# Patient Record
Sex: FEMALE | Race: BLACK OR AFRICAN AMERICAN | NOT HISPANIC OR LATINO | Employment: FULL TIME | URBAN - METROPOLITAN AREA
[De-identification: names, ages, dates, MRNs, and addresses within clinical notes are randomized per-mention and may not be internally consistent; named-entity substitution may affect disease eponyms.]

---

## 2020-01-01 ENCOUNTER — TELEPHONE (OUTPATIENT)
Dept: HEMATOLOGY/ONCOLOGY | Facility: CLINIC | Age: 30
End: 2020-01-01

## 2020-01-01 ENCOUNTER — ANESTHESIA EVENT (OUTPATIENT)
Dept: SURGERY | Facility: HOSPITAL | Age: 30
DRG: 314 | End: 2020-01-01
Payer: MEDICAID

## 2020-01-01 ENCOUNTER — HOSPITAL ENCOUNTER (EMERGENCY)
Facility: HOSPITAL | Age: 30
Discharge: HOME | End: 2020-08-09
Attending: FAMILY MEDICINE
Payer: MEDICAID

## 2020-01-01 ENCOUNTER — DOCUMENTATION ONLY (OUTPATIENT)
Dept: HEMATOLOGY/ONCOLOGY | Facility: CLINIC | Age: 30
End: 2020-01-01

## 2020-01-01 ENCOUNTER — OFFICE VISIT (OUTPATIENT)
Dept: HEMATOLOGY/ONCOLOGY | Facility: CLINIC | Age: 30
End: 2020-01-01
Payer: MEDICAID

## 2020-01-01 ENCOUNTER — PATIENT OUTREACH (OUTPATIENT)
Dept: ADMINISTRATIVE | Facility: CLINIC | Age: 30
End: 2020-01-01

## 2020-01-01 ENCOUNTER — LAB VISIT (OUTPATIENT)
Dept: LAB | Facility: HOSPITAL | Age: 30
End: 2020-01-01
Attending: INTERNAL MEDICINE
Payer: MEDICAID

## 2020-01-01 ENCOUNTER — HOSPITAL ENCOUNTER (EMERGENCY)
Facility: HOSPITAL | Age: 30
Discharge: HOME | End: 2020-06-06
Attending: FAMILY MEDICINE
Payer: MEDICAID

## 2020-01-01 ENCOUNTER — ANESTHESIA (OUTPATIENT)
Dept: SURGERY | Facility: HOSPITAL | Age: 30
DRG: 314 | End: 2020-01-01
Payer: MEDICAID

## 2020-01-01 ENCOUNTER — OFFICE VISIT (OUTPATIENT)
Dept: SURGERY | Facility: CLINIC | Age: 30
End: 2020-01-01
Payer: MEDICAID

## 2020-01-01 ENCOUNTER — TELEPHONE (OUTPATIENT)
Dept: SURGERY | Facility: CLINIC | Age: 30
End: 2020-01-01

## 2020-01-01 ENCOUNTER — ANESTHESIA (OUTPATIENT)
Dept: INTENSIVE CARE | Facility: HOSPITAL | Age: 30
DRG: 871 | End: 2020-01-01
Payer: MEDICAID

## 2020-01-01 ENCOUNTER — TELEPHONE (OUTPATIENT)
Dept: GASTROENTEROLOGY | Facility: CLINIC | Age: 30
End: 2020-01-01

## 2020-01-01 ENCOUNTER — OFFICE VISIT (OUTPATIENT)
Dept: FAMILY MEDICINE | Facility: HOSPITAL | Age: 30
End: 2020-01-01
Attending: INTERNAL MEDICINE
Payer: MEDICAID

## 2020-01-01 ENCOUNTER — HOSPITAL ENCOUNTER (INPATIENT)
Facility: HOSPITAL | Age: 30
LOS: 7 days | Discharge: HOME | DRG: 330 | End: 2020-07-20
Attending: EMERGENCY MEDICINE | Admitting: INTERNAL MEDICINE
Payer: MEDICAID

## 2020-01-01 ENCOUNTER — ANESTHESIA EVENT (OUTPATIENT)
Dept: SURGERY | Facility: HOSPITAL | Age: 30
DRG: 330 | End: 2020-01-01
Payer: MEDICAID

## 2020-01-01 ENCOUNTER — TELEPHONE (OUTPATIENT)
Dept: HOME HEALTH SERVICES | Facility: CLINIC | Age: 30
End: 2020-01-01

## 2020-01-01 ENCOUNTER — HOSPITAL ENCOUNTER (INPATIENT)
Facility: HOSPITAL | Age: 30
LOS: 4 days | Discharge: HOME HEALTH | DRG: 871 | End: 2020-09-24
Attending: EMERGENCY MEDICINE | Admitting: HOSPITALIST
Payer: MEDICAID

## 2020-01-01 ENCOUNTER — CLINICAL SUPPORT (OUTPATIENT)
Dept: HEMATOLOGY/ONCOLOGY | Facility: CLINIC | Age: 30
End: 2020-01-01
Payer: MEDICAID

## 2020-01-01 ENCOUNTER — RESEARCH ENCOUNTER (OUTPATIENT)
Dept: RESEARCH | Facility: HOSPITAL | Age: 30
End: 2020-01-01

## 2020-01-01 ENCOUNTER — HOSPITAL ENCOUNTER (INPATIENT)
Facility: HOSPITAL | Age: 30
LOS: 12 days | DRG: 871 | End: 2020-10-06
Attending: EMERGENCY MEDICINE | Admitting: EMERGENCY MEDICINE
Payer: MEDICAID

## 2020-01-01 ENCOUNTER — ANESTHESIA (OUTPATIENT)
Dept: ENDOSCOPY | Facility: HOSPITAL | Age: 30
DRG: 330 | End: 2020-01-01
Payer: MEDICAID

## 2020-01-01 ENCOUNTER — TELEPHONE (OUTPATIENT)
Dept: INFUSION THERAPY | Facility: HOSPITAL | Age: 30
End: 2020-01-01

## 2020-01-01 ENCOUNTER — INFUSION (OUTPATIENT)
Dept: INFUSION THERAPY | Facility: HOSPITAL | Age: 30
End: 2020-01-01
Attending: INTERNAL MEDICINE
Payer: MEDICAID

## 2020-01-01 ENCOUNTER — ANESTHESIA (OUTPATIENT)
Dept: ENDOSCOPY | Facility: HOSPITAL | Age: 30
DRG: 871 | End: 2020-01-01
Payer: MEDICAID

## 2020-01-01 ENCOUNTER — INFUSION (OUTPATIENT)
Dept: INFUSION THERAPY | Facility: HOSPITAL | Age: 30
End: 2020-01-01
Payer: MEDICAID

## 2020-01-01 ENCOUNTER — ANESTHESIA EVENT (OUTPATIENT)
Dept: ENDOSCOPY | Facility: HOSPITAL | Age: 30
DRG: 871 | End: 2020-01-01
Payer: MEDICAID

## 2020-01-01 ENCOUNTER — ANESTHESIA (OUTPATIENT)
Dept: SURGERY | Facility: HOSPITAL | Age: 30
DRG: 330 | End: 2020-01-01
Payer: MEDICAID

## 2020-01-01 ENCOUNTER — ANESTHESIA EVENT (OUTPATIENT)
Dept: INTENSIVE CARE | Facility: HOSPITAL | Age: 30
DRG: 871 | End: 2020-01-01
Payer: MEDICAID

## 2020-01-01 ENCOUNTER — HOSPITAL ENCOUNTER (EMERGENCY)
Facility: HOSPITAL | Age: 30
Discharge: HOME | End: 2020-08-01
Attending: EMERGENCY MEDICINE
Payer: MEDICAID

## 2020-01-01 ENCOUNTER — ANESTHESIA EVENT (OUTPATIENT)
Dept: ENDOSCOPY | Facility: HOSPITAL | Age: 30
DRG: 330 | End: 2020-01-01
Payer: MEDICAID

## 2020-01-01 ENCOUNTER — HOSPITAL ENCOUNTER (INPATIENT)
Facility: HOSPITAL | Age: 30
LOS: 14 days | Discharge: HOME | DRG: 314 | End: 2020-09-09
Attending: EMERGENCY MEDICINE | Admitting: INTERNAL MEDICINE
Payer: MEDICAID

## 2020-01-01 ENCOUNTER — NURSE TRIAGE (OUTPATIENT)
Dept: ADMINISTRATIVE | Facility: CLINIC | Age: 30
End: 2020-01-01

## 2020-01-01 VITALS
HEART RATE: 121 BPM | TEMPERATURE: 98 F | DIASTOLIC BLOOD PRESSURE: 70 MMHG | SYSTOLIC BLOOD PRESSURE: 108 MMHG | OXYGEN SATURATION: 99 % | WEIGHT: 186.75 LBS | BODY MASS INDEX: 31.11 KG/M2 | HEIGHT: 65 IN | RESPIRATION RATE: 17 BRPM

## 2020-01-01 VITALS
OXYGEN SATURATION: 98 % | DIASTOLIC BLOOD PRESSURE: 101 MMHG | SYSTOLIC BLOOD PRESSURE: 136 MMHG | HEART RATE: 121 BPM | BODY MASS INDEX: 30.71 KG/M2 | TEMPERATURE: 98 F | WEIGHT: 184.5 LBS | RESPIRATION RATE: 18 BRPM

## 2020-01-01 VITALS
RESPIRATION RATE: 20 BRPM | WEIGHT: 194 LBS | SYSTOLIC BLOOD PRESSURE: 143 MMHG | DIASTOLIC BLOOD PRESSURE: 89 MMHG | OXYGEN SATURATION: 96 % | BODY MASS INDEX: 32.32 KG/M2 | HEART RATE: 118 BPM | HEIGHT: 65 IN | TEMPERATURE: 100 F

## 2020-01-01 VITALS
HEIGHT: 65 IN | OXYGEN SATURATION: 100 % | WEIGHT: 186.31 LBS | SYSTOLIC BLOOD PRESSURE: 115 MMHG | DIASTOLIC BLOOD PRESSURE: 74 MMHG | HEART RATE: 103 BPM | RESPIRATION RATE: 22 BRPM | BODY MASS INDEX: 31.04 KG/M2 | TEMPERATURE: 98 F

## 2020-01-01 VITALS
SYSTOLIC BLOOD PRESSURE: 135 MMHG | RESPIRATION RATE: 20 BRPM | DIASTOLIC BLOOD PRESSURE: 90 MMHG | HEART RATE: 90 BPM | BODY MASS INDEX: 33.99 KG/M2 | WEIGHT: 204 LBS | OXYGEN SATURATION: 100 % | HEIGHT: 65 IN | TEMPERATURE: 98 F

## 2020-01-01 VITALS
TEMPERATURE: 99 F | SYSTOLIC BLOOD PRESSURE: 92 MMHG | RESPIRATION RATE: 18 BRPM | BODY MASS INDEX: 30.64 KG/M2 | DIASTOLIC BLOOD PRESSURE: 92 MMHG | DIASTOLIC BLOOD PRESSURE: 64 MMHG | SYSTOLIC BLOOD PRESSURE: 130 MMHG | DIASTOLIC BLOOD PRESSURE: 89 MMHG | WEIGHT: 185.94 LBS | OXYGEN SATURATION: 97 % | HEART RATE: 102 BPM | HEIGHT: 65 IN | SYSTOLIC BLOOD PRESSURE: 126 MMHG | WEIGHT: 183.88 LBS | BODY MASS INDEX: 29.88 KG/M2 | HEART RATE: 112 BPM | HEIGHT: 66 IN

## 2020-01-01 VITALS
SYSTOLIC BLOOD PRESSURE: 107 MMHG | RESPIRATION RATE: 20 BRPM | DIASTOLIC BLOOD PRESSURE: 65 MMHG | OXYGEN SATURATION: 99 % | TEMPERATURE: 99 F | HEART RATE: 114 BPM | BODY MASS INDEX: 31.07 KG/M2 | HEIGHT: 65 IN

## 2020-01-01 VITALS
RESPIRATION RATE: 20 BRPM | DIASTOLIC BLOOD PRESSURE: 79 MMHG | TEMPERATURE: 100 F | HEART RATE: 111 BPM | WEIGHT: 293 LBS | HEIGHT: 65 IN | BODY MASS INDEX: 48.82 KG/M2 | OXYGEN SATURATION: 100 % | SYSTOLIC BLOOD PRESSURE: 125 MMHG

## 2020-01-01 VITALS
HEART RATE: 119 BPM | WEIGHT: 191.13 LBS | DIASTOLIC BLOOD PRESSURE: 84 MMHG | OXYGEN SATURATION: 100 % | HEIGHT: 66 IN | BODY MASS INDEX: 30.72 KG/M2 | RESPIRATION RATE: 21 BRPM | TEMPERATURE: 99 F | SYSTOLIC BLOOD PRESSURE: 144 MMHG

## 2020-01-01 VITALS
SYSTOLIC BLOOD PRESSURE: 80 MMHG | BODY MASS INDEX: 32.36 KG/M2 | HEART RATE: 125 BPM | TEMPERATURE: 102 F | RESPIRATION RATE: 9 BRPM | WEIGHT: 194.25 LBS | DIASTOLIC BLOOD PRESSURE: 45 MMHG | OXYGEN SATURATION: 83 % | HEIGHT: 65 IN

## 2020-01-01 VITALS
SYSTOLIC BLOOD PRESSURE: 138 MMHG | WEIGHT: 195.13 LBS | OXYGEN SATURATION: 99 % | HEART RATE: 103 BPM | HEIGHT: 65 IN | BODY MASS INDEX: 32.51 KG/M2 | DIASTOLIC BLOOD PRESSURE: 94 MMHG | TEMPERATURE: 99 F | RESPIRATION RATE: 18 BRPM

## 2020-01-01 VITALS
TEMPERATURE: 98 F | SYSTOLIC BLOOD PRESSURE: 137 MMHG | DIASTOLIC BLOOD PRESSURE: 91 MMHG | RESPIRATION RATE: 18 BRPM | HEART RATE: 94 BPM | OXYGEN SATURATION: 100 %

## 2020-01-01 DIAGNOSIS — C18.9 ADENOCARCINOMA OF COLON: Primary | ICD-10-CM

## 2020-01-01 DIAGNOSIS — R79.89 ELEVATED LFTS: ICD-10-CM

## 2020-01-01 DIAGNOSIS — T45.1X5A CHEMOTHERAPY-INDUCED NAUSEA AND VOMITING: ICD-10-CM

## 2020-01-01 DIAGNOSIS — R78.81 MRSA BACTEREMIA: ICD-10-CM

## 2020-01-01 DIAGNOSIS — C18.9 ADENOCARCINOMA OF COLON: ICD-10-CM

## 2020-01-01 DIAGNOSIS — Z71.89 COUNSELING REGARDING ADVANCE CARE PLANNING AND GOALS OF CARE: ICD-10-CM

## 2020-01-01 DIAGNOSIS — E87.20 LACTIC ACID ACIDOSIS: ICD-10-CM

## 2020-01-01 DIAGNOSIS — R11.2 INTRACTABLE NAUSEA AND VOMITING: Primary | ICD-10-CM

## 2020-01-01 DIAGNOSIS — G93.40 ACUTE ENCEPHALOPATHY: ICD-10-CM

## 2020-01-01 DIAGNOSIS — E87.20 LACTIC ACIDOSIS: ICD-10-CM

## 2020-01-01 DIAGNOSIS — B95.62 MRSA BACTEREMIA: ICD-10-CM

## 2020-01-01 DIAGNOSIS — R11.2 CHEMOTHERAPY-INDUCED NAUSEA AND VOMITING: ICD-10-CM

## 2020-01-01 DIAGNOSIS — T80.219D INFECTION OF VENOUS ACCESS PORT, SUBSEQUENT ENCOUNTER: ICD-10-CM

## 2020-01-01 DIAGNOSIS — C79.9 METASTATIC DISEASE: ICD-10-CM

## 2020-01-01 DIAGNOSIS — G89.3 CHRONIC NEOPLASM-RELATED PAIN: ICD-10-CM

## 2020-01-01 DIAGNOSIS — R78.81 BACTEREMIA DUE TO STAPHYLOCOCCUS: ICD-10-CM

## 2020-01-01 DIAGNOSIS — D50.0 IRON DEFICIENCY ANEMIA DUE TO CHRONIC BLOOD LOSS: ICD-10-CM

## 2020-01-01 DIAGNOSIS — Z71.89 ADVANCE CARE PLANNING: ICD-10-CM

## 2020-01-01 DIAGNOSIS — R00.0 TACHYARRHYTHMIA: ICD-10-CM

## 2020-01-01 DIAGNOSIS — R06.02 SHORTNESS OF BREATH: ICD-10-CM

## 2020-01-01 DIAGNOSIS — D63.0 ANEMIA IN NEOPLASTIC DISEASE: ICD-10-CM

## 2020-01-01 DIAGNOSIS — T45.1X5A CHEMOTHERAPY-INDUCED NEUTROPENIA: ICD-10-CM

## 2020-01-01 DIAGNOSIS — I38 ENDOCARDITIS: ICD-10-CM

## 2020-01-01 DIAGNOSIS — R52 PAIN: ICD-10-CM

## 2020-01-01 DIAGNOSIS — R94.31 QT PROLONGATION: ICD-10-CM

## 2020-01-01 DIAGNOSIS — K59.00 CONSTIPATION, UNSPECIFIED CONSTIPATION TYPE: ICD-10-CM

## 2020-01-01 DIAGNOSIS — K63.89 COLONIC MASS: ICD-10-CM

## 2020-01-01 DIAGNOSIS — R10.9 ABDOMINAL PAIN: ICD-10-CM

## 2020-01-01 DIAGNOSIS — R41.82 ALTERED MENTAL STATUS, UNSPECIFIED ALTERED MENTAL STATUS TYPE: Primary | ICD-10-CM

## 2020-01-01 DIAGNOSIS — C78.7 SECONDARY MALIGNANCY OF LIVER: ICD-10-CM

## 2020-01-01 DIAGNOSIS — A41.9 SEPSIS: ICD-10-CM

## 2020-01-01 DIAGNOSIS — T80.219A INFECTION OF VENOUS ACCESS PORT, INITIAL ENCOUNTER: ICD-10-CM

## 2020-01-01 DIAGNOSIS — Z51.5 PALLIATIVE CARE ENCOUNTER: ICD-10-CM

## 2020-01-01 DIAGNOSIS — E87.20 METABOLIC ACIDOSIS: ICD-10-CM

## 2020-01-01 DIAGNOSIS — B95.8 BACTEREMIA DUE TO STAPHYLOCOCCUS: ICD-10-CM

## 2020-01-01 DIAGNOSIS — T81.30XA WOUND DEHISCENCE: Primary | ICD-10-CM

## 2020-01-01 DIAGNOSIS — R00.0 TACHYCARDIA: ICD-10-CM

## 2020-01-01 DIAGNOSIS — C18.9 METASTATIC COLON CANCER TO LIVER: ICD-10-CM

## 2020-01-01 DIAGNOSIS — R34 OLIGURIA: ICD-10-CM

## 2020-01-01 DIAGNOSIS — R04.0 EPISTAXIS: ICD-10-CM

## 2020-01-01 DIAGNOSIS — D63.0 ANEMIA IN NEOPLASTIC DISEASE: Primary | ICD-10-CM

## 2020-01-01 DIAGNOSIS — R07.9 CHEST PAIN: ICD-10-CM

## 2020-01-01 DIAGNOSIS — R65.21 SEPTIC SHOCK: ICD-10-CM

## 2020-01-01 DIAGNOSIS — Z71.89 GOALS OF CARE, COUNSELING/DISCUSSION: ICD-10-CM

## 2020-01-01 DIAGNOSIS — C78.7 METASTATIC COLON CANCER TO LIVER: ICD-10-CM

## 2020-01-01 DIAGNOSIS — R11.2 NON-INTRACTABLE VOMITING WITH NAUSEA, UNSPECIFIED VOMITING TYPE: ICD-10-CM

## 2020-01-01 DIAGNOSIS — R31.9 URINARY TRACT INFECTION WITH HEMATURIA, SITE UNSPECIFIED: Primary | ICD-10-CM

## 2020-01-01 DIAGNOSIS — E80.6 HYPERBILIRUBINEMIA: ICD-10-CM

## 2020-01-01 DIAGNOSIS — R11.2 NAUSEA AND VOMITING, INTRACTABILITY OF VOMITING NOT SPECIFIED, UNSPECIFIED VOMITING TYPE: Primary | ICD-10-CM

## 2020-01-01 DIAGNOSIS — R79.89 ELEVATED TROPONIN: ICD-10-CM

## 2020-01-01 DIAGNOSIS — D70.1 CHEMOTHERAPY-INDUCED NEUTROPENIA: ICD-10-CM

## 2020-01-01 DIAGNOSIS — Z79.899 DVT PROPHYLAXIS: ICD-10-CM

## 2020-01-01 DIAGNOSIS — Z01.818 PREOP TESTING: Primary | ICD-10-CM

## 2020-01-01 DIAGNOSIS — R16.0 LIVER MASS: ICD-10-CM

## 2020-01-01 DIAGNOSIS — D70.9 NEUTROPENIA, UNSPECIFIED TYPE: ICD-10-CM

## 2020-01-01 DIAGNOSIS — N17.9 AKI (ACUTE KIDNEY INJURY): ICD-10-CM

## 2020-01-01 DIAGNOSIS — A41.9 SEPTIC SHOCK: ICD-10-CM

## 2020-01-01 DIAGNOSIS — N39.0 URINARY TRACT INFECTION WITH HEMATURIA, SITE UNSPECIFIED: Primary | ICD-10-CM

## 2020-01-01 DIAGNOSIS — A41.9 SEPSIS, DUE TO UNSPECIFIED ORGANISM, UNSPECIFIED WHETHER ACUTE ORGAN DYSFUNCTION PRESENT: Primary | ICD-10-CM

## 2020-01-01 LAB
ABO + RH BLD: NORMAL
AFP SERPL-MCNC: 2.2 NG/ML (ref 0–8.4)
ALBUMIN SERPL BCP-MCNC: 1.5 G/DL (ref 3.5–5.2)
ALBUMIN SERPL BCP-MCNC: 1.5 G/DL (ref 3.5–5.2)
ALBUMIN SERPL BCP-MCNC: 1.6 G/DL (ref 3.5–5.2)
ALBUMIN SERPL BCP-MCNC: 1.6 G/DL (ref 3.5–5.2)
ALBUMIN SERPL BCP-MCNC: 1.7 G/DL (ref 3.5–5.2)
ALBUMIN SERPL BCP-MCNC: 1.8 G/DL (ref 3.5–5.2)
ALBUMIN SERPL BCP-MCNC: 1.9 G/DL (ref 3.5–5.2)
ALBUMIN SERPL BCP-MCNC: 1.9 G/DL (ref 3.5–5.2)
ALBUMIN SERPL BCP-MCNC: 2 G/DL (ref 3.5–5.2)
ALBUMIN SERPL BCP-MCNC: 2.1 G/DL (ref 3.5–5.2)
ALBUMIN SERPL BCP-MCNC: 2.2 G/DL (ref 3.5–5.2)
ALBUMIN SERPL BCP-MCNC: 2.3 G/DL (ref 3.5–5.2)
ALBUMIN SERPL BCP-MCNC: 2.3 G/DL (ref 3.5–5.2)
ALBUMIN SERPL BCP-MCNC: 2.4 G/DL (ref 3.5–5.2)
ALBUMIN SERPL BCP-MCNC: 2.9 G/DL (ref 3.5–5.2)
ALBUMIN SERPL BCP-MCNC: 3 G/DL (ref 3.5–5.2)
ALBUMIN SERPL BCP-MCNC: 3 G/DL (ref 3.5–5.2)
ALBUMIN SERPL BCP-MCNC: 3.5 G/DL (ref 3.5–5.2)
ALBUMIN SERPL BCP-MCNC: 3.5 G/DL (ref 3.5–5.2)
ALBUMIN SERPL BCP-MCNC: 3.7 G/DL (ref 3.5–5.2)
ALBUMIN SERPL BCP-MCNC: 3.9 G/DL (ref 3.5–5.2)
ALBUMIN SERPL BCP-MCNC: 4.1 G/DL (ref 3.5–5.2)
ALBUMIN SERPL BCP-MCNC: 4.2 G/DL (ref 3.5–5.2)
ALBUMIN SERPL BCP-MCNC: 4.2 G/DL (ref 3.5–5.2)
ALBUMIN SERPL BCP-MCNC: 4.5 G/DL (ref 3.5–5.2)
ALBUMIN SERPL BCP-MCNC: 4.5 G/DL (ref 3.5–5.2)
ALBUMIN/CREAT UR: 66.7 UG/MG (ref 0–30)
ALLENS TEST: ABNORMAL
ALP SERPL-CCNC: 126 U/L (ref 38–126)
ALP SERPL-CCNC: 156 U/L (ref 55–135)
ALP SERPL-CCNC: 159 U/L (ref 55–135)
ALP SERPL-CCNC: 160 U/L (ref 55–135)
ALP SERPL-CCNC: 167 U/L (ref 55–135)
ALP SERPL-CCNC: 167 U/L (ref 55–135)
ALP SERPL-CCNC: 190 U/L (ref 38–126)
ALP SERPL-CCNC: 202 U/L (ref 38–126)
ALP SERPL-CCNC: 205 U/L (ref 38–126)
ALP SERPL-CCNC: 235 U/L (ref 38–126)
ALP SERPL-CCNC: 240 U/L (ref 55–135)
ALP SERPL-CCNC: 247 U/L (ref 38–126)
ALP SERPL-CCNC: 280 U/L (ref 55–135)
ALP SERPL-CCNC: 281 U/L (ref 38–126)
ALP SERPL-CCNC: 291 U/L (ref 55–135)
ALP SERPL-CCNC: 294 U/L (ref 55–135)
ALP SERPL-CCNC: 301 U/L (ref 55–135)
ALP SERPL-CCNC: 308 U/L (ref 55–135)
ALP SERPL-CCNC: 313 U/L (ref 55–135)
ALP SERPL-CCNC: 325 U/L (ref 55–135)
ALP SERPL-CCNC: 330 U/L (ref 55–135)
ALP SERPL-CCNC: 336 U/L (ref 55–135)
ALP SERPL-CCNC: 337 U/L (ref 55–135)
ALP SERPL-CCNC: 372 U/L (ref 55–135)
ALP SERPL-CCNC: 383 U/L (ref 55–135)
ALP SERPL-CCNC: 397 U/L (ref 55–135)
ALP SERPL-CCNC: 417 U/L (ref 55–135)
ALP SERPL-CCNC: 93 U/L (ref 55–135)
ALP SERPL-CCNC: 93 U/L (ref 55–135)
ALT SERPL W/O P-5'-P-CCNC: 111 U/L (ref 10–44)
ALT SERPL W/O P-5'-P-CCNC: 128 U/L (ref 10–44)
ALT SERPL W/O P-5'-P-CCNC: 129 U/L (ref 10–44)
ALT SERPL W/O P-5'-P-CCNC: 20 U/L (ref 10–44)
ALT SERPL W/O P-5'-P-CCNC: 24 U/L (ref 10–44)
ALT SERPL W/O P-5'-P-CCNC: 37 U/L (ref 10–44)
ALT SERPL W/O P-5'-P-CCNC: 37 U/L (ref 10–44)
ALT SERPL W/O P-5'-P-CCNC: 38 U/L (ref 10–44)
ALT SERPL W/O P-5'-P-CCNC: 38 U/L (ref 10–44)
ALT SERPL W/O P-5'-P-CCNC: 46 U/L (ref 10–44)
ALT SERPL W/O P-5'-P-CCNC: 46 U/L (ref 10–44)
ALT SERPL W/O P-5'-P-CCNC: 47 U/L (ref 10–44)
ALT SERPL W/O P-5'-P-CCNC: 51 U/L (ref 10–44)
ALT SERPL W/O P-5'-P-CCNC: 52 U/L (ref 10–44)
ALT SERPL W/O P-5'-P-CCNC: 54 U/L (ref 10–44)
ALT SERPL W/O P-5'-P-CCNC: 55 U/L (ref 10–44)
ALT SERPL W/O P-5'-P-CCNC: 56 U/L (ref 10–44)
ALT SERPL W/O P-5'-P-CCNC: 58 U/L (ref 10–44)
ALT SERPL W/O P-5'-P-CCNC: 58 U/L (ref 10–44)
ALT SERPL W/O P-5'-P-CCNC: 61 U/L (ref 10–44)
ALT SERPL W/O P-5'-P-CCNC: 64 U/L (ref 10–44)
ALT SERPL W/O P-5'-P-CCNC: 70 U/L (ref 10–44)
ALT SERPL W/O P-5'-P-CCNC: 71 U/L (ref 10–44)
ALT SERPL W/O P-5'-P-CCNC: 73 U/L (ref 10–44)
ALT SERPL W/O P-5'-P-CCNC: 81 U/L (ref 10–44)
ALT SERPL W/O P-5'-P-CCNC: 83 U/L (ref 10–44)
ALT SERPL W/O P-5'-P-CCNC: 84 U/L (ref 10–44)
ALT SERPL W/O P-5'-P-CCNC: 93 U/L (ref 10–44)
ALT SERPL W/O P-5'-P-CCNC: 98 U/L (ref 10–44)
AMMONIA PLAS-SCNC: 18 UMOL/L (ref 10–50)
AMMONIA PLAS-SCNC: 30 UMOL/L (ref 10–50)
AMORPH CRY UR QL COMP ASSIST: ABNORMAL
AMPHET+METHAMPHET UR QL: NEGATIVE
AMPHIPHYSIN AB TITR CSF: NEGATIVE TITER
AMPHIPHYSIN AB TITR SER: NORMAL {TITER}
ANION GAP SERPL CALC-SCNC: 10 MMOL/L (ref 8–16)
ANION GAP SERPL CALC-SCNC: 11 MMOL/L (ref 8–16)
ANION GAP SERPL CALC-SCNC: 12 MMOL/L (ref 8–16)
ANION GAP SERPL CALC-SCNC: 13 MMOL/L (ref 8–16)
ANION GAP SERPL CALC-SCNC: 14 MMOL/L (ref 8–16)
ANION GAP SERPL CALC-SCNC: 14 MMOL/L (ref 8–16)
ANION GAP SERPL CALC-SCNC: 15 MMOL/L (ref 8–16)
ANION GAP SERPL CALC-SCNC: 16 MMOL/L (ref 8–16)
ANION GAP SERPL CALC-SCNC: 16 MMOL/L (ref 8–16)
ANION GAP SERPL CALC-SCNC: 17 MMOL/L (ref 8–16)
ANION GAP SERPL CALC-SCNC: 18 MMOL/L (ref 8–16)
ANION GAP SERPL CALC-SCNC: 20 MMOL/L (ref 8–16)
ANION GAP SERPL CALC-SCNC: 22 MMOL/L (ref 8–16)
ANION GAP SERPL CALC-SCNC: 24 MMOL/L (ref 8–16)
ANION GAP SERPL CALC-SCNC: 26 MMOL/L (ref 8–16)
ANION GAP SERPL CALC-SCNC: 7 MMOL/L (ref 8–16)
ANION GAP SERPL CALC-SCNC: 7 MMOL/L (ref 8–16)
ANION GAP SERPL CALC-SCNC: 8 MMOL/L (ref 8–16)
ANION GAP SERPL CALC-SCNC: 8 MMOL/L (ref 8–16)
ANION GAP SERPL CALC-SCNC: 9 MMOL/L (ref 8–16)
ANION GAP SERPL CALC-SCNC: 9 MMOL/L (ref 8–16)
ANISOCYTOSIS BLD QL SMEAR: ABNORMAL
ANISOCYTOSIS BLD QL SMEAR: SLIGHT
AORTIC ROOT ANNULUS: 2.61 CM
ASCENDING AORTA: 2.31 CM
ASCENDING AORTA: 2.76 CM
AST SERPL-CCNC: 100 U/L (ref 10–40)
AST SERPL-CCNC: 104 U/L (ref 10–40)
AST SERPL-CCNC: 112 U/L (ref 10–40)
AST SERPL-CCNC: 117 U/L (ref 15–46)
AST SERPL-CCNC: 118 U/L (ref 10–40)
AST SERPL-CCNC: 121 U/L (ref 10–40)
AST SERPL-CCNC: 138 U/L (ref 10–40)
AST SERPL-CCNC: 150 U/L (ref 10–40)
AST SERPL-CCNC: 153 U/L (ref 10–40)
AST SERPL-CCNC: 167 U/L (ref 10–40)
AST SERPL-CCNC: 173 U/L (ref 10–40)
AST SERPL-CCNC: 200 U/L (ref 10–40)
AST SERPL-CCNC: 201 U/L (ref 10–40)
AST SERPL-CCNC: 210 U/L (ref 15–46)
AST SERPL-CCNC: 219 U/L (ref 10–40)
AST SERPL-CCNC: 222 U/L (ref 10–40)
AST SERPL-CCNC: 237 U/L (ref 10–40)
AST SERPL-CCNC: 241 U/L (ref 15–46)
AST SERPL-CCNC: 261 U/L (ref 10–40)
AST SERPL-CCNC: 265 U/L (ref 15–46)
AST SERPL-CCNC: 274 U/L (ref 15–46)
AST SERPL-CCNC: 285 U/L (ref 10–40)
AST SERPL-CCNC: 346 U/L (ref 15–46)
AST SERPL-CCNC: 379 U/L (ref 10–40)
AST SERPL-CCNC: 383 U/L (ref 10–40)
AST SERPL-CCNC: 80 U/L (ref 10–40)
AST SERPL-CCNC: 86 U/L (ref 10–40)
AST SERPL-CCNC: 89 U/L (ref 10–40)
AST SERPL-CCNC: 90 U/L (ref 15–46)
AV INDEX (PROSTH): 0.79
AV INDEX (PROSTH): 0.82
AV MEAN GRADIENT: 3 MMHG
AV MEAN GRADIENT: 6 MMHG
AV PEAK GRADIENT: 13 MMHG
AV PEAK GRADIENT: 4 MMHG
AV VALVE AREA: 2.47 CM2
AV VALVE AREA: 2.54 CM2
AV VELOCITY RATIO: 0.71
AV VELOCITY RATIO: 0.73
B-HCG UR QL: NEGATIVE
BACTERIA #/AREA URNS AUTO: ABNORMAL /HPF
BACTERIA #/AREA URNS AUTO: NORMAL /HPF
BACTERIA #/AREA URNS HPF: ABNORMAL /HPF
BACTERIA #/AREA URNS HPF: ABNORMAL /HPF
BACTERIA #/AREA URNS HPF: NORMAL /HPF
BACTERIA BLD CULT: ABNORMAL
BACTERIA BLD CULT: NORMAL
BACTERIA CATH TIP CULT: ABNORMAL
BACTERIA CSF CULT: NO GROWTH
BACTERIA SPEC AEROBE CULT: ABNORMAL
BACTERIA SPEC ANAEROBE CULT: NORMAL
BACTERIA SPEC ANAEROBE CULT: NORMAL
BACTERIA UR CULT: ABNORMAL
BARBITURATES UR QL SCN>200 NG/ML: NEGATIVE
BASO STIPL BLD QL SMEAR: ABNORMAL
BASOPHILS # BLD AUTO: 0.01 K/UL (ref 0–0.2)
BASOPHILS # BLD AUTO: 0.02 K/UL (ref 0–0.2)
BASOPHILS # BLD AUTO: 0.03 K/UL (ref 0–0.2)
BASOPHILS # BLD AUTO: 0.04 K/UL (ref 0–0.2)
BASOPHILS # BLD AUTO: 0.04 K/UL (ref 0–0.2)
BASOPHILS # BLD AUTO: 0.05 K/UL (ref 0–0.2)
BASOPHILS # BLD AUTO: 0.06 K/UL (ref 0–0.2)
BASOPHILS # BLD AUTO: ABNORMAL K/UL (ref 0–0.2)
BASOPHILS NFR BLD: 0 % (ref 0–1.9)
BASOPHILS NFR BLD: 0.1 % (ref 0–1.9)
BASOPHILS NFR BLD: 0.3 % (ref 0–1.9)
BASOPHILS NFR BLD: 0.4 % (ref 0–1.9)
BASOPHILS NFR BLD: 0.4 % (ref 0–1.9)
BASOPHILS NFR BLD: 0.5 % (ref 0–1.9)
BASOPHILS NFR BLD: 0.5 % (ref 0–1.9)
BASOPHILS NFR BLD: 0.7 % (ref 0–1.9)
BASOPHILS NFR BLD: 0.8 % (ref 0–1.9)
BASOPHILS NFR BLD: 1 % (ref 0–1.9)
BASOPHILS NFR BLD: 2 % (ref 0–1.9)
BENZODIAZ UR QL SCN>200 NG/ML: NEGATIVE
BILIRUB SERPL-MCNC: 0.6 MG/DL (ref 0.1–1)
BILIRUB SERPL-MCNC: 0.6 MG/DL (ref 0.1–1)
BILIRUB SERPL-MCNC: 0.7 MG/DL (ref 0.1–1)
BILIRUB SERPL-MCNC: 0.7 MG/DL (ref 0.1–1)
BILIRUB SERPL-MCNC: 0.8 MG/DL (ref 0.1–1)
BILIRUB SERPL-MCNC: 0.9 MG/DL (ref 0.1–1)
BILIRUB SERPL-MCNC: 1 MG/DL (ref 0.1–1)
BILIRUB SERPL-MCNC: 1.1 MG/DL (ref 0.1–1)
BILIRUB SERPL-MCNC: 1.2 MG/DL (ref 0.1–1)
BILIRUB SERPL-MCNC: 1.3 MG/DL (ref 0.1–1)
BILIRUB SERPL-MCNC: 1.3 MG/DL (ref 0.1–1)
BILIRUB SERPL-MCNC: 1.6 MG/DL (ref 0.1–1)
BILIRUB SERPL-MCNC: 1.6 MG/DL (ref 0.1–1)
BILIRUB SERPL-MCNC: 1.8 MG/DL (ref 0.1–1)
BILIRUB SERPL-MCNC: 1.9 MG/DL (ref 0.1–1)
BILIRUB SERPL-MCNC: 2.2 MG/DL (ref 0.1–1)
BILIRUB SERPL-MCNC: 2.3 MG/DL (ref 0.1–1)
BILIRUB SERPL-MCNC: 2.5 MG/DL (ref 0.1–1)
BILIRUB SERPL-MCNC: 2.6 MG/DL (ref 0.1–1)
BILIRUB SERPL-MCNC: 2.6 MG/DL (ref 0.1–1)
BILIRUB SERPL-MCNC: 2.8 MG/DL (ref 0.1–1)
BILIRUB SERPL-MCNC: 2.8 MG/DL (ref 0.1–1)
BILIRUB SERPL-MCNC: 2.9 MG/DL (ref 0.1–1)
BILIRUB SERPL-MCNC: 2.9 MG/DL (ref 0.1–1)
BILIRUB SERPL-MCNC: 3 MG/DL (ref 0.1–1)
BILIRUB SERPL-MCNC: 3.1 MG/DL (ref 0.1–1)
BILIRUB SERPL-MCNC: 3.5 MG/DL (ref 0.1–1)
BILIRUB SERPL-MCNC: 3.7 MG/DL (ref 0.1–1)
BILIRUB SERPL-MCNC: 3.8 MG/DL (ref 0.1–1)
BILIRUB UR QL STRIP: ABNORMAL
BILIRUB UR QL STRIP: NEGATIVE
BLD GP AB SCN CELLS X3 SERPL QL: NORMAL
BLD PROD TYP BPU: NORMAL
BLOOD UNIT EXPIRATION DATE: NORMAL
BLOOD UNIT TYPE CODE: 5100
BLOOD UNIT TYPE: NORMAL
BNP SERPL-MCNC: 53 PG/ML (ref 0–99)
BSA FOR ECHO PROCEDURE: 1.93 M2
BSA FOR ECHO PROCEDURE: 1.93 M2
BSA FOR ECHO PROCEDURE: 1.97 M2
BUN SERPL-MCNC: 10 MG/DL (ref 6–20)
BUN SERPL-MCNC: 10 MG/DL (ref 6–20)
BUN SERPL-MCNC: 11 MG/DL (ref 6–20)
BUN SERPL-MCNC: 11 MG/DL (ref 6–20)
BUN SERPL-MCNC: 12 MG/DL (ref 6–20)
BUN SERPL-MCNC: 13 MG/DL (ref 6–20)
BUN SERPL-MCNC: 13 MG/DL (ref 6–20)
BUN SERPL-MCNC: 14 MG/DL (ref 6–20)
BUN SERPL-MCNC: 14 MG/DL (ref 7–17)
BUN SERPL-MCNC: 15 MG/DL (ref 6–20)
BUN SERPL-MCNC: 15 MG/DL (ref 6–20)
BUN SERPL-MCNC: 16 MG/DL (ref 6–20)
BUN SERPL-MCNC: 17 MG/DL (ref 6–20)
BUN SERPL-MCNC: 17 MG/DL (ref 6–20)
BUN SERPL-MCNC: 18 MG/DL (ref 6–20)
BUN SERPL-MCNC: 18 MG/DL (ref 6–20)
BUN SERPL-MCNC: 19 MG/DL (ref 6–20)
BUN SERPL-MCNC: 20 MG/DL (ref 6–20)
BUN SERPL-MCNC: 21 MG/DL (ref 6–20)
BUN SERPL-MCNC: 22 MG/DL (ref 6–20)
BUN SERPL-MCNC: 27 MG/DL (ref 6–20)
BUN SERPL-MCNC: 28 MG/DL (ref 6–20)
BUN SERPL-MCNC: 3 MG/DL (ref 7–17)
BUN SERPL-MCNC: 4 MG/DL (ref 6–20)
BUN SERPL-MCNC: 5 MG/DL (ref 7–17)
BUN SERPL-MCNC: 6 MG/DL (ref 6–20)
BUN SERPL-MCNC: 6 MG/DL (ref 6–20)
BUN SERPL-MCNC: 7 MG/DL (ref 6–20)
BUN SERPL-MCNC: 8 MG/DL (ref 6–20)
BUN SERPL-MCNC: 8 MG/DL (ref 7–17)
BUN SERPL-MCNC: 9 MG/DL (ref 7–17)
BURR CELLS BLD QL SMEAR: ABNORMAL
BZE UR QL SCN: NEGATIVE
C DIFF GDH STL QL: NEGATIVE
C DIFF GDH STL QL: NEGATIVE
C DIFF TOX A+B STL QL IA: NEGATIVE
C DIFF TOX A+B STL QL IA: NEGATIVE
CALCIUM SERPL-MCNC: 10 MG/DL (ref 8.7–10.5)
CALCIUM SERPL-MCNC: 10 MG/DL (ref 8.7–10.5)
CALCIUM SERPL-MCNC: 10.2 MG/DL (ref 8.7–10.5)
CALCIUM SERPL-MCNC: 10.3 MG/DL (ref 8.7–10.5)
CALCIUM SERPL-MCNC: 6.3 MG/DL (ref 8.7–10.5)
CALCIUM SERPL-MCNC: 7.1 MG/DL (ref 8.7–10.5)
CALCIUM SERPL-MCNC: 7.1 MG/DL (ref 8.7–10.5)
CALCIUM SERPL-MCNC: 7.2 MG/DL (ref 8.7–10.5)
CALCIUM SERPL-MCNC: 7.3 MG/DL (ref 8.7–10.5)
CALCIUM SERPL-MCNC: 7.4 MG/DL (ref 8.7–10.5)
CALCIUM SERPL-MCNC: 7.5 MG/DL (ref 8.7–10.5)
CALCIUM SERPL-MCNC: 7.6 MG/DL (ref 8.7–10.5)
CALCIUM SERPL-MCNC: 7.8 MG/DL (ref 8.7–10.5)
CALCIUM SERPL-MCNC: 7.9 MG/DL (ref 8.7–10.5)
CALCIUM SERPL-MCNC: 7.9 MG/DL (ref 8.7–10.5)
CALCIUM SERPL-MCNC: 8 MG/DL (ref 8.7–10.5)
CALCIUM SERPL-MCNC: 8 MG/DL (ref 8.7–10.5)
CALCIUM SERPL-MCNC: 8.3 MG/DL (ref 8.7–10.5)
CALCIUM SERPL-MCNC: 8.4 MG/DL (ref 8.7–10.5)
CALCIUM SERPL-MCNC: 8.4 MG/DL (ref 8.7–10.5)
CALCIUM SERPL-MCNC: 8.6 MG/DL (ref 8.7–10.5)
CALCIUM SERPL-MCNC: 8.6 MG/DL (ref 8.7–10.5)
CALCIUM SERPL-MCNC: 8.9 MG/DL (ref 8.7–10.5)
CALCIUM SERPL-MCNC: 9.1 MG/DL (ref 8.7–10.5)
CALCIUM SERPL-MCNC: 9.1 MG/DL (ref 8.7–10.5)
CALCIUM SERPL-MCNC: 9.2 MG/DL (ref 8.7–10.5)
CALCIUM SERPL-MCNC: 9.2 MG/DL (ref 8.7–10.5)
CALCIUM SERPL-MCNC: 9.6 MG/DL (ref 8.7–10.5)
CALCIUM SERPL-MCNC: 9.8 MG/DL (ref 8.7–10.5)
CANNABINOIDS UR QL SCN: NEGATIVE
CEA SERPL-MCNC: 133.5 NG/ML (ref 0–5)
CHLORIDE SERPL-SCNC: 100 MMOL/L (ref 95–110)
CHLORIDE SERPL-SCNC: 101 MMOL/L (ref 95–110)
CHLORIDE SERPL-SCNC: 101 MMOL/L (ref 95–110)
CHLORIDE SERPL-SCNC: 102 MMOL/L (ref 95–110)
CHLORIDE SERPL-SCNC: 103 MMOL/L (ref 95–110)
CHLORIDE SERPL-SCNC: 104 MMOL/L (ref 95–110)
CHLORIDE SERPL-SCNC: 105 MMOL/L (ref 95–110)
CHLORIDE SERPL-SCNC: 106 MMOL/L (ref 95–110)
CHLORIDE SERPL-SCNC: 106 MMOL/L (ref 95–110)
CHLORIDE SERPL-SCNC: 108 MMOL/L (ref 95–110)
CHLORIDE SERPL-SCNC: 108 MMOL/L (ref 95–110)
CHLORIDE SERPL-SCNC: 109 MMOL/L (ref 95–110)
CHLORIDE SERPL-SCNC: 109 MMOL/L (ref 95–110)
CHLORIDE SERPL-SCNC: 111 MMOL/L (ref 95–110)
CHLORIDE SERPL-SCNC: 112 MMOL/L (ref 95–110)
CHLORIDE SERPL-SCNC: 87 MMOL/L (ref 95–110)
CHLORIDE SERPL-SCNC: 93 MMOL/L (ref 95–110)
CHLORIDE SERPL-SCNC: 94 MMOL/L (ref 95–110)
CHLORIDE SERPL-SCNC: 96 MMOL/L (ref 95–110)
CHLORIDE SERPL-SCNC: 97 MMOL/L (ref 95–110)
CHLORIDE SERPL-SCNC: 97 MMOL/L (ref 95–110)
CHLORIDE SERPL-SCNC: 98 MMOL/L (ref 95–110)
CHLORIDE SERPL-SCNC: 99 MMOL/L (ref 95–110)
CHLORIDE UR-SCNC: 45 MMOL/L (ref 25–200)
CK SERPL-CCNC: 180 U/L (ref 55–170)
CK SERPL-CCNC: 299 U/L (ref 20–180)
CLARITY CSF: CLEAR
CLARITY CSF: CLEAR
CLARITY UR REFRACT.AUTO: ABNORMAL
CLARITY UR REFRACT.AUTO: CLEAR
CLARITY UR: ABNORMAL
CLARITY UR: ABNORMAL
CLARITY UR: CLEAR
CLARITY UR: CLEAR
CMV DNA SPEC QL NAA+PROBE: NOT DETECTED
CO2 SERPL-SCNC: 10 MMOL/L (ref 23–29)
CO2 SERPL-SCNC: 12 MMOL/L (ref 23–29)
CO2 SERPL-SCNC: 14 MMOL/L (ref 23–29)
CO2 SERPL-SCNC: 15 MMOL/L (ref 23–29)
CO2 SERPL-SCNC: 16 MMOL/L (ref 23–29)
CO2 SERPL-SCNC: 18 MMOL/L (ref 23–29)
CO2 SERPL-SCNC: 19 MMOL/L (ref 23–29)
CO2 SERPL-SCNC: 20 MMOL/L (ref 23–29)
CO2 SERPL-SCNC: 21 MMOL/L (ref 23–29)
CO2 SERPL-SCNC: 22 MMOL/L (ref 23–29)
CO2 SERPL-SCNC: 23 MMOL/L (ref 23–29)
CO2 SERPL-SCNC: 24 MMOL/L (ref 23–29)
CO2 SERPL-SCNC: 25 MMOL/L (ref 23–29)
CO2 SERPL-SCNC: 27 MMOL/L (ref 23–29)
CO2 SERPL-SCNC: 28 MMOL/L (ref 23–29)
CO2 SERPL-SCNC: 29 MMOL/L (ref 23–29)
CO2 SERPL-SCNC: 30 MMOL/L (ref 23–29)
CO2 SERPL-SCNC: 31 MMOL/L (ref 23–29)
CO2 SERPL-SCNC: 8 MMOL/L (ref 23–29)
CODING SYSTEM: NORMAL
COLOR CSF: COLORLESS
COLOR CSF: COLORLESS
COLOR UR AUTO: ABNORMAL
COLOR UR AUTO: ABNORMAL
COLOR UR AUTO: YELLOW
COLOR UR: ABNORMAL
COLOR UR: YELLOW
COMMENT: NORMAL
CORTIS SERPL-MCNC: 11.8 UG/DL
CREAT SERPL-MCNC: 0.6 MG/DL (ref 0.5–1.4)
CREAT SERPL-MCNC: 0.63 MG/DL (ref 0.5–1.4)
CREAT SERPL-MCNC: 0.64 MG/DL (ref 0.5–1.4)
CREAT SERPL-MCNC: 0.64 MG/DL (ref 0.5–1.4)
CREAT SERPL-MCNC: 0.65 MG/DL (ref 0.5–1.4)
CREAT SERPL-MCNC: 0.7 MG/DL (ref 0.5–1.4)
CREAT SERPL-MCNC: 0.72 MG/DL (ref 0.5–1.4)
CREAT SERPL-MCNC: 0.73 MG/DL (ref 0.5–1.4)
CREAT SERPL-MCNC: 0.8 MG/DL (ref 0.5–1.4)
CREAT SERPL-MCNC: 0.9 MG/DL (ref 0.5–1.4)
CREAT SERPL-MCNC: 1 MG/DL (ref 0.5–1.4)
CREAT SERPL-MCNC: 1.2 MG/DL (ref 0.5–1.4)
CREAT SERPL-MCNC: 1.4 MG/DL (ref 0.5–1.4)
CREAT SERPL-MCNC: 1.5 MG/DL (ref 0.5–1.4)
CREAT SERPL-MCNC: 1.7 MG/DL (ref 0.5–1.4)
CREAT SERPL-MCNC: 2 MG/DL (ref 0.5–1.4)
CREAT UR-MCNC: 49 MG/DL (ref 15–325)
CREAT UR-MCNC: 9 MG/DL (ref 15–325)
CREAT UR-MCNC: 9 MG/DL (ref 15–325)
CTP QC/QA: YES
CV ECHO LV RWT: 0.33 CM
CV ECHO LV RWT: 0.37 CM
CV2 IGG TITR CSF: NEGATIVE TITER
CV2 IGG TITR SER: NORMAL {TITER}
DACRYOCYTES BLD QL SMEAR: ABNORMAL
DELSYS: ABNORMAL
DIFFERENTIAL METHOD: ABNORMAL
DISPENSE STATUS: NORMAL
DOHLE BOD BLD QL SMEAR: PRESENT
DOP CALC AO PEAK VEL: 1.06 M/S
DOP CALC AO PEAK VEL: 1.77 M/S
DOP CALC AO VTI: 13.18 CM
DOP CALC AO VTI: 19.9 CM
DOP CALC LVOT AREA: 3.1 CM2
DOP CALC LVOT AREA: 3.1 CM2
DOP CALC LVOT DIAMETER: 1.98 CM
DOP CALC LVOT DIAMETER: 2 CM
DOP CALC LVOT PEAK VEL: 0.77 M/S
DOP CALC LVOT PEAK VEL: 1.25 M/S
DOP CALC LVOT STROKE VOLUME: 33.45 CM3
DOP CALC LVOT STROKE VOLUME: 49.2 CM3
DOP CALCLVOT PEAK VEL VTI: 10.87 CM
DOP CALCLVOT PEAK VEL VTI: 15.67 CM
E WAVE DECELERATION TIME: 60.12 MSEC
E WAVE DECELERATION TIME: 68.59 MSEC
E/A RATIO: 0.84
E/A RATIO: 0.87
E/E' RATIO: 5.57 M/S
E/E' RATIO: 8.94 M/S
EBV DNA SPEC QL NAA+PROBE: NOT DETECTED
ECHO LV POSTERIOR WALL: 0.63 CM (ref 0.6–1.1)
ECHO LV POSTERIOR WALL: 0.8 CM (ref 0.6–1.1)
EOSINOPHIL # BLD AUTO: 0 K/UL (ref 0–0.5)
EOSINOPHIL # BLD AUTO: 0.1 K/UL (ref 0–0.5)
EOSINOPHIL # BLD AUTO: 0.2 K/UL (ref 0–0.5)
EOSINOPHIL # BLD AUTO: 0.3 K/UL (ref 0–0.5)
EOSINOPHIL # BLD AUTO: ABNORMAL K/UL (ref 0–0.5)
EOSINOPHIL NFR BLD: 0 % (ref 0–8)
EOSINOPHIL NFR BLD: 0.1 % (ref 0–8)
EOSINOPHIL NFR BLD: 0.2 % (ref 0–8)
EOSINOPHIL NFR BLD: 0.3 % (ref 0–8)
EOSINOPHIL NFR BLD: 0.4 % (ref 0–8)
EOSINOPHIL NFR BLD: 0.4 % (ref 0–8)
EOSINOPHIL NFR BLD: 0.6 % (ref 0–8)
EOSINOPHIL NFR BLD: 0.7 % (ref 0–8)
EOSINOPHIL NFR BLD: 0.7 % (ref 0–8)
EOSINOPHIL NFR BLD: 0.8 % (ref 0–8)
EOSINOPHIL NFR BLD: 0.8 % (ref 0–8)
EOSINOPHIL NFR BLD: 1 % (ref 0–8)
EOSINOPHIL NFR BLD: 2 % (ref 0–8)
EOSINOPHIL NFR BLD: 2.1 % (ref 0–8)
EOSINOPHIL NFR BLD: 2.3 % (ref 0–8)
EOSINOPHIL NFR BLD: 2.4 % (ref 0–8)
EOSINOPHIL NFR BLD: 3.5 % (ref 0–8)
EOSINOPHIL NFR BLD: 4.8 % (ref 0–8)
EOSINOPHIL NFR BLD: 4.9 % (ref 0–8)
ERYTHROCYTE [DISTWIDTH] IN BLOOD BY AUTOMATED COUNT: 14.1 % (ref 11.5–14.5)
ERYTHROCYTE [DISTWIDTH] IN BLOOD BY AUTOMATED COUNT: 14.1 % (ref 11.5–14.5)
ERYTHROCYTE [DISTWIDTH] IN BLOOD BY AUTOMATED COUNT: 14.4 % (ref 11.5–14.5)
ERYTHROCYTE [DISTWIDTH] IN BLOOD BY AUTOMATED COUNT: 14.5 % (ref 11.5–14.5)
ERYTHROCYTE [DISTWIDTH] IN BLOOD BY AUTOMATED COUNT: 14.7 % (ref 11.5–14.5)
ERYTHROCYTE [DISTWIDTH] IN BLOOD BY AUTOMATED COUNT: 14.8 % (ref 11.5–14.5)
ERYTHROCYTE [DISTWIDTH] IN BLOOD BY AUTOMATED COUNT: 17.7 % (ref 11.5–14.5)
ERYTHROCYTE [DISTWIDTH] IN BLOOD BY AUTOMATED COUNT: 17.7 % (ref 11.5–14.5)
ERYTHROCYTE [DISTWIDTH] IN BLOOD BY AUTOMATED COUNT: 17.9 % (ref 11.5–14.5)
ERYTHROCYTE [DISTWIDTH] IN BLOOD BY AUTOMATED COUNT: 18.6 % (ref 11.5–14.5)
ERYTHROCYTE [DISTWIDTH] IN BLOOD BY AUTOMATED COUNT: 18.7 % (ref 11.5–14.5)
ERYTHROCYTE [DISTWIDTH] IN BLOOD BY AUTOMATED COUNT: 18.8 % (ref 11.5–14.5)
ERYTHROCYTE [DISTWIDTH] IN BLOOD BY AUTOMATED COUNT: 18.9 % (ref 11.5–14.5)
ERYTHROCYTE [DISTWIDTH] IN BLOOD BY AUTOMATED COUNT: 19.3 % (ref 11.5–14.5)
ERYTHROCYTE [DISTWIDTH] IN BLOOD BY AUTOMATED COUNT: 19.4 % (ref 11.5–14.5)
ERYTHROCYTE [DISTWIDTH] IN BLOOD BY AUTOMATED COUNT: 19.4 % (ref 11.5–14.5)
ERYTHROCYTE [DISTWIDTH] IN BLOOD BY AUTOMATED COUNT: 19.7 % (ref 11.5–14.5)
ERYTHROCYTE [DISTWIDTH] IN BLOOD BY AUTOMATED COUNT: 19.9 % (ref 11.5–14.5)
ERYTHROCYTE [DISTWIDTH] IN BLOOD BY AUTOMATED COUNT: 20.2 % (ref 11.5–14.5)
ERYTHROCYTE [DISTWIDTH] IN BLOOD BY AUTOMATED COUNT: 20.4 % (ref 11.5–14.5)
ERYTHROCYTE [DISTWIDTH] IN BLOOD BY AUTOMATED COUNT: 20.4 % (ref 11.5–14.5)
ERYTHROCYTE [DISTWIDTH] IN BLOOD BY AUTOMATED COUNT: 20.5 % (ref 11.5–14.5)
ERYTHROCYTE [DISTWIDTH] IN BLOOD BY AUTOMATED COUNT: 20.6 % (ref 11.5–14.5)
ERYTHROCYTE [DISTWIDTH] IN BLOOD BY AUTOMATED COUNT: 21.4 % (ref 11.5–14.5)
ERYTHROCYTE [DISTWIDTH] IN BLOOD BY AUTOMATED COUNT: 23.1 % (ref 11.5–14.5)
ERYTHROCYTE [DISTWIDTH] IN BLOOD BY AUTOMATED COUNT: 27.5 % (ref 11.5–14.5)
ERYTHROCYTE [DISTWIDTH] IN BLOOD BY AUTOMATED COUNT: 27.7 % (ref 11.5–14.5)
ERYTHROCYTE [DISTWIDTH] IN BLOOD BY AUTOMATED COUNT: 27.7 % (ref 11.5–14.5)
ERYTHROCYTE [DISTWIDTH] IN BLOOD BY AUTOMATED COUNT: 27.9 % (ref 11.5–14.5)
ERYTHROCYTE [DISTWIDTH] IN BLOOD BY AUTOMATED COUNT: 27.9 % (ref 11.5–14.5)
ERYTHROCYTE [DISTWIDTH] IN BLOOD BY AUTOMATED COUNT: 28 % (ref 11.5–14.5)
ERYTHROCYTE [DISTWIDTH] IN BLOOD BY AUTOMATED COUNT: 28.5 % (ref 11.5–14.5)
ERYTHROCYTE [DISTWIDTH] IN BLOOD BY AUTOMATED COUNT: 28.6 % (ref 11.5–14.5)
ERYTHROCYTE [DISTWIDTH] IN BLOOD BY AUTOMATED COUNT: 29.2 % (ref 11.5–14.5)
ERYTHROCYTE [DISTWIDTH] IN BLOOD BY AUTOMATED COUNT: 29.6 % (ref 11.5–14.5)
ERYTHROCYTE [DISTWIDTH] IN BLOOD BY AUTOMATED COUNT: 29.7 % (ref 11.5–14.5)
ERYTHROCYTE [DISTWIDTH] IN BLOOD BY AUTOMATED COUNT: 30.2 % (ref 11.5–14.5)
ERYTHROCYTE [DISTWIDTH] IN BLOOD BY AUTOMATED COUNT: 32.2 % (ref 11.5–14.5)
EST. GFR  (AFRICAN AMERICAN): 37.8 ML/MIN/1.73 M^2
EST. GFR  (AFRICAN AMERICAN): 46 ML/MIN/1.73 M^2
EST. GFR  (AFRICAN AMERICAN): 54 ML/MIN/1.73 M^2
EST. GFR  (AFRICAN AMERICAN): 58 ML/MIN/1.73 M^2
EST. GFR  (AFRICAN AMERICAN): >60 ML/MIN/1.73 M^2
EST. GFR  (NON AFRICAN AMERICAN): 32.8 ML/MIN/1.73 M^2
EST. GFR  (NON AFRICAN AMERICAN): 40 ML/MIN/1.73 M^2
EST. GFR  (NON AFRICAN AMERICAN): 46 ML/MIN/1.73 M^2
EST. GFR  (NON AFRICAN AMERICAN): 50 ML/MIN/1.73 M^2
EST. GFR  (NON AFRICAN AMERICAN): >60 ML/MIN/1.73 M^2
ESTIMATED AVG GLUCOSE: 88 MG/DL (ref 68–131)
ETHANOL UR-MCNC: <10 MG/DL
FERRITIN SERPL-MCNC: 147 NG/ML (ref 20–300)
FERRITIN SERPL-MCNC: 406 NG/ML (ref 20–300)
FINAL PATHOLOGIC DIAGNOSIS: ABNORMAL
FINAL PATHOLOGIC DIAGNOSIS: NORMAL
FLOW: 2
FLOW: 3
FOLATE SERPL-MCNC: 4.7 NG/ML (ref 4–24)
FRACTIONAL SHORTENING: 28 % (ref 28–44)
FRACTIONAL SHORTENING: 58 % (ref 28–44)
GLIAL NUC TYPE 1 AB TITR CSF: NEGATIVE TITER
GLIAL NUC TYPE 1 AB TITR SER: NORMAL {TITER}
GLUCOSE CSF-MCNC: 57 MG/DL (ref 40–70)
GLUCOSE SERPL-MCNC: 100 MG/DL (ref 70–110)
GLUCOSE SERPL-MCNC: 100 MG/DL (ref 70–110)
GLUCOSE SERPL-MCNC: 101 MG/DL (ref 70–110)
GLUCOSE SERPL-MCNC: 101 MG/DL (ref 70–110)
GLUCOSE SERPL-MCNC: 102 MG/DL (ref 70–110)
GLUCOSE SERPL-MCNC: 103 MG/DL (ref 70–110)
GLUCOSE SERPL-MCNC: 104 MG/DL (ref 70–110)
GLUCOSE SERPL-MCNC: 105 MG/DL (ref 70–110)
GLUCOSE SERPL-MCNC: 106 MG/DL (ref 70–110)
GLUCOSE SERPL-MCNC: 110 MG/DL (ref 70–110)
GLUCOSE SERPL-MCNC: 117 MG/DL (ref 70–110)
GLUCOSE SERPL-MCNC: 65 MG/DL (ref 70–110)
GLUCOSE SERPL-MCNC: 660 MG/DL (ref 70–110)
GLUCOSE SERPL-MCNC: 68 MG/DL (ref 70–110)
GLUCOSE SERPL-MCNC: 72 MG/DL (ref 70–110)
GLUCOSE SERPL-MCNC: 73 MG/DL (ref 70–110)
GLUCOSE SERPL-MCNC: 75 MG/DL (ref 70–110)
GLUCOSE SERPL-MCNC: 76 MG/DL (ref 70–110)
GLUCOSE SERPL-MCNC: 76 MG/DL (ref 70–110)
GLUCOSE SERPL-MCNC: 78 MG/DL (ref 70–110)
GLUCOSE SERPL-MCNC: 82 MG/DL (ref 70–110)
GLUCOSE SERPL-MCNC: 83 MG/DL (ref 70–110)
GLUCOSE SERPL-MCNC: 87 MG/DL (ref 70–110)
GLUCOSE SERPL-MCNC: 88 MG/DL (ref 70–110)
GLUCOSE SERPL-MCNC: 89 MG/DL (ref 70–110)
GLUCOSE SERPL-MCNC: 92 MG/DL (ref 70–110)
GLUCOSE SERPL-MCNC: 92 MG/DL (ref 70–110)
GLUCOSE SERPL-MCNC: 93 MG/DL (ref 70–110)
GLUCOSE SERPL-MCNC: 93 MG/DL (ref 70–110)
GLUCOSE SERPL-MCNC: 94 MG/DL (ref 70–110)
GLUCOSE SERPL-MCNC: 94 MG/DL (ref 70–110)
GLUCOSE SERPL-MCNC: 95 MG/DL (ref 70–110)
GLUCOSE SERPL-MCNC: 96 MG/DL (ref 70–110)
GLUCOSE SERPL-MCNC: 97 MG/DL (ref 70–110)
GLUCOSE SERPL-MCNC: 98 MG/DL (ref 70–110)
GLUCOSE SERPL-MCNC: 98 MG/DL (ref 70–110)
GLUCOSE SERPL-MCNC: 99 MG/DL (ref 70–110)
GLUCOSE UR QL STRIP: NEGATIVE
GRAM STN SPEC: NORMAL
GRAM STN SPEC: NORMAL
GRAN CASTS #/AREA URNS LPF: 2 /LPF
GROSS: ABNORMAL
GROSS: NORMAL
GROSS: NORMAL
HAV IGM SERPL QL IA: NEGATIVE
HBA1C MFR BLD HPLC: 4.7 % (ref 4–5.6)
HBV CORE IGM SERPL QL IA: NEGATIVE
HBV SURFACE AG SERPL QL IA: NEGATIVE
HBV SURFACE AG SERPL QL IA: NEGATIVE
HCG INTACT+B SERPL-ACNC: <1.2 MIU/ML
HCG INTACT+B SERPL-ACNC: <2.39 MIU/ML
HCO3 UR-SCNC: 12.1 MMOL/L (ref 24–28)
HCO3 UR-SCNC: 12.5 MMOL/L (ref 24–28)
HCO3 UR-SCNC: 14.4 MMOL/L (ref 24–28)
HCO3 UR-SCNC: 17.7 MMOL/L (ref 24–28)
HCO3 UR-SCNC: 18.6 MMOL/L (ref 24–28)
HCO3 UR-SCNC: 6.7 MMOL/L (ref 24–28)
HCT VFR BLD AUTO: 20.4 % (ref 37–48.5)
HCT VFR BLD AUTO: 22.2 % (ref 37–48.5)
HCT VFR BLD AUTO: 22.3 % (ref 37–48.5)
HCT VFR BLD AUTO: 22.4 % (ref 37–48.5)
HCT VFR BLD AUTO: 22.7 % (ref 37–48.5)
HCT VFR BLD AUTO: 23.1 % (ref 37–48.5)
HCT VFR BLD AUTO: 23.3 % (ref 37–48.5)
HCT VFR BLD AUTO: 23.4 % (ref 37–48.5)
HCT VFR BLD AUTO: 23.4 % (ref 37–48.5)
HCT VFR BLD AUTO: 23.5 % (ref 37–48.5)
HCT VFR BLD AUTO: 23.8 % (ref 37–48.5)
HCT VFR BLD AUTO: 24.3 % (ref 37–48.5)
HCT VFR BLD AUTO: 24.3 % (ref 37–48.5)
HCT VFR BLD AUTO: 24.5 % (ref 37–48.5)
HCT VFR BLD AUTO: 24.6 % (ref 37–48.5)
HCT VFR BLD AUTO: 24.8 % (ref 37–48.5)
HCT VFR BLD AUTO: 25.2 % (ref 37–48.5)
HCT VFR BLD AUTO: 25.3 % (ref 37–48.5)
HCT VFR BLD AUTO: 25.3 % (ref 37–48.5)
HCT VFR BLD AUTO: 25.4 % (ref 37–48.5)
HCT VFR BLD AUTO: 25.9 % (ref 37–48.5)
HCT VFR BLD AUTO: 26.3 % (ref 37–48.5)
HCT VFR BLD AUTO: 26.5 % (ref 37–48.5)
HCT VFR BLD AUTO: 27 % (ref 37–48.5)
HCT VFR BLD AUTO: 27.1 % (ref 37–48.5)
HCT VFR BLD AUTO: 27.1 % (ref 37–48.5)
HCT VFR BLD AUTO: 27.7 % (ref 37–48.5)
HCT VFR BLD AUTO: 29.5 % (ref 37–48.5)
HCT VFR BLD AUTO: 29.5 % (ref 37–48.5)
HCT VFR BLD AUTO: 30.1 % (ref 37–48.5)
HCT VFR BLD AUTO: 32.9 % (ref 37–48.5)
HCT VFR BLD AUTO: 34.2 % (ref 37–48.5)
HCT VFR BLD AUTO: 36.1 % (ref 37–48.5)
HCT VFR BLD AUTO: 36.9 % (ref 37–48.5)
HCT VFR BLD AUTO: 37.2 % (ref 37–48.5)
HCV AB SERPL QL IA: NEGATIVE
HGB BLD-MCNC: 10 G/DL (ref 12–16)
HGB BLD-MCNC: 10.6 G/DL (ref 12–16)
HGB BLD-MCNC: 11.1 G/DL (ref 12–16)
HGB BLD-MCNC: 11.1 G/DL (ref 12–16)
HGB BLD-MCNC: 6.4 G/DL (ref 12–16)
HGB BLD-MCNC: 6.4 G/DL (ref 12–16)
HGB BLD-MCNC: 6.6 G/DL (ref 12–16)
HGB BLD-MCNC: 7 G/DL (ref 12–16)
HGB BLD-MCNC: 7 G/DL (ref 12–16)
HGB BLD-MCNC: 7.2 G/DL (ref 12–16)
HGB BLD-MCNC: 7.3 G/DL (ref 12–16)
HGB BLD-MCNC: 7.4 G/DL (ref 12–16)
HGB BLD-MCNC: 7.5 G/DL (ref 12–16)
HGB BLD-MCNC: 7.6 G/DL (ref 12–16)
HGB BLD-MCNC: 7.6 G/DL (ref 12–16)
HGB BLD-MCNC: 7.8 G/DL (ref 12–16)
HGB BLD-MCNC: 7.9 G/DL (ref 12–16)
HGB BLD-MCNC: 7.9 G/DL (ref 12–16)
HGB BLD-MCNC: 8.1 G/DL (ref 12–16)
HGB BLD-MCNC: 8.1 G/DL (ref 12–16)
HGB BLD-MCNC: 8.4 G/DL (ref 12–16)
HGB BLD-MCNC: 8.6 G/DL (ref 12–16)
HGB BLD-MCNC: 9 G/DL (ref 12–16)
HGB BLD-MCNC: 9.1 G/DL (ref 12–16)
HGB BLD-MCNC: 9.1 G/DL (ref 12–16)
HGB BLD-MCNC: 9.9 G/DL (ref 12–16)
HGB UR QL STRIP: ABNORMAL
HGB UR QL STRIP: NEGATIVE
HIV 1+2 AB+HIV1 P24 AG SERPL QL IA: NEGATIVE
HSV1 DNA SPEC QL NAA+PROBE: NORMAL
HSV1, PCR, CSF: NEGATIVE
HSV2 DNA SPEC QL NAA+PROBE: NORMAL
HSV2, PCR, CSF: NEGATIVE
HU1 AB TITR CSF IF: NEGATIVE TITER
HU1 AB TITR SER: NORMAL {TITER}
HU2 AB TITR CSF IF: NEGATIVE TITER
HU2 AB TITR SER IF: NORMAL {TITER}
HU3 AB TITR CSF: NEGATIVE TITER
HU3 AB TITR SER: NORMAL {TITER}
HYALINE CASTS #/AREA URNS LPF: 0 /LPF
HYALINE CASTS #/AREA URNS LPF: 1 /LPF
HYALINE CASTS #/AREA URNS LPF: 3 /LPF
HYALINE CASTS UR QL AUTO: 0 /LPF
HYPOCHROMIA BLD QL SMEAR: ABNORMAL
IMM GRANULOCYTES # BLD AUTO: 0 K/UL (ref 0–0.04)
IMM GRANULOCYTES # BLD AUTO: 0.01 K/UL (ref 0–0.04)
IMM GRANULOCYTES # BLD AUTO: 0.02 K/UL (ref 0–0.04)
IMM GRANULOCYTES # BLD AUTO: 0.04 K/UL (ref 0–0.04)
IMM GRANULOCYTES # BLD AUTO: 0.05 K/UL (ref 0–0.04)
IMM GRANULOCYTES # BLD AUTO: 0.05 K/UL (ref 0–0.04)
IMM GRANULOCYTES # BLD AUTO: 0.06 K/UL (ref 0–0.04)
IMM GRANULOCYTES # BLD AUTO: 0.07 K/UL (ref 0–0.04)
IMM GRANULOCYTES # BLD AUTO: 0.2 K/UL (ref 0–0.04)
IMM GRANULOCYTES # BLD AUTO: 0.22 K/UL (ref 0–0.04)
IMM GRANULOCYTES # BLD AUTO: 0.25 K/UL (ref 0–0.04)
IMM GRANULOCYTES # BLD AUTO: 0.26 K/UL (ref 0–0.04)
IMM GRANULOCYTES # BLD AUTO: 0.29 K/UL (ref 0–0.04)
IMM GRANULOCYTES # BLD AUTO: 0.32 K/UL (ref 0–0.04)
IMM GRANULOCYTES # BLD AUTO: 0.35 K/UL (ref 0–0.04)
IMM GRANULOCYTES # BLD AUTO: 0.38 K/UL (ref 0–0.04)
IMM GRANULOCYTES # BLD AUTO: 0.39 K/UL (ref 0–0.04)
IMM GRANULOCYTES # BLD AUTO: ABNORMAL K/UL (ref 0–0.04)
IMM GRANULOCYTES NFR BLD AUTO: 0 % (ref 0–0.5)
IMM GRANULOCYTES NFR BLD AUTO: 0.2 % (ref 0–0.5)
IMM GRANULOCYTES NFR BLD AUTO: 0.3 % (ref 0–0.5)
IMM GRANULOCYTES NFR BLD AUTO: 0.3 % (ref 0–0.5)
IMM GRANULOCYTES NFR BLD AUTO: 0.4 % (ref 0–0.5)
IMM GRANULOCYTES NFR BLD AUTO: 0.5 % (ref 0–0.5)
IMM GRANULOCYTES NFR BLD AUTO: 0.6 % (ref 0–0.5)
IMM GRANULOCYTES NFR BLD AUTO: 0.7 % (ref 0–0.5)
IMM GRANULOCYTES NFR BLD AUTO: 0.8 % (ref 0–0.5)
IMM GRANULOCYTES NFR BLD AUTO: 2.1 % (ref 0–0.5)
IMM GRANULOCYTES NFR BLD AUTO: 2.2 % (ref 0–0.5)
IMM GRANULOCYTES NFR BLD AUTO: 2.5 % (ref 0–0.5)
IMM GRANULOCYTES NFR BLD AUTO: 2.6 % (ref 0–0.5)
IMM GRANULOCYTES NFR BLD AUTO: 2.8 % (ref 0–0.5)
IMM GRANULOCYTES NFR BLD AUTO: 3.1 % (ref 0–0.5)
IMM GRANULOCYTES NFR BLD AUTO: 4 % (ref 0–0.5)
IMM GRANULOCYTES NFR BLD AUTO: 4.4 % (ref 0–0.5)
IMM GRANULOCYTES NFR BLD AUTO: 4.9 % (ref 0–0.5)
IMM GRANULOCYTES NFR BLD AUTO: ABNORMAL % (ref 0–0.5)
IMMUNOLOGIST REVIEW: NORMAL
INR PPP: 1 (ref 0.8–1.2)
INR PPP: 1.2 (ref 0.8–1.2)
INR PPP: 1.3 (ref 0.8–1.2)
INR PPP: 1.5 (ref 0.8–1.2)
INTERVENTRICULAR SEPTUM: 0.8 CM (ref 0.6–1.1)
INTERVENTRICULAR SEPTUM: 0.8 CM (ref 0.6–1.1)
IRON SERPL-MCNC: 18 UG/DL (ref 30–160)
IRON SERPL-MCNC: 20 UG/DL (ref 30–160)
IVRT: 93.24 MSEC
KETONES UR QL STRIP: ABNORMAL
KETONES UR QL STRIP: NEGATIVE
LA MAJOR: 3.88 CM
LA MAJOR: 4.43 CM
LA MINOR: 3.2 CM
LA MINOR: 4.36 CM
LA WIDTH: 2 CM
LA WIDTH: 3.05 CM
LACTATE SERPL-SCNC: 1.2 MMOL/L (ref 0.5–2.2)
LACTATE SERPL-SCNC: 1.7 MMOL/L (ref 0.5–2.2)
LACTATE SERPL-SCNC: 1.8 MMOL/L (ref 0.5–2.2)
LACTATE SERPL-SCNC: 10.3 MMOL/L (ref 0.5–2.2)
LACTATE SERPL-SCNC: 10.7 MMOL/L (ref 0.5–2.2)
LACTATE SERPL-SCNC: 2.1 MMOL/L (ref 0.5–2.2)
LACTATE SERPL-SCNC: 3.1 MMOL/L (ref 0.5–2.2)
LACTATE SERPL-SCNC: 3.1 MMOL/L (ref 0.5–2.2)
LACTATE SERPL-SCNC: 4.3 MMOL/L (ref 0.5–2.2)
LACTATE SERPL-SCNC: 4.4 MMOL/L (ref 0.5–2.2)
LACTATE SERPL-SCNC: 4.4 MMOL/L (ref 0.5–2.2)
LACTATE SERPL-SCNC: 4.6 MMOL/L (ref 0.5–2.2)
LACTATE SERPL-SCNC: 4.7 MMOL/L (ref 0.5–2.2)
LACTATE SERPL-SCNC: 4.8 MMOL/L (ref 0.5–2.2)
LACTATE SERPL-SCNC: 4.9 MMOL/L (ref 0.5–2.2)
LACTATE SERPL-SCNC: 5 MMOL/L (ref 0.5–2.2)
LACTATE SERPL-SCNC: 5.2 MMOL/L (ref 0.5–2.2)
LACTATE SERPL-SCNC: 5.4 MMOL/L (ref 0.5–2.2)
LACTATE SERPL-SCNC: 5.4 MMOL/L (ref 0.5–2.2)
LACTATE SERPL-SCNC: 5.5 MMOL/L (ref 0.5–2.2)
LACTATE SERPL-SCNC: 5.8 MMOL/L (ref 0.5–2.2)
LACTATE SERPL-SCNC: 6.3 MMOL/L (ref 0.5–2.2)
LACTATE SERPL-SCNC: 6.3 MMOL/L (ref 0.5–2.2)
LACTATE SERPL-SCNC: 6.9 MMOL/L (ref 0.5–2.2)
LACTATE SERPL-SCNC: 7 MMOL/L (ref 0.5–2.2)
LACTATE SERPL-SCNC: 9.8 MMOL/L (ref 0.5–2.2)
LACTATE SERPL-SCNC: >12 MMOL/L (ref 0.5–2.2)
LDH SERPL L TO P-CCNC: 4.84 MMOL/L (ref 0.5–2.2)
LEFT ATRIUM SIZE: 2.73 CM
LEFT ATRIUM SIZE: 2.85 CM
LEFT ATRIUM VOLUME INDEX: 16.2 ML/M2
LEFT ATRIUM VOLUME INDEX: 9 ML/M2
LEFT ATRIUM VOLUME: 16.99 CM3
LEFT ATRIUM VOLUME: 31.1 CM3
LEFT INTERNAL DIMENSION IN SYSTOLE: 1.8 CM (ref 2.1–4)
LEFT INTERNAL DIMENSION IN SYSTOLE: 2.73 CM (ref 2.1–4)
LEFT VENTRICLE DIASTOLIC VOLUME INDEX: 32.39 ML/M2
LEFT VENTRICLE DIASTOLIC VOLUME INDEX: 41.42 ML/M2
LEFT VENTRICLE DIASTOLIC VOLUME: 62.12 ML
LEFT VENTRICLE DIASTOLIC VOLUME: 78.17 ML
LEFT VENTRICLE MASS INDEX: 39 G/M2
LEFT VENTRICLE MASS INDEX: 56 G/M2
LEFT VENTRICLE SYSTOLIC VOLUME INDEX: 12.2 ML/M2
LEFT VENTRICLE SYSTOLIC VOLUME INDEX: 14.5 ML/M2
LEFT VENTRICLE SYSTOLIC VOLUME: 23.09 ML
LEFT VENTRICLE SYSTOLIC VOLUME: 27.77 ML
LEFT VENTRICULAR INTERNAL DIMENSION IN DIASTOLE: 3.8 CM (ref 3.5–6)
LEFT VENTRICULAR INTERNAL DIMENSION IN DIASTOLE: 4.3 CM (ref 3.5–6)
LEFT VENTRICULAR MASS: 105.33 G
LEFT VENTRICULAR MASS: 73.97 G
LEUKOCYTE ESTERASE UR QL STRIP: ABNORMAL
LEUKOCYTE ESTERASE UR QL STRIP: NEGATIVE
LIPASE SERPL-CCNC: 17 U/L (ref 23–300)
LIPASE SERPL-CCNC: 26 U/L (ref 23–300)
LV LATERAL E/E' RATIO: 5.2 M/S
LV LATERAL E/E' RATIO: 7.6 M/S
LV SEPTAL E/E' RATIO: 10.86 M/S
LV SEPTAL E/E' RATIO: 6 M/S
LYMPHOCYTES # BLD AUTO: 0.6 K/UL (ref 1–4.8)
LYMPHOCYTES # BLD AUTO: 0.6 K/UL (ref 1–4.8)
LYMPHOCYTES # BLD AUTO: 0.7 K/UL (ref 1–4.8)
LYMPHOCYTES # BLD AUTO: 1.1 K/UL (ref 1–4.8)
LYMPHOCYTES # BLD AUTO: 1.2 K/UL (ref 1–4.8)
LYMPHOCYTES # BLD AUTO: 1.3 K/UL (ref 1–4.8)
LYMPHOCYTES # BLD AUTO: 1.3 K/UL (ref 1–4.8)
LYMPHOCYTES # BLD AUTO: 1.4 K/UL (ref 1–4.8)
LYMPHOCYTES # BLD AUTO: 1.5 K/UL (ref 1–4.8)
LYMPHOCYTES # BLD AUTO: ABNORMAL K/UL (ref 1–4.8)
LYMPHOCYTES NFR BLD: 10.7 % (ref 18–48)
LYMPHOCYTES NFR BLD: 10.8 % (ref 18–48)
LYMPHOCYTES NFR BLD: 11.2 % (ref 18–48)
LYMPHOCYTES NFR BLD: 12 % (ref 18–48)
LYMPHOCYTES NFR BLD: 12 % (ref 18–48)
LYMPHOCYTES NFR BLD: 12.6 % (ref 18–48)
LYMPHOCYTES NFR BLD: 13.1 % (ref 18–48)
LYMPHOCYTES NFR BLD: 13.2 % (ref 18–48)
LYMPHOCYTES NFR BLD: 14 % (ref 18–48)
LYMPHOCYTES NFR BLD: 14 % (ref 18–48)
LYMPHOCYTES NFR BLD: 14.1 % (ref 18–48)
LYMPHOCYTES NFR BLD: 14.8 % (ref 18–48)
LYMPHOCYTES NFR BLD: 15.2 % (ref 18–48)
LYMPHOCYTES NFR BLD: 15.5 % (ref 18–48)
LYMPHOCYTES NFR BLD: 18 % (ref 18–48)
LYMPHOCYTES NFR BLD: 19 % (ref 18–48)
LYMPHOCYTES NFR BLD: 19.8 % (ref 18–48)
LYMPHOCYTES NFR BLD: 2 % (ref 18–48)
LYMPHOCYTES NFR BLD: 22 % (ref 18–48)
LYMPHOCYTES NFR BLD: 22.1 % (ref 18–48)
LYMPHOCYTES NFR BLD: 28.4 % (ref 18–48)
LYMPHOCYTES NFR BLD: 29 % (ref 18–48)
LYMPHOCYTES NFR BLD: 4 % (ref 18–48)
LYMPHOCYTES NFR BLD: 40 % (ref 18–48)
LYMPHOCYTES NFR BLD: 5 % (ref 18–48)
LYMPHOCYTES NFR BLD: 50 % (ref 18–48)
LYMPHOCYTES NFR BLD: 57 % (ref 18–48)
LYMPHOCYTES NFR BLD: 6 % (ref 18–48)
LYMPHOCYTES NFR BLD: 6 % (ref 18–48)
LYMPHOCYTES NFR BLD: 7 % (ref 18–48)
LYMPHOCYTES NFR BLD: 70 % (ref 18–48)
LYMPHOCYTES NFR BLD: 8.7 % (ref 18–48)
LYMPHOCYTES NFR BLD: 8.8 % (ref 18–48)
LYMPHOCYTES NFR BLD: 8.8 % (ref 18–48)
LYMPHOCYTES NFR BLD: 9 % (ref 18–48)
LYMPHOCYTES NFR CSF MANUAL: 100 % (ref 40–80)
LYMPHOCYTES NFR CSF MANUAL: 89 % (ref 40–80)
MAGNESIUM SERPL-MCNC: 1.4 MG/DL (ref 1.6–2.6)
MAGNESIUM SERPL-MCNC: 1.7 MG/DL (ref 1.6–2.6)
MAGNESIUM SERPL-MCNC: 1.7 MG/DL (ref 1.6–2.6)
MAGNESIUM SERPL-MCNC: 1.8 MG/DL (ref 1.6–2.6)
MAGNESIUM SERPL-MCNC: 1.9 MG/DL (ref 1.6–2.6)
MAGNESIUM SERPL-MCNC: 2 MG/DL (ref 1.6–2.6)
MAGNESIUM SERPL-MCNC: 2.1 MG/DL (ref 1.6–2.6)
MAGNESIUM SERPL-MCNC: 2.2 MG/DL (ref 1.6–2.6)
MAGNESIUM SERPL-MCNC: 2.3 MG/DL (ref 1.6–2.6)
MAGNESIUM SERPL-MCNC: 2.4 MG/DL (ref 1.6–2.6)
MAGNESIUM SERPL-MCNC: 2.5 MG/DL (ref 1.6–2.6)
MAGNESIUM SERPL-MCNC: 2.6 MG/DL (ref 1.6–2.6)
MAGNESIUM SERPL-MCNC: 2.8 MG/DL (ref 1.6–2.6)
MCH RBC QN AUTO: 23 PG (ref 27–31)
MCH RBC QN AUTO: 23.2 PG (ref 27–31)
MCH RBC QN AUTO: 23.3 PG (ref 27–31)
MCH RBC QN AUTO: 23.4 PG (ref 27–31)
MCH RBC QN AUTO: 23.5 PG (ref 27–31)
MCH RBC QN AUTO: 23.5 PG (ref 27–31)
MCH RBC QN AUTO: 23.6 PG (ref 27–31)
MCH RBC QN AUTO: 23.7 PG (ref 27–31)
MCH RBC QN AUTO: 23.8 PG (ref 27–31)
MCH RBC QN AUTO: 23.9 PG (ref 27–31)
MCH RBC QN AUTO: 24.1 PG (ref 27–31)
MCH RBC QN AUTO: 24.2 PG (ref 27–31)
MCH RBC QN AUTO: 24.4 PG (ref 27–31)
MCH RBC QN AUTO: 24.5 PG (ref 27–31)
MCH RBC QN AUTO: 24.5 PG (ref 27–31)
MCH RBC QN AUTO: 25 PG (ref 27–31)
MCH RBC QN AUTO: 25.3 PG (ref 27–31)
MCH RBC QN AUTO: 26.4 PG (ref 27–31)
MCH RBC QN AUTO: 26.5 PG (ref 27–31)
MCH RBC QN AUTO: 26.6 PG (ref 27–31)
MCH RBC QN AUTO: 26.8 PG (ref 27–31)
MCH RBC QN AUTO: 27 PG (ref 27–31)
MCH RBC QN AUTO: 27 PG (ref 27–31)
MCH RBC QN AUTO: 27.5 PG (ref 27–31)
MCH RBC QN AUTO: 28 PG (ref 27–31)
MCHC RBC AUTO-ENTMCNC: 28.2 G/DL (ref 32–36)
MCHC RBC AUTO-ENTMCNC: 28.8 G/DL (ref 32–36)
MCHC RBC AUTO-ENTMCNC: 29 G/DL (ref 32–36)
MCHC RBC AUTO-ENTMCNC: 29.2 G/DL (ref 32–36)
MCHC RBC AUTO-ENTMCNC: 29.3 G/DL (ref 32–36)
MCHC RBC AUTO-ENTMCNC: 29.4 G/DL (ref 32–36)
MCHC RBC AUTO-ENTMCNC: 29.4 G/DL (ref 32–36)
MCHC RBC AUTO-ENTMCNC: 29.8 G/DL (ref 32–36)
MCHC RBC AUTO-ENTMCNC: 30 G/DL (ref 32–36)
MCHC RBC AUTO-ENTMCNC: 30.1 G/DL (ref 32–36)
MCHC RBC AUTO-ENTMCNC: 30.1 G/DL (ref 32–36)
MCHC RBC AUTO-ENTMCNC: 30.2 G/DL (ref 32–36)
MCHC RBC AUTO-ENTMCNC: 30.2 G/DL (ref 32–36)
MCHC RBC AUTO-ENTMCNC: 30.3 G/DL (ref 32–36)
MCHC RBC AUTO-ENTMCNC: 30.5 G/DL (ref 32–36)
MCHC RBC AUTO-ENTMCNC: 30.7 G/DL (ref 32–36)
MCHC RBC AUTO-ENTMCNC: 30.8 G/DL (ref 32–36)
MCHC RBC AUTO-ENTMCNC: 30.9 G/DL (ref 32–36)
MCHC RBC AUTO-ENTMCNC: 31 G/DL (ref 32–36)
MCHC RBC AUTO-ENTMCNC: 31.2 G/DL (ref 32–36)
MCHC RBC AUTO-ENTMCNC: 31.3 G/DL (ref 32–36)
MCHC RBC AUTO-ENTMCNC: 31.4 G/DL (ref 32–36)
MCHC RBC AUTO-ENTMCNC: 31.5 G/DL (ref 32–36)
MCHC RBC AUTO-ENTMCNC: 31.5 G/DL (ref 32–36)
MCHC RBC AUTO-ENTMCNC: 31.7 G/DL (ref 32–36)
MCHC RBC AUTO-ENTMCNC: 32.1 G/DL (ref 32–36)
MCHC RBC AUTO-ENTMCNC: 32.2 G/DL (ref 32–36)
MCHC RBC AUTO-ENTMCNC: 32.3 G/DL (ref 32–36)
MCHC RBC AUTO-ENTMCNC: 32.5 G/DL (ref 32–36)
MCV RBC AUTO: 74 FL (ref 82–98)
MCV RBC AUTO: 75 FL (ref 82–98)
MCV RBC AUTO: 76 FL (ref 82–98)
MCV RBC AUTO: 77 FL (ref 82–98)
MCV RBC AUTO: 78 FL (ref 82–98)
MCV RBC AUTO: 79 FL (ref 82–98)
MCV RBC AUTO: 80 FL (ref 82–98)
MCV RBC AUTO: 81 FL (ref 82–98)
MCV RBC AUTO: 81 FL (ref 82–98)
MCV RBC AUTO: 82 FL (ref 82–98)
MCV RBC AUTO: 84 FL (ref 82–98)
MCV RBC AUTO: 85 FL (ref 82–98)
MCV RBC AUTO: 86 FL (ref 82–98)
MCV RBC AUTO: 88 FL (ref 82–98)
MCV RBC AUTO: 89 FL (ref 82–98)
MCV RBC AUTO: 90 FL (ref 82–98)
MCV RBC AUTO: 91 FL (ref 82–98)
MCV RBC AUTO: 93 FL (ref 82–98)
MCV RBC AUTO: 93 FL (ref 82–98)
MCV RBC AUTO: 94 FL (ref 82–98)
MCV RBC AUTO: 95 FL (ref 82–98)
METAMYELOCYTES NFR BLD MANUAL: 1 %
METAMYELOCYTES NFR BLD MANUAL: 1 %
METAMYELOCYTES NFR BLD MANUAL: 2 %
METAMYELOCYTES NFR BLD MANUAL: 4 %
METAMYELOCYTES NFR BLD MANUAL: 5 %
METAMYELOCYTES NFR BLD MANUAL: 6 %
METHADONE UR QL SCN>300 NG/ML: NEGATIVE
MICROALBUMIN UR DL<=1MG/L-MCNC: 6 UG/ML
MICROSCOPIC COMMENT: ABNORMAL
MICROSCOPIC COMMENT: NORMAL
MICROSCOPIC COMMENT: NORMAL
MICROSCOPIC EXAM: ABNORMAL
MICROSCOPIC EXAM: NORMAL
MICROSCOPIC EXAM: NORMAL
MODE: ABNORMAL
MONOCYTES # BLD AUTO: 0.3 K/UL (ref 0.3–1)
MONOCYTES # BLD AUTO: 0.5 K/UL (ref 0.3–1)
MONOCYTES # BLD AUTO: 0.5 K/UL (ref 0.3–1)
MONOCYTES # BLD AUTO: 0.6 K/UL (ref 0.3–1)
MONOCYTES # BLD AUTO: 0.7 K/UL (ref 0.3–1)
MONOCYTES # BLD AUTO: 0.8 K/UL (ref 0.3–1)
MONOCYTES # BLD AUTO: 0.9 K/UL (ref 0.3–1)
MONOCYTES # BLD AUTO: 0.9 K/UL (ref 0.3–1)
MONOCYTES # BLD AUTO: 1 K/UL (ref 0.3–1)
MONOCYTES # BLD AUTO: 1.2 K/UL (ref 0.3–1)
MONOCYTES # BLD AUTO: ABNORMAL K/UL (ref 0.3–1)
MONOCYTES NFR BLD: 1 % (ref 4–15)
MONOCYTES NFR BLD: 10 % (ref 4–15)
MONOCYTES NFR BLD: 10.1 % (ref 4–15)
MONOCYTES NFR BLD: 10.1 % (ref 4–15)
MONOCYTES NFR BLD: 10.3 % (ref 4–15)
MONOCYTES NFR BLD: 11.5 % (ref 4–15)
MONOCYTES NFR BLD: 12 % (ref 4–15)
MONOCYTES NFR BLD: 2 % (ref 4–15)
MONOCYTES NFR BLD: 3 % (ref 4–15)
MONOCYTES NFR BLD: 30 % (ref 4–15)
MONOCYTES NFR BLD: 4 % (ref 4–15)
MONOCYTES NFR BLD: 5 % (ref 4–15)
MONOCYTES NFR BLD: 50 % (ref 4–15)
MONOCYTES NFR BLD: 6 % (ref 4–15)
MONOCYTES NFR BLD: 6.9 % (ref 4–15)
MONOCYTES NFR BLD: 7 % (ref 4–15)
MONOCYTES NFR BLD: 7.1 % (ref 4–15)
MONOCYTES NFR BLD: 7.4 % (ref 4–15)
MONOCYTES NFR BLD: 7.5 % (ref 4–15)
MONOCYTES NFR BLD: 7.6 % (ref 4–15)
MONOCYTES NFR BLD: 8 % (ref 4–15)
MONOCYTES NFR BLD: 8 % (ref 4–15)
MONOCYTES NFR BLD: 8.7 % (ref 4–15)
MONOCYTES NFR BLD: 8.8 % (ref 4–15)
MONOCYTES NFR BLD: 9 % (ref 4–15)
MONOCYTES NFR BLD: 9.2 % (ref 4–15)
MONOCYTES NFR BLD: 9.2 % (ref 4–15)
MONOCYTES NFR BLD: 9.3 % (ref 4–15)
MONOCYTES NFR BLD: 9.3 % (ref 4–15)
MV PEAK A VEL: 0.87 M/S
MV PEAK A VEL: 0.93 M/S
MV PEAK E VEL: 0.76 M/S
MV PEAK E VEL: 0.78 M/S
MV STENOSIS PRESSURE HALF TIME: 17.43 MS
MV STENOSIS PRESSURE HALF TIME: 19.89 MS
MV VALVE AREA P 1/2 METHOD: 11.06 CM2
MV VALVE AREA P 1/2 METHOD: 12.62 CM2
MYELOCYTES NFR BLD MANUAL: 1 %
MYELOCYTES NFR BLD MANUAL: 2 %
MYELOCYTES NFR BLD MANUAL: 3 %
MYELOCYTES NFR BLD MANUAL: 5 %
NACHR AB SER-SCNC: NORMAL NMOL/ML
NEUTROPHILS # BLD AUTO: 1.1 K/UL (ref 1.8–7.7)
NEUTROPHILS # BLD AUTO: 2.8 K/UL (ref 1.8–7.7)
NEUTROPHILS # BLD AUTO: 3 K/UL (ref 1.8–7.7)
NEUTROPHILS # BLD AUTO: 4.5 K/UL (ref 1.8–7.7)
NEUTROPHILS # BLD AUTO: 4.9 K/UL (ref 1.8–7.7)
NEUTROPHILS # BLD AUTO: 4.9 K/UL (ref 1.8–7.7)
NEUTROPHILS # BLD AUTO: 5.2 K/UL (ref 1.8–7.7)
NEUTROPHILS # BLD AUTO: 5.7 K/UL (ref 1.8–7.7)
NEUTROPHILS # BLD AUTO: 6 K/UL (ref 1.8–7.7)
NEUTROPHILS # BLD AUTO: 6 K/UL (ref 1.8–7.7)
NEUTROPHILS # BLD AUTO: 6.4 K/UL (ref 1.8–7.7)
NEUTROPHILS # BLD AUTO: 6.8 K/UL (ref 1.8–7.7)
NEUTROPHILS # BLD AUTO: 6.9 K/UL (ref 1.8–7.7)
NEUTROPHILS # BLD AUTO: 7.1 K/UL (ref 1.8–7.7)
NEUTROPHILS # BLD AUTO: 7.2 K/UL (ref 1.8–7.7)
NEUTROPHILS # BLD AUTO: 7.4 K/UL (ref 1.8–7.7)
NEUTROPHILS # BLD AUTO: 8 K/UL (ref 1.8–7.7)
NEUTROPHILS # BLD AUTO: 8.1 K/UL (ref 1.8–7.7)
NEUTROPHILS # BLD AUTO: 8.9 K/UL (ref 1.8–7.7)
NEUTROPHILS # BLD AUTO: ABNORMAL K/UL (ref 1.8–7.7)
NEUTROPHILS NFR BLD: 0 % (ref 38–73)
NEUTROPHILS NFR BLD: 10 % (ref 38–73)
NEUTROPHILS NFR BLD: 37 % (ref 38–73)
NEUTROPHILS NFR BLD: 37.9 % (ref 38–73)
NEUTROPHILS NFR BLD: 52 % (ref 38–73)
NEUTROPHILS NFR BLD: 54.2 % (ref 38–73)
NEUTROPHILS NFR BLD: 61.6 % (ref 38–73)
NEUTROPHILS NFR BLD: 63 % (ref 38–73)
NEUTROPHILS NFR BLD: 64 % (ref 38–73)
NEUTROPHILS NFR BLD: 65.1 % (ref 38–73)
NEUTROPHILS NFR BLD: 67 % (ref 38–73)
NEUTROPHILS NFR BLD: 68.4 % (ref 38–73)
NEUTROPHILS NFR BLD: 69 % (ref 38–73)
NEUTROPHILS NFR BLD: 70.8 % (ref 38–73)
NEUTROPHILS NFR BLD: 71.1 % (ref 38–73)
NEUTROPHILS NFR BLD: 72 % (ref 38–73)
NEUTROPHILS NFR BLD: 72 % (ref 38–73)
NEUTROPHILS NFR BLD: 72.1 % (ref 38–73)
NEUTROPHILS NFR BLD: 73 % (ref 38–73)
NEUTROPHILS NFR BLD: 74.1 % (ref 38–73)
NEUTROPHILS NFR BLD: 75 % (ref 38–73)
NEUTROPHILS NFR BLD: 75.4 % (ref 38–73)
NEUTROPHILS NFR BLD: 76.6 % (ref 38–73)
NEUTROPHILS NFR BLD: 77 % (ref 38–73)
NEUTROPHILS NFR BLD: 79.4 % (ref 38–73)
NEUTROPHILS NFR BLD: 79.5 % (ref 38–73)
NEUTROPHILS NFR BLD: 80 % (ref 38–73)
NEUTROPHILS NFR BLD: 80.1 % (ref 38–73)
NEUTROPHILS NFR BLD: 80.3 % (ref 38–73)
NEUTROPHILS NFR BLD: 83 % (ref 38–73)
NEUTROPHILS NFR BLD: 85 % (ref 38–73)
NEUTROPHILS NFR BLD: 86 % (ref 38–73)
NEUTROPHILS NFR BLD: 87 % (ref 38–73)
NEUTROPHILS NFR BLD: 9 % (ref 38–73)
NEUTROPHILS NFR BLD: 91 % (ref 38–73)
NEUTROPHILS NFR CSF MANUAL: 11 % (ref 0–6)
NEUTS BAND NFR BLD MANUAL: 1 %
NEUTS BAND NFR BLD MANUAL: 1 %
NEUTS BAND NFR BLD MANUAL: 12 %
NEUTS BAND NFR BLD MANUAL: 12 %
NEUTS BAND NFR BLD MANUAL: 13 %
NEUTS BAND NFR BLD MANUAL: 15 %
NEUTS BAND NFR BLD MANUAL: 18 %
NEUTS BAND NFR BLD MANUAL: 18 %
NEUTS BAND NFR BLD MANUAL: 19 %
NEUTS BAND NFR BLD MANUAL: 2 %
NEUTS BAND NFR BLD MANUAL: 22 %
NEUTS BAND NFR BLD MANUAL: 31 %
NEUTS BAND NFR BLD MANUAL: 4 %
NEUTS BAND NFR BLD MANUAL: 5 %
NEUTS BAND NFR BLD MANUAL: 7 %
NITRITE UR QL STRIP: NEGATIVE
NITRITE UR QL STRIP: POSITIVE
NON-SQ EPI CELLS #/AREA URNS AUTO: <1 /HPF
NRBC BLD-RTO: 0 /100 WBC
NRBC BLD-RTO: 1 /100 WBC
NUM UNITS TRANS PACKED RBC: NORMAL
OPIATES UR QL SCN: NORMAL
OSMOLALITY SERPL: 280 MOSM/KG (ref 275–295)
OSMOLALITY UR: 579 MOSM/KG (ref 50–1200)
OVALOCYTES BLD QL SMEAR: ABNORMAL
PARANEOPLASTIC INTERPRETATION,CSF: NORMAL
PAVAL REFLEX TEST ADDED: NORMAL
PCA-1 AB TITR SER: NORMAL {TITER}
PCA-2 AB TITR CSF: NEGATIVE TITER
PCA-2 AB TITR SER: NORMAL {TITER}
PCA-TR AB TITR CSF: NEGATIVE TITER
PCA-TR AB TITR SER: NORMAL {TITER}
PCO2 BLDA: 13.2 MMHG (ref 35–45)
PCO2 BLDA: 18.3 MMHG (ref 35–45)
PCO2 BLDA: 23.6 MMHG (ref 35–45)
PCO2 BLDA: 24.9 MMHG (ref 35–45)
PCO2 BLDA: 27.7 MMHG (ref 35–45)
PCO2 BLDA: 29.3 MMHG (ref 35–45)
PCP UR QL SCN>25 NG/ML: NEGATIVE
PH SMN: 7.3 [PH] (ref 7.35–7.45)
PH SMN: 7.32 [PH] (ref 7.35–7.45)
PH SMN: 7.39 [PH] (ref 7.35–7.45)
PH SMN: 7.41 [PH] (ref 7.35–7.45)
PH SMN: 7.41 [PH] (ref 7.35–7.45)
PH SMN: 7.44 [PH] (ref 7.35–7.45)
PH UR STRIP: 5 [PH] (ref 5–8)
PH UR STRIP: 6 [PH] (ref 5–8)
PH UR STRIP: 7 [PH] (ref 5–8)
PH UR STRIP: 7 [PH] (ref 5–8)
PHOSPHATE SERPL-MCNC: 1.2 MG/DL (ref 2.7–4.5)
PHOSPHATE SERPL-MCNC: 1.3 MG/DL (ref 2.7–4.5)
PHOSPHATE SERPL-MCNC: 1.4 MG/DL (ref 2.7–4.5)
PHOSPHATE SERPL-MCNC: 1.6 MG/DL (ref 2.7–4.5)
PHOSPHATE SERPL-MCNC: 1.6 MG/DL (ref 2.7–4.5)
PHOSPHATE SERPL-MCNC: 1.7 MG/DL (ref 2.7–4.5)
PHOSPHATE SERPL-MCNC: 1.8 MG/DL (ref 2.7–4.5)
PHOSPHATE SERPL-MCNC: 1.8 MG/DL (ref 2.7–4.5)
PHOSPHATE SERPL-MCNC: 1.9 MG/DL (ref 2.7–4.5)
PHOSPHATE SERPL-MCNC: 1.9 MG/DL (ref 2.7–4.5)
PHOSPHATE SERPL-MCNC: 2 MG/DL (ref 2.7–4.5)
PHOSPHATE SERPL-MCNC: 2 MG/DL (ref 2.7–4.5)
PHOSPHATE SERPL-MCNC: 2.1 MG/DL (ref 2.7–4.5)
PHOSPHATE SERPL-MCNC: 2.3 MG/DL (ref 2.7–4.5)
PHOSPHATE SERPL-MCNC: 2.4 MG/DL (ref 2.7–4.5)
PHOSPHATE SERPL-MCNC: 2.4 MG/DL (ref 2.7–4.5)
PHOSPHATE SERPL-MCNC: 2.5 MG/DL (ref 2.7–4.5)
PHOSPHATE SERPL-MCNC: 2.6 MG/DL (ref 2.7–4.5)
PHOSPHATE SERPL-MCNC: 2.7 MG/DL (ref 2.7–4.5)
PHOSPHATE SERPL-MCNC: 2.8 MG/DL (ref 2.7–4.5)
PHOSPHATE SERPL-MCNC: 2.9 MG/DL (ref 2.7–4.5)
PHOSPHATE SERPL-MCNC: 3 MG/DL (ref 2.7–4.5)
PHOSPHATE SERPL-MCNC: 3.1 MG/DL (ref 2.7–4.5)
PHOSPHATE SERPL-MCNC: 3.1 MG/DL (ref 2.7–4.5)
PHOSPHATE SERPL-MCNC: 3.2 MG/DL (ref 2.7–4.5)
PHOSPHATE SERPL-MCNC: 3.2 MG/DL (ref 2.7–4.5)
PHOSPHATE SERPL-MCNC: 3.4 MG/DL (ref 2.7–4.5)
PHOSPHATE SERPL-MCNC: 3.4 MG/DL (ref 2.7–4.5)
PHOSPHATE SERPL-MCNC: 3.5 MG/DL (ref 2.7–4.5)
PHOSPHATE SERPL-MCNC: 4 MG/DL (ref 2.7–4.5)
PISA TR MAX VEL: 3.16 M/S
PLATELET # BLD AUTO: 112 K/UL (ref 150–350)
PLATELET # BLD AUTO: 114 K/UL (ref 150–350)
PLATELET # BLD AUTO: 116 K/UL (ref 150–350)
PLATELET # BLD AUTO: 122 K/UL (ref 150–350)
PLATELET # BLD AUTO: 138 K/UL (ref 150–350)
PLATELET # BLD AUTO: 139 K/UL (ref 150–350)
PLATELET # BLD AUTO: 144 K/UL (ref 150–350)
PLATELET # BLD AUTO: 144 K/UL (ref 150–350)
PLATELET # BLD AUTO: 145 K/UL (ref 150–350)
PLATELET # BLD AUTO: 145 K/UL (ref 150–350)
PLATELET # BLD AUTO: 148 K/UL (ref 150–350)
PLATELET # BLD AUTO: 149 K/UL (ref 150–350)
PLATELET # BLD AUTO: 151 K/UL (ref 150–350)
PLATELET # BLD AUTO: 152 K/UL (ref 150–350)
PLATELET # BLD AUTO: 154 K/UL (ref 150–350)
PLATELET # BLD AUTO: 157 K/UL (ref 150–350)
PLATELET # BLD AUTO: 170 K/UL (ref 150–350)
PLATELET # BLD AUTO: 177 K/UL (ref 150–350)
PLATELET # BLD AUTO: 178 K/UL (ref 150–350)
PLATELET # BLD AUTO: 182 K/UL (ref 150–350)
PLATELET # BLD AUTO: 184 K/UL (ref 150–350)
PLATELET # BLD AUTO: 191 K/UL (ref 150–350)
PLATELET # BLD AUTO: 212 K/UL (ref 150–350)
PLATELET # BLD AUTO: 239 K/UL (ref 150–350)
PLATELET # BLD AUTO: 294 K/UL (ref 150–350)
PLATELET # BLD AUTO: 302 K/UL (ref 150–350)
PLATELET # BLD AUTO: 336 K/UL (ref 150–350)
PLATELET # BLD AUTO: 372 K/UL (ref 150–350)
PLATELET # BLD AUTO: 378 K/UL (ref 150–350)
PLATELET # BLD AUTO: 388 K/UL (ref 150–350)
PLATELET # BLD AUTO: 400 K/UL (ref 150–350)
PLATELET # BLD AUTO: 408 K/UL (ref 150–350)
PLATELET # BLD AUTO: 414 K/UL (ref 150–350)
PLATELET # BLD AUTO: 418 K/UL (ref 150–350)
PLATELET # BLD AUTO: 479 K/UL (ref 150–350)
PLATELET # BLD AUTO: 489 K/UL (ref 150–350)
PLATELET # BLD AUTO: 636 K/UL (ref 150–350)
PLATELET # BLD AUTO: 64 K/UL (ref 150–350)
PLATELET # BLD AUTO: 65 K/UL (ref 150–350)
PLATELET # BLD AUTO: 77 K/UL (ref 150–350)
PLATELET BLD QL SMEAR: ABNORMAL
PMV BLD AUTO: 10 FL (ref 9.2–12.9)
PMV BLD AUTO: 10.1 FL (ref 9.2–12.9)
PMV BLD AUTO: 10.2 FL (ref 9.2–12.9)
PMV BLD AUTO: 10.5 FL (ref 9.2–12.9)
PMV BLD AUTO: 10.6 FL (ref 9.2–12.9)
PMV BLD AUTO: 10.7 FL (ref 9.2–12.9)
PMV BLD AUTO: 10.7 FL (ref 9.2–12.9)
PMV BLD AUTO: 10.9 FL (ref 9.2–12.9)
PMV BLD AUTO: 11 FL (ref 9.2–12.9)
PMV BLD AUTO: 11 FL (ref 9.2–12.9)
PMV BLD AUTO: 11.4 FL (ref 9.2–12.9)
PMV BLD AUTO: 11.8 FL (ref 9.2–12.9)
PMV BLD AUTO: 9.4 FL (ref 9.2–12.9)
PMV BLD AUTO: 9.4 FL (ref 9.2–12.9)
PMV BLD AUTO: 9.5 FL (ref 9.2–12.9)
PMV BLD AUTO: 9.5 FL (ref 9.2–12.9)
PMV BLD AUTO: 9.6 FL (ref 9.2–12.9)
PMV BLD AUTO: 9.7 FL (ref 9.2–12.9)
PMV BLD AUTO: 9.8 FL (ref 9.2–12.9)
PMV BLD AUTO: ABNORMAL FL (ref 9.2–12.9)
PNEOE REFLEX TEST ADDED: NORMAL
PO2 BLDA: 108 MMHG (ref 80–100)
PO2 BLDA: 129 MMHG (ref 80–100)
PO2 BLDA: 24 MMHG (ref 40–60)
PO2 BLDA: 33 MMHG (ref 40–60)
PO2 BLDA: 39 MMHG (ref 40–60)
PO2 BLDA: 53 MMHG (ref 40–60)
POC BE: -11 MMOL/L
POC BE: -12 MMOL/L
POC BE: -14 MMOL/L
POC BE: -19 MMOL/L
POC BE: -6 MMOL/L
POC BE: -7 MMOL/L
POC SATURATED O2: 37 % (ref 95–100)
POC SATURATED O2: 66 % (ref 95–100)
POC SATURATED O2: 75 % (ref 95–100)
POC SATURATED O2: 88 % (ref 95–100)
POC SATURATED O2: 98 % (ref 95–100)
POC SATURATED O2: 99 % (ref 95–100)
POC TCO2: 13 MMOL/L (ref 23–27)
POC TCO2: 13 MMOL/L (ref 24–29)
POC TCO2: 15 MMOL/L (ref 24–29)
POC TCO2: 19 MMOL/L (ref 24–29)
POC TCO2: 19 MMOL/L (ref 24–29)
POC TCO2: 7 MMOL/L (ref 23–27)
POCT GLUCOSE: 101 MG/DL (ref 70–110)
POCT GLUCOSE: 106 MG/DL (ref 70–110)
POCT GLUCOSE: 108 MG/DL (ref 70–110)
POCT GLUCOSE: 109 MG/DL (ref 70–110)
POCT GLUCOSE: 120 MG/DL (ref 70–110)
POCT GLUCOSE: 138 MG/DL (ref 70–110)
POCT GLUCOSE: 79 MG/DL (ref 70–110)
POCT GLUCOSE: 80 MG/DL (ref 70–110)
POCT GLUCOSE: 83 MG/DL (ref 70–110)
POCT GLUCOSE: 84 MG/DL (ref 70–110)
POCT GLUCOSE: 85 MG/DL (ref 70–110)
POCT GLUCOSE: 86 MG/DL (ref 70–110)
POCT GLUCOSE: 86 MG/DL (ref 70–110)
POIKILOCYTOSIS BLD QL SMEAR: ABNORMAL
POIKILOCYTOSIS BLD QL SMEAR: SLIGHT
POLYCHROMASIA BLD QL SMEAR: ABNORMAL
POTASSIUM SERPL-SCNC: 3.3 MMOL/L (ref 3.5–5.1)
POTASSIUM SERPL-SCNC: 3.4 MMOL/L (ref 3.5–5.1)
POTASSIUM SERPL-SCNC: 3.5 MMOL/L (ref 3.5–5.1)
POTASSIUM SERPL-SCNC: 3.6 MMOL/L (ref 3.5–5.1)
POTASSIUM SERPL-SCNC: 3.7 MMOL/L (ref 3.5–5.1)
POTASSIUM SERPL-SCNC: 3.8 MMOL/L (ref 3.5–5.1)
POTASSIUM SERPL-SCNC: 3.9 MMOL/L (ref 3.5–5.1)
POTASSIUM SERPL-SCNC: 4 MMOL/L (ref 3.5–5.1)
POTASSIUM SERPL-SCNC: 4.1 MMOL/L (ref 3.5–5.1)
POTASSIUM SERPL-SCNC: 4.2 MMOL/L (ref 3.5–5.1)
POTASSIUM SERPL-SCNC: 4.3 MMOL/L (ref 3.5–5.1)
POTASSIUM SERPL-SCNC: 4.3 MMOL/L (ref 3.5–5.1)
POTASSIUM SERPL-SCNC: 4.4 MMOL/L (ref 3.5–5.1)
POTASSIUM SERPL-SCNC: 4.5 MMOL/L (ref 3.5–5.1)
POTASSIUM SERPL-SCNC: 4.5 MMOL/L (ref 3.5–5.1)
POTASSIUM SERPL-SCNC: 4.7 MMOL/L (ref 3.5–5.1)
POTASSIUM SERPL-SCNC: 4.8 MMOL/L (ref 3.5–5.1)
POTASSIUM SERPL-SCNC: 4.9 MMOL/L (ref 3.5–5.1)
POTASSIUM SERPL-SCNC: 5 MMOL/L (ref 3.5–5.1)
POTASSIUM SERPL-SCNC: 5.8 MMOL/L (ref 3.5–5.1)
POTASSIUM UR-SCNC: 28 MMOL/L (ref 15–95)
PROCALCITONIN SERPL IA-MCNC: 291.29 NG/ML
PROT CSF-MCNC: 20 MG/DL (ref 15–40)
PROT SERPL-MCNC: 10.4 G/DL (ref 6–8.4)
PROT SERPL-MCNC: 6 G/DL (ref 6–8.4)
PROT SERPL-MCNC: 6.1 G/DL (ref 6–8.4)
PROT SERPL-MCNC: 6.2 G/DL (ref 6–8.4)
PROT SERPL-MCNC: 6.3 G/DL (ref 6–8.4)
PROT SERPL-MCNC: 6.3 G/DL (ref 6–8.4)
PROT SERPL-MCNC: 6.4 G/DL (ref 6–8.4)
PROT SERPL-MCNC: 6.5 G/DL (ref 6–8.4)
PROT SERPL-MCNC: 6.6 G/DL (ref 6–8.4)
PROT SERPL-MCNC: 6.7 G/DL (ref 6–8.4)
PROT SERPL-MCNC: 6.8 G/DL (ref 6–8.4)
PROT SERPL-MCNC: 6.9 G/DL (ref 6–8.4)
PROT SERPL-MCNC: 7.1 G/DL (ref 6–8.4)
PROT SERPL-MCNC: 7.2 G/DL (ref 6–8.4)
PROT SERPL-MCNC: 7.2 G/DL (ref 6–8.4)
PROT SERPL-MCNC: 7.3 G/DL (ref 6–8.4)
PROT SERPL-MCNC: 7.4 G/DL (ref 6–8.4)
PROT SERPL-MCNC: 7.6 G/DL (ref 6–8.4)
PROT SERPL-MCNC: 7.7 G/DL (ref 6–8.4)
PROT SERPL-MCNC: 7.9 G/DL (ref 6–8.4)
PROT SERPL-MCNC: 8.1 G/DL (ref 6–8.4)
PROT SERPL-MCNC: 8.2 G/DL (ref 6–8.4)
PROT SERPL-MCNC: 8.2 G/DL (ref 6–8.4)
PROT SERPL-MCNC: 8.5 G/DL (ref 6–8.4)
PROT SERPL-MCNC: 8.6 G/DL (ref 6–8.4)
PROT SERPL-MCNC: 8.9 G/DL (ref 6–8.4)
PROT SERPL-MCNC: 9 G/DL (ref 6–8.4)
PROT SERPL-MCNC: 9.2 G/DL (ref 6–8.4)
PROT SERPL-MCNC: 9.4 G/DL (ref 6–8.4)
PROT UR QL STRIP: ABNORMAL
PROT UR QL STRIP: NEGATIVE
PROT UR QL STRIP: NEGATIVE
PROT UR-MCNC: 8 MG/DL (ref 0–15)
PROT/CREAT UR: 0.89 MG/G{CREAT} (ref 0–0.2)
PROTHROMBIN TIME: 10.9 SEC (ref 9–12.5)
PROTHROMBIN TIME: 13.1 SEC (ref 9–12.5)
PROTHROMBIN TIME: 13.9 SEC (ref 9–12.5)
PROTHROMBIN TIME: 14 SEC (ref 9–12.5)
PROTHROMBIN TIME: 14.6 SEC (ref 9–12.5)
PROTHROMBIN TIME: 16.4 SEC (ref 9–12.5)
PURKINJE CELLS AB TITR CSF IF: NEGATIVE TITER
PV PEAK VELOCITY: 1.35 CM/S
RA MAJOR: 3.2 CM
RA MAJOR: 3.34 CM
RA PRESSURE: 3 MMHG
RA PRESSURE: 3 MMHG
RA WIDTH: 2.14 CM
RA WIDTH: 2.33 CM
RBC # BLD AUTO: 2.33 M/UL (ref 4–5.4)
RBC # BLD AUTO: 2.49 M/UL (ref 4–5.4)
RBC # BLD AUTO: 2.63 M/UL (ref 4–5.4)
RBC # BLD AUTO: 2.71 M/UL (ref 4–5.4)
RBC # BLD AUTO: 2.72 M/UL (ref 4–5.4)
RBC # BLD AUTO: 2.72 M/UL (ref 4–5.4)
RBC # BLD AUTO: 2.74 M/UL (ref 4–5.4)
RBC # BLD AUTO: 2.74 M/UL (ref 4–5.4)
RBC # BLD AUTO: 2.76 M/UL (ref 4–5.4)
RBC # BLD AUTO: 2.79 M/UL (ref 4–5.4)
RBC # BLD AUTO: 2.81 M/UL (ref 4–5.4)
RBC # BLD AUTO: 2.86 M/UL (ref 4–5.4)
RBC # BLD AUTO: 2.94 M/UL (ref 4–5.4)
RBC # BLD AUTO: 2.94 M/UL (ref 4–5.4)
RBC # BLD AUTO: 2.95 M/UL (ref 4–5.4)
RBC # BLD AUTO: 3.07 M/UL (ref 4–5.4)
RBC # BLD AUTO: 3.08 M/UL (ref 4–5.4)
RBC # BLD AUTO: 3.08 M/UL (ref 4–5.4)
RBC # BLD AUTO: 3.1 M/UL (ref 4–5.4)
RBC # BLD AUTO: 3.15 M/UL (ref 4–5.4)
RBC # BLD AUTO: 3.16 M/UL (ref 4–5.4)
RBC # BLD AUTO: 3.24 M/UL (ref 4–5.4)
RBC # BLD AUTO: 3.26 M/UL (ref 4–5.4)
RBC # BLD AUTO: 3.27 M/UL (ref 4–5.4)
RBC # BLD AUTO: 3.3 M/UL (ref 4–5.4)
RBC # BLD AUTO: 3.37 M/UL (ref 4–5.4)
RBC # BLD AUTO: 3.4 M/UL (ref 4–5.4)
RBC # BLD AUTO: 3.4 M/UL (ref 4–5.4)
RBC # BLD AUTO: 3.54 M/UL (ref 4–5.4)
RBC # BLD AUTO: 3.65 M/UL (ref 4–5.4)
RBC # BLD AUTO: 3.65 M/UL (ref 4–5.4)
RBC # BLD AUTO: 3.74 M/UL (ref 4–5.4)
RBC # BLD AUTO: 3.9 M/UL (ref 4–5.4)
RBC # BLD AUTO: 4.18 M/UL (ref 4–5.4)
RBC # BLD AUTO: 4.25 M/UL (ref 4–5.4)
RBC # BLD AUTO: 4.61 M/UL (ref 4–5.4)
RBC # BLD AUTO: 4.67 M/UL (ref 4–5.4)
RBC # BLD AUTO: 4.67 M/UL (ref 4–5.4)
RBC # CSF: 1 /CU MM
RBC # CSF: 175 /CU MM
RBC #/AREA URNS AUTO: 0 /HPF (ref 0–4)
RBC #/AREA URNS AUTO: 1 /HPF (ref 0–4)
RBC #/AREA URNS AUTO: 2 /HPF (ref 0–4)
RBC #/AREA URNS AUTO: 3 /HPF (ref 0–4)
RBC #/AREA URNS AUTO: >100 /HPF (ref 0–4)
RBC #/AREA URNS HPF: 0 /HPF (ref 0–4)
RBC #/AREA URNS HPF: 3 /HPF (ref 0–4)
RBC #/AREA URNS HPF: >100 /HPF (ref 0–4)
RIGHT VENTRICULAR END-DIASTOLIC DIMENSION: 1.91 CM
RIGHT VENTRICULAR END-DIASTOLIC DIMENSION: 2 CM
RIGHT VENTRICULAR LENGTH IN DIASTOLE (APICAL 4-CHAMBER VIEW): 4.1 CM
RV TISSUE DOPPLER FREE WALL SYSTOLIC VELOCITY 1 (APICAL 4 CHAMBER VIEW): 16.94 CM/S
SAMPLE: ABNORMAL
SARS-COV-2 RDRP RESP QL NAA+PROBE: NEGATIVE
SATURATED IRON: 4 % (ref 20–50)
SATURATED IRON: 6 % (ref 20–50)
SCHISTOCYTES BLD QL SMEAR: ABNORMAL
SCHISTOCYTES BLD QL SMEAR: PRESENT
SCHISTOCYTES BLD QL SMEAR: PRESENT
SINUS: 2.43 CM
SITE: ABNORMAL
SODIUM SERPL-SCNC: 121 MMOL/L (ref 136–145)
SODIUM SERPL-SCNC: 130 MMOL/L (ref 136–145)
SODIUM SERPL-SCNC: 131 MMOL/L (ref 136–145)
SODIUM SERPL-SCNC: 132 MMOL/L (ref 136–145)
SODIUM SERPL-SCNC: 133 MMOL/L (ref 136–145)
SODIUM SERPL-SCNC: 134 MMOL/L (ref 136–145)
SODIUM SERPL-SCNC: 135 MMOL/L (ref 136–145)
SODIUM SERPL-SCNC: 135 MMOL/L (ref 136–145)
SODIUM SERPL-SCNC: 136 MMOL/L (ref 136–145)
SODIUM SERPL-SCNC: 137 MMOL/L (ref 136–145)
SODIUM SERPL-SCNC: 138 MMOL/L (ref 136–145)
SODIUM SERPL-SCNC: 138 MMOL/L (ref 136–145)
SODIUM SERPL-SCNC: 139 MMOL/L (ref 136–145)
SODIUM SERPL-SCNC: 139 MMOL/L (ref 136–145)
SODIUM SERPL-SCNC: 140 MMOL/L (ref 136–145)
SODIUM UR-SCNC: 53 MMOL/L (ref 20–250)
SODIUM UR-SCNC: <20 MMOL/L (ref 20–250)
SP GR UR STRIP: 1.01 (ref 1–1.03)
SP GR UR STRIP: 1.01 (ref 1–1.03)
SP GR UR STRIP: 1.02 (ref 1–1.03)
SP GR UR STRIP: <=1.005 (ref 1–1.03)
SP GR UR STRIP: >=1.03 (ref 1–1.03)
SP02: 100
SP02: 96
SPECIMEN SOURCE: NORMAL
SPECIMEN VOL CSF: 3 ML
SPECIMEN VOL CSF: 3 ML
SQUAMOUS #/AREA URNS AUTO: 2 /HPF
SQUAMOUS #/AREA URNS AUTO: 3 /HPF
SQUAMOUS #/AREA URNS AUTO: ABNORMAL /HPF
SQUAMOUS #/AREA URNS HPF: 2 /HPF
STJ: 2.18 CM
STJ: 2.28 CM
STOMATOCYTES BLD QL SMEAR: PRESENT
STOMATOCYTES BLD QL SMEAR: PRESENT
STRIA MUS AB TITR SER: NORMAL {TITER}
SUPPLEMENTAL DIAGNOSIS: ABNORMAL
SUPPLEMENTAL DIAGNOSIS: NORMAL
TARGETS BLD QL SMEAR: ABNORMAL
TDI LATERAL: 0.1 M/S
TDI LATERAL: 0.15 M/S
TDI SEPTAL: 0.07 M/S
TDI SEPTAL: 0.13 M/S
TDI: 0.09 M/S
TDI: 0.14 M/S
TOTAL IRON BINDING CAPACITY: 342 UG/DL (ref 250–450)
TOTAL IRON BINDING CAPACITY: 438 UG/DL (ref 250–450)
TOXIC GRANULES BLD QL SMEAR: PRESENT
TOXICOLOGY INFORMATION: NORMAL
TR MAX PG: 40 MMHG
TRANS ERYTHROCYTES VOL PATIENT: NORMAL ML
TRANS ERYTHROCYTES VOL PATIENT: NORMAL ML
TRANSFERRIN SERPL-MCNC: 231 MG/DL (ref 200–375)
TRANSFERRIN SERPL-MCNC: 296 MG/DL (ref 200–375)
TRICUSPID ANNULAR PLANE SYSTOLIC EXCURSION: 1.79 CM
TRICUSPID ANNULAR PLANE SYSTOLIC EXCURSION: 1.93 CM
TROPONIN I SERPL DL<=0.01 NG/ML-MCNC: 0.01 NG/ML (ref 0–0.03)
TROPONIN I SERPL DL<=0.01 NG/ML-MCNC: 0.04 NG/ML (ref 0–0.03)
TROPONIN I SERPL DL<=0.01 NG/ML-MCNC: 0.07 NG/ML (ref 0–0.03)
TROPONIN I SERPL DL<=0.01 NG/ML-MCNC: <0.006 NG/ML (ref 0–0.03)
TSH SERPL DL<=0.005 MIU/L-ACNC: 1.88 UIU/ML (ref 0.4–4)
TV REST PULMONARY ARTERY PRESSURE: 43 MMHG
URN SPEC COLLECT METH UR: ABNORMAL
UROBILINOGEN UR STRIP-ACNC: >=8 EU/DL
UROBILINOGEN UR STRIP-ACNC: >=8 EU/DL
UROBILINOGEN UR STRIP-ACNC: ABNORMAL EU/DL
UROBILINOGEN UR STRIP-ACNC: NEGATIVE EU/DL
VANCOMYCIN SERPL-MCNC: 19.4 UG/ML
VANCOMYCIN SERPL-MCNC: 19.6 UG/ML
VANCOMYCIN SERPL-MCNC: 22.3 UG/ML
VANCOMYCIN SERPL-MCNC: 27.3 UG/ML
VANCOMYCIN SERPL-MCNC: 6.9 UG/ML
VANCOMYCIN TROUGH SERPL-MCNC: 10.2 UG/ML (ref 10–22)
VANCOMYCIN TROUGH SERPL-MCNC: 17.3 UG/ML (ref 10–22)
VANCOMYCIN TROUGH SERPL-MCNC: 19.5 UG/ML (ref 10–22)
VANCOMYCIN TROUGH SERPL-MCNC: 19.9 UG/ML (ref 10–22)
VANCOMYCIN TROUGH SERPL-MCNC: 22.1 UG/ML (ref 10–22)
VANCOMYCIN TROUGH SERPL-MCNC: 22.7 UG/ML (ref 10–22)
VANCOMYCIN TROUGH SERPL-MCNC: 23 UG/ML (ref 10–22)
VANCOMYCIN TROUGH SERPL-MCNC: 25.5 UG/ML (ref 10–22)
VANCOMYCIN TROUGH SERPL-MCNC: 39.1 UG/ML (ref 10–22)
VANCOMYCIN TROUGH SERPL-MCNC: 6 UG/ML (ref 10–22)
VGCC-N BIND AB SER-SCNC: NORMAL PMOL/L
VGCC-P/Q BIND AB SER-SCNC: NORMAL PMOL/L
VGKC AB SER-SCNC: NORMAL PMOL/L
VIT B1 BLD-MCNC: 37 UG/L (ref 38–122)
VIT B12 SERPL-MCNC: 663 PG/ML (ref 210–950)
WBC # BLD AUTO: 0.95 K/UL (ref 3.9–12.7)
WBC # BLD AUTO: 0.99 K/UL (ref 3.9–12.7)
WBC # BLD AUTO: 10.06 K/UL (ref 3.9–12.7)
WBC # BLD AUTO: 10.2 K/UL (ref 3.9–12.7)
WBC # BLD AUTO: 10.25 K/UL (ref 3.9–12.7)
WBC # BLD AUTO: 11.29 K/UL (ref 3.9–12.7)
WBC # BLD AUTO: 11.63 K/UL (ref 3.9–12.7)
WBC # BLD AUTO: 12.42 K/UL (ref 3.9–12.7)
WBC # BLD AUTO: 14.04 K/UL (ref 3.9–12.7)
WBC # BLD AUTO: 2.5 K/UL (ref 3.9–12.7)
WBC # BLD AUTO: 2.88 K/UL (ref 3.9–12.7)
WBC # BLD AUTO: 20.1 K/UL (ref 3.9–12.7)
WBC # BLD AUTO: 21.43 K/UL (ref 3.9–12.7)
WBC # BLD AUTO: 21.81 K/UL (ref 3.9–12.7)
WBC # BLD AUTO: 23.64 K/UL (ref 3.9–12.7)
WBC # BLD AUTO: 25.77 K/UL (ref 3.9–12.7)
WBC # BLD AUTO: 25.94 K/UL (ref 3.9–12.7)
WBC # BLD AUTO: 25.94 K/UL (ref 3.9–12.7)
WBC # BLD AUTO: 26.54 K/UL (ref 3.9–12.7)
WBC # BLD AUTO: 27.95 K/UL (ref 3.9–12.7)
WBC # BLD AUTO: 29.81 K/UL (ref 3.9–12.7)
WBC # BLD AUTO: 32.88 K/UL (ref 3.9–12.7)
WBC # BLD AUTO: 4.84 K/UL (ref 3.9–12.7)
WBC # BLD AUTO: 5.11 K/UL (ref 3.9–12.7)
WBC # BLD AUTO: 6.88 K/UL (ref 3.9–12.7)
WBC # BLD AUTO: 6.93 K/UL (ref 3.9–12.7)
WBC # BLD AUTO: 7 K/UL (ref 3.9–12.7)
WBC # BLD AUTO: 7.12 K/UL (ref 3.9–12.7)
WBC # BLD AUTO: 7.24 K/UL (ref 3.9–12.7)
WBC # BLD AUTO: 7.25 K/UL (ref 3.9–12.7)
WBC # BLD AUTO: 7.29 K/UL (ref 3.9–12.7)
WBC # BLD AUTO: 7.36 K/UL (ref 3.9–12.7)
WBC # BLD AUTO: 7.51 K/UL (ref 3.9–12.7)
WBC # BLD AUTO: 8.6 K/UL (ref 3.9–12.7)
WBC # BLD AUTO: 8.9 K/UL (ref 3.9–12.7)
WBC # BLD AUTO: 9.36 K/UL (ref 3.9–12.7)
WBC # BLD AUTO: 9.42 K/UL (ref 3.9–12.7)
WBC # BLD AUTO: 9.56 K/UL (ref 3.9–12.7)
WBC # BLD AUTO: 9.62 K/UL (ref 3.9–12.7)
WBC # BLD AUTO: 9.83 K/UL (ref 3.9–12.7)
WBC # CSF: 2 /CU MM (ref 0–5)
WBC # CSF: 2 /CU MM (ref 0–5)
WBC #/AREA URNS AUTO: 1 /HPF (ref 0–5)
WBC #/AREA URNS AUTO: 10 /HPF (ref 0–5)
WBC #/AREA URNS AUTO: 3 /HPF (ref 0–5)
WBC #/AREA URNS AUTO: 3 /HPF (ref 0–5)
WBC #/AREA URNS AUTO: 5 /HPF (ref 0–5)
WBC #/AREA URNS HPF: 1 /HPF (ref 0–5)
WBC #/AREA URNS HPF: 1 /HPF (ref 0–5)
WBC #/AREA URNS HPF: 2 /HPF (ref 0–5)
WBC CLUMPS UR QL AUTO: ABNORMAL

## 2020-01-01 PROCEDURE — 86850 RBC ANTIBODY SCREEN: CPT

## 2020-01-01 PROCEDURE — 25000003 PHARM REV CODE 250: Performed by: STUDENT IN AN ORGANIZED HEALTH CARE EDUCATION/TRAINING PROGRAM

## 2020-01-01 PROCEDURE — 25000003 PHARM REV CODE 250: Performed by: NURSE PRACTITIONER

## 2020-01-01 PROCEDURE — 83690 ASSAY OF LIPASE: CPT | Mod: ER

## 2020-01-01 PROCEDURE — 83735 ASSAY OF MAGNESIUM: CPT

## 2020-01-01 PROCEDURE — 71000039 HC RECOVERY, EACH ADD'L HOUR: Performed by: STUDENT IN AN ORGANIZED HEALTH CARE EDUCATION/TRAINING PROGRAM

## 2020-01-01 PROCEDURE — 87186 SC STD MICRODIL/AGAR DIL: CPT

## 2020-01-01 PROCEDURE — 63600175 PHARM REV CODE 636 W HCPCS: Performed by: HOSPITALIST

## 2020-01-01 PROCEDURE — 80069 RENAL FUNCTION PANEL: CPT | Mod: 91

## 2020-01-01 PROCEDURE — 85025 COMPLETE CBC W/AUTO DIFF WBC: CPT

## 2020-01-01 PROCEDURE — 63600175 PHARM REV CODE 636 W HCPCS: Performed by: FAMILY MEDICINE

## 2020-01-01 PROCEDURE — 80053 COMPREHEN METABOLIC PANEL: CPT

## 2020-01-01 PROCEDURE — 99900035 HC TECH TIME PER 15 MIN (STAT)

## 2020-01-01 PROCEDURE — 81025 URINE PREGNANCY TEST: CPT | Mod: ER

## 2020-01-01 PROCEDURE — 94761 N-INVAS EAR/PLS OXIMETRY MLT: CPT

## 2020-01-01 PROCEDURE — 36415 COLL VENOUS BLD VENIPUNCTURE: CPT | Mod: PO

## 2020-01-01 PROCEDURE — 11000001 HC ACUTE MED/SURG PRIVATE ROOM

## 2020-01-01 PROCEDURE — 63600175 PHARM REV CODE 636 W HCPCS: Performed by: INTERNAL MEDICINE

## 2020-01-01 PROCEDURE — 25000003 PHARM REV CODE 250: Performed by: ANESTHESIOLOGY

## 2020-01-01 PROCEDURE — 25000003 PHARM REV CODE 250: Performed by: INTERNAL MEDICINE

## 2020-01-01 PROCEDURE — 36556 INSERT NON-TUNNEL CV CATH: CPT | Mod: 79,RT,, | Performed by: STUDENT IN AN ORGANIZED HEALTH CARE EDUCATION/TRAINING PROGRAM

## 2020-01-01 PROCEDURE — 93010 ELECTROCARDIOGRAM REPORT: CPT | Mod: ,,, | Performed by: INTERNAL MEDICINE

## 2020-01-01 PROCEDURE — 20000000 HC ICU ROOM

## 2020-01-01 PROCEDURE — 85007 BL SMEAR W/DIFF WBC COUNT: CPT

## 2020-01-01 PROCEDURE — 63600175 PHARM REV CODE 636 W HCPCS: Performed by: NURSE PRACTITIONER

## 2020-01-01 PROCEDURE — 84100 ASSAY OF PHOSPHORUS: CPT

## 2020-01-01 PROCEDURE — 63600175 PHARM REV CODE 636 W HCPCS

## 2020-01-01 PROCEDURE — 27000221 HC OXYGEN, UP TO 24 HOURS

## 2020-01-01 PROCEDURE — 93010 EKG 12-LEAD: ICD-10-PCS | Mod: ,,, | Performed by: INTERNAL MEDICINE

## 2020-01-01 PROCEDURE — 85027 COMPLETE CBC AUTOMATED: CPT

## 2020-01-01 PROCEDURE — 99213 OFFICE O/P EST LOW 20 MIN: CPT | Mod: PBBFAC,PO | Performed by: STUDENT IN AN ORGANIZED HEALTH CARE EDUCATION/TRAINING PROGRAM

## 2020-01-01 PROCEDURE — 25000003 PHARM REV CODE 250: Performed by: FAMILY MEDICINE

## 2020-01-01 PROCEDURE — 88381 MICRODISSECTION MANUAL: CPT | Performed by: PATHOLOGY

## 2020-01-01 PROCEDURE — 25000003 PHARM REV CODE 250: Performed by: NURSE ANESTHETIST, CERTIFIED REGISTERED

## 2020-01-01 PROCEDURE — 97110 THERAPEUTIC EXERCISES: CPT | Performed by: PHYSICAL THERAPIST

## 2020-01-01 PROCEDURE — P9021 RED BLOOD CELLS UNIT: HCPCS

## 2020-01-01 PROCEDURE — A9698 NON-RAD CONTRAST MATERIALNOC: HCPCS | Performed by: FAMILY MEDICINE

## 2020-01-01 PROCEDURE — 81003 URINALYSIS AUTO W/O SCOPE: CPT

## 2020-01-01 PROCEDURE — 83605 ASSAY OF LACTIC ACID: CPT

## 2020-01-01 PROCEDURE — 87040 BLOOD CULTURE FOR BACTERIA: CPT | Mod: 59

## 2020-01-01 PROCEDURE — 36000711: Performed by: STUDENT IN AN ORGANIZED HEALTH CARE EDUCATION/TRAINING PROGRAM

## 2020-01-01 PROCEDURE — 85610 PROTHROMBIN TIME: CPT

## 2020-01-01 PROCEDURE — 27201012 HC FORCEPS, HOT/COLD, DISP: Performed by: INTERNAL MEDICINE

## 2020-01-01 PROCEDURE — 99233 SBSQ HOSP IP/OBS HIGH 50: CPT | Mod: ,,, | Performed by: INTERNAL MEDICINE

## 2020-01-01 PROCEDURE — 86255 FLUORESCENT ANTIBODY SCREEN: CPT | Mod: 91

## 2020-01-01 PROCEDURE — 87086 URINE CULTURE/COLONY COUNT: CPT | Mod: ER

## 2020-01-01 PROCEDURE — 25000003 PHARM REV CODE 250: Performed by: GENERAL ACUTE CARE HOSPITAL

## 2020-01-01 PROCEDURE — 25000003 PHARM REV CODE 250: Performed by: CLINICAL NURSE SPECIALIST

## 2020-01-01 PROCEDURE — 83735 ASSAY OF MAGNESIUM: CPT | Mod: 91

## 2020-01-01 PROCEDURE — 99233 PR SUBSEQUENT HOSPITAL CARE,LEVL III: ICD-10-PCS | Mod: ,,, | Performed by: INTERNAL MEDICINE

## 2020-01-01 PROCEDURE — 88108 CYTOPATH CONCENTRATE TECH: CPT | Performed by: STUDENT IN AN ORGANIZED HEALTH CARE EDUCATION/TRAINING PROGRAM

## 2020-01-01 PROCEDURE — 37000009 HC ANESTHESIA EA ADD 15 MINS: Performed by: INTERNAL MEDICINE

## 2020-01-01 PROCEDURE — 87075 CULTR BACTERIA EXCEPT BLOOD: CPT | Mod: 59

## 2020-01-01 PROCEDURE — 97535 SELF CARE MNGMENT TRAINING: CPT | Mod: CO

## 2020-01-01 PROCEDURE — 80069 RENAL FUNCTION PANEL: CPT

## 2020-01-01 PROCEDURE — 37000008 HC ANESTHESIA 1ST 15 MINUTES: Performed by: STUDENT IN AN ORGANIZED HEALTH CARE EDUCATION/TRAINING PROGRAM

## 2020-01-01 PROCEDURE — 96415 CHEMO IV INFUSION ADDL HR: CPT

## 2020-01-01 PROCEDURE — A4216 STERILE WATER/SALINE, 10 ML: HCPCS | Performed by: NURSE PRACTITIONER

## 2020-01-01 PROCEDURE — 84484 ASSAY OF TROPONIN QUANT: CPT | Mod: 91

## 2020-01-01 PROCEDURE — 83880 ASSAY OF NATRIURETIC PEPTIDE: CPT

## 2020-01-01 PROCEDURE — 97530 THERAPEUTIC ACTIVITIES: CPT | Mod: CO

## 2020-01-01 PROCEDURE — 63600175 PHARM REV CODE 636 W HCPCS: Performed by: GENERAL ACUTE CARE HOSPITAL

## 2020-01-01 PROCEDURE — 93010 EKG 12-LEAD: ICD-10-PCS | Mod: 59,,, | Performed by: INTERNAL MEDICINE

## 2020-01-01 PROCEDURE — 99024 PR POST-OP FOLLOW-UP VISIT: ICD-10-PCS | Mod: ,,, | Performed by: STUDENT IN AN ORGANIZED HEALTH CARE EDUCATION/TRAINING PROGRAM

## 2020-01-01 PROCEDURE — 36415 COLL VENOUS BLD VENIPUNCTURE: CPT

## 2020-01-01 PROCEDURE — 25000003 PHARM REV CODE 250: Mod: ER | Performed by: EMERGENCY MEDICINE

## 2020-01-01 PROCEDURE — 63600175 PHARM REV CODE 636 W HCPCS: Performed by: STUDENT IN AN ORGANIZED HEALTH CARE EDUCATION/TRAINING PROGRAM

## 2020-01-01 PROCEDURE — 25500020 PHARM REV CODE 255: Performed by: INTERNAL MEDICINE

## 2020-01-01 PROCEDURE — 87040 BLOOD CULTURE FOR BACTERIA: CPT

## 2020-01-01 PROCEDURE — 99291 PR CRITICAL CARE, E/M 30-74 MINUTES: ICD-10-PCS | Mod: ,,, | Performed by: EMERGENCY MEDICINE

## 2020-01-01 PROCEDURE — 87088 URINE BACTERIA CULTURE: CPT | Mod: ER

## 2020-01-01 PROCEDURE — 25000003 PHARM REV CODE 250: Performed by: PHYSICIAN ASSISTANT

## 2020-01-01 PROCEDURE — 97110 THERAPEUTIC EXERCISES: CPT

## 2020-01-01 PROCEDURE — S0028 INJECTION, FAMOTIDINE, 20 MG: HCPCS | Performed by: NURSE PRACTITIONER

## 2020-01-01 PROCEDURE — 99232 SBSQ HOSP IP/OBS MODERATE 35: CPT | Mod: 24,,, | Performed by: STUDENT IN AN ORGANIZED HEALTH CARE EDUCATION/TRAINING PROGRAM

## 2020-01-01 PROCEDURE — 99233 SBSQ HOSP IP/OBS HIGH 50: CPT | Mod: ,,, | Performed by: NURSE PRACTITIONER

## 2020-01-01 PROCEDURE — 63600175 PHARM REV CODE 636 W HCPCS: Mod: JG | Performed by: INTERNAL MEDICINE

## 2020-01-01 PROCEDURE — 83935 ASSAY OF URINE OSMOLALITY: CPT

## 2020-01-01 PROCEDURE — 85025 COMPLETE CBC W/AUTO DIFF WBC: CPT | Mod: ER

## 2020-01-01 PROCEDURE — 63600175 PHARM REV CODE 636 W HCPCS: Performed by: NURSE ANESTHETIST, CERTIFIED REGISTERED

## 2020-01-01 PROCEDURE — 83540 ASSAY OF IRON: CPT | Mod: PO

## 2020-01-01 PROCEDURE — 77001 CHG FLUOROGUIDE CNTRL VEN ACCESS,PLACE,REPLACE,REMOVE: ICD-10-PCS | Mod: 26,59,, | Performed by: STUDENT IN AN ORGANIZED HEALTH CARE EDUCATION/TRAINING PROGRAM

## 2020-01-01 PROCEDURE — 96375 TX/PRO/DX INJ NEW DRUG ADDON: CPT

## 2020-01-01 PROCEDURE — 12000002 HC ACUTE/MED SURGE SEMI-PRIVATE ROOM

## 2020-01-01 PROCEDURE — 97110 THERAPEUTIC EXERCISES: CPT | Mod: CO

## 2020-01-01 PROCEDURE — 82043 UR ALBUMIN QUANTITATIVE: CPT

## 2020-01-01 PROCEDURE — 85025 COMPLETE CBC W/AUTO DIFF WBC: CPT | Mod: PO

## 2020-01-01 PROCEDURE — 87040 BLOOD CULTURE FOR BACTERIA: CPT | Mod: ER

## 2020-01-01 PROCEDURE — 87529 HSV DNA AMP PROBE: CPT

## 2020-01-01 PROCEDURE — 80074 ACUTE HEPATITIS PANEL: CPT

## 2020-01-01 PROCEDURE — 88307 TISSUE EXAM BY PATHOLOGIST: CPT | Mod: 26,,, | Performed by: PATHOLOGY

## 2020-01-01 PROCEDURE — 99233 SBSQ HOSP IP/OBS HIGH 50: CPT | Mod: ,,, | Performed by: PSYCHIATRY & NEUROLOGY

## 2020-01-01 PROCEDURE — 20600001 HC STEP DOWN PRIVATE ROOM

## 2020-01-01 PROCEDURE — 80307 DRUG TEST PRSMV CHEM ANLYZR: CPT

## 2020-01-01 PROCEDURE — 84300 ASSAY OF URINE SODIUM: CPT

## 2020-01-01 PROCEDURE — U0002 COVID-19 LAB TEST NON-CDC: HCPCS | Mod: ER

## 2020-01-01 PROCEDURE — A9585 GADOBUTROL INJECTION: HCPCS | Performed by: INTERNAL MEDICINE

## 2020-01-01 PROCEDURE — 63600175 PHARM REV CODE 636 W HCPCS: Mod: ER | Performed by: FAMILY MEDICINE

## 2020-01-01 PROCEDURE — 80202 ASSAY OF VANCOMYCIN: CPT

## 2020-01-01 PROCEDURE — 80053 COMPREHEN METABOLIC PANEL: CPT | Mod: PO

## 2020-01-01 PROCEDURE — 97530 THERAPEUTIC ACTIVITIES: CPT

## 2020-01-01 PROCEDURE — 25000003 PHARM REV CODE 250: Performed by: EMERGENCY MEDICINE

## 2020-01-01 PROCEDURE — 87077 CULTURE AEROBIC IDENTIFY: CPT | Mod: ER

## 2020-01-01 PROCEDURE — 96523 IRRIG DRUG DELIVERY DEVICE: CPT

## 2020-01-01 PROCEDURE — 97166 OT EVAL MOD COMPLEX 45 MIN: CPT

## 2020-01-01 PROCEDURE — 81275 KRAS GENE VARIANTS EXON 2: CPT | Performed by: PATHOLOGY

## 2020-01-01 PROCEDURE — 85007 BL SMEAR W/DIFF WBC COUNT: CPT | Mod: ER

## 2020-01-01 PROCEDURE — 99213 OFFICE O/P EST LOW 20 MIN: CPT | Mod: PBBFAC,PO | Performed by: INTERNAL MEDICINE

## 2020-01-01 PROCEDURE — 83605 ASSAY OF LACTIC ACID: CPT | Mod: ER

## 2020-01-01 PROCEDURE — 27201040 HC RC 50 FILTER

## 2020-01-01 PROCEDURE — 93010 ELECTROCARDIOGRAM REPORT: CPT | Mod: 59,,, | Performed by: INTERNAL MEDICINE

## 2020-01-01 PROCEDURE — 25500020 PHARM REV CODE 255: Performed by: FAMILY MEDICINE

## 2020-01-01 PROCEDURE — 96361 HYDRATE IV INFUSION ADD-ON: CPT | Mod: ER

## 2020-01-01 PROCEDURE — 80053 COMPREHEN METABOLIC PANEL: CPT | Mod: ER

## 2020-01-01 PROCEDURE — 86703 HIV-1/HIV-2 1 RESULT ANTBDY: CPT

## 2020-01-01 PROCEDURE — 82436 ASSAY OF URINE CHLORIDE: CPT

## 2020-01-01 PROCEDURE — 36600 WITHDRAWAL OF ARTERIAL BLOOD: CPT

## 2020-01-01 PROCEDURE — 99213 OFFICE O/P EST LOW 20 MIN: CPT | Mod: PBBFAC

## 2020-01-01 PROCEDURE — 96365 THER/PROPH/DIAG IV INF INIT: CPT | Mod: ER

## 2020-01-01 PROCEDURE — 87186 SC STD MICRODIL/AGAR DIL: CPT | Mod: 59,ER

## 2020-01-01 PROCEDURE — 99233 SBSQ HOSP IP/OBS HIGH 50: CPT | Mod: ,,, | Performed by: GENERAL ACUTE CARE HOSPITAL

## 2020-01-01 PROCEDURE — 99233 PR SUBSEQUENT HOSPITAL CARE,LEVL III: ICD-10-PCS | Mod: ,,, | Performed by: STUDENT IN AN ORGANIZED HEALTH CARE EDUCATION/TRAINING PROGRAM

## 2020-01-01 PROCEDURE — 99000 SPECIMEN HANDLING OFFICE-LAB: CPT

## 2020-01-01 PROCEDURE — 93005 ELECTROCARDIOGRAM TRACING: CPT

## 2020-01-01 PROCEDURE — 25000003 PHARM REV CODE 250: Mod: ER | Performed by: FAMILY MEDICINE

## 2020-01-01 PROCEDURE — 99232 PR SUBSEQUENT HOSPITAL CARE,LEVL II: ICD-10-PCS | Mod: ,,, | Performed by: INTERNAL MEDICINE

## 2020-01-01 PROCEDURE — 63600175 PHARM REV CODE 636 W HCPCS: Performed by: PHYSICIAN ASSISTANT

## 2020-01-01 PROCEDURE — 82803 BLOOD GASES ANY COMBINATION: CPT | Mod: ER

## 2020-01-01 PROCEDURE — 88342 IMHCHEM/IMCYTCHM 1ST ANTB: CPT | Performed by: PATHOLOGY

## 2020-01-01 PROCEDURE — S0030 INJECTION, METRONIDAZOLE: HCPCS | Performed by: INTERNAL MEDICINE

## 2020-01-01 PROCEDURE — 99291 PR CRITICAL CARE, E/M 30-74 MINUTES: ICD-10-PCS | Mod: ,,, | Performed by: INTERNAL MEDICINE

## 2020-01-01 PROCEDURE — 99232 SBSQ HOSP IP/OBS MODERATE 35: CPT | Mod: ,,, | Performed by: INTERNAL MEDICINE

## 2020-01-01 PROCEDURE — P9016 RBC LEUKOCYTES REDUCED: HCPCS

## 2020-01-01 PROCEDURE — 99215 PR OFFICE/OUTPT VISIT, EST, LEVL V, 40-54 MIN: ICD-10-PCS | Mod: S$PBB,,, | Performed by: INTERNAL MEDICINE

## 2020-01-01 PROCEDURE — 99291 CRITICAL CARE FIRST HOUR: CPT | Mod: ,,, | Performed by: CLINICAL NURSE SPECIALIST

## 2020-01-01 PROCEDURE — 96361 HYDRATE IV INFUSION ADD-ON: CPT

## 2020-01-01 PROCEDURE — 83036 HEMOGLOBIN GLYCOSYLATED A1C: CPT

## 2020-01-01 PROCEDURE — 99356 PR PROLONGED SERV,INPATIENT,1ST HR: ICD-10-PCS | Mod: ,,, | Performed by: INTERNAL MEDICINE

## 2020-01-01 PROCEDURE — 81001 URINALYSIS AUTO W/SCOPE: CPT

## 2020-01-01 PROCEDURE — 82803 BLOOD GASES ANY COMBINATION: CPT

## 2020-01-01 PROCEDURE — 84133 ASSAY OF URINE POTASSIUM: CPT

## 2020-01-01 PROCEDURE — 99356 PR PROLONGED SERV,INPATIENT,1ST HR: CPT | Mod: ,,, | Performed by: INTERNAL MEDICINE

## 2020-01-01 PROCEDURE — U0002 COVID-19 LAB TEST NON-CDC: HCPCS | Performed by: EMERGENCY MEDICINE

## 2020-01-01 PROCEDURE — 99999 PR PBB SHADOW E&M-EST. PATIENT-LVL III: CPT | Mod: PBBFAC,,, | Performed by: INTERNAL MEDICINE

## 2020-01-01 PROCEDURE — 88305 TISSUE EXAM BY PATHOLOGIST: ICD-10-PCS | Mod: 26,,, | Performed by: PATHOLOGY

## 2020-01-01 PROCEDURE — 97110 THERAPEUTIC EXERCISES: CPT | Mod: CQ

## 2020-01-01 PROCEDURE — 96375 TX/PRO/DX INJ NEW DRUG ADDON: CPT | Mod: ER

## 2020-01-01 PROCEDURE — 83605 ASSAY OF LACTIC ACID: CPT | Mod: 91

## 2020-01-01 PROCEDURE — 87077 CULTURE AEROBIC IDENTIFY: CPT | Mod: 59,ER

## 2020-01-01 PROCEDURE — C9290 INJ, BUPIVACAINE LIPOSOME: HCPCS | Performed by: STUDENT IN AN ORGANIZED HEALTH CARE EDUCATION/TRAINING PROGRAM

## 2020-01-01 PROCEDURE — 80053 COMPREHEN METABOLIC PANEL: CPT | Mod: 91

## 2020-01-01 PROCEDURE — 80202 ASSAY OF VANCOMYCIN: CPT | Mod: 91

## 2020-01-01 PROCEDURE — 99233 PR SUBSEQUENT HOSPITAL CARE,LEVL III: ICD-10-PCS | Mod: ,,, | Performed by: NURSE PRACTITIONER

## 2020-01-01 PROCEDURE — 99233 PR SUBSEQUENT HOSPITAL CARE,LEVL III: ICD-10-PCS | Mod: ,,, | Performed by: PSYCHIATRY & NEUROLOGY

## 2020-01-01 PROCEDURE — 81000 URINALYSIS NONAUTO W/SCOPE: CPT | Mod: ER

## 2020-01-01 PROCEDURE — 99232 PR SUBSEQUENT HOSPITAL CARE,LEVL II: ICD-10-PCS | Mod: 24,,, | Performed by: STUDENT IN AN ORGANIZED HEALTH CARE EDUCATION/TRAINING PROGRAM

## 2020-01-01 PROCEDURE — 36430 TRANSFUSION BLD/BLD COMPNT: CPT

## 2020-01-01 PROCEDURE — 96374 THER/PROPH/DIAG INJ IV PUSH: CPT

## 2020-01-01 PROCEDURE — 81210 BRAF GENE: CPT | Performed by: PATHOLOGY

## 2020-01-01 PROCEDURE — 25000003 PHARM REV CODE 250: Performed by: HOSPITALIST

## 2020-01-01 PROCEDURE — 95714 VEEG EA 12-26 HR UNMNTR: CPT

## 2020-01-01 PROCEDURE — 99215 OFFICE O/P EST HI 40 MIN: CPT | Mod: S$PBB,,, | Performed by: INTERNAL MEDICINE

## 2020-01-01 PROCEDURE — 82550 ASSAY OF CK (CPK): CPT | Mod: ER

## 2020-01-01 PROCEDURE — 95700 EEG CONT REC W/VID EEG TECH: CPT

## 2020-01-01 PROCEDURE — 83930 ASSAY OF BLOOD OSMOLALITY: CPT

## 2020-01-01 PROCEDURE — 99291 CRITICAL CARE FIRST HOUR: CPT | Mod: 25,ER

## 2020-01-01 PROCEDURE — 82533 TOTAL CORTISOL: CPT

## 2020-01-01 PROCEDURE — 25000003 PHARM REV CODE 250

## 2020-01-01 PROCEDURE — 82550 ASSAY OF CK (CPK): CPT

## 2020-01-01 PROCEDURE — 88342 IMHCHEM/IMCYTCHM 1ST ANTB: CPT | Mod: 26,,, | Performed by: PATHOLOGY

## 2020-01-01 PROCEDURE — 99900035 HC TECH TIME PER 15 MIN (STAT): Mod: ER

## 2020-01-01 PROCEDURE — 76937 US GUIDE VASCULAR ACCESS: CPT

## 2020-01-01 PROCEDURE — 88341 PR IHC OR ICC EACH ADD'L SINGLE ANTIBODY  STAINPR: ICD-10-PCS | Mod: 26,,, | Performed by: PATHOLOGY

## 2020-01-01 PROCEDURE — 37000008 HC ANESTHESIA 1ST 15 MINUTES: Performed by: INTERNAL MEDICINE

## 2020-01-01 PROCEDURE — 84702 CHORIONIC GONADOTROPIN TEST: CPT

## 2020-01-01 PROCEDURE — 86920 COMPATIBILITY TEST SPIN: CPT

## 2020-01-01 PROCEDURE — 87075 CULTR BACTERIA EXCEPT BLOOD: CPT

## 2020-01-01 PROCEDURE — 87496 CYTOMEG DNA AMP PROBE: CPT

## 2020-01-01 PROCEDURE — C9113 INJ PANTOPRAZOLE SODIUM, VIA: HCPCS | Mod: ER | Performed by: FAMILY MEDICINE

## 2020-01-01 PROCEDURE — 63600175 PHARM REV CODE 636 W HCPCS: Performed by: CLINICAL NURSE SPECIALIST

## 2020-01-01 PROCEDURE — 82105 ALPHA-FETOPROTEIN SERUM: CPT

## 2020-01-01 PROCEDURE — 63600175 PHARM REV CODE 636 W HCPCS: Performed by: EMERGENCY MEDICINE

## 2020-01-01 PROCEDURE — 95720 EEG PHY/QHP EA INCR W/VEEG: CPT | Mod: ,,, | Performed by: PSYCHIATRY & NEUROLOGY

## 2020-01-01 PROCEDURE — 82607 VITAMIN B-12: CPT

## 2020-01-01 PROCEDURE — 99999 PR PBB SHADOW E&M-EST. PATIENT-LVL III: ICD-10-PCS | Mod: PBBFAC,,, | Performed by: STUDENT IN AN ORGANIZED HEALTH CARE EDUCATION/TRAINING PROGRAM

## 2020-01-01 PROCEDURE — 88305 TISSUE EXAM BY PATHOLOGIST: CPT | Performed by: PATHOLOGY

## 2020-01-01 PROCEDURE — 88342 CHG IMMUNOCYTOCHEMISTRY: ICD-10-PCS | Mod: 26,,, | Performed by: PATHOLOGY

## 2020-01-01 PROCEDURE — 88341 IMHCHEM/IMCYTCHM EA ADD ANTB: CPT | Mod: 26,,, | Performed by: PATHOLOGY

## 2020-01-01 PROCEDURE — 36410 VNPNXR 3YR/> PHY/QHP DX/THER: CPT

## 2020-01-01 PROCEDURE — 63600175 PHARM REV CODE 636 W HCPCS: Mod: JG | Performed by: NURSE PRACTITIONER

## 2020-01-01 PROCEDURE — 63600175 PHARM REV CODE 636 W HCPCS: Mod: ER | Performed by: EMERGENCY MEDICINE

## 2020-01-01 PROCEDURE — 85007 BL SMEAR W/DIFF WBC COUNT: CPT | Mod: 91

## 2020-01-01 PROCEDURE — 25000242 PHARM REV CODE 250 ALT 637 W/ HCPCS: Performed by: NURSE PRACTITIONER

## 2020-01-01 PROCEDURE — 87798 DETECT AGENT NOS DNA AMP: CPT

## 2020-01-01 PROCEDURE — 85027 COMPLETE CBC AUTOMATED: CPT | Mod: PO

## 2020-01-01 PROCEDURE — 88307 PR  SURG PATH,LEVEL V: ICD-10-PCS | Mod: 26,,, | Performed by: PATHOLOGY

## 2020-01-01 PROCEDURE — 84157 ASSAY OF PROTEIN OTHER: CPT

## 2020-01-01 PROCEDURE — 82140 ASSAY OF AMMONIA: CPT

## 2020-01-01 PROCEDURE — 99497 PR ADVNCD CARE PLAN 30 MIN: ICD-10-PCS | Mod: ,,, | Performed by: NURSE PRACTITIONER

## 2020-01-01 PROCEDURE — 63600175 PHARM REV CODE 636 W HCPCS: Performed by: ANESTHESIOLOGY

## 2020-01-01 PROCEDURE — 81275 KRAS GENE VARIANTS EXON 2: CPT | Mod: 91 | Performed by: PATHOLOGY

## 2020-01-01 PROCEDURE — 87077 CULTURE AEROBIC IDENTIFY: CPT

## 2020-01-01 PROCEDURE — U0002 COVID-19 LAB TEST NON-CDC: HCPCS

## 2020-01-01 PROCEDURE — 97116 GAIT TRAINING THERAPY: CPT | Mod: CQ

## 2020-01-01 PROCEDURE — 83735 ASSAY OF MAGNESIUM: CPT | Mod: ER

## 2020-01-01 PROCEDURE — 45380 PR COLONOSCOPY,BIOPSY: ICD-10-PCS | Mod: 52,,, | Performed by: INTERNAL MEDICINE

## 2020-01-01 PROCEDURE — 96411 CHEMO IV PUSH ADDL DRUG: CPT

## 2020-01-01 PROCEDURE — 99215 PR OFFICE/OUTPT VISIT, EST, LEVL V, 40-54 MIN: ICD-10-PCS | Mod: 95,,, | Performed by: INTERNAL MEDICINE

## 2020-01-01 PROCEDURE — 99233 SBSQ HOSP IP/OBS HIGH 50: CPT | Mod: ,,, | Performed by: STUDENT IN AN ORGANIZED HEALTH CARE EDUCATION/TRAINING PROGRAM

## 2020-01-01 PROCEDURE — 84156 ASSAY OF PROTEIN URINE: CPT

## 2020-01-01 PROCEDURE — 82945 GLUCOSE OTHER FLUID: CPT

## 2020-01-01 PROCEDURE — 27202415 HC CARTRIDGE, CRRT

## 2020-01-01 PROCEDURE — 96413 CHEMO IV INFUSION 1 HR: CPT

## 2020-01-01 PROCEDURE — 82746 ASSAY OF FOLIC ACID SERUM: CPT

## 2020-01-01 PROCEDURE — 99213 OFFICE O/P EST LOW 20 MIN: CPT | Performed by: PHYSICIAN ASSISTANT

## 2020-01-01 PROCEDURE — 88309 TISSUE EXAM BY PATHOLOGIST: CPT | Performed by: PATHOLOGY

## 2020-01-01 PROCEDURE — 99284 EMERGENCY DEPT VISIT MOD MDM: CPT | Mod: 25,ER

## 2020-01-01 PROCEDURE — 71000033 HC RECOVERY, INTIAL HOUR: Performed by: STUDENT IN AN ORGANIZED HEALTH CARE EDUCATION/TRAINING PROGRAM

## 2020-01-01 PROCEDURE — 85027 COMPLETE CBC AUTOMATED: CPT | Mod: ER

## 2020-01-01 PROCEDURE — 86255 FLUORESCENT ANTIBODY SCREEN: CPT | Mod: 59

## 2020-01-01 PROCEDURE — 25500020 PHARM REV CODE 255: Mod: ER | Performed by: EMERGENCY MEDICINE

## 2020-01-01 PROCEDURE — 99291 CRITICAL CARE FIRST HOUR: CPT | Mod: ,,, | Performed by: INTERNAL MEDICINE

## 2020-01-01 PROCEDURE — 81311 NRAS GENE VARIANTS EXON 2&3: CPT | Performed by: PATHOLOGY

## 2020-01-01 PROCEDURE — 99213 OFFICE O/P EST LOW 20 MIN: CPT | Mod: PBBFAC,PO,25 | Performed by: INTERNAL MEDICINE

## 2020-01-01 PROCEDURE — 25500020 PHARM REV CODE 255: Performed by: HOSPITALIST

## 2020-01-01 PROCEDURE — 99291 PR CRITICAL CARE, E/M 30-74 MINUTES: ICD-10-PCS | Mod: ,,, | Performed by: CLINICAL NURSE SPECIALIST

## 2020-01-01 PROCEDURE — 88305 TISSUE EXAM BY PATHOLOGIST: CPT | Mod: 26,,, | Performed by: PATHOLOGY

## 2020-01-01 PROCEDURE — 81210 BRAF GENE: CPT | Mod: 91 | Performed by: PATHOLOGY

## 2020-01-01 PROCEDURE — 80100008 HC CRRT DAILY MAINTENANCE

## 2020-01-01 PROCEDURE — 97162 PT EVAL MOD COMPLEX 30 MIN: CPT | Performed by: PHYSICAL THERAPIST

## 2020-01-01 PROCEDURE — 97165 OT EVAL LOW COMPLEX 30 MIN: CPT

## 2020-01-01 PROCEDURE — 86901 BLOOD TYPING SEROLOGIC RH(D): CPT

## 2020-01-01 PROCEDURE — 36561 INSERT TUNNELED CV CATH: CPT | Mod: 51,RT,, | Performed by: STUDENT IN AN ORGANIZED HEALTH CARE EDUCATION/TRAINING PROGRAM

## 2020-01-01 PROCEDURE — 99233 PR SUBSEQUENT HOSPITAL CARE,LEVL III: ICD-10-PCS | Mod: ,,, | Performed by: GENERAL ACUTE CARE HOSPITAL

## 2020-01-01 PROCEDURE — 83605 ASSAY OF LACTIC ACID: CPT | Mod: 91,ER

## 2020-01-01 PROCEDURE — 99285 EMERGENCY DEPT VISIT HI MDM: CPT | Mod: 25

## 2020-01-01 PROCEDURE — 82728 ASSAY OF FERRITIN: CPT

## 2020-01-01 PROCEDURE — 99999 PR PBB SHADOW E&M-EST. PATIENT-LVL III: CPT | Mod: PBBFAC,,,

## 2020-01-01 PROCEDURE — A4216 STERILE WATER/SALINE, 10 ML: HCPCS | Performed by: INTERNAL MEDICINE

## 2020-01-01 PROCEDURE — 88341 IMHCHEM/IMCYTCHM EA ADD ANTB: CPT | Mod: 59 | Performed by: PATHOLOGY

## 2020-01-01 PROCEDURE — 81403 MOPATH PROCEDURE LEVEL 4: CPT | Performed by: PATHOLOGY

## 2020-01-01 PROCEDURE — 99215 OFFICE O/P EST HI 40 MIN: CPT | Mod: 95,,, | Performed by: INTERNAL MEDICINE

## 2020-01-01 PROCEDURE — 95720 PR EEG, W/VIDEO, CONT RECORD, I&R, >12<26 HRS: ICD-10-PCS | Mod: ,,, | Performed by: PSYCHIATRY & NEUROLOGY

## 2020-01-01 PROCEDURE — 87070 CULTURE OTHR SPECIMN AEROBIC: CPT

## 2020-01-01 PROCEDURE — 36561 PR INSERT TUNNELED CV CATH WITH PORT: ICD-10-PCS | Mod: 51,RT,, | Performed by: STUDENT IN AN ORGANIZED HEALTH CARE EDUCATION/TRAINING PROGRAM

## 2020-01-01 PROCEDURE — C1788 PORT, INDWELLING, IMP: HCPCS | Performed by: STUDENT IN AN ORGANIZED HEALTH CARE EDUCATION/TRAINING PROGRAM

## 2020-01-01 PROCEDURE — 99223 PR INITIAL HOSPITAL CARE,LEVL III: ICD-10-PCS | Mod: ,,, | Performed by: INTERNAL MEDICINE

## 2020-01-01 PROCEDURE — 88108 PR  CYTOPATH FLUIDS,CONCENTRATN,INTERP: ICD-10-PCS | Mod: 26,,, | Performed by: STUDENT IN AN ORGANIZED HEALTH CARE EDUCATION/TRAINING PROGRAM

## 2020-01-01 PROCEDURE — 84443 ASSAY THYROID STIM HORMONE: CPT

## 2020-01-01 PROCEDURE — 81000 URINALYSIS NONAUTO W/SCOPE: CPT

## 2020-01-01 PROCEDURE — 87340 HEPATITIS B SURFACE AG IA: CPT

## 2020-01-01 PROCEDURE — 97161 PT EVAL LOW COMPLEX 20 MIN: CPT

## 2020-01-01 PROCEDURE — 94760 N-INVAS EAR/PLS OXIMETRY 1: CPT

## 2020-01-01 PROCEDURE — G0378 HOSPITAL OBSERVATION PER HR: HCPCS

## 2020-01-01 PROCEDURE — 36590 REMOVAL TUNNELED CV CATH: CPT | Mod: 79,51,, | Performed by: STUDENT IN AN ORGANIZED HEALTH CARE EDUCATION/TRAINING PROGRAM

## 2020-01-01 PROCEDURE — 87205 SMEAR GRAM STAIN: CPT

## 2020-01-01 PROCEDURE — 80048 BASIC METABOLIC PNL TOTAL CA: CPT

## 2020-01-01 PROCEDURE — 87449 NOS EACH ORGANISM AG IA: CPT

## 2020-01-01 PROCEDURE — 36556 PR INSERT NON-TUNNEL CV CATH 5+ YRS OLD: ICD-10-PCS | Mod: 79,RT,, | Performed by: STUDENT IN AN ORGANIZED HEALTH CARE EDUCATION/TRAINING PROGRAM

## 2020-01-01 PROCEDURE — 44205 LAP COLECTOMY PART W/ILEUM: CPT | Mod: ,,, | Performed by: STUDENT IN AN ORGANIZED HEALTH CARE EDUCATION/TRAINING PROGRAM

## 2020-01-01 PROCEDURE — 99291 CRITICAL CARE FIRST HOUR: CPT | Mod: ,,, | Performed by: EMERGENCY MEDICINE

## 2020-01-01 PROCEDURE — 83540 ASSAY OF IRON: CPT

## 2020-01-01 PROCEDURE — 84484 ASSAY OF TROPONIN QUANT: CPT

## 2020-01-01 PROCEDURE — 96365 THER/PROPH/DIAG IV INF INIT: CPT | Mod: 59

## 2020-01-01 PROCEDURE — 88108 CYTOPATH CONCENTRATE TECH: CPT | Mod: 26,,, | Performed by: STUDENT IN AN ORGANIZED HEALTH CARE EDUCATION/TRAINING PROGRAM

## 2020-01-01 PROCEDURE — 83735 ASSAY OF MAGNESIUM: CPT | Mod: PO

## 2020-01-01 PROCEDURE — C1751 CATH, INF, PER/CENT/MIDLINE: HCPCS

## 2020-01-01 PROCEDURE — 97530 THERAPEUTIC ACTIVITIES: CPT | Performed by: PHYSICAL THERAPIST

## 2020-01-01 PROCEDURE — 84100 ASSAY OF PHOSPHORUS: CPT | Mod: PO

## 2020-01-01 PROCEDURE — 89051 BODY FLUID CELL COUNT: CPT

## 2020-01-01 PROCEDURE — 99232 PR SUBSEQUENT HOSPITAL CARE,LEVL II: ICD-10-PCS | Mod: 25,24,, | Performed by: STUDENT IN AN ORGANIZED HEALTH CARE EDUCATION/TRAINING PROGRAM

## 2020-01-01 PROCEDURE — 36590 PR REMOVAL TUNNELED CV CATH W SUBQ PORT OR PUMP: ICD-10-PCS | Mod: 79,51,, | Performed by: STUDENT IN AN ORGANIZED HEALTH CARE EDUCATION/TRAINING PROGRAM

## 2020-01-01 PROCEDURE — 27201423 OPTIME MED/SURG SUP & DEVICES STERILE SUPPLY: Performed by: STUDENT IN AN ORGANIZED HEALTH CARE EDUCATION/TRAINING PROGRAM

## 2020-01-01 PROCEDURE — 87324 CLOSTRIDIUM AG IA: CPT

## 2020-01-01 PROCEDURE — 45380 COLONOSCOPY AND BIOPSY: CPT | Mod: 52,,, | Performed by: INTERNAL MEDICINE

## 2020-01-01 PROCEDURE — 99999 PR PBB SHADOW E&M-EST. PATIENT-LVL III: ICD-10-PCS | Mod: PBBFAC,,, | Performed by: INTERNAL MEDICINE

## 2020-01-01 PROCEDURE — 99292 CRITICAL CARE ADDL 30 MIN: CPT | Mod: 25,ER

## 2020-01-01 PROCEDURE — 44205 PR LAP,SURG,COLECTOMY,W/REMVL TERM ILEUM: ICD-10-PCS | Mod: ,,, | Performed by: STUDENT IN AN ORGANIZED HEALTH CARE EDUCATION/TRAINING PROGRAM

## 2020-01-01 PROCEDURE — 99999 PR PBB SHADOW E&M-EST. PATIENT-LVL III: ICD-10-PCS | Mod: PBBFAC,,,

## 2020-01-01 PROCEDURE — 25500020 PHARM REV CODE 255: Performed by: EMERGENCY MEDICINE

## 2020-01-01 PROCEDURE — 94799 UNLISTED PULMONARY SVC/PX: CPT

## 2020-01-01 PROCEDURE — 90945 DIALYSIS ONE EVALUATION: CPT

## 2020-01-01 PROCEDURE — 82378 CARCINOEMBRYONIC ANTIGEN: CPT

## 2020-01-01 PROCEDURE — 99232 SBSQ HOSP IP/OBS MODERATE 35: CPT | Mod: 25,24,, | Performed by: STUDENT IN AN ORGANIZED HEALTH CARE EDUCATION/TRAINING PROGRAM

## 2020-01-01 PROCEDURE — 99285 EMERGENCY DEPT VISIT HI MDM: CPT | Mod: 25,ER

## 2020-01-01 PROCEDURE — 99497 ADVNCD CARE PLAN 30 MIN: CPT | Mod: ,,, | Performed by: NURSE PRACTITIONER

## 2020-01-01 PROCEDURE — 84425 ASSAY OF VITAMIN B-1: CPT

## 2020-01-01 PROCEDURE — 87186 SC STD MICRODIL/AGAR DIL: CPT | Mod: ER

## 2020-01-01 PROCEDURE — 85027 COMPLETE CBC AUTOMATED: CPT | Mod: 91

## 2020-01-01 PROCEDURE — 77001 FLUOROGUIDE FOR VEIN DEVICE: CPT | Mod: 26,59,, | Performed by: STUDENT IN AN ORGANIZED HEALTH CARE EDUCATION/TRAINING PROGRAM

## 2020-01-01 PROCEDURE — 87070 CULTURE OTHR SPECIMN AEROBIC: CPT | Mod: 59

## 2020-01-01 PROCEDURE — 81025 URINE PREGNANCY TEST: CPT

## 2020-01-01 PROCEDURE — 36000710: Performed by: STUDENT IN AN ORGANIZED HEALTH CARE EDUCATION/TRAINING PROGRAM

## 2020-01-01 PROCEDURE — 87529 HSV DNA AMP PROBE: CPT | Mod: 59

## 2020-01-01 PROCEDURE — 88307 TISSUE EXAM BY PATHOLOGIST: CPT | Performed by: PATHOLOGY

## 2020-01-01 PROCEDURE — 97530 THERAPEUTIC ACTIVITIES: CPT | Mod: CQ

## 2020-01-01 PROCEDURE — 99999 PR PBB SHADOW E&M-EST. PATIENT-LVL III: CPT | Mod: PBBFAC,,, | Performed by: STUDENT IN AN ORGANIZED HEALTH CARE EDUCATION/TRAINING PROGRAM

## 2020-01-01 PROCEDURE — 37000009 HC ANESTHESIA EA ADD 15 MINS: Performed by: STUDENT IN AN ORGANIZED HEALTH CARE EDUCATION/TRAINING PROGRAM

## 2020-01-01 PROCEDURE — 25000003 PHARM REV CODE 250: Performed by: PATHOLOGY

## 2020-01-01 PROCEDURE — 96367 TX/PROPH/DG ADDL SEQ IV INF: CPT

## 2020-01-01 PROCEDURE — 45380 COLONOSCOPY AND BIOPSY: CPT | Performed by: INTERNAL MEDICINE

## 2020-01-01 PROCEDURE — 96368 THER/DIAG CONCURRENT INF: CPT

## 2020-01-01 PROCEDURE — 81025 URINE PREGNANCY TEST: CPT | Performed by: EMERGENCY MEDICINE

## 2020-01-01 PROCEDURE — 96372 THER/PROPH/DIAG INJ SC/IM: CPT | Mod: 59,ER

## 2020-01-01 PROCEDURE — 96416 CHEMO PROLONG INFUSE W/PUMP: CPT

## 2020-01-01 PROCEDURE — 99024 POSTOP FOLLOW-UP VISIT: CPT | Mod: ,,, | Performed by: STUDENT IN AN ORGANIZED HEALTH CARE EDUCATION/TRAINING PROGRAM

## 2020-01-01 PROCEDURE — 99223 1ST HOSP IP/OBS HIGH 75: CPT | Mod: ,,, | Performed by: INTERNAL MEDICINE

## 2020-01-01 PROCEDURE — 63600175 PHARM REV CODE 636 W HCPCS: Performed by: PATHOLOGY

## 2020-01-01 PROCEDURE — 84702 CHORIONIC GONADOTROPIN TEST: CPT | Mod: ER

## 2020-01-01 PROCEDURE — 84145 PROCALCITONIN (PCT): CPT

## 2020-01-01 PROCEDURE — 93005 ELECTROCARDIOGRAM TRACING: CPT | Mod: ER

## 2020-01-01 DEVICE — PORT POWER CLEAR VIEW: Type: IMPLANTABLE DEVICE | Site: CHEST | Status: FUNCTIONAL

## 2020-01-01 RX ORDER — OXYCODONE AND ACETAMINOPHEN 10; 325 MG/1; MG/1
1 TABLET ORAL EVERY 4 HOURS PRN
Qty: 30 TABLET | Refills: 0 | Status: SHIPPED | OUTPATIENT
Start: 2020-01-01 | End: 2020-10-09

## 2020-01-01 RX ORDER — DEXMEDETOMIDINE HYDROCHLORIDE 4 UG/ML
0.2 INJECTION, SOLUTION INTRAVENOUS CONTINUOUS
Status: DISCONTINUED | OUTPATIENT
Start: 2020-01-01 | End: 2020-01-01

## 2020-01-01 RX ORDER — ENOXAPARIN SODIUM 100 MG/ML
40 INJECTION SUBCUTANEOUS EVERY 24 HOURS
Status: DISCONTINUED | OUTPATIENT
Start: 2020-01-01 | End: 2020-01-01

## 2020-01-01 RX ORDER — ONDANSETRON 4 MG/1
4 TABLET, ORALLY DISINTEGRATING ORAL EVERY 6 HOURS PRN
Status: DISCONTINUED | OUTPATIENT
Start: 2020-01-01 | End: 2020-01-01 | Stop reason: HOSPADM

## 2020-01-01 RX ORDER — ENOXAPARIN SODIUM 100 MG/ML
40 INJECTION SUBCUTANEOUS EVERY 24 HOURS
Status: DISCONTINUED | OUTPATIENT
Start: 2020-01-01 | End: 2020-01-01 | Stop reason: HOSPADM

## 2020-01-01 RX ORDER — ONDANSETRON 4 MG/1
4 TABLET, ORALLY DISINTEGRATING ORAL EVERY 6 HOURS
Status: DISCONTINUED | OUTPATIENT
Start: 2020-01-01 | End: 2020-01-01 | Stop reason: HOSPADM

## 2020-01-01 RX ORDER — SIMETHICONE 125 MG
125 TABLET,CHEWABLE ORAL EVERY 6 HOURS PRN
Status: DISCONTINUED | OUTPATIENT
Start: 2020-01-01 | End: 2020-01-01 | Stop reason: HOSPADM

## 2020-01-01 RX ORDER — MIDAZOLAM HYDROCHLORIDE 1 MG/ML
4 INJECTION INTRAMUSCULAR; INTRAVENOUS ONCE AS NEEDED
Status: DISCONTINUED | OUTPATIENT
Start: 2020-01-01 | End: 2020-01-01

## 2020-01-01 RX ORDER — PROPOFOL 10 MG/ML
VIAL (ML) INTRAVENOUS
Status: DISCONTINUED | OUTPATIENT
Start: 2020-01-01 | End: 2020-01-01

## 2020-01-01 RX ORDER — SODIUM CHLORIDE 0.9 % (FLUSH) 0.9 %
10 SYRINGE (ML) INJECTION
Status: CANCELLED | OUTPATIENT
Start: 2020-01-01

## 2020-01-01 RX ORDER — SODIUM CHLORIDE 0.9 % (FLUSH) 0.9 %
10 SYRINGE (ML) INJECTION
Status: DISCONTINUED | OUTPATIENT
Start: 2020-01-01 | End: 2020-01-01 | Stop reason: HOSPADM

## 2020-01-01 RX ORDER — POTASSIUM CHLORIDE 29.8 MG/ML
80 INJECTION INTRAVENOUS
Status: DISCONTINUED | OUTPATIENT
Start: 2020-01-01 | End: 2020-01-01

## 2020-01-01 RX ORDER — METOPROLOL TARTRATE 50 MG/1
100 TABLET ORAL 2 TIMES DAILY
Status: DISCONTINUED | OUTPATIENT
Start: 2020-01-01 | End: 2020-01-01 | Stop reason: HOSPADM

## 2020-01-01 RX ORDER — FENTANYL CITRATE 50 UG/ML
INJECTION, SOLUTION INTRAMUSCULAR; INTRAVENOUS
Status: DISCONTINUED | OUTPATIENT
Start: 2020-01-01 | End: 2020-01-01

## 2020-01-01 RX ORDER — SODIUM CHLORIDE 0.9 % (FLUSH) 0.9 %
10 SYRINGE (ML) INJECTION
Status: DISCONTINUED | OUTPATIENT
Start: 2020-01-01 | End: 2020-01-01

## 2020-01-01 RX ORDER — CIPROFLOXACIN 500 MG/1
500 TABLET ORAL
Status: COMPLETED | OUTPATIENT
Start: 2020-01-01 | End: 2020-01-01

## 2020-01-01 RX ORDER — HYDROCODONE BITARTRATE AND ACETAMINOPHEN 500; 5 MG/1; MG/1
TABLET ORAL
Status: DISCONTINUED | OUTPATIENT
Start: 2020-01-01 | End: 2020-01-01 | Stop reason: HOSPADM

## 2020-01-01 RX ORDER — DOCUSATE SODIUM 100 MG/1
100 CAPSULE, LIQUID FILLED ORAL DAILY
Status: DISCONTINUED | OUTPATIENT
Start: 2020-01-01 | End: 2020-01-01 | Stop reason: HOSPADM

## 2020-01-01 RX ORDER — ONDANSETRON 4 MG/1
4 TABLET, ORALLY DISINTEGRATING ORAL
Status: COMPLETED | OUTPATIENT
Start: 2020-01-01 | End: 2020-01-01

## 2020-01-01 RX ORDER — HYDROMORPHONE HYDROCHLORIDE 2 MG/ML
0.2 INJECTION, SOLUTION INTRAMUSCULAR; INTRAVENOUS; SUBCUTANEOUS EVERY 5 MIN PRN
Status: DISCONTINUED | OUTPATIENT
Start: 2020-01-01 | End: 2020-01-01 | Stop reason: HOSPADM

## 2020-01-01 RX ORDER — HEPARIN 100 UNIT/ML
500 SYRINGE INTRAVENOUS
Status: DISCONTINUED | OUTPATIENT
Start: 2020-01-01 | End: 2020-01-01 | Stop reason: HOSPADM

## 2020-01-01 RX ORDER — FOLIC ACID 1 MG/1
1 TABLET ORAL DAILY
Qty: 100 TABLET | Refills: 3 | Status: SHIPPED | OUTPATIENT
Start: 2020-01-01 | End: 2021-07-30

## 2020-01-01 RX ORDER — HYDROCODONE BITARTRATE AND ACETAMINOPHEN 500; 5 MG/1; MG/1
TABLET ORAL CONTINUOUS
Status: ACTIVE | OUTPATIENT
Start: 2020-01-01 | End: 2020-01-01

## 2020-01-01 RX ORDER — INSULIN ASPART 100 [IU]/ML
0-5 INJECTION, SOLUTION INTRAVENOUS; SUBCUTANEOUS EVERY 4 HOURS PRN
Status: DISCONTINUED | OUTPATIENT
Start: 2020-01-01 | End: 2020-01-01

## 2020-01-01 RX ORDER — MORPHINE SULFATE 2 MG/ML
2 INJECTION, SOLUTION INTRAMUSCULAR; INTRAVENOUS ONCE
Status: COMPLETED | OUTPATIENT
Start: 2020-01-01 | End: 2020-01-01

## 2020-01-01 RX ORDER — FUROSEMIDE 10 MG/ML
20 INJECTION INTRAMUSCULAR; INTRAVENOUS ONCE
Status: COMPLETED | OUTPATIENT
Start: 2020-01-01 | End: 2020-01-01

## 2020-01-01 RX ORDER — LIDOCAINE HYDROCHLORIDE 10 MG/ML
INJECTION INFILTRATION; PERINEURAL CODE/TRAUMA/SEDATION MEDICATION
Status: COMPLETED | OUTPATIENT
Start: 2020-01-01 | End: 2020-01-01

## 2020-01-01 RX ORDER — METOPROLOL TARTRATE 25 MG/1
25 TABLET, FILM COATED ORAL 3 TIMES DAILY
Status: DISCONTINUED | OUTPATIENT
Start: 2020-01-01 | End: 2020-01-01

## 2020-01-01 RX ORDER — DICYCLOMINE HYDROCHLORIDE 20 MG/1
20 TABLET ORAL 3 TIMES DAILY PRN
Status: DISCONTINUED | OUTPATIENT
Start: 2020-01-01 | End: 2020-01-01

## 2020-01-01 RX ORDER — MAGNESIUM SULFATE HEPTAHYDRATE 40 MG/ML
2 INJECTION, SOLUTION INTRAVENOUS
Status: DISPENSED | OUTPATIENT
Start: 2020-01-01 | End: 2020-01-01

## 2020-01-01 RX ORDER — SODIUM CHLORIDE 9 MG/ML
1000 INJECTION, SOLUTION INTRAVENOUS
Status: COMPLETED | OUTPATIENT
Start: 2020-01-01 | End: 2020-01-01

## 2020-01-01 RX ORDER — SODIUM CHLORIDE 9 MG/ML
INJECTION, SOLUTION INTRAVENOUS CONTINUOUS PRN
Status: DISCONTINUED | OUTPATIENT
Start: 2020-01-01 | End: 2020-01-01

## 2020-01-01 RX ORDER — DOCUSATE SODIUM 100 MG/1
100 CAPSULE, LIQUID FILLED ORAL DAILY
Qty: 30 CAPSULE | Refills: 0 | Status: SHIPPED | OUTPATIENT
Start: 2020-01-01

## 2020-01-01 RX ORDER — ONDANSETRON 2 MG/ML
4 INJECTION INTRAMUSCULAR; INTRAVENOUS EVERY 6 HOURS PRN
Status: DISCONTINUED | OUTPATIENT
Start: 2020-01-01 | End: 2020-01-01 | Stop reason: HOSPADM

## 2020-01-01 RX ORDER — MAGNESIUM SULFATE HEPTAHYDRATE 40 MG/ML
2 INJECTION, SOLUTION INTRAVENOUS
Status: DISCONTINUED | OUTPATIENT
Start: 2020-01-01 | End: 2020-01-01

## 2020-01-01 RX ORDER — AMLODIPINE BESYLATE 5 MG/1
5 TABLET ORAL DAILY
Qty: 30 TABLET | Refills: 11 | Status: SHIPPED | OUTPATIENT
Start: 2020-01-01 | End: 2021-09-10

## 2020-01-01 RX ORDER — METOCLOPRAMIDE HYDROCHLORIDE 5 MG/ML
10 INJECTION INTRAMUSCULAR; INTRAVENOUS
Status: COMPLETED | OUTPATIENT
Start: 2020-01-01 | End: 2020-01-01

## 2020-01-01 RX ORDER — IBUPROFEN 200 MG
24 TABLET ORAL
Status: DISCONTINUED | OUTPATIENT
Start: 2020-01-01 | End: 2020-01-01

## 2020-01-01 RX ORDER — LORAZEPAM 2 MG/ML
0.5 INJECTION INTRAMUSCULAR ONCE
Status: COMPLETED | OUTPATIENT
Start: 2020-01-01 | End: 2020-01-01

## 2020-01-01 RX ORDER — MIDAZOLAM HYDROCHLORIDE 1 MG/ML
INJECTION INTRAMUSCULAR; INTRAVENOUS
Status: COMPLETED
Start: 2020-01-01 | End: 2020-01-01

## 2020-01-01 RX ORDER — ALPRAZOLAM 0.25 MG/1
0.25 TABLET ORAL ONCE AS NEEDED
Status: DISCONTINUED | OUTPATIENT
Start: 2020-01-01 | End: 2020-01-01

## 2020-01-01 RX ORDER — ONDANSETRON 2 MG/ML
8 INJECTION INTRAMUSCULAR; INTRAVENOUS EVERY 6 HOURS PRN
Status: DISCONTINUED | OUTPATIENT
Start: 2020-01-01 | End: 2020-01-01

## 2020-01-01 RX ORDER — VECURONIUM BROMIDE FOR INJECTION 1 MG/ML
INJECTION, POWDER, LYOPHILIZED, FOR SOLUTION INTRAVENOUS
Status: DISCONTINUED | OUTPATIENT
Start: 2020-01-01 | End: 2020-01-01

## 2020-01-01 RX ORDER — ONDANSETRON 2 MG/ML
8 INJECTION INTRAMUSCULAR; INTRAVENOUS
Status: DISCONTINUED | OUTPATIENT
Start: 2020-01-01 | End: 2020-01-01 | Stop reason: HOSPADM

## 2020-01-01 RX ORDER — POTASSIUM CHLORIDE 20 MEQ/1
20 TABLET, EXTENDED RELEASE ORAL ONCE
Status: COMPLETED | OUTPATIENT
Start: 2020-01-01 | End: 2020-01-01

## 2020-01-01 RX ORDER — MAGNESIUM SULFATE HEPTAHYDRATE 40 MG/ML
2 INJECTION, SOLUTION INTRAVENOUS ONCE
Status: COMPLETED | OUTPATIENT
Start: 2020-01-01 | End: 2020-01-01

## 2020-01-01 RX ORDER — CEFEPIME HYDROCHLORIDE 2 G/1
2 INJECTION, POWDER, FOR SOLUTION INTRAVENOUS
Status: DISCONTINUED | OUTPATIENT
Start: 2020-01-01 | End: 2020-01-01

## 2020-01-01 RX ORDER — EPINEPHRINE 0.3 MG/.3ML
0.3 INJECTION SUBCUTANEOUS ONCE AS NEEDED
Status: CANCELLED | OUTPATIENT
Start: 2020-01-01

## 2020-01-01 RX ORDER — OXYCODONE AND ACETAMINOPHEN 5; 325 MG/1; MG/1
1 TABLET ORAL ONCE
Status: COMPLETED | OUTPATIENT
Start: 2020-01-01 | End: 2020-01-01

## 2020-01-01 RX ORDER — SODIUM CHLORIDE 9 MG/ML
INJECTION, SOLUTION INTRAVENOUS CONTINUOUS
Status: DISCONTINUED | OUTPATIENT
Start: 2020-01-01 | End: 2020-01-01

## 2020-01-01 RX ORDER — ONDANSETRON 4 MG/1
4 TABLET, ORALLY DISINTEGRATING ORAL EVERY 8 HOURS PRN
Qty: 30 TABLET | Refills: 0 | Status: SHIPPED | OUTPATIENT
Start: 2020-01-01 | End: 2020-01-01

## 2020-01-01 RX ORDER — POTASSIUM CHLORIDE 7.45 MG/ML
40 INJECTION INTRAVENOUS
Status: DISCONTINUED | OUTPATIENT
Start: 2020-01-01 | End: 2020-01-01

## 2020-01-01 RX ORDER — KETOROLAC TROMETHAMINE 30 MG/ML
15 INJECTION, SOLUTION INTRAMUSCULAR; INTRAVENOUS
Status: COMPLETED | OUTPATIENT
Start: 2020-01-01 | End: 2020-01-01

## 2020-01-01 RX ORDER — MORPHINE SULFATE 10 MG/ML
4 INJECTION, SOLUTION INTRAMUSCULAR; INTRAVENOUS EVERY 4 HOURS PRN
Status: DISCONTINUED | OUTPATIENT
Start: 2020-01-01 | End: 2020-01-01

## 2020-01-01 RX ORDER — FENTANYL CITRATE 50 UG/ML
INJECTION, SOLUTION INTRAMUSCULAR; INTRAVENOUS CODE/TRAUMA/SEDATION MEDICATION
Status: COMPLETED | OUTPATIENT
Start: 2020-01-01 | End: 2020-01-01

## 2020-01-01 RX ORDER — MORPHINE SULFATE/0.9% NACL/PF 1 MG/ML
1 PLASTIC BAG, INJECTION (ML) INTRAVENOUS CONTINUOUS
Status: DISCONTINUED | OUTPATIENT
Start: 2020-01-01 | End: 2020-10-07 | Stop reason: HOSPADM

## 2020-01-01 RX ORDER — MORPHINE SULFATE 4 MG/ML
4 INJECTION, SOLUTION INTRAMUSCULAR; INTRAVENOUS EVERY 4 HOURS PRN
Status: DISCONTINUED | OUTPATIENT
Start: 2020-01-01 | End: 2020-01-01 | Stop reason: HOSPADM

## 2020-01-01 RX ORDER — CEFEPIME HYDROCHLORIDE 1 G/50ML
2 INJECTION, SOLUTION INTRAVENOUS
Status: DISCONTINUED | OUTPATIENT
Start: 2020-01-01 | End: 2020-01-01

## 2020-01-01 RX ORDER — ACETAMINOPHEN 325 MG/1
650 TABLET ORAL EVERY 4 HOURS PRN
Status: DISCONTINUED | OUTPATIENT
Start: 2020-01-01 | End: 2020-01-01

## 2020-01-01 RX ORDER — EPINEPHRINE 0.3 MG/.3ML
0.3 INJECTION SUBCUTANEOUS ONCE AS NEEDED
Status: DISCONTINUED | OUTPATIENT
Start: 2020-01-01 | End: 2020-01-01 | Stop reason: HOSPADM

## 2020-01-01 RX ORDER — HEPARIN SODIUM 5000 [USP'U]/ML
5000 INJECTION, SOLUTION INTRAVENOUS; SUBCUTANEOUS EVERY 8 HOURS
Status: DISCONTINUED | OUTPATIENT
Start: 2020-01-01 | End: 2020-01-01

## 2020-01-01 RX ORDER — FOLIC ACID 1 MG/1
1 TABLET ORAL DAILY
Status: DISCONTINUED | OUTPATIENT
Start: 2020-01-01 | End: 2020-01-01

## 2020-01-01 RX ORDER — DIPHENHYDRAMINE HYDROCHLORIDE 50 MG/ML
25 INJECTION INTRAMUSCULAR; INTRAVENOUS EVERY 6 HOURS PRN
Status: DISCONTINUED | OUTPATIENT
Start: 2020-01-01 | End: 2020-01-01

## 2020-01-01 RX ORDER — HYDROCODONE BITARTRATE AND ACETAMINOPHEN 5; 325 MG/1; MG/1
1 TABLET ORAL EVERY 6 HOURS PRN
Qty: 30 TABLET | Refills: 0 | Status: SHIPPED | OUTPATIENT
Start: 2020-01-01 | End: 2020-01-01 | Stop reason: SDUPTHER

## 2020-01-01 RX ORDER — HYDROCODONE BITARTRATE AND ACETAMINOPHEN 5; 325 MG/1; MG/1
1 TABLET ORAL EVERY 4 HOURS PRN
Qty: 15 TABLET | Refills: 0 | Status: SHIPPED | OUTPATIENT
Start: 2020-01-01 | End: 2020-01-01

## 2020-01-01 RX ORDER — NOREPINEPHRINE BITARTRATE/D5W 8 MG/250ML
0.05 PLASTIC BAG, INJECTION (ML) INTRAVENOUS CONTINUOUS
Status: DISCONTINUED | OUTPATIENT
Start: 2020-01-01 | End: 2020-01-01

## 2020-01-01 RX ORDER — MIDAZOLAM HYDROCHLORIDE 1 MG/ML
INJECTION INTRAMUSCULAR; INTRAVENOUS CODE/TRAUMA/SEDATION MEDICATION
Status: COMPLETED | OUTPATIENT
Start: 2020-01-01 | End: 2020-01-01

## 2020-01-01 RX ORDER — SCOLOPAMINE TRANSDERMAL SYSTEM 1 MG/1
1 PATCH, EXTENDED RELEASE TRANSDERMAL
Status: DISCONTINUED | OUTPATIENT
Start: 2020-01-01 | End: 2020-10-07 | Stop reason: HOSPADM

## 2020-01-01 RX ORDER — GLUCAGON 1 MG
1 KIT INJECTION
Status: DISCONTINUED | OUTPATIENT
Start: 2020-01-01 | End: 2020-01-01

## 2020-01-01 RX ORDER — IBUPROFEN 200 MG
16 TABLET ORAL
Status: DISCONTINUED | OUTPATIENT
Start: 2020-01-01 | End: 2020-01-01 | Stop reason: HOSPADM

## 2020-01-01 RX ORDER — ACETAMINOPHEN 325 MG/1
650 TABLET ORAL
Status: CANCELLED | OUTPATIENT
Start: 2020-01-01

## 2020-01-01 RX ORDER — LIDOCAINE HCL/PF 100 MG/5ML
SYRINGE (ML) INTRAVENOUS
Status: DISCONTINUED | OUTPATIENT
Start: 2020-01-01 | End: 2020-01-01

## 2020-01-01 RX ORDER — ACETAMINOPHEN 325 MG/1
650 TABLET ORAL EVERY 8 HOURS PRN
Status: DISCONTINUED | OUTPATIENT
Start: 2020-01-01 | End: 2020-01-01 | Stop reason: HOSPADM

## 2020-01-01 RX ORDER — TALC
6 POWDER (GRAM) TOPICAL NIGHTLY PRN
Status: DISCONTINUED | OUTPATIENT
Start: 2020-01-01 | End: 2020-01-01 | Stop reason: HOSPADM

## 2020-01-01 RX ORDER — HYDROCODONE BITARTRATE AND ACETAMINOPHEN 500; 5 MG/1; MG/1
TABLET ORAL ONCE
Status: CANCELLED | OUTPATIENT
Start: 2020-01-01 | End: 2020-01-01

## 2020-01-01 RX ORDER — SODIUM CHLORIDE, SODIUM LACTATE, POTASSIUM CHLORIDE, CALCIUM CHLORIDE 600; 310; 30; 20 MG/100ML; MG/100ML; MG/100ML; MG/100ML
INJECTION, SOLUTION INTRAVENOUS CONTINUOUS
Status: DISCONTINUED | OUTPATIENT
Start: 2020-01-01 | End: 2020-01-01

## 2020-01-01 RX ORDER — INDOCYANINE GREEN AND WATER 25 MG
KIT INJECTION
Status: DISCONTINUED | OUTPATIENT
Start: 2020-01-01 | End: 2020-01-01

## 2020-01-01 RX ORDER — ONDANSETRON 8 MG/1
8 TABLET, ORALLY DISINTEGRATING ORAL EVERY 8 HOURS PRN
Qty: 40 TABLET | Refills: 5 | Status: SHIPPED | OUTPATIENT
Start: 2020-01-01 | End: 2020-01-01

## 2020-01-01 RX ORDER — FENTANYL CITRATE 50 UG/ML
25 INJECTION, SOLUTION INTRAMUSCULAR; INTRAVENOUS EVERY 5 MIN PRN
Status: DISCONTINUED | OUTPATIENT
Start: 2020-01-01 | End: 2020-01-01

## 2020-01-01 RX ORDER — HEPARIN SODIUM 5000 [USP'U]/ML
INJECTION, SOLUTION INTRAVENOUS; SUBCUTANEOUS
Status: DISCONTINUED | OUTPATIENT
Start: 2020-01-01 | End: 2020-01-01 | Stop reason: HOSPADM

## 2020-01-01 RX ORDER — KETOROLAC TROMETHAMINE 10 MG/1
10 TABLET, FILM COATED ORAL
Status: COMPLETED | OUTPATIENT
Start: 2020-01-01 | End: 2020-01-01

## 2020-01-01 RX ORDER — MORPHINE SULFATE 2 MG/ML
2 INJECTION, SOLUTION INTRAMUSCULAR; INTRAVENOUS EVERY 6 HOURS PRN
Status: DISCONTINUED | OUTPATIENT
Start: 2020-01-01 | End: 2020-01-01

## 2020-01-01 RX ORDER — MORPHINE SULFATE 2 MG/ML
2 INJECTION, SOLUTION INTRAMUSCULAR; INTRAVENOUS
Status: DISCONTINUED | OUTPATIENT
Start: 2020-01-01 | End: 2020-10-07 | Stop reason: HOSPADM

## 2020-01-01 RX ORDER — ENOXAPARIN SODIUM 100 MG/ML
90 INJECTION SUBCUTANEOUS EVERY 12 HOURS
Status: DISCONTINUED | OUTPATIENT
Start: 2020-01-01 | End: 2020-01-01

## 2020-01-01 RX ORDER — ENOXAPARIN SODIUM 100 MG/ML
40 INJECTION SUBCUTANEOUS EVERY 12 HOURS
Status: DISCONTINUED | OUTPATIENT
Start: 2020-01-01 | End: 2020-01-01

## 2020-01-01 RX ORDER — LORAZEPAM 2 MG/ML
4 INJECTION INTRAMUSCULAR EVERY 5 MIN PRN
Status: DISCONTINUED | OUTPATIENT
Start: 2020-01-01 | End: 2020-01-01

## 2020-01-01 RX ORDER — SIMETHICONE 125 MG
125 TABLET,CHEWABLE ORAL EVERY 6 HOURS PRN
Refills: 0 | COMMUNITY
Start: 2020-01-01

## 2020-01-01 RX ORDER — HEPARIN 100 UNIT/ML
500 SYRINGE INTRAVENOUS
Status: CANCELLED | OUTPATIENT
Start: 2020-01-01

## 2020-01-01 RX ORDER — ONDANSETRON 4 MG/1
4 TABLET, FILM COATED ORAL EVERY 8 HOURS PRN
Qty: 20 TABLET | Refills: 0 | Status: ON HOLD | OUTPATIENT
Start: 2020-01-01 | End: 2020-01-01 | Stop reason: HOSPADM

## 2020-01-01 RX ORDER — FUROSEMIDE 10 MG/ML
40 INJECTION INTRAMUSCULAR; INTRAVENOUS ONCE
Status: COMPLETED | OUTPATIENT
Start: 2020-01-01 | End: 2020-01-01

## 2020-01-01 RX ORDER — HYDROMORPHONE HYDROCHLORIDE 2 MG/ML
INJECTION, SOLUTION INTRAMUSCULAR; INTRAVENOUS; SUBCUTANEOUS
Status: DISCONTINUED | OUTPATIENT
Start: 2020-01-01 | End: 2020-01-01

## 2020-01-01 RX ORDER — DILTIAZEM HYDROCHLORIDE 5 MG/ML
10 INJECTION INTRAVENOUS ONCE
Status: DISCONTINUED | OUTPATIENT
Start: 2020-01-01 | End: 2020-01-01

## 2020-01-01 RX ORDER — ONDANSETRON 2 MG/ML
4 INJECTION INTRAMUSCULAR; INTRAVENOUS EVERY 8 HOURS PRN
Status: DISCONTINUED | OUTPATIENT
Start: 2020-01-01 | End: 2020-01-01 | Stop reason: HOSPADM

## 2020-01-01 RX ORDER — ROCURONIUM BROMIDE 10 MG/ML
INJECTION, SOLUTION INTRAVENOUS
Status: DISCONTINUED | OUTPATIENT
Start: 2020-01-01 | End: 2020-01-01

## 2020-01-01 RX ORDER — ACETAMINOPHEN 325 MG/1
650 TABLET ORAL EVERY 4 HOURS PRN
Status: DISCONTINUED | OUTPATIENT
Start: 2020-01-01 | End: 2020-01-01 | Stop reason: HOSPADM

## 2020-01-01 RX ORDER — IBUPROFEN 800 MG/1
800 TABLET ORAL 3 TIMES DAILY PRN
Qty: 30 TABLET | Refills: 0 | Status: ON HOLD | OUTPATIENT
Start: 2020-01-01 | End: 2020-01-01 | Stop reason: HOSPADM

## 2020-01-01 RX ORDER — DICYCLOMINE HYDROCHLORIDE 10 MG/ML
20 INJECTION INTRAMUSCULAR ONCE
Status: COMPLETED | OUTPATIENT
Start: 2020-01-01 | End: 2020-01-01

## 2020-01-01 RX ORDER — MORPHINE SULFATE 2 MG/ML
2 INJECTION, SOLUTION INTRAMUSCULAR; INTRAVENOUS EVERY 4 HOURS PRN
Status: DISCONTINUED | OUTPATIENT
Start: 2020-01-01 | End: 2020-01-01

## 2020-01-01 RX ORDER — OXYCODONE AND ACETAMINOPHEN 5; 325 MG/1; MG/1
1 TABLET ORAL
Status: DISCONTINUED | OUTPATIENT
Start: 2020-01-01 | End: 2020-01-01 | Stop reason: HOSPADM

## 2020-01-01 RX ORDER — LIDOCAINE HYDROCHLORIDE 10 MG/ML
INJECTION INFILTRATION; PERINEURAL
Status: COMPLETED
Start: 2020-01-01 | End: 2020-01-01

## 2020-01-01 RX ORDER — OXYMETAZOLINE HCL 0.05 %
2 SPRAY, NON-AEROSOL (ML) NASAL 2 TIMES DAILY PRN
Status: DISPENSED | OUTPATIENT
Start: 2020-01-01 | End: 2020-01-01

## 2020-01-01 RX ORDER — ACETAMINOPHEN 325 MG/1
650 TABLET ORAL
Status: COMPLETED | OUTPATIENT
Start: 2020-01-01 | End: 2020-01-01

## 2020-01-01 RX ORDER — ONDANSETRON HYDROCHLORIDE 2 MG/ML
INJECTION, SOLUTION INTRAMUSCULAR; INTRAVENOUS
Status: DISCONTINUED | OUTPATIENT
Start: 2020-01-01 | End: 2020-01-01

## 2020-01-01 RX ORDER — LIDOCAINE HYDROCHLORIDE 10 MG/ML
10 INJECTION, SOLUTION EPIDURAL; INFILTRATION; INTRACAUDAL; PERINEURAL
Status: COMPLETED | OUTPATIENT
Start: 2020-01-01 | End: 2020-01-01

## 2020-01-01 RX ORDER — MAGNESIUM SULFATE HEPTAHYDRATE 40 MG/ML
4 INJECTION, SOLUTION INTRAVENOUS
Status: DISCONTINUED | OUTPATIENT
Start: 2020-01-01 | End: 2020-01-01

## 2020-01-01 RX ORDER — VANCOMYCIN HCL IN 5 % DEXTROSE 1G/250ML
1500 PLASTIC BAG, INJECTION (ML) INTRAVENOUS EVERY 12 HOURS
Start: 2020-01-01 | End: 2020-01-01

## 2020-01-01 RX ORDER — MORPHINE SULFATE 2 MG/ML
1 INJECTION, SOLUTION INTRAMUSCULAR; INTRAVENOUS
Status: COMPLETED | OUTPATIENT
Start: 2020-01-01 | End: 2020-01-01

## 2020-01-01 RX ORDER — DIPHENHYDRAMINE HYDROCHLORIDE 50 MG/ML
50 INJECTION INTRAMUSCULAR; INTRAVENOUS ONCE AS NEEDED
Status: DISCONTINUED | OUTPATIENT
Start: 2020-01-01 | End: 2020-01-01 | Stop reason: HOSPADM

## 2020-01-01 RX ORDER — FENTANYL CITRATE 50 UG/ML
100 INJECTION, SOLUTION INTRAMUSCULAR; INTRAVENOUS ONCE
Status: COMPLETED | OUTPATIENT
Start: 2020-01-01 | End: 2020-01-01

## 2020-01-01 RX ORDER — LIDOCAINE AND PRILOCAINE 25; 25 MG/G; MG/G
CREAM TOPICAL SEE ADMIN INSTRUCTIONS
Qty: 30 G | Refills: 2 | Status: SHIPPED | OUTPATIENT
Start: 2020-01-01

## 2020-01-01 RX ORDER — HYOSCYAMINE SULFATE 0.5 MG/ML
0.5 INJECTION, SOLUTION SUBCUTANEOUS ONCE
Status: DISCONTINUED | OUTPATIENT
Start: 2020-01-01 | End: 2020-01-01

## 2020-01-01 RX ORDER — ONDANSETRON 2 MG/ML
4 INJECTION INTRAMUSCULAR; INTRAVENOUS
Status: COMPLETED | OUTPATIENT
Start: 2020-01-01 | End: 2020-01-01

## 2020-01-01 RX ORDER — MUPIROCIN 20 MG/G
OINTMENT TOPICAL 2 TIMES DAILY
Status: DISCONTINUED | OUTPATIENT
Start: 2020-10-07 | End: 2020-10-07 | Stop reason: HOSPADM

## 2020-01-01 RX ORDER — INSULIN ASPART 100 [IU]/ML
1-10 INJECTION, SOLUTION INTRAVENOUS; SUBCUTANEOUS EVERY 6 HOURS PRN
Status: DISCONTINUED | OUTPATIENT
Start: 2020-01-01 | End: 2020-01-01

## 2020-01-01 RX ORDER — PROMETHAZINE HYDROCHLORIDE 12.5 MG/1
12.5 TABLET ORAL EVERY 6 HOURS PRN
Status: DISCONTINUED | OUTPATIENT
Start: 2020-01-01 | End: 2020-01-01 | Stop reason: HOSPADM

## 2020-01-01 RX ORDER — HEPARIN 100 UNIT/ML
500 SYRINGE INTRAVENOUS
Status: COMPLETED | OUTPATIENT
Start: 2020-01-01 | End: 2020-01-01

## 2020-01-01 RX ORDER — HYDROCODONE BITARTRATE AND ACETAMINOPHEN 5; 325 MG/1; MG/1
1 TABLET ORAL EVERY 4 HOURS PRN
Status: DISCONTINUED | OUTPATIENT
Start: 2020-01-01 | End: 2020-01-01 | Stop reason: HOSPADM

## 2020-01-01 RX ORDER — SODIUM CHLORIDE, SODIUM LACTATE, POTASSIUM CHLORIDE, CALCIUM CHLORIDE 600; 310; 30; 20 MG/100ML; MG/100ML; MG/100ML; MG/100ML
1000 INJECTION, SOLUTION INTRAVENOUS
Status: COMPLETED | OUTPATIENT
Start: 2020-01-01 | End: 2020-01-01

## 2020-01-01 RX ORDER — LIDOCAINE HYDROCHLORIDE 10 MG/ML
INJECTION, SOLUTION EPIDURAL; INFILTRATION; INTRACAUDAL; PERINEURAL
Status: COMPLETED | OUTPATIENT
Start: 2020-01-01 | End: 2020-01-01

## 2020-01-01 RX ORDER — SUCCINYLCHOLINE CHLORIDE 20 MG/ML
INJECTION INTRAMUSCULAR; INTRAVENOUS
Status: DISCONTINUED | OUTPATIENT
Start: 2020-01-01 | End: 2020-01-01

## 2020-01-01 RX ORDER — METRONIDAZOLE 500 MG/100ML
500 INJECTION, SOLUTION INTRAVENOUS
Status: DISCONTINUED | OUTPATIENT
Start: 2020-01-01 | End: 2020-01-01

## 2020-01-01 RX ORDER — LIDOCAINE HYDROCHLORIDE 10 MG/ML
1 INJECTION, SOLUTION EPIDURAL; INFILTRATION; INTRACAUDAL; PERINEURAL ONCE
Status: DISCONTINUED | OUTPATIENT
Start: 2020-01-01 | End: 2020-01-01

## 2020-01-01 RX ORDER — ACETAMINOPHEN 325 MG/1
650 TABLET ORAL EVERY 6 HOURS PRN
Status: DISCONTINUED | OUTPATIENT
Start: 2020-01-01 | End: 2020-01-01 | Stop reason: HOSPADM

## 2020-01-01 RX ORDER — DIPHENHYDRAMINE HYDROCHLORIDE 50 MG/ML
50 INJECTION INTRAMUSCULAR; INTRAVENOUS ONCE AS NEEDED
Status: CANCELLED | OUTPATIENT
Start: 2020-01-01

## 2020-01-01 RX ORDER — PROPOFOL 10 MG/ML
INJECTION, EMULSION INTRAVENOUS
Status: DISCONTINUED | OUTPATIENT
Start: 2020-01-01 | End: 2020-01-01

## 2020-01-01 RX ORDER — CLINDAMYCIN HYDROCHLORIDE 150 MG/1
300 CAPSULE ORAL 4 TIMES DAILY
Qty: 40 CAPSULE | Refills: 0 | Status: SHIPPED | OUTPATIENT
Start: 2020-01-01 | End: 2020-01-01

## 2020-01-01 RX ORDER — BUSPIRONE HYDROCHLORIDE 5 MG/1
10 TABLET ORAL ONCE
Status: COMPLETED | OUTPATIENT
Start: 2020-01-01 | End: 2020-01-01

## 2020-01-01 RX ORDER — ONDANSETRON 2 MG/ML
INJECTION INTRAMUSCULAR; INTRAVENOUS
Status: COMPLETED
Start: 2020-01-01 | End: 2020-01-01

## 2020-01-01 RX ORDER — METOPROLOL TARTRATE 50 MG/1
50 TABLET ORAL 3 TIMES DAILY
Status: DISCONTINUED | OUTPATIENT
Start: 2020-01-01 | End: 2020-01-01 | Stop reason: HOSPADM

## 2020-01-01 RX ORDER — KETOROLAC TROMETHAMINE 30 MG/ML
INJECTION, SOLUTION INTRAMUSCULAR; INTRAVENOUS
Status: DISCONTINUED | OUTPATIENT
Start: 2020-01-01 | End: 2020-01-01

## 2020-01-01 RX ORDER — METHOCARBAMOL 500 MG/1
500 TABLET, FILM COATED ORAL ONCE
Status: COMPLETED | OUTPATIENT
Start: 2020-01-01 | End: 2020-01-01

## 2020-01-01 RX ORDER — GLUCAGON 1 MG
1 KIT INJECTION
Status: DISCONTINUED | OUTPATIENT
Start: 2020-01-01 | End: 2020-01-01 | Stop reason: HOSPADM

## 2020-01-01 RX ORDER — MIDAZOLAM HYDROCHLORIDE 1 MG/ML
INJECTION INTRAMUSCULAR; INTRAVENOUS
Status: DISCONTINUED | OUTPATIENT
Start: 2020-01-01 | End: 2020-01-01

## 2020-01-01 RX ORDER — GLYCOPYRROLATE 0.2 MG/ML
INJECTION INTRAMUSCULAR; INTRAVENOUS
Status: DISCONTINUED | OUTPATIENT
Start: 2020-01-01 | End: 2020-01-01

## 2020-01-01 RX ORDER — LORAZEPAM 2 MG/ML
1 INJECTION INTRAMUSCULAR
Status: DISCONTINUED | OUTPATIENT
Start: 2020-01-01 | End: 2020-10-07 | Stop reason: HOSPADM

## 2020-01-01 RX ORDER — MORPHINE SULFATE 2 MG/ML
2 INJECTION, SOLUTION INTRAMUSCULAR; INTRAVENOUS EVERY 4 HOURS PRN
Status: DISCONTINUED | OUTPATIENT
Start: 2020-01-01 | End: 2020-01-01 | Stop reason: HOSPADM

## 2020-01-01 RX ORDER — DIPHENHYDRAMINE HYDROCHLORIDE 50 MG/ML
25 INJECTION INTRAMUSCULAR; INTRAVENOUS EVERY 6 HOURS PRN
Status: DISCONTINUED | OUTPATIENT
Start: 2020-01-01 | End: 2020-01-01 | Stop reason: HOSPADM

## 2020-01-01 RX ORDER — LORAZEPAM 2 MG/ML
2 INJECTION INTRAMUSCULAR ONCE
Status: DISCONTINUED | OUTPATIENT
Start: 2020-01-01 | End: 2020-01-01

## 2020-01-01 RX ORDER — METOPROLOL TARTRATE 100 MG/1
100 TABLET ORAL 2 TIMES DAILY
Qty: 60 TABLET | Refills: 11 | Status: SHIPPED | OUTPATIENT
Start: 2020-01-01 | End: 2021-09-09

## 2020-01-01 RX ORDER — LACTULOSE 10 G/15ML
20 SOLUTION ORAL
Status: COMPLETED | OUTPATIENT
Start: 2020-01-01 | End: 2020-01-01

## 2020-01-01 RX ORDER — POTASSIUM CHLORIDE 14.9 MG/ML
60 INJECTION INTRAVENOUS
Status: DISCONTINUED | OUTPATIENT
Start: 2020-01-01 | End: 2020-01-01

## 2020-01-01 RX ORDER — SODIUM CHLORIDE 0.9 % (FLUSH) 0.9 %
10 SYRINGE (ML) INJECTION EVERY 6 HOURS
Status: DISCONTINUED | OUTPATIENT
Start: 2020-01-01 | End: 2020-01-01 | Stop reason: HOSPADM

## 2020-01-01 RX ORDER — ONDANSETRON HCL IN 0.9 % NACL 8 MG/50 ML
8 INTRAVENOUS SOLUTION, PIGGYBACK (ML) INTRAVENOUS 2 TIMES DAILY
Status: DISCONTINUED | OUTPATIENT
Start: 2020-01-01 | End: 2020-01-01

## 2020-01-01 RX ORDER — SPIRONOLACTONE 25 MG/1
25 TABLET ORAL DAILY
Status: DISCONTINUED | OUTPATIENT
Start: 2020-01-01 | End: 2020-01-01

## 2020-01-01 RX ORDER — VANCOMYCIN HCL IN 5 % DEXTROSE 1G/250ML
1000 PLASTIC BAG, INJECTION (ML) INTRAVENOUS
Status: DISCONTINUED | OUTPATIENT
Start: 2020-01-01 | End: 2020-01-01

## 2020-01-01 RX ORDER — PANTOPRAZOLE SODIUM 40 MG/10ML
40 INJECTION, POWDER, LYOPHILIZED, FOR SOLUTION INTRAVENOUS
Status: COMPLETED | OUTPATIENT
Start: 2020-01-01 | End: 2020-01-01

## 2020-01-01 RX ORDER — GADOBUTROL 604.72 MG/ML
9 INJECTION INTRAVENOUS
Status: COMPLETED | OUTPATIENT
Start: 2020-01-01 | End: 2020-01-01

## 2020-01-01 RX ORDER — BENZONATATE 100 MG/1
100 CAPSULE ORAL 3 TIMES DAILY PRN
Status: DISCONTINUED | OUTPATIENT
Start: 2020-01-01 | End: 2020-01-01 | Stop reason: HOSPADM

## 2020-01-01 RX ORDER — POTASSIUM CHLORIDE 20 MEQ/1
60 TABLET, EXTENDED RELEASE ORAL ONCE
Status: COMPLETED | OUTPATIENT
Start: 2020-01-01 | End: 2020-01-01

## 2020-01-01 RX ORDER — ONDANSETRON 4 MG/1
4 TABLET, ORALLY DISINTEGRATING ORAL EVERY 8 HOURS PRN
Status: DISCONTINUED | OUTPATIENT
Start: 2020-01-01 | End: 2020-01-01 | Stop reason: HOSPADM

## 2020-01-01 RX ORDER — VANCOMYCIN HCL IN 5 % DEXTROSE 1G/250ML
1000 PLASTIC BAG, INJECTION (ML) INTRAVENOUS
Status: DISCONTINUED | OUTPATIENT
Start: 2020-01-01 | End: 2020-01-01 | Stop reason: HOSPADM

## 2020-01-01 RX ORDER — POLYETHYLENE GLYCOL 3350, SODIUM SULFATE ANHYDROUS, SODIUM BICARBONATE, SODIUM CHLORIDE, POTASSIUM CHLORIDE 236; 22.74; 6.74; 5.86; 2.97 G/4L; G/4L; G/4L; G/4L; G/4L
4000 POWDER, FOR SOLUTION ORAL ONCE
Status: COMPLETED | OUTPATIENT
Start: 2020-01-01 | End: 2020-01-01

## 2020-01-01 RX ORDER — PROPOFOL 10 MG/ML
INJECTION, EMULSION INTRAVENOUS CONTINUOUS PRN
Status: DISCONTINUED | OUTPATIENT
Start: 2020-01-01 | End: 2020-01-01

## 2020-01-01 RX ORDER — OXYCODONE AND ACETAMINOPHEN 10; 325 MG/1; MG/1
1 TABLET ORAL EVERY 4 HOURS PRN
Status: DISCONTINUED | OUTPATIENT
Start: 2020-01-01 | End: 2020-01-01 | Stop reason: HOSPADM

## 2020-01-01 RX ORDER — IBUPROFEN 200 MG
24 TABLET ORAL
Status: DISCONTINUED | OUTPATIENT
Start: 2020-01-01 | End: 2020-01-01 | Stop reason: HOSPADM

## 2020-01-01 RX ORDER — POTASSIUM CHLORIDE 20 MEQ/1
40 TABLET, EXTENDED RELEASE ORAL ONCE
Status: COMPLETED | OUTPATIENT
Start: 2020-01-01 | End: 2020-01-01

## 2020-01-01 RX ORDER — ONDANSETRON 4 MG/1
4 TABLET, ORALLY DISINTEGRATING ORAL EVERY 8 HOURS PRN
Qty: 15 TABLET | Refills: 1 | Status: SHIPPED | OUTPATIENT
Start: 2020-01-01 | End: 2020-01-01

## 2020-01-01 RX ORDER — SPIRONOLACTONE 25 MG/1
25 TABLET ORAL ONCE
Status: COMPLETED | OUTPATIENT
Start: 2020-01-01 | End: 2020-01-01

## 2020-01-01 RX ORDER — NEOSTIGMINE METHYLSULFATE 1 MG/ML
INJECTION, SOLUTION INTRAVENOUS
Status: DISCONTINUED | OUTPATIENT
Start: 2020-01-01 | End: 2020-01-01

## 2020-01-01 RX ORDER — AMLODIPINE BESYLATE 5 MG/1
5 TABLET ORAL DAILY
Status: DISCONTINUED | OUTPATIENT
Start: 2020-01-01 | End: 2020-01-01 | Stop reason: HOSPADM

## 2020-01-01 RX ORDER — SODIUM CHLORIDE 9 MG/ML
INJECTION, SOLUTION INTRAVENOUS CONTINUOUS
Status: CANCELLED | OUTPATIENT
Start: 2020-01-01

## 2020-01-01 RX ORDER — VANCOMYCIN HCL IN 5 % DEXTROSE 1G/250ML
1000 PLASTIC BAG, INJECTION (ML) INTRAVENOUS ONCE
Status: COMPLETED | OUTPATIENT
Start: 2020-01-01 | End: 2020-01-01

## 2020-01-01 RX ORDER — ONDANSETRON 2 MG/ML
4 INJECTION INTRAMUSCULAR; INTRAVENOUS EVERY 4 HOURS PRN
Status: DISCONTINUED | OUTPATIENT
Start: 2020-01-01 | End: 2020-01-01 | Stop reason: HOSPADM

## 2020-01-01 RX ORDER — PROMETHAZINE HYDROCHLORIDE 12.5 MG/1
12.5 TABLET ORAL EVERY 6 HOURS PRN
Qty: 30 TABLET | Refills: 2 | Status: SHIPPED | OUTPATIENT
Start: 2020-01-01

## 2020-01-01 RX ORDER — POTASSIUM CHLORIDE 7.45 MG/ML
80 INJECTION INTRAVENOUS
Status: DISCONTINUED | OUTPATIENT
Start: 2020-01-01 | End: 2020-01-01

## 2020-01-01 RX ORDER — METOPROLOL TARTRATE 50 MG/1
50 TABLET ORAL 2 TIMES DAILY
Status: DISCONTINUED | OUTPATIENT
Start: 2020-01-01 | End: 2020-01-01

## 2020-01-01 RX ORDER — DEXTROSE MONOHYDRATE 100 MG/ML
INJECTION, SOLUTION INTRAVENOUS CONTINUOUS PRN
Status: DISCONTINUED | OUTPATIENT
Start: 2020-01-01 | End: 2020-01-01

## 2020-01-01 RX ORDER — VANCOMYCIN HCL IN 5 % DEXTROSE 1G/250ML
1000 PLASTIC BAG, INJECTION (ML) INTRAVENOUS EVERY 12 HOURS
Status: ON HOLD
Start: 2020-01-01 | End: 2020-01-01 | Stop reason: SDUPTHER

## 2020-01-01 RX ORDER — FENTANYL CITRATE 50 UG/ML
12.5 INJECTION, SOLUTION INTRAMUSCULAR; INTRAVENOUS EVERY 4 HOURS PRN
Status: DISCONTINUED | OUTPATIENT
Start: 2020-01-01 | End: 2020-01-01

## 2020-01-01 RX ORDER — BUPIVACAINE HYDROCHLORIDE 5 MG/ML
INJECTION, SOLUTION PERINEURAL
Status: DISCONTINUED | OUTPATIENT
Start: 2020-01-01 | End: 2020-01-01 | Stop reason: HOSPADM

## 2020-01-01 RX ORDER — POTASSIUM CHLORIDE 7.45 MG/ML
60 INJECTION INTRAVENOUS
Status: DISCONTINUED | OUTPATIENT
Start: 2020-01-01 | End: 2020-01-01

## 2020-01-01 RX ORDER — KETOROLAC TROMETHAMINE 30 MG/ML
15 INJECTION, SOLUTION INTRAMUSCULAR; INTRAVENOUS EVERY 6 HOURS
Status: DISCONTINUED | OUTPATIENT
Start: 2020-01-01 | End: 2020-01-01 | Stop reason: HOSPADM

## 2020-01-01 RX ORDER — ONDANSETRON 8 MG/1
8 TABLET, ORALLY DISINTEGRATING ORAL EVERY 8 HOURS PRN
Status: DISCONTINUED | OUTPATIENT
Start: 2020-01-01 | End: 2020-01-01 | Stop reason: HOSPADM

## 2020-01-01 RX ORDER — HYDROCODONE BITARTRATE AND ACETAMINOPHEN 5; 325 MG/1; MG/1
1 TABLET ORAL EVERY 8 HOURS PRN
Qty: 60 TABLET | Refills: 0 | Status: ON HOLD | OUTPATIENT
Start: 2020-01-01 | End: 2020-01-01 | Stop reason: HOSPADM

## 2020-01-01 RX ORDER — FOLIC ACID 1 MG/1
1 TABLET ORAL DAILY
Status: DISCONTINUED | OUTPATIENT
Start: 2020-01-01 | End: 2020-01-01 | Stop reason: HOSPADM

## 2020-01-01 RX ORDER — CLINDAMYCIN PHOSPHATE 150 MG/ML
600 INJECTION, SOLUTION INTRAVENOUS
Status: COMPLETED | OUTPATIENT
Start: 2020-01-01 | End: 2020-01-01

## 2020-01-01 RX ORDER — ENOXAPARIN SODIUM 100 MG/ML
30 INJECTION SUBCUTANEOUS EVERY 24 HOURS
Status: DISCONTINUED | OUTPATIENT
Start: 2020-01-01 | End: 2020-01-01

## 2020-01-01 RX ORDER — FAMOTIDINE 10 MG/ML
20 INJECTION INTRAVENOUS ONCE
Status: COMPLETED | OUTPATIENT
Start: 2020-01-01 | End: 2020-01-01

## 2020-01-01 RX ORDER — CEFEPIME HYDROCHLORIDE 1 G/50ML
2 INJECTION, SOLUTION INTRAVENOUS
Status: COMPLETED | OUTPATIENT
Start: 2020-01-01 | End: 2020-01-01

## 2020-01-01 RX ORDER — VANCOMYCIN HCL IN 5 % DEXTROSE 1G/250ML
1000 PLASTIC BAG, INJECTION (ML) INTRAVENOUS
Status: DISCONTINUED | OUTPATIENT
Start: 2020-01-01 | End: 2020-01-01 | Stop reason: DRUGHIGH

## 2020-01-01 RX ORDER — PROMETHAZINE HYDROCHLORIDE 25 MG/1
25 TABLET ORAL EVERY 6 HOURS PRN
Qty: 60 TABLET | Refills: 2 | Status: ON HOLD | OUTPATIENT
Start: 2020-01-01 | End: 2020-01-01 | Stop reason: HOSPADM

## 2020-01-01 RX ORDER — MORPHINE SULFATE 2 MG/ML
2 INJECTION, SOLUTION INTRAMUSCULAR; INTRAVENOUS EVERY 6 HOURS PRN
Status: DISCONTINUED | OUTPATIENT
Start: 2020-01-01 | End: 2020-01-01 | Stop reason: HOSPADM

## 2020-01-01 RX ORDER — HYDROCODONE BITARTRATE AND ACETAMINOPHEN 500; 5 MG/1; MG/1
TABLET ORAL ONCE
Status: COMPLETED | OUTPATIENT
Start: 2020-01-01 | End: 2020-01-01

## 2020-01-01 RX ORDER — POTASSIUM CHLORIDE 29.8 MG/ML
40 INJECTION INTRAVENOUS
Status: DISCONTINUED | OUTPATIENT
Start: 2020-01-01 | End: 2020-01-01

## 2020-01-01 RX ORDER — NOREPINEPHRINE BITARTRATE/D5W 4MG/250ML
0.02 PLASTIC BAG, INJECTION (ML) INTRAVENOUS CONTINUOUS
Status: DISCONTINUED | OUTPATIENT
Start: 2020-01-01 | End: 2020-01-01

## 2020-01-01 RX ORDER — DEXTROSE MONOHYDRATE AND SODIUM CHLORIDE 5; .9 G/100ML; G/100ML
1000 INJECTION, SOLUTION INTRAVENOUS
Status: COMPLETED | OUTPATIENT
Start: 2020-01-01 | End: 2020-01-01

## 2020-01-01 RX ORDER — SODIUM CHLORIDE, SODIUM LACTATE, POTASSIUM CHLORIDE, CALCIUM CHLORIDE 600; 310; 30; 20 MG/100ML; MG/100ML; MG/100ML; MG/100ML
INJECTION, SOLUTION INTRAVENOUS CONTINUOUS PRN
Status: DISCONTINUED | OUTPATIENT
Start: 2020-01-01 | End: 2020-01-01

## 2020-01-01 RX ORDER — TALC
6 POWDER (GRAM) TOPICAL ONCE
Status: COMPLETED | OUTPATIENT
Start: 2020-01-01 | End: 2020-01-01

## 2020-01-01 RX ORDER — IBUPROFEN 400 MG/1
400 TABLET ORAL EVERY 4 HOURS PRN
Status: DISCONTINUED | OUTPATIENT
Start: 2020-01-01 | End: 2020-01-01 | Stop reason: HOSPADM

## 2020-01-01 RX ORDER — FLUOROURACIL 50 MG/ML
400 INJECTION, SOLUTION INTRAVENOUS
Status: COMPLETED | OUTPATIENT
Start: 2020-01-01 | End: 2020-01-01

## 2020-01-01 RX ORDER — SPIRONOLACTONE 25 MG/1
25 TABLET ORAL DAILY
Qty: 30 TABLET | Refills: 11 | Status: SHIPPED | OUTPATIENT
Start: 2020-01-01 | End: 2021-09-09

## 2020-01-01 RX ORDER — PHENYLEPHRINE HYDROCHLORIDE 10 MG/ML
INJECTION INTRAVENOUS
Status: DISCONTINUED | OUTPATIENT
Start: 2020-01-01 | End: 2020-01-01

## 2020-01-01 RX ORDER — ONDANSETRON 2 MG/ML
4 INJECTION INTRAMUSCULAR; INTRAVENOUS EVERY 6 HOURS PRN
Status: DISCONTINUED | OUTPATIENT
Start: 2020-01-01 | End: 2020-01-01

## 2020-01-01 RX ORDER — FLUOROURACIL 50 MG/ML
400 INJECTION, SOLUTION INTRAVENOUS
Status: CANCELLED | OUTPATIENT
Start: 2020-01-01

## 2020-01-01 RX ORDER — DOCUSATE SODIUM 100 MG/1
100 CAPSULE, LIQUID FILLED ORAL DAILY
Status: DISCONTINUED | OUTPATIENT
Start: 2020-01-01 | End: 2020-01-01

## 2020-01-01 RX ORDER — CIPROFLOXACIN 500 MG/1
500 TABLET ORAL 2 TIMES DAILY
Qty: 20 TABLET | Refills: 0 | Status: SHIPPED | OUTPATIENT
Start: 2020-01-01 | End: 2020-01-01

## 2020-01-01 RX ORDER — HYDROMORPHONE HYDROCHLORIDE 2 MG/ML
0.5 INJECTION, SOLUTION INTRAMUSCULAR; INTRAVENOUS; SUBCUTANEOUS EVERY 5 MIN PRN
Status: DISCONTINUED | OUTPATIENT
Start: 2020-01-01 | End: 2020-01-01 | Stop reason: HOSPADM

## 2020-01-01 RX ORDER — HYDROCODONE BITARTRATE AND ACETAMINOPHEN 500; 5 MG/1; MG/1
TABLET ORAL
Status: DISCONTINUED | OUTPATIENT
Start: 2020-01-01 | End: 2020-01-01

## 2020-01-01 RX ORDER — IBUPROFEN 200 MG
16 TABLET ORAL
Status: DISCONTINUED | OUTPATIENT
Start: 2020-01-01 | End: 2020-01-01

## 2020-01-01 RX ORDER — HEPARIN SODIUM 1000 [USP'U]/ML
2300 INJECTION, SOLUTION INTRAVENOUS; SUBCUTANEOUS
Status: DISCONTINUED | OUTPATIENT
Start: 2020-01-01 | End: 2020-01-01 | Stop reason: HOSPADM

## 2020-01-01 RX ADMIN — MAGNESIUM SULFATE HEPTAHYDRATE 2 G: 40 INJECTION, SOLUTION INTRAVENOUS at 04:08

## 2020-01-01 RX ADMIN — DICYCLOMINE HYDROCHLORIDE 20 MG: 20 INJECTION, SOLUTION INTRAMUSCULAR at 01:09

## 2020-01-01 RX ADMIN — SODIUM CHLORIDE, SODIUM LACTATE, POTASSIUM CHLORIDE, AND CALCIUM CHLORIDE 1000 ML: .6; .31; .03; .02 INJECTION, SOLUTION INTRAVENOUS at 12:09

## 2020-01-01 RX ADMIN — MORPHINE SULFATE 2 MG: 2 INJECTION, SOLUTION INTRAMUSCULAR; INTRAVENOUS at 08:09

## 2020-01-01 RX ADMIN — CEFEPIME 2 G: 2 INJECTION, POWDER, FOR SOLUTION INTRAVENOUS at 03:09

## 2020-01-01 RX ADMIN — FAMOTIDINE 20 MG: 10 INJECTION, SOLUTION INTRAVENOUS at 05:08

## 2020-01-01 RX ADMIN — ESMOLOL HYDROCHLORIDE 10 MG: 10 INJECTION INTRAVENOUS at 07:07

## 2020-01-01 RX ADMIN — PROMETHAZINE HYDROCHLORIDE 12.5 MG: 25 INJECTION INTRAMUSCULAR; INTRAVENOUS at 03:09

## 2020-01-01 RX ADMIN — Medication 10 ML: at 12:09

## 2020-01-01 RX ADMIN — PSYLLIUM HUSK 1 PACKET: 3.4 POWDER ORAL at 09:09

## 2020-01-01 RX ADMIN — ONDANSETRON 4 MG: 2 INJECTION INTRAMUSCULAR; INTRAVENOUS at 09:09

## 2020-01-01 RX ADMIN — SODIUM PHOSPHATE, MONOBASIC, MONOHYDRATE 30 MMOL: 276; 142 INJECTION, SOLUTION INTRAVENOUS at 02:09

## 2020-01-01 RX ADMIN — ONDANSETRON 4 MG: 2 INJECTION INTRAMUSCULAR; INTRAVENOUS at 08:09

## 2020-01-01 RX ADMIN — HEPARIN SODIUM 2300 UNITS: 1000 INJECTION, SOLUTION INTRAVENOUS; SUBCUTANEOUS at 05:08

## 2020-01-01 RX ADMIN — METOPROLOL TARTRATE 50 MG: 50 TABLET, FILM COATED ORAL at 09:09

## 2020-01-01 RX ADMIN — LIDOCAINE HYDROCHLORIDE 100 MG: 10 INJECTION, SOLUTION EPIDURAL; INFILTRATION; INTRACAUDAL; PERINEURAL at 03:08

## 2020-01-01 RX ADMIN — PROPOFOL 50 MG: 10 INJECTION, EMULSION INTRAVENOUS at 08:07

## 2020-01-01 RX ADMIN — Medication 6 MG: at 02:09

## 2020-01-01 RX ADMIN — BENZONATATE 100 MG: 100 CAPSULE ORAL at 11:09

## 2020-01-01 RX ADMIN — IOHEXOL 100 ML: 350 INJECTION, SOLUTION INTRAVENOUS at 11:07

## 2020-01-01 RX ADMIN — THIAMINE HYDROCHLORIDE 500 MG: 100 INJECTION, SOLUTION INTRAMUSCULAR; INTRAVENOUS at 02:10

## 2020-01-01 RX ADMIN — ALTEPLASE 2 MG: 2.2 INJECTION, POWDER, LYOPHILIZED, FOR SOLUTION INTRAVENOUS at 04:09

## 2020-01-01 RX ADMIN — MAGNESIUM SULFATE HEPTAHYDRATE 2 G: 40 INJECTION, SOLUTION INTRAVENOUS at 11:09

## 2020-01-01 RX ADMIN — HYDROMORPHONE HYDROCHLORIDE 0.4 MG: 2 INJECTION INTRAMUSCULAR; INTRAVENOUS; SUBCUTANEOUS at 12:07

## 2020-01-01 RX ADMIN — AMLODIPINE BESYLATE 5 MG: 5 TABLET ORAL at 09:09

## 2020-01-01 RX ADMIN — AMPICILLIN SODIUM 2 G: 2 INJECTION, POWDER, FOR SOLUTION INTRAMUSCULAR; INTRAVENOUS at 10:09

## 2020-01-01 RX ADMIN — HEPARIN 500 UNITS: 100 SYRINGE at 04:08

## 2020-01-01 RX ADMIN — ENOXAPARIN SODIUM 90 MG: 100 INJECTION SUBCUTANEOUS at 08:09

## 2020-01-01 RX ADMIN — METOPROLOL TARTRATE 25 MG: 25 TABLET, FILM COATED ORAL at 09:09

## 2020-01-01 RX ADMIN — KETOROLAC TROMETHAMINE 15 MG: 30 INJECTION, SOLUTION INTRAMUSCULAR at 05:07

## 2020-01-01 RX ADMIN — POTASSIUM CHLORIDE 20 MEQ: 1500 TABLET, EXTENDED RELEASE ORAL at 06:09

## 2020-01-01 RX ADMIN — ONDANSETRON 4 MG: 2 INJECTION INTRAMUSCULAR; INTRAVENOUS at 09:07

## 2020-01-01 RX ADMIN — SODIUM PHOSPHATE, MONOBASIC, MONOHYDRATE 15 MMOL: 276; 142 INJECTION, SOLUTION INTRAVENOUS at 04:09

## 2020-01-01 RX ADMIN — BENZONATATE 100 MG: 100 CAPSULE ORAL at 08:09

## 2020-01-01 RX ADMIN — FLUOROURACIL 790 MG: 50 INJECTION, SOLUTION INTRAVENOUS at 01:08

## 2020-01-01 RX ADMIN — PHYTONADIONE 5 MG: 10 INJECTION, EMULSION INTRAMUSCULAR; INTRAVENOUS; SUBCUTANEOUS at 04:09

## 2020-01-01 RX ADMIN — FENTANYL CITRATE 50 MCG: 50 INJECTION, SOLUTION INTRAMUSCULAR; INTRAVENOUS at 10:07

## 2020-01-01 RX ADMIN — FOLIC ACID 1 MG: 1 TABLET ORAL at 10:09

## 2020-01-01 RX ADMIN — KETOROLAC TROMETHAMINE 15 MG: 30 INJECTION, SOLUTION INTRAMUSCULAR at 06:07

## 2020-01-01 RX ADMIN — LORAZEPAM 0.5 MG: 2 INJECTION INTRAMUSCULAR; INTRAVENOUS at 06:09

## 2020-01-01 RX ADMIN — SCOPALAMINE 1 PATCH: 1 PATCH, EXTENDED RELEASE TRANSDERMAL at 06:10

## 2020-01-01 RX ADMIN — VANCOMYCIN HYDROCHLORIDE 1250 MG: 1.25 INJECTION, POWDER, LYOPHILIZED, FOR SOLUTION INTRAVENOUS at 03:09

## 2020-01-01 RX ADMIN — MORPHINE SULFATE 4 MG: 4 INJECTION INTRAVENOUS at 09:07

## 2020-01-01 RX ADMIN — ENOXAPARIN SODIUM 40 MG: 40 INJECTION SUBCUTANEOUS at 05:09

## 2020-01-01 RX ADMIN — MORPHINE SULFATE 1 MG: 2 INJECTION, SOLUTION INTRAMUSCULAR; INTRAVENOUS at 01:09

## 2020-01-01 RX ADMIN — Medication 10 ML: at 05:09

## 2020-01-01 RX ADMIN — THIAMINE HYDROCHLORIDE 100 MG: 100 INJECTION, SOLUTION INTRAMUSCULAR; INTRAVENOUS at 08:09

## 2020-01-01 RX ADMIN — SODIUM CHLORIDE 1000 ML: 0.9 INJECTION, SOLUTION INTRAVENOUS at 02:09

## 2020-01-01 RX ADMIN — ESMOLOL HYDROCHLORIDE 10 MG: 10 INJECTION INTRAVENOUS at 09:07

## 2020-01-01 RX ADMIN — OXYCODONE HYDROCHLORIDE AND ACETAMINOPHEN 1 TABLET: 5; 325 TABLET ORAL at 07:07

## 2020-01-01 RX ADMIN — KETOROLAC TROMETHAMINE 15 MG: 30 INJECTION, SOLUTION INTRAMUSCULAR at 11:07

## 2020-01-01 RX ADMIN — MAGNESIUM SULFATE IN WATER 2 G: 40 INJECTION, SOLUTION INTRAVENOUS at 02:09

## 2020-01-01 RX ADMIN — PROPOFOL 50 MG: 10 INJECTION, EMULSION INTRAVENOUS at 04:08

## 2020-01-01 RX ADMIN — MORPHINE SULFATE 2 MG: 2 INJECTION, SOLUTION INTRAMUSCULAR; INTRAVENOUS at 01:09

## 2020-01-01 RX ADMIN — VANCOMYCIN HYDROCHLORIDE 1500 MG: 1.5 INJECTION, POWDER, LYOPHILIZED, FOR SOLUTION INTRAVENOUS at 10:09

## 2020-01-01 RX ADMIN — ACYCLOVIR SODIUM 570 MG: 50 INJECTION, SOLUTION INTRAVENOUS at 12:09

## 2020-01-01 RX ADMIN — MORPHINE SULFATE 4 MG: 4 INJECTION INTRAVENOUS at 04:07

## 2020-01-01 RX ADMIN — SUCCINYLCHOLINE CHLORIDE 100 MG: 20 INJECTION, SOLUTION INTRAMUSCULAR; INTRAVENOUS at 07:07

## 2020-01-01 RX ADMIN — VECURONIUM BROMIDE 1 MG: 10 INJECTION, POWDER, LYOPHILIZED, FOR SOLUTION INTRAVENOUS at 09:07

## 2020-01-01 RX ADMIN — METOPROLOL TARTRATE 25 MG: 25 TABLET, FILM COATED ORAL at 08:08

## 2020-01-01 RX ADMIN — MORPHINE SULFATE 2 MG: 2 INJECTION, SOLUTION INTRAMUSCULAR; INTRAVENOUS at 06:09

## 2020-01-01 RX ADMIN — ENOXAPARIN SODIUM 40 MG: 40 INJECTION SUBCUTANEOUS at 06:09

## 2020-01-01 RX ADMIN — FUROSEMIDE 40 MG: 10 INJECTION, SOLUTION INTRAVENOUS at 04:09

## 2020-01-01 RX ADMIN — METRONIDAZOLE 500 MG: 500 INJECTION, SOLUTION INTRAVENOUS at 01:08

## 2020-01-01 RX ADMIN — METOPROLOL TARTRATE 50 MG: 50 TABLET, FILM COATED ORAL at 03:09

## 2020-01-01 RX ADMIN — VANCOMYCIN HYDROCHLORIDE 1500 MG: 1.5 INJECTION, POWDER, LYOPHILIZED, FOR SOLUTION INTRAVENOUS at 03:09

## 2020-01-01 RX ADMIN — MORPHINE SULFATE 2 MG: 2 INJECTION, SOLUTION INTRAMUSCULAR; INTRAVENOUS at 09:09

## 2020-01-01 RX ADMIN — TBO-FILGRASTIM 480 MCG: 480 INJECTION, SOLUTION SUBCUTANEOUS at 11:08

## 2020-01-01 RX ADMIN — MORPHINE SULFATE 2 MG: 2 INJECTION, SOLUTION INTRAMUSCULAR; INTRAVENOUS at 06:10

## 2020-01-01 RX ADMIN — SODIUM CHLORIDE 250 ML: 0.9 INJECTION, SOLUTION INTRAVENOUS at 05:09

## 2020-01-01 RX ADMIN — BENZONATATE 100 MG: 100 CAPSULE ORAL at 10:09

## 2020-01-01 RX ADMIN — METOPROLOL TARTRATE 25 MG: 25 TABLET, FILM COATED ORAL at 08:09

## 2020-01-01 RX ADMIN — FENTANYL CITRATE 100 MCG: 50 INJECTION, SOLUTION INTRAMUSCULAR; INTRAVENOUS at 07:07

## 2020-01-01 RX ADMIN — DOCUSATE SODIUM 100 MG: 100 CAPSULE, LIQUID FILLED ORAL at 08:07

## 2020-01-01 RX ADMIN — ONDANSETRON 4 MG: 2 INJECTION INTRAMUSCULAR; INTRAVENOUS at 07:09

## 2020-01-01 RX ADMIN — Medication 0.04 MCG/KG/MIN: at 04:08

## 2020-01-01 RX ADMIN — METOPROLOL TARTRATE 50 MG: 50 TABLET, FILM COATED ORAL at 08:09

## 2020-01-01 RX ADMIN — ESMOLOL HYDROCHLORIDE 10 MG: 10 INJECTION INTRAVENOUS at 08:07

## 2020-01-01 RX ADMIN — HEPARIN SODIUM 5000 UNITS: 5000 INJECTION INTRAVENOUS; SUBCUTANEOUS at 06:10

## 2020-01-01 RX ADMIN — SODIUM BICARBONATE: 84 INJECTION, SOLUTION INTRAVENOUS at 05:08

## 2020-01-01 RX ADMIN — MIDAZOLAM HYDROCHLORIDE 1 MG: 1 INJECTION, SOLUTION INTRAMUSCULAR; INTRAVENOUS at 03:07

## 2020-01-01 RX ADMIN — POTASSIUM PHOSPHATE, MONOBASIC 500 MG: 500 TABLET, SOLUBLE ORAL at 09:09

## 2020-01-01 RX ADMIN — CEFEPIME HYDROCHLORIDE 2 G: 2 INJECTION, SOLUTION INTRAVENOUS at 12:08

## 2020-01-01 RX ADMIN — Medication 6 MG: at 09:08

## 2020-01-01 RX ADMIN — AMPICILLIN SODIUM 2 G: 2 INJECTION, POWDER, FOR SOLUTION INTRAMUSCULAR; INTRAVENOUS at 07:09

## 2020-01-01 RX ADMIN — ACETAMINOPHEN 650 MG: 325 TABLET ORAL at 07:08

## 2020-01-01 RX ADMIN — MORPHINE SULFATE 2 MG: 2 INJECTION, SOLUTION INTRAMUSCULAR; INTRAVENOUS at 03:10

## 2020-01-01 RX ADMIN — AMPICILLIN SODIUM 2 G: 2 INJECTION, POWDER, FOR SOLUTION INTRAMUSCULAR; INTRAVENOUS at 12:09

## 2020-01-01 RX ADMIN — PHENYLEPHRINE HYDROCHLORIDE 100 MCG: 10 INJECTION INTRAVENOUS at 07:07

## 2020-01-01 RX ADMIN — FOLIC ACID 1 MG: 1 TABLET ORAL at 09:07

## 2020-01-01 RX ADMIN — IBUPROFEN 400 MG: 400 TABLET, FILM COATED ORAL at 09:07

## 2020-01-01 RX ADMIN — CEFEPIME HYDROCHLORIDE 2 G: 2 INJECTION, SOLUTION INTRAVENOUS at 07:08

## 2020-01-01 RX ADMIN — SPIRONOLACTONE 25 MG: 25 TABLET ORAL at 09:09

## 2020-01-01 RX ADMIN — AMPICILLIN SODIUM 2 G: 2 INJECTION, POWDER, FOR SOLUTION INTRAMUSCULAR; INTRAVENOUS at 03:09

## 2020-01-01 RX ADMIN — GELATIN ABSORBABLE SPONGE SIZE 100 1 APPLICATOR: MISC at 04:07

## 2020-01-01 RX ADMIN — ACYCLOVIR SODIUM 570 MG: 50 INJECTION, SOLUTION INTRAVENOUS at 03:10

## 2020-01-01 RX ADMIN — MORPHINE SULFATE 2 MG: 2 INJECTION, SOLUTION INTRAMUSCULAR; INTRAVENOUS at 10:09

## 2020-01-01 RX ADMIN — FENTANYL CITRATE 12.5 MCG: 50 INJECTION INTRAMUSCULAR; INTRAVENOUS at 09:09

## 2020-01-01 RX ADMIN — MEROPENEM 1 G: 1 INJECTION, POWDER, FOR SOLUTION INTRAVENOUS at 12:09

## 2020-01-01 RX ADMIN — MORPHINE SULFATE 2 MG: 2 INJECTION, SOLUTION INTRAMUSCULAR; INTRAVENOUS at 03:09

## 2020-01-01 RX ADMIN — PHENYLEPHRINE HYDROCHLORIDE 100 MCG: 10 INJECTION INTRAVENOUS at 12:07

## 2020-01-01 RX ADMIN — PROMETHAZINE HYDROCHLORIDE 25 MG: 25 INJECTION INTRAMUSCULAR; INTRAVENOUS at 04:09

## 2020-01-01 RX ADMIN — PROPOFOL 80 MG: 10 INJECTION, EMULSION INTRAVENOUS at 02:07

## 2020-01-01 RX ADMIN — VECURONIUM BROMIDE 2 MG: 10 INJECTION, POWDER, LYOPHILIZED, FOR SOLUTION INTRAVENOUS at 08:07

## 2020-01-01 RX ADMIN — AMPICILLIN SODIUM 2 G: 2 INJECTION, POWDER, FOR SOLUTION INTRAMUSCULAR; INTRAVENOUS at 06:09

## 2020-01-01 RX ADMIN — CEFEPIME 2 G: 2 INJECTION, POWDER, FOR SOLUTION INTRAVENOUS at 12:09

## 2020-01-01 RX ADMIN — ENOXAPARIN SODIUM 90 MG: 100 INJECTION SUBCUTANEOUS at 09:09

## 2020-01-01 RX ADMIN — ENOXAPARIN SODIUM 40 MG: 40 INJECTION SUBCUTANEOUS at 04:09

## 2020-01-01 RX ADMIN — THIAMINE HYDROCHLORIDE 500 MG: 100 INJECTION, SOLUTION INTRAMUSCULAR; INTRAVENOUS at 09:09

## 2020-01-01 RX ADMIN — MORPHINE SULFATE 2 MG: 2 INJECTION, SOLUTION INTRAMUSCULAR; INTRAVENOUS at 11:09

## 2020-01-01 RX ADMIN — CIPROFLOXACIN 500 MG: 500 TABLET, FILM COATED ORAL at 09:06

## 2020-01-01 RX ADMIN — METOPROLOL TARTRATE 25 MG: 25 TABLET, FILM COATED ORAL at 04:09

## 2020-01-01 RX ADMIN — SODIUM PHOSPHATE, MONOBASIC, MONOHYDRATE 20.01 MMOL: 276; 142 INJECTION, SOLUTION INTRAVENOUS at 12:09

## 2020-01-01 RX ADMIN — IOHEXOL 1000 ML: 9 SOLUTION ORAL at 10:08

## 2020-01-01 RX ADMIN — SPIRONOLACTONE 25 MG: 25 TABLET ORAL at 04:09

## 2020-01-01 RX ADMIN — METOCLOPRAMIDE 10 MG: 5 INJECTION, SOLUTION INTRAMUSCULAR; INTRAVENOUS at 12:08

## 2020-01-01 RX ADMIN — SODIUM CHLORIDE 1000 ML: 0.9 INJECTION, SOLUTION INTRAVENOUS at 12:09

## 2020-01-01 RX ADMIN — FENTANYL CITRATE 12.5 MCG: 50 INJECTION INTRAMUSCULAR; INTRAVENOUS at 01:09

## 2020-01-01 RX ADMIN — VANCOMYCIN HYDROCHLORIDE 500 MG: KIT at 11:08

## 2020-01-01 RX ADMIN — SODIUM CHLORIDE 1000 ML: 0.9 INJECTION, SOLUTION INTRAVENOUS at 01:08

## 2020-01-01 RX ADMIN — DEXTROSE MONOHYDRATE 1750 MG: 50 INJECTION, SOLUTION INTRAVENOUS at 10:08

## 2020-01-01 RX ADMIN — ONDANSETRON 4 MG: 2 INJECTION INTRAMUSCULAR; INTRAVENOUS at 06:09

## 2020-01-01 RX ADMIN — KETOROLAC TROMETHAMINE 15 MG: 30 INJECTION, SOLUTION INTRAMUSCULAR at 07:07

## 2020-01-01 RX ADMIN — FENTANYL CITRATE 12.5 MCG: 50 INJECTION INTRAMUSCULAR; INTRAVENOUS at 05:09

## 2020-01-01 RX ADMIN — Medication 10 ML: at 06:09

## 2020-01-01 RX ADMIN — SODIUM CHLORIDE, SODIUM LACTATE, POTASSIUM CHLORIDE, AND CALCIUM CHLORIDE: .6; .31; .03; .02 INJECTION, SOLUTION INTRAVENOUS at 11:08

## 2020-01-01 RX ADMIN — PROPOFOL 50 MG: 10 INJECTION, EMULSION INTRAVENOUS at 03:08

## 2020-01-01 RX ADMIN — SODIUM CHLORIDE, SODIUM LACTATE, POTASSIUM CHLORIDE, AND CALCIUM CHLORIDE 1000 ML: .6; .31; .03; .02 INJECTION, SOLUTION INTRAVENOUS at 01:08

## 2020-01-01 RX ADMIN — SODIUM CHLORIDE, SODIUM LACTATE, POTASSIUM CHLORIDE, AND CALCIUM CHLORIDE: .6; .31; .03; .02 INJECTION, SOLUTION INTRAVENOUS at 02:08

## 2020-01-01 RX ADMIN — ROCURONIUM BROMIDE 10 MG: 10 INJECTION, SOLUTION INTRAVENOUS at 08:07

## 2020-01-01 RX ADMIN — ENOXAPARIN SODIUM 40 MG: 40 INJECTION SUBCUTANEOUS at 05:07

## 2020-01-01 RX ADMIN — ONDANSETRON 4 MG: 2 INJECTION INTRAMUSCULAR; INTRAVENOUS at 04:07

## 2020-01-01 RX ADMIN — VANCOMYCIN HYDROCHLORIDE 1250 MG: 1.25 INJECTION, POWDER, LYOPHILIZED, FOR SOLUTION INTRAVENOUS at 02:09

## 2020-01-01 RX ADMIN — HYDROMORPHONE HYDROCHLORIDE 0.5 MG: 2 INJECTION, SOLUTION INTRAMUSCULAR; INTRAVENOUS; SUBCUTANEOUS at 02:07

## 2020-01-01 RX ADMIN — HYDROCODONE BITARTRATE AND ACETAMINOPHEN 1 TABLET: 5; 325 TABLET ORAL at 10:07

## 2020-01-01 RX ADMIN — DEXAMETHASONE SODIUM PHOSPHATE: 4 INJECTION, SOLUTION INTRAMUSCULAR; INTRAVENOUS at 10:08

## 2020-01-01 RX ADMIN — THIAMINE HYDROCHLORIDE 500 MG: 100 INJECTION, SOLUTION INTRAMUSCULAR; INTRAVENOUS at 04:09

## 2020-01-01 RX ADMIN — CEFEPIME 2 G: 2 INJECTION, POWDER, FOR SOLUTION INTRAVENOUS at 06:09

## 2020-01-01 RX ADMIN — PROPOFOL 20 MG: 10 INJECTION, EMULSION INTRAVENOUS at 04:08

## 2020-01-01 RX ADMIN — ONDANSETRON 8 MG: 2 INJECTION INTRAMUSCULAR; INTRAVENOUS at 02:09

## 2020-01-01 RX ADMIN — ACYCLOVIR SODIUM 570 MG: 50 INJECTION, SOLUTION INTRAVENOUS at 06:09

## 2020-01-01 RX ADMIN — INDOCYANINE GREEN 7.5 MG: KIT INTRAVENOUS at 10:07

## 2020-01-01 RX ADMIN — Medication 6 MG: at 09:09

## 2020-01-01 RX ADMIN — ONDANSETRON 4 MG: 2 INJECTION, SOLUTION INTRAMUSCULAR; INTRAVENOUS at 12:07

## 2020-01-01 RX ADMIN — HYDROMORPHONE HYDROCHLORIDE 0.4 MG: 2 INJECTION INTRAMUSCULAR; INTRAVENOUS; SUBCUTANEOUS at 11:07

## 2020-01-01 RX ADMIN — ACYCLOVIR SODIUM 570 MG: 50 INJECTION, SOLUTION INTRAVENOUS at 07:09

## 2020-01-01 RX ADMIN — Medication 10 ML: at 04:09

## 2020-01-01 RX ADMIN — BENZONATATE 100 MG: 100 CAPSULE ORAL at 04:09

## 2020-01-01 RX ADMIN — MORPHINE SULFATE 2 MG: 2 INJECTION, SOLUTION INTRAMUSCULAR; INTRAVENOUS at 05:09

## 2020-01-01 RX ADMIN — PROPOFOL 150 MG: 10 INJECTION, EMULSION INTRAVENOUS at 07:07

## 2020-01-01 RX ADMIN — VANCOMYCIN HYDROCHLORIDE 1000 MG: 1 INJECTION, POWDER, LYOPHILIZED, FOR SOLUTION INTRAVENOUS at 03:09

## 2020-01-01 RX ADMIN — POTASSIUM PHOSPHATE, MONOBASIC 500 MG: 500 TABLET, SOLUBLE ORAL at 08:09

## 2020-01-01 RX ADMIN — VANCOMYCIN HYDROCHLORIDE 1250 MG: 1.25 INJECTION, POWDER, LYOPHILIZED, FOR SOLUTION INTRAVENOUS at 02:08

## 2020-01-01 RX ADMIN — BENZONATATE 100 MG: 100 CAPSULE ORAL at 01:09

## 2020-01-01 RX ADMIN — VANCOMYCIN HYDROCHLORIDE 1250 MG: 1.25 INJECTION, POWDER, LYOPHILIZED, FOR SOLUTION INTRAVENOUS at 01:09

## 2020-01-01 RX ADMIN — ENOXAPARIN SODIUM 90 MG: 100 INJECTION SUBCUTANEOUS at 10:09

## 2020-01-01 RX ADMIN — HEPARIN SODIUM 5000 UNITS: 5000 INJECTION INTRAVENOUS; SUBCUTANEOUS at 01:10

## 2020-01-01 RX ADMIN — PIPERACILLIN AND TAZOBACTAM 4.5 G: 4; .5 INJECTION, POWDER, LYOPHILIZED, FOR SOLUTION INTRAVENOUS; PARENTERAL at 05:09

## 2020-01-01 RX ADMIN — DEXMEDETOMIDINE HYDROCHLORIDE 0.2 MCG/KG/HR: 4 INJECTION, SOLUTION INTRAVENOUS at 06:09

## 2020-01-01 RX ADMIN — KETOROLAC TROMETHAMINE 15 MG: 30 INJECTION, SOLUTION INTRAMUSCULAR at 12:07

## 2020-01-01 RX ADMIN — SIMETHICONE 125 MG: 125 TABLET, CHEWABLE ORAL at 08:09

## 2020-01-01 RX ADMIN — NEOSTIGMINE METHYLSULFATE 5 MG: 1 INJECTION INTRAVENOUS at 12:07

## 2020-01-01 RX ADMIN — DEXTROSE MONOHYDRATE 2000 MG: 50 INJECTION, SOLUTION INTRAVENOUS at 03:08

## 2020-01-01 RX ADMIN — PROPOFOL 150 MCG/KG/MIN: 10 INJECTION, EMULSION INTRAVENOUS at 02:07

## 2020-01-01 RX ADMIN — ACETAMINOPHEN 650 MG: 325 TABLET ORAL at 12:07

## 2020-01-01 RX ADMIN — METRONIDAZOLE 500 MG: 500 INJECTION, SOLUTION INTRAVENOUS at 07:08

## 2020-01-01 RX ADMIN — AMPICILLIN SODIUM 2 G: 2 INJECTION, POWDER, FOR SOLUTION INTRAMUSCULAR; INTRAVENOUS at 09:09

## 2020-01-01 RX ADMIN — PROMETHAZINE HYDROCHLORIDE 12.5 MG: 25 INJECTION INTRAMUSCULAR; INTRAVENOUS at 12:08

## 2020-01-01 RX ADMIN — LORAZEPAM 0.5 MG: 2 INJECTION INTRAMUSCULAR; INTRAVENOUS at 04:08

## 2020-01-01 RX ADMIN — LIDOCAINE HYDROCHLORIDE: 10 INJECTION, SOLUTION INFILTRATION; PERINEURAL at 04:08

## 2020-01-01 RX ADMIN — SPIRONOLACTONE 25 MG: 25 TABLET ORAL at 01:09

## 2020-01-01 RX ADMIN — VANCOMYCIN HYDROCHLORIDE 500 MG: KIT at 09:08

## 2020-01-01 RX ADMIN — SODIUM BICARBONATE: 84 INJECTION, SOLUTION INTRAVENOUS at 11:08

## 2020-01-01 RX ADMIN — MEROPENEM 1 G: 1 INJECTION, POWDER, FOR SOLUTION INTRAVENOUS at 06:09

## 2020-01-01 RX ADMIN — ONDANSETRON 4 MG: 2 INJECTION INTRAMUSCULAR; INTRAVENOUS at 05:07

## 2020-01-01 RX ADMIN — MORPHINE SULFATE 2 MG: 2 INJECTION, SOLUTION INTRAMUSCULAR; INTRAVENOUS at 12:09

## 2020-01-01 RX ADMIN — POTASSIUM CHLORIDE 60 MEQ: 1500 TABLET, EXTENDED RELEASE ORAL at 02:09

## 2020-01-01 RX ADMIN — MORPHINE SULFATE 4 MG: 4 INJECTION INTRAVENOUS at 07:07

## 2020-01-01 RX ADMIN — MEROPENEM 1 G: 1 INJECTION, POWDER, FOR SOLUTION INTRAVENOUS at 08:09

## 2020-01-01 RX ADMIN — GADOBUTROL 9 ML: 604.72 INJECTION INTRAVENOUS at 04:09

## 2020-01-01 RX ADMIN — MEROPENEM 1 G: 1 INJECTION, POWDER, FOR SOLUTION INTRAVENOUS at 02:09

## 2020-01-01 RX ADMIN — CEFTRIAXONE 1 G: 1 INJECTION, SOLUTION INTRAVENOUS at 12:07

## 2020-01-01 RX ADMIN — POTASSIUM PHOSPHATE, MONOBASIC 500 MG: 500 TABLET, SOLUBLE ORAL at 03:09

## 2020-01-01 RX ADMIN — ACETAMINOPHEN 650 MG: 325 TABLET ORAL at 08:09

## 2020-01-01 RX ADMIN — DEXMEDETOMIDINE HYDROCHLORIDE 0.6 MCG/KG/HR: 4 INJECTION, SOLUTION INTRAVENOUS at 11:09

## 2020-01-01 RX ADMIN — FOLIC ACID 1 MG: 1 TABLET ORAL at 08:09

## 2020-01-01 RX ADMIN — SODIUM CHLORIDE 1000 ML: 0.9 INJECTION, SOLUTION INTRAVENOUS at 03:09

## 2020-01-01 RX ADMIN — THIAMINE HYDROCHLORIDE 500 MG: 100 INJECTION, SOLUTION INTRAMUSCULAR; INTRAVENOUS at 09:10

## 2020-01-01 RX ADMIN — DOCUSATE SODIUM 100 MG: 100 CAPSULE, LIQUID FILLED ORAL at 09:07

## 2020-01-01 RX ADMIN — SODIUM PHOSPHATE, MONOBASIC, MONOHYDRATE 39.99 MMOL: 276; 142 INJECTION, SOLUTION INTRAVENOUS at 07:08

## 2020-01-01 RX ADMIN — SODIUM BICARBONATE: 84 INJECTION, SOLUTION INTRAVENOUS at 01:08

## 2020-01-01 RX ADMIN — ACYCLOVIR SODIUM 570 MG: 50 INJECTION, SOLUTION INTRAVENOUS at 11:09

## 2020-01-01 RX ADMIN — MEROPENEM 1 G: 1 INJECTION, POWDER, FOR SOLUTION INTRAVENOUS at 01:09

## 2020-01-01 RX ADMIN — SODIUM CHLORIDE, SODIUM LACTATE, POTASSIUM CHLORIDE, AND CALCIUM CHLORIDE 500 ML: .6; .31; .03; .02 INJECTION, SOLUTION INTRAVENOUS at 09:08

## 2020-01-01 RX ADMIN — ONDANSETRON 8 MG: 2 INJECTION INTRAMUSCULAR; INTRAVENOUS at 06:09

## 2020-01-01 RX ADMIN — HEPARIN SODIUM 5000 UNITS: 5000 INJECTION INTRAVENOUS; SUBCUTANEOUS at 09:09

## 2020-01-01 RX ADMIN — ONDANSETRON 4 MG: 4 TABLET, ORALLY DISINTEGRATING ORAL at 04:07

## 2020-01-01 RX ADMIN — METOPROLOL TARTRATE 25 MG: 25 TABLET, FILM COATED ORAL at 02:09

## 2020-01-01 RX ADMIN — METOPROLOL TARTRATE 25 MG: 25 TABLET, FILM COATED ORAL at 03:09

## 2020-01-01 RX ADMIN — TBO-FILGRASTIM 480 MCG: 480 INJECTION, SOLUTION SUBCUTANEOUS at 05:08

## 2020-01-01 RX ADMIN — ACETAMINOPHEN 650 MG: 325 TABLET ORAL at 04:08

## 2020-01-01 RX ADMIN — ENOXAPARIN SODIUM 30 MG: 40 INJECTION SUBCUTANEOUS at 04:08

## 2020-01-01 RX ADMIN — Medication 10 ML: at 03:09

## 2020-01-01 RX ADMIN — FOLIC ACID 1 MG: 1 TABLET ORAL at 08:07

## 2020-01-01 RX ADMIN — FUROSEMIDE 20 MG: 10 INJECTION, SOLUTION INTRAVENOUS at 06:09

## 2020-01-01 RX ADMIN — FENTANYL CITRATE 50 MCG: 50 INJECTION, SOLUTION INTRAMUSCULAR; INTRAVENOUS at 07:07

## 2020-01-01 RX ADMIN — Medication 6 MG: at 11:09

## 2020-01-01 RX ADMIN — METOPROLOL TARTRATE 25 MG: 25 TABLET, FILM COATED ORAL at 02:08

## 2020-01-01 RX ADMIN — ALTEPLASE 2 MG: 2.2 INJECTION, POWDER, LYOPHILIZED, FOR SOLUTION INTRAVENOUS at 12:09

## 2020-01-01 RX ADMIN — CEFEPIME 2 G: 2 INJECTION, POWDER, FOR SOLUTION INTRAVENOUS at 10:09

## 2020-01-01 RX ADMIN — THIAMINE HYDROCHLORIDE 100 MG: 100 INJECTION, SOLUTION INTRAMUSCULAR; INTRAVENOUS at 02:09

## 2020-01-01 RX ADMIN — POTASSIUM CHLORIDE 40 MEQ: 7.46 INJECTION, SOLUTION INTRAVENOUS at 05:09

## 2020-01-01 RX ADMIN — PANTOPRAZOLE SODIUM 40 MG: 40 INJECTION, POWDER, FOR SOLUTION INTRAVENOUS at 12:08

## 2020-01-01 RX ADMIN — OXYCODONE HYDROCHLORIDE AND ACETAMINOPHEN 1 TABLET: 5; 325 TABLET ORAL at 03:09

## 2020-01-01 RX ADMIN — LIDOCAINE HYDROCHLORIDE 10 ML: 10 INJECTION, SOLUTION INFILTRATION; PERINEURAL at 03:07

## 2020-01-01 RX ADMIN — PSYLLIUM HUSK 1 PACKET: 3.4 POWDER ORAL at 08:09

## 2020-01-01 RX ADMIN — PROMETHAZINE HYDROCHLORIDE 12.5 MG: 25 INJECTION INTRAMUSCULAR; INTRAVENOUS at 11:08

## 2020-01-01 RX ADMIN — CEFEPIME 2 G: 2 INJECTION, POWDER, FOR SOLUTION INTRAVENOUS at 03:10

## 2020-01-01 RX ADMIN — POTASSIUM CHLORIDE 40 MEQ: 1500 TABLET, EXTENDED RELEASE ORAL at 02:08

## 2020-01-01 RX ADMIN — METOPROLOL TARTRATE 25 MG: 25 TABLET, FILM COATED ORAL at 10:08

## 2020-01-01 RX ADMIN — ONDANSETRON 4 MG: 2 INJECTION INTRAMUSCULAR; INTRAVENOUS at 03:07

## 2020-01-01 RX ADMIN — Medication 1 MG/HR: at 09:10

## 2020-01-01 RX ADMIN — FENTANYL CITRATE 50 MCG: 50 INJECTION INTRAMUSCULAR; INTRAVENOUS at 03:07

## 2020-01-01 RX ADMIN — POLYETHYLENE GLYCOL 3350, SODIUM SULFATE ANHYDROUS, SODIUM BICARBONATE, SODIUM CHLORIDE, POTASSIUM CHLORIDE 4000 ML: 236; 22.74; 6.74; 5.86; 2.97 POWDER, FOR SOLUTION ORAL at 10:07

## 2020-01-01 RX ADMIN — VANCOMYCIN HYDROCHLORIDE 1250 MG: 1.25 INJECTION, POWDER, LYOPHILIZED, FOR SOLUTION INTRAVENOUS at 06:09

## 2020-01-01 RX ADMIN — SODIUM CHLORIDE 1000 ML: 0.9 INJECTION, SOLUTION INTRAVENOUS at 11:08

## 2020-01-01 RX ADMIN — ROCURONIUM BROMIDE 5 MG: 10 INJECTION, SOLUTION INTRAVENOUS at 07:07

## 2020-01-01 RX ADMIN — MAGNESIUM SULFATE HEPTAHYDRATE 2 G: 40 INJECTION, SOLUTION INTRAVENOUS at 12:08

## 2020-01-01 RX ADMIN — Medication 10 ML: at 04:08

## 2020-01-01 RX ADMIN — VANCOMYCIN HYDROCHLORIDE 1000 MG: 1 INJECTION, POWDER, LYOPHILIZED, FOR SOLUTION INTRAVENOUS at 09:09

## 2020-01-01 RX ADMIN — POTASSIUM PHOSPHATE, MONOBASIC 1000 MG: 500 TABLET, SOLUBLE ORAL at 11:09

## 2020-01-01 RX ADMIN — ONDANSETRON 4 MG: 4 TABLET, ORALLY DISINTEGRATING ORAL at 09:06

## 2020-01-01 RX ADMIN — IOHEXOL 100 ML: 350 INJECTION, SOLUTION INTRAVENOUS at 10:09

## 2020-01-01 RX ADMIN — BUSPIRONE HYDROCHLORIDE 10 MG: 5 TABLET ORAL at 05:08

## 2020-01-01 RX ADMIN — Medication 6 MG: at 08:08

## 2020-01-01 RX ADMIN — ONDANSETRON 4 MG: 2 INJECTION INTRAMUSCULAR; INTRAVENOUS at 02:09

## 2020-01-01 RX ADMIN — GLYCOPYRROLATE 0.6 MG: 0.2 INJECTION, SOLUTION INTRAMUSCULAR; INTRAVENOUS at 12:07

## 2020-01-01 RX ADMIN — Medication 10 ML: at 11:09

## 2020-01-01 RX ADMIN — ENOXAPARIN SODIUM 40 MG: 40 INJECTION SUBCUTANEOUS at 06:08

## 2020-01-01 RX ADMIN — VANCOMYCIN HYDROCHLORIDE 1500 MG: 1.5 INJECTION, POWDER, LYOPHILIZED, FOR SOLUTION INTRAVENOUS at 11:09

## 2020-01-01 RX ADMIN — ENOXAPARIN SODIUM 40 MG: 40 INJECTION SUBCUTANEOUS at 04:08

## 2020-01-01 RX ADMIN — AMPICILLIN SODIUM 2 G: 2 INJECTION, POWDER, FOR SOLUTION INTRAMUSCULAR; INTRAVENOUS at 11:10

## 2020-01-01 RX ADMIN — SODIUM CHLORIDE, SODIUM LACTATE, POTASSIUM CHLORIDE, AND CALCIUM CHLORIDE: .6; .31; .03; .02 INJECTION, SOLUTION INTRAVENOUS at 06:07

## 2020-01-01 RX ADMIN — ACETAMINOPHEN 650 MG: 325 TABLET ORAL at 11:08

## 2020-01-01 RX ADMIN — IOHEXOL 100 ML: 350 INJECTION, SOLUTION INTRAVENOUS at 03:07

## 2020-01-01 RX ADMIN — CEFEPIME HYDROCHLORIDE 2 G: 2 INJECTION, SOLUTION INTRAVENOUS at 01:08

## 2020-01-01 RX ADMIN — VANCOMYCIN HYDROCHLORIDE 1000 MG: 1 INJECTION, POWDER, LYOPHILIZED, FOR SOLUTION INTRAVENOUS at 04:09

## 2020-01-01 RX ADMIN — FOLIC ACID 1 MG: 5 INJECTION, SOLUTION INTRAMUSCULAR; INTRAVENOUS; SUBCUTANEOUS at 02:10

## 2020-01-01 RX ADMIN — MIDAZOLAM 2 MG: 1 INJECTION INTRAMUSCULAR; INTRAVENOUS at 03:09

## 2020-01-01 RX ADMIN — SODIUM PHOSPHATE, MONOBASIC, MONOHYDRATE 30 MMOL: 276; 142 INJECTION, SOLUTION INTRAVENOUS at 12:09

## 2020-01-01 RX ADMIN — NOREPINEPHRINE BITARTRATE 0.05 MCG/KG/MIN: 1 INJECTION, SOLUTION, CONCENTRATE INTRAVENOUS at 01:08

## 2020-01-01 RX ADMIN — LACTULOSE 20 G: 20 SOLUTION ORAL at 09:06

## 2020-01-01 RX ADMIN — BACITRACIN ZINC NEOMYCIN SULFATE POLYMYXIN B SULFATE: 400; 3.5; 5 OINTMENT TOPICAL at 03:08

## 2020-01-01 RX ADMIN — METOPROLOL TARTRATE 100 MG: 50 TABLET, FILM COATED ORAL at 09:09

## 2020-01-01 RX ADMIN — SODIUM PHOSPHATE, DIBASIC AND SODIUM PHOSPHATE, MONOBASIC 1 ENEMA: 7; 19 ENEMA RECTAL at 12:07

## 2020-01-01 RX ADMIN — SODIUM PHOSPHATE, MONOBASIC, MONOHYDRATE 30 MMOL: 276; 142 INJECTION, SOLUTION INTRAVENOUS at 09:09

## 2020-01-01 RX ADMIN — LORAZEPAM 1 MG: 2 INJECTION INTRAMUSCULAR; INTRAVENOUS at 12:09

## 2020-01-01 RX ADMIN — Medication 10 ML: at 05:07

## 2020-01-01 RX ADMIN — CLINDAMYCIN PHOSPHATE 600 MG: 150 INJECTION, SOLUTION INTRAMUSCULAR; INTRAVENOUS at 03:08

## 2020-01-01 RX ADMIN — ONDANSETRON 4 MG: 4 TABLET, ORALLY DISINTEGRATING ORAL at 10:07

## 2020-01-01 RX ADMIN — VANCOMYCIN HYDROCHLORIDE 1000 MG: 1 INJECTION, POWDER, LYOPHILIZED, FOR SOLUTION INTRAVENOUS at 05:09

## 2020-01-01 RX ADMIN — METOPROLOL TARTRATE 25 MG: 25 TABLET, FILM COATED ORAL at 06:08

## 2020-01-01 RX ADMIN — SODIUM CHLORIDE, SODIUM LACTATE, POTASSIUM CHLORIDE, AND CALCIUM CHLORIDE 1000 ML: .6; .31; .03; .02 INJECTION, SOLUTION INTRAVENOUS at 06:09

## 2020-01-01 RX ADMIN — POTASSIUM PHOSPHATE, MONOBASIC 500 MG: 500 TABLET, SOLUBLE ORAL at 04:09

## 2020-01-01 RX ADMIN — Medication 6 MG: at 11:08

## 2020-01-01 RX ADMIN — VANCOMYCIN HYDROCHLORIDE 1750 MG: 1 INJECTION, POWDER, LYOPHILIZED, FOR SOLUTION INTRAVENOUS at 12:08

## 2020-01-01 RX ADMIN — MEROPENEM 1 G: 1 INJECTION, POWDER, FOR SOLUTION INTRAVENOUS at 05:09

## 2020-01-01 RX ADMIN — LIDOCAINE HYDROCHLORIDE 60 MG: 20 INJECTION, SOLUTION INTRAVENOUS at 07:07

## 2020-01-01 RX ADMIN — VANCOMYCIN HYDROCHLORIDE 500 MG: KIT at 06:08

## 2020-01-01 RX ADMIN — HEPARIN 500 UNITS: 100 SYRINGE at 05:07

## 2020-01-01 RX ADMIN — AMPICILLIN SODIUM 2 G: 2 INJECTION, POWDER, FOR SOLUTION INTRAMUSCULAR; INTRAVENOUS at 11:09

## 2020-01-01 RX ADMIN — SODIUM CHLORIDE: 0.9 INJECTION, SOLUTION INTRAVENOUS at 03:08

## 2020-01-01 RX ADMIN — ESMOLOL HYDROCHLORIDE 10 MG: 10 INJECTION INTRAVENOUS at 10:07

## 2020-01-01 RX ADMIN — SODIUM CHLORIDE, SODIUM LACTATE, POTASSIUM CHLORIDE, AND CALCIUM CHLORIDE: .6; .31; .03; .02 INJECTION, SOLUTION INTRAVENOUS at 04:08

## 2020-01-01 RX ADMIN — SODIUM BICARBONATE: 84 INJECTION, SOLUTION INTRAVENOUS at 06:08

## 2020-01-01 RX ADMIN — HYDROCODONE BITARTRATE AND ACETAMINOPHEN 1 TABLET: 5; 325 TABLET ORAL at 09:07

## 2020-01-01 RX ADMIN — ONDANSETRON 4 MG: 4 TABLET, ORALLY DISINTEGRATING ORAL at 10:08

## 2020-01-01 RX ADMIN — LIDOCAINE HYDROCHLORIDE 20 MG: 10 INJECTION, SOLUTION EPIDURAL; INFILTRATION; INTRACAUDAL; PERINEURAL at 01:09

## 2020-01-01 RX ADMIN — THIAMINE HYDROCHLORIDE 500 MG: 100 INJECTION, SOLUTION INTRAMUSCULAR; INTRAVENOUS at 03:09

## 2020-01-01 RX ADMIN — AMPICILLIN SODIUM 2 G: 2 INJECTION, POWDER, FOR SOLUTION INTRAMUSCULAR; INTRAVENOUS at 02:09

## 2020-01-01 RX ADMIN — ACETAMINOPHEN 650 MG: 325 TABLET ORAL at 05:08

## 2020-01-01 RX ADMIN — METRONIDAZOLE 500 MG: 500 INJECTION, SOLUTION INTRAVENOUS at 12:08

## 2020-01-01 RX ADMIN — IOHEXOL 100 ML: 350 INJECTION, SOLUTION INTRAVENOUS at 02:09

## 2020-01-01 RX ADMIN — METOPROLOL TARTRATE 25 MG: 25 TABLET, FILM COATED ORAL at 09:08

## 2020-01-01 RX ADMIN — PHENYLEPHRINE HYDROCHLORIDE 100 MCG: 10 INJECTION INTRAVENOUS at 11:07

## 2020-01-01 RX ADMIN — POTASSIUM CHLORIDE 60 MEQ: 1500 TABLET, EXTENDED RELEASE ORAL at 08:09

## 2020-01-01 RX ADMIN — ACYCLOVIR SODIUM 570 MG: 50 INJECTION, SOLUTION INTRAVENOUS at 03:09

## 2020-01-01 RX ADMIN — SODIUM PHOSPHATE, MONOBASIC, MONOHYDRATE AND SODIUM PHOSPHATE, DIBASIC, ANHYDROUS 15 MMOL: 276; 142 INJECTION, SOLUTION INTRAVENOUS at 05:09

## 2020-01-01 RX ADMIN — SODIUM CHLORIDE, SODIUM LACTATE, POTASSIUM CHLORIDE, AND CALCIUM CHLORIDE: .6; .31; .03; .02 INJECTION, SOLUTION INTRAVENOUS at 08:07

## 2020-01-01 RX ADMIN — DOCUSATE SODIUM 100 MG: 100 CAPSULE, LIQUID FILLED ORAL at 08:09

## 2020-01-01 RX ADMIN — ACYCLOVIR SODIUM 570 MG: 50 INJECTION, SOLUTION INTRAVENOUS at 02:09

## 2020-01-01 RX ADMIN — VANCOMYCIN HYDROCHLORIDE 1000 MG: 1 INJECTION, POWDER, LYOPHILIZED, FOR SOLUTION INTRAVENOUS at 12:09

## 2020-01-01 RX ADMIN — METRONIDAZOLE 500 MG: 500 INJECTION, SOLUTION INTRAVENOUS at 06:08

## 2020-01-01 RX ADMIN — ENOXAPARIN SODIUM 40 MG: 40 INJECTION SUBCUTANEOUS at 05:08

## 2020-01-01 RX ADMIN — Medication 10 ML: at 09:09

## 2020-01-01 RX ADMIN — SODIUM CHLORIDE 1000 ML: 0.9 INJECTION, SOLUTION INTRAVENOUS at 11:09

## 2020-01-01 RX ADMIN — ONDANSETRON 8 MG: 2 INJECTION INTRAMUSCULAR; INTRAVENOUS at 11:09

## 2020-01-01 RX ADMIN — AMIODARONE HYDROCHLORIDE 150 MG: 1.5 INJECTION, SOLUTION INTRAVENOUS at 03:08

## 2020-01-01 RX ADMIN — VANCOMYCIN HYDROCHLORIDE 1250 MG: 1.25 INJECTION, POWDER, LYOPHILIZED, FOR SOLUTION INTRAVENOUS at 01:08

## 2020-01-01 RX ADMIN — LORAZEPAM 0.5 MG: 2 INJECTION INTRAMUSCULAR; INTRAVENOUS at 07:08

## 2020-01-01 RX ADMIN — AMPICILLIN SODIUM 2 G: 2 INJECTION, POWDER, FOR SOLUTION INTRAMUSCULAR; INTRAVENOUS at 03:10

## 2020-01-01 RX ADMIN — Medication 1 MG/HR: at 05:10

## 2020-01-01 RX ADMIN — SODIUM CHLORIDE, SODIUM LACTATE, POTASSIUM CHLORIDE, AND CALCIUM CHLORIDE 500 ML: .6; .31; .03; .02 INJECTION, SOLUTION INTRAVENOUS at 11:10

## 2020-01-01 RX ADMIN — VANCOMYCIN HYDROCHLORIDE 1000 MG: 1 INJECTION, POWDER, LYOPHILIZED, FOR SOLUTION INTRAVENOUS at 08:09

## 2020-01-01 RX ADMIN — CEFEPIME 2 G: 2 INJECTION, POWDER, FOR SOLUTION INTRAVENOUS at 11:10

## 2020-01-01 RX ADMIN — VANCOMYCIN HYDROCHLORIDE 1500 MG: 1.5 INJECTION, POWDER, LYOPHILIZED, FOR SOLUTION INTRAVENOUS at 05:09

## 2020-01-01 RX ADMIN — ONDANSETRON 8 MG: 2 INJECTION INTRAMUSCULAR; INTRAVENOUS at 04:09

## 2020-01-01 RX ADMIN — IBUPROFEN 400 MG: 400 TABLET, FILM COATED ORAL at 12:07

## 2020-01-01 RX ADMIN — ENOXAPARIN SODIUM 30 MG: 40 INJECTION SUBCUTANEOUS at 05:08

## 2020-01-01 RX ADMIN — DOCUSATE SODIUM 100 MG: 100 CAPSULE, LIQUID FILLED ORAL at 11:07

## 2020-01-01 RX ADMIN — AMPICILLIN SODIUM 2 G: 2 INJECTION, POWDER, FOR SOLUTION INTRAMUSCULAR; INTRAVENOUS at 04:09

## 2020-01-01 RX ADMIN — IBUPROFEN 400 MG: 400 TABLET, FILM COATED ORAL at 10:07

## 2020-01-01 RX ADMIN — CEFEPIME HYDROCHLORIDE 2 G: 2 INJECTION, SOLUTION INTRAVENOUS at 11:08

## 2020-01-01 RX ADMIN — SODIUM PHOSPHATE, MONOBASIC, MONOHYDRATE 20.01 MMOL: 276; 142 INJECTION, SOLUTION INTRAVENOUS at 10:09

## 2020-01-01 RX ADMIN — ONDANSETRON 8 MG: 2 INJECTION INTRAMUSCULAR; INTRAVENOUS at 09:09

## 2020-01-01 RX ADMIN — VANCOMYCIN HYDROCHLORIDE 2000 MG: 100 INJECTION, POWDER, LYOPHILIZED, FOR SOLUTION INTRAVENOUS at 04:09

## 2020-01-01 RX ADMIN — SODIUM PHOSPHATE, MONOBASIC, MONOHYDRATE 30 MMOL: 276; 142 INJECTION, SOLUTION INTRAVENOUS at 12:08

## 2020-01-01 RX ADMIN — ONDANSETRON 8 MG: 8 TABLET, ORALLY DISINTEGRATING ORAL at 05:07

## 2020-01-01 RX ADMIN — MORPHINE SULFATE 2 MG: 2 INJECTION, SOLUTION INTRAMUSCULAR; INTRAVENOUS at 04:09

## 2020-01-01 RX ADMIN — SODIUM CHLORIDE, SODIUM LACTATE, POTASSIUM CHLORIDE, AND CALCIUM CHLORIDE 1000 ML: .6; .31; .03; .02 INJECTION, SOLUTION INTRAVENOUS at 12:07

## 2020-01-01 RX ADMIN — ONDANSETRON 4 MG: 2 INJECTION INTRAMUSCULAR; INTRAVENOUS at 06:08

## 2020-01-01 RX ADMIN — LIDOCAINE HYDROCHLORIDE 50 MG: 20 INJECTION, SOLUTION INTRAVENOUS at 02:07

## 2020-01-01 RX ADMIN — VANCOMYCIN HYDROCHLORIDE 1250 MG: 1.25 INJECTION, POWDER, LYOPHILIZED, FOR SOLUTION INTRAVENOUS at 08:09

## 2020-01-01 RX ADMIN — ONDANSETRON 4 MG: 2 INJECTION INTRAMUSCULAR; INTRAVENOUS at 12:07

## 2020-01-01 RX ADMIN — KETOROLAC TROMETHAMINE 10 MG: 10 TABLET, FILM COATED ORAL at 09:06

## 2020-01-01 RX ADMIN — POTASSIUM CHLORIDE 40 MEQ: 1500 TABLET, EXTENDED RELEASE ORAL at 10:08

## 2020-01-01 RX ADMIN — VANCOMYCIN HYDROCHLORIDE 1500 MG: 1.5 INJECTION, POWDER, LYOPHILIZED, FOR SOLUTION INTRAVENOUS at 06:09

## 2020-01-01 RX ADMIN — Medication 6 MG: at 02:08

## 2020-01-01 RX ADMIN — BENZONATATE 100 MG: 100 CAPSULE ORAL at 06:08

## 2020-01-01 RX ADMIN — METOPROLOL TARTRATE 25 MG: 25 TABLET, FILM COATED ORAL at 04:08

## 2020-01-01 RX ADMIN — AMLODIPINE BESYLATE 5 MG: 5 TABLET ORAL at 08:09

## 2020-01-01 RX ADMIN — IOHEXOL 75 ML: 350 INJECTION, SOLUTION INTRAVENOUS at 12:08

## 2020-01-01 RX ADMIN — AMIODARONE HYDROCHLORIDE 1 MG/MIN: 1.8 INJECTION, SOLUTION INTRAVENOUS at 03:08

## 2020-01-01 RX ADMIN — CEFEPIME 2 G: 2 INJECTION, POWDER, FOR SOLUTION INTRAVENOUS at 07:09

## 2020-01-01 RX ADMIN — POTASSIUM CHLORIDE 20 MEQ: 1500 TABLET, EXTENDED RELEASE ORAL at 04:09

## 2020-01-01 RX ADMIN — FENTANYL CITRATE 50 MCG: 50 INJECTION, SOLUTION INTRAMUSCULAR; INTRAVENOUS at 08:07

## 2020-01-01 RX ADMIN — METHOCARBAMOL TABLETS 500 MG: 500 TABLET, COATED ORAL at 02:09

## 2020-01-01 RX ADMIN — SODIUM PHOSPHATE, MONOBASIC, MONOHYDRATE 30 MMOL: 276; 142 INJECTION, SOLUTION INTRAVENOUS at 08:09

## 2020-01-01 RX ADMIN — FENTANYL CITRATE 100 MCG: 50 INJECTION, SOLUTION INTRAMUSCULAR; INTRAVENOUS at 12:09

## 2020-01-01 RX ADMIN — CEFEPIME 2 G: 2 INJECTION, POWDER, FOR SOLUTION INTRAVENOUS at 02:09

## 2020-01-01 RX ADMIN — MEROPENEM 1 G: 1 INJECTION, POWDER, FOR SOLUTION INTRAVENOUS at 04:09

## 2020-01-01 RX ADMIN — SODIUM CHLORIDE: 0.9 INJECTION, SOLUTION INTRAVENOUS at 12:07

## 2020-01-01 RX ADMIN — PROMETHAZINE HYDROCHLORIDE 25 MG: 25 INJECTION INTRAMUSCULAR; INTRAVENOUS at 12:09

## 2020-01-01 RX ADMIN — ERTAPENEM SODIUM 1 G: 1 INJECTION, POWDER, LYOPHILIZED, FOR SOLUTION INTRAMUSCULAR; INTRAVENOUS at 06:07

## 2020-01-01 RX ADMIN — DEXTROSE MONOHYDRATE 790 MG: 50 INJECTION, SOLUTION INTRAVENOUS at 11:08

## 2020-01-01 RX ADMIN — BENZONATATE 100 MG: 100 CAPSULE ORAL at 06:09

## 2020-01-01 RX ADMIN — ACETAMINOPHEN 650 MG: 325 TABLET ORAL at 04:09

## 2020-01-01 RX ADMIN — SODIUM CHLORIDE, SODIUM LACTATE, POTASSIUM CHLORIDE, AND CALCIUM CHLORIDE 500 ML: .6; .31; .03; .02 INJECTION, SOLUTION INTRAVENOUS at 02:08

## 2020-01-01 RX ADMIN — AMPICILLIN SODIUM 2 G: 2 INJECTION, POWDER, FOR SOLUTION INTRAMUSCULAR; INTRAVENOUS at 06:10

## 2020-01-01 RX ADMIN — MORPHINE SULFATE 2 MG: 2 INJECTION, SOLUTION INTRAMUSCULAR; INTRAVENOUS at 02:09

## 2020-01-01 RX ADMIN — ONDANSETRON 4 MG: 2 INJECTION INTRAMUSCULAR; INTRAVENOUS at 03:08

## 2020-01-01 RX ADMIN — ACETAMINOPHEN 650 MG: 325 TABLET ORAL at 02:08

## 2020-01-01 RX ADMIN — ONDANSETRON 8 MG: 2 INJECTION INTRAMUSCULAR; INTRAVENOUS at 12:09

## 2020-01-01 RX ADMIN — DEXTROSE AND SODIUM CHLORIDE 1000 ML: 5; .9 INJECTION, SOLUTION INTRAVENOUS at 12:08

## 2020-01-01 RX ADMIN — FENTANYL CITRATE 12.5 MCG: 50 INJECTION INTRAMUSCULAR; INTRAVENOUS at 04:09

## 2020-01-01 RX ADMIN — SPIRONOLACTONE 25 MG: 25 TABLET ORAL at 08:09

## 2020-01-01 RX ADMIN — SODIUM CHLORIDE, SODIUM LACTATE, POTASSIUM CHLORIDE, AND CALCIUM CHLORIDE 1000 ML: .6; .31; .03; .02 INJECTION, SOLUTION INTRAVENOUS at 08:09

## 2020-01-01 RX ADMIN — SODIUM CHLORIDE, SODIUM LACTATE, POTASSIUM CHLORIDE, AND CALCIUM CHLORIDE 1000 ML: .6; .31; .03; .02 INJECTION, SOLUTION INTRAVENOUS at 11:08

## 2020-01-01 RX ADMIN — SODIUM CHLORIDE: 0.9 INJECTION, SOLUTION INTRAVENOUS at 09:07

## 2020-01-01 RX ADMIN — ACETAMINOPHEN 650 MG: 325 TABLET ORAL at 03:09

## 2020-01-01 RX ADMIN — ACYCLOVIR SODIUM 570 MG: 50 INJECTION, SOLUTION INTRAVENOUS at 11:10

## 2020-01-01 RX ADMIN — BENZONATATE 100 MG: 100 CAPSULE ORAL at 07:08

## 2020-01-01 RX ADMIN — METOPROLOL TARTRATE 25 MG: 25 TABLET, FILM COATED ORAL at 11:08

## 2020-01-01 RX ADMIN — BENZONATATE 100 MG: 100 CAPSULE ORAL at 09:09

## 2020-01-01 RX ADMIN — METOPROLOL TARTRATE 50 MG: 50 TABLET, FILM COATED ORAL at 04:09

## 2020-01-01 RX ADMIN — MEROPENEM 1 G: 1 INJECTION, POWDER, FOR SOLUTION INTRAVENOUS at 09:09

## 2020-01-01 RX ADMIN — METOPROLOL TARTRATE 50 MG: 50 TABLET, FILM COATED ORAL at 02:09

## 2020-01-01 RX ADMIN — ONDANSETRON 4 MG: 2 INJECTION INTRAMUSCULAR; INTRAVENOUS at 04:09

## 2020-01-01 RX ADMIN — FLUOROURACIL 4730 MG: 50 INJECTION, SOLUTION INTRAVENOUS at 01:08

## 2020-01-01 RX ADMIN — HEPARIN SODIUM 5000 UNITS: 5000 INJECTION INTRAVENOUS; SUBCUTANEOUS at 12:09

## 2020-01-01 RX ADMIN — KETOROLAC TROMETHAMINE 30 MG: 30 INJECTION, SOLUTION INTRAMUSCULAR; INTRAVENOUS at 11:07

## 2020-01-01 RX ADMIN — DEXMEDETOMIDINE HYDROCHLORIDE 0.9 MCG/KG/HR: 4 INJECTION, SOLUTION INTRAVENOUS at 04:10

## 2020-01-01 RX ADMIN — CEFEPIME HYDROCHLORIDE 2 G: 2 INJECTION, SOLUTION INTRAVENOUS at 04:08

## 2020-01-01 RX ADMIN — MORPHINE SULFATE 2 MG: 2 INJECTION, SOLUTION INTRAMUSCULAR; INTRAVENOUS at 07:09

## 2020-01-01 RX ADMIN — ACYCLOVIR SODIUM 570 MG: 50 INJECTION, SOLUTION INTRAVENOUS at 10:09

## 2020-01-01 RX ADMIN — POTASSIUM CHLORIDE 40 MEQ: 7.46 INJECTION, SOLUTION INTRAVENOUS at 12:09

## 2020-01-01 RX ADMIN — SODIUM CHLORIDE: 0.9 INJECTION, SOLUTION INTRAVENOUS at 01:07

## 2020-01-01 RX ADMIN — ROCURONIUM BROMIDE 35 MG: 10 INJECTION, SOLUTION INTRAVENOUS at 07:07

## 2020-01-01 RX ADMIN — MAGNESIUM SULFATE IN WATER 2 G: 40 INJECTION, SOLUTION INTRAVENOUS at 06:09

## 2020-01-01 RX ADMIN — MIDAZOLAM HYDROCHLORIDE 3 MG: 1 INJECTION, SOLUTION INTRAMUSCULAR; INTRAVENOUS at 06:07

## 2020-01-01 RX ADMIN — IOHEXOL 100 ML: 350 INJECTION, SOLUTION INTRAVENOUS at 01:09

## 2020-01-01 RX ADMIN — FUROSEMIDE 40 MG: 10 INJECTION, SOLUTION INTRAVENOUS at 02:09

## 2020-01-01 RX ADMIN — LIDOCAINE HYDROCHLORIDE: 20 SOLUTION ORAL; TOPICAL at 02:08

## 2020-01-01 RX ADMIN — OXALIPLATIN 167 MG: 5 INJECTION, SOLUTION INTRAVENOUS at 11:08

## 2020-01-01 RX ADMIN — FUROSEMIDE 20 MG: 10 INJECTION, SOLUTION INTRAVENOUS at 04:09

## 2020-01-01 RX ADMIN — MIDAZOLAM HYDROCHLORIDE 2 MG: 1 INJECTION, SOLUTION INTRAMUSCULAR; INTRAVENOUS at 07:07

## 2020-01-01 RX ADMIN — SODIUM CHLORIDE, SODIUM LACTATE, POTASSIUM CHLORIDE, AND CALCIUM CHLORIDE 1000 ML: .6; .31; .03; .02 INJECTION, SOLUTION INTRAVENOUS at 05:09

## 2020-01-01 RX ADMIN — DEXTROSE MONOHYDRATE: 50 INJECTION, SOLUTION INTRAVENOUS at 10:08

## 2020-01-01 RX ADMIN — THIAMINE HYDROCHLORIDE 500 MG: 100 INJECTION, SOLUTION INTRAMUSCULAR; INTRAVENOUS at 10:09

## 2020-01-01 RX ADMIN — ACYCLOVIR SODIUM 570 MG: 50 INJECTION, SOLUTION INTRAVENOUS at 08:09

## 2020-06-06 NOTE — ED PROVIDER NOTES
Encounter Date: 6/6/2020       History     Chief Complaint   Patient presents with    Abdominal Pain     Pt with c/o constant lower abd pain x1 week with associated nausea and vomiting     29-year-old female complains of lower abdominal pain for last 1 week.  Mild nausea and vomiting.  No diarrhea.  Constipation.  Patient had cycle on May 9th and also had some bleeding on May 20th.  Increased frequency of urination and dysuria.  No acute vaginal bleeding or discharge.  No flank pain.  No back pain.    The history is provided by the patient.     Review of patient's allergies indicates:   Allergen Reactions    Sulfa (sulfonamide antibiotics)      Past Medical History:   Diagnosis Date    Uterine cyst      History reviewed. No pertinent surgical history.  Family History   Problem Relation Age of Onset    Diabetes Mother     Hypertension Mother     Hypertension Maternal Grandmother     Hypertension Maternal Grandfather     Hypertension Paternal Grandmother     Hypertension Paternal Grandfather      Social History     Tobacco Use    Smoking status: Never Smoker    Smokeless tobacco: Never Used   Substance Use Topics    Alcohol use: No    Drug use: No     Review of Systems   Constitutional: Negative for activity change, appetite change, chills and fever.   HENT: Negative for congestion, ear discharge, rhinorrhea, sinus pressure, sinus pain, sore throat and trouble swallowing.    Eyes: Negative for photophobia, pain, discharge, redness, itching and visual disturbance.   Respiratory: Negative for cough, chest tightness, shortness of breath and wheezing.    Cardiovascular: Negative for chest pain, palpitations and leg swelling.   Gastrointestinal: Positive for abdominal pain and nausea. Negative for abdominal distention, constipation, diarrhea and vomiting.   Genitourinary: Negative for dysuria, flank pain, frequency and hematuria.   Musculoskeletal: Negative for back pain, gait problem, neck pain and neck  stiffness.   Skin: Negative for rash and wound.   Neurological: Negative for dizziness, tremors, seizures, syncope, speech difficulty, weakness, light-headedness, numbness and headaches.   Psychiatric/Behavioral: Negative for behavioral problems, confusion, hallucinations and sleep disturbance. The patient is not nervous/anxious.    All other systems reviewed and are negative.      Physical Exam     Initial Vitals [06/06/20 0758]   BP Pulse Resp Temp SpO2   (!) 140/94 95 18 98.4 °F (36.9 °C) 100 %      MAP       --         Physical Exam    Nursing note and vitals reviewed.  Constitutional: Vital signs are normal. She appears well-developed and well-nourished. She is active.   HENT:   Head: Normocephalic and atraumatic.   Right Ear: Tympanic membrane normal.   Left Ear: Tympanic membrane normal.   Nose: Nose normal.   Mouth/Throat: Oropharynx is clear and moist.   Eyes: Conjunctivae and lids are normal.   Neck: Trachea normal, normal range of motion and full passive range of motion without pain. Neck supple. Normal range of motion present. No neck rigidity.   Cardiovascular: Normal rate, regular rhythm, S1 normal, S2 normal, normal heart sounds, intact distal pulses and normal pulses.   Pulmonary/Chest: Breath sounds normal. No respiratory distress. She has no wheezes. She has no rhonchi. She has no rales.   Abdominal: Soft. Normal appearance and bowel sounds are normal. She exhibits no distension. There is tenderness in the suprapubic area. There is guarding. There is no rigidity, no CVA tenderness, no tenderness at McBurney's point and negative Chavira's sign.       Musculoskeletal: Normal range of motion.   Lymphadenopathy:     She has no cervical adenopathy.   Neurological: She is alert and oriented to person, place, and time. She has normal strength and normal reflexes. No cranial nerve deficit or sensory deficit. GCS score is 15. GCS eye subscore is 4. GCS verbal subscore is 5. GCS motor subscore is 6.   Skin:  Skin is warm and intact. Capillary refill takes less than 2 seconds. No abrasion, no bruising and no rash noted.   Psychiatric: She has a normal mood and affect. Her speech is normal and behavior is normal. Judgment and thought content normal. She is not actively hallucinating. Cognition and memory are normal. She is attentive.         ED Course   Procedures  Labs Reviewed   URINALYSIS, REFLEX TO URINE CULTURE   PREGNANCY TEST, URINE RAPID          Imaging Results    None          Medical Decision Making:   Initial Assessment:   Lower abdominal pain and dysuria.  Mild nausea and vomiting.  No fever.  Differential Diagnosis:   Constipation, UTI, dysmenorrhea, fibroids, ovarian cyst.  Clinical Tests:   Lab Tests: Ordered and Reviewed  ED Management:  Urinalysis is consistent with UTI.  Treated with Cipro.  Zofran for nausea Ibuprofen for pain.  X-ray reveals constipation treated with lactulose.  Pregnancy test is negative.  Patient's symptoms have improved and prescribed Cipro, Zofran, ibuprofen and senna as needed for constipation.  Patient is advised to follow-up ED immediately with any worsening vomiting or pain.                                 Clinical Impression:       ICD-10-CM ICD-9-CM   1. Urinary tract infection with hematuria, site unspecified N39.0 599.0    R31.9 599.70   2. Abdominal pain R10.9 789.00   3. Constipation, unspecified constipation type K59.00 564.00         Disposition:   Disposition: Discharged  Condition: Stable                        Thomas Galaviz MD  06/06/20 5759

## 2020-07-14 PROBLEM — K63.89 COLONIC MASS: Status: ACTIVE | Noted: 2020-01-01

## 2020-07-14 PROBLEM — R16.0 LIVER MASS: Status: ACTIVE | Noted: 2020-01-01

## 2020-07-14 PROBLEM — C79.9 METASTATIC DISEASE: Status: ACTIVE | Noted: 2020-01-01

## 2020-07-14 PROBLEM — R11.2 INTRACTABLE NAUSEA AND VOMITING: Status: ACTIVE | Noted: 2020-01-01

## 2020-07-14 NOTE — PLAN OF CARE
Plan of care reviewed with patient. Patient verbalized understanding of care. Patient remained NPO for liver biopsy today.  Patient is currently on room air. No signs/symptoms of distress noted. Bed locked and low, call light within reach, non skid socks applied, allergy band applied. Instructed to call If needing assistance. Will continue to monitor.

## 2020-07-14 NOTE — PLAN OF CARE
Pt lives in [place] with her 3 sons ages 3, 8, 10 yo.  She says her mom, Char [phone], lives near by and helps her as needed.  White board updated with CM name and contact information.  Discharge brochure provided.  Pt encouraged to call with any questions or concerns.  Cm will continue to follow pt through transitions of care and assist with any discharge needs.       07/14/20 1050   Discharge Assessment   Assessment Type Discharge Planning Assessment   Confirmed/corrected address and phone number on facesheet? Yes   Assessment information obtained from? Patient   Communicated expected length of stay with patient/caregiver yes   Prior to hospitilization cognitive status: Alert/Oriented   Prior to hospitalization functional status: Independent   Current cognitive status: Alert/Oriented   Current Functional Status: Independent   Facility Arrived From: home   Lives With child(larisa), dependent   Able to Return to Prior Arrangements yes   Is patient able to care for self after discharge? Yes   Who are your caregiver(s) and their phone number(s)? Char (pt's mom) [phone]   Patient's perception of discharge disposition home or selfcare   Readmission Within the Last 30 Days no previous admission in last 30 days   Patient currently being followed by outpatient case management? No   Patient currently receives any other outside agency services? No   Equipment Currently Used at Home none   Do you have any problems affording any of your prescribed medications? No   Is the patient taking medications as prescribed? yes   Does the patient have transportation home? Yes   Transportation Anticipated family or friend will provide   Discharge Plan A Home   Discharge Plan B Home with family   DME Needed Upon Discharge  none   Patient/Family in Agreement with Plan yes     Michael Nickerson RN,   [phone]

## 2020-07-14 NOTE — PLAN OF CARE
Clarified bowel prep with Dr. Cordova. Night nurse is to start bowel prep @ 2100 tonight not 1800

## 2020-07-14 NOTE — PLAN OF CARE
VN cued into pt's room for introduction. VN informed pt that VN would be working along side bedside nurse and PCT throughout shift. Level of present pain assessed. At present no distress noted. Thoroughly discussed with patient the plan of care. With assistance from patient, admission assessment data collection completed. Discussed with patient High fall risk protocol and interventions that have been initiated and cont be in place for safety. Patient verbalized clear understanding and cooperation using teach back method. Bed alarm presently activated and in use. Will cont to be available to patient and intervene prn.

## 2020-07-14 NOTE — HPI
29 year-old female was admitted on 7/13/20 for nausea/abdominal pain. CT imaging revealed a colon mass with liver lesions concerning for metastatic cancer. Consult is for cancer.

## 2020-07-14 NOTE — CONSULTS
"[facility]    CC: "Evaluation for metastatic colon cancer", left lower quadrant abdominal pain, nausea, vomiting    HPI 29 y.o. female with PMHx of left ovarian cyst who presents for progressive, intermittent left lower quadrant abdominal pain with associated nausea and vomiting. The patient states that she developed progressively worsening, intermittent left lower quadrant abdominal pain over the past 2-3 weeks and nausea and vomiting for the last month of non-bloody, bilious emesis. She was transferred from [facility] after coming to the ED at [facility] for these complaints with workup including CT A/P revealing 4.5 cm length annular lesion in the midline transverse colon suspicious for colonic neoplasm along with innumerable large hepatic lesions, suspicious for metastases, with largest measuring 7.6 by 6.4 cm in size. She was treated for a UTI and was transferred to [facility] and GI was consulted for further evaluation of suspected metastatic colon cancer.    Upon interview, the patient reports weight loss of 10 lbs over the last month, nausea, vomiting, LLQ abdominal pain, constipation x2 days, and hematochezia with bright red blood upon wiping after bowel movements. She denies FH of colon or gastric malignancy. No smoking, alcohol use, or illicit drug use. Denies night sweats, dysphagia, odynophagia, melena, or changes in stool appearance or caliber.    Chart reviewed and summarized here.    Past Medical History  Past Medical History:   Diagnosis Date    Uterine cyst        Past Surgical History  History reviewed. No pertinent surgical history.    Social History  Social History     Tobacco Use    Smoking status: Never Smoker    Smokeless tobacco: Never Used   Substance Use Topics    Alcohol use: No    Drug use: No       Family History  Family History   Problem Relation Age of Onset    Diabetes Mother     Hypertension Mother     Hypertension Maternal Grandmother     Hypertension Maternal " "Grandfather     Hypertension Paternal Grandmother     Hypertension Paternal Grandfather        Review of Systems  General ROS: negative for chills, fever. +Weight loss  Psychological ROS: negative for hallucination, depression or suicidal ideation  Ophthalmic ROS: negative for blurry vision, photophobia or eye pain  ENT ROS: negative for epistaxis, sore throat or rhinorrhea  Respiratory ROS: no cough, shortness of breath, or wheezing  Cardiovascular ROS: no chest pain or dyspnea on exertion  Gastrointestinal ROS: +Abdominal pain. No change in bowel habits, or black/ bloody stools  Genito-Urinary ROS: no dysuria, trouble voiding, or hematuria  Musculoskeletal ROS: negative for gait disturbance or muscular weakness  Neurological ROS: no syncope or seizures; no ataxia  Dermatological ROS: negative for pruritis, rash and jaundice    Physical Examination  /84 (BP Location: Left arm, Patient Position: Lying)   Pulse 101   Temp 96.9 °F (36.1 °C) (Oral)   Resp 20   Ht 5' 5" (1.651 m)   Wt 88.5 kg (195 lb 1.7 oz)   LMP 06/22/2020 (Approximate)   SpO2 99%   Breastfeeding No   BMI 32.47 kg/m²   General appearance: alert, cooperative, no distress, sitting upright in bed  HENT: Normocephalic, atraumatic, neck symmetrical, no nasal discharge   Eyes: conjunctivae/corneas clear, PERRL, EOM's intact  Lungs: clear to auscultation bilaterally, no dullness to percussion bilaterally  Heart: regular rate and rhythm without rub; no displacement of the PMI   Abdomen: tenderness to light and deep palpation at LLQ and epigastrium, no guarding or rebound tenderness present  Extremities: extremities symmetric; no clubbing, cyanosis, or edema  Integument: Skin color, texture, turgor normal; no rashes; hair distrubution normal  Neurologic: Alert and oriented X 3, normal strength, normal coordination and gait  Psychiatric: no pressured speech; normal affect; no evidence of impaired cognition     Labs:  WBC 7.36, Hgb 9.0, MCV 79, " Plts 479  Ferritin 147, alkaline phosphatase 202, T bili 0.9, , ALT 70, lipase 26  UA 3+ occult blood, positive nitrites, 2+ bilirubin    Imaging:  I have personally reviewed and interpreted these images.    Assessment:   Ms. Cisneros is a 29 year old -American female with PMHx of left ovarian cyst who presents for progressive, intermittent left lower quadrant abdominal pain x2-3 weeks with associated nausea and vomiting x1 month. She was transferred from [facility] after coming to the ED at [facility] for LLQ abdominal pain, nausea, and vomiting with workup including CT A/P revealing 4.5 cm length annular lesion in the midline transverse colon suspicious for colonic neoplasm along with innumerable large hepatic lesions, suspicious for metastases, with largest measuring 7.6 by 6.4 cm in size. She was treated for a UTI and was transferred to [facility] and GI was consulted for further evaluation of suspected metastatic colon cancer. Pt reports 10 lb weight loss over last month and hematochezia with bright red blood upon wiping. Labs notable for Hgb 9.0, MCV 79, alk phos 202, t bili 0.9, , and ALT 70, lipase 26. Imaging as summarized above. Suspect colonic adenocarcinoma with metastases to liver with microcytic anemia.    // Left lower quadrant abdominal pain  // Nausea, vomiting  // Suspected metastatic cancer with hepatic metastases  // CT A/P with 4.5 cm transverse colon mass, suspect colonic primary  // Microcytic anemia, possibly 2/2 GI blood loss    Plan:   - Will plan for colonoscopy tomorrow 7/15 for biopsy of transverse colon mass for definitive diagnosis. Split prep ordered.  - IR consult for image guided biopsy of liver lesions.  - CEA and AFP tumor markers ordered.  - Oncology consulted, follow up recommendations.  - [facility] will continue to follow.      Nasra Mcclain MD PGY4  [facility]

## 2020-07-14 NOTE — ED PROVIDER NOTES
"Encounter Date: 7/13/2020       History     Chief Complaint   Patient presents with    Vomiting     Pt c/o vomiting after each time she eats X 1 month. No vomiting noted since arrival to ED.     Diarrhea     Pt c/o Diarrhea X 10 episodes today.  Pt reports seeing "a little blood" on toilet tissue with last episode of diarrhea.      Huy Cisneros is a 29 y.o. female who  has a past medical history of Uterine cyst she presents the ER today with vomiting for the past month.  She reports she is able to keep food suggest tubes or broths or bland food down however when she eats more solid foods she vomits shortly thereafter.  She denies any interval change in this today.  She reports diarrhea for the past 1 day.  She reports 10 episodes.  She did see blood in her diarrhea earlier.  She was seen last month and diagnosed with urinary tract infection.  She did take antibiotics after that.  No recent travel no sick contacts no fever.        Review of patient's allergies indicates:   Allergen Reactions    Sulfa (sulfonamide antibiotics)      Past Medical History:   Diagnosis Date    Uterine cyst      History reviewed. No pertinent surgical history.  Family History   Problem Relation Age of Onset    Diabetes Mother     Hypertension Mother     Hypertension Maternal Grandmother     Hypertension Maternal Grandfather     Hypertension Paternal Grandmother     Hypertension Paternal Grandfather      Social History     Tobacco Use    Smoking status: Never Smoker    Smokeless tobacco: Never Used   Substance Use Topics    Alcohol use: No    Drug use: No     Review of Systems   Constitutional: Negative for chills and fever.   HENT: Negative for sore throat.    Respiratory: Negative for cough and shortness of breath.    Cardiovascular: Negative for chest pain.   Gastrointestinal: Positive for abdominal pain, diarrhea and vomiting. Negative for nausea.   Genitourinary: Negative for dysuria, frequency and urgency. "   Musculoskeletal: Negative for back pain, neck pain and neck stiffness.   Skin: Negative for rash and wound.   Neurological: Negative for syncope and weakness.   Hematological: Does not bruise/bleed easily.   Psychiatric/Behavioral: Negative for agitation, behavioral problems and confusion.       Physical Exam     Initial Vitals [07/13/20 2234]   BP Pulse Resp Temp SpO2   (!) 148/89 102 19 98.9 °F (37.2 °C) 98 %      MAP       --         Physical Exam    Constitutional: No distress.   HENT:   Head: Normocephalic and atraumatic.   Eyes: Conjunctivae are normal.   Cardiovascular: Intact distal pulses.   Pulmonary/Chest: No respiratory distress.   Abdominal:   Diffuse generalized tenderness no rebound or guarding.   Neurological: She is alert.   Skin: Skin is warm and dry.   Psychiatric: She has a normal mood and affect.         ED Course   Procedures  Labs Reviewed   URINALYSIS, REFLEX TO URINE CULTURE - Abnormal; Notable for the following components:       Result Value    Appearance, UA Hazy (*)     Protein, UA 1+ (*)     Ketones, UA 3+ (*)     Bilirubin (UA) 2+ (*)     Occult Blood UA 3+ (*)     Nitrite, UA Positive (*)     Urobilinogen, UA >=8.0 (*)     Leukocytes, UA 1+ (*)     All other components within normal limits    Narrative:     Specimen Source->Urine   URINALYSIS MICROSCOPIC - Abnormal; Notable for the following components:    RBC, UA >100 (*)     All other components within normal limits    Narrative:     Specimen Source->Urine   PREGNANCY TEST, URINE RAPID    Narrative:     Specimen Source->Urine   CBC W/ AUTO DIFFERENTIAL   COMPREHENSIVE METABOLIC PANEL   LIPASE   OCCULT BLOOD X 1, STOOL   LACTIC ACID, PLASMA   POCT URINE PREGNANCY          Imaging Results          X-Ray Chest 1 View (Final result)  Result time 07/14/20 07:27:46    Final result by Juanpablo Shine MD (07/14/20 07:27:46)                 Impression:      No acute abnormality.      Electronically signed by: Juanpablo  Rl  Date:    07/14/2020  Time:    07:27             Narrative:    EXAMINATION:  XR CHEST 1 VIEW    CLINICAL HISTORY:  . Secondary malignant neoplasm of unspecified site    TECHNIQUE:  Single frontal portable view of the chest was performed.    COMPARISON:  04/29/2013    FINDINGS:  Support devices: None    The lungs are clear, with normal appearance of pulmonary vasculature and no pleural effusion or pneumothorax.    The cardiac silhouette is normal in size. The hilar and mediastinal contours are unremarkable.    Bones are intact.                               CT Abdomen Pelvis With Contrast (Final result)  Result time 07/14/20 07:42:01    Final result by Juanpablo Shine MD (07/14/20 07:42:01)                 Impression:      4.5 cm length annular lesion in the midline transverse colon suspicious for colonic neoplasm, with associated innumerable large hepatic metastatic lesions.  Fluid noted in the right colon suggesting impending diarrheal illness.  No evidence of bowel obstruction.      Electronically signed by: Juanpablo Shine  Date:    07/14/2020  Time:    07:42             Narrative:    EXAMINATION:  CT ABDOMEN PELVIS WITH CONTRAST    CLINICAL HISTORY:  Metastatic disease evaluation;abnormal CT scan with concern for liver metastasis;    TECHNIQUE:  Low dose axial images, sagittal and coronal reformations were obtained from the lung bases to the pubic symphysis after  mL Omnipaque 350.    All CT scans at this location are performed using dose modulation techniques as appropriate to a performed exam including the following: Automated exposure control; adjustment of the mA and/or kV according to patient size.    COMPARISON:  CT abdomen pelvis without IV contrast 07/14/2020    FINDINGS:  Heart: Normal in size. No pericardial effusion.    Lung Bases: Well aerated, without consolidation or pleural fluid.    Liver: Innumerable large hypoattenuating hepatic lesions compatible with metastatic neoplastic process.   Largest lesion for example at the posterior right liver measuring 7.6 x 6.4 cm series 3, image 36.  And left hepatic lesion measuring 5.1 x 4.5 cm image 45.    Gallbladder: Innumerable partially calcified and low-density gallstones are present.  No CT evidence of cholecystitis.    Bile Ducts: No evidence of dilated ducts.    Pancreas: No mass or peripancreatic fat stranding.    Spleen: Unremarkable.    Adrenals: Unremarkable.    Kidneys/ Ureters: Unremarkable.    Bladder: Bladder partially distended limiting evaluation.  Suggestion of mild bladder wall thickening.  Please correlate for cystitis.    Reproductive organs: Left ovarian follicles noted.  Trace free fluid the pelvis.    GI Tract/Mesentery: Distal esophagus, stomach, duodenum, small bowel unremarkable.  Fluid noted in the right colon suggesting impending diarrheal illness.  4.5 cm length annular lesion in the midline transverse colon suspicious for colonic neoplasm.  9 x 9 mm mesenteric lymph node series 3, image 102.    Appendix: No evidence of appendictis.    Peritoneal Space: No free air.    Retroperitoneum: No significant adenopathy.    Abdominal wall: 5.2 x 2.1 cm ovoid lesion in the right flank superficial subcutaneous fat, likely representing an old posttraumatic resolved hematoma.    Vasculature: No significant atherosclerosis or aneurysm.    Bones: No acute fracture.                               CT Renal Stone Study ABD Pelvis WO (Final result)  Result time 07/14/20 08:08:32    Final result by ADRIANA Perdue Sr., MD (07/14/20 08:08:32)                 Impression:      1. There are multiple hypodense masses scattered throughout the liver.  This is consistent with the patient's history and characteristic of metastatic disease.  2. There is possible thickening of the wall of the transverse portion of the colon.  If additional imaging evaluation is clinically indicated, I recommend consideration of a CT examination of the abdomen and pelvis with  oral, rectal, and IV contrast.  3. There is a heterogeneous mass in the subcutaneous fat posterolateral to the right gluteus chris.  This mass measures 5.4 cm in craniocaudal dimension by 2.5 cm in AP dimension by 4.6 cm in medial-lateral dimension.  If additional imaging evaluation of this is clinically indicated, I recommend consideration of a PET-CT examination.  This may help in the planning of a percutaneous biopsy.  4. The kidneys, ureters, and urinary bladder are normal in appearance.  5. There are multiple poorly visualized stones within the gallbladder.  If additional imaging evaluation is clinically indicated, I recommend consideration of an ultrasound examination of the gallbladder.  6. The liver is enlarged.  It measures 20.4 cm in craniocaudal dimension.  7. The emergency room department is already aware of the above findings and impressions at the time of this dictation.  There were notified by Dr. Jimenez at 01:26 on 07/14/2020.  All CT scans at this facility use dose modulation, iterative reconstruction, and/or weight base dosing when appropriate to reduce radiation dose when appropriate to reduce radiation dose to as low as reasonably achievable.      Electronically signed by: Carlo Perdue MD  Date:    07/14/2020  Time:    08:08             Narrative:    EXAMINATION:  CT RENAL STONE STUDY ABD PELVIS WO    CLINICAL HISTORY:  Hematuria, unknown cause; metastatic disease    TECHNIQUE:  Standard abdomen and pelvis CT protocol without oral or IV contrast was performed.    COMPARISON:  A KUB performed on 06/06/2020.    FINDINGS:  Finding: The size of the heart is within normal limits. The lungs are clear. There is no pneumothorax or pleural effusion.    There are multiple hypodense masses scattered throughout the liver.  The liver is enlarged.  It measures 20.4 cm in craniocaudal dimension.  There are multiple poorly visualized stones within the gallbladder.  The pancreas, spleen, adrenals, and  kidneys are normal in appearance. The ureters and the urinary bladder are normal in appearance.  The uterus and ovaries were not optimally visualized.  The appendix is normal in appearance.  There is possible thickening of the wall of the transverse portion of the colon.  There is no free fluid within the abdomen or pelvis. There is no pneumoperitoneum.  There is a heterogeneous mass in the subcutaneous fat posterolateral to the right gluteus chris.  This mass measures 5.4 cm in craniocaudal dimension by 2.5 cm in AP dimension by 4.6 cm in medial-lateral dimension.                                 Medical Decision Making:   Differential Diagnosis:   Differential Diagnosis includes, but is not limited to:  AAA, aortic dissection, mesenteric ischemia, perforated viscous, MI/ACS, SBO/volvulus, incarcerated/strangulated hernia, intussusception, ileus, appendicitis, cholecystitis, cholangitis, diverticulitis, esophagitis, hepatitis, nephrolithiasis, pancreatitis, gastroenteritis, colitis, IBD/IBS, biliary colic, GERD, PUD, constipation, UTI/pyelonephritis,  disorder.    ED Management:  Patient to be admitted to [facility] for further evaluation of findings concerning for metastatic cancer                   ED Course as of Jul 14 2057   Tue Jul 14, 2020   0155 Patient sleeping on reassessment.     [RN]   0248 Patient with concern for metastatic liver ca. Will repeat imaging to assess for malignancy with IV contrast.     [RN]   0502 Chest x-ray without acute findings by my interpretation    [RN]      ED Course User Index  [RN] Bola Villafuerte Jr., MD                Clinical Impression:       ICD-10-CM ICD-9-CM   1. Intractable nausea and vomiting  R11.2 536.2   2. Metastatic disease  C79.9 199.1     Portions of this note were dictated using voice recognition software and may contain dictation related errors in spelling/grammar/syntax not found on text review                             Bola Villafuerte Jr.,  MD  07/14/20 1842

## 2020-07-14 NOTE — CONSULTS
[facility]  Hematology/Oncology  Consult Note    Patient Name: Huy Cisneros  MRN: [id number]  Admission Date: 7/13/2020  Hospital Length of Stay: 0 days  Code Status: Full Code   Attending Provider: Melquiades Leon MD  Consulting Provider: Jewel Arzola MD  Primary Care Physician: Primary Doctor No  Principal Problem:Intractable nausea and vomiting    Inpatient consult to Hematology/Oncology  Consult performed by: Jewel Arzola MD  Consult ordered by: Drake Littlejohn MD  Reason for consult: concern for metastatic cancer.        Subjective:     HPI:  29 year-old female was admitted on 7/13/20 for nausea/abdominal pain. CT imaging revealed a colon mass with liver lesions concerning for metastatic cancer. Consult is for cancer.    - she endorses recent onset of blood in her stool. She endorses mild weight loss, abdominal pain, fatigue.    Oncology Treatment Plan:   [No treatment plan]    Medications:  Continuous Infusions:  Scheduled Meds:   polyethylene glycol  4,000 mL Oral Once     PRN Meds:acetaminophen, dextrose 50%, dextrose 50%, glucagon (human recombinant), glucose, glucose, ibuprofen, ondansetron, ondansetron, ondansetron, sodium chloride 0.9%, sodium chloride 0.9%     Review of patient's allergies indicates:   Allergen Reactions    Sulfa (sulfonamide antibiotics)         Past Medical History:   Diagnosis Date    Uterine cyst      History reviewed. No pertinent surgical history.  Family History     Problem Relation (Age of Onset)    Diabetes Mother    Hypertension Mother, Maternal Grandmother, Maternal Grandfather, Paternal Grandmother, Paternal Grandfather        Tobacco Use    Smoking status: Never Smoker    Smokeless tobacco: Never Used   Substance and Sexual Activity    Alcohol use: No    Drug use: No    Sexual activity: Yes     Partners: Male     Birth control/protection: None       Review of Systems   Constitutional: Positive for fatigue and unexpected weight change.    HENT: Negative for sore throat.    Eyes: Negative for visual disturbance.   Respiratory: Negative for shortness of breath.    Cardiovascular: Negative for chest pain.   Gastrointestinal: Positive for abdominal pain.   Genitourinary: Negative for dysuria.   Musculoskeletal: Negative for back pain.   Skin: Negative for rash.   Neurological: Negative for headaches.   Hematological: Negative for adenopathy.   Psychiatric/Behavioral: The patient is nervous/anxious.      Objective:     Vital Signs (Most Recent):  Temp: 96.9 °F (36.1 °C) (07/14/20 1131)  Pulse: 82 (07/14/20 1625)  Resp: 16 (07/14/20 1625)  BP: 122/74 (07/14/20 1625)  SpO2: 100 % (07/14/20 1625) Vital Signs (24h Range):  Temp:  [96.9 °F (36.1 °C)-98.9 °F (37.2 °C)] 96.9 °F (36.1 °C)  Pulse:  [] 82  Resp:  [15-20] 16  SpO2:  [98 %-100 %] 100 %  BP: (114-148)/(64-89) 122/74     Weight: 88.5 kg (195 lb 1.7 oz)  Body mass index is 32.47 kg/m².  Body surface area is 2.01 meters squared.      Intake/Output Summary (Last 24 hours) at 7/14/2020 1707  Last data filed at 7/14/2020 0205  Gross per 24 hour   Intake 1130 ml   Output --   Net 1130 ml       Physical Exam  Vitals signs and nursing note reviewed.   Constitutional:       Appearance: She is well-developed.   HENT:      Head: Normocephalic and atraumatic.   Eyes:      Pupils: Pupils are equal, round, and reactive to light.   Neck:      Musculoskeletal: Normal range of motion and neck supple.   Cardiovascular:      Rate and Rhythm: Normal rate and regular rhythm.   Pulmonary:      Effort: Pulmonary effort is normal.      Breath sounds: Normal breath sounds.   Abdominal:      General: Bowel sounds are normal.      Palpations: Abdomen is soft.   Musculoskeletal: Normal range of motion.   Skin:     General: Skin is warm and dry.   Neurological:      Mental Status: She is alert and oriented to person, place, and time.   Psychiatric:         Behavior: Behavior normal.         Thought Content: Thought content  normal.         Judgment: Judgment normal.         Significant Labs:   Labs have been reviewed.    Lab Results   Component Value Date    WBC 7.36 07/14/2020    HGB 9.0 (L) 07/14/2020    HCT 29.5 (L) 07/14/2020    MCV 79 (L) 07/14/2020     (H) 07/14/2020         Diagnostic Results:  CT abdomen/pelvis (7/14/20): I have personally reviewed the images  4.5 cm length annular lesion in the midline transverse colon suspicious for colonic neoplasm, with associated innumerable large hepatic metastatic lesions.  Fluid noted in the right colon suggesting impending diarrheal illness.  No evidence of bowel obstruction.      Assessment/Plan:     Liver mass  - CT imaging is very concerning for metastatic colon cancer.  - follow up pathology from liver biopsy.  - I will schedule a follow-up appointment in clinic in one week to discuss results of biopsy and determine next steps (systemic chemotherapy).    Colonic mass  - see above.        Thank you for your consult.     Jewel Arzola MD  Hematology/Oncology  [facility]
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
## 2020-07-14 NOTE — TELEPHONE ENCOUNTER
----- Message from Chao Bradshaw sent at 7/14/2020  8:11 AM CDT -----  Contact: Herlinda romero ATG [employer] / [phone]  CONSULTS:       Room number:  484     Referring MD: Dr. Villafuerte    Diagnosis:  evaluation for colon cancer

## 2020-07-14 NOTE — ED NOTES
Attempted to call report to receiving nurse at [facility]. Was told the nurse is passing medications and will be given a message to call back for report.

## 2020-07-14 NOTE — ASSESSMENT & PLAN NOTE
- CT imaging is very concerning for metastatic colon cancer.  - follow up pathology from liver biopsy.  - I will schedule a follow-up appointment in clinic in one week to discuss results of biopsy and determine next steps (systemic chemotherapy).

## 2020-07-14 NOTE — SUBJECTIVE & OBJECTIVE
- she endorses recent onset of blood in her stool. She endorses mild weight loss, abdominal pain, fatigue.    Oncology Treatment Plan:   [No treatment plan]    Medications:  Continuous Infusions:  Scheduled Meds:   polyethylene glycol  4,000 mL Oral Once     PRN Meds:acetaminophen, dextrose 50%, dextrose 50%, glucagon (human recombinant), glucose, glucose, ibuprofen, ondansetron, ondansetron, ondansetron, sodium chloride 0.9%, sodium chloride 0.9%     Review of patient's allergies indicates:   Allergen Reactions    Sulfa (sulfonamide antibiotics)         Past Medical History:   Diagnosis Date    Uterine cyst      History reviewed. No pertinent surgical history.  Family History     Problem Relation (Age of Onset)    Diabetes Mother    Hypertension Mother, Maternal Grandmother, Maternal Grandfather, Paternal Grandmother, Paternal Grandfather        Tobacco Use    Smoking status: Never Smoker    Smokeless tobacco: Never Used   Substance and Sexual Activity    Alcohol use: No    Drug use: No    Sexual activity: Yes     Partners: Male     Birth control/protection: None       Review of Systems   Constitutional: Positive for fatigue and unexpected weight change.   HENT: Negative for sore throat.    Eyes: Negative for visual disturbance.   Respiratory: Negative for shortness of breath.    Cardiovascular: Negative for chest pain.   Gastrointestinal: Positive for abdominal pain.   Genitourinary: Negative for dysuria.   Musculoskeletal: Negative for back pain.   Skin: Negative for rash.   Neurological: Negative for headaches.   Hematological: Negative for adenopathy.   Psychiatric/Behavioral: The patient is nervous/anxious.      Objective:     Vital Signs (Most Recent):  Temp: 96.9 °F (36.1 °C) (07/14/20 1131)  Pulse: 82 (07/14/20 1625)  Resp: 16 (07/14/20 1625)  BP: 122/74 (07/14/20 1625)  SpO2: 100 % (07/14/20 1625) Vital Signs (24h Range):  Temp:  [96.9 °F (36.1 °C)-98.9 °F (37.2 °C)] 96.9 °F (36.1 °C)  Pulse:   [] 82  Resp:  [15-20] 16  SpO2:  [98 %-100 %] 100 %  BP: (114-148)/(64-89) 122/74     Weight: 88.5 kg (195 lb 1.7 oz)  Body mass index is 32.47 kg/m².  Body surface area is 2.01 meters squared.      Intake/Output Summary (Last 24 hours) at 7/14/2020 1707  Last data filed at 7/14/2020 0205  Gross per 24 hour   Intake 1130 ml   Output --   Net 1130 ml       Physical Exam  Vitals signs and nursing note reviewed.   Constitutional:       Appearance: She is well-developed.   HENT:      Head: Normocephalic and atraumatic.   Eyes:      Pupils: Pupils are equal, round, and reactive to light.   Neck:      Musculoskeletal: Normal range of motion and neck supple.   Cardiovascular:      Rate and Rhythm: Normal rate and regular rhythm.   Pulmonary:      Effort: Pulmonary effort is normal.      Breath sounds: Normal breath sounds.   Abdominal:      General: Bowel sounds are normal.      Palpations: Abdomen is soft.   Musculoskeletal: Normal range of motion.   Skin:     General: Skin is warm and dry.   Neurological:      Mental Status: She is alert and oriented to person, place, and time.   Psychiatric:         Behavior: Behavior normal.         Thought Content: Thought content normal.         Judgment: Judgment normal.         Significant Labs:   Labs have been reviewed.    Lab Results   Component Value Date    WBC 7.36 07/14/2020    HGB 9.0 (L) 07/14/2020    HCT 29.5 (L) 07/14/2020    MCV 79 (L) 07/14/2020     (H) 07/14/2020         Diagnostic Results:  CT abdomen/pelvis (7/14/20): I have personally reviewed the images  4.5 cm length annular lesion in the midline transverse colon suspicious for colonic neoplasm, with associated innumerable large hepatic metastatic lesions.  Fluid noted in the right colon suggesting impending diarrheal illness.  No evidence of bowel obstruction.

## 2020-07-14 NOTE — CONSULTS
Interventional Radiology Consult/Pre-Procedure Note      Chief Complaint/Reason for Consult: Liver masses    History of Present Illness:  Huy Cisneros is a 29 y.o. female with the PMH listed below who presents with imaging findings of colon ca met to liver. IR consulted for liver mass bx.    Admission H&P reviewed.    Past Medical History:   Diagnosis Date    Uterine cyst      History reviewed. No pertinent surgical history.    Allergies:   Review of patient's allergies indicates:   Allergen Reactions    Sulfa (sulfonamide antibiotics)        Scheduled Meds:   Continuous Infusions:   PRN Meds:dextrose 50%, dextrose 50%, glucagon (human recombinant), glucose, glucose, ibuprofen, ondansetron, sodium chloride 0.9%, sodium chloride 0.9%    Anticoagulation/Antiplatelet Meds: aspirin    Review of Systems:   As documented in admission H&P.    Physical Exam:  Temp: 96.9 °F (36.1 °C) (07/14/20 1131)  Pulse: 101 (07/14/20 1131)  Resp: 20 (07/14/20 1131)  BP: 120/84 (07/14/20 1131)  SpO2: 99 % (07/14/20 1032)     General: NAD  HEENT: Normocephalic, sclera anicteric, oropharynx clear  Heart: RRR  Lungs: Symmetric excursions, breathing unlabored  Abd: NTND, soft  Extremities: CARRILLO  Neuro: Gross nonfocal    Labs:  No results for input(s): INR in the last 168 hours.    Invalid input(s):  PT,  PTT    Recent Labs   Lab 07/14/20  0032   WBC 7.36   HGB 9.0*   HCT 29.5*   MCV 79*   *      Recent Labs   Lab 07/14/20  0032         K 4.2      CO2 23   BUN 9   CREATININE 0.72   CALCIUM 10.0   ALT 70*   *   ALBUMIN 4.5   BILITOT 0.9     Imaging:  CT AP 7/14/20 reviewed.    Assessment/Plan:   Liver mets presumed 2/2 transverse colon primary. Will undergo liver mass bx today.    Sedation:  Sedation history: have not been any systemic reactions  ASA: 2 / Mallampati: 2  Sedation plan: Moderate (Versed, fentanyl)     Risks (including, but not limited to, pain, bleeding, infection, damage to nearby  structures, failure to obtain sufficient material for diagnosis, and the need for additional procedures), benefits, and alternatives were discussed with the patient. All questions were answered to the best of my abilities. The patient wishes to proceed. Written informed consent was obtained.      Skyler Fleming MD  [facility]  Pager [phone]

## 2020-07-14 NOTE — TELEPHONE ENCOUNTER
Consult given to dr dacosta----- Message from Neisha Cid sent at 7/14/2020 12:24 PM CDT -----  CONSULT    Room /bed number: 484  Referring MD:   Diagnosis: found to have colon mask with likely METASTASES to the liver   Who Called: HARSH Amos Telemetry 4b, [phone]

## 2020-07-15 NOTE — TRANSFER OF CARE
"Anesthesia Transfer of Care Note    Patient: Huy Cisneros    Procedure(s) Performed: Procedure(s) (LRB):  COLONOSCOPY (N/A)    Patient location: GI    Anesthesia Type: MAC    Transport from OR: Transported from OR on room air with adequate spontaneous ventilation    Post pain: adequate analgesia    Post assessment: no apparent anesthetic complications and tolerated procedure well    Post vital signs: stable    Level of consciousness: awake, alert and oriented    Nausea/Vomiting: no nausea/vomiting    Complications: none    Transfer of care protocol was followed      Last vitals:   Visit Vitals  BP (!) 131/58 (BP Location: Left arm, Patient Position: Lying)   Pulse 101   Temp 36.7 °C (98.1 °F) (Temporal)   Resp 18   Ht 5' 5" (1.651 m)   Wt 88.5 kg (195 lb 1.7 oz)   LMP 06/22/2020 (Approximate)   SpO2 100%   Breastfeeding No   BMI 32.47 kg/m²     "

## 2020-07-15 NOTE — ANESTHESIA PREPROCEDURE EVALUATION
07/15/2020  Huy Cisneros is a 29 y.o., female with metastatic colon CA admitted with intractable n/v for colonoscopy under MAC. States no vomiting today, but nausea getting zofran. Still working on prep at 0720.    Past Medical History:   Diagnosis Date    Uterine cyst      History reviewed. No pertinent surgical history.      Anesthesia Evaluation    I have reviewed the Patient Summary Reports.   I have reviewed the NPO Status.   I have reviewed the Medications.     Review of Systems  Anesthesia Hx:  Denies Family Hx of Anesthesia complications.    Social:  Non-Smoker    Hematology/Oncology:         -- Anemia: Current/Recent Cancer.   Cardiovascular:  Cardiovascular Normal     Pulmonary:  Pulmonary Normal    Renal/:  Renal/ Normal     Hepatic/GI:   Colon CA       Physical Exam  General:  Obesity    Airway/Jaw/Neck:  Airway Findings: Tongue: Normal Mallampati: III  TM Distance: Normal, at least 6 cm       Chest/Lungs:  Chest/Lungs Clear    Heart/Vascular:  Heart Findings: Normal       Mental Status:  Mental Status Findings:  Alert and Oriented       Lab Results   Component Value Date    WBC 7.36 07/14/2020    HGB 9.0 (L) 07/14/2020    HCT 29.5 (L) 07/14/2020     (H) 07/14/2020    ALT 70 (H) 07/14/2020     (H) 07/14/2020     07/14/2020    K 4.2 07/14/2020     07/14/2020    CREATININE 0.72 07/14/2020    BUN 9 07/14/2020    CO2 23 07/14/2020    TSH 1.876 07/14/2020    INR 1.0 07/14/2020    HGBA1C 4.7 07/14/2020         Anesthesia Plan  Type of Anesthesia, risks & benefits discussed:  Anesthesia Type:  MAC, general  Patient's Preference:   Intra-op Monitoring Plan: standard ASA monitors  Intra-op Monitoring Plan Comments:   Post Op Pain Control Plan:   Post Op Pain Control Plan Comments:   Induction:    Beta Blocker:  Patient is not currently on a Beta-Blocker (No further  documentation required).       Informed Consent: Patient understands risks and agrees with Anesthesia plan.  Questions answered. Anesthesia consent signed with patient.  ASA Score: 3     Day of Surgery Review of History & Physical:            Ready For Surgery From Anesthesia Perspective.

## 2020-07-15 NOTE — [employer] PATIENT INSTRUCTIONS
Discharge Summary/Instructions after an Endoscopic Procedure  Patient Name: Huy Cisneros  Patient MRN: [id number]  Patient   YOB: 1990  Wednesday, July 15, 2020  Hi Drake MD  Your health is very important to us during the Covid Crisis. Following your   procedure today, you will receive a daily text for 2 weeks asking about   signs or symptoms of Covid 19.  Please respond to this text when you   receive it so we can follow up and keep you as safe as possible.   RESTRICTIONS:  During your procedure today, you received medications for sedation.  These   medications may affect your judgment, balance and coordination.  Therefore,   for 24 hours, you have the following restrictions:   - DO NOT drive a car, operate machinery, make legal/financial decisions,   sign important papers or drink alcohol.    ACTIVITY:  Today: no heavy lifting, straining or running due to procedural   sedation/anesthesia.  The following day: return to full activity including work.  DIET:  Eat and drink normally unless instructed otherwise.     TREATMENT FOR COMMON SIDE EFFECTS:  - Mild abdominal pain, nausea, belching, bloating or excessive gas:  rest,   eat lightly and use a heating pad.  - Sore Throat: treat with throat lozenges and/or gargle with warm salt   water.  - Because air was used during the procedure, expelling large amounts of air   from your rectum or belching is normal.  - If a bowel prep was taken, you may not have a bowel movement for 1-3 days.    This is normal.  SYMPTOMS TO WATCH FOR AND REPORT TO YOUR PHYSICIAN:  1. Abdominal pain or bloating, other than gas cramps.  2. Chest pain.  3. Back pain.  4. Signs of infection such as: chills or fever occurring within 24 hours   after the procedure.  5. Rectal bleeding, which would show as bright red, maroon, or black stools.   (A tablespoon of blood from the rectum is not serious, especially if   hemorrhoids are present.)  6. Vomiting.  7. Weakness or  dizziness.  GO DIRECTLY TO THE NEAREST EMERGENCY ROOM IF YOU HAVE ANY OF THE FOLLOWING:      Difficulty breathing              Chills and/or fever over 101 F   Persistent vomiting and/or vomiting blood   Severe abdominal pain   Severe chest pain   Black, tarry stools   Bleeding- more than one tablespoon   Any other symptom or condition that you feel may need urgent attention  Your doctor recommends these additional instructions:  If any biopsies were taken, your doctors clinic will contact you in 1 to 2   weeks with any results.  - Return patient to hospital [facility] for ongoing care.   - Advance diet as tolerated.   - Continue present medications.   - Await pathology results.   - Refer to a surgeon today.   - Repeat colonoscopy after surgery is complete because the examination was   incomplete.  For questions, problems or results please call your physician - Hi Drake MD.  EMERGENCY PHONE NUMBER: [phone],  LAB RESULTS: [phone]  IF A COMPLICATION OR EMERGENCY SITUATION ARISES AND YOU ARE UNABLE TO REACH   YOUR PHYSICIAN - GO DIRECTLY TO THE EMERGENCY ROOM.  Hi Drake MD  7/15/2020 2:22:03 PM  This report has been verified and signed electronically.  [employer]

## 2020-07-15 NOTE — PLAN OF CARE
Plan of care reviewed with patient.  Kept clean and dry per staff assist. Bowel prep administered  for colonoscopy. Patient remained NPO passed midnight. Safety maintained at this time. Bed in lowest position, side rails up x 2. Call light in reach.  Encouraged patient to use call light for assistance .Verbalized understanding. Will continue to monitor patient.

## 2020-07-15 NOTE — NURSING
Patient educated on importance of completing Golytely. Patient stated she gets nauseated when she takes sips. Offered to chill Golytely. Stated it was cool already. PRN Zofran administered for nausea.

## 2020-07-15 NOTE — NURSING
Patient did not complete bowel prep for colonoscopy. Patient   Encouraged and educated  on importance of finishing bowel prep. Patient stated it made her nauseated. PRN Zofran administered. MD Beauchamp notified..

## 2020-07-15 NOTE — PROGRESS NOTES
[facility] Res[facility] Progress Note    Subjective:      Plan for colonoscopy today but unable to finish prep last night 2/2 nausea. Discussed importance of finishing the prep with her in regards to GI's ability to perform the procedure. Otherwise she is still having some crampy left abdominal pain. UCx w/ E. Coli >100k- denies any frequency/dysuria.    Objective:   Last 24 Hour Vital Signs:  BP  Min: 106/72  Max: 139/89  Temp  Av.1 °F (36.7 °C)  Min: 96.9 °F (36.1 °C)  Max: 98.7 °F (37.1 °C)  Pulse  Av.4  Min: 82  Max: 102  Resp  Av.7  Min: 15  Max: 20  SpO2  Av.8 %  Min: 99 %  Max: 100 %  Weight  Av.5 kg (195 lb 1.7 oz)  Min: 88.5 kg (195 lb 1.7 oz)  Max: 88.5 kg (195 lb 1.7 oz)  I/O last 3 completed shifts:  In: 1255 [P.O.:125; IV Piggyback:1130]  Out: 0     Physical Examination:  Physical Exam   Constitutional: She is oriented to person, place, and time. No distress.   HENT:   Head: Normocephalic and atraumatic.   Nose: Nose normal. No rhinorrhea.   Mouth/Throat: Moist mucous membranes, no swelling   Eyes: Pupils are equal, round, and reactive to light. Conjunctivae are normal.   Neck: Normal range of motion. Neck supple.   Cardiovascular: Normal rate, regular rhythm and normal heart sounds.   Pulmonary/Chest: Effort normal and breath sounds normal.   Abdominal: Soft. Normal appearance and bowel sounds are normal.   Slight tenderness to left side upper and lower quadrants   Musculoskeletal:         General: No swelling or deformity.   Neurological: She is oriented to person, place, and time.   Skin: Skin is warm and dry.   Psychiatric: Her behavior is normal. Mood normal.        Laboratory:  Laboratory Data Reviewed:  Trended Lab Data:  Recent Labs   Lab 20  0032   WBC 7.36   HGB 9.0*   HCT 29.5*   *   MCV 79*   RDW 14.4      K 4.2      CO2 23   BUN 9   CREATININE 0.72      PROT 9.0*   ALBUMIN 4.5   BILITOT 0.9   *   ALKPHOS 202*   ALT  70*       Trended Cardiac Data:  No results for input(s): TROPONINI, CKTOTAL, CKMB, BNP in the last 168 hours.    Microbiology Data   Microbiology Results (last 7 days)     ** No results found for the last 168 hours. **          Radiology:  Imaging Results          X-Ray Chest 1 View (Final result)  Result time 07/14/20 07:27:46    Final result by Juanpablo Shine MD (07/14/20 07:27:46)                 Impression:      No acute abnormality.      Electronically signed by: Juanpablo Shine  Date:    07/14/2020  Time:    07:27             Narrative:    EXAMINATION:  XR CHEST 1 VIEW    CLINICAL HISTORY:  . Secondary malignant neoplasm of unspecified site    TECHNIQUE:  Single frontal portable view of the chest was performed.    COMPARISON:  04/29/2013    FINDINGS:  Support devices: None    The lungs are clear, with normal appearance of pulmonary vasculature and no pleural effusion or pneumothorax.    The cardiac silhouette is normal in size. The hilar and mediastinal contours are unremarkable.    Bones are intact.                               CT Abdomen Pelvis With Contrast (Final result)  Result time 07/14/20 07:42:01    Final result by Juanpablo Shine MD (07/14/20 07:42:01)                 Impression:      4.5 cm length annular lesion in the midline transverse colon suspicious for colonic neoplasm, with associated innumerable large hepatic metastatic lesions.  Fluid noted in the right colon suggesting impending diarrheal illness.  No evidence of bowel obstruction.      Electronically signed by: Juanpablo Shine  Date:    07/14/2020  Time:    07:42             Narrative:    EXAMINATION:  CT ABDOMEN PELVIS WITH CONTRAST    CLINICAL HISTORY:  Metastatic disease evaluation;abnormal CT scan with concern for liver metastasis;    TECHNIQUE:  Low dose axial images, sagittal and coronal reformations were obtained from the lung bases to the pubic symphysis after  mL Omnipaque 350.    All CT scans at this location are performed  using dose modulation techniques as appropriate to a performed exam including the following: Automated exposure control; adjustment of the mA and/or kV according to patient size.    COMPARISON:  CT abdomen pelvis without IV contrast 07/14/2020    FINDINGS:  Heart: Normal in size. No pericardial effusion.    Lung Bases: Well aerated, without consolidation or pleural fluid.    Liver: Innumerable large hypoattenuating hepatic lesions compatible with metastatic neoplastic process.  Largest lesion for example at the posterior right liver measuring 7.6 x 6.4 cm series 3, image 36.  And left hepatic lesion measuring 5.1 x 4.5 cm image 45.    Gallbladder: Innumerable partially calcified and low-density gallstones are present.  No CT evidence of cholecystitis.    Bile Ducts: No evidence of dilated ducts.    Pancreas: No mass or peripancreatic fat stranding.    Spleen: Unremarkable.    Adrenals: Unremarkable.    Kidneys/ Ureters: Unremarkable.    Bladder: Bladder partially distended limiting evaluation.  Suggestion of mild bladder wall thickening.  Please correlate for cystitis.    Reproductive organs: Left ovarian follicles noted.  Trace free fluid the pelvis.    GI Tract/Mesentery: Distal esophagus, stomach, duodenum, small bowel unremarkable.  Fluid noted in the right colon suggesting impending diarrheal illness.  4.5 cm length annular lesion in the midline transverse colon suspicious for colonic neoplasm.  9 x 9 mm mesenteric lymph node series 3, image 102.    Appendix: No evidence of appendictis.    Peritoneal Space: No free air.    Retroperitoneum: No significant adenopathy.    Abdominal wall: 5.2 x 2.1 cm ovoid lesion in the right flank superficial subcutaneous fat, likely representing an old posttraumatic resolved hematoma.    Vasculature: No significant atherosclerosis or aneurysm.    Bones: No acute fracture.                               CT Renal Stone Study ABD Pelvis WO (Final result)  Result time 07/14/20  08:08:32    Final result by ADRIANA Perdue Sr., MD (07/14/20 08:08:32)                 Impression:      1. There are multiple hypodense masses scattered throughout the liver.  This is consistent with the patient's history and characteristic of metastatic disease.  2. There is possible thickening of the wall of the transverse portion of the colon.  If additional imaging evaluation is clinically indicated, I recommend consideration of a CT examination of the abdomen and pelvis with oral, rectal, and IV contrast.  3. There is a heterogeneous mass in the subcutaneous fat posterolateral to the right gluteus chris.  This mass measures 5.4 cm in craniocaudal dimension by 2.5 cm in AP dimension by 4.6 cm in medial-lateral dimension.  If additional imaging evaluation of this is clinically indicated, I recommend consideration of a PET-CT examination.  This may help in the planning of a percutaneous biopsy.  4. The kidneys, ureters, and urinary bladder are normal in appearance.  5. There are multiple poorly visualized stones within the gallbladder.  If additional imaging evaluation is clinically indicated, I recommend consideration of an ultrasound examination of the gallbladder.  6. The liver is enlarged.  It measures 20.4 cm in craniocaudal dimension.  7. The emergency room department is already aware of the above findings and impressions at the time of this dictation.  There were notified by Dr. Jimenez at 01:26 on 07/14/2020.  All CT scans at this facility use dose modulation, iterative reconstruction, and/or weight base dosing when appropriate to reduce radiation dose when appropriate to reduce radiation dose to as low as reasonably achievable.      Electronically signed by: aCrlo Perdue MD  Date:    07/14/2020  Time:    08:08             Narrative:    EXAMINATION:  CT RENAL STONE STUDY ABD PELVIS WO    CLINICAL HISTORY:  Hematuria, unknown cause; metastatic disease    TECHNIQUE:  Standard abdomen and pelvis CT  protocol without oral or IV contrast was performed.    COMPARISON:  A KUB performed on 06/06/2020.    FINDINGS:  Finding: The size of the heart is within normal limits. The lungs are clear. There is no pneumothorax or pleural effusion.    There are multiple hypodense masses scattered throughout the liver.  The liver is enlarged.  It measures 20.4 cm in craniocaudal dimension.  There are multiple poorly visualized stones within the gallbladder.  The pancreas, spleen, adrenals, and kidneys are normal in appearance. The ureters and the urinary bladder are normal in appearance.  The uterus and ovaries were not optimally visualized.  The appendix is normal in appearance.  There is possible thickening of the wall of the transverse portion of the colon.  There is no free fluid within the abdomen or pelvis. There is no pneumoperitoneum.  There is a heterogeneous mass in the subcutaneous fat posterolateral to the right gluteus chris.  This mass measures 5.4 cm in craniocaudal dimension by 2.5 cm in AP dimension by 4.6 cm in medial-lateral dimension.                                  Current Medications:     Infusions:       Scheduled:   folic acid  1 mg Oral Daily        PRN:  acetaminophen, dextrose 50%, dextrose 50%, glucagon (human recombinant), glucose, glucose, ibuprofen, ondansetron, ondansetron, ondansetron, sodium chloride 0.9%, sodium chloride 0.9%    Antibiotics and Day Number of Therapy:  None    Lines and Day Number of Therapy:  PIV R    Assessment and Plan     Huy Cisneros is a 29 y.o.female being evaluated for new diagnosis of colon cancer + numerous metastases to the liver. Scheduled for colonoscopy today.    Persistent vomiting  -patient has intractable vomiting likely 2/2 obstruction of the transverse colon from her cancer  -unable to finish bowel prep overnight 2/2 nausea/vomiting  -PRN zofran    Metastatic colon cancer:  - Pt presents with 1 month history of post-prandial emesis and abdominal  pain.   - New onset diarrhea x 1 day with 10 episodes of non-bloody diarrhea in the past day.   - CT abdomen demonstrates stricture at transverse colon with numerous metastatic lesions in the liver.   - AST and ALT elevated on admission at 117 and 70 respectively. Alk-phos elevated at 202.   - Plan for colonoscopy today although patient as yet hasn't been able to finish bowel prep  -Will f/u GI recs     Microcytic anemia:  - Likely iron deficiency anemia 2/2 GI blood loss  - Hgb: 9.0, Hct: 29.5, MCV: 79.   - F/U iron studies  - Consider transfusion if Hgb < 7  - asymptomatic on admission     UTI:  - UCx positive for E. Coli >100,000 7/15  - No dysuria or frequency, pt reports symptoms do not feel like her previous UTI  - Urinalysis on admission nitrite (+), demonstrated +1 leukocytes, and > 100 RBCs with occasional bacteria.   - Pt given Rocephin 1 g IV at [facility] ED      Ovarian Cyst  -sees outpatient OBGYN        Health Maintenance:  Up to date on Flu, Tetanus, Pap smear  PCP [facility]     Fluids: GoLytely prep today, PO after  Electrolytes: No abnormalities  Prophylaxis: Lovenox 40mg  Code Status: Full     Dispo: Pending GI recommendations, outpatient workup of new cancer diagnosis  Angel Del Rosario  [facility] HO-I  [facility] Hospitalist Service Team B    [facility] Hospitalist Pager numbers:   [facility] Hospital[facility] Medicine Team A (Beatris/Selvin): [phone]  [facility]ist Medicine Team B (Carolyn/Nato):  [phone]

## 2020-07-15 NOTE — PLAN OF CARE
Recovery complete. Patient recovered to baseline. Report phoned to TERRY Ennis. Pt transferred back to unit in [facility].

## 2020-07-15 NOTE — PLAN OF CARE
Patient A&Ox4.  Room air.  No telemtery orders.  Regular diet.  Up independently to the bathroom.  Skin intact.  No complaints of pain.  Colonoscopy done with this shift with biopsy taken.  All questions answered.  Will continue to monitor.    Marycarmen Cohn RN

## 2020-07-15 NOTE — PLAN OF CARE
Dr Drake attempted to talk to pt post procedure, but pt is sleeping and whimpering in her sleep and did not respond to him. NAGEL BUSTOS to revisit pt another time.

## 2020-07-15 NOTE — TELEPHONE ENCOUNTER
9:12am:  Attempted to notify/lm for pt. To confirm appt. With Dr. Arzola at 8:30am on 7/17/20 but phone out of service.    9:13am:  Pt.'s mother requests call in 10-15 minutes with appt. Info.    9:25am:  Pt. And mother confirmed appt. With Dr. Arzola at 8:30am on 7/17/20.

## 2020-07-16 NOTE — PROGRESS NOTES
"[facility]    CC: "Evaluation for metastatic colon cancer", left lower quadrant abdominal pain, nausea, vomiting    HPI 29 y.o. female with PMHx of left ovarian cyst who presents for progressive, intermittent left lower quadrant abdominal pain with associated nausea and vomiting. The patient states that she developed progressively worsening, intermittent left lower quadrant abdominal pain over the past 2-3 weeks and nausea and vomiting for the last month of non-bloody, bilious emesis. She was transferred from [facility] after coming to the ED at [facility] for these complaints with workup including CT A/P revealing 4.5 cm length annular lesion in the midline transverse colon suspicious for colonic neoplasm along with innumerable large hepatic lesions, suspicious for metastases, with largest measuring 7.6 by 6.4 cm in size. She was treated for a UTI and was transferred to [facility] and GI was consulted for further evaluation of suspected metastatic colon cancer.    Interval History:   Patient seen and examined this morning, no acute events overnight. She reports nausea and has had little PO intake. Minimal abdominal pain this morning.     Review of Systems  General ROS: negative for chills, fever. +Weight loss  Respiratory ROS: no cough, shortness of breath, or wheezing  Cardiovascular ROS: no chest pain or dyspnea on exertion  Gastrointestinal ROS: +Abdominal pain. No change in bowel habits, or black/ bloody stools      Physical Examination  /87 (BP Location: Right arm, Patient Position: Lying)   Pulse 89   Temp 97.7 °F (36.5 °C) (Oral)   Resp 20   Ht 5' 5" (1.651 m)   Wt 88.5 kg (195 lb 1.7 oz)   LMP 06/22/2020 (Approximate)   SpO2 97%   Breastfeeding No   BMI 32.47 kg/m²   General appearance: alert, cooperative, no distress, sitting upright in bed  HENT: Normocephalic, atraumatic, neck symmetrical, no nasal discharge   Eyes: conjunctivae/corneas clear, PERRL, EOM's intact  Lungs: clear to " auscultation bilaterally, no chest wall tenderness   Heart: regular rate and rhythm without rub, no murmurs, gallops or rubs   Abdomen: tenderness to  deep palpation at LLQ and epigastrium, no guarding or rebound tenderness present  Extremities: extremities symmetric; no clubbing, cyanosis, or edema    Labs:  WBC 7.36, Hgb 9.0, MCV 79, Plts 479  Ferritin 147, alkaline phosphatase 202, T bili 0.9, , ALT 70, lipase 26  UA 3+ occult blood, positive nitrites, 2+ bilirubin    Imaging:  I have personally reviewed and interpreted these images.    Assessment:   Ms. Cisneros is a 29 year old AAF with PMHx who presents for progressive, intermittent left lower quadrant abdominal pain x2-3 weeks with associated nausea and vomiting x1 month. Workup including CT A/P revealing 4.5 cm length annular lesion in the midline transverse colon suspicious for colonic neoplasm along with innumerable large hepatic lesions, suspicious for metastases, with largest measuring 7.6 by 6.4 cm in size. GI was consulted for further evaluation of suspected metastatic colon cancer. Pt reports 10 lb weight loss over last month and hematochezia with bright red blood upon wiping. Suspect colonic adenocarcinoma with metastases to liver with microcytic anemia. She has undergone IR biopsy of liver lesion and colonoscpy with biospy of transverse colon mass     Plan:   - CEA elevated, AFP wnl   - colonoscopy 7/15 with biopsy of partially obstructing mass in transverse colon  - follow up path and oncology planning as next step as chemotherapy will likely be pursued and will shrink the colonic tumor with stricture but will consider for future stent or surgery for palliation

## 2020-07-16 NOTE — CONSULTS
"Patient ID: Huy Cisneros is a 29 y.o. female.    Chief Complaint: Vomiting (Pt c/o vomiting after each time she eats X 1 month. No vomiting noted since arrival to ED. ) and Diarrhea (Pt c/o Diarrhea X 10 episodes today.  Pt reports seeing "a little blood" on toilet tissue with last episode of diarrhea. )      HPI:  29F presents to ED in [place] with nausea, vomiting, stomach pains across upper abdomen. OVerall this pain is worse shortly after eating. She thinks that has been getting worse for the past 3-4 weeks. She previously went to the ED with similar symptoms. She was transferred to [place] when CT showed mass in transverse colon. She underwent a colon prep which she seems to have tolerated well and had cscope but they were unable to pass the area of the mass even with pediatric scope. Biopsies were taken. She does have bowel movements. Denies fam hx of colon cancer. Has not been able to keep much food down, even soups for a few weeks. Has lost enough weight in the past month that her clothes no longer fit.     Review of Systems   Constitutional: Negative for fever.   HENT: Negative for trouble swallowing.    Respiratory: Negative for shortness of breath.    Cardiovascular: Negative for chest pain.   Gastrointestinal: Positive for abdominal pain, nausea and vomiting. Negative for blood in stool.   Genitourinary: Negative for dysuria.   Musculoskeletal: Negative for gait problem.   Skin: Negative for rash and wound.   Allergic/Immunologic: Negative for immunocompromised state.   Neurological: Negative for weakness.   Hematological: Does not bruise/bleed easily.   Psychiatric/Behavioral: Negative for agitation.       Current Facility-Administered Medications   Medication Dose Route Frequency Provider Last Rate Last Dose    acetaminophen tablet 650 mg  650 mg Oral Q6H PRN Drake Littlejohn MD        dextrose 50% injection 12.5 g  12.5 g Intravenous PRN Leonila Rogers MD        dextrose 50% injection 25 g  " 25 g Intravenous PRN Leonila Rogers MD        folic acid tablet 1 mg  1 mg Oral Daily Drake Littlejohn MD   1 mg at 07/16/20 0809    glucagon (human recombinant) injection 1 mg  1 mg Intramuscular PRN Leonila Rogers MD        glucose chewable tablet 16 g  16 g Oral PRN Leonila Rogers MD        glucose chewable tablet 24 g  24 g Oral PRN Leonila Rogers MD        ibuprofen tablet 400 mg  400 mg Oral Q4H PRN Leonila Rogers MD   400 mg at 07/15/20 2158    ondansetron disintegrating tablet 4 mg  4 mg Oral Q8H PRN Leonila Rogers MD   4 mg at 07/15/20 0407    ondansetron injection 4 mg  4 mg Intravenous Q4H PRN Melquiades Leon MD        ondansetron injection 8 mg  8 mg Intravenous PRN Melquiades Leon MD        sodium chloride 0.9% flush 10 mL  10 mL Intravenous PRN Bola Villafuerte Jr., MD        sodium chloride 0.9% flush 10 mL  10 mL Intravenous PRN Leonila Rogers MD           Review of patient's allergies indicates:   Allergen Reactions    Sulfa (sulfonamide antibiotics)        Past Medical History:   Diagnosis Date    Uterine cyst        Past Surgical History:   Procedure Laterality Date    COLONOSCOPY N/A 7/15/2020    Procedure: COLONOSCOPY;  Surgeon: Hi Drake MD;  Location: [facility];  Service: Endoscopy;  Laterality: N/A;    COLONOSCOPY W/ BIOPSIES         Family History   Problem Relation Age of Onset    Diabetes Mother     Hypertension Mother     Hypertension Maternal Grandmother     Hypertension Maternal Grandfather     Hypertension Paternal Grandmother     Hypertension Paternal Grandfather        Social History     Socioeconomic History    Marital status: Single     Spouse name: Not on file    Number of children: Not on file    Years of education: Not on file    Highest education level: Not on file   Occupational History    Not on file   Social Needs    Financial resource strain: Not on file    Food insecurity     Worry: Not on file     Inability: Not on file     Transportation needs     Medical: Not on file     Non-medical: Not on file   Tobacco Use    Smoking status: Never Smoker    Smokeless tobacco: Never Used   Substance and Sexual Activity    Alcohol use: No    Drug use: No    Sexual activity: Yes     Partners: Male     Birth control/protection: None   Lifestyle    Physical activity     Days per week: Not on file     Minutes per session: Not on file    Stress: Not on file   Relationships    Social connections     Talks on phone: Not on file     Gets together: Not on file     Attends Nondenominational service: Not on file     Active member of club or organization: Not on file     Attends meetings of clubs or organizations: Not on file     Relationship status: Not on file   Other Topics Concern    Not on file   Social History Narrative    Not on file       Vitals:    07/16/20 1210   BP: 124/87   Pulse: 89   Resp: 20   Temp: 97.7 °F (36.5 °C)       Physical Exam  Constitutional:       General: She is not in acute distress.     Appearance: She is well-developed.   HENT:      Head: Normocephalic and atraumatic.   Eyes:      General: No scleral icterus.  Cardiovascular:      Rate and Rhythm: Normal rate.   Pulmonary:      Effort: Pulmonary effort is normal.      Breath sounds: No stridor.   Abdominal:      General: There is no distension.      Palpations: Abdomen is soft.      Tenderness: There is no abdominal tenderness.   Lymphadenopathy:      Cervical: No cervical adenopathy.   Skin:     General: Skin is warm.      Findings: No erythema.   Neurological:      Mental Status: She is alert and oriented to person, place, and time.   Psychiatric:         Behavior: Behavior normal.     CT shows mass in mid transverse colon along with what appears to be widespread metastatic disease in the liver.  CEA 130s  WBC 4  Hg 8  Liver biopsy done  Albumin 4.5 on admit    Assessment & Plan:   29F with obstructing mass in transverse colon likely metastatic colon cancer  Discussed options  with patient, hemo/onc, GI. Given that she appears obstructed would not wait for neoadj chemo. Plan for resection in am. Discussed possible ostomy, possible leak. Will place port while in OR.

## 2020-07-16 NOTE — SUBJECTIVE & OBJECTIVE
Interval History:   - she underwent colonoscopy/biopsy yesterday.  - she feels somewhat better today. She is tolerating PO diet.    Oncology Treatment Plan:   [No treatment plan]    Medications:  Continuous Infusions:  Scheduled Meds:   folic acid  1 mg Oral Daily     PRN Meds:acetaminophen, dextrose 50%, dextrose 50%, glucagon (human recombinant), glucose, glucose, ibuprofen, ondansetron, ondansetron, ondansetron, sodium chloride 0.9%, sodium chloride 0.9%     Review of Systems   Constitutional: Positive for fatigue and unexpected weight change.   HENT: Negative for sore throat.    Eyes: Negative for visual disturbance.   Respiratory: Negative for shortness of breath.    Cardiovascular: Negative for chest pain.   Gastrointestinal: Positive for abdominal pain.   Genitourinary: Negative for dysuria.   Musculoskeletal: Negative for back pain.   Skin: Negative for rash.   Neurological: Negative for headaches.   Hematological: Negative for adenopathy.   Psychiatric/Behavioral: The patient is not nervous/anxious.      Objective:     Vital Signs (Most Recent):  Temp: 97.5 °F (36.4 °C) (07/16/20 0737)  Pulse: 100 (07/16/20 0737)  Resp: 18 (07/16/20 0737)  BP: 117/64 (07/16/20 0737)  SpO2: 98 % (07/16/20 0800) Vital Signs (24h Range):  Temp:  [97.5 °F (36.4 °C)-98.4 °F (36.9 °C)] 97.5 °F (36.4 °C)  Pulse:  [] 100  Resp:  [16-24] 18  SpO2:  [98 %-100 %] 98 %  BP: (117-135)/(58-90) 117/64     Weight: 88.5 kg (195 lb 1.7 oz)  Body mass index is 32.47 kg/m².  Body surface area is 2.01 meters squared.      Intake/Output Summary (Last 24 hours) at 7/16/2020 1013  Last data filed at 7/16/2020 0818  Gross per 24 hour   Intake 380 ml   Output 700 ml   Net -320 ml       Physical Exam  Vitals signs and nursing note reviewed.   Constitutional:       Appearance: She is well-developed.   HENT:      Head: Normocephalic and atraumatic.   Eyes:      Pupils: Pupils are equal, round, and reactive to light.   Neck:      Musculoskeletal:  Normal range of motion and neck supple.   Cardiovascular:      Rate and Rhythm: Normal rate and regular rhythm.   Pulmonary:      Effort: Pulmonary effort is normal.      Breath sounds: Normal breath sounds.   Abdominal:      General: Bowel sounds are normal.      Palpations: Abdomen is soft.   Musculoskeletal: Normal range of motion.   Skin:     General: Skin is warm and dry.   Neurological:      Mental Status: She is alert and oriented to person, place, and time.   Psychiatric:         Behavior: Behavior normal.         Thought Content: Thought content normal.         Judgment: Judgment normal.         Significant Labs:   Labs have been reviewed.    Lab Results   Component Value Date    WBC 4.84 07/16/2020    HGB 7.9 (L) 07/16/2020    HCT 26.3 (L) 07/16/2020    MCV 78 (L) 07/16/2020     (H) 07/16/2020     Colonoscopy (7/15/20):  Findings:        The perianal and digital rectal examinations were normal.        A fungating partially obstructing mass was found in the transverse        colon. The mass was circumferential. It could not be traversed by        the pediatric colonoscope. No bleeding was present on finding but        did bleed on contact. This was biopsied with a cold forceps for        histology. Verification of patient identification for the specimen        was done. Estimated blood loss was minimal.   Impression:             - Likely malignant partially obstructing tumor in the transverse colon. Biopsied.

## 2020-07-16 NOTE — PLAN OF CARE
Plan of care reviewed with patient.  Kept clean and dry per staff assist.Regular diet. Patient stated she did not have an appetite. Safety maintained at this time. Bed in lowest position, side rails up x 2. Call light in reach.  Encouraged patient to use call light for assistance .Verbalized understanding. Will continue to monitor patient.

## 2020-07-16 NOTE — ASSESSMENT & PLAN NOTE
- CT imaging is very concerning for metastatic colon cancer.  - follow up pathology from colonoscopy.  - defer to surgery regarding if she will need upfront surgery for partially obstructing colonic mass.  - I will schedule a follow-up appointment in clinic in one week to discuss results of biopsy and determine next steps (systemic chemotherapy).

## 2020-07-16 NOTE — PROGRESS NOTES
[facility] Resident [facility] Progress Note    Subjective:      Underwent colonoscopy yesterday, to be evaluated by surgery today. Poor PO intake 2/2 reduced appetite, otherwise no complaints today.    Objective:   Last 24 Hour Vital Signs:  BP  Min: 117/64  Max: 135/79  Temp  Av.9 °F (36.6 °C)  Min: 97.5 °F (36.4 °C)  Max: 98.4 °F (36.9 °C)  Pulse  Av.5  Min: 92  Max: 112  Resp  Av.2  Min: 16  Max: 24  SpO2  Av.3 %  Min: 98 %  Max: 100 %  I/O last 3 completed shifts:  In: 505 [P.O.:305; I.V.:200]  Out: 200 [Urine:200]    Physical Examination:  Physical Exam   Constitutional: She is oriented to person, place, and time. No distress.   HENT:   Head: Normocephalic and atraumatic.   Nose: Nose normal. No rhinorrhea.   Mouth/Throat: Moist mucous membranes, no swelling   Eyes: Pupils are equal, round, and reactive to light. Conjunctivae are normal.   Neck: Normal range of motion. Neck supple.   Cardiovascular: Normal rate, regular rhythm and normal heart sounds.   Pulmonary/Chest: Effort normal and breath sounds normal.   Abdominal: Soft. Normal appearance and bowel sounds are normal.   Slight tenderness to left side upper and lower quadrants   Musculoskeletal:         General: No swelling or deformity.   Neurological: She is oriented to person, place, and time.   Skin: Skin is warm and dry.   Psychiatric: Her behavior is normal. Mood normal.        Laboratory:  Laboratory Data Reviewed:  Trended Lab Data:  Recent Labs   Lab 20  0032 07/15/20  0825   WBC 7.36  --    HGB 9.0*  --    HCT 29.5*  --    *  --    MCV 79*  --    RDW 14.4  --     136   K 4.2 4.4    102   CO2 23 23   BUN 9 8   CREATININE 0.72 0.8    72   PROT 9.0* 7.9   ALBUMIN 4.5 3.5   BILITOT 0.9 0.7   * 86*   ALKPHOS 202* 160*   ALT 70* 47*       Trended Cardiac Data:  No results for input(s): TROPONINI, CKTOTAL, CKMB, BNP in the last 168 hours.    Microbiology Data   Microbiology Results (last  7 days)     ** No results found for the last 168 hours. **          Radiology:  Imaging Results          X-Ray Chest 1 View (Final result)  Result time 07/14/20 07:27:46    Final result by Juanpablo Shine MD (07/14/20 07:27:46)                 Impression:      No acute abnormality.      Electronically signed by: Juanpablo Shine  Date:    07/14/2020  Time:    07:27             Narrative:    EXAMINATION:  XR CHEST 1 VIEW    CLINICAL HISTORY:  . Secondary malignant neoplasm of unspecified site    TECHNIQUE:  Single frontal portable view of the chest was performed.    COMPARISON:  04/29/2013    FINDINGS:  Support devices: None    The lungs are clear, with normal appearance of pulmonary vasculature and no pleural effusion or pneumothorax.    The cardiac silhouette is normal in size. The hilar and mediastinal contours are unremarkable.    Bones are intact.                               CT Abdomen Pelvis With Contrast (Final result)  Result time 07/14/20 07:42:01    Final result by Juanpablo Shine MD (07/14/20 07:42:01)                 Impression:      4.5 cm length annular lesion in the midline transverse colon suspicious for colonic neoplasm, with associated innumerable large hepatic metastatic lesions.  Fluid noted in the right colon suggesting impending diarrheal illness.  No evidence of bowel obstruction.      Electronically signed by: Juanpablo Shine  Date:    07/14/2020  Time:    07:42             Narrative:    EXAMINATION:  CT ABDOMEN PELVIS WITH CONTRAST    CLINICAL HISTORY:  Metastatic disease evaluation;abnormal CT scan with concern for liver metastasis;    TECHNIQUE:  Low dose axial images, sagittal and coronal reformations were obtained from the lung bases to the pubic symphysis after  mL Omnipaque 350.    All CT scans at this location are performed using dose modulation techniques as appropriate to a performed exam including the following: Automated exposure control; adjustment of the mA and/or kV according to  patient size.    COMPARISON:  CT abdomen pelvis without IV contrast 07/14/2020    FINDINGS:  Heart: Normal in size. No pericardial effusion.    Lung Bases: Well aerated, without consolidation or pleural fluid.    Liver: Innumerable large hypoattenuating hepatic lesions compatible with metastatic neoplastic process.  Largest lesion for example at the posterior right liver measuring 7.6 x 6.4 cm series 3, image 36.  And left hepatic lesion measuring 5.1 x 4.5 cm image 45.    Gallbladder: Innumerable partially calcified and low-density gallstones are present.  No CT evidence of cholecystitis.    Bile Ducts: No evidence of dilated ducts.    Pancreas: No mass or peripancreatic fat stranding.    Spleen: Unremarkable.    Adrenals: Unremarkable.    Kidneys/ Ureters: Unremarkable.    Bladder: Bladder partially distended limiting evaluation.  Suggestion of mild bladder wall thickening.  Please correlate for cystitis.    Reproductive organs: Left ovarian follicles noted.  Trace free fluid the pelvis.    GI Tract/Mesentery: Distal esophagus, stomach, duodenum, small bowel unremarkable.  Fluid noted in the right colon suggesting impending diarrheal illness.  4.5 cm length annular lesion in the midline transverse colon suspicious for colonic neoplasm.  9 x 9 mm mesenteric lymph node series 3, image 102.    Appendix: No evidence of appendictis.    Peritoneal Space: No free air.    Retroperitoneum: No significant adenopathy.    Abdominal wall: 5.2 x 2.1 cm ovoid lesion in the right flank superficial subcutaneous fat, likely representing an old posttraumatic resolved hematoma.    Vasculature: No significant atherosclerosis or aneurysm.    Bones: No acute fracture.                               CT Renal Stone Study ABD Pelvis WO (Final result)  Result time 07/14/20 08:08:32    Final result by ADRIANA Perdue Sr., MD (07/14/20 08:08:32)                 Impression:      1. There are multiple hypodense masses scattered throughout the  liver.  This is consistent with the patient's history and characteristic of metastatic disease.  2. There is possible thickening of the wall of the transverse portion of the colon.  If additional imaging evaluation is clinically indicated, I recommend consideration of a CT examination of the abdomen and pelvis with oral, rectal, and IV contrast.  3. There is a heterogeneous mass in the subcutaneous fat posterolateral to the right gluteus chris.  This mass measures 5.4 cm in craniocaudal dimension by 2.5 cm in AP dimension by 4.6 cm in medial-lateral dimension.  If additional imaging evaluation of this is clinically indicated, I recommend consideration of a PET-CT examination.  This may help in the planning of a percutaneous biopsy.  4. The kidneys, ureters, and urinary bladder are normal in appearance.  5. There are multiple poorly visualized stones within the gallbladder.  If additional imaging evaluation is clinically indicated, I recommend consideration of an ultrasound examination of the gallbladder.  6. The liver is enlarged.  It measures 20.4 cm in craniocaudal dimension.  7. The emergency room department is already aware of the above findings and impressions at the time of this dictation.  There were notified by Dr. Jimenez at 01:26 on 07/14/2020.  All CT scans at this facility use dose modulation, iterative reconstruction, and/or weight base dosing when appropriate to reduce radiation dose when appropriate to reduce radiation dose to as low as reasonably achievable.      Electronically signed by: Carlo Perdue MD  Date:    07/14/2020  Time:    08:08             Narrative:    EXAMINATION:  CT RENAL STONE STUDY ABD PELVIS WO    CLINICAL HISTORY:  Hematuria, unknown cause; metastatic disease    TECHNIQUE:  Standard abdomen and pelvis CT protocol without oral or IV contrast was performed.    COMPARISON:  A KUB performed on 06/06/2020.    FINDINGS:  Finding: The size of the heart is within normal limits. The  lungs are clear. There is no pneumothorax or pleural effusion.    There are multiple hypodense masses scattered throughout the liver.  The liver is enlarged.  It measures 20.4 cm in craniocaudal dimension.  There are multiple poorly visualized stones within the gallbladder.  The pancreas, spleen, adrenals, and kidneys are normal in appearance. The ureters and the urinary bladder are normal in appearance.  The uterus and ovaries were not optimally visualized.  The appendix is normal in appearance.  There is possible thickening of the wall of the transverse portion of the colon.  There is no free fluid within the abdomen or pelvis. There is no pneumoperitoneum.  There is a heterogeneous mass in the subcutaneous fat posterolateral to the right gluteus chris.  This mass measures 5.4 cm in craniocaudal dimension by 2.5 cm in AP dimension by 4.6 cm in medial-lateral dimension.                                  Current Medications:     Infusions:       Scheduled:   folic acid  1 mg Oral Daily        PRN:  acetaminophen, dextrose 50%, dextrose 50%, glucagon (human recombinant), glucose, glucose, ibuprofen, ondansetron, ondansetron, ondansetron, sodium chloride 0.9%, sodium chloride 0.9%    Antibiotics and Day Number of Therapy:  None    Lines and Day Number of Therapy:  PIV R    Assessment and Plan     Huy Cisneros is a 29 y.o.female being evaluated for new diagnosis of colon cancer + numerous metastases to the liver. S/p colonoscopy 7/15.    Persistent vomiting  -patient has intractable vomiting likely 2/2 obstruction of the transverse colon from her cancer  -surgery evaluating today for possible resection of transverse colonic stricture  -better PO intake with PRN zofran    Metastatic colon cancer:  - Pt presents with 1 month history of post-prandial emesis and abdominal pain.   - New onset diarrhea x 1 day with 10 episodes of non-bloody diarrhea in the past day.   - CT abdomen demonstrates stricture at  transverse colon with numerous metastatic lesions in the liver.   - AST and ALT elevated on admission at 117 and 70 respectively. Alk-phos elevated at 202.   - likely malignant partially obstructing tumor in transverse colon; biopsy pending  - will F/U GI/surgery recs     Microcytic anemia:  - Likely iron deficiency anemia 2/2 GI blood loss  - Hgb: 9.0, Hct: 29.5, MCV: 79.   - F/U iron studies  - Consider transfusion if Hgb < 7  - asymptomatic on admission     UTI:  - UCx positive for E. Coli >100,000 7/15  - No dysuria or frequency, pt reports symptoms do not feel like her previous UTI  - Urinalysis on admission nitrite (+), demonstrated +1 leukocytes, and > 100 RBCs with occasional bacteria.   - Pt given Rocephin 1 g IV at [facility] ED  - Refraining from additional abx given lack of symptoms      Ovarian Cyst  -sees outpatient OBGYN        Health Maintenance:  Up to date on Flu, Tetanus, Pap smear  PCP [facility]     Fluids: PO  Electrolytes: No abnormalities  Prophylaxis: Lovenox 40mg  Code Status: Full     Dispo: Pending surgery eval, workup of new cancer diagnosis  Angel Del Rosario  [facility] HO-I  [facility] Hospitalist Service Team B    [facility] Hospitalist Pager numbers:   [facility] Hospitalist Medicine Team A (Beatris/Selvin): [phone]  [facility] Hospitalist Medicine Team B (Carolyn/Nato):  [phone]

## 2020-07-16 NOTE — PLAN OF CARE
Patient A&Ox4.  Room air.  No telemetry orders.  Clear liquid diet.  Up independently to the bathroom.  NPO at midnight for colectomy and port placement.  All questions answered.  Will continue to monitor.    Marycarmen Cohn RN

## 2020-07-16 NOTE — H&P (VIEW-ONLY)
"Patient ID: Huy Cisneros is a 29 y.o. female.    Chief Complaint: Vomiting (Pt c/o vomiting after each time she eats X 1 month. No vomiting noted since arrival to ED. ) and Diarrhea (Pt c/o Diarrhea X 10 episodes today.  Pt reports seeing "a little blood" on toilet tissue with last episode of diarrhea. )      HPI:  29F presents to ED in [place] with nausea, vomiting, stomach pains across upper abdomen. OVerall this pain is worse shortly after eating. She thinks that has been getting worse for the past 3-4 weeks. She previously went to the ED with similar symptoms. She was transferred to [place] when CT showed mass in transverse colon. She underwent a colon prep which she seems to have tolerated well and had cscope but they were unable to pass the area of the mass even with pediatric scope. Biopsies were taken. She does have bowel movements. Denies fam hx of colon cancer. Has not been able to keep much food down, even soups for a few weeks. Has lost enough weight in the past month that her clothes no longer fit.     Review of Systems   Constitutional: Negative for fever.   HENT: Negative for trouble swallowing.    Respiratory: Negative for shortness of breath.    Cardiovascular: Negative for chest pain.   Gastrointestinal: Positive for abdominal pain, nausea and vomiting. Negative for blood in stool.   Genitourinary: Negative for dysuria.   Musculoskeletal: Negative for gait problem.   Skin: Negative for rash and wound.   Allergic/Immunologic: Negative for immunocompromised state.   Neurological: Negative for weakness.   Hematological: Does not bruise/bleed easily.   Psychiatric/Behavioral: Negative for agitation.       Current Facility-Administered Medications   Medication Dose Route Frequency Provider Last Rate Last Dose    acetaminophen tablet 650 mg  650 mg Oral Q6H PRN Drake Littlejohn MD        dextrose 50% injection 12.5 g  12.5 g Intravenous PRN Leonila Rogers MD        dextrose 50% injection 25 g  " 25 g Intravenous PRN Leonila Rogers MD        folic acid tablet 1 mg  1 mg Oral Daily Drake Littlejohn MD   1 mg at 07/16/20 0809    glucagon (human recombinant) injection 1 mg  1 mg Intramuscular PRN Leonila Rogers MD        glucose chewable tablet 16 g  16 g Oral PRN Leonila Rogers MD        glucose chewable tablet 24 g  24 g Oral PRN Leonila Rogers MD        ibuprofen tablet 400 mg  400 mg Oral Q4H PRN Leonila Rogers MD   400 mg at 07/15/20 2158    ondansetron disintegrating tablet 4 mg  4 mg Oral Q8H PRN Leonila Rogers MD   4 mg at 07/15/20 0407    ondansetron injection 4 mg  4 mg Intravenous Q4H PRN Melquiades Leon MD        ondansetron injection 8 mg  8 mg Intravenous PRN Melquiades Leon MD        sodium chloride 0.9% flush 10 mL  10 mL Intravenous PRN Bola Villafuerte Jr., MD        sodium chloride 0.9% flush 10 mL  10 mL Intravenous PRN Leonila Rogers MD           Review of patient's allergies indicates:   Allergen Reactions    Sulfa (sulfonamide antibiotics)        Past Medical History:   Diagnosis Date    Uterine cyst        Past Surgical History:   Procedure Laterality Date    COLONOSCOPY N/A 7/15/2020    Procedure: COLONOSCOPY;  Surgeon: Hi Drake MD;  Location: [facility];  Service: Endoscopy;  Laterality: N/A;    COLONOSCOPY W/ BIOPSIES         Family History   Problem Relation Age of Onset    Diabetes Mother     Hypertension Mother     Hypertension Maternal Grandmother     Hypertension Maternal Grandfather     Hypertension Paternal Grandmother     Hypertension Paternal Grandfather        Social History     Socioeconomic History    Marital status: Single     Spouse name: Not on file    Number of children: Not on file    Years of education: Not on file    Highest education level: Not on file   Occupational History    Not on file   Social Needs    Financial resource strain: Not on file    Food insecurity     Worry: Not on file     Inability: Not on file     Transportation needs     Medical: Not on file     Non-medical: Not on file   Tobacco Use    Smoking status: Never Smoker    Smokeless tobacco: Never Used   Substance and Sexual Activity    Alcohol use: No    Drug use: No    Sexual activity: Yes     Partners: Male     Birth control/protection: None   Lifestyle    Physical activity     Days per week: Not on file     Minutes per session: Not on file    Stress: Not on file   Relationships    Social connections     Talks on phone: Not on file     Gets together: Not on file     Attends Voodoo service: Not on file     Active member of club or organization: Not on file     Attends meetings of clubs or organizations: Not on file     Relationship status: Not on file   Other Topics Concern    Not on file   Social History Narrative    Not on file       Vitals:    07/16/20 1210   BP: 124/87   Pulse: 89   Resp: 20   Temp: 97.7 °F (36.5 °C)       Physical Exam  Constitutional:       General: She is not in acute distress.     Appearance: She is well-developed.   HENT:      Head: Normocephalic and atraumatic.   Eyes:      General: No scleral icterus.  Cardiovascular:      Rate and Rhythm: Normal rate.   Pulmonary:      Effort: Pulmonary effort is normal.      Breath sounds: No stridor.   Abdominal:      General: There is no distension.      Palpations: Abdomen is soft.      Tenderness: There is no abdominal tenderness.   Lymphadenopathy:      Cervical: No cervical adenopathy.   Skin:     General: Skin is warm.      Findings: No erythema.   Neurological:      Mental Status: She is alert and oriented to person, place, and time.   Psychiatric:         Behavior: Behavior normal.     CT shows mass in mid transverse colon along with what appears to be widespread metastatic disease in the liver.  CEA 130s  WBC 4  Hg 8  Liver biopsy done  Albumin 4.5 on admit    Assessment & Plan:   29F with obstructing mass in transverse colon likely metastatic colon cancer  Discussed options  with patient, hemo/onc, GI. Given that she appears obstructed would not wait for neoadj chemo. Plan for resection in am. Discussed possible ostomy, possible leak. Will place port while in OR.

## 2020-07-16 NOTE — TELEPHONE ENCOUNTER
Pt.'s mother and pt. Confirmed rescheduled appt. With Dr. Arzola at 2:30pm on 7/23/20 due to hospital admittance.

## 2020-07-16 NOTE — PROGRESS NOTES
[facility]  Hematology/Oncology  Progress Note    Patient Name: Huy Cisneros  Admission Date: 7/13/2020  Hospital Length of Stay: 2 days  Code Status: Full Code     Subjective:     HPI:  29 year-old female was admitted on 7/13/20 for nausea/abdominal pain. CT imaging revealed a colon mass with liver lesions concerning for metastatic cancer. Consult is for cancer.    Interval History:   - she underwent colonoscopy/biopsy yesterday.  - she feels somewhat better today. She is tolerating PO diet.    Oncology Treatment Plan:   [No treatment plan]    Medications:  Continuous Infusions:  Scheduled Meds:   folic acid  1 mg Oral Daily     PRN Meds:acetaminophen, dextrose 50%, dextrose 50%, glucagon (human recombinant), glucose, glucose, ibuprofen, ondansetron, ondansetron, ondansetron, sodium chloride 0.9%, sodium chloride 0.9%     Review of Systems   Constitutional: Positive for fatigue and unexpected weight change.   HENT: Negative for sore throat.    Eyes: Negative for visual disturbance.   Respiratory: Negative for shortness of breath.    Cardiovascular: Negative for chest pain.   Gastrointestinal: Positive for abdominal pain.   Genitourinary: Negative for dysuria.   Musculoskeletal: Negative for back pain.   Skin: Negative for rash.   Neurological: Negative for headaches.   Hematological: Negative for adenopathy.   Psychiatric/Behavioral: The patient is not nervous/anxious.      Objective:     Vital Signs (Most Recent):  Temp: 97.5 °F (36.4 °C) (07/16/20 0737)  Pulse: 100 (07/16/20 0737)  Resp: 18 (07/16/20 0737)  BP: 117/64 (07/16/20 0737)  SpO2: 98 % (07/16/20 0800) Vital Signs (24h Range):  Temp:  [97.5 °F (36.4 °C)-98.4 °F (36.9 °C)] 97.5 °F (36.4 °C)  Pulse:  [] 100  Resp:  [16-24] 18  SpO2:  [98 %-100 %] 98 %  BP: (117-135)/(58-90) 117/64     Weight: 88.5 kg (195 lb 1.7 oz)  Body mass index is 32.47 kg/m².  Body surface area is 2.01 meters squared.      Intake/Output Summary  (Last 24 hours) at 7/16/2020 1013  Last data filed at 7/16/2020 0818  Gross per 24 hour   Intake 380 ml   Output 700 ml   Net -320 ml       Physical Exam  Vitals signs and nursing note reviewed.   Constitutional:       Appearance: She is well-developed.   HENT:      Head: Normocephalic and atraumatic.   Eyes:      Pupils: Pupils are equal, round, and reactive to light.   Neck:      Musculoskeletal: Normal range of motion and neck supple.   Cardiovascular:      Rate and Rhythm: Normal rate and regular rhythm.   Pulmonary:      Effort: Pulmonary effort is normal.      Breath sounds: Normal breath sounds.   Abdominal:      General: Bowel sounds are normal.      Palpations: Abdomen is soft.   Musculoskeletal: Normal range of motion.   Skin:     General: Skin is warm and dry.   Neurological:      Mental Status: She is alert and oriented to person, place, and time.   Psychiatric:         Behavior: Behavior normal.         Thought Content: Thought content normal.         Judgment: Judgment normal.         Significant Labs:   Labs have been reviewed.    Lab Results   Component Value Date    WBC 4.84 07/16/2020    HGB 7.9 (L) 07/16/2020    HCT 26.3 (L) 07/16/2020    MCV 78 (L) 07/16/2020     (H) 07/16/2020     Colonoscopy (7/15/20):  Findings:        The perianal and digital rectal examinations were normal.        A fungating partially obstructing mass was found in the transverse        colon. The mass was circumferential. It could not be traversed by        the pediatric colonoscope. No bleeding was present on finding but        did bleed on contact. This was biopsied with a cold forceps for        histology. Verification of patient identification for the specimen        was done. Estimated blood loss was minimal.   Impression:             - Likely malignant partially obstructing tumor in the transverse colon. Biopsied.     Assessment/Plan:     Liver mass  - CT imaging is very concerning for metastatic colon  cancer.  - follow up pathology from colonoscopy.  - defer to surgery regarding if she will need upfront surgery for partially obstructing colonic mass.  - I will schedule a follow-up appointment in clinic in one week to discuss results of biopsy and determine next steps (systemic chemotherapy).    Colonic mass  - see above.          Jewel Arzola MD  Hematology/Oncology  [facility]

## 2020-07-16 NOTE — PROCEDURES
Interventional Radiology Post-Procedure Note    Pre Op Diagnosis: Liver masses  Post Op Diagnosis: Same    Procedure: US-guide coax core needle bx    Procedure performed by: Bernadette    Written Informed Consent Obtained: Yes  Specimen Sent: Yes  Estimated Blood Loss: Minimal    Findings:    Hypoechoic masses in both lobes of the liver. 18-ga cores x5 taken from a representative mass in the left lobe. Adequacy of specimen confirmed. Tract embolized with Gel-Foam sponge slurry.    No immediate complications. Patient tolerated procedure well. Please see full dictated procedure report for additional details and recommendations.      Skyler Fleming MD  [facility]  Pager [phone]

## 2020-07-17 NOTE — NURSING
Pt arrived to unit via stretcher. Drowsy but easily arouse. Mother at bedside. No distress noted. Offered fluids. Drank apple juice and took sips of broth, c/o nausea. Administered zofran prn as ordered. No further nausea episode noted. Pt fell back asleep. Tran remains in place, intact and patent. Vs obtained and documented. Remains on O2 at 2L/min via NC. sats @ 100%. Call light within reach. Mother remains at bedside.

## 2020-07-17 NOTE — TRANSFER OF CARE
"Anesthesia Transfer of Care Note    Patient: Huy Cisneros    Procedure(s) Performed: Procedure(s) (LRB):  COLECTOMY, LAPAROSCOPIC (N/A)  INSERTION, PORT-A-CATH (N/A)    Patient location: PACU    Anesthesia Type: general    Transport from OR: Transported from OR on 100% O2 by closed face mask with adequate spontaneous ventilation    Post pain: adequate analgesia    Post assessment: no apparent anesthetic complications    Post vital signs: stable    Level of consciousness: sedated    Nausea/Vomiting: no nausea/vomiting    Complications: none    Transfer of care protocol was followedComments: Report given to IVY, PACU RN      Last vitals:   Visit Vitals  BP (!) 136/93 (BP Location: Right arm, Patient Position: Lying)   Pulse 98   Temp 36.8 °C (98.3 °F) (Oral)   Resp 16   Ht 5' 5" (1.651 m)   Wt 88.5 kg (195 lb 1.7 oz)   LMP 06/22/2020 (Approximate)   SpO2 99%   Breastfeeding No   BMI 32.47 kg/m²     "

## 2020-07-17 NOTE — PLAN OF CARE
[facility] Plan of Care     29 year old AAF with partially obstructing mass in transverse colon. CT abd/pelvis with innumerable liver lesions, concerning for metastasis. Suspect colonic adenocarcinoma with metastases to liver with microcytic anemia. She has undergone IR biopsy of liver lesion and colonoscpy with biospy of transverse colon mass.    - follow up path and oncology planning  - to OR today for resection of obstructing transverse colon mass and likely port placement   - GI will sign off, please contact us for if additional questions or concerns arise    Gatito Alejandro MD  [facility] PGY-II  Case discussed with [facility] fellow

## 2020-07-17 NOTE — ANESTHESIA PREPROCEDURE EVALUATION
07/16/2020  Huy Cisneros is a 29 y.o., female with metastatic colon CA admitted with intractable n/v for colonoscopy under MAC. States no vomiting today, but nausea getting zofran. Still working on prep at 0720.    Past Medical History:   Diagnosis Date    Uterine cyst      Past Surgical History:   Procedure Laterality Date    COLONOSCOPY N/A 7/15/2020    Procedure: COLONOSCOPY;  Surgeon: Hi Drake MD;  Location: [facility];  Service: Endoscopy;  Laterality: N/A;    COLONOSCOPY W/ BIOPSIES           Pre-op Assessment    I have reviewed the Patient Summary Reports.    I have reviewed the NPO Status.   I have reviewed the Medications.     Review of Systems  Anesthesia Hx:  Denies Family Hx of Anesthesia complications.    Social:  Non-Smoker    Hematology/Oncology:         -- Anemia: Current/Recent Cancer.   Cardiovascular:  Cardiovascular Normal     Pulmonary:  Pulmonary Normal    Renal/:  Renal/ Normal     Hepatic/GI:   Colon CA       Physical Exam  General:  Obesity    Airway/Jaw/Neck:  Airway Findings: Tongue: Normal Mallampati: III  TM Distance: Normal, at least 6 cm       Chest/Lungs:  Chest/Lungs Clear    Heart/Vascular:  Heart Findings: Normal       Mental Status:  Mental Status Findings:  Alert and Oriented       Lab Results   Component Value Date    WBC 4.84 07/16/2020    HGB 7.9 (L) 07/16/2020    HCT 26.3 (L) 07/16/2020     (H) 07/16/2020    ALT 46 (H) 07/16/2020    AST 89 (H) 07/16/2020     07/16/2020    K 3.6 07/16/2020     07/16/2020    CREATININE 0.8 07/16/2020    BUN 6 07/16/2020    CO2 25 07/16/2020    TSH 1.876 07/14/2020    INR 1.0 07/14/2020    HGBA1C 4.7 07/14/2020         Anesthesia Plan  Type of Anesthesia, risks & benefits discussed:  Anesthesia Type:  MAC, general  Patient's Preference:   Intra-op Monitoring Plan: standard ASA  monitors  Intra-op Monitoring Plan Comments:   Post Op Pain Control Plan:   Post Op Pain Control Plan Comments:   Induction:    Beta Blocker:  Patient is not currently on a Beta-Blocker (No further documentation required).       Informed Consent: Patient understands risks and agrees with Anesthesia plan.  Questions answered. Anesthesia consent signed with patient.  ASA Score: 3     Day of Surgery Review of History & Physical:            Ready For Surgery From Anesthesia Perspective.

## 2020-07-17 NOTE — PLAN OF CARE
VN cued into patients room.  Patient sleeping. Patient is in no acute distress at this time.  Call light within reach. Will continue to monitor closely.

## 2020-07-17 NOTE — PROGRESS NOTES
[facility] Resident Progress Note    Subjective:      Patient not in room.  Getting procedure today to remove partially obstructing mass in the transverse colon.  Per chart review, no events overnight.      Objective:   Last 24 Hour Vital Signs:  BP  Min: 117/64  Max: 138/91  Temp  Av.7 °F (36.5 °C)  Min: 97.5 °F (36.4 °C)  Max: 98.3 °F (36.8 °C)  Pulse  Av.2  Min: 74  Max: 115  Resp  Av  Min: 16  Max: 20  SpO2  Av.6 %  Min: 97 %  Max: 100 %  I/O last 3 completed shifts:  In: 420 [P.O.:420]  Out: 500 [Urine:500]    Physical Examination:  UNABLE TO BE PERFORMED - NOT IN ROOM       Laboratory:  Laboratory Data Reviewed:  Trended Lab Data:  Recent Labs   Lab 20  0032 07/15/20  0825 20  0927 20  0616   WBC 7.36  --  4.84 5.11   HGB 9.0*  --  7.9* 7.8*   HCT 29.5*  --  26.3* 25.4*   *  --  418* 400*   MCV 79*  --  78* 78*   RDW 14.4  --  14.1 14.1    136 136 134*   K 4.2 4.4 3.6 3.9    102 103 102   CO2 23 23 25 21*   BUN 9 8 6 4*   CREATININE 0.72 0.8 0.8 0.7    72 97 83   PROT 9.0* 7.9 8.1 7.6   ALBUMIN 4.5 3.5 3.5 3.0*   BILITOT 0.9 0.7 0.6 0.6   * 86* 89* 118*   ALKPHOS 202* 160* 167* 167*   ALT 70* 47* 46* 54*       Trended Cardiac Data:  No results for input(s): TROPONINI, CKTOTAL, CKMB, BNP in the last 168 hours.    Microbiology Data   Microbiology Results (last 7 days)     ** No results found for the last 168 hours. **          Radiology:  Imaging Results          X-Ray Chest 1 View (Final result)  Result time 20 07:27:46    Final result by Juanpablo Shine MD (20 07:27:46)                 Impression:      No acute abnormality.      Electronically signed by: Juanpablo Shine  Date:    2020  Time:    07:27             Narrative:    EXAMINATION:  XR CHEST 1 VIEW    CLINICAL HISTORY:  . Secondary malignant neoplasm of unspecified site    TECHNIQUE:  Single frontal portable view of the chest was  performed.    COMPARISON:  04/29/2013    FINDINGS:  Support devices: None    The lungs are clear, with normal appearance of pulmonary vasculature and no pleural effusion or pneumothorax.    The cardiac silhouette is normal in size. The hilar and mediastinal contours are unremarkable.    Bones are intact.                               CT Abdomen Pelvis With Contrast (Final result)  Result time 07/14/20 07:42:01    Final result by Juanpablo Shine MD (07/14/20 07:42:01)                 Impression:      4.5 cm length annular lesion in the midline transverse colon suspicious for colonic neoplasm, with associated innumerable large hepatic metastatic lesions.  Fluid noted in the right colon suggesting impending diarrheal illness.  No evidence of bowel obstruction.      Electronically signed by: Juanpablo Shine  Date:    07/14/2020  Time:    07:42             Narrative:    EXAMINATION:  CT ABDOMEN PELVIS WITH CONTRAST    CLINICAL HISTORY:  Metastatic disease evaluation;abnormal CT scan with concern for liver metastasis;    TECHNIQUE:  Low dose axial images, sagittal and coronal reformations were obtained from the lung bases to the pubic symphysis after  mL Omnipaque 350.    All CT scans at this location are performed using dose modulation techniques as appropriate to a performed exam including the following: Automated exposure control; adjustment of the mA and/or kV according to patient size.    COMPARISON:  CT abdomen pelvis without IV contrast 07/14/2020    FINDINGS:  Heart: Normal in size. No pericardial effusion.    Lung Bases: Well aerated, without consolidation or pleural fluid.    Liver: Innumerable large hypoattenuating hepatic lesions compatible with metastatic neoplastic process.  Largest lesion for example at the posterior right liver measuring 7.6 x 6.4 cm series 3, image 36.  And left hepatic lesion measuring 5.1 x 4.5 cm image 45.    Gallbladder: Innumerable partially calcified and low-density gallstones are  present.  No CT evidence of cholecystitis.    Bile Ducts: No evidence of dilated ducts.    Pancreas: No mass or peripancreatic fat stranding.    Spleen: Unremarkable.    Adrenals: Unremarkable.    Kidneys/ Ureters: Unremarkable.    Bladder: Bladder partially distended limiting evaluation.  Suggestion of mild bladder wall thickening.  Please correlate for cystitis.    Reproductive organs: Left ovarian follicles noted.  Trace free fluid the pelvis.    GI Tract/Mesentery: Distal esophagus, stomach, duodenum, small bowel unremarkable.  Fluid noted in the right colon suggesting impending diarrheal illness.  4.5 cm length annular lesion in the midline transverse colon suspicious for colonic neoplasm.  9 x 9 mm mesenteric lymph node series 3, image 102.    Appendix: No evidence of appendictis.    Peritoneal Space: No free air.    Retroperitoneum: No significant adenopathy.    Abdominal wall: 5.2 x 2.1 cm ovoid lesion in the right flank superficial subcutaneous fat, likely representing an old posttraumatic resolved hematoma.    Vasculature: No significant atherosclerosis or aneurysm.    Bones: No acute fracture.                               CT Renal Stone Study ABD Pelvis WO (Final result)  Result time 07/14/20 08:08:32    Final result by ADRIANA Perdue Sr., MD (07/14/20 08:08:32)                 Impression:      1. There are multiple hypodense masses scattered throughout the liver.  This is consistent with the patient's history and characteristic of metastatic disease.  2. There is possible thickening of the wall of the transverse portion of the colon.  If additional imaging evaluation is clinically indicated, I recommend consideration of a CT examination of the abdomen and pelvis with oral, rectal, and IV contrast.  3. There is a heterogeneous mass in the subcutaneous fat posterolateral to the right gluteus chris.  This mass measures 5.4 cm in craniocaudal dimension by 2.5 cm in AP dimension by 4.6 cm in  medial-lateral dimension.  If additional imaging evaluation of this is clinically indicated, I recommend consideration of a PET-CT examination.  This may help in the planning of a percutaneous biopsy.  4. The kidneys, ureters, and urinary bladder are normal in appearance.  5. There are multiple poorly visualized stones within the gallbladder.  If additional imaging evaluation is clinically indicated, I recommend consideration of an ultrasound examination of the gallbladder.  6. The liver is enlarged.  It measures 20.4 cm in craniocaudal dimension.  7. The emergency room department is already aware of the above findings and impressions at the time of this dictation.  There were notified by Dr. Jimenez at 01:26 on 07/14/2020.  All CT scans at this facility use dose modulation, iterative reconstruction, and/or weight base dosing when appropriate to reduce radiation dose when appropriate to reduce radiation dose to as low as reasonably achievable.      Electronically signed by: Carlo Perdue MD  Date:    07/14/2020  Time:    08:08             Narrative:    EXAMINATION:  CT RENAL STONE STUDY ABD PELVIS WO    CLINICAL HISTORY:  Hematuria, unknown cause; metastatic disease    TECHNIQUE:  Standard abdomen and pelvis CT protocol without oral or IV contrast was performed.    COMPARISON:  A KUB performed on 06/06/2020.    FINDINGS:  Finding: The size of the heart is within normal limits. The lungs are clear. There is no pneumothorax or pleural effusion.    There are multiple hypodense masses scattered throughout the liver.  The liver is enlarged.  It measures 20.4 cm in craniocaudal dimension.  There are multiple poorly visualized stones within the gallbladder.  The pancreas, spleen, adrenals, and kidneys are normal in appearance. The ureters and the urinary bladder are normal in appearance.  The uterus and ovaries were not optimally visualized.  The appendix is normal in appearance.  There is possible thickening of the  wall of the transverse portion of the colon.  There is no free fluid within the abdomen or pelvis. There is no pneumoperitoneum.  There is a heterogeneous mass in the subcutaneous fat posterolateral to the right gluteus chris.  This mass measures 5.4 cm in craniocaudal dimension by 2.5 cm in AP dimension by 4.6 cm in medial-lateral dimension.                                  Current Medications:     Infusions:       Scheduled:   folic acid  1 mg Oral Daily        PRN:  acetaminophen, dextrose 50%, dextrose 50%, glucagon (human recombinant), glucose, glucose, ibuprofen, ondansetron, ondansetron, ondansetron, sodium chloride 0.9%, sodium chloride 0.9%    Antibiotics and Day Number of Therapy:  None    Lines and Day Number of Therapy:  PIV R    Assessment and Plan     Huy Cisneros is a 29 y.o.female being evaluated for new diagnosis of colon cancer + numerous metastases to the liver. S/p colonoscopy 7/15.  Getting resection of the mass with surgery.    Intractable nausea, vomiting  -patient has intractable vomiting likely 2/2 obstruction of the transverse colon from her cancer  -surgery plans on operating on transverse colon stricture today- NPO since midnight, holding AC  -better PO intake with PRN zofran    Metastatic colon cancer:  - Pt presents with 1 month history of post-prandial emesis and abdominal pain.   - New onset diarrhea x 1 day with 10 episodes of non-bloody diarrhea in the past day.   - CT abdomen demonstrates stricture at transverse colon with numerous metastatic lesions in the liver.   - AST and ALT elevated on admission at 117 and 70 respectively. Alk-phos elevated at 202.   - likely malignant partially obstructing tumor in transverse colon; biopsy pending  - Surgery plans on doing resection today (NPO at midnight, holding anticoagulation)     Microcytic anemia:  - Likely iron deficiency anemia 2/2 GI blood loss  - Hgb: 9.0, Hct: 29.5, MCV: 79.   - Iron studies showing mixed picture: iron  18, TIBC 438, transferrin 296, ferritin 147, folate 4.7, B12 663  - Consider transfusion if Hgb < 7  - Supplementing folic acid daily     Asymptomatic bacteruria  - UCx positive for E. Coli >100,000 7/15  - No dysuria or frequency, pt reports symptoms do not feel like her previous UTI  - Urinalysis on admission nitrite (+), demonstrated +1 leukocytes, and > 100 RBCs with occasional bacteria.   - Pt given Rocephin 1 g IV at [facility] ED  - Refraining from additional abx given lack of symptoms      Ovarian Cyst  -sees outpatient OBGYN        Health Maintenance:  Up to date on Flu, Tetanus, Pap smear  PCP [facility]     Fluids: PO  Electrolytes: No abnormalities  Prophylaxis: Lovenox 40mg  Code Status: Full     Dispo: Resection of colonic mass today, f/u surgery recs    Drake Littlejohn  [facility] HO-III  [facility] Hospitalist Service Team B    [facility] Hospitalist Pager numbers:   [facility] Medicine Team A (Beatris/Selvin): [phone]  [facility] Medicine Team B (Carolyn/Nato):  [phone]

## 2020-07-17 NOTE — INTERVAL H&P NOTE
The patient has been examined and the H&P has been reviewed:    I concur with the findings and no changes have occurred since H&P was written.    Anesthesia/Surgery risks, benefits and alternative options discussed and understood by patient/family.          Active Hospital Problems    Diagnosis  POA    *Intractable nausea and vomiting [R11.2]  Yes    Metastatic disease [C79.9]  Yes    Liver mass [R16.0]  Yes    Colonic mass [K63.89]  Yes      Resolved Hospital Problems   No resolved problems to display.

## 2020-07-17 NOTE — PLAN OF CARE
29 year old AAF with partially obstructing mass in transverse colon. CT abd/pelvis with innumerable liver lesions, concerning for metastasis. Suspect colonic adenocarcinoma with metastases to liver with microcytic anemia. She has undergone IR biopsy of liver lesion and colonoscpy with biospy of transverse colon mass.    Pt lives in [place] with her sons 3, 8, and 10 years old.  She is independent at home.  Her mom and Karley [phone] live in [place] as well and can help her as needed.  They say she has a lot of family support.  Pt had lap colectomy 7/17/20 and port a cath placement.  She has follow-up appt with Dr. Arzola.  She will need follow-up with Dr. Naqvi and to establish with PCP.         07/17/20 1520   Discharge Reassessment   Assessment Type Discharge Planning Reassessment   Provided patient/caregiver education on the expected discharge date and the discharge plan Yes   Do you have any problems affording any of your prescribed medications? No   Discharge Plan A Home with family   Discharge Plan B Home with family   DME Needed Upon Discharge    (TBD)   Anticipated Discharge Disposition Home   Can the patient/caregiver answer the patient profile reliably? Yes, cognitively intact   Describe the patient's ability to walk at the present time. Major restrictions/daily assistance from another person   How often would a person be available to care for the patient? Often     Michael Nickerson RN, CM  [phone]

## 2020-07-17 NOTE — PLAN OF CARE
Patient A&Ox4.VSS, NADN, Room air.No telemetry orders.Clear liquid diet.Up independently to the bathroom.NPO at midnight for colectomy and port placement.All questions answered.Will continue to monitor.

## 2020-07-17 NOTE — OP NOTE
[facility]  General Surgery  Operative Note    SUMMARY     Date of Procedure: 7/17/2020     Procedure: Procedure(s) (LRB):  COLECTOMY, LAPAROSCOPIC (N/A)  INSERTION, PORT-A-CATH (N/A)     Laparoscopic extended right hemicolectomy    Surgeon(s) and Role:     * Armani Naqvi MD - Primary    Assisting Surgeon: Toya Hook PGY2    Pre-Operative Diagnosis: Colon obstruction due to colorectal cancer with metastatic disease to the liver    Post-Operative Diagnosis: Same    Anesthesia: General    Technical Procedures Used: Brought into OR and placed supine. A LUQ 5mm trocar was inserted and the abdomen was insufflated. A left flank 12mm trocar, 10mm umbilical trocar and 5mm suprapubic trocars were inserted. The mass was located in the mod transverse colon and was mobile. No peritoneal mets were seen but there were many masses noted within the liver. The ileocolic pedicle was identified and dissected free circumferentially. Posterior this this a plane was developed and bluntly carried laterally and superiorly anterior to the duodenum. The gastrocolic ligament was taken down using ligasure from a point at the distal transverse colon and carried to the right around the hepatic flexure and down the right paracolic gutter. The ileocolic vessels were ligated with hemoclips and ligasure.  A division point about 6-7cm distal to the mass was selected and a stapler was fire across the transverse colon after ICG confirmed good bloodflow. The ileum was then devided with a stapler about 10cm proximal to the IC valve. Ligasure was used to divide the remaining lateral attachments and the mesecolon. The specimen was then completely free and was placed above the liver. The ileum easily laid next to the transverse colon and an isoperistatic side to side anastomosis was performed using a purple load from the stapler. The common enterotomy was closed using 2-0 vloc running suture in two layers. The specimen was then  removed using a wound protector through the LLQ trocar site that was extended. This was closed using 1 PDS. The 10mm trocar site was closed using 0 vicryl. Skin was closed using fascia and dermabond was applied.    Next our attention was turned to the port.  The patient was then prepped and draped in the standard sterile fashion. The ultrasound was used to identify the Right internal jugular vein. The patient was placed into the Trendelenburg position and an 18g needle was used to access the vein with one pass under ultrasound guidance. The ultrasound images were stored and interpreted by me. The wire was advanced without issue under fluoroscopic guidance. The bed was returned to leveled position and we turned our attention to creation of the port pocket in the right upper chest. 5ml of 1% local anesthesia was injected into the right upper chest in the location of the port pocket. A 4cm incision was made using a 15 blade scalpel, and the tissues were dissected using a bovie cautery until the pectoral fascia was identified. A pocket was made in the subcutaneous tissue to accommodate the port. Once we were satisfied with the pocket, the catheter was flushed and then measured using fluoro and then cut to an appropriate length. Using a tunneling device it was passed from the pocket to the neck incision and then connected and secured to the port. The port was then sutured into position using 3.0 vicryl suture. The patient was returned to the Trendelenburg position. Under fluoroscopic guidance the dilator and sheath were placed over the wire using Seldinger technique. The dilator and wire were removed and the catheter was introduced and fed into the sheath as the peel away sheath was removed. Fluoro was used to confirm the position of the catheter in the distal SVC and that the catheter did not appear to be kinked at the neck location. The dermis of the port pocket was re-approximated with 4.0 vicryl suture in an  interrupted fashion. A bello needle was used to draw back blood and was then flushed and locked with heparin. The skin was re-approximated using 5.0 monocryl suture in a running fashion. Steri strips and dry sterile dressing were then applied. The patient was awakened from anesthesia without complication and returned to the postoperative recovery unit in stable condition. At the end of the case, sponge, instrument, and needle counts were correct and hemostasis was achieved. I was present and scrubbed throughout the entirety of the case.       Description of the Findings of the Procedure: hard mass in mid transverse colon. Obvious innumerable masses in liver. No peritoneal mets noted.    Significant Surgical Tasks Conducted by the Assistant(s), if Applicable:     Complications: No    Estimated Blood Loss (EBL): 100mL           Implants:   Implant Name Type Inv. Item Serial No.  Lot No. LRB No. Used Action   PORT POWER CLEAR VIEW - [id number]  PORT POWER CLEAR VIEW  [employer] [id number] N/A 1 Implanted       Specimens:   Right and proximal transverse colon            Condition: Stable    Disposition: PACU - hemodynamically stable.    Attestation: I was present and scrubbed for the entire procedure.

## 2020-07-17 NOTE — NURSING
Received report from TERRY Zamudio in OR. Pt will be returning to unit post laparoscopic colectomy and port placement. Pt  Has 4 sites with dermabond to abdominal area and right chest. Tran in place. Total  Output during procedure was 700ml. Received versad, fentanyl, toradol, dilaudid, invance while in OR. Total fluid given 2800. Vs are stabled

## 2020-07-17 NOTE — PLAN OF CARE
Pt laying in bed resting quietly. Comfort measures in place post laparoscopic colectomy to maintain pain free. Safety measures in place. Call light within reach. Bed wheels locked. Bed in lowest position.

## 2020-07-18 NOTE — PLAN OF CARE
POC reviewed with the pt, verbalized understanding. VSS. AAOx3. PIV remained intact. C/o pain, analgesia given as per MAR. Safety maintained at all times. Instructed to call for any assistance. Call bell within reach. Bed alarm on. Will continue to monitor.

## 2020-07-18 NOTE — PLAN OF CARE
"VN cued into pt's room for introduction. VN informed pt that VN would be working along side bedside nurse and PCT throughout shift. Level of present pain assessed. At present no distress noted.patient states that she is feeling "ok" and very sleepy today. Discussed the plan of care with patient briefly due to patient wanting to go back to sleep. Discussed with patient High fall risk protocol and interventions that have been initiated and cont be in place for safety. Patient verbalized clear understanding and cooperation using teach back method. Bed alarm presently activated and in use. Will cont to be available to patient and intervene prn.        "

## 2020-07-18 NOTE — PROGRESS NOTES
[facility] Resident Progress Note    Subjective:      Lap colectomy + port placement went well. Patient currently in bed recuperating. Still feeling weak from operation and not yet having good PO intake, but denies pain, f/c, n/v.    Objective:   Last 24 Hour Vital Signs:  BP  Min: 102/67  Max: 123/77  Temp  Av °F (36.7 °C)  Min: 97.2 °F (36.2 °C)  Max: 99.4 °F (37.4 °C)  Pulse  Av.3  Min: 76  Max: 109  Resp  Av.4  Min: 14  Max: 20  SpO2  Av.7 %  Min: 97 %  Max: 100 %  I/O last 3 completed shifts:  In: 2300 [I.V.:2300]  Out: 1150 [Urine:1150]    Physical Examination:  Physical Exam   Constitutional: She is oriented to person, place, and time. No distress.   HENT:   Head: Normocephalic and atraumatic.   Nose: Nose normal. No rhinorrhea.   Mouth/Throat: Moist mucous membranes, no swelling   Eyes: Pupils are equal, round, and reactive to light. Conjunctivae are normal.   Neck: Normal range of motion. Neck supple.   Cardiovascular: Normal rate, regular rhythm and normal heart sounds.   Pulmonary/Chest: Effort normal and breath sounds normal.   Abdominal: Soft. Normal appearance and bowel sounds are normal. Laparoscopic incision sites C/D/I with no redness or swelling.  Musculoskeletal:         General: No swelling or deformity.   Neurological: She is oriented to person, place, and time.   Skin: Skin is warm and dry.   Psychiatric: Her behavior is normal. Mood normal.         Laboratory:  Laboratory Data Reviewed:  Trended Lab Data:  Recent Labs   Lab 20  0927 20  0616 20  0736   WBC 4.84 5.11 7.25   HGB 7.9* 7.8* 7.3*   HCT 26.3* 25.4* 24.8*   * 400* 378*   MCV 78* 78* 81*   RDW 14.1 14.1 14.5    134* 135*   K 3.6 3.9 4.1    102 105   CO2 25 21* 22*   BUN 6 4* 6   CREATININE 0.8 0.7 0.7   GLU 97 83 104   PROT 8.1 7.6 7.2   ALBUMIN 3.5 3.0* 3.0*   BILITOT 0.6 0.6 0.7   AST 89* 118* 167*   ALKPHOS 167* 167* 159*   ALT 46* 54* 58*       Trended Cardiac  Data:  No results for input(s): TROPONINI, CKTOTAL, CKMB, BNP in the last 168 hours.    Microbiology Data   Microbiology Results (last 7 days)     ** No results found for the last 168 hours. **          Radiology:  Imaging Results          X-Ray Chest 1 View (Final result)  Result time 07/14/20 07:27:46    Final result by Juanpablo Shine MD (07/14/20 07:27:46)                 Impression:      No acute abnormality.      Electronically signed by: Juanpablo Shine  Date:    07/14/2020  Time:    07:27             Narrative:    EXAMINATION:  XR CHEST 1 VIEW    CLINICAL HISTORY:  . Secondary malignant neoplasm of unspecified site    TECHNIQUE:  Single frontal portable view of the chest was performed.    COMPARISON:  04/29/2013    FINDINGS:  Support devices: None    The lungs are clear, with normal appearance of pulmonary vasculature and no pleural effusion or pneumothorax.    The cardiac silhouette is normal in size. The hilar and mediastinal contours are unremarkable.    Bones are intact.                               CT Abdomen Pelvis With Contrast (Final result)  Result time 07/14/20 07:42:01    Final result by Juanpablo Shine MD (07/14/20 07:42:01)                 Impression:      4.5 cm length annular lesion in the midline transverse colon suspicious for colonic neoplasm, with associated innumerable large hepatic metastatic lesions.  Fluid noted in the right colon suggesting impending diarrheal illness.  No evidence of bowel obstruction.      Electronically signed by: Juanpablo Shine  Date:    07/14/2020  Time:    07:42             Narrative:    EXAMINATION:  CT ABDOMEN PELVIS WITH CONTRAST    CLINICAL HISTORY:  Metastatic disease evaluation;abnormal CT scan with concern for liver metastasis;    TECHNIQUE:  Low dose axial images, sagittal and coronal reformations were obtained from the lung bases to the pubic symphysis after  mL Omnipaque 350.    All CT scans at this location are performed using dose modulation  techniques as appropriate to a performed exam including the following: Automated exposure control; adjustment of the mA and/or kV according to patient size.    COMPARISON:  CT abdomen pelvis without IV contrast 07/14/2020    FINDINGS:  Heart: Normal in size. No pericardial effusion.    Lung Bases: Well aerated, without consolidation or pleural fluid.    Liver: Innumerable large hypoattenuating hepatic lesions compatible with metastatic neoplastic process.  Largest lesion for example at the posterior right liver measuring 7.6 x 6.4 cm series 3, image 36.  And left hepatic lesion measuring 5.1 x 4.5 cm image 45.    Gallbladder: Innumerable partially calcified and low-density gallstones are present.  No CT evidence of cholecystitis.    Bile Ducts: No evidence of dilated ducts.    Pancreas: No mass or peripancreatic fat stranding.    Spleen: Unremarkable.    Adrenals: Unremarkable.    Kidneys/ Ureters: Unremarkable.    Bladder: Bladder partially distended limiting evaluation.  Suggestion of mild bladder wall thickening.  Please correlate for cystitis.    Reproductive organs: Left ovarian follicles noted.  Trace free fluid the pelvis.    GI Tract/Mesentery: Distal esophagus, stomach, duodenum, small bowel unremarkable.  Fluid noted in the right colon suggesting impending diarrheal illness.  4.5 cm length annular lesion in the midline transverse colon suspicious for colonic neoplasm.  9 x 9 mm mesenteric lymph node series 3, image 102.    Appendix: No evidence of appendictis.    Peritoneal Space: No free air.    Retroperitoneum: No significant adenopathy.    Abdominal wall: 5.2 x 2.1 cm ovoid lesion in the right flank superficial subcutaneous fat, likely representing an old posttraumatic resolved hematoma.    Vasculature: No significant atherosclerosis or aneurysm.    Bones: No acute fracture.                               CT Renal Stone Study ABD Pelvis WO (Final result)  Result time 07/14/20 08:08:32    Final result by  ADRIANA Perdue Sr., MD (07/14/20 08:08:32)                 Impression:      1. There are multiple hypodense masses scattered throughout the liver.  This is consistent with the patient's history and characteristic of metastatic disease.  2. There is possible thickening of the wall of the transverse portion of the colon.  If additional imaging evaluation is clinically indicated, I recommend consideration of a CT examination of the abdomen and pelvis with oral, rectal, and IV contrast.  3. There is a heterogeneous mass in the subcutaneous fat posterolateral to the right gluteus chris.  This mass measures 5.4 cm in craniocaudal dimension by 2.5 cm in AP dimension by 4.6 cm in medial-lateral dimension.  If additional imaging evaluation of this is clinically indicated, I recommend consideration of a PET-CT examination.  This may help in the planning of a percutaneous biopsy.  4. The kidneys, ureters, and urinary bladder are normal in appearance.  5. There are multiple poorly visualized stones within the gallbladder.  If additional imaging evaluation is clinically indicated, I recommend consideration of an ultrasound examination of the gallbladder.  6. The liver is enlarged.  It measures 20.4 cm in craniocaudal dimension.  7. The emergency room department is already aware of the above findings and impressions at the time of this dictation.  There were notified by Dr. Jimenez at 01:26 on 07/14/2020.  All CT scans at this facility use dose modulation, iterative reconstruction, and/or weight base dosing when appropriate to reduce radiation dose when appropriate to reduce radiation dose to as low as reasonably achievable.      Electronically signed by: Carlo Perdue MD  Date:    07/14/2020  Time:    08:08             Narrative:    EXAMINATION:  CT RENAL STONE STUDY ABD PELVIS WO    CLINICAL HISTORY:  Hematuria, unknown cause; metastatic disease    TECHNIQUE:  Standard abdomen and pelvis CT protocol without oral or IV  contrast was performed.    COMPARISON:  A KUB performed on 06/06/2020.    FINDINGS:  Finding: The size of the heart is within normal limits. The lungs are clear. There is no pneumothorax or pleural effusion.    There are multiple hypodense masses scattered throughout the liver.  The liver is enlarged.  It measures 20.4 cm in craniocaudal dimension.  There are multiple poorly visualized stones within the gallbladder.  The pancreas, spleen, adrenals, and kidneys are normal in appearance. The ureters and the urinary bladder are normal in appearance.  The uterus and ovaries were not optimally visualized.  The appendix is normal in appearance.  There is possible thickening of the wall of the transverse portion of the colon.  There is no free fluid within the abdomen or pelvis. There is no pneumoperitoneum.  There is a heterogeneous mass in the subcutaneous fat posterolateral to the right gluteus chris.  This mass measures 5.4 cm in craniocaudal dimension by 2.5 cm in AP dimension by 4.6 cm in medial-lateral dimension.                                  Current Medications:     Infusions:       Scheduled:   folic acid  1 mg Oral Daily    ketorolac  15 mg Intravenous Q6H        PRN:  acetaminophen, acetaminophen, dextrose 50%, dextrose 50%, diphenhydrAMINE, glucagon (human recombinant), glucose, glucose, HYDROcodone-acetaminophen, HYDROmorphone, HYDROmorphone, ibuprofen, melatonin, morphine, ondansetron, ondansetron, ondansetron, ondansetron, oxyCODONE-acetaminophen, promethazine (PHENERGAN) IVPB, sodium chloride 0.9%, sodium chloride 0.9%, sodium chloride 0.9%    Antibiotics and Day Number of Therapy:  None    Lines and Day Number of Therapy:  PIV R    Assessment and Plan     Leahtae Cisneros is a 29 y.o.female being evaluated for new diagnosis of colon cancer + numerous metastases to the liver. S/p colonoscopy 7/15.  Getting resection of the mass with surgery.    Intractable nausea, vomiting  -patient has  intractable vomiting likely 2/2 obstruction of the transverse colon from her cancer  -better PO intake with PRN zofran  -lap R vinh colectomy 7/17 to clear colon obstruction; no vomiting or nausea today    Metastatic colon cancer:  - Pt presents with 1 month history of post-prandial emesis and abdominal pain.   - New onset diarrhea x 1 day with 10 episodes of non-bloody diarrhea in the past day.   - CT abdomen demonstrates stricture at transverse colon with numerous metastatic lesions in the liver.   - AST and ALT elevated on admission at 117 and 70 respectively. Alk-phos elevated at 202.   - likely malignant partially obstructing tumor in transverse colon; biopsy pending  -laparoscopic R hemicolectomy performed 7/17 to remove colon obstruction  --port placed during colectomy     Microcytic anemia:  - Likely iron deficiency anemia 2/2 GI blood loss  - Hgb: 9.0, Hct: 29.5, MCV: 79.   - Iron studies showing mixed picture: iron 18, TIBC 438, transferrin 296, ferritin 147, folate 4.7, B12 663  - Consider transfusion if Hgb < 7  - Supplementing folic acid daily     Asymptomatic bacteruria  - UCx positive for E. Coli >100,000 7/15  - No dysuria or frequency, pt reports symptoms do not feel like her previous UTI  - Urinalysis on admission nitrite (+), demonstrated +1 leukocytes, and > 100 RBCs with occasional bacteria.   - Pt given Rocephin 1 g IV at [facility] ED  - Refraining from additional abx given lack of symptoms      Ovarian Cyst  -sees outpatient OBGYN        Health Maintenance:  Up to date on Flu, Tetanus, Pap smear  PCP [facility]     Fluids: PO  Electrolytes: No abnormalities  Prophylaxis: Lovenox 40mg  Code Status: Full     Dispo: F/U surgery, H/O recs for discharge    Angel Del Rosario  [facility] HO-I  [facility] Hospitalist Service Team B    [facility] Hospitalist Pager numbers:   [facility] Hospitalist Medicine Team A (Beatris/Selvin): [phone]  [facility] Hospitalist Medicine Team B (Carolyn/Nato):   [phone]

## 2020-07-18 NOTE — PROGRESS NOTES
[facility]  General Surgery  Progress Note    Subjective:     Interval History:   FEeling ok this morning  Pain moderate  PT on board  Afebrile  Some nausea earlier, on clears  +flatus      Post-Op Info:  Procedure(s) (LRB):  COLECTOMY, LAPAROSCOPIC (N/A)  INSERTION, PORT-A-CATH (N/A)   1 Day Post-Op      Medications:  Continuous Infusions:  Scheduled Meds:   folic acid  1 mg Oral Daily    ketorolac  15 mg Intravenous Q6H     PRN Meds:acetaminophen, acetaminophen, dextrose 50%, dextrose 50%, diphenhydrAMINE, glucagon (human recombinant), glucose, glucose, HYDROcodone-acetaminophen, HYDROmorphone, HYDROmorphone, ibuprofen, melatonin, morphine, ondansetron, ondansetron, ondansetron, ondansetron, oxyCODONE-acetaminophen, promethazine (PHENERGAN) IVPB, sodium chloride 0.9%, sodium chloride 0.9%, sodium chloride 0.9%     Objective:     Vital Signs (Most Recent):  Temp: 98.1 °F (36.7 °C) (07/18/20 0746)  Pulse: 104 (07/18/20 0746)  Resp: 18 (07/18/20 0921)  BP: 123/77 (07/18/20 0746)  SpO2: 100 % (07/18/20 0746) Vital Signs (24h Range):  Temp:  [97.2 °F (36.2 °C)-99.4 °F (37.4 °C)] 98.1 °F (36.7 °C)  Pulse:  [] 104  Resp:  [14-20] 18  SpO2:  [97 %-100 %] 100 %  BP: (102-123)/(51-77) 123/77       Intake/Output Summary (Last 24 hours) at 7/18/2020 1131  Last data filed at 7/18/2020 0600  Gross per 24 hour   Intake 0 ml   Output 640 ml   Net -640 ml       Physical Exam  Constitutional:       Appearance: Normal appearance.   Cardiovascular:      Rate and Rhythm: Normal rate.   Pulmonary:      Effort: Pulmonary effort is normal.   Abdominal:      General: There is no distension.      Comments: Appropriately tender   Neurological:      Mental Status: She is oriented to person, place, and time.         Significant Labs:  CBC:   Recent Labs   Lab 07/18/20  0736   WBC 7.25   RBC 3.07*   HGB 7.3*   HCT 24.8*   *   MCV 81*   MCH 23.8*   MCHC 29.4*       Significant Diagnostics:  I have reviewed all  pertinent imaging results/findings within the past 24 hours.    Assessment/Plan:     Active Diagnoses:    Diagnosis Date Noted POA    PRINCIPAL PROBLEM:  Intractable nausea and vomiting [R11.2] 07/14/2020 Yes    Metastatic disease [C79.9] 07/14/2020 Yes    Liver mass [R16.0] 07/14/2020 Yes    Colonic mass [K63.89] 07/14/2020 Yes      Problems Resolved During this Admission:     29F with obstructing transverse colon mass with liver mets s/p lap extended right hemicolectomy  On clears. Will continue for now. May advance to low residue diet once nausea resolves  OOB  Tran out  Gerri Naqvi MD  General Surgery  [facility]

## 2020-07-18 NOTE — PLAN OF CARE
VN rounding: VN cued into patient's room to complete evening rounding. Patient sittiung up in bed resting with min-mod discomfort. Patient states that she jst received pain medication so waiting for it to start working. At this time patient denies any questions, concerns and needs. Will cont to be available to patient and intervene prn.

## 2020-07-18 NOTE — PT/OT/SLP EVAL
"Physical Therapy Evaluation    Patient Name:  Huy Cisneros   MRN:  [id number]    Recommendations:     Discharge Recommendations:  (likely home without PT needs, pending progress.)   Discharge Equipment Recommendations: (likely none)   Barriers to discharge: Inaccessible home    Assessment:     Huy Cisneros is a 29 y.o. female admitted with a medical diagnosis of Intractable nausea and vomiting.  She presents with the following impairments/functional limitations:  weakness, impaired endurance, impaired self care skills, gait instability, impaired functional mobilty, impaired balance, pain. Pt in significant pain during evaluation. Pt would benefit from continued skilled acute care PT services to return to independent functional mobility.     Rehab Prognosis: Good; patient would benefit from acute skilled PT services to address these deficits and reach maximum level of function.    Recent Surgery: Procedure(s) (LRB):  COLECTOMY, LAPAROSCOPIC (N/A)  INSERTION, PORT-A-CATH (N/A) 1 Day Post-Op    Plan:     During this hospitalization, patient to be seen 6 x/week to address the identified rehab impairments via gait training, therapeutic activities, therapeutic exercises and progress toward the following goals:    · Plan of Care Expires:   8/18/2020    Subjective     Chief Complaint: Pain, per pt, she received pain medication at 5 and is not due for more pain meds until noon. Pt agreeable to PT evaluation.   Patient/Family Comments/goals: to improve pain.   Pain/Comfort:  · Pain Rating 1: ("a little")  · Location - Orientation 1: midline  · Location 1: abdomen  · Pain Addressed 1: Pre-medicate for activity, Distraction  · Pain Rating Post-Intervention 1: 5/10    Patients cultural, spiritual, [employer] conflicts given the current situation:      Living Environment:  Pt lives with her three children (3, 8 and 10 years old) in a town house with NSTE. Pt's bedroom and bathroom are on the second floor and their " are no handrails on the stairs. PTS pt was independent with all ADLs and worked as a LPN.    Prior to admission, patients level of function was independent.  Equipment used at home: none.  DME owned (not currently used): none. Pt denies using/owning DME.  Pt denies any recent falls/near falls.  Upon discharge, patient will have assistance from family.    Objective:     Communicated with RN prior to session.  Patient found HOB elevated with PureWick, bed alarm  upon PT entry to room.    General Precautions: Standard, fall   Orthopedic Precautions:N/A   Braces: N/A     Exams:  · Cognitive Exam:  Patient is oriented to Person, Place, Time and Situation  · RLE ROM: WFL  · RLE Strength: WFL except hip flexion, limited 2* to pain  · LLE ROM: WFL  · LLE Strength: WFL except hip flexion, limited 2* to pain    Functional Mobility:  · Bed Mobility:     · Scooting: contact guard assistance  · Supine to Sit: maximal assistance  · Sit to Supine: moderate assistance  · Transfers:     · Sit to Stand:  moderate assistance with hand-held assist  · Gait: 3 side steps.   · Balance: Good sitting balance, fair- standing balance.     Therapeutic Activities and Exercises:  · Bed mobility and transfers as listed above.   · Pt took ~3 sides steps towards EOB, pt shuffled feet and did not clear either from floor. Pt with significant forward trunk lean.   · Pt was educated on role of PT, POC and importance of OOB activity to prevent further decline in strength and mobility.     Patient left supine with HOB elevated with call button in reach and bed alarm on.     AM-PAC 6 CLICK MOBILITY  Total Score:12       GOALS:   Multidisciplinary Problems     Physical Therapy Goals        Problem: Physical Therapy Goal    Goal Priority Disciplines Outcome Goal Variances Interventions   Physical Therapy Goal     PT, PT/OT      Description: Goals to be met by: 2020    Patient will increase functional independence with mobility by performin)  Supine<>Sit independently.   2) Sit <>Stand independently.  3) Bed <>Chair with independently.  4) Pt to ambulate 150 feet independently.  5) Pt will ascend/descend 1 flight of stairs without HR independently.                      History:     Past Medical History:   Diagnosis Date    Uterine cyst        Past Surgical History:   Procedure Laterality Date    COLONOSCOPY N/A 7/15/2020    Procedure: COLONOSCOPY;  Surgeon: Hi Drake MD;  [facility];  Service: Endoscopy;  Laterality: N/A;    COLONOSCOPY W/ BIOPSIES         Time Tracking:     PT Received On: 07/18/20  PT Start Time: 0805     PT Stop Time: 0830  PT Total Time (min): 25 min     Billable Minutes: Evaluation 15 and Therapeutic Activity 10      Otoniel Bishop, PT, DPT  07/18/2020

## 2020-07-19 PROBLEM — C18.9 ADENOCARCINOMA OF COLON: Status: ACTIVE | Noted: 2020-01-01

## 2020-07-19 PROBLEM — C78.7 SECONDARY MALIGNANCY OF LIVER: Status: ACTIVE | Noted: 2020-01-01

## 2020-07-19 NOTE — PROGRESS NOTES
[facility]  Hematology/Oncology  Progress Note    Patient Name: Huy Cisneros  Admission Date: 7/13/2020  Hospital Length of Stay: 5 days  Code Status: Full Code     Subjective:     HPI:  29 year-old female was admitted on 7/13/20 for nausea/abdominal pain. CT imaging revealed a colon mass with liver lesions concerning for metastatic cancer. Consult is for cancer.    Interval History:   - she underwent right hemicolectomy on 7/17/20.  - she endorses abdominal discomfort today.    Oncology Treatment Plan:   [No treatment plan]    Medications:  Continuous Infusions:  Scheduled Meds:   docusate sodium  100 mg Oral Daily    enoxaparin  40 mg Subcutaneous Q24H    folic acid  1 mg Oral Daily    ketorolac  15 mg Intravenous Q6H     PRN Meds:acetaminophen, acetaminophen, dextrose 50%, dextrose 50%, diphenhydrAMINE, glucagon (human recombinant), glucose, glucose, HYDROcodone-acetaminophen, HYDROmorphone, HYDROmorphone, ibuprofen, melatonin, morphine, ondansetron, ondansetron, ondansetron, ondansetron, oxyCODONE-acetaminophen, sodium chloride 0.9%, sodium chloride 0.9%, sodium chloride 0.9%     Review of Systems   Constitutional: Positive for fatigue and unexpected weight change.   HENT: Negative for sore throat.    Eyes: Negative for visual disturbance.   Respiratory: Negative for shortness of breath.    Cardiovascular: Negative for chest pain.   Gastrointestinal: Positive for abdominal pain.   Genitourinary: Negative for dysuria.   Musculoskeletal: Negative for back pain.   Skin: Negative for rash.   Neurological: Negative for headaches.   Hematological: Negative for adenopathy.   Psychiatric/Behavioral: The patient is not nervous/anxious.      Objective:     Vital Signs (Most Recent):  Temp: 97.8 °F (36.6 °C) (07/19/20 1211)  Pulse: 105 (07/19/20 1215)  Resp: 16 (07/19/20 1215)  BP: 122/84 (07/19/20 1211)  SpO2: 100 % (07/19/20 1215) Vital Signs (24h Range):  Temp:  [96.1 °F (35.6 °C)-102.1  °F (38.9 °C)] 97.8 °F (36.6 °C)  Pulse:  [100-117] 105  Resp:  [14-20] 16  SpO2:  [96 %-100 %] 100 %  BP: (111-133)/(75-89) 122/84     Weight: 88.5 kg (195 lb 1.7 oz)  Body mass index is 32.47 kg/m².  Body surface area is 2.01 meters squared.      Intake/Output Summary (Last 24 hours) at 7/19/2020 1253  Last data filed at 7/19/2020 1000  Gross per 24 hour   Intake 150 ml   Output 800 ml   Net -650 ml       Physical Exam  Vitals signs and nursing note reviewed.   Constitutional:       Appearance: She is well-developed.   HENT:      Head: Normocephalic and atraumatic.   Eyes:      Pupils: Pupils are equal, round, and reactive to light.   Neck:      Musculoskeletal: Normal range of motion and neck supple.   Cardiovascular:      Rate and Rhythm: Normal rate and regular rhythm.   Pulmonary:      Effort: Pulmonary effort is normal.      Breath sounds: Normal breath sounds.   Abdominal:      General: Bowel sounds are normal.      Palpations: Abdomen is soft.   Musculoskeletal: Normal range of motion.   Skin:     General: Skin is warm and dry.   Neurological:      Mental Status: She is alert and oriented to person, place, and time.   Psychiatric:         Behavior: Behavior normal.         Thought Content: Thought content normal.         Judgment: Judgment normal.         Significant Labs:   Labs have been reviewed.    Lab Results   Component Value Date    WBC 7.51 07/19/2020    HGB 7.2 (L) 07/19/2020    HCT 24.6 (L) 07/19/2020    MCV 80 (L) 07/19/2020     (H) 07/19/2020         Diagnostic Results:  7/14/20:  Liver, biopsy:   Adenocarcinoma, moderately differentiated   Cytomorphologic features and immunohistochemical staining pattern supports   colonic primary     Assessment/Plan:     Secondary malignancy of liver  - CT imaging is very concerning for metastatic colon cancer.  - biopsy of liver mass (7/14/20) revealed metastatic colon adenocarcinoma.  - she underwent right hemicolectomy on 7/17/20.  - I have messaged  the pathologist about sending tissue for molecular testing.  - I will schedule a follow-up appointment in clinic in 1-2 weeks to determine discuss chemotherapy.    Adenocarcinoma of colon  - see above.         Jewel Arzola MD  Hematology/Oncology  [facility]

## 2020-07-19 NOTE — PLAN OF CARE
VN cued into pt's room for introduction. VN informed pt that VN would be working along side bedside nurse and PCT throughout shift. Level of present pain assessed. At present no distress noted. Patient states feeling stronger and better. Abd pain lessening. Patient states so glad to be able to take shower this am but did tire her out. Patient tolerating lunch without difficulties at this time. Discussed with patient the plan of care and upcoming discharge home. Discussed with patient High fall risk protocol and interventions that have been initiated and cont be in place for safety. Patient verbalized clear understanding and cooperation using teach back method. Bed alarm presently activated and in use. Will cont to be available to patient and intervene prn.

## 2020-07-19 NOTE — PLAN OF CARE
POC reviewed, patient verbalize understanding. Patient denies pain/discomfort. PRN med given as charted. Fall precaution maintained: bed on lowest position, call light within reach, bed alarm set, side rail up x 2, non skid sock on. Will continue to monitor.

## 2020-07-19 NOTE — PLAN OF CARE
Care plan explained & understood by pt. Meds given as per MAR. C/o pain, analgesia given. Safety maintained at all times. Call bell within reach. Bed on low position. Bed alarm on. Will continue to monitor.

## 2020-07-19 NOTE — ASSESSMENT & PLAN NOTE
- CT imaging is very concerning for metastatic colon cancer.  - biopsy of liver mass (7/14/20) revealed metastatic colon adenocarcinoma.  - she underwent right hemicolectomy on 7/17/20.  - I have messaged the pathologist about sending tissue for molecular testing.  - I will schedule a follow-up appointment in clinic in 1-2 weeks to determine discuss chemotherapy.

## 2020-07-19 NOTE — SUBJECTIVE & OBJECTIVE
Interval History:   - she underwent right hemicolectomy on 7/17/20.  - she endorses abdominal discomfort today.    Oncology Treatment Plan:   [No treatment plan]    Medications:  Continuous Infusions:  Scheduled Meds:   docusate sodium  100 mg Oral Daily    enoxaparin  40 mg Subcutaneous Q24H    folic acid  1 mg Oral Daily    ketorolac  15 mg Intravenous Q6H     PRN Meds:acetaminophen, acetaminophen, dextrose 50%, dextrose 50%, diphenhydrAMINE, glucagon (human recombinant), glucose, glucose, HYDROcodone-acetaminophen, HYDROmorphone, HYDROmorphone, ibuprofen, melatonin, morphine, ondansetron, ondansetron, ondansetron, ondansetron, oxyCODONE-acetaminophen, sodium chloride 0.9%, sodium chloride 0.9%, sodium chloride 0.9%     Review of Systems   Constitutional: Positive for fatigue and unexpected weight change.   HENT: Negative for sore throat.    Eyes: Negative for visual disturbance.   Respiratory: Negative for shortness of breath.    Cardiovascular: Negative for chest pain.   Gastrointestinal: Positive for abdominal pain.   Genitourinary: Negative for dysuria.   Musculoskeletal: Negative for back pain.   Skin: Negative for rash.   Neurological: Negative for headaches.   Hematological: Negative for adenopathy.   Psychiatric/Behavioral: The patient is not nervous/anxious.      Objective:     Vital Signs (Most Recent):  Temp: 97.8 °F (36.6 °C) (07/19/20 1211)  Pulse: 105 (07/19/20 1215)  Resp: 16 (07/19/20 1215)  BP: 122/84 (07/19/20 1211)  SpO2: 100 % (07/19/20 1215) Vital Signs (24h Range):  Temp:  [96.1 °F (35.6 °C)-102.1 °F (38.9 °C)] 97.8 °F (36.6 °C)  Pulse:  [100-117] 105  Resp:  [14-20] 16  SpO2:  [96 %-100 %] 100 %  BP: (111-133)/(75-89) 122/84     Weight: 88.5 kg (195 lb 1.7 oz)  Body mass index is 32.47 kg/m².  Body surface area is 2.01 meters squared.      Intake/Output Summary (Last 24 hours) at 7/19/2020 1253  Last data filed at 7/19/2020 1000  Gross per 24 hour   Intake 150 ml   Output 800 ml   Net  -650 ml       Physical Exam  Vitals signs and nursing note reviewed.   Constitutional:       Appearance: She is well-developed.   HENT:      Head: Normocephalic and atraumatic.   Eyes:      Pupils: Pupils are equal, round, and reactive to light.   Neck:      Musculoskeletal: Normal range of motion and neck supple.   Cardiovascular:      Rate and Rhythm: Normal rate and regular rhythm.   Pulmonary:      Effort: Pulmonary effort is normal.      Breath sounds: Normal breath sounds.   Abdominal:      General: Bowel sounds are normal.      Palpations: Abdomen is soft.   Musculoskeletal: Normal range of motion.   Skin:     General: Skin is warm and dry.   Neurological:      Mental Status: She is alert and oriented to person, place, and time.   Psychiatric:         Behavior: Behavior normal.         Thought Content: Thought content normal.         Judgment: Judgment normal.         Significant Labs:   Labs have been reviewed.    Lab Results   Component Value Date    WBC 7.51 07/19/2020    HGB 7.2 (L) 07/19/2020    HCT 24.6 (L) 07/19/2020    MCV 80 (L) 07/19/2020     (H) 07/19/2020         Diagnostic Results:  7/14/20:  Liver, biopsy:   Adenocarcinoma, moderately differentiated   Cytomorphologic features and immunohistochemical staining pattern supports   colonic primary

## 2020-07-19 NOTE — MEDICAL/APP STUDENT
[facility] Medicine Progress Note    Primary Team: [facility] Hospitalist Team B  Attending Physician: Sami Dutton MD  Resident: Eron  Intern: Miguel Ángel    Subjective:      This morning, Ms. Cisneros said her pain was down to a 4/10, and when asked if it was localized, she still pointed to her left abdomen. She is still experiencing nausea occasionally that improves with Zofran. She has not experienced any dysuria, changes in frequency, or pain with urination. She has not noticed any shortness of breath when walking to the restroom or while lying down and denies other chest pain. She denies calf pain, warmth, or tightness. She denies increased pain localized to her incisions.      Objective:     Last 24 Hour Vital Signs:  BP  Min: 111/75  Max: 135/83  Temp  Av.1 °F (36.7 °C)  Min: 96.1 °F (35.6 °C)  Max: 102.1 °F (38.9 °C)  Pulse  Av.9  Min: 100  Max: 117  Resp  Av.6  Min: 16  Max: 20  SpO2  Av.8 %  Min: 96 %  Max: 100 %  I/O last 3 completed shifts:  In: 150 [P.O.:150]  Out: 1400 [Urine:1300; Emesis/NG output:100]    Physical Examination:  General: lying comfortably in bed, somnolent but responsive to questions  Head:  Normocephalic and atraumatic  Eyes: pupils are equal, round, and reactive to light. No conjunctival injection  Neck: port incision lacks swelling, slight erythema  Cardiovascular: Increased rate, regular rhythm, and normal heart sounds  Pulmonary/chest: Effort normal with speech but did not increase when asked to take a deep breath for exam. Normal breath sounds  Abdominal: Laparoscopic incision sites have minimal erythema and swelling. Normal in appearance. Winced in pain with gentle placement of stethoscope. Stated abdominal pain 4/10 and mostly localized to left side.  Musculoskeletal: No swelling, erythema, or pain in legs.   Neurological: Oriented to person, place, and time. Strength 5/5 in bilateral lower extremities.   Skin: skin is warm and dry  Psych: somnolent but  responsive. Typical, appropriate affect    Laboratory:  Laboratory Data Reviewed: yes  Pertinent Findings:  WBC: 7.51  Granulocyte %: 80.3  Lymph%: 8.7  Immature granulocytes (Abs): 0.05  Immature granulocytes: 0.7  Hb: 7.2  MCV: 80  RDW: 14.7  Platelets: 372  Iron: 18  TIBC: 438  Sat Iron: 4  Transferrin: 296  Ferritin: 147  Folate: 4.7    Microbiology Data Reviewed: yes  Pertinent Findings:  Urine culture positive for E. Coli 6/6/20    Other Results:  EKG (my interpretation): Normal rate. Sinus rhythm. No axis deviation. No CA prolongation. Narrow QRS. No QT prolongation. Possible ST abnormality in V1.    Radiology Data Reviewed: yes  Pertinent Findings:  CT abdomen pelvis: evidence of innumerable large hepatic lesions consistent with metastatic neoplastic process. Annular lesion in the midline transverse colon.   Liver biopsy: adenocarcinoma, colonic primary source    Current Medications:     Infusions:       Scheduled:  Docusate sodium 100 mg Oral daily  Enoxaparin 40 mg SubQ Q24H  Folic acid 1mg Oral Daily  Ketorolac 15 mg IV Q6H       PRN:  Acetaminophen, dextrose 50%, diphenhydramine, glucagon (human recombinant), glucose, hydrocodone-acetaminophen, hydromorphone, hydromorphone, ibuprofen, melatonin, morphine, ondansetron, oxycodone-acetaminophen, sodium chloride 0.9%    Antibiotics and Day Number of Therapy:  Ceftriaxone started 7/14 2x dose  Ertapenem started 7/17 1x dose    Lines and Day Number of Therapy:  Peripheral IV line right antecubital started 7/14 to present    Assessment:     Huy Cisneros is a 29 year old female who presented with metastatic colon cancer with numerous mets to the liver, associated with intractable nausea and vomiting for 1 month. She had an obstructing mass removed from her transverse colon on 7/16. She was febrile to 102.1 once last night at 20:17 and has not been since. She has been tachycardic since 6AM.     Plan:     Colon cancer/liver metastasis:  - colectomy  "performed 7/16 to remove obstructing mass in transverse colon  - follow-up with hem/onc for treatment options and plan    Fever:  - febrile to 102.1 x1  - consider PE/DVT risk. Wells DVT score of 2 (moderate risk). Wells PE score of 4 (moderate risk). Consider ultrasound on legs. D-dimer may not be necessary if no other potential source for fever. Consider CTA.  - consider UTI but patient denied dysuria, polyuria, or pain with urination.  No indication for UA currently.  - consider atelectasis or pneumonia with recent operation history and shallow "deep breath" effort. Possible spirometry treatment to decreased risk  - monitor laparoscopic incisions for drainage or increased pain. Consider culture if fever or malaise progresses.     Intractable nausea and vomiting:  - ondansetron PRN    Iron deficiency anemia:  - monitor Hb levels as they are decreasing  - consider iron supplementation if does not improve with treatment of colon cancer    Ivanna Matos  [facility] [id number]    [facility] Hospitalist Pager numbers:   [facility] Hospitalist Medicine Team A (Beatris/Selvin): [phone]  [facility]ospitals Hospitalist Medicine Team B (Carolyn/Nato):  [phone]      "

## 2020-07-19 NOTE — PROGRESS NOTES
[facility]  General Surgery  Progress Note    Subjective:     Interval History:   FEeling ok this morning  Pain improving  Able to get up and shower today without difficulty  +BM  PT on board  102 fever yesterday  NO nausea   +flatus      Post-Op Info:  Procedure(s) (LRB):  COLECTOMY, LAPAROSCOPIC (N/A)  INSERTION, PORT-A-CATH (N/A)   2 Days Post-Op      Medications:  Continuous Infusions:  Scheduled Meds:   docusate sodium  100 mg Oral Daily    enoxaparin  40 mg Subcutaneous Q24H    folic acid  1 mg Oral Daily    ketorolac  15 mg Intravenous Q6H     PRN Meds:acetaminophen, acetaminophen, dextrose 50%, dextrose 50%, diphenhydrAMINE, glucagon (human recombinant), glucose, glucose, HYDROcodone-acetaminophen, HYDROmorphone, HYDROmorphone, ibuprofen, melatonin, morphine, ondansetron, ondansetron, ondansetron, ondansetron, oxyCODONE-acetaminophen, sodium chloride 0.9%, sodium chloride 0.9%, sodium chloride 0.9%     Objective:     Vital Signs (Most Recent):  Temp: 96.5 °F (35.8 °C) (07/19/20 0741)  Pulse: (!) 111 (07/19/20 0741)  Resp: 18 (07/19/20 0921)  BP: 128/75 (07/19/20 0741)  SpO2: 99 % (07/19/20 0741) Vital Signs (24h Range):  Temp:  [96.1 °F (35.6 °C)-102.1 °F (38.9 °C)] 96.5 °F (35.8 °C)  Pulse:  [100-117] 111  Resp:  [16-20] 18  SpO2:  [96 %-100 %] 99 %  BP: (111-135)/(75-89) 128/75       Intake/Output Summary (Last 24 hours) at 7/19/2020 1051  Last data filed at 7/19/2020 1000  Gross per 24 hour   Intake 150 ml   Output 900 ml   Net -750 ml       Physical Exam  Constitutional:       Appearance: Normal appearance.   Cardiovascular:      Rate and Rhythm: Normal rate.   Pulmonary:      Effort: Pulmonary effort is normal.   Abdominal:      General: There is no distension.      Comments: Appropriately tender   Neurological:      Mental Status: She is oriented to person, place, and time.         Significant Labs:  CBC:   Recent Labs   Lab 07/19/20  0627   WBC 7.51   RBC 3.08*   HGB 7.2*   HCT  24.6*   *   MCV 80*   MCH 23.4*   MCHC 29.3*       Significant Diagnostics:  I have reviewed all pertinent imaging results/findings within the past 24 hours.    Assessment/Plan:     Active Diagnoses:    Diagnosis Date Noted POA    PRINCIPAL PROBLEM:  Intractable nausea and vomiting [R11.2] 07/14/2020 Yes    Metastatic disease [C79.9] 07/14/2020 Yes    Liver mass [R16.0] 07/14/2020 Yes    Colonic mass [K63.89] 07/14/2020 Yes      Problems Resolved During this Admission:     29F with obstructing transverse colon mass with liver mets s/p lap extended right hemicolectomy  Low res diet  OOB, ambulating ok  Colace  Home tomorrow if tolerates diet and remains afebrile        Armani Naqvi MD  General Surgery  [facility]

## 2020-07-19 NOTE — PROGRESS NOTES
[facility] Resident Progress Note    Subjective:      POD 2, one temperature of 102.1 last night, afebrile since. Some nausea yesterday improved w/ zofran, also some generalized 4/10 abdominal pain controlled w/ PRN.     Denies f/c, cough/SOB, increased tenderness, drainage or redness at wound sites, increased urinary frequency or dysuria, or leg swelling/tenderness/pain.    Objective:   Last 24 Hour Vital Signs:  BP  Min: 111/75  Max: 135/83  Temp  Av.1 °F (36.7 °C)  Min: 96.1 °F (35.6 °C)  Max: 102.1 °F (38.9 °C)  Pulse  Av.3  Min: 100  Max: 117  Resp  Av.5  Min: 16  Max: 20  SpO2  Av.9 %  Min: 96 %  Max: 100 %  I/O last 3 completed shifts:  In: 150 [P.O.:150]  Out: 1400 [Urine:1300; Emesis/NG output:100]    Physical Examination:  Physical Exam   Constitutional: She is oriented to person, place, and time. No distress.   HENT:   Head: Normocephalic and atraumatic.   Nose: Nose normal. No rhinorrhea.   Mouth/Throat: Moist mucous membranes, no swelling   Eyes: Pupils are equal, round, and reactive to light. Conjunctivae are normal.   Neck: Normal range of motion. Neck supple.   Cardiovascular: Normal rate, regular rhythm and normal heart sounds.   Pulmonary/Chest: Lungs clear to auscultation although patient noted to be taking slow, shallow breaths   Abdominal: Soft. Normal appearance and bowel sounds are normal. Laparoscopic incision sites C/D/I with no redness or swelling. Some tenderness around incision sites and pt states general abdominal pain 4/10.  Musculoskeletal:         General: No leg swelling, no increased vascularity, no leg pain, no TTP in calves.  Neurological: She is oriented to person, place, and time.   Skin: Skin is warm and dry.   Psychiatric: Her behavior is normal. Mood normal.         Laboratory:  Laboratory Data Reviewed:  Trended Lab Data:  Recent Labs   Lab 20  0927 20  0616 20  0736 20  0627   WBC 4.84 5.11 7.25 7.51   HGB 7.9* 7.8*  7.3* 7.2*   HCT 26.3* 25.4* 24.8* 24.6*   * 400* 378* 372*   MCV 78* 78* 81* 80*   RDW 14.1 14.1 14.5 14.7*    134* 135* 136   K 3.6 3.9 4.1 3.7    102 105 102   CO2 25 21* 22* 24   BUN 6 4* 6 7   CREATININE 0.8 0.7 0.7 0.7   GLU 97 83 104 102   PROT 8.1 7.6 7.2 7.3   ALBUMIN 3.5 3.0* 3.0* 2.9*   BILITOT 0.6 0.6 0.7 1.0   AST 89* 118* 167* 112*   ALKPHOS 167* 167* 159* 156*   ALT 46* 54* 58*  --        Trended Cardiac Data:  No results for input(s): TROPONINI, CKTOTAL, CKMB, BNP in the last 168 hours.    Microbiology Data   Microbiology Results (last 7 days)     ** No results found for the last 168 hours. **          Radiology:  Imaging Results          X-Ray Chest 1 View (Final result)  Result time 07/14/20 07:27:46    Final result by Juanpablo Shine MD (07/14/20 07:27:46)                 Impression:      No acute abnormality.      Electronically signed by: Juanpablo Shine  Date:    07/14/2020  Time:    07:27             Narrative:    EXAMINATION:  XR CHEST 1 VIEW    CLINICAL HISTORY:  . Secondary malignant neoplasm of unspecified site    TECHNIQUE:  Single frontal portable view of the chest was performed.    COMPARISON:  04/29/2013    FINDINGS:  Support devices: None    The lungs are clear, with normal appearance of pulmonary vasculature and no pleural effusion or pneumothorax.    The cardiac silhouette is normal in size. The hilar and mediastinal contours are unremarkable.    Bones are intact.                               CT Abdomen Pelvis With Contrast (Final result)  Result time 07/14/20 07:42:01    Final result by Juanpablo Shine MD (07/14/20 07:42:01)                 Impression:      4.5 cm length annular lesion in the midline transverse colon suspicious for colonic neoplasm, with associated innumerable large hepatic metastatic lesions.  Fluid noted in the right colon suggesting impending diarrheal illness.  No evidence of bowel obstruction.      Electronically signed by: Juanpablo  Shine  Date:    07/14/2020  Time:    07:42             Narrative:    EXAMINATION:  CT ABDOMEN PELVIS WITH CONTRAST    CLINICAL HISTORY:  Metastatic disease evaluation;abnormal CT scan with concern for liver metastasis;    TECHNIQUE:  Low dose axial images, sagittal and coronal reformations were obtained from the lung bases to the pubic symphysis after  mL Omnipaque 350.    All CT scans at this location are performed using dose modulation techniques as appropriate to a performed exam including the following: Automated exposure control; adjustment of the mA and/or kV according to patient size.    COMPARISON:  CT abdomen pelvis without IV contrast 07/14/2020    FINDINGS:  Heart: Normal in size. No pericardial effusion.    Lung Bases: Well aerated, without consolidation or pleural fluid.    Liver: Innumerable large hypoattenuating hepatic lesions compatible with metastatic neoplastic process.  Largest lesion for example at the posterior right liver measuring 7.6 x 6.4 cm series 3, image 36.  And left hepatic lesion measuring 5.1 x 4.5 cm image 45.    Gallbladder: Innumerable partially calcified and low-density gallstones are present.  No CT evidence of cholecystitis.    Bile Ducts: No evidence of dilated ducts.    Pancreas: No mass or peripancreatic fat stranding.    Spleen: Unremarkable.    Adrenals: Unremarkable.    Kidneys/ Ureters: Unremarkable.    Bladder: Bladder partially distended limiting evaluation.  Suggestion of mild bladder wall thickening.  Please correlate for cystitis.    Reproductive organs: Left ovarian follicles noted.  Trace free fluid the pelvis.    GI Tract/Mesentery: Distal esophagus, stomach, duodenum, small bowel unremarkable.  Fluid noted in the right colon suggesting impending diarrheal illness.  4.5 cm length annular lesion in the midline transverse colon suspicious for colonic neoplasm.  9 x 9 mm mesenteric lymph node series 3, image 102.    Appendix: No evidence of  appendictis.    Peritoneal Space: No free air.    Retroperitoneum: No significant adenopathy.    Abdominal wall: 5.2 x 2.1 cm ovoid lesion in the right flank superficial subcutaneous fat, likely representing an old posttraumatic resolved hematoma.    Vasculature: No significant atherosclerosis or aneurysm.    Bones: No acute fracture.                               CT Renal Stone Study ABD Pelvis WO (Final result)  Result time 07/14/20 08:08:32    Final result by ADRIANA Perdue Sr., MD (07/14/20 08:08:32)                 Impression:      1. There are multiple hypodense masses scattered throughout the liver.  This is consistent with the patient's history and characteristic of metastatic disease.  2. There is possible thickening of the wall of the transverse portion of the colon.  If additional imaging evaluation is clinically indicated, I recommend consideration of a CT examination of the abdomen and pelvis with oral, rectal, and IV contrast.  3. There is a heterogeneous mass in the subcutaneous fat posterolateral to the right gluteus chris.  This mass measures 5.4 cm in craniocaudal dimension by 2.5 cm in AP dimension by 4.6 cm in medial-lateral dimension.  If additional imaging evaluation of this is clinically indicated, I recommend consideration of a PET-CT examination.  This may help in the planning of a percutaneous biopsy.  4. The kidneys, ureters, and urinary bladder are normal in appearance.  5. There are multiple poorly visualized stones within the gallbladder.  If additional imaging evaluation is clinically indicated, I recommend consideration of an ultrasound examination of the gallbladder.  6. The liver is enlarged.  It measures 20.4 cm in craniocaudal dimension.  7. The emergency room department is already aware of the above findings and impressions at the time of this dictation.  There were notified by Dr. Jimenez at 01:26 on 07/14/2020.  All CT scans at this facility use dose modulation, iterative  reconstruction, and/or weight base dosing when appropriate to reduce radiation dose when appropriate to reduce radiation dose to as low as reasonably achievable.      Electronically signed by: Carlo Perdue MD  Date:    07/14/2020  Time:    08:08             Narrative:    EXAMINATION:  CT RENAL STONE STUDY ABD PELVIS WO    CLINICAL HISTORY:  Hematuria, unknown cause; metastatic disease    TECHNIQUE:  Standard abdomen and pelvis CT protocol without oral or IV contrast was performed.    COMPARISON:  A KUB performed on 06/06/2020.    FINDINGS:  Finding: The size of the heart is within normal limits. The lungs are clear. There is no pneumothorax or pleural effusion.    There are multiple hypodense masses scattered throughout the liver.  The liver is enlarged.  It measures 20.4 cm in craniocaudal dimension.  There are multiple poorly visualized stones within the gallbladder.  The pancreas, spleen, adrenals, and kidneys are normal in appearance. The ureters and the urinary bladder are normal in appearance.  The uterus and ovaries were not optimally visualized.  The appendix is normal in appearance.  There is possible thickening of the wall of the transverse portion of the colon.  There is no free fluid within the abdomen or pelvis. There is no pneumoperitoneum.  There is a heterogeneous mass in the subcutaneous fat posterolateral to the right gluteus chris.  This mass measures 5.4 cm in craniocaudal dimension by 2.5 cm in AP dimension by 4.6 cm in medial-lateral dimension.                                  Current Medications:     Infusions:       Scheduled:   docusate sodium  100 mg Oral Daily    enoxaparin  40 mg Subcutaneous Q24H    folic acid  1 mg Oral Daily    ketorolac  15 mg Intravenous Q6H        PRN:  acetaminophen, acetaminophen, dextrose 50%, dextrose 50%, diphenhydrAMINE, glucagon (human recombinant), glucose, glucose, HYDROcodone-acetaminophen, HYDROmorphone, HYDROmorphone, ibuprofen, melatonin,  morphine, ondansetron, ondansetron, ondansetron, ondansetron, oxyCODONE-acetaminophen, sodium chloride 0.9%, sodium chloride 0.9%, sodium chloride 0.9%    Antibiotics and Day Number of Therapy:  None    Lines and Day Number of Therapy:  PIV R    Assessment and Plan     Huy Cisneros is a 29 y.o.female being evaluated for new diagnosis of colon cancer + numerous metastases to the liver. S/p colonoscopy 7/15.  Getting resection of the mass with surgery.    Intractable nausea, vomiting  -patient has intractable vomiting likely 2/2 obstruction of the transverse colon from her cancer  -better PO intake with PRN zofran  -lap R vinh colectomy 7/17 to clear colon obstruction; no vomiting or nausea today    Post-OP Fever  -one temp measured 102.1 7/18  -likely secondary to atelectasis given patient's immobility and shallow breaths on exam  -incentive spirometry, +/- CXR     -Will discuss D-Dimer, +/- US to r/o with team today, although suspicion for DVT low given physical exam this morning    Metastatic colon cancer:  - Pt presents with 1 month history of post-prandial emesis and abdominal pain.   - New onset diarrhea x 1 day with 10 episodes of non-bloody diarrhea in the past day.   - CT abdomen demonstrates stricture at transverse colon with numerous metastatic lesions in the liver.   - AST and ALT elevated on admission at 117 and 70 respectively. Alk-phos elevated at 202.   - likely malignant partially obstructing tumor in transverse colon; biopsy pending  -laparoscopic R hemicolectomy performed 7/17 to remove colon obstruction  --port placed during colectomy     Microcytic anemia:  - Likely iron deficiency anemia 2/2 GI blood loss, no melena noted during admission  - Hgb: stable at 7-8, MCV high 70s/80s  - Iron studies showing mixed picture: iron 18, TIBC 438, transferrin 296, ferritin 147, folate 4.7, B12 663  - Consider transfusion if Hgb < 7  - Supplementing folic acid daily     Asymptomatic bacteruria  - UCx  positive for E. Coli >100,000 7/15  - No dysuria or frequency, pt reports symptoms do not feel like her previous UTI  - Urinalysis on admission nitrite (+), demonstrated +1 leukocytes, and > 100 RBCs with occasional bacteria.   - Pt given Rocephin 1 g IV at [facility] ED  - Refraining from additional abx given lack of symptoms      Ovarian Cyst  -sees outpatient OBGYN        Health Maintenance:  Up to date on Flu, Tetanus, Pap smear  PCP [facility]     Fluids: PO  Electrolytes: No abnormalities  Prophylaxis: Lovenox 40mg  Code Status: Full     Dispo: F/U surgery recs for discharge. Plan to D/C home after she regains some strength this weekend.    Angel Del Rosario  [facility] HO-I  [facility] Hospitalist Service Team B    [facility] Medicine Hospitalist Pager numbers:   [facility] Hospitalist Medicine Team A (Beatris/Selvin): [phone]  [facility]ist Medicine Team B (Carolyn/Nato):  [phone]

## 2020-07-20 NOTE — NURSING
Pt awake, alert, and oriented. VN reviewed discharge orders. Pt awaiting ride. Family arrived at 1545. Transport arranged for discharge.

## 2020-07-20 NOTE — PLAN OF CARE
Patient has received discharge instructions. Prescriptions received. Instructions reviewed with pt using teachback method. All questions answered to pt satisfaction. IV access and telemetry removed per floor nurse. Transport to be  requested for discharge.

## 2020-07-20 NOTE — PROGRESS NOTES
[facility] Resident Progress Note    Subjective:      POD 3, no fevers overnight. Had one ep of shortness of breath last night, likely anxiety. Normal breathing on interview today. N/v last night controlled with PRN zofran.     Denies f/c, cough/SOB, increased tenderness, drainage or redness at wound sites, increased urinary frequency or dysuria, or leg swelling/tenderness/pain.    Objective:   Last 24 Hour Vital Signs:  BP  Min: 122/84  Max: 138/93  Temp  Av.6 °F (36.4 °C)  Min: 96.8 °F (36 °C)  Max: 98.5 °F (36.9 °C)  Pulse  Av.4  Min: 102  Max: 110  Resp  Av  Min: 14  Max: 20  SpO2  Av.2 %  Min: 98 %  Max: 100 %  I/O last 3 completed shifts:  In: 400 [P.O.:400]  Out: 600 [Urine:500; Emesis/NG output:100]    Physical Examination:  Physical Exam   Constitutional: She is oriented to person, place, and time. No distress.   HENT:   Head: Normocephalic and atraumatic.   Nose: Nose normal. No rhinorrhea.   Mouth/Throat: Moist mucous membranes, no swelling   Eyes: Pupils are equal, round, and reactive to light. Conjunctivae are normal.   Neck: Normal range of motion. Neck supple.   Cardiovascular: tachypneic, regular rhythm and normal heart sounds.   Pulmonary/Chest: Lungs clear to auscultation, pt speaking normally  Abdominal: Soft. Normal appearance and bowel sounds are normal. Laparoscopic incision sites C/D/I with no redness or swelling. Some tenderness around incision sites.  Musculoskeletal:         General: No leg swelling, no increased vascularity, no leg pain, no TTP in calves.  Neurological: She is oriented to person, place, and time.   Skin: Skin is warm and dry.   Psychiatric: Her behavior is normal. Mood normal.         Laboratory:  Laboratory Data Reviewed:  Trended Lab Data:  Recent Labs   Lab 20  0616 20  0736 20  0627 20  0555   WBC 5.11 7.25 7.51 7.29   HGB 7.8* 7.3* 7.2* 7.2*   HCT 25.4* 24.8* 24.6* 23.8*   * 378* 372* 388*   MCV 78* 81*  80* 77*   RDW 14.1 14.5 14.7* 14.8*   * 135* 136  --    K 3.9 4.1 3.7  --     105 102  --    CO2 21* 22* 24  --    BUN 4* 6 7  --    CREATININE 0.7 0.7 0.7  --    GLU 83 104 102  --    PROT 7.6 7.2 7.3  --    ALBUMIN 3.0* 3.0* 2.9*  --    BILITOT 0.6 0.7 1.0  --    * 167* 112*  --    ALKPHOS 167* 159* 156*  --    ALT 54* 58* 51*  --        Trended Cardiac Data:  No results for input(s): TROPONINI, CKTOTAL, CKMB, BNP in the last 168 hours.    Microbiology Data   Microbiology Results (last 7 days)     ** No results found for the last 168 hours. **          Radiology:  Imaging Results          X-Ray Chest 1 View (Final result)  Result time 07/14/20 07:27:46    Final result by Juanpablo Shine MD (07/14/20 07:27:46)                 Impression:      No acute abnormality.      Electronically signed by: Juanpablo Shine  Date:    07/14/2020  Time:    07:27             Narrative:    EXAMINATION:  XR CHEST 1 VIEW    CLINICAL HISTORY:  . Secondary malignant neoplasm of unspecified site    TECHNIQUE:  Single frontal portable view of the chest was performed.    COMPARISON:  04/29/2013    FINDINGS:  Support devices: None    The lungs are clear, with normal appearance of pulmonary vasculature and no pleural effusion or pneumothorax.    The cardiac silhouette is normal in size. The hilar and mediastinal contours are unremarkable.    Bones are intact.                               CT Abdomen Pelvis With Contrast (Final result)  Result time 07/14/20 07:42:01    Final result by Juanpablo Shine MD (07/14/20 07:42:01)                 Impression:      4.5 cm length annular lesion in the midline transverse colon suspicious for colonic neoplasm, with associated innumerable large hepatic metastatic lesions.  Fluid noted in the right colon suggesting impending diarrheal illness.  No evidence of bowel obstruction.      Electronically signed by: uJanpablo Shine  Date:    07/14/2020  Time:    07:42             Narrative:     EXAMINATION:  CT ABDOMEN PELVIS WITH CONTRAST    CLINICAL HISTORY:  Metastatic disease evaluation;abnormal CT scan with concern for liver metastasis;    TECHNIQUE:  Low dose axial images, sagittal and coronal reformations were obtained from the lung bases to the pubic symphysis after  mL Omnipaque 350.    All CT scans at this location are performed using dose modulation techniques as appropriate to a performed exam including the following: Automated exposure control; adjustment of the mA and/or kV according to patient size.    COMPARISON:  CT abdomen pelvis without IV contrast 07/14/2020    FINDINGS:  Heart: Normal in size. No pericardial effusion.    Lung Bases: Well aerated, without consolidation or pleural fluid.    Liver: Innumerable large hypoattenuating hepatic lesions compatible with metastatic neoplastic process.  Largest lesion for example at the posterior right liver measuring 7.6 x 6.4 cm series 3, image 36.  And left hepatic lesion measuring 5.1 x 4.5 cm image 45.    Gallbladder: Innumerable partially calcified and low-density gallstones are present.  No CT evidence of cholecystitis.    Bile Ducts: No evidence of dilated ducts.    Pancreas: No mass or peripancreatic fat stranding.    Spleen: Unremarkable.    Adrenals: Unremarkable.    Kidneys/ Ureters: Unremarkable.    Bladder: Bladder partially distended limiting evaluation.  Suggestion of mild bladder wall thickening.  Please correlate for cystitis.    Reproductive organs: Left ovarian follicles noted.  Trace free fluid the pelvis.    GI Tract/Mesentery: Distal esophagus, stomach, duodenum, small bowel unremarkable.  Fluid noted in the right colon suggesting impending diarrheal illness.  4.5 cm length annular lesion in the midline transverse colon suspicious for colonic neoplasm.  9 x 9 mm mesenteric lymph node series 3, image 102.    Appendix: No evidence of appendictis.    Peritoneal Space: No free air.    Retroperitoneum: No significant  adenopathy.    Abdominal wall: 5.2 x 2.1 cm ovoid lesion in the right flank superficial subcutaneous fat, likely representing an old posttraumatic resolved hematoma.    Vasculature: No significant atherosclerosis or aneurysm.    Bones: No acute fracture.                               CT Renal Stone Study ABD Pelvis WO (Final result)  Result time 07/14/20 08:08:32    Final result by ADRIANA Perdue Sr., MD (07/14/20 08:08:32)                 Impression:      1. There are multiple hypodense masses scattered throughout the liver.  This is consistent with the patient's history and characteristic of metastatic disease.  2. There is possible thickening of the wall of the transverse portion of the colon.  If additional imaging evaluation is clinically indicated, I recommend consideration of a CT examination of the abdomen and pelvis with oral, rectal, and IV contrast.  3. There is a heterogeneous mass in the subcutaneous fat posterolateral to the right gluteus chris.  This mass measures 5.4 cm in craniocaudal dimension by 2.5 cm in AP dimension by 4.6 cm in medial-lateral dimension.  If additional imaging evaluation of this is clinically indicated, I recommend consideration of a PET-CT examination.  This may help in the planning of a percutaneous biopsy.  4. The kidneys, ureters, and urinary bladder are normal in appearance.  5. There are multiple poorly visualized stones within the gallbladder.  If additional imaging evaluation is clinically indicated, I recommend consideration of an ultrasound examination of the gallbladder.  6. The liver is enlarged.  It measures 20.4 cm in craniocaudal dimension.  7. The emergency room department is already aware of the above findings and impressions at the time of this dictation.  There were notified by Dr. Jimenez at 01:26 on 07/14/2020.  All CT scans at this facility use dose modulation, iterative reconstruction, and/or weight base dosing when appropriate to reduce radiation  dose when appropriate to reduce radiation dose to as low as reasonably achievable.      Electronically signed by: Carlo Perdue MD  Date:    07/14/2020  Time:    08:08             Narrative:    EXAMINATION:  CT RENAL STONE STUDY ABD PELVIS WO    CLINICAL HISTORY:  Hematuria, unknown cause; metastatic disease    TECHNIQUE:  Standard abdomen and pelvis CT protocol without oral or IV contrast was performed.    COMPARISON:  A KUB performed on 06/06/2020.    FINDINGS:  Finding: The size of the heart is within normal limits. The lungs are clear. There is no pneumothorax or pleural effusion.    There are multiple hypodense masses scattered throughout the liver.  The liver is enlarged.  It measures 20.4 cm in craniocaudal dimension.  There are multiple poorly visualized stones within the gallbladder.  The pancreas, spleen, adrenals, and kidneys are normal in appearance. The ureters and the urinary bladder are normal in appearance.  The uterus and ovaries were not optimally visualized.  The appendix is normal in appearance.  There is possible thickening of the wall of the transverse portion of the colon.  There is no free fluid within the abdomen or pelvis. There is no pneumoperitoneum.  There is a heterogeneous mass in the subcutaneous fat posterolateral to the right gluteus chris.  This mass measures 5.4 cm in craniocaudal dimension by 2.5 cm in AP dimension by 4.6 cm in medial-lateral dimension.                                  Current Medications:     Infusions:       Scheduled:   docusate sodium  100 mg Oral Daily    enoxaparin  40 mg Subcutaneous Q24H    folic acid  1 mg Oral Daily    ketorolac  15 mg Intravenous Q6H        PRN:  acetaminophen, acetaminophen, dextrose 50%, dextrose 50%, diphenhydrAMINE, glucagon (human recombinant), glucose, glucose, HYDROcodone-acetaminophen, HYDROmorphone, HYDROmorphone, ibuprofen, melatonin, morphine, ondansetron, ondansetron, ondansetron, ondansetron,  oxyCODONE-acetaminophen, sodium chloride 0.9%, sodium chloride 0.9%, sodium chloride 0.9%    Antibiotics and Day Number of Therapy:  None    Lines and Day Number of Therapy:  PIV R wrist, change today    Assessment and Plan     Huy Cisneros is a 29 y.o.female being evaluated for new diagnosis of colon cancer + numerous metastases to the liver. S/p colonoscopy 7/15.  Getting resection of the mass with surgery.    Intractable nausea, vomiting  -patient has intractable vomiting likely 2/2 obstruction of the transverse colon from her cancer  -better PO intake with PRN zofran  -lap R vinh colectomy 7/17 to clear colon obstruction; no vomiting or nausea today    Post-OP Fever (resolved)  -one temp measured 102.1 7/18, tachycardic at baseline most likely 2/2 anemia  -fv likely secondary to atelectasis given patient's immobility and shallow breaths on exam  -IS given, patient educated    Metastatic colon cancer:  - Pt presents with 1 month history of post-prandial emesis and abdominal pain.   - New onset diarrhea x 1 day with 10 episodes of non-bloody diarrhea in the past day.   - CT abdomen demonstrates stricture at transverse colon with numerous metastatic lesions in the liver.   - AST and ALT elevated on admission at 117 and 70 respectively. Alk-phos elevated at 202.   - likely malignant partially obstructing tumor in transverse colon; biopsy pending  -laparoscopic R hemicolectomy performed 7/17 to remove colon obstruction  --port placed during colectomy     Microcytic anemia:  - Likely iron deficiency anemia 2/2 GI blood loss, no melena noted during admission, tachycardic at baseline this admission  - Hgb: stable at 7-8, MCV high 70s/80s  - Iron studies showing mixed picture: iron 18, TIBC 438, transferrin 296, ferritin 147, folate 4.7, B12 663  - Consider transfusion if Hgb < 7  - Supplementing folic acid daily     Asymptomatic bacteruria  - UCx positive for E. Coli >100,000 7/15  - No dysuria or frequency, pt  reports symptoms do not feel like her previous UTI  - Urinalysis on admission nitrite (+), demonstrated +1 leukocytes, and > 100 RBCs with occasional bacteria.   - Pt given Rocephin 1 g IV at [facility] ED  - Refraining from additional abx given lack of symptoms      Ovarian Cyst  -sees outpatient OBGYN        Health Maintenance:  Up to date on Flu, Tetanus, Pap smear  [facility]     Fluids: PO  Electrolytes: No abnormalities  Prophylaxis: Lovenox 40mg  Code Status: Full     Dispo: Can go home today after PT, F/U appointments set up    Angel Del Rosario  [facility] HO-I  [facility] Hospitalist Service Team B    [facility] Medicine Hospitalist Pager numbers:   [facility] Hospitalist Medicine Team A (Beatris/Selvin): [phone]  [facility] Hospitalist Medicine Team B (Carolyn/Nato):  [phone]

## 2020-07-20 NOTE — PROGRESS NOTES
[facility]  General Surgery  Progress Note    Subjective:       Post-Op Info:  Procedure(s) (LRB):  COLECTOMY, LAPAROSCOPIC (N/A)  INSERTION, PORT-A-CATH (N/A)   3 Days Post-Op     Interval History: NAEO,VSS. She reports nausea, vomiting over the weekend with CLD. Not tolerated more than a few bites of low residue diet, due to nausea, moderately relieved with medications. Ambulating out of bed. Passing flatus and bowel movements. Pain is well controlled.     Medications:  Continuous Infusions:  Scheduled Meds:   docusate sodium  100 mg Oral Daily    enoxaparin  40 mg Subcutaneous Q24H    folic acid  1 mg Oral Daily    ketorolac  15 mg Intravenous Q6H     PRN Meds:acetaminophen, acetaminophen, dextrose 50%, dextrose 50%, diphenhydrAMINE, glucagon (human recombinant), glucose, glucose, HYDROcodone-acetaminophen, HYDROmorphone, HYDROmorphone, ibuprofen, melatonin, morphine, ondansetron, ondansetron, ondansetron, ondansetron, oxyCODONE-acetaminophen, sodium chloride 0.9%, sodium chloride 0.9%, sodium chloride 0.9%     Review of patient's allergies indicates:   Allergen Reactions    Sulfa (sulfonamide antibiotics)      Objective:     Vital Signs (Most Recent):  Temp: 96.8 °F (36 °C) (07/20/20 0513)  Pulse: 104 (07/20/20 0513)  Resp: 20 (07/20/20 0513)  BP: 131/84 (07/20/20 0513)  SpO2: 99 % (07/20/20 0811) Vital Signs (24h Range):  Temp:  [96.8 °F (36 °C)-98.5 °F (36.9 °C)] 96.8 °F (36 °C)  Pulse:  [102-110] 104  Resp:  [14-20] 20  SpO2:  [98 %-100 %] 99 %  BP: (122-138)/(81-93) 131/84     Weight: 88.5 kg (195 lb 1.7 oz)  Body mass index is 32.47 kg/m².    Intake/Output - Last 3 Shifts       07/18 0700 - 07/19 0659 07/19 0700 - 07/20 0659 07/20 0700 - 07/21 0659    P.O. 150 250     I.V. (mL/kg)       Total Intake(mL/kg) 150 (1.7) 250 (2.8)     Urine (mL/kg/hr) 700 (0.3) 200 (0.1)     Emesis/NG output 100      Stool  0     Total Output 800 200     Net -650 +50            Urine Occurrence  1 x      Stool Occurrence  2 x           Physical Exam  Vitals signs and nursing note reviewed.   Constitutional:       General: She is not in acute distress.  Cardiovascular:      Rate and Rhythm: Normal rate.      Pulses: Normal pulses.   Pulmonary:      Effort: Pulmonary effort is normal. No respiratory distress.   Abdominal:      General: There is no distension.      Palpations: Abdomen is soft.      Tenderness: There is abdominal tenderness (mild incisional ).      Comments: Laparoscopic incisions c/d/i without erythema or induration    Skin:     General: Skin is dry.   Neurological:      General: No focal deficit present.      Mental Status: She is alert and oriented to person, place, and time.          Significant Labs:  CBC:   Recent Labs   Lab 07/20/20  0555   WBC 7.29   RBC 3.08*   HGB 7.2*   HCT 23.8*   *   MCV 77*   MCH 23.4*   MCHC 30.3*     BMP:   Recent Labs   Lab 07/19/20  0627         K 3.7      CO2 24   BUN 7   CREATININE 0.7   CALCIUM 8.9   MG 1.8     CMP:   Recent Labs   Lab 07/19/20  0627      CALCIUM 8.9   ALBUMIN 2.9*   PROT 7.3      K 3.7   CO2 24      BUN 7   CREATININE 0.7   ALKPHOS 156*   ALT 51*   *   BILITOT 1.0       Significant Diagnostics:  I have reviewed all pertinent imaging results/findings within the past 24 hours.    Assessment/Plan:     Adenocarcinoma of colon  POD#3 extended right hemicolectomy with obstructive metastatic colon cancer. Patient reports nausea, moderately controlled with medications. Tolerating only a few bites of low residue diet. Pain is well controlled. Ambulating out of bed without issue. Satisfactory return of bowel function.     - Zofran, phenergan as needed for nausea  - Pain meds prn   - OOB, IS, ambulate  - Ok to DC once tolerating low residue diet   - Outpatient follow-up appointment with Dr. Naqvi in 2 weeks        Toya Hook MD  General Surgery  [facility]

## 2020-07-20 NOTE — ASSESSMENT & PLAN NOTE
POD#3 extended right hemicolectomy with obstructive metastatic colon cancer. Patient reports nausea, moderately controlled with medications. Tolerating only a few bites of low residue diet. Pain is well controlled. Ambulating out of bed without issue.     - Zofran, phenergan as needed for nausea  - Pain meds prn   - OOB, IS, ambulate  - Ok to DC once tolerating low residue diet   - Outpatient follow-up appointment with Dr. Naqvi in 2 weeks
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
## 2020-07-20 NOTE — TELEPHONE ENCOUNTER
Unable to leave message informing pt. Of rescheduled appt. With Dr. Arzola on 7/28/20.    ----- Message from Jewel Arzola MD sent at 7/19/2020 12:58 PM CDT -----  Regarding: push appt back to week of 7/27/20.  Thanks!    Jewel

## 2020-07-20 NOTE — MEDICAL/APP STUDENT
"[facility] Medicine Progress Note    Primary Team: [facility] Hospitalist Team B  Attending Physician: Sami Dutton MD  Resident: Eron  Intern: Miguel Ángel    Subjective:      Patient stated she was not doing well today. She woke up with shortness of breath and a dry mouth that felt like she had been having a hard time breathing for a while. She experienced chest pain throughout this period of shortness of breath that felt localized "over her heart." She has been getting the hiccups, which make her nausea worse and caused her to vomit this morning. Her right lower leg and foot has been causing her pain since last night, associated with tingling throughout and a cold sensation compared to her left leg. She is more anxious today due to her worsening symptoms.     Objective:     Last 24 Hour Vital Signs:  BP  Min: 122/84  Max: 138/93  Temp  Av.6 °F (36.4 °C)  Min: 96.8 °F (36 °C)  Max: 98.5 °F (36.9 °C)  Pulse  Av.4  Min: 102  Max: 110  Resp  Av  Min: 14  Max: 20  SpO2  Av.2 %  Min: 98 %  Max: 100 %  I/O last 3 completed shifts:  In: 400 [P.O.:400]  Out: 600 [Urine:500; Emesis/NG output:100]    Physical Examination:  General: sitting up, appears distressed and uncomfortable  Head: Normocephalic and atraumatic  Neck: port incision lacks swelling, slight erythema  Cardiovascular: increased rate, regular rhythm, and normal heart sounds  Pulmonary/chest: able to take a deeper breath on exam without pain. Stopping at the end of each sentence to take a breath. Normal breath sounds  Abdominal: laparoscopic incision sites have minimal erythema and swelling. She is less concerned about the pain and more about the nausea and hiccups that keep returning.  Musculoskeletal: No swelling or erythema in legs. Symmetrical coloration, size, and temperature.   Neurological: oriented to person, place, and time  Psych: anxious but responsive    Laboratory:  Laboratory Data Reviewed: yes  Pertinent Findings:  WBC: 7.29  Hb: " 7.2  MCV:: 77  RDW: 14.8  Platelets: 388  Iron: 18  TIBC: 438  Sat Iron: 4  Transferrin: 296  Ferritin: 147  Folate: 4.7  AlkPhos: 156  Albumin: 2.9  AST: 112  ALT: 51    Microbiology Data Reviewed: yes  Pertinent Findings:  Urine culture positive for E. Coli 6/6/20    Other Results:  EKG (my interpretation):  Normal rate. Sinus rhythm. No axis deviation. No TX prolongation. Narrow QRS. No QT prolongation. Possible ST abnormality in V1.    Radiology Data Reviewed: yes  Pertinent Findings:  CT abdomen pelvis: evidence of innumerable large hepatic lesions consistent with metastatic neoplastic process. Annular lesion in the midline transverse colon.   Liver biopsy: adenocarcinoma, colonic primary source    Current Medications:     Infusions:       Scheduled:  Docusate sodium 100 mg oral daily   Enoxaparin 40 mg SubQ Q24H  Folic acid 1mg Oral daily  Ketorolac 15 mh IV Q6H       PRN:  Acetaminophen, dextrose 50%, diphenhydramine, glucagon (human recombinant), glucose, hydrocodone-acetaminophen, hydromorphone, ibuprofen, melatonin, morphine, ondansetron, oxycodone-acetaminophen, sodium chloride 0.9%    Antibiotics and Day Number of Therapy:  Ceftriaxone started 7/14 x2 doses  Ertapenem started 7/17 x1 dose    Lines and Day Number of Therapy:  Peripheral IV line right hand started started 7/15 to present    Assessment:   Huy Cisneros is a 29 year old female with metastatic adenocarcinoma of the colon with metastasis to her liver associated with intractable nausea and vomiting. She underwent a partial colectomy of an obstructing mass in her transverse colon on 7/16.    Plan:     Colonic adenocarcinoma/liver metastasis:  - partial colectomy performed 7/16 to remove obstructing mass in transverse colon  - follow up with hem/onc for treatment options and plan    Shortness of breath:   - Not ongoing. Current presentation appears to be more associated with anxiety due to the return of nausea and recent emesis episode.   -  Consider IR alprazolam if anxiety worsens. Consider risk with hepatic transaminitis and with fall risk.    Intractable nausea and vomiting:  - ondansetron PRN  - diet progresses as tolerated    Iron deficiency anemia:  Latest labs: 7/14   Iron: 18  TIBC: 438 Sat Iron: 4 Transferrin: 296  Ferritin: 147  -  Repeat labs when starting colon cancer tx  - iron supplementation may be required     Post-op fever (resolved):  - started spirometry treatments. Has had no problems with treatments    Fluids: PO  Electrolytes: No abnormalities  PPx: Enoxaparin 40 mg  Code: full    Dispo: patient can go home today if no recurrent or worsening shortness of breath and if nausea improves/is controlled. Follow up with heme/onc, GI, and possibly PT     Ivanna Matos  [facility] Internal Medicine [id number]    [facility] Medicine Hospitalist Pager numbers:   [facility] Hospitalist Medicine Team A (Beatris/Selvin): [phone]  [facility] Hospitalist Medicine Team B (Carolyn/Nato):  [phone]

## 2020-07-20 NOTE — PLAN OF CARE
Tn visited with pt and provided post op education, s/s infection to monitor for and reviewed all appts with pt ([facility], ONC, Surg, and someone from GI office will call pt to schedule) Pt stated her mother will provide help at home and come pick her up. Tn encouraged pt to call for any further needs/concerns. Tn notified floor nurse and VN pt ready for d/c after she receives her prescriptions from pharmacy.     07/20/20 1328   Final Note   Assessment Type Final Discharge Note   Anticipated Discharge Disposition Home   What phone number can be called within the next 1-3 days to see how you are doing after discharge? [phone]   Hospital Follow Up  Appt(s) scheduled? Yes   Discharge plans and expectations educations in teach back method with documentation complete? Yes   Right Care Referral Info   Post Acute Recommendation No Care

## 2020-07-20 NOTE — SUBJECTIVE & OBJECTIVE
Interval History: BRUNA,REINAS. She reports nausea, vomiting over the weekend with CLD. Not tolerated more than a few bites of low residue diet, due to nausea, moderately relieved with medications. Ambulating out of bed. Passing flatus and bowel movements. Pain is well controlled.     Medications:  Continuous Infusions:  Scheduled Meds:   docusate sodium  100 mg Oral Daily    enoxaparin  40 mg Subcutaneous Q24H    folic acid  1 mg Oral Daily    ketorolac  15 mg Intravenous Q6H     PRN Meds:acetaminophen, acetaminophen, dextrose 50%, dextrose 50%, diphenhydrAMINE, glucagon (human recombinant), glucose, glucose, HYDROcodone-acetaminophen, HYDROmorphone, HYDROmorphone, ibuprofen, melatonin, morphine, ondansetron, ondansetron, ondansetron, ondansetron, oxyCODONE-acetaminophen, sodium chloride 0.9%, sodium chloride 0.9%, sodium chloride 0.9%     Review of patient's allergies indicates:   Allergen Reactions    Sulfa (sulfonamide antibiotics)      Objective:     Vital Signs (Most Recent):  Temp: 96.8 °F (36 °C) (07/20/20 0513)  Pulse: 104 (07/20/20 0513)  Resp: 20 (07/20/20 0513)  BP: 131/84 (07/20/20 0513)  SpO2: 99 % (07/20/20 0811) Vital Signs (24h Range):  Temp:  [96.8 °F (36 °C)-98.5 °F (36.9 °C)] 96.8 °F (36 °C)  Pulse:  [102-110] 104  Resp:  [14-20] 20  SpO2:  [98 %-100 %] 99 %  BP: (122-138)/(81-93) 131/84     Weight: 88.5 kg (195 lb 1.7 oz)  Body mass index is 32.47 kg/m².    Intake/Output - Last 3 Shifts       07/18 0700 - 07/19 0659 07/19 0700 - 07/20 0659 07/20 0700 - 07/21 0659    P.O. 150 250     I.V. (mL/kg)       Total Intake(mL/kg) 150 (1.7) 250 (2.8)     Urine (mL/kg/hr) 700 (0.3) 200 (0.1)     Emesis/NG output 100      Stool  0     Total Output 800 200     Net -650 +50            Urine Occurrence  1 x     Stool Occurrence  2 x           Physical Exam  Vitals signs and nursing note reviewed.   Constitutional:       General: She is not in acute distress.  Cardiovascular:      Rate and Rhythm: Normal rate.       Pulses: Normal pulses.   Pulmonary:      Effort: Pulmonary effort is normal. No respiratory distress.   Abdominal:      General: There is no distension.      Palpations: Abdomen is soft.      Tenderness: There is abdominal tenderness (mild incisional ).      Comments: Laparoscopic incisions c/d/i without erythema or induration    Skin:     General: Skin is dry.   Neurological:      General: No focal deficit present.      Mental Status: She is alert and oriented to person, place, and time.          Significant Labs:  CBC:   Recent Labs   Lab 07/20/20  0555   WBC 7.29   RBC 3.08*   HGB 7.2*   HCT 23.8*   *   MCV 77*   MCH 23.4*   MCHC 30.3*     BMP:   Recent Labs   Lab 07/19/20  0627         K 3.7      CO2 24   BUN 7   CREATININE 0.7   CALCIUM 8.9   MG 1.8     CMP:   Recent Labs   Lab 07/19/20  0627      CALCIUM 8.9   ALBUMIN 2.9*   PROT 7.3      K 3.7   CO2 24      BUN 7   CREATININE 0.7   ALKPHOS 156*   ALT 51*   *   BILITOT 1.0       Significant Diagnostics:  I have reviewed all pertinent imaging results/findings within the past 24 hours.

## 2020-07-21 NOTE — PROGRESS NOTES
C3 nurse attempted to contact patient. No answer. The following message was left for the patient to return the call:  Good morning I am a nurse calling on behalf of [facility] from the [facility].  This is a Transitional Care Call for Huy BAIG. When you have a moment please contact us at [phone] or [phone] Monday through Friday, between the hours of 8 am to 4 pm. We look forward to speaking with you. On behalf of [facility] have a nice day.    The patient does not have a scheduled HOSFU appointment within 7-14 days post hospital discharge date 7/20/20. No PCP on file.

## 2020-07-21 NOTE — PATIENT INSTRUCTIONS
Discharge Instructions for Total Abdominal Colectomy  A total abdominal colectomy is surgery to remove your colon. Your colon, also called the large intestine, is part of your bowel. A colectomy is done to remove disease, such as cancer, polyps, and inflammatory bowel disease, and to relieve the symptoms you have been having, such as bleeding, blockage, and pain.  Activity  · Ask your friends and family to help with chores and errands while you recover.  · Walk on a regular basis. Start with short walks each day. Gradually increase the distance you walk and how often you walk.  · Dont lift anything heavier than 10 pounds for the first 6 weeks after your surgery.  · Dont drive for 2 weeks after surgery or as directed by your doctor. Dont drive while you are taking prescription pain medicine.  · Ask your doctor when you can expect to return to work. Most people are able to return to work within 4 to 6 weeks after surgery.  Other home care  · Diarrhea or loose stools are common after surgery, and can last weeks to months. If you have watery, or bloody diarrhea, call your surgeon. This may be a sign of a bowel infection or other problem.  · Follow the diet prescribed for you in the hospital. Slowly add foods until you get back to your regular diet. If a food gives you stomach or bowel problems, avoid it for a while.  · Initially, you may be on a low fiber diet. After this, adding fiber can help with the diarrhea. If it is severe, your doctor may add a medicine for the diarrhea as well.  · You may use pain medicine as directed by your provider. Discuss your best option before leaving the hospital and at your post operative visit.  · Use nutritional supplements or shakes as directed by your doctor.  · Drink at least 8 glasses of water every day, unless directed otherwise. It's very important to avoid dehydration, especially if you have an ostomy (a bag that collects stool) or diarrhea.   · Take your medicines exactly  as directed. Dont skip doses.  · Shower or bathe as directed by your healthcare provider. Gently wash your incision with soap and water and pat dry.  · Avoid tub baths until the staples in your incision have been removed.  Follow-up care  Make a follow-up appointment as directed by our staff.     When to call your healthcare provider  Call your healthcare provider right away if you have any of the following:  · Fever of 100.4°F (38°C) or higher, or as directed by your healthcare provider  · Diarrhea that lasts more than 3 days  · Nausea and vomiting that wont go away  · Pain in your abdomen that gets worse or isnt relieved by pain medicine  · Drainage or redness around your incision  · Bright red or dark black stools   Date Last Reviewed: 1/1/2020  © 8195-1835 [employer]. [address] [place] [address], [place] [address]. All rights reserved. This information is not intended as a substitute for professional medical care. Always follow your healthcare professional's instructions.

## 2020-07-23 NOTE — PHYSICIAN QUERY
PT Name: Huy Cisneros  MR #: [id number]    Pathology Findings Clarification   Maite Blackwood RN, CCDS; [email or web address]    This form is a permanent document in the medical record.     Query Date: July 23, 2020    By submitting this query, we are merely seeking further clarification of documentation.    Please utilize your independent clinical judgment when addressing the question(s) below.    The medical record contains the following:  Pathology Findings Location in Medical Record   EXCISION OF RIGHT COLON:   MODERATELY DIFFERENTIATED ADENOCARCINOMA WITH INFILTRATION THROUGH THE   MUSCULARIS INTO PERICOLONIC ADIPOSE TISSUE   METASTATIC CARCINOMA  TO 6 OF 15 LYMPH NODES   MULTIPLE METASTASES TO THE FAT ITSELF   NO SIGNIFICANT ABNORMALITY OF THE TERMINAL ILEUM OR APPENDIX   MMR--NORMAL FOR COLORECTAL CARCINOMA    Immunohistochemistry (IHC) Testing for Mismatch Repair (MMR) Proteins:   MLH1 - Intact nuclear expression   MSH2 - Intact nuclear expression   MSH6 - Intact nuclear expression   PMS2 - Intact nuclear expression   Background nonneoplastic tissue/internal control with intact nuclear   expression   IHC Interpretation   No loss of nuclear expression of MMR proteins: low probability of   microsatellite instability   There are exceptions to the above IHC interpretations. These results should   not be considered in isolation, and clinical correlation with genetic   counseling is recommended to assess the need for germline testing.   MMR-IHC interpretation is performed by Luca Mcclain M.D.      Comment: Interpreted by: Luca Mcclain M.D., Signed on 07/22/2020 at 12:30   Path Report 7/17/2020     Please clarify:  [ x ] Pathology findings noted above are ruled in/confirmed as diagnoses   [  ] Pathology findings noted above are not confirmed as diagnoses   [  ] Other diagnosis (please specify): ___________   [  ] Clinically Undetermined     Please document in your progress notes daily for the  duration of treatment until resolved and include in your discharge summary.

## 2020-07-24 NOTE — ANESTHESIA POSTPROCEDURE EVALUATION
Anesthesia Post Evaluation    Patient: Huy Cisneros    Procedure(s) Performed: Procedure(s) (LRB):  COLECTOMY, LAPAROSCOPIC (N/A)  INSERTION, PORT-A-CATH (N/A)    Final Anesthesia Type: general    Patient location during evaluation: PACU  Patient participation: Yes- Able to Participate  Level of consciousness: awake and alert, oriented and awake  Post-procedure vital signs: reviewed and stable  Pain management: adequate  Airway patency: patent    PONV status at discharge: No PONV  Anesthetic complications: no      Cardiovascular status: blood pressure returned to baseline  Respiratory status: unassisted and room air  Hydration status: euvolemic  Follow-up not needed.          Vitals Value Taken Time   /94 07/20/20 1258   Temp 37 °C (98.6 °F) 07/20/20 1258   Pulse 103 07/20/20 1258   Resp 18 07/20/20 1258   SpO2 99 % 07/20/20 1258         Event Time   Out of Recovery 07/17/2020 14:50:00         Pain/Rosa Maria Score: No data recorded

## 2020-07-27 PROBLEM — R11.2 CHEMOTHERAPY-INDUCED NAUSEA AND VOMITING: Status: ACTIVE | Noted: 2020-01-01

## 2020-07-27 PROBLEM — T45.1X5A CHEMOTHERAPY-INDUCED NAUSEA AND VOMITING: Status: ACTIVE | Noted: 2020-01-01

## 2020-07-27 PROBLEM — G89.3 CHRONIC NEOPLASM-RELATED PAIN: Status: ACTIVE | Noted: 2020-01-01

## 2020-07-27 NOTE — PROGRESS NOTES
PATIENT: Huy Cisneros  MRN: [id number]  DATE: 7/27/2020    Diagnosis:   1. Adenocarcinoma of colon    2. Secondary malignancy of liver    3. Chronic neoplasm-related pain    4. Chemotherapy-induced nausea and vomiting    5. Iron deficiency anemia due to chronic blood loss    6. Advance care planning      Chief Complaint: Colon Cancer    Oncologic History:      Oncologic History 1. Colon adenocarcinoma      Oncologic Treatment 1. Right hemicolectomy (7/17/20)  2. Planned therapy: FOLFOX +/- bevacizumab      Pathology 7/17/20:  Right hemicolectomy:  MODERATELY DIFFERENTIATED ADENOCARCINOMA WITH INFILTRATION THROUGH THE   MUSCULARIS INTO PERICOLONIC ADIPOSE TISSUE   METASTATIC CARCINOMA  TO 6 OF 15 LYMPH NODES   MULTIPLE METASTASES TO THE FAT ITSELF   NO SIGNIFICANT ABNORMALITY OF THE TERMINAL ILEUM OR APPENDIX   MMR--NORMAL FOR COLORECTAL CARCINOMA   Immunohistochemistry (IHC) Testing for Mismatch Repair (MMR) Proteins:   MLH1 - Intact nuclear expression   MSH2 - Intact nuclear expression   MSH6 - Intact nuclear expression   PMS2 - Intact nuclear expression   Background nonneoplastic tissue/internal control with intact nuclear   expression   IHC Interpretation   No loss of nuclear expression of MMR proteins: low probability of   microsatellite instability     Procedure:  Excision of right and transverse colon   Tumor Site:  transverse colon   Tumor Size:  3.2 cm   Macroscopic Tumor Perforation:  No   Histologic Type:  Adenocarcinoma   Histologic Grade:  Moderately differentiated   Tumor Extension:  Through the muscularis into pericolonic adipose tissue and   with tumor deposits extending to the most circumferential pericolonic tissue.   Margins:  The tumor is  9 cm from the distal margin and 44 from the proximal.   Treatment Effect:  No known presurgical therapy   Lymphovascular Invasion:  Identified in pericolonic tissue   Perineural Invasion:  Present   Tumor Deposits:  Present--approximately 12   Number of  Lymph Nodes Examined:15   Number of Lymph Nodes Involved:  6   Pathologic Stage Classification: pT4a pN2A pM1a--liver metastases observed at     7/15/20:  Transverse colon biopsy:  Invasive moderately differentiated colonic   adenocarcinoma       7/14/20:  Liver, biopsy:   Adenocarcinoma, moderately differentiated   Cytomorphologic features and immunohistochemical staining pattern supports   colonic primary       Subjective:    History of Present Illness: Ms. Cisneros is a 29 y.o. female who presents for evaluation and management of metastatic colon cancer. I had initially seen her during a hospitalization in July 2020.    - biopsy of liver mass (7/14/20) revealed metastatic colon adenocarcinoma.  - she underwent right hemicolectomy on 7/17/20. A port-a-cath was placed during that surgery.    Interval history:  - she presents for a follow-up appointment for her colon cancer.  - she endorses mild abdominal pain, especially in the right upper quadrant.  - She denies shortness of breath, chest pain, nausea, vomiting, diarrhea.    Past medical, surgical, family, and social histories have been reviewed and updated below.    Past Medical History:   Past Medical History:   Diagnosis Date    Uterine cyst        Past Surgical History:   Past Surgical History:   Procedure Laterality Date    COLONOSCOPY N/A 7/15/2020    Procedure: COLONOSCOPY;  Surgeon: Hi Drake MD;  Location: [facility] ENDO;  Service: Endoscopy;  Laterality: N/A;    COLONOSCOPY W/ BIOPSIES      INSERTION OF TUNNELED CENTRAL VENOUS CATHETER (CVC) WITH SUBCUTANEOUS PORT N/A 7/17/2020    Procedure: INSERTION, PORT-A-CATH;  Surgeon: Armani Naqvi MD;  Location: [facility] OR;  Service: General;  Laterality: N/A;       Family History:   Family History   Problem Relation Age of Onset    Diabetes Mother     Hypertension Mother     Hypertension Maternal Grandmother     Hypertension Maternal Grandfather     Hypertension Paternal Grandmother      "Hypertension Paternal Grandfather        Social History:  reports that she has never smoked. She has never used smokeless tobacco. She reports that she does not drink alcohol or use drugs.    Allergies:  Review of patient's allergies indicates:   Allergen Reactions    Sulfa (sulfonamide antibiotics)        Medications:  Current Outpatient Medications   Medication Sig Dispense Refill    docusate sodium (COLACE) 100 MG capsule Take 1 capsule (100 mg total) by mouth once daily. 30 capsule 0    HYDROcodone-acetaminophen (NORCO) 5-325 mg per tablet Take 1 tablet by mouth every 6 (six) hours as needed for Pain. 30 tablet 0    ondansetron (ZOFRAN-ODT) 4 MG TbDL Dissolve 1 tablet (4 mg total) under the tongue every 8 (eight) hours as needed (nausea, vomiting). 30 tablet 0     No current facility-administered medications for this visit.        Review of Systems   Constitutional: Positive for fatigue.   HENT: Negative for sore throat.    Eyes: Negative for visual disturbance.   Respiratory: Negative for cough and shortness of breath.    Cardiovascular: Negative for chest pain.   Gastrointestinal: Positive for abdominal pain. Negative for constipation, diarrhea, nausea and vomiting.   Genitourinary: Negative for dysuria.   Musculoskeletal: Negative for back pain.   Skin: Negative for rash.   Neurological: Negative for headaches.   Hematological: Negative for adenopathy.   Psychiatric/Behavioral: The patient is not nervous/anxious.      ECOG Performance Status:   ECOG SCORE 1       Objective:      Vitals:   Vitals:    07/28/20 1046   Pulse: (!) 121   Resp: 17   Temp: 98.4 °F (36.9 °C)   TempSrc: Oral   SpO2: 99%   Weight: 84.7 kg (186 lb 11.7 oz)   Height: 5' 5" (1.651 m)     BMI: Body mass index is 31.07 kg/m².    Physical Exam  Vitals signs and nursing note reviewed.   Constitutional:       Appearance: She is well-developed.   HENT:      Head: Normocephalic and atraumatic.   Eyes:      Pupils: Pupils are equal, round, and " reactive to light.   Neck:      Musculoskeletal: Normal range of motion and neck supple.   Cardiovascular:      Rate and Rhythm: Normal rate and regular rhythm.   Pulmonary:      Effort: Pulmonary effort is normal.      Breath sounds: Normal breath sounds.   Abdominal:      General: Bowel sounds are normal.      Palpations: Abdomen is soft.      Tenderness: There is abdominal tenderness.   Musculoskeletal: Normal range of motion.      Comments: Port-a-cath in right chest-wall.   Skin:     General: Skin is warm and dry.   Neurological:      Mental Status: She is alert and oriented to person, place, and time.   Psychiatric:         Behavior: Behavior normal.         Thought Content: Thought content normal.         Judgment: Judgment normal.         Laboratory Data:  Labs have been reviewed.    Lab Results   Component Value Date    WBC 7.29 07/20/2020    HGB 7.2 (L) 07/20/2020    HCT 23.8 (L) 07/20/2020    MCV 77 (L) 07/20/2020     (H) 07/20/2020         Imaging:     Colonoscopy (7/15/20):  Findings:        The perianal and digital rectal examinations were normal.        A fungating partially obstructing mass was found in the transverse        colon. The mass was circumferential. It could not be traversed by        the pediatric colonoscope. No bleeding was present on finding but        did bleed on contact. This was biopsied with a cold forceps for        histology. Verification of patient identification for the specimen        was done. Estimated blood loss was minimal.   Impression:             - Likely malignant partially obstructing tumor in the transverse colon. Biopsied.     CT abdomen/pelvis (7/14/20): I have personally reviewed the images  4.5 cm length annular lesion in the midline transverse colon suspicious for colonic neoplasm, with associated innumerable large hepatic metastatic lesions.  Fluid noted in the right colon suggesting impending diarrheal illness.  No evidence of bowel  obstruction.      Assessment:       1. Adenocarcinoma of colon    2. Secondary malignancy of liver    3. Chronic neoplasm-related pain    4. Chemotherapy-induced nausea and vomiting    5. Iron deficiency anemia due to chronic blood loss    6. Advance care planning           Plan:     1. Adenocarcinoma of right colon - stage IV  - I have reviewed her chart/labs/imaging/pathology  - biopsy of liver mass (7/14/20) revealed metastatic colon adenocarcinoma.  - she underwent right hemicolectomy on 7/17/20.  - tissue has been sent for STRATA molecular testing.  - I and [facility] recommend FOLFOX + bevacizumab.  - The risks and benefits of chemotherapy were discussed, written information was given, and informed consent was obtained.  - port was placed at time of right hemicolectomy.  - return to clinic in 2 weeks in preparation for cycle #1 of FOLFOX + bevacizumab. Will hold bevacizumab due to recent surgery.    2. Chronic neoplasm-related pain  - controlled with norco. I refilled her prescription  - continue to monitor.    3. Chemotherapy-induced nausea and vomiting  - I have sent a prescription for ondansetron ODT to her pharmacy.    4. 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  - I will repeat iron studies next week. If still iron deficient, I will add iron sucrose to her chemotherapy.    5. Advance Care Planning     Power of   I initiated the process of advance care planning today and explained the importance of this process to the patient.  I introduced the concept of advance directives to the patient, as well. Then the patient received detailed information about the importance of designating a Health Care Power of  (HCPOA). She was also instructed to communicate with this person about their wishes for future healthcare, should she become sick and lose decision-making capacity. The patient has not  previously appointed a HCPOA. After our discussion, the patient has decided to complete a HCPOA and has appointed her mother Char Cisneros ([phone]) and aunt Alannah Mcintyre ([phone]). I spent a total time of 16 minutes discussing this issue with the patient.          - return to clinic in 2 weeks in preparation for cycle #1 of FOLFOX + bevacizumab. Will hold bevacizumab due to recent surgery.    Jewel Arzola M.D.  Hematology/Oncology  [facility] - [address], [address]  [place] [address]  Phone: [phone]  Fax: [phone]

## 2020-07-27 NOTE — TELEPHONE ENCOUNTER
Post procedure follow up call, no contact made. Invalid number.     Reason for Disposition   Wrong number reached.  Phone number verified.    Additional Information   Negative: Caller is angry or rude (e.g., hangs up, verbally abusive, yelling)   Negative: Caller hangs up   Negative: Caller has already spoken with the PCP and has no further questions.   Negative: Caller has already spoken with another triager and has no further questions.   Negative: Caller has already spoken with another triager or PCP AND has further questions AND triager able to answer questions.   Negative: Busy signal.  First attempt to contact caller.  Follow-up call scheduled within 15 minutes.   Negative: No answer.  First attempt to contact caller.  Follow-up call scheduled within 15 minutes.   Negative: Message left on identified voice mail   Negative: Message left on unidentified voice mail.  Phone number verified.   Negative: Message left with person in household.    Protocols used: NO CONTACT OR DUPLICATE CONTACT CALL-A-

## 2020-07-28 NOTE — PLAN OF CARE
DISCONTINUE [facility]AL - Colorectal    Trial 2018.209: PCTP COLO-007 Phase 1/2 Multi-center Investigation of ANTIONETTE-009   (Nab-rapamycin) in Combination with FOLFOX and Bevacizumab as First-line Therapy   in Patients with Advanced or Metastatic Colorectal Cancer    **Trial eligibility and accrual should be confirmed by your research team**    REASON: Other Reason  PRIOR TREATMENT: Trial 2018.209: PCTP COLO-007 Phase 1/2 Multi-center   Investigation of ANTIONETTE-009 (Nab-rapamycin) in Combination with FOLFOX and   Bevacizumab as First-line Therapy in Patients with Advanced or Metastatic   Colorectal Cancer  TREATMENT RESPONSE: Unable to Evaluate    START ON PATHWAY REGIMEN - Colorectal    [id number]        Bevacizumab-xxxx       Oxaliplatin (Eloxatin)       Leucovorin       Fluorouracil       Fluorouracil           Additional Orders: Do not administer bevacizumab for at least 28 days   prior to elective surgery and for at least 28 days following surgery and until   the wound is fully healed.    **Always confirm dose/schedule in your pharmacy ordering system**    Patient Characteristics:  Distant Metastases, Nonsurgical Candidate, KRAS/NRAS Mutation Positive/Unknown   (BRAF V600 Wild-Type/Unknown), Standard Cytotoxic Therapy, First Line Standard   Cytotoxic Therapy, Bevacizumab Eligible, PS = 0,1  Tumor Location: Colon  Therapeutic Status: Distant Metastases  Microsatellite/Mismatch Repair Status: LUCIANO/pMMR  BRAF Mutation Status: Awaiting Test Results  KRAS/NRAS Mutation Status: Awaiting Test Results  Standard Cytotoxic Line of Therapy: First Line Standard Cytotoxic Therapy  ECOG Performance Status: 1  Bevacizumab Eligibility: Eligible  Intent of Therapy:  Non-Curative / Palliative Intent, Discussed with Patient

## 2020-07-28 NOTE — PLAN OF CARE
CLINICAL TRIAL [facility] - Colorectal    Trial 2018.209: PCTP COLO-007 Phase 1/2 Multi-center Investigation of ANTIONETTE-009   (Nab-rapamycin) in Combination with FOLFOX and Bevacizumab as First-line Therapy   in Patients with Advanced or Metastatic Colorectal Cancer    **Trial eligibility and accrual should be confirmed by your research team**    Patient Characteristics:  Distant Metastases, Nonsurgical Candidate, KRAS/NRAS Mutation Positive/Unknown   (BRAF V600 Wild-Type/Unknown), Standard Cytotoxic Therapy, First Line Standard   Cytotoxic Therapy, Bevacizumab Eligible, PS = 0,1  Tumor Location: Colon  Therapeutic Status: Distant Metastases  Microsatellite/Mismatch Repair Status: LUCIANO/pMMR  BRAF Mutation Status: Awaiting Test Results  KRAS/NRAS Mutation Status: Awaiting Test Results  Standard Cytotoxic Line of Therapy: First Line Standard Cytotoxic Therapy  ECOG Performance Status: 1  Bevacizumab Eligibility: Eligible  Intent of Therapy:  Non-Curative / Palliative Intent, Discussed with Patient

## 2020-07-28 NOTE — Clinical Note
(not sure if this was sent yet):  1. Add ferritin, iron/TIBC to labs in [place] next week.    Thanks!

## 2020-07-30 NOTE — PROGRESS NOTES
PATIENT: Huy Cisneros  MRN: [id number]  DATE: 7/30/2020      Diagnosis:   1. Adenocarcinoma of colon    2. Secondary malignancy of liver    3. Anemia in neoplastic disease        Chief Complaint: Adenocarcinoma of colon    Ms. Cisneros is a 29-year-old female was recently diagnosed with metastatic adenocarcinoma of the colon status post right hemicolectomy presented to the PCP clinic for further evaluation of enrollment in a clinical trial    She is a 29-year-old female with no significant past medical history, complained of 20 lb weight loss in the last 3 months and initially presented to the emergency room at [facility] for nausea, vomiting, weight loss and abdominal pain.  Urinalysis was positive for nitrite and significant urobilinogen and was treated for urinary tract infection.  She presented to the emergency room at [place] for similar symptoms and was seen by Dr. Arzola, hematology/oncology and a CT of the abdomen and pelvis revealed 4.5 cm length annular lesion in the midline transverse colon suspicious for colonic neoplasm, with associated innumerable large hepatic metastatic lesions.  She had IR guided liver biopsy on July 14, 2020 which revealed adenocarcinoma, moderately differentiated.  She had a colonoscopy done on July 15, 2020 which revealed A fungating partially obstructing mass was found in the transverse colon. The mass was circumferential, It could not be traversed by the pediatric colonoscope.  She underwent right hemicolectomy on July 17, 2020.  Dr. Arzola initially planned chemotherapy FOLFOX plus bevacizumab and sent to [facility] to get evaluated for a possible clinical trial.    Patient today complained of weakness, tiredness, right paravertebral pain started today.  She presented today in a wheelchair along with her mother.  She also complained of poor appetite, nausea not well controlled with Zofran, but denied of vomiting in the last 2 days.  She denied any blood  per rectum, constipation, fever or chills.  She has significant weight loss of about 20 lb in the last 3 months likely secondary to poor appetite and malignancy.    Family history:  She has 3 boys at ages 10, 8 and 3.  She had no significant history of colon cancer, ovarian or endometrial cancer in the family.        Oncologic History 1. Colon adenocarcinoma       Oncologic Treatment 1. Right hemicolectomy (7/17/20)  2. Planned therapy: FOLFOX +/- bevacizumab        7/17/20:  Right hemicolectomy:  MODERATELY DIFFERENTIATED ADENOCARCINOMA WITH INFILTRATION THROUGH THE   MUSCULARIS INTO PERICOLONIC ADIPOSE TISSUE   METASTATIC CARCINOMA  TO 6 OF 15 LYMPH NODES   MULTIPLE METASTASES TO THE FAT ITSELF   NO SIGNIFICANT ABNORMALITY OF THE TERMINAL ILEUM OR APPENDIX   MMR--NORMAL FOR COLORECTAL CARCINOMA   Immunohistochemistry (IHC) Testing for Mismatch Repair (MMR) Proteins:   MLH1 - Intact nuclear expression   MSH2 - Intact nuclear expression   MSH6 - Intact nuclear expression   PMS2 - Intact nuclear expression   Background nonneoplastic tissue/internal control with intact nuclear   expression   IHC Interpretation   No loss of nuclear expression of MMR proteins: low probability of   microsatellite instability      Procedure:  Excision of right and transverse colon   Tumor Site:  transverse colon   Tumor Size:  3.2 cm   Macroscopic Tumor Perforation:  No   Histologic Type:  Adenocarcinoma   Histologic Grade:  Moderately differentiated   Tumor Extension:  Through the muscularis into pericolonic adipose tissue and   with tumor deposits extending to the most circumferential pericolonic tissue.   Margins:  The tumor is  9 cm from the distal margin and 44 from the proximal.   Treatment Effect:  No known presurgical therapy   Lymphovascular Invasion:  Identified in pericolonic tissue   Perineural Invasion:  Present   Tumor Deposits:  Present--approximately 12   Number of Lymph Nodes Examined:15   Number of Lymph Nodes  Involved:  6   Pathologic Stage Classification: pT4a pN2A pM1a--liver metastases observed at      7/15/20:  Transverse colon biopsy:  Invasive moderately differentiated colonic adenocarcinoma         7/14/20:  Liver, biopsy:   Adenocarcinoma, moderately differentiated   Cytomorphologic features and immunohistochemical staining pattern supports   colonic primary                 Subjective:    Initial History: Ms. Cisneros is a 29 y.o. female who returns for follow up.     Past Medical History:   Past Medical History:   Diagnosis Date    Uterine cyst        Past Surgical HIstory:   Past Surgical History:   Procedure Laterality Date    COLONOSCOPY N/A 7/15/2020    Procedure: COLONOSCOPY;  Surgeon: Hi Drake MD;  Location: [facility] ENDO;  Service: Endoscopy;  Laterality: N/A;    COLONOSCOPY W/ BIOPSIES      INSERTION OF TUNNELED CENTRAL VENOUS CATHETER (CVC) WITH SUBCUTANEOUS PORT N/A 7/17/2020    Procedure: INSERTION, PORT-A-CATH;  Surgeon: Armani Naqvi MD;  Location: [facility] OR;  Service: General;  Laterality: N/A;       Family History:   Family History   Problem Relation Age of Onset    Hypertension Mother     Hypertension Maternal Grandmother     Hypertension Maternal Grandfather     Hypertension Paternal Grandmother        Social History:  reports that she has never smoked. She has never used smokeless tobacco. She reports that she does not drink alcohol or use drugs.    Allergies:  Review of patient's allergies indicates:   Allergen Reactions    Sulfa (sulfonamide antibiotics)        Medications:  Current Outpatient Medications   Medication Sig Dispense Refill    docusate sodium (COLACE) 100 MG capsule Take 1 capsule (100 mg total) by mouth once daily. 30 capsule 0    HYDROcodone-acetaminophen (NORCO) 5-325 mg per tablet Take 1 tablet by mouth every 8 (eight) hours as needed for Pain (chronic cancer-related pain). 60 tablet 0    ondansetron (ZOFRAN-ODT) 8 MG TbDL Take 1 tablet (8 mg  "total) by mouth every 8 (eight) hours as needed (chemotherapy-induced nausea and vomiting). 40 tablet 5     No current facility-administered medications for this visit.        Review of Systems   Constitutional: Positive for activity change, appetite change and unexpected weight change. Negative for chills and fever.   HENT: Negative for nosebleeds and trouble swallowing.    Respiratory: Negative for cough and shortness of breath.    Cardiovascular: Negative for chest pain.   Gastrointestinal: Positive for nausea and vomiting. Negative for abdominal pain, blood in stool and constipation.   Genitourinary: Negative for dysuria and hematuria.   Musculoskeletal: Positive for back pain. Negative for gait problem.   Skin: Negative for rash.   Neurological: Positive for weakness.   Hematological: Negative for adenopathy. Does not bruise/bleed easily.   Psychiatric/Behavioral: Negative for agitation and behavioral problems.       ECOG Performance Status: 2   Objective:      Vitals:   Vitals:    07/30/20 0918   BP: 107/65   BP Location: Right arm   Patient Position: Sitting   BP Method: Large (Automatic)   Pulse: (!) 114   Resp: 20   Temp: 98.6 °F (37 °C)   TempSrc: Oral   SpO2: 99%   Height: 5' 5" (1.651 m)     BMI: Body mass index is 31.07 kg/m².    Physical Exam  Constitutional:       Appearance: She is obese.   HENT:      Head: Normocephalic and atraumatic.      Nose: Nose normal.      Mouth/Throat:      Mouth: Mucous membranes are moist.   Eyes:      General: No scleral icterus.     Pupils: Pupils are equal, round, and reactive to light.   Neck:      Musculoskeletal: Normal range of motion.   Cardiovascular:      Rate and Rhythm: Normal rate and regular rhythm.      Heart sounds: No murmur.      Comments: PORT in right chest  Pulmonary:      Effort: Pulmonary effort is normal.      Breath sounds: Normal breath sounds.   Abdominal:      General: Abdomen is flat. Bowel sounds are normal.   Musculoskeletal: Normal range of " motion.         General: No swelling.   Skin:     General: Skin is warm.      Capillary Refill: Capillary refill takes less than 2 seconds.   Neurological:      General: No focal deficit present.      Mental Status: She is alert.   Psychiatric:         Mood and Affect: Mood normal.         Behavior: Behavior normal.         Laboratory Data:  Clinical Support on 07/30/2020   Component Date Value Ref Range Status    Specimen UA 07/30/2020 Urine, Clean Catch   Final    Color, UA 07/30/2020 Manuela  Yellow, Straw, Manuela Final    Appearance, UA 07/30/2020 Hazy* Clear Final    pH, UA 07/30/2020 6.0  5.0 - 8.0 Final    Specific Eugene, UA 07/30/2020 1.020  1.005 - 1.030 Final    Protein, UA 07/30/2020 1+* Negative Final    Comment: Recommend a 24 hour urine protein or a urine   protein/creatinine ratio if globulin induced proteinuria is  clinically suspected.      Glucose, UA 07/30/2020 Negative  Negative Final    Ketones, UA 07/30/2020 Negative  Negative Final    Bilirubin (UA) 07/30/2020 Negative  Negative Final    Occult Blood UA 07/30/2020 Negative  Negative Final    Nitrite, UA 07/30/2020 Negative  Negative Final    Leukocytes, UA 07/30/2020 Negative  Negative Final    RBC, UA 07/30/2020 1  0 - 4 /hpf Final    WBC, UA 07/30/2020 3  0 - 5 /hpf Final    Bacteria 07/30/2020 Few* None-Occ /hpf Final    Squam Epithel, UA 07/30/2020 2  /hpf Final    Non-Squam Epith 07/30/2020 <1  <1/hpf /hpf Final    Hyaline Casts, UA 07/30/2020 0  0-1/lpf /lpf Final    Microscopic Comment 07/30/2020 SEE COMMENT   Final    Comment: Other formed elements not mentioned in the report are not   present in the microscopic examination.            Imaging:    Assessment:       1. Adenocarcinoma of colon    2. Secondary malignancy of liver    3. Anemia in neoplastic disease      1. Metastatic adenocarcinoma of the colon:  CEA -133.5. Status post right hemicolectomy on July 17, 2020.  Tissue has been sent for STRATA molecular  testing.  Port placement was done at the time of right hemicolectomy.  Discussed about the COLO-007 trial with the patient as well as her mother.     Protocol: A Phase 1/2 Multi-Center Investigation of ANTIONETTE-009 (Nab-Rapamycin) in Combination with FOLFOX and Bevacizumab as First-line Therapy in Patients Advanced and Metastatic Colorectal Cancer.   Protocol # COLO-007  IRB # 2018.209  PI: Capo Phillips MD  Version Date: 15-SEP-2019    2.  Metastatic colon cancer to the liver:  Biopsy of the liver mass on July 14, 2020 revealed metastatic colon adenocarcinoma  3.  Symptomatic Anemia:  Her anemia likely secondary to chronic blood loss from colon cancer and anemia of malignancy.  Her folate is also at low normal 4.7  4.  Back pain, started today.  Right paravertebral suspect lumbosacral strain.  Has been taking Norco p.r.n.  5.  Nausea and vomiting:  Patient's nausea not well controlled with Zofran alone so recommended to alternate Zofran and Phenergan to help with nausea and vomiting.       Plan:     1.  Discussed in length about the clinical trial.  The trial protocol was discussed in length by TERRY Taylor  2.  Will transfuse 2 units of PRBC for symptomatic anemia.  Scheduled for tomorrow  3.  Patient not planning for children any more, and declined ova banking  4.  Strata molecular testing pending  5.  Recommended genetic counseling in view of very early colon cancer   6.  Phenergan Script sent to her pharmacy  7.  Repeat urinalysis today  8.  Recommend folic acid 1 mg every day    Return to clinic as per jassi nurseClaudia    Plan of care discussed with Dr. Alan Sullivan MD PGY-6  Hematology and Oncology  Pager:[phone]         I have reviewed the notes, assessments, and/or procedures performed by the housestaff, as above.  I have personally interviewed and examined the patient at the beside, and rounded with the housestaff. I concur with her/his assessment and plan and the documentation of  Huy Cisneros.  I, Dr. Capo Phillips, personally spent more than 60 mins during this encounter, greater than 50% was spent in direct counseling and/or coordination of care.     Capo Phillips M.D., M.S., F.A.C.P.  Hematology/Oncology Attending  [facility]

## 2020-07-30 NOTE — PROGRESS NOTES
July 30, 2020     Protocol: A Phase 1/2 Multi-Center Investigation of ANTIONETTE-009 (Nab-Rapamycin) in Combination with FOLFOX and Bevacizumab as First-line Therapy in Patients Advanced and Metastatic Colorectal Cancer.   Protocol # COLO-007  IRB # 2018.209  PI: Capo Phillips MD  Version Date: 15-SEP-2019    Met with patient and pts mother to discuss COLO-007 trial.  Discussed all aspects of trial and answered patients questions.   Pt receiving blood transfusion tomorrow due to anemia from surgery.  Lab are scheduled to be rechecked on 8/3/20 and if pt meets eligibility requirements will return to consent for trial.   Pt given consent to read and my card with phone number for any questions.

## 2020-07-30 NOTE — PROGRESS NOTES
[facility] Tumor Board    Date: 7/30/2020    Patient Name: Huy Cisneros     MRN: [id number]    Diagnosis: Metastatic Colon Cancer    Referring Provider: Dr. Arzola    Present PCTP Providers:    Dr. Capo Brand, MSN, APRN, FNP-C    Patient Summary:  Diagnosed in July 2020  Recommend FOLFOX + bevacizumab  ECOG 1    Molecular Workup:  Strata: tissue sent 7/28/20    Board Recommendations:  COLO-007: ANTIONETTE/Folfox/Jei; 4 wks washout from surgery (8/14/20); Hgb 7.2 lab repeat next week, eligible if Hgb > 9, able to transfuse

## 2020-07-31 NOTE — PLAN OF CARE
1200 pt arrived for X2 pRBC units. Pt feeling fatigue, still has slight tenderness to LLA s/p surgery on 7/17/2020. Pt pending initiation of trial if qualifies, needs hgb >9. Pt denies dizziness, CP, palpitations. Does report fatigue, weakness, eyes slightly jaundiced. Meds, hx, axs, tx plan reviewed in detail. Consent reviewed. Port accessed, maintaining sterile technique, flushing freely, blood return noted. Pt reclined in chair. VSS. Mother to  upon completions. Blankets/snacks provided. [facility] pt closely.

## 2020-07-31 NOTE — PLAN OF CARE
1745 pt tolerated 2 units pRBCs, without issues. Post tx VSs. No signs of volume overload. Port de-accessed, line hep locked prior to removal. Band aid to site. Pt ambulatory out of infusion. Avs provided. Mother to provide transportation. Pt understands what to continue to monitor for.

## 2020-08-01 NOTE — ED NOTES
Suture tray and lidocaine placed at bedside with size 8 sterile gloves. Awaiting MD for procedure.

## 2020-08-01 NOTE — DISCHARGE INSTRUCTIONS
Please see your surgeon in 3 days as scheduled.  Return to the ER immediately if any signs of infection begin, like redness, pus, increasing swelling, or pain.  Please return to the ED immediately if symptoms return, change or worsen in any way.  Please call your primary doctor today for reevaluation appointment.

## 2020-08-02 NOTE — ED PROVIDER NOTES
Chief Complaint  Chief Complaint   Patient presents with    Wound Check     pt had blood transfusion through her port to right chest yesterday. today noticed incision site is open. port was placed on the 17th. pt has stage 4 cancer. denies any fever       HPI  Huy Cisneros is a 29 y.o. female who presents with a problem with her port.  Patient reports that she had her port placed on the 17th, approximately 2 weeks ago.  She has stage IV cancer with metastases to the liver.  She reports that the port was healing well but yesterday while getting a blood transfusion through the port, she reports that through cleaning and handling of it, though wound started to open slightly.  She denies any infection or pus or redness or pain.    Past medical history  Past Medical History:   Diagnosis Date    Uterine cyst        Current Medications  No current facility-administered medications for this encounter.     Current Outpatient Medications:     clindamycin (CLEOCIN) 150 MG capsule, Take 2 capsules (300 mg total) by mouth 4 (four) times daily. for 5 days, Disp: 40 capsule, Rfl: 0    docusate sodium (COLACE) 100 MG capsule, Take 1 capsule (100 mg total) by mouth once daily., Disp: 30 capsule, Rfl: 0    folic acid (FOLVITE) 1 MG tablet, Take 1 tablet (1 mg total) by mouth once daily., Disp: 100 tablet, Rfl: 3    HYDROcodone-acetaminophen (NORCO) 5-325 mg per tablet, Take 1 tablet by mouth every 8 (eight) hours as needed for Pain (chronic cancer-related pain)., Disp: 60 tablet, Rfl: 0    ondansetron (ZOFRAN-ODT) 8 MG TbDL, Take 1 tablet (8 mg total) by mouth every 8 (eight) hours as needed (chemotherapy-induced nausea and vomiting)., Disp: 40 tablet, Rfl: 5    promethazine (PHENERGAN) 25 MG tablet, Take 1 tablet (25 mg total) by mouth every 6 (six) hours as needed for Nausea., Disp: 60 tablet, Rfl: 2    Allergies  Review of patient's allergies indicates:   Allergen Reactions    Sulfa (sulfonamide antibiotics)         Surgical history  Past Surgical History:   Procedure Laterality Date    COLONOSCOPY N/A 7/15/2020    Procedure: COLONOSCOPY;  Surgeon: Hi Drake MD;  [facility];  Service: Endoscopy;  Laterality: N/A;    COLONOSCOPY W/ BIOPSIES      INSERTION OF TUNNELED CENTRAL VENOUS CATHETER (CVC) WITH SUBCUTANEOUS PORT N/A 7/17/2020    Procedure: INSERTION, PORT-A-CATH;  Surgeon: Armani Naqvi MD;  Location: [facility] OR;  Service: General;  Laterality: N/A;       Social history  Social History     Socioeconomic History    Marital status: Single     Spouse name: Not on file    Number of children: Not on file    Years of education: Not on file    Highest education level: Not on file   Occupational History    Not on file   Social Needs    Financial resource strain: Not on file    Food insecurity     Worry: Not on file     Inability: Not on file    Transportation needs     Medical: Not on file     Non-medical: Not on file   Tobacco Use    Smoking status: Never Smoker    Smokeless tobacco: Never Used   Substance and Sexual Activity    Alcohol use: No    Drug use: No    Sexual activity: Yes     Partners: Male     Birth control/protection: None   Lifestyle    Physical activity     Days per week: Not on file     Minutes per session: Not on file    Stress: Not on file   Relationships    Social connections     Talks on phone: Not on file     Gets together: Not on file     Attends Worship service: Not on file     Active member of club or organization: Not on file     Attends meetings of clubs or organizations: Not on file     Relationship status: Not on file   Other Topics Concern    Not on file   Social History Narrative    Not on file       Family History  Family History   Problem Relation Age of Onset    Hypertension Mother     Hypertension Maternal Grandmother     Hypertension Maternal Grandfather     Hypertension Paternal Grandmother        Review of systems  Constitutional:  "No fever or weakness.  Eyes: No redness, pain, or discharge.  HENT: No ear pain, no sudden onset headache, no throat pain.  Respiratory: No wheezing, cough, or shortness of breath.  Cardiovascular: No chest pain or palpitations.  Neurologic: No new focal weakness or sensory changes.  All systems otherwise negative except as noted in ROS and HPI    Physical Exam  Vital signs: BP (!) 144/84   Pulse (!) 119   Temp 99.2 °F (37.3 °C) (Oral)   Resp (!) 21   Ht 5' 6" (1.676 m)   Wt 86.7 kg (191 lb 1.6 oz)   LMP 07/13/2020   SpO2 100%   BMI 30.84 kg/m²   Constitutional: No acute distress.  Well developed, alert, oriented and appropriate.  HENT: Normocephalic, atraumatic. Normal ear, nose, and throat.  Eyes: PERRL, EOMI, normal conjunctiva.  Neck: Normal range of motion, no tenderness; supple.  Respiratory: Nonlabored breathing with normal breath sounds.  Cardiovascular: RRR with no pulse deficit.  GI: Soft, nontender, no rebound or guarding.  Musculoskeletal: Normal ROM, no tenderness, injury, or edema.  Skin:  There is slight dehiscence of the port surgical ostomy.  The port is visible through the wound.  No signs of infection at this time  Neurologic: Normal motor, sensation with no new focal deficit.  Psychiatric: Affect normal, judgement normal, mood normal.  No SI, HI, and not gravely disabled.    Labs  Pertinent labs reviewed (see chart for details)  Labs Reviewed - No data to display    ECG  Results for orders placed or performed during the hospital encounter of 07/13/20   EKG 12-lead    Collection Time: 07/14/20 10:29 AM    Narrative    Test Reason : R11.2,    Vent. Rate : 078 BPM     Atrial Rate : 078 BPM     P-R Int : 140 ms          QRS Dur : 074 ms      QT Int : 396 ms       P-R-T Axes : 031 055 025 degrees     QTc Int : 451 ms    Normal sinus rhythm with sinus arrhythmia  Septal infarct ,age undetermined  Abnormal ECG  No previous ECGs available  Confirmed by Solis Ceron III, MD ([id number]) on 7/15/2020 " 8:31:28 AM    Referred By: [email or web address]   SELF           Confirmed By:Solis Ceron III, MD     ECG interpreted by ED MD    Radiology  No orders to display       Procedures  Procedures    Medications   clindamycin injection 600 mg (600 mg Intramuscular Given 8/1/20 1529)   lidocaine (PF) 10 mg/ml (1%) injection 100 mg (100 mg Infiltration Given by Other 8/1/20 1530)   neomycin-bacitracin-polymyxin ointment ( Topical (Top) Given 8/1/20 1531)     Laceration procedure note:  Wound was cleaned and prepped in usual sterile fashion with betadine.  Wound was cleaned.    Location:  Right anterior superior chest wall  Length:  2.6 cm  Anesthesia: 1% lidocaine w/o epi - 5 cc  Suture:  2 X 3-0 Prolene simple interrupted sutures  Blood loss:  Less than 1 cc  No complications  Pt tolerated procedure well\      ED course and medical decision making    ED Course as of Aug 02 0909   Sat Aug 01, 2020   1542 Patient does not wish for any further investigation into her tachycardia.  She feels well overall.  She would just like her port evaluated and assisted with    [MB]      ED Course User Index  [MB] Geo Jimenez MD       Patient advised of risk of infection from attempt to reach close wound but this appears to be a reasonable strategy at this time.  No signs of infection but we will place to large sutures for assistance with closure and start patient on antibiotics to prevent possible infection.  She understands the risk of infection that attempting this closure will bring.  She has an appointment with her surgeon in a couple of days.  She will see him for re-evaluation    Disposition    Patient discharged in stable condition      Final impression  1. Wound dehiscence        Critical care time spent with this patient was 0 minutes excluding the procedure time.          Geo Jimenez MD  08/02/20 5544

## 2020-08-03 NOTE — TELEPHONE ENCOUNTER
Pt to enroll in clinical trial at [facility] with Dr. Phillips. Pt will receive care at [facility].

## 2020-08-03 NOTE — PROGRESS NOTES
S/p robot extended right hemicolectomy for obstructing adenocarcinoma of transverse colon 7/17/20  Path confirmed adenocarcinoma with 6 of 15 LN positive. Liver biopsy + adenocarcinoma  Since discharge feeling better, postop pain mostly resolved, no drainage from incisions  Eating well, no NV. No pain like she had before surgery. Having regular BMs  No fevers  Saw dr dacosta. Planning chemo to start aug 17th  Was weak, found to be anemic and sent for pRBCs tranfusion Friday. At infusion center they used the port and apparently disrupted the incision with prep stick  Went to ED later that evening with bleeding from skin. Couple interrupted prolene sutures were placed, started on clinda  No pain, redness, drainage from port site as of now.  RTC in 2 weeks to see how shes doing, remove sutures.

## 2020-08-07 NOTE — TELEPHONE ENCOUNTER
Pt. Confirmed lab appt. On 8/10/20, office visit with Dr. Arzola at 10am and infusion appt. At 10:30am on 8/11/20.      ----- Message from Jewel Arzola MD sent at 8/7/2020  1:57 PM CDT -----  Good afternoon,    Yes, FOLFOX + jannie is the regimen. She did not qualify for the clinical trial, so we are going to push ahead with FOLFOX + jannie.    I heard from Neema that it has been approved.    James, schedule her to see me Tuesday, 8/11 with labs CBC, CMP, mag, phos day before near her house. I think Neema said she can schedule her for cycle #1 of 8/11/20. Please confirm with neema.    Thanks!  Jewel  ----- Message -----  From: James Woody RN  Sent: 8/7/2020   1:53 PM CDT  To: Jewel Arzola MD      ----- Message -----  From: Jessica Lee RN  Sent: 8/7/2020  12:33 PM CDT  To: James Woody RN    The COLORECTAL FOLFOX + BEVACIZUMAB is under referral # [id number] and I approved it on 7/29/2020. Neema closed the referral on 8/03/2020 as clinic decision. Is this the chemo you are referring to that the patient needs approved? If so I can reopen the referral but I am not sure why Neema closed it. I am waiting for her response now    ----- Message -----  From: James Woody RN  Sent: 8/7/2020  10:24 AM CDT  To: Jessica Lee RN    Do u need a new plan? Or can it just be reauth.?  ----- Message -----  From: Jessica Lee RN  Sent: 8/7/2020  10:08 AM CDT  To: James Woody RN    I approved on 7/29/2020 but someone has closed the referral. I do not see a new referral in.    ----- Message -----  From: James Woody RN  Sent: 8/7/2020  10:01 AM CDT  To: Jessica Lee, TERRY    Good morning,    Dr. Arzola would like pt. Approved for chemo asap with next week being the goal.  Please let us know if you need any additional info.    Thank you,    James

## 2020-08-07 NOTE — TELEPHONE ENCOUNTER
GAIL for pt. To confirm lab appt. In [facility] at 10am on 8/10/20 and office visit with Dr. Arzola at 10am on 8/11/20.

## 2020-08-09 NOTE — ED PROVIDER NOTES
Encounter Date: 8/8/2020       History     Chief Complaint   Patient presents with    Nausea     c/o weakness and N/V x1 day. Denies SOB, coughing, dirrhea.      29-year-old female with adenocarcinoma of colon with metastasis to liver.  Patient had hemicolectomy about a month ago.  Patient is placed on port and awaiting for chemotherapy.  Patient has chronic nausea and takes Zofran at home.  Patient claims it did not make her any better.  She took some Phenergan with some improvement but could not eat or drink anything since this morning.  She feels dehydrated.  Patient denies fever, cough, shortness of breath, chest pain.  No abdominal pain or diarrhea.    The history is provided by the patient.     Review of patient's allergies indicates:   Allergen Reactions    Sulfa (sulfonamide antibiotics)      Past Medical History:   Diagnosis Date    Uterine cyst      Past Surgical History:   Procedure Laterality Date    COLONOSCOPY N/A 7/15/2020    Procedure: COLONOSCOPY;  Surgeon: Hi Drake MD;  Location: [facility] ENDO;  Service: Endoscopy;  Laterality: N/A;    COLONOSCOPY W/ BIOPSIES      INSERTION OF TUNNELED CENTRAL VENOUS CATHETER (CVC) WITH SUBCUTANEOUS PORT N/A 7/17/2020    Procedure: INSERTION, PORT-A-CATH;  Surgeon: Armani Naqvi MD;  Location: [facility] OR;  Service: General;  Laterality: N/A;     Family History   Problem Relation Age of Onset    Hypertension Mother     Hypertension Maternal Grandmother     Hypertension Maternal Grandfather     Hypertension Paternal Grandmother      Social History     Tobacco Use    Smoking status: Never Smoker    Smokeless tobacco: Never Used   Substance Use Topics    Alcohol use: No    Drug use: No     Review of Systems   Constitutional: Negative for activity change, appetite change, chills and fever.   HENT: Negative for congestion, ear discharge, rhinorrhea, sinus pressure, sinus pain, sore throat and trouble swallowing.    Eyes: Negative for  photophobia, pain, discharge, redness, itching and visual disturbance.   Respiratory: Negative for cough, chest tightness, shortness of breath and wheezing.    Cardiovascular: Negative for chest pain, palpitations and leg swelling.   Gastrointestinal: Positive for nausea and vomiting. Negative for abdominal distention, abdominal pain, constipation and diarrhea.   Genitourinary: Negative for dysuria, flank pain, frequency and hematuria.   Musculoskeletal: Negative for back pain, gait problem, neck pain and neck stiffness.   Skin: Negative for rash and wound.   Neurological: Negative for dizziness, tremors, seizures, syncope, speech difficulty, weakness, light-headedness, numbness and headaches.   Psychiatric/Behavioral: Negative for behavioral problems, confusion, hallucinations and sleep disturbance. The patient is not nervous/anxious.    All other systems reviewed and are negative.      Physical Exam     Initial Vitals [08/08/20 2212]   BP Pulse Resp Temp SpO2   135/77 (!) 130 18 99.7 °F (37.6 °C) 100 %      MAP       --         Physical Exam    Nursing note and vitals reviewed.  Constitutional: Vital signs are normal. She appears well-developed and well-nourished. She is not diaphoretic. She is active. No distress.   HENT:   Head: Normocephalic and atraumatic.   Right Ear: Tympanic membrane normal.   Left Ear: Tympanic membrane normal.   Nose: Nose normal.   Mouth/Throat: Oropharynx is clear and moist.   Eyes: Conjunctivae, EOM and lids are normal. Pupils are equal, round, and reactive to light.   Neck: Trachea normal, normal range of motion and full passive range of motion without pain. Neck supple. Normal range of motion present. No neck rigidity.   Cardiovascular: Regular rhythm, S1 normal, S2 normal, normal heart sounds, intact distal pulses and normal pulses. Tachycardia present.    Pulmonary/Chest: Breath sounds normal. No respiratory distress. She has no wheezes. She has no rhonchi. She has no rales. She  exhibits no tenderness.   Abdominal: Soft. Normal appearance and bowel sounds are normal. She exhibits no distension. There is no abdominal tenderness. There is no guarding.   Musculoskeletal: Normal range of motion.   Lymphadenopathy:     She has no cervical adenopathy.   Neurological: She is alert and oriented to person, place, and time. She has normal strength and normal reflexes. No cranial nerve deficit or sensory deficit. GCS score is 15. GCS eye subscore is 4. GCS verbal subscore is 5. GCS motor subscore is 6.   Skin: Skin is warm and intact. Capillary refill takes less than 2 seconds. No abrasion, no bruising and no rash noted.   Psychiatric: She has a normal mood and affect. Her speech is normal and behavior is normal. Judgment and thought content normal. She is not actively hallucinating. Cognition and memory are normal. She is attentive.         ED Course   Procedures  Labs Reviewed   URINALYSIS, REFLEX TO URINE CULTURE   PREGNANCY TEST, URINE RAPID          Imaging Results    None          Medical Decision Making:   Clinical Tests:   Lab Tests: Ordered and Reviewed  ED Management:  History of metastatic cancer to liver.- nausea and vomiting.  Mild dehydration treated with IV fluids.  Symptoms improved with Phenergan and Reglan.  Patient is advised to continue her medications and follow up with her oncology.  Follow-up ED with worsening symptoms.                                 Clinical Impression:       ICD-10-CM ICD-9-CM   1. Nausea and vomiting, intractability of vomiting not specified, unspecified vomiting type  R11.2 787.01         Disposition:   Disposition: Discharged  Condition: Stable                        Thomas Galaviz MD  08/09/20 0601

## 2020-08-09 NOTE — ED NOTES
Pt states she is feeling better. Voices no complaints at present. States will call for transportation home

## 2020-08-10 PROBLEM — D63.0 ANEMIA IN NEOPLASTIC DISEASE: Status: ACTIVE | Noted: 2020-01-01

## 2020-08-10 NOTE — PROGRESS NOTES
PATIENT: Huy Cisneros  MRN: [id number]  DATE: 8/10/2020    Diagnosis:   1. Adenocarcinoma of colon    2. Secondary malignancy of liver    3. Chronic neoplasm-related pain    4. Chemotherapy-induced nausea and vomiting    5. Anemia in neoplastic disease      Chief Complaint: Colon Cancer    Oncologic History:      Oncologic History 1. Colon adenocarcinoma      Oncologic Treatment 1. Right hemicolectomy (7/17/20)  2. Planned therapy: FOLFOX +/- bevacizumab      Pathology 7/17/20:  Right hemicolectomy:  MODERATELY DIFFERENTIATED ADENOCARCINOMA WITH INFILTRATION THROUGH THE   MUSCULARIS INTO PERICOLONIC ADIPOSE TISSUE   METASTATIC CARCINOMA  TO 6 OF 15 LYMPH NODES   MULTIPLE METASTASES TO THE FAT ITSELF   NO SIGNIFICANT ABNORMALITY OF THE TERMINAL ILEUM OR APPENDIX   MMR--NORMAL FOR COLORECTAL CARCINOMA   Immunohistochemistry (IHC) Testing for Mismatch Repair (MMR) Proteins:   MLH1 - Intact nuclear expression   MSH2 - Intact nuclear expression   MSH6 - Intact nuclear expression   PMS2 - Intact nuclear expression   Background nonneoplastic tissue/internal control with intact nuclear   expression   IHC Interpretation   No loss of nuclear expression of MMR proteins: low probability of   microsatellite instability     Procedure:  Excision of right and transverse colon   Tumor Site:  transverse colon   Tumor Size:  3.2 cm   Macroscopic Tumor Perforation:  No   Histologic Type:  Adenocarcinoma   Histologic Grade:  Moderately differentiated   Tumor Extension:  Through the muscularis into pericolonic adipose tissue and   with tumor deposits extending to the most circumferential pericolonic tissue.   Margins:  The tumor is  9 cm from the distal margin and 44 from the proximal.   Treatment Effect:  No known presurgical therapy   Lymphovascular Invasion:  Identified in pericolonic tissue   Perineural Invasion:  Present   Tumor Deposits:  Present--approximately 12   Number of Lymph Nodes Examined:15   Number of Lymph Nodes  Involved:  6   Pathologic Stage Classification: pT4a pN2A pM1a--liver metastases observed at     BRAF Mutation Analysis(V600E), Tumor  Result Summary:  NO MUTATION IDENTIFIED  Result:  Provided diagnosis: colorectal adenocarcinoma  BRAF status: Wild-type  KRAS Mutation Analysis, Colorectal  Result Summary:  NO MUTATION IDENTIFIED  Result:  Provided diagnosis: colorectal adenocarcinoma  KRAS status: Wild-type    7/15/20:  Transverse colon biopsy:  Invasive moderately differentiated colonic   adenocarcinoma       7/14/20:  Liver, biopsy:   Adenocarcinoma, moderately differentiated   Cytomorphologic features and immunohistochemical staining pattern supports   colonic primary       Subjective:    History of Present Illness: Ms. Cisneros is a 29 y.o. female who presents for evaluation and management of metastatic colon cancer. I had initially seen her during a hospitalization in July 2020.    - biopsy of liver mass (7/14/20) revealed metastatic colon adenocarcinoma.  - she underwent right hemicolectomy on 7/17/20. A port-a-cath was placed during that surgery.    Interval history:  - she presents for a follow-up appointment for her colon cancer.  - she is scheduled for cycle #1 of FOLFOX on 8/11/20 - 8/13/20.  - unfortunately, due to elevated liver function tests, she was determined not to be a candidate for the clinical trial.  - She denies shortness of breath, chest pain, nausea, vomiting, diarrhea.    Past medical, surgical, family, and social histories have been reviewed and updated below.    Past Medical History:   Past Medical History:   Diagnosis Date    Uterine cyst        Past Surgical History:   Past Surgical History:   Procedure Laterality Date    COLONOSCOPY N/A 7/15/2020    Procedure: COLONOSCOPY;  Surgeon: Hi Drake MD;  [facility];  Service: Endoscopy;  Laterality: N/A;    COLONOSCOPY W/ BIOPSIES      INSERTION OF TUNNELED CENTRAL VENOUS CATHETER (CVC) WITH SUBCUTANEOUS PORT N/A  7/17/2020    Procedure: INSERTION, PORT-A-CATH;  Surgeon: Armani Naqvi MD;  Location: [facility] OR;  Service: General;  Laterality: N/A;       Family History:   Family History   Problem Relation Age of Onset    Hypertension Mother     Hypertension Maternal Grandmother     Hypertension Maternal Grandfather     Hypertension Paternal Grandmother        Social History:  reports that she has never smoked. She has never used smokeless tobacco. She reports that she does not drink alcohol or use drugs.    Allergies:  Review of patient's allergies indicates:   Allergen Reactions    Sulfa (sulfonamide antibiotics)        Medications:  Current Outpatient Medications   Medication Sig Dispense Refill    docusate sodium (COLACE) 100 MG capsule Take 1 capsule (100 mg total) by mouth once daily. 30 capsule 0    folic acid (FOLVITE) 1 MG tablet Take 1 tablet (1 mg total) by mouth once daily. 100 tablet 3    HYDROcodone-acetaminophen (NORCO) 5-325 mg per tablet Take 1 tablet by mouth every 8 (eight) hours as needed for Pain (chronic cancer-related pain). 60 tablet 0    ondansetron (ZOFRAN-ODT) 8 MG TbDL Take 1 tablet (8 mg total) by mouth every 8 (eight) hours as needed (chemotherapy-induced nausea and vomiting). 40 tablet 5    promethazine (PHENERGAN) 25 MG tablet Take 1 tablet (25 mg total) by mouth every 6 (six) hours as needed for Nausea. 60 tablet 2     No current facility-administered medications for this visit.        Review of Systems   Constitutional: Positive for fatigue.   HENT: Negative for sore throat.    Eyes: Negative for visual disturbance.   Respiratory: Negative for cough and shortness of breath.    Cardiovascular: Negative for chest pain.   Gastrointestinal: Negative for constipation, diarrhea, nausea and vomiting.   Genitourinary: Negative for dysuria.   Musculoskeletal: Negative for back pain.   Skin: Negative for rash.   Neurological: Negative for headaches.   Hematological: Negative for adenopathy.    Psychiatric/Behavioral: The patient is not nervous/anxious.      ECOG Performance Status:   ECOG SCORE 1       Objective:      Vitals:   Vitals:    08/11/20 0939   BP: (!) 136/101   Pulse: (!) 121   Resp: 18   Temp: 97.5 °F (36.4 °C)   TempSrc: Oral   SpO2: 98%   Weight: 83.7 kg (184 lb 8.4 oz)     BMI: Body mass index is 30.71 kg/m².    Physical Exam  Vitals signs and nursing note reviewed.   Constitutional:       Appearance: She is well-developed.   HENT:      Head: Normocephalic and atraumatic.   Eyes:      Pupils: Pupils are equal, round, and reactive to light.   Neck:      Musculoskeletal: Normal range of motion and neck supple.   Cardiovascular:      Rate and Rhythm: Normal rate and regular rhythm.   Pulmonary:      Effort: Pulmonary effort is normal.      Breath sounds: Normal breath sounds.   Abdominal:      General: Bowel sounds are normal.      Palpations: Abdomen is soft.      Tenderness: There is abdominal tenderness.   Musculoskeletal: Normal range of motion.      Comments: Port-a-cath in right chest-wall.   Skin:     General: Skin is warm and dry.   Neurological:      Mental Status: She is alert and oriented to person, place, and time.   Psychiatric:         Behavior: Behavior normal.         Thought Content: Thought content normal.         Judgment: Judgment normal.         Laboratory Data:  Labs have been reviewed.    Lab Results   Component Value Date    WBC 6.88 08/10/2020    HGB 10.0 (L) 08/10/2020    HCT 34.2 (L) 08/10/2020    MCV 81 (L) 08/10/2020     (H) 08/10/2020       Imaging:     Colonoscopy (7/15/20):  Findings:        The perianal and digital rectal examinations were normal.        A fungating partially obstructing mass was found in the transverse        colon. The mass was circumferential. It could not be traversed by        the pediatric colonoscope. No bleeding was present on finding but        did bleed on contact. This was biopsied with a cold forceps for        histology.  Verification of patient identification for the specimen        was done. Estimated blood loss was minimal.   Impression:             - Likely malignant partially obstructing tumor in the transverse colon. Biopsied.     CT abdomen/pelvis (7/14/20): I have personally reviewed the images  4.5 cm length annular lesion in the midline transverse colon suspicious for colonic neoplasm, with associated innumerable large hepatic metastatic lesions.  Fluid noted in the right colon suggesting impending diarrheal illness.  No evidence of bowel obstruction.      Assessment:       1. Adenocarcinoma of colon    2. Secondary malignancy of liver    3. Chronic neoplasm-related pain    4. Chemotherapy-induced nausea and vomiting    5. Anemia in neoplastic disease           Plan:     1. Adenocarcinoma of right colon - stage IV  - I have reviewed her chart/labs/imaging/pathology  - biopsy of liver mass (7/14/20) revealed metastatic colon adenocarcinoma.  - she underwent right hemicolectomy on 7/17/20.  - I and [facility] recommend FOLFOX + bevacizumab.  - BRAF wild-type, KRAS wild-type. I will message pathology about NRAS status. If NRAS wild-type, I will consider using panitumumab instead of bevacizumab.  - The risks and benefits of chemotherapy were discussed, written information was given, and informed consent was obtained.  - port was placed at time of right hemicolectomy.  - unfortunately, due to elevated liver function tests, she was determined not to be a candidate for the clinical trial.  - Labs have been reviewed. Liver function tests are mildly elevated. Okay to proceed with cycle #1 of FOLFOX, scheduled for 8/11/20 - 8/13/20.  - return to clinic in 2 weeks in preparation for cycle #2 of FOLFOX + bevacizumab. Will hold bevacizumab due to recent surgery.    2. Chronic neoplasm-related pain  - controlled with norco.  - continue to monitor.    3. Chemotherapy-induced nausea and vomiting  - I have sent a prescription for  ondansetron ODT to her pharmacy.    4. Anemia in neoplastic disease  - Labs have been reviewed. Hemoglobin is 10 g/dL.  - iron studies (8/10/20) suggest anemia in neoplastic disease / anemia of chronic disease  - continue to monitor    5. Advance Care Planning   Power of   I initiated the process of advance care planning today and explained the importance of this process to the patient.  I introduced the concept of advance directives to the patient, as well. Then the patient received detailed information about the importance of designating a Health Care Power of  (HCPOA). She was also instructed to communicate with this person about their wishes for future healthcare, should she become sick and lose decision-making capacity. The patient has not previously appointed a HCPOA. After our discussion, the patient has decided to complete a HCPOA and has appointed her mother Char Cisneros ([phone]) and aunt Alannah Mcintyre ([phone]).        - return to clinic in 2 weeks in preparation for cycle #2 of FOLFOX + bevacizumab. Will hold bevacizumab due to recent surgery.    Jewel Arzola M.D.  Hematology/Oncology  [facility] - [address], [address]  [place] [address]  Phone: [phone]  Fax: [phone]

## 2020-08-11 NOTE — NURSING
Pt tolerated Folfox infusion well. No adverse reaction noted. Pt education reinforced on chemo regimen, side effects, what to expect, and when to call . Pt verbalized understanding. I reviewed pt calendar w/ pt and understanding verbalized.   PAC remains accessed with 5FU infusing at 3cc/hr to portable chemo pump. Patent upon discharge.

## 2020-08-11 NOTE — TELEPHONE ENCOUNTER
----- Message from James Woody RN sent at 8/11/2020 10:50 AM CDT -----  Good morning,    Could you prescribe emla cream for pt.'s port?    Thank you,    James

## 2020-08-11 NOTE — Clinical Note
Good morning,    I saw Ms. Cisneros today (metastatic colon cancer). I see that the KRAS came back as wild type. I did not see any mention about NRAS status. Was NRAS testing sent off, or is it included in KRAS testing? If she is NRAS wild-type also, she would qualify for a specialized therapy. Also, can you tell if the tissue was sent for STRATA testing?    Thanks so much! Have a great day!  Jewel

## 2020-08-12 NOTE — PROGRESS NOTES
Subjective:       Patient ID: Huy Cisneros is a 29 y.o. female.    Chief Complaint: Hospital Follow Up    Ms. Cisneros is a 29 y.o. female here today for hospital follow up. Approximately one month ago she presented to the ED for nausea, decreased appetite, and some blood in her stool. At that time CT scan revealed colonic mass with liver masses concerning for metastatic disease. Liver biopsy revealed metastatic adenocarcinoma of colonic origin (7/14). She underwent right hemicolectomy on 7/17 and also had port a cath placed. She is being followed by Oncology, Dr. Arzola, and started first chemo treatment yesterday (Folfox), which is currently infusing.    Today Ms. Cisneros seems in good spirits. She is tolerating her Chemo without any current side effects. She is staying with her mom currently. Her 3 boys 10, 8, 3. Staying with god-parents. Overall mood is good. Has her moments, but tries to meditate and sit outside.     Past Medical History:   Diagnosis Date    Cancer     Colon CA with mets to the liver    Uterine cyst      Past Surgical History:   Procedure Laterality Date    COLONOSCOPY N/A 7/15/2020    Procedure: COLONOSCOPY;  Surgeon: Hi Drake MD;  Location: [facility] ENDO;  Service: Endoscopy;  Laterality: N/A;    COLONOSCOPY W/ BIOPSIES      INSERTION OF TUNNELED CENTRAL VENOUS CATHETER (CVC) WITH SUBCUTANEOUS PORT N/A 7/17/2020    Procedure: INSERTION, PORT-A-CATH;  Surgeon: Armani Naqvi MD;  Location: [facility] OR;  Service: General;  Laterality: N/A;     Family History   Problem Relation Age of Onset    Hypertension Mother     Hypertension Maternal Grandmother     Hypertension Maternal Grandfather     Hypertension Paternal Grandmother      Social History     Socioeconomic History    Marital status: Single     Spouse name: Not on file    Number of children: Not on file    Years of education: Not on file    Highest education level: Not on file   Occupational History     Not on file   Social Needs    Financial resource strain: Not on file    Food insecurity     Worry: Not on file     Inability: Not on file    Transportation needs     Medical: Not on file     Non-medical: Not on file   Tobacco Use    Smoking status: Never Smoker    Smokeless tobacco: Never Used   Substance and Sexual Activity    Alcohol use: No    Drug use: No    Sexual activity: Yes     Partners: Male     Birth control/protection: None   Lifestyle    Physical activity     Days per week: Not on file     Minutes per session: Not on file    Stress: Not on file   Relationships    Social connections     Talks on phone: Not on file     Gets together: Not on file     Attends Nondenominational service: Not on file     Active member of club or organization: Not on file     Attends meetings of clubs or organizations: Not on file     Relationship status: Not on file   Other Topics Concern    Not on file   Social History Narrative    Not on file       Review of Systems   Constitutional: Positive for activity change and fatigue.   HENT: Negative.    Respiratory: Negative.    Cardiovascular: Negative.    Genitourinary: Negative.    Musculoskeletal: Negative.    Skin: Negative.    Neurological: Negative.    Psychiatric/Behavioral: Negative.        Objective:      Vitals:    08/12/20 1037   BP: (!) 130/92   Pulse: 102     Physical Exam  Vitals signs and nursing note reviewed.   Constitutional:       General: She is not in acute distress.     Appearance: She is well-developed. She is not diaphoretic.   HENT:      Head: Normocephalic and atraumatic.   Eyes:      Conjunctiva/sclera: Conjunctivae normal.   Neck:      Trachea: No tracheal deviation.   Cardiovascular:      Rate and Rhythm: Normal rate.   Pulmonary:      Effort: Pulmonary effort is normal. No respiratory distress.   Musculoskeletal: Normal range of motion.         General: No tenderness or deformity.   Skin:     General: Skin is warm and dry.      Comments: Port a  cath currently infusing   Neurological:      General: No focal deficit present.      Mental Status: She is alert and oriented to person, place, and time.      Coordination: Coordination normal.      Gait: Gait normal.   Psychiatric:         Behavior: Behavior normal.         Thought Content: Thought content normal.         Assessment:       1. Adenocarcinoma of colon    2. Secondary malignancy of liver        Plan:       Adenocarcinoma of colon  Secondary malignancy of liver   -Following with Dr. Arzola at this time. Continue chemo per Heme/Onc.     Ms. Cisneros presented today for follow up. She has stage 4 metastatic colon cancer and is currently being treated with chemo monitored by Dr. Arzola. At this time she will defer a PCP follow up and focus on her cancer treatment. She was given our contact information and will call to schedule with us if she changes her mind.     Future Appointments   Date Time Provider Department Center   9/17/2020 10:10 AM LAB, [facility] RVPH LAB [facility]   9/17/2020 12:00 PM Kristy Bueno NP [facility] C3H [place]   9/23/2020  1:20 PM Jewel Arzola MD [facility] HEM ONC [facility]   9/28/2020 11:00 AM Brandon Schumacher MD [facility] LSUFMRE [facility]   10/5/2020 10:00 AM LAB, [facility] RVPH LAB [facility]   10/5/2020 10:20 AM LAB, [facility] RVPH LAB [facility]   10/6/2020 10:20 AM Jewel Arzola MD [facility] HEM ONC [facility]   10/19/2020 10:00 AM LAB, [facility] RVPH LAB [facility]       Follow up if symptoms worsen or fail to improve.

## 2020-08-13 NOTE — TELEPHONE ENCOUNTER
8/13/20  5:00pm  Spoke to patient regarding appointment tomorrow with Dr. Naqvi. I advised her that Dr. Naqvi will not be in the office tomorrow. I asked patient if we could reschedule her appointment. Patient began crying stating she just started chemo and I tired of going to the doctor. I asked patient if she would be ok with seeing Dr. Alonso tomorrow. Patient stated she would rather go to ER in [place], which is closer to her home. I advised patient that it would be best to come to office but it is up to her. Patient stated she won't make office appt but will report to ED. Verbalized understanding.

## 2020-08-13 NOTE — NURSING
Pt came in for pump D/C. Pt tolerated home 5FU well and had no complaints. PAC flushed w/ NS and heparin and deaccessed. Pt education reinforced and explained what to expect in the next couple of days. Pt verbalized understanding.

## 2020-08-24 NOTE — TELEPHONE ENCOUNTER
2:20pm:  Unable to leave message for pt. To confirm lab appt. On 8/31/20 and office visit w/ Dr. Arzoal at 10am on 9/1/20.    2:25pm:  Pt.'s mother confirmed lab appt. At 12:30pm on 8/31/20 and office visit with Dr. Arzola at 10am on 9/1/20.

## 2020-08-26 PROBLEM — A41.9 SEPTIC SHOCK: Status: ACTIVE | Noted: 2020-01-01

## 2020-08-26 PROBLEM — N17.9 AKI (ACUTE KIDNEY INJURY): Status: ACTIVE | Noted: 2020-01-01

## 2020-08-26 PROBLEM — E87.1 HYPONATREMIA: Status: ACTIVE | Noted: 2020-01-01

## 2020-08-26 PROBLEM — D70.9 NEUTROPENIA: Status: ACTIVE | Noted: 2020-01-01

## 2020-08-26 PROBLEM — G89.3 CHRONIC NEOPLASM-RELATED PAIN: Status: RESOLVED | Noted: 2020-01-01 | Resolved: 2020-01-01

## 2020-08-26 PROBLEM — E87.20 METABOLIC ACIDOSIS: Status: ACTIVE | Noted: 2020-01-01

## 2020-08-26 PROBLEM — T80.219A INFECTED VENOUS ACCESS PORT: Status: ACTIVE | Noted: 2020-01-01

## 2020-08-26 PROBLEM — E83.42 HYPOMAGNESEMIA: Status: ACTIVE | Noted: 2020-01-01

## 2020-08-26 PROBLEM — R11.2 NAUSEA & VOMITING: Status: ACTIVE | Noted: 2020-01-01

## 2020-08-26 PROBLEM — R65.21 SEPTIC SHOCK: Status: ACTIVE | Noted: 2020-01-01

## 2020-08-26 NOTE — ED NOTES
Results for MARK RICHARDS (MRN [id number]) as of 8/26/2020 12:00   Ref. Range 8/26/2020 11:22   POC PH Latest Ref Range: 7.35 - 7.45  7.297 (L)   POC PCO2 Latest Ref Range: 35 - 45 mmHg 24.9 (L)   POC PO2 Latest Ref Range: 40 - 60 mmHg 24 (LL)   POC BE Latest Ref Range: -2 to 2 mmol/L -14   POC HCO3 Latest Ref Range: 24 - 28 mmol/L 12.1 (L)   POC SATURATED O2 Latest Ref Range: 95 - 100 % 37 (L)   POC TCO2 Latest Ref Range: 24 - 29 mmol/L 13 (L)   Sample Unknown VENOUS   DelSys Unknown Room Air   Allens Test Unknown N/A   Site Unknown Other   VBG results reported to Dr. Farhad Nesbitt in pt.'s room by LYNDSEY Pollard.

## 2020-08-26 NOTE — HPI
Ms. Huy Cisneros is a 30 y/o female who lives in [place], [place] at her residence with her children. The patients PCP is Imtiaz Lindsay PA-C. She is also followed by Dr. Jewel Arzola with Oncology. She has a pmh of colon cancer with liver mets, uterine cysts, and HTN. She has a past surgical history of a mediport placement and a colonoscopy. The patient does not smoke cigarettes, drink alcohol, or use illicit drugs.     She presents to the ED with complaints of fever, vomiting and feeling dizzy since yesterday.  Per the patient she states her port a cath to right chest wall busted a stitch last night she while was bathing. She also has complaints of feeling SOB with lower extremity swelling.     Her ED workup includes WBC--0.95, hemoglobin 9.9, Na--132, creatinine 2.00, magnesium--1.4, lactate--10.7, CPK--180, pH--7.2. Pending blood cultures and wound cultures. She is COVID-19 negative. CXR---There has been interval placement of a right subclavian venous line. Its tip is in the region of the junction of the superior vena cava with the right atrium.  There is no pneumothorax. There is no focal pulmonary infiltrate visualized. XR of the abd---There is a paucity of gas within the gastrointestinal system. There are multiple radiopaque leads projected over the abdomen and pelvis. She will be admitted to the [facility] Medicine department for further care.

## 2020-08-26 NOTE — CONSULTS
[facility] ICU 5th Floor  Critical Care - Medicine  Consult Note    Patient Name: Huy Cisneros  MRN: [id number]  Admission Date: 8/26/2020  Hospital Length of Stay: 0 days  Code Status: Full Code  Attending Physician: Maryana Patel*  Primary Care Provider: Primary Doctor No   Principal Problem: <principal problem not specified>    Inpatient consult to Pulmonology  Consult performed by: Jimmie Malin MD  Consult ordered by: Maryana Patel MD        Subjective:     HPI: 29 F with significant pmhx of metastatic adenocarcinoma of the colon with liver mets, uterine cysts, and HTN who presented to [facility] ED for intractable nausea/vomitting that has been on going for about 1 day.  Pt describes vomiting as having some food contents but mostly saliva, she reports she has not been able to tolerated PO since symptoms began.  Associated symptoms include fevers, chills, and general malaise which also started when nausea/vomiting started. Pt denies abdominal pain, endorses 1 episode of diarrhea. Denies any chest pain, SOB, changes in urinary habits.  Pt reports she has never had symptoms like this in the past. She reports her cancer is s/p resection in July 2020 and currently undergoing chemotherapy last given 2 weeks via port in the right chest.  In ED noticed wound dehiscence at right chest port site.  While in the ED pt was found to tachycardic up to 162 and hypotensive down to 85/43, pt was started on IVF, Abx, and pressor support. Labs noteable for WC of 0.95, Cr 2.0, pH 7.2, lactate 10.7.  Pt transferred to [facility] ICU for higher level of care for septic shock.          Past Medical History:   Diagnosis Date    Cancer     Colon CA with mets to the liver    Uterine cyst        Past Surgical History:   Procedure Laterality Date    COLONOSCOPY N/A 7/15/2020    Procedure: COLONOSCOPY;  Surgeon: Hi Drake MD;  Location: [facility];  Service: Endoscopy;   Laterality: N/A;    COLONOSCOPY W/ BIOPSIES      INSERTION OF TUNNELED CENTRAL VENOUS CATHETER (CVC) WITH SUBCUTANEOUS PORT N/A 7/17/2020    Procedure: INSERTION, PORT-A-CATH;  Surgeon: Armani Naqvi MD;  Location: [facility];  Service: General;  Laterality: N/A;       Review of patient's allergies indicates:   Allergen Reactions    Sulfa (sulfonamide antibiotics)        Family History     Problem Relation (Age of Onset)    Hypertension Mother, Maternal Grandmother, Maternal Grandfather, Paternal Grandmother        Tobacco Use    Smoking status: Never Smoker    Smokeless tobacco: Never Used   Substance and Sexual Activity    Alcohol use: No    Drug use: No    Sexual activity: Yes     Partners: Male     Birth control/protection: None      Review of Systems   Constitutional: Positive for chills, fatigue and fever.   HENT: Negative for postnasal drip, rhinorrhea, sinus pressure and sinus pain.    Eyes: Negative.    Respiratory: Negative for cough, shortness of breath, wheezing and stridor.    Cardiovascular: Negative for chest pain, palpitations and leg swelling.   Gastrointestinal: Positive for diarrhea, nausea and vomiting. Negative for abdominal pain.   Endocrine: Negative.    Genitourinary: Negative.  Negative for difficulty urinating, dysuria and flank pain.   Musculoskeletal: Positive for back pain. Negative for joint swelling, myalgias, neck pain and neck stiffness.   Allergic/Immunologic: Negative.    Neurological: Positive for light-headedness. Negative for seizures and syncope.   Hematological: Negative.    Psychiatric/Behavioral: Negative.      Objective:     Vital Signs (Most Recent):  Temp: 99 °F (37.2 °C) (08/26/20 1510)  Pulse: (!) 162 (08/26/20 1510)  Resp: (!) 40 (08/26/20 1510)  BP: (!) 108/55 (08/26/20 1510)  SpO2: 98 % (08/26/20 1510) Vital Signs (24h Range):  Temp:  [98.9 °F (37.2 °C)-99.3 °F (37.4 °C)] 99 °F (37.2 °C)  Pulse:  [144-162] 162  Resp:  [35-48] 40  SpO2:  [98 %-100 %] 98  %  BP: ()/(43-70) 108/55     Weight: 81.5 kg (179 lb 10.8 oz)  Body mass index is 29.9 kg/m².      Intake/Output Summary (Last 24 hours) at 2020 1605  Last data filed at 2020 1300  Gross per 24 hour   Intake 2050 ml   Output --   Net 2050 ml     Gen: Alert to person, place and time. Ill appearing   Head: Atraumatic, normocephalic   Neck: No JVD present, neck supple, no LAD   Eyes: Pupils equal and reactive to light,extra-ocular eye movements intact,   Mouth: dry mucous membranes   CV: tachycardic, no rubs, gallops, or murmurs. Wound dehiscence noticed on latera aspect of Mediport scar over right chest   Lungs: CTAB no crackles, wheezing, or rhonchi. on oxygen for comfort. No accessory muscle use   Abd: Soft, non tender, non distended, bowel sounds present. No rebound tenderness or guarding present.     EXT: 2+ radial and dorsalis pedis pulses bilaterally.  No edema or cyanosis noted.    Neuro: CN II: pupils equal, reactive to light. CN III, IV, VI: extra-ocular muscles intact.     Skin: Warm and dry.  No jaundice noted.        Lines/Drains/Airways     Central Venous Catheter Line            Port A Cath Single Lumen 20 40 days          Peripheral Intravenous Line                 Peripheral IV - Single Lumen 20 1119 20 G Left Wrist less than 1 day         Peripheral IV - Single Lumen 20 1140 20 G Right Hand less than 1 day                Significant Labs:    CBC/Anemia Profile:  Recent Labs   Lab 20  1127   WBC 0.95*   HGB 9.9*   HCT 32.9*      MCV 79*   RDW 18.8*        Chemistries:  Recent Labs   Lab 20  1127   *   K 4.0   CL 98   CO2 10*   BUN 14   CREATININE 2.00*   CALCIUM 8.6*   ALBUMIN 3.7   PROT 7.7   BILITOT 3.8*   ALKPHOS 126   ALT 93*   *   MG 1.4*   CPK: 180  Lipase: 17  Lactate: > 12.0     VB.297  24.9  24  12.1       Significant Imaging: CXR: I have reviewed all pertinent results/findings within the past 24 hours and my personal  findings are:  right chest mediport cather tip overlying atrium. No acute process    Assessment/Plan:     Active Diagnoses:    Diagnosis Date Noted POA    Septic shock [A41.9, R65.21] 08/26/2020 Yes    KIERSTEN (acute kidney injury) [N17.9] 08/26/2020 Yes    Infected venous access port [T80.219A] 08/26/2020 Yes    Metabolic acidosis [E87.2] 08/26/2020 Yes    Neutropenia [D70.9] 08/26/2020 Yes    Anemia in neoplastic disease [D63.0] 08/10/2020 Yes    Metastatic disease [C79.9] 07/14/2020 Yes    Secondary malignancy of liver [C78.7] 07/14/2020 Yes    Adenocarcinoma of colon [C18.9] 07/14/2020 Yes      Problems Resolved During this Admission:     Neuro  -no acute issues    CV  Shock, likely 2/2 sepsis   -s/p IVF with out improvement of MAPs  -continue pressor support with Levo, maintain MAPs >65  -LA > 12, continue to trend   -source likely from dehiscence of Mediport; blood cultures and cultures from mediport   -broad spectrum abx; ID consulted, follow culture data   Tachycardic   -up to 162 in the ED, no history of arrhythmias   -2/2 shock, dehydration  -continue IVF, EKG Sinus tach  -continue to monitor     Pulm  -no acute issues at this time  -O2 NC for comfort   -CXR as above     Renal/Eelctrolytes   KIERSTEN  -BUN/CR on admit 14/2.0; baseline Cr 0.65  -rebolledo in place, monitor I/O, renal US ordered, nephrology following   Hypomagnesium  -recommend replete and continue to monitor   AGMA  -AG 28, lactate >12  -emergent HD tonight, trialysis placement   -daily CMP, trend LA     GI  Intractable nausea/vomiting  -prn zofran IV, IVF, adv diet as tolerated   Transmits   -T bili 3.8, , ALT 93  -likely 2/2 mets to liver   -hep panel, HIV, continue to monitor  Adenocarcinoma of colon with mets to liver   -s/p resection, currently receiving chemo through mediport   -followed by Dr. Arzola as outpt, hemeOnc consulted     ID/Heme  Wound dehiscence  -consult general surgery for assessment of Mediport site  -cultures and  abx as above   Leukopenia  -WC 0.95 likely 2/2 chemo, hemeonc consulted, neutropenic precautions   Microcytic Anemia   -no evidence of active bleeding , continue to monitor     Critical secondary to Patient has a condition that poses threat to life and bodily function: Shock     Critical care was time spent personally by me on the following activities: development of treatment plan with patient or surrogate and bedside caregivers, discussions with consultants, evaluation of patient's response to treatment, examination of patient, ordering and performing treatments and interventions, ordering and review of laboratory studies, ordering and review of radiographic studies, pulse oximetry, re-evaluation of patient's condition.  This critical care time did not overlap with that of any other provider or involve time for any procedures.    Thank you for your consult. I will follow-up with patient. Please contact us if you have any additional questions.     Jimmie Malin MD  [facility]   Critical Care - Medicine  [facility] ICU 5th Floor

## 2020-08-26 NOTE — ASSESSMENT & PLAN NOTE
Consulting nephrology for management. IV fluids given in the ED. Tran catheter placed. Nephrology has ordered a renal ultrasound. Monitor intake and output q shift.

## 2020-08-26 NOTE — ED NOTES
1st set of blood cultures collected, labeled and taken to lab. Pt was stuck 3 additional time in an attempt to collect 2nd set of blood cultures which was unsuccessful. MD notified unable to collect 2nd set of blood cultures. Ok per Dr. Nesbitt to start antibiotics without 2nd set of blood cultures being drawn.

## 2020-08-26 NOTE — HPI
29-year-old female underwent right hemicolectomy 07/17/2020 for metastatic colon adenocarcinoma and received 1st cycle of FOLFOX 08/11/2020.    7/20/2020 - CT scan - Multiple hepatic hypodensities better evaluated on CT from 07/14/2020, consistent with metastatic disease.    She has been vomiting and feeling dizzy since yesterday.  Last night she busted a stitch to the Port-A-Cath site causing dehiscence and this morning she fell after going to the restroom.    She presented to the ED with tachycardia, heart rate up to 160 and was hypotensive with blood pressure down to 85/43.  She was started on antibiotics, IV fluids and pressors and transferred to [facility] ICU.    WBC on admission 0.95, creatinine 2, lactate >12    Bilirubin 3.8, , ALT 93    Seen by . Right chest port removed.

## 2020-08-26 NOTE — EICU
Rounding (Video Assessment):  N/A    Intervention Initiated From:  Bedside    Jeremy Communicated with Bedside Nurse regarding:  Documentation and Time-Out    Comments: called to bedside via eLert for time out and documentation of trialysis line placement. Dr. ARY Naqvi at bedside to perform procedure. See bedside procedural flow sheet for details. VSS. Pt tolerated procedure well.

## 2020-08-26 NOTE — CONSULTS
[facility] ICU 5th Floor  Hematology/Oncology  Consult Note    Patient Name: Huy Cisneros  MRN: [id number]  Admission Date: 8/26/2020  Hospital Length of Stay: 0 days  Code Status: Full Code   Attending Provider: Maryana Patel*  Consulting Provider: Guido Friedman MD  Primary Care Physician: Primary Doctor No  Principal Problem:<principal problem not specified>    Consults  Subjective:     HPI:  29-year-old female underwent right hemicolectomy 07/17/2020 for metastatic colon adenocarcinoma and received 1st cycle of FOLFOX 08/11/2020.    7/20/2020 - CT scan - Multiple hepatic hypodensities better evaluated on CT from 07/14/2020, consistent with metastatic disease.    She has been vomiting and feeling dizzy since yesterday.  Last night she busted a stitch to the Port-A-Cath site causing dehiscence and this morning she fell after going to the restroom.    She presented to the ED with tachycardia, heart rate up to 160 and was hypotensive with blood pressure down to 85/43.  She was started on antibiotics, IV fluids and pressors and transferred to [facility].    WBC on admission 0.95, creatinine 2, lactate >12    Bilirubin 3.8, , ALT 93    Seen by . Right chest port removed.      Oncology Treatment Plan:   OP COLORECTAL FOLFOX + BEVACIZUMAB Q2W    Medications:  Continuous Infusions:   amiodarone in dextrose 5% 1 mg/min (08/26/20 1523)    amiodarone in dextrose 5%      norepinephrine bitartrate-D5W 0.04 mcg/kg/min (08/26/20 1653)     Scheduled Meds:   [START ON 8/27/2020] ceFEPime (MAXIPIME) IVPB  2 g Intravenous Q12H    enoxaparin  30 mg Subcutaneous Q24H    famotidine (PF)  20 mg Intravenous Once    magnesium sulfate IVPB  2 g Intravenous Once    sodium bicarbonate drip   Intravenous Once    tbo-filgrastim  480 mcg Subcutaneous Daily    [START ON 8/27/2020] vancomycin (VANCOCIN) IVPB  1,250 mg Intravenous Q24H     PRN Meds:acetaminophen,  diphenhydrAMINE-zinc acetate 2-0.1%, influenza, ondansetron, ondansetron, ondansetron, ondansetron, pneumoc 13-davion conj-dip cr(PF), sodium chloride 0.9%, Pharmacy to dose Vancomycin consult **AND** vancomycin - pharmacy to dose     Review of patient's allergies indicates:   Allergen Reactions    Sulfa (sulfonamide antibiotics)         Past Medical History:   Diagnosis Date    Cancer     Colon CA with mets to the liver    Uterine cyst      Past Surgical History:   Procedure Laterality Date    COLONOSCOPY N/A 7/15/2020    Procedure: COLONOSCOPY;  Surgeon: Hi Drake MD;  Location: [facility] ENDO;  Service: Endoscopy;  Laterality: N/A;    COLONOSCOPY W/ BIOPSIES      INSERTION OF TUNNELED CENTRAL VENOUS CATHETER (CVC) WITH SUBCUTANEOUS PORT N/A 7/17/2020    Procedure: INSERTION, PORT-A-CATH;  Surgeon: Armani Naqvi MD;  Location: [facility] OR;  Service: General;  Laterality: N/A;     Family History     Problem Relation (Age of Onset)    Hypertension Mother, Maternal Grandmother, Maternal Grandfather, Paternal Grandmother        Tobacco Use    Smoking status: Never Smoker    Smokeless tobacco: Never Used   Substance and Sexual Activity    Alcohol use: No    Drug use: No    Sexual activity: Yes     Partners: Male     Birth control/protection: None       Review of Systems   Constitutional: Positive for fatigue. Negative for fever and unexpected weight change.   HENT: Negative for mouth sores and nosebleeds.    Respiratory: Negative for shortness of breath and wheezing.    Cardiovascular: Negative for chest pain and palpitations.   Gastrointestinal: Negative for abdominal pain and nausea.   Allergic/Immunologic: Negative for food allergies.   Neurological: Positive for weakness.   Psychiatric/Behavioral: Negative for behavioral problems and confusion.     Objective:     Vital Signs (Most Recent):  Temp: 99 °F (37.2 °C) (08/26/20 1510)  Pulse: (!) 153 (08/26/20 1640)  Resp: (!) 41 (08/26/20 1640)  BP:  (!) 110/58 (08/26/20 1600)  SpO2: 100 % (08/26/20 1640) Vital Signs (24h Range):  Temp:  [98.9 °F (37.2 °C)-99.3 °F (37.4 °C)] 99 °F (37.2 °C)  Pulse:  [141-165] 153  Resp:  [35-48] 41  SpO2:  [98 %-100 %] 100 %  BP: ()/(43-70) 110/58     Weight: 81.5 kg (179 lb 10.8 oz)  Body mass index is 29.9 kg/m².  Body surface area is 1.93 meters squared.      Intake/Output Summary (Last 24 hours) at 8/26/2020 1712  Last data filed at 8/26/2020 1300  Gross per 24 hour   Intake 2050 ml   Output --   Net 2050 ml       Physical Exam  Vitals signs and nursing note reviewed.   Constitutional:       General: She is awake.      Appearance: She is ill-appearing. She is not toxic-appearing.   HENT:      Mouth/Throat:      Pharynx: No oropharyngeal exudate.   Neck:      Musculoskeletal: Neck supple. No neck rigidity.      Thyroid: No thyromegaly.   Cardiovascular:      Rate and Rhythm: Regular rhythm. Tachycardia present.   Pulmonary:      Effort: Pulmonary effort is normal. No respiratory distress.      Breath sounds: No wheezing or rales.   Chest:       Lymphadenopathy:      Cervical: No cervical adenopathy.   Skin:     Findings: No bruising or petechiae.   Neurological:      Mental Status: She is alert and oriented to person, place, and time.   Psychiatric:         Behavior: Behavior is cooperative.         Significant Labs:   All pertinent labs from the last 24 hours have been reviewed.    Diagnostic Results:  I have reviewed and interpreted all pertinent imaging results/findings within the past 24 hours.    Assessment/Plan:   1. Septic Shock likely 2/2 infected port-a-cath  2. Chemotherapy induced neutropenia  3. Worsening bilirubin and transaminitis  4. ARF    Continue broad-spectrum antibiotics and IV fluids.    If renal function improves, check CT scan of abdomen with IV contrast to assess for status of liver mets.    Will start G-CSF support for neutropenia    D/w Laxmi, Xenia and Dariusz.    Guido Friedman  MD  Hematology/Oncology  [facility] ICU 5th Floor

## 2020-08-26 NOTE — ED PROVIDER NOTES
Encounter Date: 8/26/2020       History     Chief Complaint   Patient presents with    Vomiting     Pt states has been vomiting since yesterday and feeling dizzy.  Pt states port a cath to right chest wall busted a stitch last night while bathing.  Pt states fell this am after going to restroom, states normal BM this am.  Pt c/o bilateral leg pain. Pt noted SOB with exertion.      Profoundly ill appearing 29-year-old female with a past medical history significant for metastatic adenocarcinoma of the colon who presents for evaluation of recurrent vomiting chills as well as fevers which started yesterday.  She has a port in the right chest which is used for chemotherapy which she is currently undergoing as well as having received blood transfusion from the same port previously.  The wound dehisced overlying port recently.  She has been unable to eat or drink anything since yesterday with recurrent episodes of vomiting, Hickups and profound nausea as well as back pain.  She has never had anything quite this extreme in the past though she has had episodes nausea and abdominal pain.        Review of patient's allergies indicates:   Allergen Reactions    Sulfa (sulfonamide antibiotics)      Past Medical History:   Diagnosis Date    Cancer     Colon CA with mets to the liver    Uterine cyst      Past Surgical History:   Procedure Laterality Date    COLONOSCOPY N/A 7/15/2020    Procedure: COLONOSCOPY;  Surgeon: Hi Draek MD;  Location: [facility];  Service: Endoscopy;  Laterality: N/A;    COLONOSCOPY W/ BIOPSIES      INSERTION OF TUNNELED CENTRAL VENOUS CATHETER (CVC) WITH SUBCUTANEOUS PORT N/A 7/17/2020    Procedure: INSERTION, PORT-A-CATH;  Surgeon: Armani Naqvi MD;  Location: [facility] OR;  Service: General;  Laterality: N/A;     Family History   Problem Relation Age of Onset    Hypertension Mother     Hypertension Maternal Grandmother     Hypertension Maternal Grandfather     Hypertension  Paternal Grandmother      Social History     Tobacco Use    Smoking status: Never Smoker    Smokeless tobacco: Never Used   Substance Use Topics    Alcohol use: No    Drug use: No     Review of Systems  Constitutional-positive fever positive chills  HEENT-no congestion, no ear pain, no nose bleed, no sinus pain,  Eyes-no discharge, no itching, no redness, no visual change  Respiratory-no apnea, no chest tightness, no choking, no cough, no shortness of breath, no wheezing  Cardio-no chest pain  GI-no distention, positive abdominal pain, no diarrhea, no constipation positive vomiting positive nausea  Endocrine-no cold intolerance, no heat intolerance  -no difficulty urinating, no dysuria, no flank pain,  MSK-positive arthralgias, no joint swelling, positive leg pain positive back pain  Skin-no rashes  Allergy-no environmental allergy  Neurologic-no dizziness, no headache, no numbness, no seizure  Hematology-no swollen nodes  Behavioral-no confusion, no hallucinations, no nervousness  Physical Exam     Initial Vitals   BP Pulse Resp Temp SpO2   08/26/20 1126 08/26/20 1100 08/26/20 1100 08/26/20 1051 08/26/20 1051   (!) 88/54 (!) 156 (!) 48 98.9 °F (37.2 °C) 99 %      MAP       --                Physical Exam  Constitutional:  Profoundly ill appearing 29-year-old female who is somnolent, with heavy respirations  Eyes: Conjunctivae slightly yellowed  ENT       Head: Normocephalic, atraumatic.       Nose: No congestion.       Mouth/Throat: Mucous membranes are moist.  Hematological/Lymphatic/Immunilogical: No cervical lymphadenopathy.  Cardiovascular:  Rapid rate, regular rhythm. Normal and symmetric distal pulses.  In the right chest is a dehisced wound overlying a port  Respiratory:  Increased respiratory effort. Breath sounds are normal.  Gastrointestinal: Soft, diffusely tender  Musculoskeletal: Normal range of motion in all extremities. No obvious deformities or swelling.  Neurologic: Alert, oriented. Normal  speech and language. No gross focal neurologic deficits are appreciated.  Skin:  Cool clammy  Psychiatric: Mood and affect are normal.   ED Course   Critical Care    Date/Time: 8/26/2020 11:55 AM  Performed by: Farhad Nesbitt MD  Authorized by: Farhad Nesbitt MD   Direct patient critical care time: 55 minutes  Additional history critical care time: 15 minutes  Ordering / reviewing critical care time: 15 minutes  Documentation critical care time: 10 minutes  Consulting other physicians critical care time: 10 minutes  Consult with family critical care time: 15 minutes  Total critical care time (exclusive of procedural time) : 120 minutes  Critical care time was exclusive of separately billable procedures and treating other patients and teaching time.  Critical care was necessary to treat or prevent imminent or life-threatening deterioration of the following conditions: circulatory failure, shock, dehydration, sepsis and renal failure.  Critical care was time spent personally by me on the following activities: blood draw for specimens, development of treatment plan with patient or surrogate, discussions with primary provider, discussions with consultants, interpretation of cardiac output measurements, examination of patient, evaluation of patient's response to treatment, obtaining history from patient or surrogate, ordering and performing treatments and interventions, ordering and review of radiographic studies, ordering and review of laboratory studies, pulse oximetry, re-evaluation of patient's condition, review of old charts and vascular access procedures.        Labs Reviewed   CBC W/ AUTO DIFFERENTIAL - Abnormal; Notable for the following components:       Result Value    WBC 0.95 (*)     Hemoglobin 9.9 (*)     Hematocrit 32.9 (*)     Mean Corpuscular Volume 79 (*)     Mean Corpuscular Hemoglobin 23.7 (*)     Mean Corpuscular Hemoglobin Conc 30.1 (*)     RDW 18.8 (*)     All other components within  normal limits    Narrative:        WBC critical result(s) called and verbal readback obtained from   Dr. Nesbitt. by [facility] 08/26/2020 12:22   COMPREHENSIVE METABOLIC PANEL - Abnormal; Notable for the following components:    Sodium 132 (*)     CO2 10 (*)     Creatinine 2.00 (*)     Calcium 8.6 (*)     Total Bilirubin 3.8 (*)      (*)     ALT 93 (*)     Anion Gap 24 (*)     eGFR if  37.8 (*)     eGFR if non  32.8 (*)     All other components within normal limits   LIPASE - Abnormal; Notable for the following components:    Lipase Result 17 (*)     All other components within normal limits   MAGNESIUM - Abnormal; Notable for the following components:    Magnesium 1.4 (*)     All other components within normal limits   LACTIC ACID, PLASMA - Abnormal; Notable for the following components:    Lactate (Lactic Acid) 10.7 (*)     All other components within normal limits    Narrative:     Lactic Acid   critical result(s) called and verbal readback obtained   from Mariella TREVINO) by [facility] 08/26/2020 11:56   CK - Abnormal; Notable for the following components:     (*)     All other components within normal limits   ISTAT PROCEDURE - Abnormal; Notable for the following components:    POC PH 7.297 (*)     POC PCO2 24.9 (*)     POC PO2 24 (*)     POC HCO3 12.1 (*)     POC SATURATED O2 37 (*)     POC TCO2 13 (*)     All other components within normal limits   CULTURE, BLOOD   HCG, QUANTITATIVE, PREGNANCY   SARS-COV-2 RNA AMPLIFICATION, QUAL   CK   URINALYSIS, REFLEX TO URINE CULTURE   PREGNANCY TEST, URINE RAPID   LACTIC ACID, PLASMA        ECG Results          EKG 12-lead (Final result)  Result time 08/26/20 13:01:54    Final result by Interface, Lab In [facility] (08/26/20 13:01:54)                 Narrative:    Test Reason : R00.0,    Vent. Rate : 148 BPM     Atrial Rate : 148 BPM     P-R Int : 130 ms          QRS Dur : 066 ms      QT Int : 288 ms       P-R-T Axes : 053 098 011  degrees     QTc Int : 452 ms    Sinus tachycardia with Premature ventricular complexes or Fusion complexes  Rightward axis  Borderline Abnormal ECG  When compared with ECG of 14-JUL-2020 10:29,  Fusion complexes are now Present  Premature ventricular complexes are now Present  Vent. rate has increased BY  70 BPM  Criteria for Septal infarct are no longer Present  T wave inversion no longer evident in Anterior leads  Confirmed by Osmany GASPAR MD, Solis CRAIN ([id number]) on 8/26/2020 1:01:45 PM    Referred By: [email or web address]   SELF           Confirmed By:Solis Ceron III, MD                            Imaging Results          X-Ray Abdomen AP 1 View (KUB) (Final result)  Result time 08/26/20 12:33:49    Final result by ADRIANA Perdue Sr., MD (08/26/20 12:33:49)                 Impression:      1. There is a paucity of gas within the gastrointestinal system.  2. There are multiple radiopaque leads projected over the abdomen and pelvis.      Electronically signed by: Carlo Perdue MD  Date:    08/26/2020  Time:    12:33             Narrative:    EXAMINATION:  XR ABDOMEN AP 1 VIEW    CLINICAL HISTORY:  Pain, unspecified    COMPARISON:  06/06/2020    FINDINGS:  There is a paucity of gas within the gastrointestinal system.  The bony structures appear intact.  There are multiple radiopaque leads projected over the abdomen and pelvis.                               X-Ray Chest AP Portable (Final result)  Result time 08/26/20 12:30:44    Final result by ADRIANA Perdue Sr., MD (08/26/20 12:30:44)                 Impression:      1. There has been interval placement of a right subclavian venous line.  Its tip is in the region of the junction of the superior vena cava with the right atrium.  There is no pneumothorax.  2. There is no focal pulmonary infiltrate visualized.  There are radiopaque leads projected over both sides of the thorax.  .      Electronically signed by: Carlo Perdue MD  Date:    08/26/2020  Time:    12:30              Narrative:    EXAMINATION:  XR CHEST AP PORTABLE    CLINICAL HISTORY:  Pain, unspecified    COMPARISON:  07/14/2020    FINDINGS:  There has been interval placement of a right subclavian venous line.  Its tip is in the region of the junction of the superior vena cava with the right atrium.  The size of the heart is normal.  There is no focal pulmonary infiltrate visualized.  There are radiopaque leads projected over both sides of the thorax.  There is no pneumothorax.  The costophrenic angles are sharp.                                                   ED Course as of Aug 26 1518   Wed Aug 26, 2020   1156 My interpretation Sinus tachycardia, 148 beats per minute, normal axis, when compared to previous EKG 07/14/2020 sinus tachycardia has replaced sinus rhythm, Q-wave V2 is now resolved.    [TK]   1357 Discussed case with patient's primary oncologist Dr. Arzola, she is profoundly ill at this time needs intensive care unit level care.  Will plan admission to [place].    [TK]   5048 Spoke to patient's mother via telephone who functions as her surrogate decision maker, is amenable to admission to [place], spent considerable time in discussion about patient's care, plan is for this patient undergo admission to the intensive care unit have discussed the case with the on-call hospitalist Dr. Covarrubias who accepts this patient in transport to the intensive care unit, she has received vancomycin, cefepime, 2 L of fluid including 1 L of lactated Ringer's 1 L of normal saline is currently in the process of receiving 1/3 L. Her blood pressure continues to be on the low range of and her tachycardia consistently around 140-150 range.  Will initiate treatment with Levophed.  Will administer wide open fluids.  Continues to have no true oxygen requirement but I am giving 3 L to assist with work of breathing she demonstrates primarily a metabolic acidosis likely as result of her lactic acid.    [TK]      ED Course User Index  [TK]  Farhad Nesbitt MD       Patient Condition: The patient has not been stabilized but the benefits reasonably expected from the provision of appropriate medical treatment at another medical facility outweigh the potential risks to the patient's condition as a result of the transfer.  Reason for Transfer: Service(s) unavailable, Qualified clinical personnel unavailable  Benefits of Transfer: inpatient care, ICU level care  Risks of Transfer: arrythmia, hypotension  Accepting Physician: jenn  Sending Physician: Dr. Delicia BUSTOS Certification: Patient examined and risks and benefits explained, Patient and/or family/representative verbalize understanding        Clinical Impression:       ICD-10-CM ICD-9-CM   1. Sepsis, due to unspecified organism, unspecified whether acute organ dysfunction present  A41.9 038.9     995.91   2. Tachycardia  R00.0 785.0   3. Pain  R52 780.96   4. Neutropenia, unspecified type  D70.9 288.00   5. Lactic acid acidosis  E87.2 276.2   6. KIERSTEN (acute kidney injury)  N17.9 584.9   7. Tachyarrhythmia  R00.0 785.0             ED Disposition Condition    Admit                           Farhad Nesbitt MD  08/26/20 5380

## 2020-08-26 NOTE — ED NOTES
Pt's mother, Char Spence notified of pt transport to [facility] bed 560. Mother verbalized understanding.

## 2020-08-26 NOTE — PROGRESS NOTES
Pharmacist Renal Dose Adjustment Note    Huy Cisneros is a 29 y.o. female being treated with the medication cefepime    Patient Data:    Vital Signs (Most Recent):  Temp: 99 °F (37.2 °C) (08/26/20 1430)  Pulse: (!) 155 (08/26/20 1351)  Resp: (!) 45 (08/26/20 1351)  BP: (!) 90/54 (08/26/20 1351)  SpO2: 100 % (08/26/20 1351)   Vital Signs (72h Range):  Temp:  [98.9 °F (37.2 °C)-99.3 °F (37.4 °C)]   Pulse:  [144-156]   Resp:  [35-48]   BP: ()/(43-70)   SpO2:  [98 %-100 %]      Recent Labs   Lab 08/24/20  1113 08/26/20  1127   CREATININE 0.64 2.00*     Serum creatinine: 2 mg/dL (H) 08/26/20 1127  Estimated creatinine clearance: 43.4 mL/min (A)    Medication:Cefepime  dose: 2g  frequency q 24 hours will be changed to medication:cefepime dose:2g  frequency:q 12 hours    Pharmacist's Name: Eleanor Morales  Pharmacist's Extension: [phone]

## 2020-08-26 NOTE — PROCEDURES
"Huy Cisneros is a 29 y.o. female patient.    Temp: 99 °F (37.2 °C) (08/26/20 1510)  Pulse: (!) 153 (08/26/20 1640)  Resp: (!) 41 (08/26/20 1640)  BP: (!) 110/58 (08/26/20 1600)  SpO2: 100 % (08/26/20 1640)  Weight: 81.5 kg (179 lb 10.8 oz) (08/26/20 1510)  Height: 5' 5" (165.1 cm) (08/26/20 1510)       Central Line    Date/Time: 8/26/2020 5:43 PM  Performed by: Armani Naqvi MD  Authorized by: Armani Naqvi MD     Location procedure was performed:  [facility] GENERAL SURGERY  Consent Done ?:  Yes  Time out complete?: Verified correct patient, procedure, equipment, staff, and site/side    Indications:  Hemodialysis  Anesthesia:  Local infiltration  Local anesthetic:  Lidocaine 1% without epinephrine  Preparation:  Skin prepped with ChloraPrep  Location:  Right internal jugular  Catheter type:  Triple lumen  Catheter size:  12 Fr  Inserted Catheter Length (cm):  15  Ultrasound guidance: Yes    Vessel Caliber:  Medium   patent  Needle advanced into vessel with real time ultrasound guidance.    Guidewire confirmed in vessel.    Steril sheath on probe.    Manometry: No    Number of attempts:  2  Securement:  Line sutured, chlorhexidine patch, sterile dressing applied and blood return through all ports  XRay:  Placement verified by x-ray  Adverse Events:  None        Armani Naqvi  8/26/2020  "

## 2020-08-26 NOTE — ASSESSMENT & PLAN NOTE
Secondary malignancy of liver  Adenocarcinoma of colon  Neutropenia  Anemia in neoplastic disease    She is followed by Dr. Arzola outpatient. Will re consult oncology while inpatient. Will order neutropenic precautions. Will cont to monitor CBC daily.

## 2020-08-26 NOTE — H&P
[facility] ICU 5th Floor  Hospital Medicine  History & Physical    Patient Name: Huy Cisneros  MRN: [id number]  Admission Date: 8/26/2020  Attending Physician: Maryana Patel*   Primary Care Provider: Primary Doctor No         Patient information was obtained from patient, past medical records and ER records.     Subjective:     Principal Problem:Septic shock    Chief Complaint:   Chief Complaint   Patient presents with    Vomiting     Pt states has been vomiting since yesterday and feeling dizzy.  Pt states port a cath to right chest wall busted a stitch last night while bathing.  Pt states fell this am after going to restroom, states normal BM this am.  Pt c/o bilateral leg pain. Pt noted SOB with exertion.       HPI: Ms. Huy Cisneros is a 28 y/o female who lives in [place], [place] at her residence with her children. The patients PCP is Imtiaz Lindsay PA-C. She is also followed by Dr. Jewel Arzola with Oncology. She has a pmh of colon cancer with liver mets, uterine cysts, and HTN. She has a past surgical history of a mediport placement and a colonoscopy. The patient does not smoke cigarettes, drink alcohol, or use illicit drugs.   ?   She presents to the ED with complaints of fever, vomiting and feeling dizzy since yesterday. Per the patient she states her port a cath to right chest wall busted a stitch last night she while was bathing. She also has complaints of feeling SOB with lower extremity swelling.   ?   Her ED workup includes WBC--0.95, hemoglobin 9.9, Na--132, creatinine 2.00, magnesium--1.4, lactate--10.7, CPK--180, pH--7.2. Pending blood cultures and wound cultures. She is COVID-19 negative. CXR---There has been interval placement of a right subclavian venous line. Its tip is in the region of the junction of the superior vena cava with the right atrium. ?There is no pneumothorax. There is no focal pulmonary infiltrate visualized. XR of the abd---There  is a paucity of gas within the gastrointestinal system. There are multiple radiopaque leads projected over the abdomen and pelvis. She will be admitted to the [facility] department for further care.         Past Medical History:   Diagnosis Date    Cancer     Colon CA with mets to the liver    Uterine cyst        Past Surgical History:   Procedure Laterality Date    COLONOSCOPY N/A 7/15/2020    Procedure: COLONOSCOPY;  Surgeon: Hi Drake MD;  Location: [facility] ENDO;  Service: Endoscopy;  Laterality: N/A;    COLONOSCOPY W/ BIOPSIES      INSERTION OF TUNNELED CENTRAL VENOUS CATHETER (CVC) WITH SUBCUTANEOUS PORT N/A 7/17/2020    Procedure: INSERTION, PORT-A-CATH;  Surgeon: Armani Naqvi MD;  Location: [facility] OR;  Service: General;  Laterality: N/A;       Review of patient's allergies indicates:   Allergen Reactions    Sulfa (sulfonamide antibiotics)        No current facility-administered medications on file prior to encounter.      Current Outpatient Medications on File Prior to Encounter   Medication Sig    docusate sodium (COLACE) 100 MG capsule Take 1 capsule (100 mg total) by mouth once daily.    folic acid (FOLVITE) 1 MG tablet Take 1 tablet (1 mg total) by mouth once daily.    HYDROcodone-acetaminophen (NORCO) 5-325 mg per tablet Take 1 tablet by mouth every 8 (eight) hours as needed for Pain (chronic cancer-related pain).    lidocaine-prilocaine (EMLA) cream Apply topically As instructed. Apply to skin overlying port site 30 minutes before chemotherapy.    promethazine (PHENERGAN) 25 MG tablet Take 1 tablet (25 mg total) by mouth every 6 (six) hours as needed for Nausea.     Family History     Problem Relation (Age of Onset)    Hypertension Mother, Maternal Grandmother, Maternal Grandfather, Paternal Grandmother        Tobacco Use    Smoking status: Never Smoker    Smokeless tobacco: Never Used   Substance and Sexual Activity    Alcohol use: No    Drug use: No     Sexual activity: Yes     Partners: Male     Birth control/protection: None     Review of Systems   Constitutional: Positive for activity change, appetite change, chills, fatigue and fever.   HENT: Negative for trouble swallowing.    Eyes: Negative for visual disturbance.   Respiratory: Positive for shortness of breath. Negative for cough and wheezing.    Cardiovascular: Positive for leg swelling.   Gastrointestinal: Positive for nausea and vomiting. Negative for abdominal distention, abdominal pain and diarrhea.   Genitourinary: Negative for hematuria.   Musculoskeletal: Negative for gait problem.   Skin: Positive for wound (right chest wall).   Allergic/Immunologic: Positive for immunocompromised state.   Neurological: Positive for weakness.   Hematological: Does not bruise/bleed easily.   Psychiatric/Behavioral: Negative for confusion. The patient is not nervous/anxious.      Objective:     Vital Signs (Most Recent):  Temp: 99 °F (37.2 °C) (08/26/20 1510)  Pulse: (!) 162 (08/26/20 1510)  Resp: (!) 40 (08/26/20 1510)  BP: (!) 108/55 (08/26/20 1510)  SpO2: 98 % (08/26/20 1510) Vital Signs (24h Range):  Temp:  [98.9 °F (37.2 °C)-99.3 °F (37.4 °C)] 99 °F (37.2 °C)  Pulse:  [144-162] 162  Resp:  [35-48] 40  SpO2:  [98 %-100 %] 98 %  BP: ()/(43-70) 108/55     Weight: 81.5 kg (179 lb 10.8 oz)  Body mass index is 29.9 kg/m².    Physical Exam  Vitals signs and nursing note reviewed.   Constitutional:       Appearance: Normal appearance.   HENT:      Head: Normocephalic and atraumatic.      Mouth/Throat:      Mouth: Mucous membranes are dry.   Eyes:      Pupils: Pupils are equal, round, and reactive to light.   Neck:      Musculoskeletal: Normal range of motion.   Cardiovascular:      Rate and Rhythm: Normal rate and regular rhythm.      Pulses: Normal pulses.   Abdominal:      Palpations: Abdomen is soft.   Musculoskeletal: Normal range of motion.   Skin:     General: Skin is warm and dry.      Capillary Refill:  Capillary refill takes less than 2 seconds.   Neurological:      Mental Status: She is alert and oriented to person, place, and time.   Psychiatric:         Mood and Affect: Mood normal.         Behavior: Behavior normal.           CRANIAL NERVES     CN III, IV, VI   Pupils are equal, round, and reactive to light.       Significant Labs:   BMP:   Recent Labs   Lab 08/26/20  1127   GLU 89   *   K 4.0   CL 98   CO2 10*   BUN 14   CREATININE 2.00*   CALCIUM 8.6*   MG 1.4*     CBC:   Recent Labs   Lab 08/26/20  1127   WBC 0.95*   HGB 9.9*   HCT 32.9*        Lactic Acid:   Recent Labs   Lab 08/26/20  1118   LACTATE 10.7*     Magnesium:   Recent Labs   Lab 08/26/20  1127   MG 1.4*     POCT Glucose: No results for input(s): POCTGLUCOSE in the last 48 hours.  Urine Culture: No results for input(s): LABURIN in the last 48 hours.  Urine Studies: No results for input(s): COLORU, APPEARANCEUA, PHUR, SPECGRAV, PROTEINUA, GLUCUA, KETONESU, BILIRUBINUA, OCCULTUA, NITRITE, UROBILINOGEN, LEUKOCYTESUR, RBCUA, WBCUA, BACTERIA, SQUAMEPITHEL, HYALINECASTS in the last 48 hours.    Invalid input(s): WRIGHTSUR    Significant Imaging: I have reviewed all pertinent imaging results/findings within the past 24 hours.    Assessment/Plan:       *Septic shock  Infected venous access port  Metabolic Acidosis    Cont broad spectrum abx. ID has been consulted. Following wound cultures as well as blood cultures. General Sx has been consulted to remove her tunneled port. Lactate was initially elevated---Will reorder lactate. IVF given in the ED.     Nausea & vomiting  Prn Zofran ordered.       Hyponatremia  Monitor.       Hypomagnesemia  Replace and monitor.       KIERSTEN (acute kidney injury)  Consulting nephrology for management. IV fluids given in the ED. Tran catheter placed. Nephrology has ordered a renal ultrasound. Monitor intake and output q shift.        Metastatic disease  Secondary malignancy of liver  Adenocarcinoma of  colon  Neutropenia  Anemia in neoplastic disease    She is followed by Dr. Arzola outpatient. Will re consult oncology while inpatient. Will order neutropenic precautions. Will cont to monitor CBC daily.         VTE Risk Mitigation (From admission, onward)         Ordered     enoxaparin injection 40 mg  Every 24 hours      08/26/20 1518     IP VTE HIGH RISK PATIENT  Once      08/26/20 1458     Place sequential compression device  Until discontinued      08/26/20 1458              Critical care time spent on the evaluation and treatment of severe organ dysfunction, review of pertinent labs and imaging studies, discussions with consulting providers and discussions with patient/family: 90 minutes.     Tracy Medrano NP  Department of Hospital Medicine   [facility] ICU 5th Floor

## 2020-08-26 NOTE — SUBJECTIVE & OBJECTIVE
Oncology Treatment Plan:   OP COLORECTAL FOLFOX + BEVACIZUMAB Q2W    Medications:  Continuous Infusions:   amiodarone in dextrose 5% 1 mg/min (08/26/20 1523)    amiodarone in dextrose 5%      norepinephrine bitartrate-D5W 0.04 mcg/kg/min (08/26/20 1653)     Scheduled Meds:   [START ON 8/27/2020] ceFEPime (MAXIPIME) IVPB  2 g Intravenous Q12H    enoxaparin  30 mg Subcutaneous Q24H    famotidine (PF)  20 mg Intravenous Once    magnesium sulfate IVPB  2 g Intravenous Once    sodium bicarbonate drip   Intravenous Once    tbo-filgrastim  480 mcg Subcutaneous Daily    [START ON 8/27/2020] vancomycin (VANCOCIN) IVPB  1,250 mg Intravenous Q24H     PRN Meds:acetaminophen, diphenhydrAMINE-zinc acetate 2-0.1%, influenza, ondansetron, ondansetron, ondansetron, ondansetron, pneumoc 13-davion conj-dip cr(PF), sodium chloride 0.9%, Pharmacy to dose Vancomycin consult **AND** vancomycin - pharmacy to dose     Review of patient's allergies indicates:   Allergen Reactions    Sulfa (sulfonamide antibiotics)         Past Medical History:   Diagnosis Date    Cancer     Colon CA with mets to the liver    Uterine cyst      Past Surgical History:   Procedure Laterality Date    COLONOSCOPY N/A 7/15/2020    Procedure: COLONOSCOPY;  Surgeon: Hi Drake MD;  Location: [facility];  Service: Endoscopy;  Laterality: N/A;    COLONOSCOPY W/ BIOPSIES      INSERTION OF TUNNELED CENTRAL VENOUS CATHETER (CVC) WITH SUBCUTANEOUS PORT N/A 7/17/2020    Procedure: INSERTION, PORT-A-CATH;  Surgeon: Armani Naqvi MD;  Location: [facility] OR;  Service: General;  Laterality: N/A;     Family History     Problem Relation (Age of Onset)    Hypertension Mother, Maternal Grandmother, Maternal Grandfather, Paternal Grandmother        Tobacco Use    Smoking status: Never Smoker    Smokeless tobacco: Never Used   Substance and Sexual Activity    Alcohol use: No    Drug use: No    Sexual activity: Yes     Partners: Male     Birth  control/protection: None       Review of Systems   Constitutional: Positive for fatigue. Negative for fever and unexpected weight change.   HENT: Negative for mouth sores and nosebleeds.    Respiratory: Negative for shortness of breath and wheezing.    Cardiovascular: Negative for chest pain and palpitations.   Gastrointestinal: Negative for abdominal pain and nausea.   Allergic/Immunologic: Negative for food allergies.   Neurological: Positive for weakness.   Psychiatric/Behavioral: Negative for behavioral problems and confusion.     Objective:     Vital Signs (Most Recent):  Temp: 99 °F (37.2 °C) (08/26/20 1510)  Pulse: (!) 153 (08/26/20 1640)  Resp: (!) 41 (08/26/20 1640)  BP: (!) 110/58 (08/26/20 1600)  SpO2: 100 % (08/26/20 1640) Vital Signs (24h Range):  Temp:  [98.9 °F (37.2 °C)-99.3 °F (37.4 °C)] 99 °F (37.2 °C)  Pulse:  [141-165] 153  Resp:  [35-48] 41  SpO2:  [98 %-100 %] 100 %  BP: ()/(43-70) 110/58     Weight: 81.5 kg (179 lb 10.8 oz)  Body mass index is 29.9 kg/m².  Body surface area is 1.93 meters squared.      Intake/Output Summary (Last 24 hours) at 8/26/2020 1712  Last data filed at 8/26/2020 1300  Gross per 24 hour   Intake 2050 ml   Output --   Net 2050 ml       Physical Exam  Vitals signs and nursing note reviewed.   Constitutional:       General: She is awake.      Appearance: She is ill-appearing. She is not toxic-appearing.   HENT:      Mouth/Throat:      Pharynx: No oropharyngeal exudate.   Neck:      Musculoskeletal: Neck supple. No neck rigidity.      Thyroid: No thyromegaly.   Cardiovascular:      Rate and Rhythm: Regular rhythm. Tachycardia present.   Pulmonary:      Effort: Pulmonary effort is normal. No respiratory distress.      Breath sounds: No wheezing or rales.   Chest:       Lymphadenopathy:      Cervical: No cervical adenopathy.   Skin:     Findings: No bruising or petechiae.   Neurological:      Mental Status: She is alert and oriented to person, place, and time.    Psychiatric:         Behavior: Behavior is cooperative.         Significant Labs:   All pertinent labs from the last 24 hours have been reviewed.    Diagnostic Results:  I have reviewed and interpreted all pertinent imaging results/findings within the past 24 hours.

## 2020-08-26 NOTE — ED NOTES
Unable to pull Vancomycin from pyxis. Spoke with Pat and pharmacy. Pat stated she would looked into the issue and call me back.

## 2020-08-26 NOTE — CONSULTS
Patient ID: Huy Cisneros is a 29 y.o. female.    Chief Complaint: Vomiting (Pt states has been vomiting since yesterday and feeling dizzy.  Pt states port a cath to right chest wall busted a stitch last night while bathing.  Pt states fell this am after going to restroom, states normal BM this am.  Pt c/o bilateral leg pain. Pt noted SOB with exertion. )      HPI:  29F presented to ED with bilateral leg pain after a fall and overall not feeling well, subjective chills, dizziness for the past day or two. Worsening. Denies abdominal pain, NV. Known to me for recent colon resection for obstructing transverse colon mass with liver mets and also mediport placement. She did see me in the office after the mediport placement and mention that it was used for a transfusion and subsequently had breakdown of incision. Now on levo, IV abx.       Review of Systems   Constitutional: Positive for activity change, appetite change, chills and fatigue. Negative for fever.   HENT: Negative for trouble swallowing.    Respiratory: Negative for shortness of breath.    Cardiovascular: Negative for chest pain.   Gastrointestinal: Negative for abdominal pain, blood in stool, nausea and vomiting.   Genitourinary: Negative for dysuria.   Musculoskeletal: Positive for myalgias. Negative for gait problem.   Skin: Negative for rash and wound.   Allergic/Immunologic: Negative for immunocompromised state.   Neurological: Negative for weakness.   Hematological: Does not bruise/bleed easily.   Psychiatric/Behavioral: Negative for agitation.       Current Facility-Administered Medications   Medication Dose Route Frequency Provider Last Rate Last Dose    acetaminophen tablet 650 mg  650 mg Oral Q4H PRN Tracy Medrano NP        amiodarone 360 mg/200 mL (1.8 mg/mL) infusion  1 mg/min Intravenous Continuous Maryanablanche Patel MD 33.3 mL/hr at 08/26/20 1523 1 mg/min at 08/26/20 1523    amiodarone 360 mg/200 mL (1.8 mg/mL)  infusion  0.5 mg/min Intravenous Continuous Maryana Patel MD        [START ON 8/27/2020] cefepime in dextrose 5 % IVPB 2 g  2 g Intravenous Q12H Arekeva L. FLORA Medrano        diphenhydrAMINE-zinc acetate 2-0.1% cream   Topical (Top) TID PRN Arekeva L. FLORA Medrano        enoxaparin injection 40 mg  40 mg Subcutaneous Q24H Maryana Patel MD        famotidine (PF) injection 20 mg  20 mg Intravenous Once Arekeva L. FLORA Medrano        influenza (QUADRIVALENT PF) vaccine 0.5 mL  0.5 mL Intramuscular vaccine x 1 dose Bella Stuart MD        lidocaine HCL 10 mg/ml (1%) 10 mg/mL (1 %) injection             magnesium sulfate 2g in water 50mL IVPB (premix)  2 g Intravenous Once Arekeva L. FLORA Medrano        NORepinephrine bitartrate 8 mg in dextrose 5% 250 mL infusion  0.05 mcg/kg/min (Dosing Weight) Intravenous Continuous Arekeva L. FLORA Medrano        ondansetron disintegrating tablet 4 mg  4 mg Oral Q6H PRN Arekeva L. FLORA Medrano        ondansetron disintegrating tablet 4 mg  4 mg Oral Q6H PRN Arekeva L. FLORA Medrano        ondansetron injection 4 mg  4 mg Intravenous Q6H PRN Arekeva L. FLORA Medrano   4 mg at 08/26/20 1505    ondansetron injection 4 mg  4 mg Intravenous Q6H PRN Arekeva L. FLORA Medrano        pneumoc 13-davion conj-dip cr(PF) (PREVNAR 13 (PF)) 0.5 mL  0.5 mL Intramuscular vaccine x 1 dose Bella Stuart MD        sodium chloride 0.9% flush 10 mL  10 mL Intravenous PRN Arekeva L. FLORA Medrano        [START ON 8/27/2020] vancomycin (VANCOCIN) 1,250 mg in dextrose 5 % 250 mL IVPB  1,250 mg Intravenous Q24H Maryanablanche Patel MD        vancomycin - pharmacy to dose   Intravenous pharmacy to manage frequency Maryanablanche Patel MD           Review of patient's allergies indicates:   Allergen Reactions    Sulfa (sulfonamide antibiotics)        Past Medical History:   Diagnosis  Date    Cancer     Colon CA with mets to the liver    Uterine cyst        Past Surgical History:   Procedure Laterality Date    COLONOSCOPY N/A 7/15/2020    Procedure: COLONOSCOPY;  Surgeon: Hi Drake MD;  Location: [facility] ENDO;  Service: Endoscopy;  Laterality: N/A;    COLONOSCOPY W/ BIOPSIES      INSERTION OF TUNNELED CENTRAL VENOUS CATHETER (CVC) WITH SUBCUTANEOUS PORT N/A 7/17/2020    Procedure: INSERTION, PORT-A-CATH;  Surgeon: Armani Naqvi MD;  Location: [facility] OR;  Service: General;  Laterality: N/A;       Family History   Problem Relation Age of Onset    Hypertension Mother     Hypertension Maternal Grandmother     Hypertension Maternal Grandfather     Hypertension Paternal Grandmother        Social History     Socioeconomic History    Marital status: Single     Spouse name: Not on file    Number of children: Not on file    Years of education: Not on file    Highest education level: Not on file   Occupational History    Not on file   Social Needs    Financial resource strain: Not on file    Food insecurity     Worry: Not on file     Inability: Not on file    Transportation needs     Medical: Not on file     Non-medical: Not on file   Tobacco Use    Smoking status: Never Smoker    Smokeless tobacco: Never Used   Substance and Sexual Activity    Alcohol use: No    Drug use: No    Sexual activity: Yes     Partners: Male     Birth control/protection: None   Lifestyle    Physical activity     Days per week: Not on file     Minutes per session: Not on file    Stress: Not on file   Relationships    Social connections     Talks on phone: Not on file     Gets together: Not on file     Attends Baptism service: Not on file     Active member of club or organization: Not on file     Attends meetings of clubs or organizations: Not on file     Relationship status: Not on file   Other Topics Concern    Not on file   Social History Narrative    Not on file       Vitals:     08/26/20 1510   BP: (!) 108/55   Pulse: (!) 162   Resp: (!) 40   Temp: 99 °F (37.2 °C)       Physical Exam  Constitutional:       General: She is not in acute distress.     Appearance: She is well-developed.   HENT:      Head: Normocephalic and atraumatic.   Eyes:      General: No scleral icterus.  Cardiovascular:      Rate and Rhythm: Normal rate.   Pulmonary:      Effort: Pulmonary effort is normal.      Breath sounds: No stridor.   Abdominal:      General: There is no distension.      Palpations: Abdomen is soft.      Tenderness: There is no abdominal tenderness.   Lymphadenopathy:      Cervical: No cervical adenopathy.   Skin:     General: Skin is warm.      Findings: No erythema.          Neurological:      Mental Status: She is alert and oriented to person, place, and time.   Psychiatric:         Behavior: Behavior normal.       WBC 0.95  Hg 9.9  Cr 2 from 0.6  LA greater than 12  PH 7.297 on abg  Blood cultures pending    Assessment & Plan:   29F on chemo for metastatic CRC now with presumed sepsis  Will remove port at bedside to rule out.   Tip sent for cultures

## 2020-08-26 NOTE — PLAN OF CARE
Patient presents with    Vomiting       Pt states has been vomiting since yesterday and feeling dizzy.  Pt states port a cath to right chest wall busted a stitch last night while bathing.  Pt states fell this am after going to restroom, states normal BM this am.  Pt c/o bilateral leg pain. Pt noted SOB with exertion.       Profoundly ill appearing 29-year-old female with a past medical history significant for metastatic adenocarcinoma of the colon who presents for evaluation of recurrent vomiting chills as well as fevers which started yesterday.  She has a port in the right chest which is used for chemotherapy which she is currently undergoing as well as having received blood transfusion from the same port previously.  The wound dehisced overlying port recently.  She has been unable to eat or drink anything since yesterday with recurrent episodes of vomiting, Hickups and profound nausea as well as back pain.  She has never had anything quite this extreme in the past though she has had episodes nausea and abdominal pain.     The pt's currently in ICU. The Sw was transferred from [facility]. The pt is in a lot of pain so the Sw spoke to the pt's mother Char Spence [phone] to complete the assessment. The pt and her 3 sons ages 3,8,10 live with her mother and her other 3 siblings in [place]. The pt has a very supportive family. They assist the pt as needed with her adl's and she uses a bsc and shower chair at home. The pt's mother will transport her home at d/c. The pt's followed by Dr. Arzola(Hematology/Oncology)out pt. The pt's currently unemployed but does receive disability checks from the state for 2 of her children. The Sw left a d/c brochure at bedside for the pt with her contact info in it. The Sw also left her name and contact info with the pt's mother and encouraged her to call if she has any further questions or concerns. The Sw will continue to follow the pt throughout her transitions of  care and will assist with any d/c needs.        08/26/20 9321   Discharge Assessment   Confirmed/corrected address and phone number on facesheet? Yes   Assessment information obtained from? Caregiver   Prior to hospitilization cognitive status: Alert/Oriented   Prior to hospitalization functional status: Needs Assistance   Current cognitive status: Alert/Oriented   Current Functional Status: Needs Assistance   Facility Arrived From: [facility]   Lives With child(larisa), dependent;parent(s)   Able to Return to Prior Arrangements yes   Is patient able to care for self after discharge? Unable to determine at this time (comments)   Who are your caregiver(s) and their phone number(s)? Char Spence(mother)[phone]   Patient's perception of discharge disposition home or selfcare   Readmission Within the Last 30 Days no previous admission in last 30 days   Patient currently being followed by outpatient case management? No   Patient currently receives any other outside agency services? No   Equipment Currently Used at Home bedside commode;shower chair   Do you have any problems affording any of your prescribed medications? No  (the pt receives her meds affordably at [facility] in [place])   Is the patient taking medications as prescribed? yes   Does the patient have transportation home? Yes   Transportation Anticipated family or friend will provide   Does the patient receive services at the [facility]mad[facility] Clinic? No   Discharge Plan A Home with family   Discharge Plan B Home Health   DME Needed Upon Discharge  other (see comments)  (TBD)   Patient/Family in Agreement with Plan yes

## 2020-08-26 NOTE — ASSESSMENT & PLAN NOTE
Infected venous access port  Metabolic Acidosis    Cont broad spectrum abx. ID has been consulted. Following wound cultures as well as blood cultures. General Sx has been consulted to remove her tunneled port. Lactate was initially elevated---Will reorder lactate. IVF given in the ED.

## 2020-08-26 NOTE — PROGRESS NOTES
Pharmacokinetic Initial Assessment: IV Vancomycin    Assessment/Plan:    Initiate intravenous vancomycin with loading dose of 1750 mg once followed by a maintenance dose of vancomycin 1250mg IV every 24 hours  Desired empiric serum trough concentration is 15 to 20 mcg/mL  Draw vancomycin trough level 30 min prior to third dose on 8/28 at approximately 1145  Pharmacy will continue to follow and monitor vancomycin.      Please contact pharmacy at extension [phone] with any questions regarding this assessment.     Thank you for the consult,   Eleanor Morales       Patient brief summary:  Huy Cisneros is a 29 y.o. female initiated on antimicrobial therapy with IV Vancomycin for treatment of suspected bacteremia    Drug Allergies:   Review of patient's allergies indicates:   Allergen Reactions    Sulfa (sulfonamide antibiotics)        Actual Body Weight:   81.5 kg    Renal Function:   Estimated Creatinine Clearance: 43.4 mL/min (A) (based on SCr of 2 mg/dL (H)).,     Dialysis Method (if applicable):  N/A    CBC (last 72 hours):  Recent Labs   Lab Result Units 08/24/20  1113 08/26/20  1127   WBC K/uL 2.88* 0.95*   Hemoglobin g/dL 10.6* 9.9*   Hematocrit % 36.1* 32.9*   Platelets K/uL 302 157   Gran% % 37.9*  --    Lymph% % 50.0*  --    Mono% % 9.0  --    Eosinophil% % 2.4  --    Basophil% % 0.7  --    Differential Method  Automated  --        Metabolic Panel (last 72 hours):  Recent Labs   Lab Result Units 08/24/20  1113 08/26/20  1127   Sodium mmol/L 140 132*   Potassium mmol/L 4.1 4.0   Chloride mmol/L 104 98   CO2 mmol/L 27 10*   Glucose mg/dL 106 89   BUN, Bld mg/dL 5* 14   Creatinine mg/dL 0.64 2.00*   Albumin g/dL 4.2 3.7   Total Bilirubin mg/dL 0.8 3.8*   Alkaline Phosphatase U/L 190* 126   AST U/L 90* 210*   ALT U/L 38 93*   Magnesium mg/dL 2.2 1.4*   Phosphorus mg/dL 4.0  --        Drug levels (last 3 results):  No results for input(s): VANCOMYCINRA, VANCOMYCINPE, VANCOMYCINTR in the last 72  hours.    Microbiologic Results:  Microbiology Results (last 7 days)       Procedure Component Value Units Date/Time    Aerobic culture [[id number]] Collected: 08/26/20 1448    Order Status: Sent Specimen: Incision site from Chest, Right Updated: 08/26/20 1454    Culture, Anaerobe [[id number]] Collected: 08/26/20 1448    Order Status: Sent Specimen: Incision site from Chest, Right Updated: 08/26/20 1453    Blood culture [[id number]] Collected: 08/26/20 1140    Order Status: Sent Specimen: Blood from Peripheral, Hand, Right Updated: 08/26/20 1213    Blood culture [[id number]]     Order Status: Canceled Specimen: Blood

## 2020-08-26 NOTE — CONSULTS
Nephrology Consult  H&P      Consult Requested By: Maryana Patel*  Reason for Consult: ESRD    SUBJECTIVE:     History of Present Illness:  Patient is a 29 y.o. female presents with sepsis. Possible source is infected chemo port  Pt has stage 4 colorectal ca with mets to liver and started chemo last week  SP 4 L     Review of Systems   Constitutional: Positive for malaise/fatigue. Negative for chills and fever.   Eyes: Negative for blurred vision and double vision.   Respiratory: Positive for shortness of breath. Negative for cough.    Cardiovascular: Negative for chest pain and leg swelling.   Gastrointestinal: Negative for blood in stool, diarrhea, nausea and vomiting.   Genitourinary: Negative for dysuria, frequency and urgency.   Musculoskeletal: Negative for myalgias and neck pain.   Skin: Negative for itching and rash.   Neurological: Negative for dizziness.   Endo/Heme/Allergies: Does not bruise/bleed easily.   Psychiatric/Behavioral: Negative for depression.       Past Medical History:   Diagnosis Date    Cancer     Colon CA with mets to the liver    Uterine cyst      Past Surgical History:   Procedure Laterality Date    COLONOSCOPY N/A 7/15/2020    Procedure: COLONOSCOPY;  Surgeon: Hi Drake MD;  Location: [facility] ENDO;  Service: Endoscopy;  Laterality: N/A;    COLONOSCOPY W/ BIOPSIES      INSERTION OF TUNNELED CENTRAL VENOUS CATHETER (CVC) WITH SUBCUTANEOUS PORT N/A 7/17/2020    Procedure: INSERTION, PORT-A-CATH;  Surgeon: Armani Naqvi MD;  Location: [facility] OR;  Service: General;  Laterality: N/A;     Family History   Problem Relation Age of Onset    Hypertension Mother     Hypertension Maternal Grandmother     Hypertension Maternal Grandfather     Hypertension Paternal Grandmother      Social History     Tobacco Use    Smoking status: Never Smoker    Smokeless tobacco: Never Used   Substance Use Topics    Alcohol use: No    Drug use: No       Review of patient's  "allergies indicates:   Allergen Reactions    Sulfa (sulfonamide antibiotics)             OBJECTIVE:     Vital Signs (Most Recent)  Vitals:    08/26/20 1347 08/26/20 1351 08/26/20 1430 08/26/20 1510   BP: (!) 88/45 (!) 90/54  (!) 108/55   BP Location: Left arm   Left arm   Patient Position: Lying   Lying   Pulse: (!) 153 (!) 155  (!) 162   Resp: (!) 42 (!) 45  (!) 40   Temp: 98.9 °F (37.2 °C)  99 °F (37.2 °C) 99 °F (37.2 °C)   TempSrc: Oral  Oral Oral   SpO2:  100%  98%   Weight:   81.5 kg (179 lb 10.8 oz) 81.5 kg (179 lb 10.8 oz)   Height:   5' 5" (1.651 m) 5' 5" (1.651 m)         Date 08/26/20 0700 - 08/27/20 0659   Shift 8196-1980 [id number] 2976-7473 24 Hour Total   INTAKE   IV Piggyback 2050 2050   Shift Total(mL/kg) 2050(25.2)   2050(25.2)   OUTPUT   Shift Total(mL/kg)       Weight (kg) 81.5 81.5 81.5 81.5           Medications:   [START ON 8/27/2020] ceFEPime (MAXIPIME) IVPB  2 g Intravenous Q12H    enoxaparin  40 mg Subcutaneous Q24H    famotidine (PF)  20 mg Intravenous Once    lidocaine HCL 10 mg/ml (1%)        magnesium sulfate IVPB  2 g Intravenous Once    sodium bicarbonate drip   Intravenous Once    [START ON 8/27/2020] vancomycin (VANCOCIN) IVPB  1,250 mg Intravenous Q24H           Physical Exam   Constitutional: She is oriented to person, place, and time and well-developed, well-nourished, and in no distress. No distress.   HENT:   Head: Normocephalic and atraumatic.   Mouth/Throat: Oropharynx is clear and moist.   Eyes: EOM are normal. No scleral icterus.   Neck: Neck supple. No JVD present.   Cardiovascular: Regular rhythm. Tachycardia present. Exam reveals no friction rub.   No murmur heard.  Pulmonary/Chest: Effort normal and breath sounds normal. Tachypnea noted. No respiratory distress. She has no wheezes. She has no rales.   Abdominal: Soft. Bowel sounds are normal. She exhibits no distension. There is no abdominal tenderness.   Musculoskeletal:         General: No edema. "   Neurological: She is alert and oriented to person, place, and time.   Skin: Skin is warm and dry. No rash noted. She is not diaphoretic. No erythema.   Psychiatric: Affect normal.       Laboratory:  Recent Labs   Lab 08/24/20  1113 08/26/20  1127   WBC 2.88* 0.95*   HGB 10.6* 9.9*   HCT 36.1* 32.9*    157   MONO 9.0  0.3  --      Recent Labs   Lab 08/24/20  1113 08/26/20  1127    132*   K 4.1 4.0    98   CO2 27 10*   BUN 5* 14   CREATININE 0.64 2.00*   CALCIUM 9.8 8.6*   PHOS 4.0  --        Diagnostic Results:  X-Ray: Reviewed  US: Reviewed  Echo: Reviewed  ASSESSMENT/PLAN:     Septic shock with multiorgan failure  ARF  USP  Lactic acidosis >12, hyperventilating for max compensation, pcO2 down to 13, bicarb is 10  Neutropenia SP chemo  Hyponatremia   Hypomagnesemia    Only 150 cc UO since 8 am today, SP 4L volume resuscitation  Possibly will need CRRT tonight if unable to correct acidosis  Start bicarb gtt for now  200 cc/hour x 1L  Consult Dr. Naqvi for trialysis placement    Discussed with Harjit Reddy and Sidney  Total critical care time 55 minutes: included management of organ failures related to critical illness, titration of continuous infusions, review of pertinent labs and imaging studies, discussion with primary team      Thank you for consult, will follow  With any question please call [phone]  Emma Michaels    [facility]  PALOMO Quintana MD, FACP,   VANESA Lindsay MD,   MD BREANN Van, NP  [address]  [place], [address] [phone]

## 2020-08-26 NOTE — SUBJECTIVE & OBJECTIVE
Past Medical History:   Diagnosis Date    Cancer     Colon CA with mets to the liver    Uterine cyst        Past Surgical History:   Procedure Laterality Date    COLONOSCOPY N/A 7/15/2020    Procedure: COLONOSCOPY;  Surgeon: Hi Drake MD;  Location: [facility] ENDO;  Service: Endoscopy;  Laterality: N/A;    COLONOSCOPY W/ BIOPSIES      INSERTION OF TUNNELED CENTRAL VENOUS CATHETER (CVC) WITH SUBCUTANEOUS PORT N/A 7/17/2020    Procedure: INSERTION, PORT-A-CATH;  Surgeon: Armani Naqvi MD;  Location: [facility] OR;  Service: General;  Laterality: N/A;       Review of patient's allergies indicates:   Allergen Reactions    Sulfa (sulfonamide antibiotics)        No current facility-administered medications on file prior to encounter.      Current Outpatient Medications on File Prior to Encounter   Medication Sig    docusate sodium (COLACE) 100 MG capsule Take 1 capsule (100 mg total) by mouth once daily.    folic acid (FOLVITE) 1 MG tablet Take 1 tablet (1 mg total) by mouth once daily.    HYDROcodone-acetaminophen (NORCO) 5-325 mg per tablet Take 1 tablet by mouth every 8 (eight) hours as needed for Pain (chronic cancer-related pain).    lidocaine-prilocaine (EMLA) cream Apply topically As instructed. Apply to skin overlying port site 30 minutes before chemotherapy.    promethazine (PHENERGAN) 25 MG tablet Take 1 tablet (25 mg total) by mouth every 6 (six) hours as needed for Nausea.     Family History     Problem Relation (Age of Onset)    Hypertension Mother, Maternal Grandmother, Maternal Grandfather, Paternal Grandmother        Tobacco Use    Smoking status: Never Smoker    Smokeless tobacco: Never Used   Substance and Sexual Activity    Alcohol use: No    Drug use: No    Sexual activity: Yes     Partners: Male     Birth control/protection: None     Review of Systems   Constitutional: Positive for activity change, appetite change, chills, fatigue and fever.   HENT: Negative for trouble  swallowing.    Eyes: Negative for visual disturbance.   Respiratory: Positive for shortness of breath. Negative for cough and wheezing.    Cardiovascular: Positive for leg swelling.   Gastrointestinal: Positive for nausea and vomiting. Negative for abdominal distention, abdominal pain and diarrhea.   Genitourinary: Negative for difficulty urinating and hematuria.   Musculoskeletal: Negative for gait problem.   Skin: Positive for wound (right chest wall).   Allergic/Immunologic: Positive for immunocompromised state.   Neurological: Positive for weakness.   Hematological: Does not bruise/bleed easily.   Psychiatric/Behavioral: Negative for confusion. The patient is not nervous/anxious.      Objective:     Vital Signs (Most Recent):  Temp: 99 °F (37.2 °C) (08/26/20 1510)  Pulse: (!) 162 (08/26/20 1510)  Resp: (!) 40 (08/26/20 1510)  BP: (!) 108/55 (08/26/20 1510)  SpO2: 98 % (08/26/20 1510) Vital Signs (24h Range):  Temp:  [98.9 °F (37.2 °C)-99.3 °F (37.4 °C)] 99 °F (37.2 °C)  Pulse:  [144-162] 162  Resp:  [35-48] 40  SpO2:  [98 %-100 %] 98 %  BP: ()/(43-70) 108/55     Weight: 81.5 kg (179 lb 10.8 oz)  Body mass index is 29.9 kg/m².    Physical Exam  Vitals signs and nursing note reviewed.   Constitutional:       Appearance: Normal appearance.   HENT:      Head: Normocephalic and atraumatic.      Mouth/Throat:      Mouth: Mucous membranes are dry.   Eyes:      Pupils: Pupils are equal, round, and reactive to light.   Neck:      Musculoskeletal: Normal range of motion.   Cardiovascular:      Rate and Rhythm: Normal rate and regular rhythm.      Pulses: Normal pulses.   Abdominal:      Palpations: Abdomen is soft.   Musculoskeletal: Normal range of motion.   Skin:     General: Skin is warm and dry.      Capillary Refill: Capillary refill takes less than 2 seconds.   Neurological:      Mental Status: She is alert and oriented to person, place, and time.   Psychiatric:         Mood and Affect: Mood normal.          Behavior: Behavior normal.           CRANIAL NERVES     CN III, IV, VI   Pupils are equal, round, and reactive to light.       Significant Labs:   BMP:   Recent Labs   Lab 08/26/20  1127   GLU 89   *   K 4.0   CL 98   CO2 10*   BUN 14   CREATININE 2.00*   CALCIUM 8.6*   MG 1.4*     CBC:   Recent Labs   Lab 08/26/20  1127   WBC 0.95*   HGB 9.9*   HCT 32.9*        Lactic Acid:   Recent Labs   Lab 08/26/20  1118   LACTATE 10.7*     Magnesium:   Recent Labs   Lab 08/26/20  1127   MG 1.4*     POCT Glucose: No results for input(s): POCTGLUCOSE in the last 48 hours.  Urine Culture: No results for input(s): LABURIN in the last 48 hours.  Urine Studies: No results for input(s): COLORU, APPEARANCEUA, PHUR, SPECGRAV, PROTEINUA, GLUCUA, KETONESU, BILIRUBINUA, OCCULTUA, NITRITE, UROBILINOGEN, LEUKOCYTESUR, RBCUA, WBCUA, BACTERIA, SQUAMEPITHEL, HYALINECASTS in the last 48 hours.    Invalid input(s): TIESHA    Significant Imaging: I have reviewed all pertinent imaging results/findings within the past 24 hours.

## 2020-08-26 NOTE — PROCEDURES
"Huy Cisneros is a 29 y.o. female patient.    Temp: 99 °F (37.2 °C) (08/26/20 1510)  Pulse: (!) 162 (08/26/20 1510)  Resp: (!) 40 (08/26/20 1510)  BP: (!) 108/55 (08/26/20 1510)  SpO2: 98 % (08/26/20 1510)  Weight: 81.5 kg (179 lb 10.8 oz) (08/26/20 1510)  Height: 5' 5" (165.1 cm) (08/26/20 1510)       Removal of right IJ mediport    Date/Time: 8/26/2020 4:33 PM  Performed by: Armani Naqvi MD  Authorized by: Armani Naqvi MD     Location procedure was performed:  [facility] GENERAL SURGERY  Consent Done ?:  Yes  Time out complete?: Verified correct patient, procedure, equipment, staff, and site/side    Indications:  Med administration  Anesthesia:  Local infiltration  Local anesthetic:  Lidocaine 1% without epinephrine  Preparation:  Skin prepped with ChloraPrep  Skin prep agent dried: Skin prep agent completely dried prior to procedure    Location:  Right internal jugular  Manometry: No    Other Complications:  Previous incision incised with 11 blade. Port delivered through incision and removed intact. Tip sent for cultures   Previous incision incised with 11 blade. Port delivered through incision and removed intact. Tip sent for cultures        Armani Naqvi  8/26/2020  "

## 2020-08-26 NOTE — ED NOTES
Unable to capture NIPB in triage, unable to capture manual BP when pt moved to room, Dr Vogel notified and US to bs for MD evaluation and for IV access.

## 2020-08-26 NOTE — PROGRESS NOTES
Pt arrived to . Dr. Stuart notified of pt arrival.  HR 160s EKG ordered.  Skin C,D, I.  Incision noted to right chest wall. 2 sutures still in place with dehiscing partially to site.  Foam dsg applied.

## 2020-08-27 PROBLEM — R78.81 BACTEREMIA: Status: ACTIVE | Noted: 2020-01-01

## 2020-08-27 PROBLEM — B95.8 BACTEREMIA DUE TO STAPHYLOCOCCUS: Status: ACTIVE | Noted: 2020-01-01

## 2020-08-27 NOTE — PROGRESS NOTES
Pt AAOx4 throughout shift. Pt febrile throughout shift with chills. Tachycardic. Remains on levo for BP support. Remains on bicarb and antibiotics. Blood cultures came back positive, ID consulted. CRRT started tonight to help with acidosis. Monitoring labs closely, replacing electrolytes as needed. Pt starting to have more urine output, but still not adequate amount. Safety maintained with side rails upx2, call light and personal items within reach. Pt updated on plan of care. Will continue to monitor.

## 2020-08-27 NOTE — ASSESSMENT & PLAN NOTE
29 F with PMH of metastatic adenocarcinoma of the colon with liver mets, uterine cysts, and HTN on whom ID is consulted for likely stpah aureus bacteremia. She is neutropenic     -agree with continuing cefepime and vanco for now  -port removed  -needs repeat blood cxs until negative  -TTE was negative  -follow up on final cultures

## 2020-08-27 NOTE — SUBJECTIVE & OBJECTIVE
Past Medical History:   Diagnosis Date    Cancer     Colon CA with mets to the liver    Uterine cyst        Past Surgical History:   Procedure Laterality Date    COLONOSCOPY N/A 7/15/2020    Procedure: COLONOSCOPY;  Surgeon: Hi Drake MD;  Location: [facility] ENDO;  Service: Endoscopy;  Laterality: N/A;    COLONOSCOPY W/ BIOPSIES      INSERTION OF TUNNELED CENTRAL VENOUS CATHETER (CVC) WITH SUBCUTANEOUS PORT N/A 7/17/2020    Procedure: INSERTION, PORT-A-CATH;  Surgeon: Armani Naqvi MD;  Location: [facility] OR;  Service: General;  Laterality: N/A;       Review of patient's allergies indicates:   Allergen Reactions    Sulfa (sulfonamide antibiotics)        Medications:  Medications Prior to Admission   Medication Sig    docusate sodium (COLACE) 100 MG capsule Take 1 capsule (100 mg total) by mouth once daily.    folic acid (FOLVITE) 1 MG tablet Take 1 tablet (1 mg total) by mouth once daily.    HYDROcodone-acetaminophen (NORCO) 5-325 mg per tablet Take 1 tablet by mouth every 8 (eight) hours as needed for Pain (chronic cancer-related pain).    lidocaine-prilocaine (EMLA) cream Apply topically As instructed. Apply to skin overlying port site 30 minutes before chemotherapy.    promethazine (PHENERGAN) 25 MG tablet Take 1 tablet (25 mg total) by mouth every 6 (six) hours as needed for Nausea.     Antibiotics (From admission, onward)    Start     Stop Route Frequency Ordered    08/27/20 0000  cefepime in dextrose 5 % IVPB 2 g      -- IV Every 12 hours (non-standard times) 08/26/20 1507    08/26/20 1628  vancomycin - pharmacy to dose  (vancomycin IVPB)      -- IV pharmacy to manage frequency 08/26/20 1528        Antifungals (From admission, onward)    None        Antivirals (From admission, onward)    None           There is no immunization history for the selected administration types on file for this patient.    Family History     Problem Relation (Age of Onset)    Hypertension Mother, Maternal  Grandmother, Maternal Grandfather, Paternal Grandmother        Social History     Socioeconomic History    Marital status: Single     Spouse name: Not on file    Number of children: Not on file    Years of education: Not on file    Highest education level: Not on file   Occupational History    Not on file   Social Needs    Financial resource strain: Not on file    Food insecurity     Worry: Not on file     Inability: Not on file    Transportation needs     Medical: Not on file     Non-medical: Not on file   Tobacco Use    Smoking status: Never Smoker    Smokeless tobacco: Never Used   Substance and Sexual Activity    Alcohol use: No    Drug use: No    Sexual activity: Yes     Partners: Male     Birth control/protection: None   Lifestyle    Physical activity     Days per week: Not on file     Minutes per session: Not on file    Stress: Not on file   Relationships    Social connections     Talks on phone: Not on file     Gets together: Not on file     Attends Ca[employer] service: Not on file     Active member of club or organization: Not on file     Attends meetings of clubs or organizations: Not on file     Relationship status: Not on file   Other Topics Concern    Not on file   Social History Narrative    Not on file     Review of Systems   Constitutional: Positive for fatigue. Negative for fever and unexpected weight change.   HENT: Negative for mouth sores and nosebleeds.    Respiratory: Negative for shortness of breath and wheezing.    Cardiovascular: Negative for chest pain and palpitations.   Gastrointestinal: Negative for abdominal pain and nausea.   Allergic/Immunologic: Negative for food allergies.   Neurological: Positive for weakness.   Psychiatric/Behavioral: Negative for behavioral problems and confusion.     Objective:     Vital Signs (Most Recent):  Temp: 99.6 °F (37.6 °C) (08/27/20 1500)  Pulse: (!) 160 (08/27/20 1515)  Resp: (!) 32 (08/27/20 1515)  BP: (!) 124/99 (08/27/20  1515)  SpO2: 99 % (08/27/20 1515) Vital Signs (24h Range):  Temp:  [98.6 °F (37 °C)-103.5 °F (39.7 °C)] 99.6 °F (37.6 °C)  Pulse:  [135-162] 160  Resp:  [25-57] 32  SpO2:  [90 %-100 %] 99 %  BP: ()/(45-99) 124/99     Weight: 81.2 kg (179 lb)  Body mass index is 29.79 kg/m².    Estimated Creatinine Clearance: 108.3 mL/min (based on SCr of 0.8 mg/dL).    Physical Exam  Constitutional:       General: She is not in acute distress.     Appearance: She is well-developed.   HENT:      Head: Normocephalic and atraumatic.   Eyes:      General:         Right eye: No discharge.         Left eye: No discharge.      Conjunctiva/sclera: Conjunctivae normal.   Neck:      Musculoskeletal: Normal range of motion.   Cardiovascular:      Rate and Rhythm: Regular rhythm. Tachycardia present.   Pulmonary:      Effort: Pulmonary effort is normal. No respiratory distress.   Abdominal:      General: There is no distension.      Palpations: Abdomen is soft.      Tenderness: There is no abdominal tenderness.   Musculoskeletal: Normal range of motion.         General: No deformity.   Skin:     General: Skin is warm and dry.      Comments: Port removal dressing is clean and dry, R chest   Neurological:      Mental Status: She is alert and oriented to person, place, and time.   Psychiatric:         Behavior: Behavior normal.         Significant Labs: All pertinent labs within the past 24 hours have been reviewed.    Significant Imaging: I have reviewed all pertinent imaging results/findings within the past 24 hours.

## 2020-08-27 NOTE — PLAN OF CARE
Pt progress improving.  Team updated on labs during shift. Lactate trending down.  Marginal UOP 10-25 cc/hr.  CRRT running.Pt rinsed back at 1700 per MD orders. US of Lower extremities completed. Echo done. Pt lower extremity pain mild with relief from moving. Tmax 99.6 orally. Pt sinus tach 150s. Team aware. POC reviewed with pt and mother. See flowsheet for full assessment. Will continue to monitor pt closely.

## 2020-08-27 NOTE — HOSPITAL COURSE
She was admitted to the [facility] hospital medicine service for further care. We have consulted ID, nephrology, oncology, pulmonology, and general surgery. Her tunneled line is the suspected source for her bacteremia/septic shock. Blood cultures positive for staph. TTE pending. General sx removed tunneled port at the bedside. Will cont to follow cultures. She was initially given Levophed and Amiodarone. Lactate has improved---she was given IVF and now Nephrology has started Bicarb gtt. Dr. Naqvi was consulted for trialysis line placement---She was started on CRRT, now stopped at this time. Will cont broad spectrum abx. G-CSF started by oncology for neutropenia. MRI of the thoracic and lumbar spine was completed on 8/28 to r/o spinal abscess---Her MRI did not find a spinal fluid collection, however the MRI was suggestive of possible cavitary lung lesions. She is to have a CT chest, abdomen, and pelvis today for assessment of possible cavities in her lungs and mets in her liver. MRSA grew out at the port site. She was weaned off of levophed and now started on BB because of 's. Pulmonology has ordered CT of the C/A/P with contrast to evaluate the patient  for septic pulmonary emboli. WBCs have increased and are now 26.  she has Staph  in her BCs and the one from yesterday is NGTD.  On her CT chest and abdomen she has small bilateral pleural effusions and her liver mets appear smaller. YOLANDA with no vegetation noted. Cont daily blood cultures. Patient is on vancomycin and meropenem per ID recs. The patient remains with tachypnea and tachycardia. Up out of bed with PT. CT of chest and abdomen showing increased chest infiltrates and ascites, probable septic emboli, and extensive metastatic disease. PICC line placed for inability to obtain peripheral IV. CTA ordered to r/o PE; she was negative for PE. Patient remains tachypneic and tachycardic with fevers. Lasix was given. An abdominal US was ordered to assess ascites  levels, no ascites noted. The patient received a unit of PRBC. A palliative care consult was placed for patient and family counseling. The patient was stepped down out of ICU on 9/4. She was given morphine PRN for pain and tachypnea and tylenol for fevers. Her labs were monitored and electrolytes were replaced. She continued to work with PT/OT. Meropenem was discontinued as well as daily blood cultures. The patient received an additional unit of blood for hgb 7. ID has signed off with recommendations for 4 weeks of vancomycin. The patient was started on amlodipine and her metoprolol dosage was adjusted. She will d/c home with HH on today. She will need to follow up with ID in 2 weeks and also ONC in 1 week.

## 2020-08-27 NOTE — PT/OT/SLP EVAL
"Occupational Therapy   Evaluation    Name: Huy Cisneros  MRN: [id number]  Admitting Diagnosis:  Septic shock      Recommendations:     Discharge Recommendations: other (see comments)(TBD)  Discharge Equipment Recommendations:  other (see comments)(TBD)  Barriers to discharge:  None    Assessment:     Huy Cisneros is a 29 y.o. female with a medical diagnosis of Septic shock.  She presents with performance deficits affecting function: weakness, impaired endurance, impaired self care skills, impaired functional mobilty, gait instability, impaired balance, decreased lower extremity function, decreased upper extremity function, pain, decreased ROM, edema, impaired skin, impaired cardiopulmonary response to activity, other (comment)(neutropenic).      Rehab Prognosis: Good; patient would benefit from acute skilled OT services to address these deficits and reach maximum level of function.       Plan:     Patient to be seen 5 x/week to address the above listed problems via self-care/home management, therapeutic activities, therapeutic exercises  · Plan of Care Expires: 09/27/20  · Plan of Care Reviewed with: patient    Subjective     Chief Complaint: all over body pain  Patient/Family Comments/goals: regain strength and indep    Occupational Profile:  Living Environment: Lives w/mom in [facility], no [place], T/S combo  Previous level of function: mod I, however declining over last week  Roles and Routines:   Equipment Used at Home:  bedside commode, shower chair  Assistance upon Discharge: family    Pain/Comfort:  · Pain Rating 1: 9/10  · Location - Side 1: Bilateral  · Location - Orientation 1: generalized  · Location 1: ("all over")  · Pain Addressed 1: Pre-medicate for activity, Reposition, Distraction, Cessation of Activity, Nurse notified  · Pain Rating Post-Intervention 1: 9/10    Patients cultural, spiritual, Adventist conflicts given the current situation: no    Objective:     Communicated with: nurse prior to " session.  Patient found HOB elevated with blood pressure cuff, central line, peripheral IV, telemetry, pulse ox (continuous), rebolledo catheter upon OT entry to room.  Nurse approved bed level eval only    General Precautions: Standard, fall, neutropenic   Orthopedic Precautions:    Braces:       Occupational Performance:    Bed Mobility:    · NT    Functional Mobility/Transfers:  · NT  · Functional Mobility: NT    Activities of Daily Living:  · Feeding:  minimum assistance to drink from cup  · Grooming: minimum assistance      Cognitive/Visual Perceptual:  AO4  Flat affect    Physical Exam:  RUE AROM WFL, strength grossly 3/5  LUE AROM WFL except decreased 2/2 decreased strength at shoulder.  Strength grossly 2+'5 proximal, 3/5 distal    AMPAC 6 Click ADL:  AMPAC Total Score: 13    Treatment & Education:  Pt educated on role of OT/POC.  Performed BUE A/AAROM and G/H--increased assist to wash face and brush teeth  Education:    Patient left HOB elevated with all lines intact, call button in reach and nurse present    GOALS:   Multidisciplinary Problems     Occupational Therapy Goals        Problem: Occupational Therapy Goal    Goal Priority Disciplines Outcome Interventions   Occupational Therapy Goal     OT, PT/OT Ongoing, Progressing    Description: Goals to be met by: 9/27    Patient will increase functional independence with ADLs by performing:    Feeding with Modified [place].  UE Dressing with Stand-by Assistance.  Grooming while seated at sink with Modified [place].  Toileting from bedside commode with Minimal Assistance for hygiene and clothing management.   Toilet transfer to bedside commode with Minimal Assistance.  Increased functional strength to 3+/5 for BUE.                     History:     Past Medical History:   Diagnosis Date    Cancer     Colon CA with mets to the liver    Uterine cyst        Past Surgical History:   Procedure Laterality Date    COLONOSCOPY N/A 7/15/2020    Procedure:  COLONOSCOPY;  Surgeon: Hi Drake MD;  Location: [facility] ENDO;  Service: Endoscopy;  Laterality: N/A;    COLONOSCOPY W/ BIOPSIES      INSERTION OF TUNNELED CENTRAL VENOUS CATHETER (CVC) WITH SUBCUTANEOUS PORT N/A 7/17/2020    Procedure: INSERTION, PORT-A-CATH;  Surgeon: Armani Naqvi MD;  Location: [facility] OR;  Service: General;  Laterality: N/A;       Time Tracking:     OT Date of Treatment: 08/27/20  OT Start Time: 1015  OT Stop Time: 1038  OT Total Time (min): 23 min    Billable Minutes:Evaluation 13  Therapeutic Activity 10    Sav Spivey, OT  8/27/2020

## 2020-08-27 NOTE — CONSULTS
[facility] ICU 5th Floor  Infectious Disease  Consult Note    Patient Name: Huy Cisneros  MRN: [id number]  Admission Date: 8/26/2020  Hospital Length of Stay: 1 days  Attending Physician: Maryana Patel*  Primary Care Provider: Primary Doctor Gisselle     Isolation Status: Neutropenic    Patient information was obtained from patient and past medical records.      Inpatient consult to Infectious Diseases  Consult performed by: Brandon Schumacher MD  Consult ordered by: Silke Yuen NP  Reason for consult: bacteremia        Assessment/Plan:     * Septic shock  29 F with PMH of metastatic adenocarcinoma of the colon with liver mets, uterine cysts, and HTN on whom ID is consulted for likely stpah aureus bacteremia. She is neutropenic     -agree with continuing cefepime and vanco for now  -port removed  -needs repeat blood cxs until negative  -TTE was negative  -follow up on final cultures        Thank you for your consult. I will follow-up with patient. Please contact us if you have any additional questions.    Brandon Schumacher MD  Infectious Disease  [facility] ICU 5th Floor    Subjective:     Principal Problem: Septic shock    HPI: 29 F with PMH of metastatic adenocarcinoma of the colon with liver mets, uterine cysts, and HTN who presented to [facility] ED for intractable nausea/vomitting that has been on going for about 1 day.  Pt describes vomiting as having some food contents but mostly saliva, she reports she has not been able to tolerated PO since symptoms began.  Associated symptoms include fevers, chills, and general malaise which also started when nausea/vomiting started. Pt denies abdominal pain, endorses 1 episode of diarrhea. Denies any chest pain, SOB, changes in urinary habits.  Pt reports she has never had symptoms like this in the past. She reports her cancer is s/p resection in July 2020 and currently undergoing chemotherapy last given 2 weeks via  port in the right chest.  In ED noticed wound dehiscence at right chest port site. She was admitted to the ICU. Was briefly on norepi but off it now. Blood cxs with GPCs and staph aureus from the port site. Port is removed. Currently on therapy with vanco and cefepime.    Past Medical History:   Diagnosis Date    Cancer     Colon CA with mets to the liver    Uterine cyst        Past Surgical History:   Procedure Laterality Date    COLONOSCOPY N/A 7/15/2020    Procedure: COLONOSCOPY;  Surgeon: Hi Drake MD;  Location: [facility] ENDO;  Service: Endoscopy;  Laterality: N/A;    COLONOSCOPY W/ BIOPSIES      INSERTION OF TUNNELED CENTRAL VENOUS CATHETER (CVC) WITH SUBCUTANEOUS PORT N/A 7/17/2020    Procedure: INSERTION, PORT-A-CATH;  Surgeon: Armani Naqvi MD;  Location: [facility] OR;  Service: General;  Laterality: N/A;       Review of patient's allergies indicates:   Allergen Reactions    Sulfa (sulfonamide antibiotics)        Medications:  Medications Prior to Admission   Medication Sig    docusate sodium (COLACE) 100 MG capsule Take 1 capsule (100 mg total) by mouth once daily.    folic acid (FOLVITE) 1 MG tablet Take 1 tablet (1 mg total) by mouth once daily.    HYDROcodone-acetaminophen (NORCO) 5-325 mg per tablet Take 1 tablet by mouth every 8 (eight) hours as needed for Pain (chronic cancer-related pain).    lidocaine-prilocaine (EMLA) cream Apply topically As instructed. Apply to skin overlying port site 30 minutes before chemotherapy.    promethazine (PHENERGAN) 25 MG tablet Take 1 tablet (25 mg total) by mouth every 6 (six) hours as needed for Nausea.     Antibiotics (From admission, onward)    Start     Stop Route Frequency Ordered    08/27/20 0000  cefepime in dextrose 5 % IVPB 2 g      -- IV Every 12 hours (non-standard times) 08/26/20 1507    08/26/20 1628  vancomycin - pharmacy to dose  (vancomycin IVPB)      -- IV pharmacy to manage frequency 08/26/20 1528        Antifungals (From  admission, onward)    None        Antivirals (From admission, onward)    None           There is no immunization history for the selected administration types on file for this patient.    Family History     Problem Relation (Age of Onset)    Hypertension Mother, Maternal Grandmother, Maternal Grandfather, Paternal Grandmother        Social History     Socioeconomic History    Marital status: Single     Spouse name: Not on file    Number of children: Not on file    Years of education: Not on file    Highest education level: Not on file   Occupational History    Not on file   Social Needs    Financial resource strain: Not on file    Food insecurity     Worry: Not on file     Inability: Not on file    Transportation needs     Medical: Not on file     Non-medical: Not on file   Tobacco Use    Smoking status: Never Smoker    Smokeless tobacco: Never Used   Substance and Sexual Activity    Alcohol use: No    Drug use: No    Sexual activity: Yes     Partners: Male     Birth control/protection: None   Lifestyle    Physical activity     Days per week: Not on file     Minutes per session: Not on file    Stress: Not on file   Relationships    Social connections     Talks on phone: Not on file     Gets together: Not on file     Attends Hoahaoism service: Not on file     Active member of club or organization: Not on file     Attends meetings of clubs or organizations: Not on file     Relationship status: Not on file   Other Topics Concern    Not on file   Social History Narrative    Not on file     Review of Systems   Constitutional: Positive for fatigue. Negative for fever and unexpected weight change.   HENT: Negative for mouth sores and nosebleeds.    Respiratory: Negative for shortness of breath and wheezing.    Cardiovascular: Negative for chest pain and palpitations.   Gastrointestinal: Negative for abdominal pain and nausea.   Allergic/Immunologic: Negative for food allergies.   Neurological: Positive  for weakness.   Psychiatric/Behavioral: Negative for behavioral problems and confusion.     Objective:     Vital Signs (Most Recent):  Temp: 99.6 °F (37.6 °C) (08/27/20 1500)  Pulse: (!) 160 (08/27/20 1515)  Resp: (!) 32 (08/27/20 1515)  BP: (!) 124/99 (08/27/20 1515)  SpO2: 99 % (08/27/20 1515) Vital Signs (24h Range):  Temp:  [98.6 °F (37 °C)-103.5 °F (39.7 °C)] 99.6 °F (37.6 °C)  Pulse:  [135-162] 160  Resp:  [25-57] 32  SpO2:  [90 %-100 %] 99 %  BP: ()/(45-99) 124/99     Weight: 81.2 kg (179 lb)  Body mass index is 29.79 kg/m².    Estimated Creatinine Clearance: 108.3 mL/min (based on SCr of 0.8 mg/dL).    Physical Exam  Constitutional:       General: She is not in acute distress.     Appearance: She is well-developed.   HENT:      Head: Normocephalic and atraumatic.   Eyes:      General:         Right eye: No discharge.         Left eye: No discharge.      Conjunctiva/sclera: Conjunctivae normal.   Neck:      Musculoskeletal: Normal range of motion.   Cardiovascular:      Rate and Rhythm: Regular rhythm. Tachycardia present.   Pulmonary:      Effort: Pulmonary effort is normal. No respiratory distress.   Abdominal:      General: There is no distension.      Palpations: Abdomen is soft.      Tenderness: There is no abdominal tenderness.   Musculoskeletal: Normal range of motion.         General: No deformity.   Skin:     General: Skin is warm and dry.      Comments: Port removal dressing is clean and dry, R chest   Neurological:      Mental Status: She is alert and oriented to person, place, and time.   Psychiatric:         Behavior: Behavior normal.         Significant Labs: All pertinent labs within the past 24 hours have been reviewed.    Significant Imaging: I have reviewed all pertinent imaging results/findings within the past 24 hours.

## 2020-08-27 NOTE — HPI
29 F with PMH of metastatic adenocarcinoma of the colon with liver mets, uterine cysts, and HTN who presented to [facility] ED for intractable nausea/vomitting that has been on going for about 1 day.  Pt describes vomiting as having some food contents but mostly saliva, she reports she has not been able to tolerated PO since symptoms began.  Associated symptoms include fevers, chills, and general malaise which also started when nausea/vomiting started. Pt denies abdominal pain, endorses 1 episode of diarrhea. Denies any chest pain, SOB, changes in urinary habits.  Pt reports she has never had symptoms like this in the past. She reports her cancer is s/p resection in July 2020 and currently undergoing chemotherapy last given 2 weeks via port in the right chest.  In ED noticed wound dehiscence at right chest port site. She was admitted to the ICU. Was briefly on norepi but off it now. Blood cxs with GPCs and staph aureus from the port site. Port is removed. Currently on therapy with vanco and cefepime.    8/26 BC MRSA  8/26 cath tip and chest MRSA  8/28 BC staph aureus  8/28 WBC up to 2.5K  8/28 stool c diff negative  8/30 BC pending  8/30 WBC up tp 25.9 K  8/31 WBC 25; febrile to 101.5 over night; had YOLANDA today

## 2020-08-27 NOTE — PLAN OF CARE
OT miranda performed, report to follow    Ongoing assessment of post acute needs        Problem: Occupational Therapy Goal  Goal: Occupational Therapy Goal  Description: Goals to be met by: 9/27    Patient will increase functional independence with ADLs by performing:    Feeding with Modified [place].  UE Dressing with Stand-by Assistance.  Grooming while seated at sink with Modified [place].  Toileting from bedside commode with Minimal Assistance for hygiene and clothing management.   Toilet transfer to bedside commode with Minimal Assistance.  Increased functional strength to 3+/5 for BUE.    Outcome: Ongoing, Progressing

## 2020-08-27 NOTE — ASSESSMENT & PLAN NOTE
Infected venous access port  Metabolic Acidosis  Staph Bacteremia    Cont broad spectrum abx. ID has been consulted. Following wound cultures as well as blood cultures. General Sx has been consulted to remove her tunneled port. Lactate was initially elevated---Will reorder lactate--now down. IVF given in the ED. Nephrology was consulted. Bicarb gtt initiated by Nephrology. CRRT initiated.

## 2020-08-27 NOTE — SUBJECTIVE & OBJECTIVE
Interval History: She underwent port removal yesterday. She feels better overall today. She is tolerating a regular diet. Remains tachycardic with HR in 150s.     Medications:  Continuous Infusions:   sodium chloride 0.9%      norepinephrine bitartrate-D5W Stopped (08/27/20 0730)    sodium bicarbonate drip 150 mL/hr at 08/27/20 1100     Scheduled Meds:   ceFEPime (MAXIPIME) IVPB  2 g Intravenous Q12H    enoxaparin  30 mg Subcutaneous Q24H    tbo-filgrastim  480 mcg Subcutaneous Daily     PRN Meds:acetaminophen, dextrose 50%, diphenhydrAMINE-zinc acetate 2-0.1%, glucagon (human recombinant), influenza, insulin aspart U-100, magnesium sulfate IVPB, ondansetron, ondansetron, ondansetron, ondansetron, pneumoc 13-davion conj-dip cr(PF), sodium chloride 0.9%, sodium phosphate IVPB, sodium phosphate IVPB, sodium phosphate IVPB, Pharmacy to dose Vancomycin consult **AND** vancomycin - pharmacy to dose     Review of patient's allergies indicates:   Allergen Reactions    Sulfa (sulfonamide antibiotics)      Objective:     Vital Signs (Most Recent):  Temp: 98.6 °F (37 °C) (08/27/20 1100)  Pulse: (!) 146 (08/27/20 1130)  Resp: (!) 25 (08/27/20 1130)  BP: 106/74 (08/27/20 1130)  SpO2: 100 % (08/27/20 1130) Vital Signs (24h Range):  Temp:  [98.6 °F (37 °C)-103.5 °F (39.7 °C)] 98.6 °F (37 °C)  Pulse:  [135-165] 146  Resp:  [25-57] 25  SpO2:  [92 %-100 %] 100 %  BP: ()/(45-85) 106/74     Weight: 81.5 kg (179 lb 10.8 oz)  Body mass index is 29.9 kg/m².    Intake/Output - Last 3 Shifts       08/25 0700 - 08/26 0659 08/26 0700 - 08/27 0659 08/27 0700 - 08/28 0659    I.V. (mL/kg)  2102.2 (25.8)     IV Piggyback  2350     Total Intake(mL/kg)  4452.2 (54.6)     Urine (mL/kg/hr)  690 105 (0.2)    Other  1198 527    Stool  0     Total Output  1888 632    Net  +2564.2 -632           Stool Occurrence  0 x           Physical Exam  Constitutional:       General: She is not in acute distress.     Appearance: She is well-developed.    HENT:      Head: Normocephalic and atraumatic.   Eyes:      General:         Right eye: No discharge.         Left eye: No discharge.      Conjunctiva/sclera: Conjunctivae normal.   Neck:      Musculoskeletal: Normal range of motion.   Cardiovascular:      Rate and Rhythm: Regular rhythm. Tachycardia present.      Comments: 155 on tele monitor  Pulmonary:      Effort: Pulmonary effort is normal. No respiratory distress.   Abdominal:      General: There is no distension.      Palpations: Abdomen is soft.      Tenderness: There is no abdominal tenderness.   Musculoskeletal: Normal range of motion.         General: No deformity.   Skin:     General: Skin is warm and dry.      Comments: Port removal dressing is clean and dry, R chest   Neurological:      Mental Status: She is alert and oriented to person, place, and time.   Psychiatric:         Behavior: Behavior normal.         Significant Labs:  CBC:   Recent Labs   Lab 08/27/20  0730   WBC 0.99*   RBC 3.54*   HGB 8.4*   HCT 26.5*   *   MCV 75*   MCH 23.7*   MCHC 31.7*     CMP:   Recent Labs   Lab 08/27/20  0808   GLU 88   CALCIUM 7.4*   ALBUMIN 2.2*   PROT 6.0   *   K 4.3   CO2 20*   CL 96   BUN 13   CREATININE 0.8   ALKPHOS 93   ALT 73*   *   BILITOT 2.5*       Significant Diagnostics:  I have reviewed all pertinent imaging results/findings within the past 24 hours.

## 2020-08-27 NOTE — PROGRESS NOTES
Pharmacokinetic Assessment Follow Up: IV Vancomycin    Vancomycin serum concentration assessment(s):    Vancomycin random was added onto AM labs due to start of CRRT. Random was taken on 8/27 at 0808 resulting at 6.9 mcg/mL. Goal 15-20 mcg/mL    Vancomycin Regimen Plan:    Will give a one time dose of vancomycin 1750 mg as patient is receiving CRRT. Vancomycin random ordered on 8/28 at 0900.     Drug levels (last 3 results):  Recent Labs   Lab Result Units 08/27/20  0808   Vancomycin, Random ug/mL 6.9       Pharmacy will continue to follow and monitor vancomycin.    Please contact pharmacy at extension [phone] for questions regarding this assessment.    Thank you for the consult,   Eleanor Morales       Patient brief summary:  Huy Cisneros is a 29 y.o. female initiated on antimicrobial therapy with IV Vancomycin for treatment of bacteremia    The patient's current regimen is pulse dose with CRRT    Drug Allergies:   Review of patient's allergies indicates:   Allergen Reactions    Sulfa (sulfonamide antibiotics)        Actual Body Weight:   81.5 kg    Renal Function:   Estimated Creatinine Clearance: 86.7 mL/min (based on SCr of 1 mg/dL).,     Dialysis Method (if applicable):  CRRT    CBC (last 72 hours):  Recent Labs   Lab Result Units 08/24/20  1113 08/26/20  1127 08/27/20  0730   WBC K/uL 2.88* 0.95* 0.99*   Hemoglobin g/dL 10.6* 9.9* 8.4*   Hematocrit % 36.1* 32.9* 26.5*   Platelets K/uL 302 157 112*   Gran% % 37.9* 0.0*  --    Lymph% % 50.0* 70.0*  --    Mono% % 9.0 30.0*  --    Eosinophil% % 2.4 0.0  --    Basophil% % 0.7 0.0  --    Differential Method  Automated Manual  --        Metabolic Panel (last 72 hours):  Recent Labs   Lab Result Units 08/24/20  1113 08/26/20  1127 08/26/20  1522 08/26/20  1612 08/26/20  1920 08/26/20  2148 08/26/20  2321 08/27/20  0405 08/27/20  0808   Sodium mmol/L 140 132* 121*  --  130* 130*  --  132*  132*  --    Potassium mmol/L 4.1 4.0 3.5  --  3.6 3.4*  --  3.7  3.7  --     Chloride mmol/L 104 98 87*  --  98 98  --  96  96  --    CO2 mmol/L 27 10* 8*  --  10* 12*  --  18*  18*  --    Glucose mg/dL 106 89 660*  --  110 98  --  88  88  --    Glucose, UA   --   --   --  Negative  --   --   --   --   --    BUN, Bld mg/dL 5* 14 14  --  18 18  --  14  14  --    Creatinine mg/dL 0.64 2.00* 1.7*  --  1.5* 1.4  --  1.0  1.0  --    Albumin g/dL 4.2 3.7 1.5*  --  2.4* 2.2*  --  2.2*  2.2*  --    Total Bilirubin mg/dL 0.8 3.8*  --   --  2.6*  --   --   --   --    Alkaline Phosphatase U/L 190* 126  --   --  93  --   --   --   --    AST U/L 90* 210*  --   --  121*  --   --   --   --    ALT U/L 38 93*  --   --  83*  --   --   --   --    Magnesium mg/dL 2.2 1.4*  --   --   --   --  1.8 2.5 2.5   Phosphorus mg/dL 4.0  --  3.2  --   --  2.4*  --  3.5  3.5  --        Vancomycin Administrations:  vancomycin given in the last 96 hours                     vancomycin (VANCOCIN) 1,750 mg in dextrose 5 % 500 mL IVPB (mg) 1,750 mg New Bag 08/26/20 1245                    Microbiologic Results:  Microbiology Results (last 7 days)       Procedure Component Value Units Date/Time    Culture, Anaerobe [[id number]] Collected: 08/26/20 1448    Order Status: Completed Specimen: Incision site from Chest, Right Updated: 08/27/20 0726     Anaerobic Culture Culture in progress    Narrative:      Port incision site    Blood culture [[id number]] Collected: 08/26/20 1140    Order Status: Completed Specimen: Blood from Peripheral, Hand, Right Updated: 08/27/20 0319     Blood Culture, Routine Gram stain juan bottle: Gram positive cocci in clusters resembling Staph      Results called to and read back by: Trudy Morales RN  08/27/2020        02:08      Gram stain aer bottle: Gram positive cocci in clusters resembling Staph      Positive results previously called 08/27/2020  03:19    IV catheter culture [[id number]] Collected: 08/26/20 1604    Order Status: No result Specimen: Catheter Tip Updated: 08/27/20 0001     Culture, Anaerobe [[id number]] Collected: 08/26/20 1602    Order Status: Sent Specimen: Catheter Tip from Chest, Right Updated: 08/26/20 2256    Aerobic culture [[id number]] Collected: 08/26/20 1448    Order Status: Sent Specimen: Incision site from Chest, Right Updated: 08/26/20 1707    Aerobic culture [[id number]] Collected: 08/26/20 1604    Order Status: Canceled Specimen: Catheter Tip from Chest, Right     Blood culture [[id number]]     Order Status: Canceled Specimen: Blood

## 2020-08-27 NOTE — PLAN OF CARE
Problem: Physical Therapy Goal  Goal: Physical Therapy Goal  Description: Goals to be met by: 20     Patient will increase functional independence with mobility by performin.  BLE AROM supine x 15  2.  Roll bilaterally with supervision  3.  Supine to/from sit with supervision  4.  Sit at EOB 15 min with supervision  5.  Sit to stand with RW with CG    Outcome: Ongoing, Progressing

## 2020-08-27 NOTE — SUBJECTIVE & OBJECTIVE
Interval History:   8/27 - off levophed since am of 8/27, still spiking fevers    Oncology Treatment Plan:   OP COLORECTAL FOLFOX + BEVACIZUMAB Q2W    Medications:  Continuous Infusions:   sodium chloride 0.9%      norepinephrine bitartrate-D5W Stopped (08/27/20 0730)    sodium bicarbonate drip 150 mL/hr at 08/27/20 1100     Scheduled Meds:   ceFEPime (MAXIPIME) IVPB  2 g Intravenous Q12H    enoxaparin  30 mg Subcutaneous Q24H    tbo-filgrastim  480 mcg Subcutaneous Daily     PRN Meds:acetaminophen, dextrose 50%, diphenhydrAMINE-zinc acetate 2-0.1%, glucagon (human recombinant), influenza, insulin aspart U-100, magnesium sulfate IVPB, ondansetron, ondansetron, ondansetron, ondansetron, pneumoc 13-davion conj-dip cr(PF), sodium chloride 0.9%, sodium phosphate IVPB, sodium phosphate IVPB, sodium phosphate IVPB, Pharmacy to dose Vancomycin consult **AND** vancomycin - pharmacy to dose     Review of Systems   Constitutional: Positive for fatigue. Negative for fever and unexpected weight change.   HENT: Negative for mouth sores and nosebleeds.    Respiratory: Negative for shortness of breath and wheezing.    Cardiovascular: Negative for chest pain and palpitations.   Gastrointestinal: Negative for abdominal pain and nausea.   Allergic/Immunologic: Negative for food allergies.   Neurological: Positive for weakness.   Psychiatric/Behavioral: Negative for behavioral problems and confusion.     Objective:     Vital Signs (Most Recent):  Temp: 98.6 °F (37 °C) (08/27/20 1100)  Pulse: (!) 146 (08/27/20 1130)  Resp: (!) 25 (08/27/20 1130)  BP: 106/74 (08/27/20 1130)  SpO2: 100 % (08/27/20 1130) Vital Signs (24h Range):  Temp:  [98.6 °F (37 °C)-103.5 °F (39.7 °C)] 98.6 °F (37 °C)  Pulse:  [135-165] 146  Resp:  [25-57] 25  SpO2:  [92 %-100 %] 100 %  BP: ()/(45-85) 106/74     Weight: 81.5 kg (179 lb 10.8 oz)  Body mass index is 29.9 kg/m².  Body surface area is 1.93 meters squared.      Intake/Output Summary (Last 24  hours) at 8/27/2020 1308  Last data filed at 8/27/2020 1100  Gross per 24 hour   Intake 2402.23 ml   Output 2520 ml   Net -117.77 ml       Physical Exam  Vitals signs and nursing note reviewed.   Constitutional:       General: She is awake.      Appearance: She is ill-appearing. She is not toxic-appearing.   HENT:      Mouth/Throat:      Pharynx: No oropharyngeal exudate.   Neck:      Musculoskeletal: Neck supple. No neck rigidity.      Thyroid: No thyromegaly.   Cardiovascular:      Rate and Rhythm: Regular rhythm. Tachycardia present.   Pulmonary:      Effort: Pulmonary effort is normal. No respiratory distress.      Breath sounds: No wheezing or rales.   Chest:       Lymphadenopathy:      Cervical: No cervical adenopathy.   Skin:     Findings: No bruising or petechiae.   Neurological:      Mental Status: She is alert and oriented to person, place, and time.   Psychiatric:         Behavior: Behavior is cooperative.         Significant Labs:   All pertinent labs from the last 24 hours have been reviewed.    Diagnostic Results:  I have reviewed all pertinent imaging results/findings within the past 24 hours.

## 2020-08-27 NOTE — PROGRESS NOTES
[facility] ICU 5th Floor  Hematology/Oncology  Progress Note    Patient Name: Huy Cisneros  Admission Date: 8/26/2020  Hospital Length of Stay: 1 days  Code Status: Full Code     Subjective:     HPI:  29-year-old female underwent right hemicolectomy 07/17/2020 for metastatic colon adenocarcinoma and received 1st cycle of FOLFOX 08/11/2020.    7/20/2020 - CT scan - Multiple hepatic hypodensities better evaluated on CT from 07/14/2020, consistent with metastatic disease.    She has been vomiting and feeling dizzy since yesterday.  Last night she busted a stitch to the Port-A-Cath site causing dehiscence and this morning she fell after going to the restroom.    She presented to the ED with tachycardia, heart rate up to 160 and was hypotensive with blood pressure down to 85/43.  She was started on antibiotics, IV fluids and pressors and transferred to [facility].    WBC on admission 0.95, creatinine 2, lactate >12    Bilirubin 3.8, , ALT 93    Seen by . Right chest port removed.      Interval History:   8/26 - started Granix. Blood cultures growing gram positive cocci in clusters  8/27 - off levophed since am of 8/27, still spiking fevers    Oncology Treatment Plan:   OP COLORECTAL FOLFOX + BEVACIZUMAB Q2W    Medications:  Continuous Infusions:   sodium chloride 0.9%      norepinephrine bitartrate-D5W Stopped (08/27/20 0730)    sodium bicarbonate drip 150 mL/hr at 08/27/20 1100     Scheduled Meds:   ceFEPime (MAXIPIME) IVPB  2 g Intravenous Q12H    enoxaparin  30 mg Subcutaneous Q24H    tbo-filgrastim  480 mcg Subcutaneous Daily     PRN Meds:acetaminophen, dextrose 50%, diphenhydrAMINE-zinc acetate 2-0.1%, glucagon (human recombinant), influenza, insulin aspart U-100, magnesium sulfate IVPB, ondansetron, ondansetron, ondansetron, ondansetron, pneumoc 13-davion conj-dip cr(PF), sodium chloride 0.9%, sodium phosphate IVPB, sodium phosphate IVPB, sodium phosphate IVPB,  Pharmacy to dose Vancomycin consult **AND** vancomycin - pharmacy to dose     Review of Systems   Constitutional: Positive for fatigue. Negative for fever and unexpected weight change.   HENT: Negative for mouth sores and nosebleeds.    Respiratory: Negative for shortness of breath and wheezing.    Cardiovascular: Negative for chest pain and palpitations.   Gastrointestinal: Negative for abdominal pain and nausea.   Allergic/Immunologic: Negative for food allergies.   Neurological: Positive for weakness.   Psychiatric/Behavioral: Negative for behavioral problems and confusion.     Objective:     Vital Signs (Most Recent):  Temp: 98.6 °F (37 °C) (08/27/20 1100)  Pulse: (!) 146 (08/27/20 1130)  Resp: (!) 25 (08/27/20 1130)  BP: 106/74 (08/27/20 1130)  SpO2: 100 % (08/27/20 1130) Vital Signs (24h Range):  Temp:  [98.6 °F (37 °C)-103.5 °F (39.7 °C)] 98.6 °F (37 °C)  Pulse:  [135-165] 146  Resp:  [25-57] 25  SpO2:  [92 %-100 %] 100 %  BP: ()/(45-85) 106/74     Weight: 81.5 kg (179 lb 10.8 oz)  Body mass index is 29.9 kg/m².  Body surface area is 1.93 meters squared.      Intake/Output Summary (Last 24 hours) at 8/27/2020 1308  Last data filed at 8/27/2020 1100  Gross per 24 hour   Intake 2402.23 ml   Output 2520 ml   Net -117.77 ml       Physical Exam  Vitals signs and nursing note reviewed.   Constitutional:       General: She is awake.      Appearance: She is ill-appearing. She is not toxic-appearing.   HENT:      Mouth/Throat:      Pharynx: No oropharyngeal exudate.   Neck:      Musculoskeletal: Neck supple. No neck rigidity.      Thyroid: No thyromegaly.   Cardiovascular:      Rate and Rhythm: Regular rhythm. Tachycardia present.   Pulmonary:      Effort: Pulmonary effort is normal. No respiratory distress.      Breath sounds: No wheezing or rales.   Chest:       Lymphadenopathy:      Cervical: No cervical adenopathy.   Skin:     Findings: No bruising or petechiae.   Neurological:      Mental Status: She is  alert and oriented to person, place, and time.   Psychiatric:         Behavior: Behavior is cooperative.         Significant Labs:   All pertinent labs from the last 24 hours have been reviewed.    Diagnostic Results:  I have reviewed all pertinent imaging results/findings within the past 24 hours.    Assessment/Plan:   1. Septic Shock likely 2/2 infected port-a-cath  2. Febrile neutropenia  3. Metastatic colon adenocarcinoma     Continue broad-spectrum antibiotics.    Started G-CSF support with granix on 8/26. Received 2nd dose 8/27. Continue neutropenic precautions.    Bilirubin better, she is off levophed and is improving clinically - hence will hold off on getting a CT abd with contrast for now. Plan to check CT scan with contrast when creatinine is a baseline and she is off CRRT.    Guido Friedman MD  Hematology/Oncology  [facility] ICU 5th Floor

## 2020-08-27 NOTE — SUBJECTIVE & OBJECTIVE
Past Medical History:   Diagnosis Date    Cancer     Colon CA with mets to the liver    Uterine cyst        Past Surgical History:   Procedure Laterality Date    COLONOSCOPY N/A 7/15/2020    Procedure: COLONOSCOPY;  Surgeon: Hi Drake MD;  Location: [facility] ENDO;  Service: Endoscopy;  Laterality: N/A;    COLONOSCOPY W/ BIOPSIES      INSERTION OF TUNNELED CENTRAL VENOUS CATHETER (CVC) WITH SUBCUTANEOUS PORT N/A 7/17/2020    Procedure: INSERTION, PORT-A-CATH;  Surgeon: Armani Naqvi MD;  Location: [facility] OR;  Service: General;  Laterality: N/A;       Review of patient's allergies indicates:   Allergen Reactions    Sulfa (sulfonamide antibiotics)        No current facility-administered medications on file prior to encounter.      Current Outpatient Medications on File Prior to Encounter   Medication Sig    docusate sodium (COLACE) 100 MG capsule Take 1 capsule (100 mg total) by mouth once daily.    folic acid (FOLVITE) 1 MG tablet Take 1 tablet (1 mg total) by mouth once daily.    HYDROcodone-acetaminophen (NORCO) 5-325 mg per tablet Take 1 tablet by mouth every 8 (eight) hours as needed for Pain (chronic cancer-related pain).    lidocaine-prilocaine (EMLA) cream Apply topically As instructed. Apply to skin overlying port site 30 minutes before chemotherapy.    promethazine (PHENERGAN) 25 MG tablet Take 1 tablet (25 mg total) by mouth every 6 (six) hours as needed for Nausea.     Family History     Problem Relation (Age of Onset)    Hypertension Mother, Maternal Grandmother, Maternal Grandfather, Paternal Grandmother        Tobacco Use    Smoking status: Never Smoker    Smokeless tobacco: Never Used   Substance and Sexual Activity    Alcohol use: No    Drug use: No    Sexual activity: Yes     Partners: Male     Birth control/protection: None     Review of Systems   Constitutional: Positive for activity change, appetite change, chills, fatigue and fever.   HENT: Negative for trouble  swallowing.    Eyes: Negative for visual disturbance.   Respiratory: Positive for shortness of breath. Negative for cough and wheezing.    Cardiovascular: Positive for leg swelling.   Gastrointestinal: Negative for abdominal distention, abdominal pain, diarrhea, nausea and vomiting.   Genitourinary: Negative for difficulty urinating and hematuria.   Musculoskeletal: Negative for gait problem.   Skin: Positive for wound (right chest wall).   Allergic/Immunologic: Positive for immunocompromised state.   Neurological: Positive for weakness.   Hematological: Does not bruise/bleed easily.   Psychiatric/Behavioral: Negative for confusion. The patient is not nervous/anxious.      Objective:     Vital Signs (Most Recent):  Temp: 98.6 °F (37 °C) (08/27/20 1100)  Pulse: (!) 146 (08/27/20 1130)  Resp: (!) 25 (08/27/20 1130)  BP: 106/74 (08/27/20 1130)  SpO2: 100 % (08/27/20 1130) Vital Signs (24h Range):  Temp:  [98.6 °F (37 °C)-103.5 °F (39.7 °C)] 98.6 °F (37 °C)  Pulse:  [135-165] 146  Resp:  [25-57] 25  SpO2:  [92 %-100 %] 100 %  BP: ()/(45-85) 106/74     Weight: 81.5 kg (179 lb 10.8 oz)  Body mass index is 29.9 kg/m².    Physical Exam  Vitals signs and nursing note reviewed.   Constitutional:       Appearance: Normal appearance.   HENT:      Head: Normocephalic and atraumatic.      Mouth/Throat:      Mouth: Mucous membranes are dry.   Eyes:      Pupils: Pupils are equal, round, and reactive to light.   Neck:      Musculoskeletal: Normal range of motion.   Cardiovascular:      Rate and Rhythm: Normal rate and regular rhythm.      Pulses: Normal pulses.   Abdominal:      Palpations: Abdomen is soft.   Musculoskeletal: Normal range of motion.   Skin:     General: Skin is warm and dry.      Capillary Refill: Capillary refill takes less than 2 seconds.   Neurological:      Mental Status: She is alert and oriented to person, place, and time.   Psychiatric:         Mood and Affect: Mood normal.         Behavior: Behavior  normal.           CRANIAL NERVES     CN III, IV, VI   Pupils are equal, round, and reactive to light.       Significant Labs:   BMP:   Recent Labs   Lab 08/27/20  0808   GLU 88   *   K 4.3   CL 96   CO2 20*   BUN 13   CREATININE 0.8   CALCIUM 7.4*   MG 2.5     CBC:   Recent Labs   Lab 08/26/20  1127 08/27/20  0730   WBC 0.95* 0.99*   HGB 9.9* 8.4*   HCT 32.9* 26.5*    112*     Lactic Acid:   Recent Labs   Lab 08/26/20  1920 08/26/20  2321 08/27/20  0808   LACTATE >12.0* >12.0* 10.3*     Magnesium:   Recent Labs   Lab 08/26/20  2321 08/27/20  0405 08/27/20  0808   MG 1.8 2.5 2.5     POCT Glucose:   Recent Labs   Lab 08/26/20  1757   POCTGLUCOSE 80     Urine Culture: No results for input(s): LABURIN in the last 48 hours.  Urine Studies:   Recent Labs   Lab 08/26/20  1612   COLORU Yellow   APPEARANCEUA Cloudy*   PHUR 6.0   SPECGRAV 1.025   PROTEINUA 1+*   GLUCUA Negative   KETONESU Negative   BILIRUBINUA Negative   OCCULTUA Trace*   NITRITE Positive*   UROBILINOGEN Negative   LEUKOCYTESUR Negative   RBCUA 0   WBCUA 2   BACTERIA Many*   HYALINECASTS 3*       Significant Imaging: I have reviewed all pertinent imaging results/findings within the past 24 hours.

## 2020-08-27 NOTE — PROGRESS NOTES
[facility] ICU 5th Floor  Hospital Medicine  Progress Note    Patient Name: Huy Cisneros  MRN: [id number]  Patient Class: IP- Inpatient   Admission Date: 8/26/2020  Length of Stay: 1 days  Attending Physician: Maryana Patel*  Primary Care Provider: Primary Doctor No        Subjective:     Principal Problem:Septic shock        HPI:  Ms. Huy Cisneros is a 28 y/o female who lives in [place], [place] at her residence with her children. The patients PCP is Imtiaz Lindsay PA-C. She is also followed by Dr. Jewel Arzola with Oncology. She has a pmh of colon cancer with liver mets, uterine cysts, and HTN. She has a past surgical history of a mediport placement and a colonoscopy. The patient does not smoke cigarettes, drink alcohol, or use illicit drugs.     She presents to the ED with complaints of fever, vomiting and feeling dizzy since yesterday.  Per the patient she states her port a cath to right chest wall busted a stitch last night she while was bathing. She also has complaints of feeling SOB with lower extremity swelling.     Her ED workup includes WBC--0.95, hemoglobin 9.9, Na--132, creatinine 2.00, magnesium--1.4, lactate--10.7, CPK--180, pH--7.2. Pending blood cultures and wound cultures. She is COVID-19 negative. CXR---There has been interval placement of a right subclavian venous line. Its tip is in the region of the junction of the superior vena cava with the right atrium.  There is no pneumothorax. There is no focal pulmonary infiltrate visualized. XR of the abd---There is a paucity of gas within the gastrointestinal system. There are multiple radiopaque leads projected over the abdomen and pelvis. She will be admitted to the [facility] department for further care.         Overview/Hospital Course:  She was admitted to the [facility] hospital medicine service for further care. We have consulted ID, nephrology, oncology, pulmonology, and general  surgery. Her tunneled line is the suspected source for her bacteremia/septic shock. Blood cultures positive for staph. TTE pending. General sx removed tunneled port at the bedside. Will cont to follow cultures. She was initially given Levophed and Amiodarone. Lactate has improved---she was given IVF and now Nephrology has started Bicarb gtt. Dr. Naqvi was consulted for trialysis line placement---She is now receiving  CRRT at this time. Will cont broad spectrum abx. G-CSF started by oncology for neutropenia.    Past Medical History:   Diagnosis Date    Cancer     Colon CA with mets to the liver    Uterine cyst        Past Surgical History:   Procedure Laterality Date    COLONOSCOPY N/A 7/15/2020    Procedure: COLONOSCOPY;  Surgeon: Hi Drake MD;  Location: [facility] ENDO;  Service: Endoscopy;  Laterality: N/A;    COLONOSCOPY W/ BIOPSIES      INSERTION OF TUNNELED CENTRAL VENOUS CATHETER (CVC) WITH SUBCUTANEOUS PORT N/A 7/17/2020    Procedure: INSERTION, PORT-A-CATH;  Surgeon: Armani Naqvi MD;  Location: [facility] OR;  Service: General;  Laterality: N/A;       Review of patient's allergies indicates:   Allergen Reactions    Sulfa (sulfonamide antibiotics)        No current facility-administered medications on file prior to encounter.      Current Outpatient Medications on File Prior to Encounter   Medication Sig    docusate sodium (COLACE) 100 MG capsule Take 1 capsule (100 mg total) by mouth once daily.    folic acid (FOLVITE) 1 MG tablet Take 1 tablet (1 mg total) by mouth once daily.    HYDROcodone-acetaminophen (NORCO) 5-325 mg per tablet Take 1 tablet by mouth every 8 (eight) hours as needed for Pain (chronic cancer-related pain).    lidocaine-prilocaine (EMLA) cream Apply topically As instructed. Apply to skin overlying port site 30 minutes before chemotherapy.    promethazine (PHENERGAN) 25 MG tablet Take 1 tablet (25 mg total) by mouth every 6 (six) hours as needed for Nausea.      Family History     Problem Relation (Age of Onset)    Hypertension Mother, Maternal Grandmother, Maternal Grandfather, Paternal Grandmother        Tobacco Use    Smoking status: Never Smoker    Smokeless tobacco: Never Used   Substance and Sexual Activity    Alcohol use: No    Drug use: No    Sexual activity: Yes     Partners: Male     Birth control/protection: None     Review of Systems   Constitutional: Positive for activity change, appetite change, chills, fatigue and fever.   HENT: Negative for trouble swallowing.    Eyes: Negative for visual disturbance.   Respiratory: Positive for shortness of breath. Negative for cough and wheezing.    Cardiovascular: Positive for leg swelling.   Gastrointestinal: Negative for abdominal distention, abdominal pain, diarrhea, nausea and vomiting.   Genitourinary: Negative for difficulty urinating and hematuria.   Musculoskeletal: Negative for gait problem.   Skin: Positive for wound (right chest wall).   Allergic/Immunologic: Positive for immunocompromised state.   Neurological: Positive for weakness.   Hematological: Does not bruise/bleed easily.   Psychiatric/Behavioral: Negative for confusion. The patient is not nervous/anxious.      Objective:     Vital Signs (Most Recent):  Temp: 98.6 °F (37 °C) (08/27/20 1100)  Pulse: (!) 146 (08/27/20 1130)  Resp: (!) 25 (08/27/20 1130)  BP: 106/74 (08/27/20 1130)  SpO2: 100 % (08/27/20 1130) Vital Signs (24h Range):  Temp:  [98.6 °F (37 °C)-103.5 °F (39.7 °C)] 98.6 °F (37 °C)  Pulse:  [135-165] 146  Resp:  [25-57] 25  SpO2:  [92 %-100 %] 100 %  BP: ()/(45-85) 106/74     Weight: 81.5 kg (179 lb 10.8 oz)  Body mass index is 29.9 kg/m².    Physical Exam  Vitals signs and nursing note reviewed.   Constitutional:       Appearance: Normal appearance.   HENT:      Head: Normocephalic and atraumatic.      Mouth/Throat:      Mouth: Mucous membranes are dry.   Eyes:      Pupils: Pupils are equal, round, and reactive to light.    Neck:      Musculoskeletal: Normal range of motion.   Cardiovascular:      Rate and Rhythm: Normal rate and regular rhythm.      Pulses: Normal pulses.   Abdominal:      Palpations: Abdomen is soft.   Musculoskeletal: Normal range of motion.   Skin:     General: Skin is warm and dry.      Capillary Refill: Capillary refill takes less than 2 seconds.   Neurological:      Mental Status: She is alert and oriented to person, place, and time.   Psychiatric:         Mood and Affect: Mood normal.         Behavior: Behavior normal.           CRANIAL NERVES     CN III, IV, VI   Pupils are equal, round, and reactive to light.       Significant Labs:   BMP:   Recent Labs   Lab 08/27/20  0808   GLU 88   *   K 4.3   CL 96   CO2 20*   BUN 13   CREATININE 0.8   CALCIUM 7.4*   MG 2.5     CBC:   Recent Labs   Lab 08/26/20  1127 08/27/20  0730   WBC 0.95* 0.99*   HGB 9.9* 8.4*   HCT 32.9* 26.5*    112*     Lactic Acid:   Recent Labs   Lab 08/26/20  1920 08/26/20  2321 08/27/20  0808   LACTATE >12.0* >12.0* 10.3*     Magnesium:   Recent Labs   Lab 08/26/20  2321 08/27/20  0405 08/27/20  0808   MG 1.8 2.5 2.5     POCT Glucose:   Recent Labs   Lab 08/26/20  1757   POCTGLUCOSE 80     Urine Culture: No results for input(s): LABURIN in the last 48 hours.  Urine Studies:   Recent Labs   Lab 08/26/20  1612   COLORU Yellow   APPEARANCEUA Cloudy*   PHUR 6.0   SPECGRAV 1.025   PROTEINUA 1+*   GLUCUA Negative   KETONESU Negative   BILIRUBINUA Negative   OCCULTUA Trace*   NITRITE Positive*   UROBILINOGEN Negative   LEUKOCYTESUR Negative   RBCUA 0   WBCUA 2   BACTERIA Many*   HYALINECASTS 3*       Significant Imaging: I have reviewed all pertinent imaging results/findings within the past 24 hours.      Assessment/Plan:      * Septic shock  Infected venous access port  Metabolic Acidosis  Staph Bacteremia    Cont broad spectrum abx. ID has been consulted. Following wound cultures as well as blood cultures. General Sx has been  consulted to remove her tunneled port. Lactate was initially elevated---Will reorder lactate--now down. IVF given in the ED. Nephrology was consulted. Bicarb gtt initiated by Nephrology. CRRT initiated.     Nausea & vomiting  Prn Zofran ordered.       Hyponatremia  Monitor.       Hypomagnesemia  Replace and monitor.       Neutropenia  G-CSF started by oncology for neutropenia.      KIERSTEN (acute kidney injury)  Consulting nephrology for management. IV fluids given in the ED. Tran catheter placed. Nephrology has ordered a renal ultrasound. Monitor intake and output.      Metastatic disease  Secondary malignancy of liver  Adenocarcinoma of colon  Neutropenia  Anemia in neoplastic disease    She is followed by Dr. Arzola outpatient. Will re consult oncology while inpatient. Will order neutropenic precautions. Will cont to monitor CBC daily.         VTE Risk Mitigation (From admission, onward)         Ordered     enoxaparin injection 30 mg  Every 24 hours      08/26/20 1648     IP VTE HIGH RISK PATIENT  Once      08/26/20 1458     Place sequential compression device  Until discontinued      08/26/20 1458                Discharge Planning   ZOE:      Code Status: Full Code   Is the patient medically ready for discharge?:     Reason for patient still in hospital (select all that apply): Treatment  Discharge Plan A: Home with family            Critical care time spent on the evaluation and treatment of severe organ dysfunction, review of pertinent labs and imaging studies, discussions with consulting providers and discussions with patient/family: 45 minutes.      Tracy Medrano NP  Department of Hospital Medicine   [facility] ICU 5th Floor

## 2020-08-27 NOTE — PROGRESS NOTES
[facility] ICU 5th Floor  Critical Care - Medicine  Progress Note    Patient Name: Huy Cisneros  MRN: [id number]  Admission Date: 8/26/2020  Hospital Length of Stay: 1 days  Code Status: Full Code  Attending Provider: Maryana Patel*  Primary Care Provider: Primary Doctor No   Principal Problem: Septic shock    Subjective:     HPI: 29 F with significant pmhx of metastatic adenocarcinoma of the colon with liver mets, uterine cysts, and HTN who presented to [facility] ED for intractable nausea/vomitting. Associated symptoms include fevers, chills, and general malaise which also started when nausea/vomiting started. Pt started on pressor support prior to arrival to ICU.      Hospital/ICU Course: Central line placed (trialysis), pt initiated on CRRT, pressure support continued.  Mediport cultured and removed.  Started on bicarp gtt, IVF, broad spectrum abx. Continues to be on broad spectrum abx, lactic acid trending down.     Interval History/Significant Events: Overnight pt reports she feels better.  No episodes of vomiting however has been dry heaving overnight. Pt's pressor support was titrated off this morning.        Objective:     Vital Signs (Most Recent):  Temp: 98.9 °F (37.2 °C) (08/27/20 0700)  Pulse: (!) 154 (08/27/20 0904)  Resp: (!) 31 (08/27/20 0904)  BP: (!) 105/59 (08/27/20 0904)  SpO2: 99 % (08/27/20 0904) Vital Signs (24h Range):  Temp:  [98.9 °F (37.2 °C)-103.5 °F (39.7 °C)] 98.9 °F (37.2 °C)  Pulse:  [135-165] 154  Resp:  [25-57] 31  SpO2:  [92 %-100 %] 99 %  BP: ()/(43-85) 105/59     Weight: 81.5 kg (179 lb 10.8 oz)  Body mass index is 29.9 kg/m².      Intake/Output Summary (Last 24 hours) at 8/27/2020 1036  Last data filed at 8/27/2020 0900  Gross per 24 hour   Intake 4452.23 ml   Output 2280 ml   Net 2172.23 ml       Gen: Alert to person, place and time. Appears fatigued   Head: Atraumatic, normocephalic   Neck: No JVD present, neck supple, no LAD    Eyes: Pupils equal and reactive to light,extra-ocular eye movements intact,   Mouth: no erythema, exudates, or lesions present in oropharynx   CV: tachycardic, no rubs, gallops, or murmurs.      Lungs: CTAB no crackles, wheezing, or rhonchi.  No fremitus noted.   on 2L NC   Abd: Soft, non tender, non distended, bowel sounds present. No rebound tenderness or guarding present.     EXT: 2+ radial and dorsalis pedis pulses bilaterally.  No edema or cyanosis noted.  bl calf tenderness on palpation   Neuro: CN II: pupils equal, reactive to light. CN III, IV, VI: extra-ocular muscles intact. Lower ext weak 2/2 to pain.   Skin: Warm and dry.  No jaundice noted.           Lines/Drains/Airways     Central Venous Catheter Line            Port A Cath Single Lumen 07/17/20 41 days    Trialysis (Dialysis) Catheter 08/26/20 1734 right internal jugular less than 1 day          Drain                 Urethral Catheter 08/26/20 1636 16 Fr. less than 1 day          Peripheral Intravenous Line                 Peripheral IV - Single Lumen 08/26/20 1119 20 G Left Wrist less than 1 day         Peripheral IV - Single Lumen 08/26/20 1140 20 G Right Hand less than 1 day                Significant Labs:    CBC/Anemia Profile:  Recent Labs   Lab 08/26/20  1127 08/27/20  0730   WBC 0.95* 0.99*   HGB 9.9* 8.4*   HCT 32.9* 26.5*    112*   MCV 79* 75*   RDW 18.8* 18.7*        Chemistries:  Recent Labs   Lab 08/26/20  1127 08/26/20  1522 08/26/20  1920 08/26/20  2148 08/26/20  2321 08/27/20  0405 08/27/20  0808   * 121* 130* 130*  --  132*  132* 132*   K 4.0 3.5 3.6 3.4*  --  3.7  3.7 4.3   CL 98 87* 98 98  --  96  96 96   CO2 10* 8* 10* 12*  --  18*  18* 20*   BUN 14 14 18 18  --  14  14 13   CREATININE 2.00* 1.7* 1.5* 1.4  --  1.0  1.0 0.8   CALCIUM 8.6* 6.3* 7.6* 7.5*  --  7.3*  7.3* 7.4*   ALBUMIN 3.7 1.5* 2.4* 2.2*  --  2.2*  2.2* 2.2*   PROT 7.7  --  6.1  --   --   --  6.0   BILITOT 3.8*  --  2.6*  --   --   --  2.5*    ALKPHOS 126  --  93  --   --   --  93   ALT 93*  --  83*  --   --   --  73*   *  --  121*  --   --   --  100*   MG 1.4*  --   --   --  1.8 2.5 2.5   PHOS  --  3.2  --  2.4*  --  3.5  3.5  --      LA: >12 > 10.3       Assessment/Plan:     Active Diagnoses:    Diagnosis Date Noted POA    PRINCIPAL PROBLEM:  Septic shock [A41.9, R65.21] 08/26/2020 Yes    Bacteremia [R78.81] 08/27/2020 Yes    KIERSTEN (acute kidney injury) [N17.9] 08/26/2020 Yes    Infected venous access port [T80.219A] 08/26/2020 Yes    Metabolic acidosis [E87.2] 08/26/2020 Yes    Neutropenia [D70.9] 08/26/2020 Yes    Hypomagnesemia [E83.42] 08/26/2020 Yes    Hyponatremia [E87.1] 08/26/2020 Yes    Nausea & vomiting [R11.2] 08/26/2020 Yes    Lactic acid acidosis [E87.2]  Unknown    Sepsis [A41.9]  Unknown    Anemia in neoplastic disease [D63.0] 08/10/2020 Yes    Metastatic disease [C79.9] 07/14/2020 Yes    Secondary malignancy of liver [C78.7] 07/14/2020 Yes    Adenocarcinoma of colon [C18.9] 07/14/2020 Yes      Problems Resolved During this Admission:     Neuro  -no acute issues, fatigued but able to hold conversation      CV  Shock, likely 2/2 sepsis vs tumor necrosis   -s/p IVF with out improvement of MAPs, iniitally on pressors has now been titrated off  maintain MAPs >65  -LA >12 > 10.3, continue to trend   -source likely from dehiscence of Mediport; blood cultures and cultures from mediport   -broad spectrum abx vanc + cefepime; follow culture data   -bcx growing GP cocci resembling Staph Aureus.   Tachycardic, improving  -up to 162 in the ED, no history of arrhythmias   -2/2 shock, dehydration  -continue IVF, EKG Sinus tach  -continue to monitor      Pulm  -no acute issues at this time  -O2 NC for comfort   -CXR as above      Renal/Eelctrolytes   ARF  -BUN/CR on admit 14/2.0; baseline Cr 0.65  -Cr improving however still low urine output   -rebolledo in place, monitor I/O, renal US is normal,   -nephrology following CRRT    Hypomagnesium  -recommend replete and continue to monitor   AGMA  -AG 28, lactate >12 > 10.3   -on bicarb gtt, per nephrology will stop gtt once bicarp reaches 20  -emergent HD tonight, trialysis placement   -daily CMP, trend LA      GI  Intractable nausea/vomiting  -prn zofran IV, IVF, adv diet as tolerated   Transmits   -T bili 3.8, , ALT 93  -likely 2/2 mets to liver   -hep panel, HIV, continue to monitor  Adenocarcinoma of colon with mets to liver   -s/p resection, receiving chemo through mediport that has now been removed  -consider imaging with CT abdomen oral contrast vs MRCP for obstruction and worsening of Liver Mets  -followed by Dr. Arzola as outpt, hemeOnc consulted      ID/Heme  Wound dehiscence  -consult general surgery for assessment of Mediport site  -cultures and abx as above   Pancytopenia  -WC 0.95 likely 2/2 chemo, hemeonc consulted, neutropenic precautions   - H/H 8.4/26.5 no evidence of active bleeding , continue to monitor    -plt 112  -hemeOnc following       Critical secondary to Patient has a condition that poses threat to life and bodily function: Acute Renal Failure     Critical care was time spent personally by me on the following activities: development of treatment plan with patient or surrogate and bedside caregivers, discussions with consultants, evaluation of patient's response to treatment, examination of patient, ordering and performing treatments and interventions, ordering and review of laboratory studies, ordering and review of radiographic studies, pulse oximetry, re-evaluation of patient's condition.  This critical care time did not overlap with that of any other provider or involve time for any procedures.     Jimmie Malin MD   [facility]   Critical Care - Medicine  [facility] ICU 5th Floor

## 2020-08-27 NOTE — PROGRESS NOTES
Spoke with Dr. Michaels about pts updated lab results. Order to Rinse pt back from CRRT and repeat labs at 2200.  Stop Bicard gtt.  Replace Phos with PRN order. Will carry out orders.

## 2020-08-27 NOTE — ASSESSMENT & PLAN NOTE
Consulting nephrology for management. IV fluids given in the ED. Tran catheter placed. Nephrology has ordered a renal ultrasound. Monitor intake and output.

## 2020-08-27 NOTE — PT/OT/SLP EVAL
Physical Therapy Evaluation    Patient Name:  Huy Cisneros   MRN:  [id number]    Recommendations:     Discharge Recommendations:  (TBD)   Discharge Equipment Recommendations: (TBD)   Barriers to discharge: Decreased caregiver support and decreased functional mobility tolerance    Assessment:     Huy Cisneros is a 29 y.o. female admitted with a medical diagnosis of Septic shock.  She presents with the following impairments/functional limitations:  weakness, impaired functional mobilty, impaired cardiopulmonary response to activity, impaired endurance, gait instability, pain, decreased coordination, impaired balance, decreased upper extremity function, impaired self care skills, decreased lower extremity function.  Functional mobility deferred 2/2 tenuous CRRT line, high HR and nurse request to perform bed level eval/treatment only.    Rehab Prognosis: Good; patient would benefit from acute skilled PT services to address these deficits and reach maximum level of function.    Recent Surgery: * No surgery found *      Plan:     During this hospitalization, patient to be seen 5 x/week to address the identified rehab impairments via therapeutic activities, therapeutic exercises, neuromuscular re-education, wheelchair management/training, gait training and progress toward the following goals:    · Plan of Care Expires:  09/27/20    Subjective     Chief Complaint: pain everywhere  Patient/Family Comments/goals: get better  Pain/Comfort:  · Pain Rating 1: (9/10 everywhere)  · Pain Addressed 1: Reposition, Distraction, Pre-medicate for activity, Cessation of Activity, Nurse notified  · Pain Rating Post-Intervention 1: (9/10 post treatment)    Patients cultural, spiritual, Jainism conflicts given the current situation: no    Living Environment:  Pt lives with her mother in a [facility] with no steps and no equipment.  Her mother drives her places.  Prior to admission, patients level of function was  Ind ambulation  "without AD.  Equipment used at home: bedside commode, shower chair.  DME owned (not currently used): none.  Upon discharge, patient will have assistance from TBD.    Objective:     Communicated with nurse prior to session.  Patient found HOB elevated with blood pressure cuff, central line, peripheral IV, telemetry, pulse ox (continuous), rebolledo catheter  upon PT entry to room.    General Precautions: Standard, fall, neutropenic   Orthopedic Precautions:    Braces:       Exams:  · Pt is oriented x 4.  Pt has slightly decreased DF bilaterally 2/2 edema, full knee flexion not attempted 2/2 pain level and edema.    Functional Mobility:  · Functional mobility deferred 2/2 to tenuous CRRT line/nurse request to see pt at bed level only.    Therapeutic Activities and Exercises:   Rx of very gentle AA/PROM BLE's in all available motions x 15, achilles stretch bilaterally 45" x 3.  Note quivering/tremoring in ankles with active and passive end ROM DF bilaterally    AM-PAC 6 CLICK MOBILITY  Total Score:10     Patient left HOB elevated with all lines intact, call button in reach and nurse notified.    GOALS:   Multidisciplinary Problems     Physical Therapy Goals        Problem: Physical Therapy Goal    Goal Priority Disciplines Outcome Goal Variances Interventions   Physical Therapy Goal     PT, PT/OT Ongoing, Progressing     Description: Goals to be met by: 20     Patient will increase functional independence with mobility by performin.  BLE AROM supine x 15  2.  Roll bilaterally with supervision  3.  Supine to/from sit with supervision  4.  Sit at EOB 15 min with supervision  5.  Sit to stand with RW with CG                     History:     Past Medical History:   Diagnosis Date    Cancer     Colon CA with mets to the liver    Uterine cyst        Past Surgical History:   Procedure Laterality Date    COLONOSCOPY N/A 7/15/2020    Procedure: COLONOSCOPY;  Surgeon: Hi Drake MD;  Location: [facility]; "  Service: Endoscopy;  Laterality: N/A;    COLONOSCOPY W/ BIOPSIES      INSERTION OF TUNNELED CENTRAL VENOUS CATHETER (CVC) WITH SUBCUTANEOUS PORT N/A 7/17/2020    Procedure: INSERTION, PORT-A-CATH;  Surgeon: Armani Naqvi MD;  Location: [facility];  Service: General;  Laterality: N/A;       Time Tracking:     PT Received On: 08/27/20  PT Start Time: 1057     PT Stop Time: 1120  PT Total Time (min): 23 min     Billable Minutes: Evaluation 14 and Therapeutic Exercise 9      Enedina Ashford, PT  08/27/2020

## 2020-08-28 PROBLEM — R79.89 ELEVATED TROPONIN: Status: ACTIVE | Noted: 2020-01-01

## 2020-08-28 PROBLEM — D61.818 PANCYTOPENIA: Status: ACTIVE | Noted: 2020-01-01

## 2020-08-28 PROBLEM — R07.9 CHEST PAIN: Status: ACTIVE | Noted: 2020-01-01

## 2020-08-28 NOTE — PROGRESS NOTES
Nephrology Progress Note      Consult Requested By: Maryana Patel*  Reason for Consult: ESRD    SUBJECTIVE:     Review of Systems   Constitutional: Negative for chills and fever.   Respiratory: Negative for cough and shortness of breath (much better but on 2L O2).    Cardiovascular: Negative for chest pain and leg swelling.   Gastrointestinal: Positive for nausea. Negative for vomiting.   Musculoskeletal:        B calf pain       Past Medical History:   Diagnosis Date    Cancer     Colon CA with mets to the liver    Uterine cyst      Past Surgical History:   Procedure Laterality Date    COLONOSCOPY N/A 7/15/2020    Procedure: COLONOSCOPY;  Surgeon: Hi Drake MD;  Location: [facility] ENDO;  Service: Endoscopy;  Laterality: N/A;    COLONOSCOPY W/ BIOPSIES      INSERTION OF TUNNELED CENTRAL VENOUS CATHETER (CVC) WITH SUBCUTANEOUS PORT N/A 7/17/2020    Procedure: INSERTION, PORT-A-CATH;  Surgeon: Armani Naqvi MD;  Location: [facility] OR;  Service: General;  Laterality: N/A;     Family History   Problem Relation Age of Onset    Hypertension Mother     Hypertension Maternal Grandmother     Hypertension Maternal Grandfather     Hypertension Paternal Grandmother      Social History     Tobacco Use    Smoking status: Never Smoker    Smokeless tobacco: Never Used   Substance Use Topics    Alcohol use: No    Drug use: No       Review of patient's allergies indicates:   Allergen Reactions    Sulfa (sulfonamide antibiotics)             OBJECTIVE:     Vital Signs (Most Recent)  Vitals:    08/27/20 1645 08/27/20 1700 08/27/20 1715 08/27/20 1730   BP: (!) 138/57 127/73 (!) 140/66 (!) 122/58   BP Location:  Right arm     Patient Position:  Lying     Pulse: (!) 151 (!) 151 (!) 149 (!) 150   Resp: (!) 29 (!) 30 (!) 31 (!) 28   Temp:       TempSrc:       SpO2: 100% 100% 100% 99%   Weight:       Height:             Date 08/27/20 0700 - 08/28/20 0659   Shift 2266-8392 3871-3150 7705-5436 24 Hour Total    INTAKE   I.V.(mL/kg)  1948.8(24)  1948.8(24)   Shift Total(mL/kg)  1948.8(24)  1948.8(24)   OUTPUT   Urine(mL/kg/hr) 170(0.3) 100  270   Other 847 317  1164   Shift Total(mL/kg) 1017(12.5) 417(5.1)  1434(17.7)   Weight (kg) 81.2 81.2 81.2 81.2           Medications:   ceFEPime (MAXIPIME) IVPB  2 g Intravenous Q12H    enoxaparin  30 mg Subcutaneous Q24H    tbo-filgrastim  480 mcg Subcutaneous Daily           Physical Exam   Constitutional: She is oriented to person, place, and time and well-developed, well-nourished, and in no distress. No distress.   HENT:   Head: Normocephalic and atraumatic.   Mouth/Throat: Oropharynx is clear and moist.   Eyes: EOM are normal. No scleral icterus.   Neck: Neck supple. No JVD present.   Cardiovascular: Regular rhythm. Tachycardia present. Exam reveals no friction rub.   No murmur heard.  Pulmonary/Chest: Effort normal and breath sounds normal. Tachypnea noted. No respiratory distress. She has no wheezes. She has no rales.   Abdominal: Soft. Bowel sounds are normal. She exhibits no distension. There is no abdominal tenderness.   Musculoskeletal:         General: No edema.   Neurological: She is alert and oriented to person, place, and time.   Skin: Skin is warm and dry. No rash noted. She is not diaphoretic. No erythema.   Psychiatric: Affect normal.       Laboratory:  Recent Labs   Lab 08/24/20  1113 08/26/20  1127 08/27/20  0730   WBC 2.88* 0.95* 0.99*   HGB 10.6* 9.9* 8.4*   HCT 36.1* 32.9* 26.5*    157 112*   MONO 9.0  0.3 30.0* 50.0*     Recent Labs   Lab 08/26/20  2148 08/27/20  0405 08/27/20  0808 08/27/20  1459   * 132*  132* 132* 132*   K 3.4* 3.7  3.7 4.3 4.3   CL 98 96  96 96 94*   CO2 12* 18*  18* 20* 23   BUN 18 14  14 13 12   CREATININE 1.4 1.0  1.0 0.8 0.8   CALCIUM 7.5* 7.3*  7.3* 7.4* 7.5*   PHOS 2.4* 3.5  3.5  --  1.4*       Diagnostic Results:  X-Ray: Reviewed  US: Reviewed  Echo: Reviewed  ASSESSMENT/PLAN:     Septic shock with  multiorgan failure  ARF  NORMA  Lactic acidosis - improving on CRRT, Abx  Neutropenia SP chemo - on neupogen  Hyponatremia - avoid hypotonic fluid all IVPB in NS when possible  Hypomagnesemia, hyphos - replace as per CRRT protocol    Minimal UO ~20cc/hour  On CRRT, keeping 50 cc net positive per hour  Rinse CRRT back, stop bicarb gtt, recheck labs in 8 hours (22:00) and will see if needs to resume CRRT      Discussed with Harjit Reddy and Sidney  Total critical care time 35 minutes: included management of organ failures related to critical illness, titration of continuous infusions, review of pertinent labs and imaging studies, discussion with primary team    Seen and examined on CRRT      Thank you for consult, will follow  With any question please call [phone]  Emma Michaels    [facility]  PALOMO Quintana MD, FACPVANESA MD,   MD BREANN Van, NP  [address]  [place], [address]  [phone]

## 2020-08-28 NOTE — ASSESSMENT & PLAN NOTE
- troponin .036 and EKG with no acute STTWC; ST present with no evidence of afib  - feel troponin elevation demand related and no suspicion of ACS currently  - echo with normal LVEF and no WMA  - no further cardiac workup indicated at this time

## 2020-08-28 NOTE — PROGRESS NOTES
Pharmacokinetic Assessment Follow Up: IV Vancomycin    Vancomycin serum concentration assessment(s):    The random level was drawn correctly and can be used to guide therapy at this time. The measurement is below the desired definitive target range of 15 to 20 mcg/mL.    Vancomycin Regimen Plan:    Will reload patient with vancomycin 2000 mg once and then transition to vancomycin 1250 mg IV q 12 hours. Vancomycin trough is scheduled for 8/30 at 0100.     Drug levels (last 3 results):  Recent Labs   Lab Result Units 08/27/20  0808 08/28/20  1137   Vancomycin, Random ug/mL 6.9  --    Vancomycin-Trough ug/mL  --  6.0*       Pharmacy will continue to follow and monitor vancomycin.    Please contact pharmacy at extension [phone] for questions regarding this assessment.    Thank you for the consult,   Eleanor Morales       Patient brief summary:  Huy Cisneros is a 29 y.o. female initiated on antimicrobial therapy with IV Vancomycin for treatment of bacteremia    The patient's current regimen is pulse dosed    Drug Allergies:   Review of patient's allergies indicates:   Allergen Reactions    Sulfa (sulfonamide antibiotics)        Actual Body Weight:   81.2 kg    Renal Function:   Estimated Creatinine Clearance: 96.2 mL/min (based on SCr of 0.9 mg/dL).,     Dialysis Method (if applicable):  N/A    CBC (last 72 hours):  Recent Labs   Lab Result Units 08/26/20  1127 08/27/20  0730 08/28/20  0430   WBC K/uL 0.95* 0.99* 2.50*   Hemoglobin g/dL 9.9* 8.4* 7.2*   Hematocrit % 32.9* 26.5* 23.1*   Platelets K/uL 157 112* 122*   Gran% % 0.0* 10.0* 9.0*   Lymph% % 70.0* 40.0 57.0*   Mono% % 30.0* 50.0* 8.0   Eosinophil% % 0.0 0.0 0.0   Basophil% % 0.0 0.0 0.0   Differential Method  Manual Manual Manual       Metabolic Panel (last 72 hours):  Recent Labs   Lab Result Units 08/26/20  1127 08/26/20  1522 08/26/20  1612 08/26/20  1920 08/26/20  2148 08/26/20  2321 08/27/20  0405 08/27/20  0808 08/27/20  1459 08/27/20  2154  08/28/20  0430   Sodium mmol/L 132* 121*  --  130* 130*  --  132*  132* 132* 132* 133* 133*  133*   Potassium mmol/L 4.0 3.5  --  3.6 3.4*  --  3.7  3.7 4.3 4.3 3.8 3.6  3.6   Chloride mmol/L 98 87*  --  98 98  --  96  96 96 94* 93* 94*  94*   CO2 mmol/L 10* 8*  --  10* 12*  --  18*  18* 20* 23 25 27  27   Glucose mg/dL 89 660*  --  110 98  --  88  88 88 102 100 93  93   Glucose, UA   --   --  Negative  --   --   --   --   --   --   --   --    BUN, Bld mg/dL 14 14  --  18 18  --  14  14 13 12 15 16  16   Creatinine mg/dL 2.00* 1.7*  --  1.5* 1.4  --  1.0  1.0 0.8 0.8 0.9 0.9  0.9   Albumin g/dL 3.7 1.5*  --  2.4* 2.2*  --  2.2*  2.2* 2.2* 2.3* 2.1* 2.0*  2.0*   Total Bilirubin mg/dL 3.8*  --   --  2.6*  --   --   --  2.5*  --   --   --    Alkaline Phosphatase U/L 126  --   --  93  --   --   --  93  --   --   --    AST U/L 210*  --   --  121*  --   --   --  100*  --   --   --    ALT U/L 93*  --   --  83*  --   --   --  73*  --   --   --    Magnesium mg/dL 1.4*  --   --   --   --  1.8 2.5 2.5 2.4 2.5 2.6   Phosphorus mg/dL  --  3.2  --   --  2.4*  --  3.5  3.5  --  1.4* 2.4* 2.3*  2.3*       Vancomycin Administrations:  vancomycin given in the last 96 hours                     vancomycin (VANCOCIN) 1,750 mg in dextrose 5 % 500 mL IVPB (mg) 1,750 mg New Bag 08/27/20 1045    vancomycin (VANCOCIN) 1,750 mg in dextrose 5 % 500 mL IVPB (mg) 1,750 mg New Bag 08/26/20 1245                    Microbiologic Results:  Microbiology Results (last 7 days)       Procedure Component Value Units Date/Time    IV catheter culture [[id number]]  (Abnormal) Collected: 08/26/20 1604    Order Status: Completed Specimen: Catheter Tip Updated: 08/28/20 1238     Aerobic Culture - Cath tip STAPHYLOCOCCUS AUREUS  > 15 colonies  Susceptibility pending      Blood culture [[id number]] Collected: 08/28/20 1137    Order Status: Sent Specimen: Blood from Antecubital, Right Updated: 08/28/20 1138    Blood culture [[id number]] Collected:  08/28/20 1137    Order Status: Sent Specimen: Blood Updated: 08/28/20 1138    Blood culture [[id number]]  (Abnormal) Collected: 08/26/20 1140    Order Status: Completed Specimen: Blood from Peripheral, Hand, Right Updated: 08/28/20 1044     Blood Culture, Routine Gram stain juan bottle: Gram positive cocci in clusters resembling Staph      Results called to and read back by: Trudy Morales RN  08/27/2020        02:08      Gram stain aer bottle: Gram positive cocci in clusters resembling Staph      Positive results previously called 08/27/2020  03:19      STAPHYLOCOCCUS AUREUS  Susceptibility pending  ID consult required at [facility].[place] and [place] locations.      Culture, Anaerobe [[id number]] Collected: 08/26/20 1448    Order Status: Completed Specimen: Incision site from Chest, Right Updated: 08/28/20 0922     Anaerobic Culture Culture in progress    Narrative:      Port incision site    Culture, Anaerobe [[id number]] Collected: 08/26/20 1602    Order Status: Completed Specimen: Catheter Tip from Chest, Right Updated: 08/28/20 0714     Anaerobic Culture Culture in progress    Aerobic culture [[id number]]  (Abnormal) Collected: 08/26/20 1448    Order Status: Completed Specimen: Incision site from Chest, Right Updated: 08/27/20 1040     Aerobic Bacterial Culture STAPHYLOCOCCUS AUREUS  Many  Susceptibility pending      Narrative:      Port incision site.    Aerobic culture [[id number]] Collected: 08/26/20 1604    Order Status: Canceled Specimen: Catheter Tip from Chest, Right     Blood culture [[id number]]     Order Status: Canceled Specimen: Blood

## 2020-08-28 NOTE — PROGRESS NOTES
[facility] ICU 5th Floor  General Surgery  Progress Note    Subjective:     History of Present Illness:  No notes on file    Post-Op Info:  * No surgery found *         Interval History: She underwent port removal yesterday. She feels better overall today. She is tolerating a regular diet. Remains tachycardic with HR in 150s.     Medications:  Continuous Infusions:   sodium chloride 0.9%      norepinephrine bitartrate-D5W Stopped (08/27/20 0730)    sodium bicarbonate drip 150 mL/hr at 08/27/20 1100     Scheduled Meds:   ceFEPime (MAXIPIME) IVPB  2 g Intravenous Q12H    enoxaparin  30 mg Subcutaneous Q24H    tbo-filgrastim  480 mcg Subcutaneous Daily     PRN Meds:acetaminophen, dextrose 50%, diphenhydrAMINE-zinc acetate 2-0.1%, glucagon (human recombinant), influenza, insulin aspart U-100, magnesium sulfate IVPB, ondansetron, ondansetron, ondansetron, ondansetron, pneumoc 13-davion conj-dip cr(PF), sodium chloride 0.9%, sodium phosphate IVPB, sodium phosphate IVPB, sodium phosphate IVPB, Pharmacy to dose Vancomycin consult **AND** vancomycin - pharmacy to dose     Review of patient's allergies indicates:   Allergen Reactions    Sulfa (sulfonamide antibiotics)      Objective:     Vital Signs (Most Recent):  Temp: 98.6 °F (37 °C) (08/27/20 1100)  Pulse: (!) 146 (08/27/20 1130)  Resp: (!) 25 (08/27/20 1130)  BP: 106/74 (08/27/20 1130)  SpO2: 100 % (08/27/20 1130) Vital Signs (24h Range):  Temp:  [98.6 °F (37 °C)-103.5 °F (39.7 °C)] 98.6 °F (37 °C)  Pulse:  [135-165] 146  Resp:  [25-57] 25  SpO2:  [92 %-100 %] 100 %  BP: ()/(45-85) 106/74     Weight: 81.5 kg (179 lb 10.8 oz)  Body mass index is 29.9 kg/m².    Intake/Output - Last 3 Shifts       08/25 0700 - 08/26 0659 08/26 0700 - 08/27 0659 08/27 0700 - 08/28 0659    I.V. (mL/kg)  2102.2 (25.8)     IV Piggyback  2350     Total Intake(mL/kg)  4452.2 (54.6)     Urine (mL/kg/hr)  690 105 (0.2)    Other  1198 527    Stool  0     Total Output   1888 632    Net  +2564.2 -632           Stool Occurrence  0 x           Physical Exam  Constitutional:       General: She is not in acute distress.     Appearance: She is well-developed.   HENT:      Head: Normocephalic and atraumatic.   Eyes:      General:         Right eye: No discharge.         Left eye: No discharge.      Conjunctiva/sclera: Conjunctivae normal.   Neck:      Musculoskeletal: Normal range of motion.   Cardiovascular:      Rate and Rhythm: Regular rhythm. Tachycardia present.      Comments: 155 on tele monitor  Pulmonary:      Effort: Pulmonary effort is normal. No respiratory distress.   Abdominal:      General: There is no distension.      Palpations: Abdomen is soft.      Tenderness: There is no abdominal tenderness.   Musculoskeletal: Normal range of motion.         General: No deformity.   Skin:     General: Skin is warm and dry.      Comments: Port removal dressing is clean and dry, R chest   Neurological:      Mental Status: She is alert and oriented to person, place, and time.   Psychiatric:         Behavior: Behavior normal.         Significant Labs:  CBC:   Recent Labs   Lab 08/27/20  0730   WBC 0.99*   RBC 3.54*   HGB 8.4*   HCT 26.5*   *   MCV 75*   MCH 23.7*   MCHC 31.7*     CMP:   Recent Labs   Lab 08/27/20  0808   GLU 88   CALCIUM 7.4*   ALBUMIN 2.2*   PROT 6.0   *   K 4.3   CO2 20*   CL 96   BUN 13   CREATININE 0.8   ALKPHOS 93   ALT 73*   *   BILITOT 2.5*       Significant Diagnostics:  I have reviewed all pertinent imaging results/findings within the past 24 hours.    Assessment/Plan:     * Septic shock  29F on chemo for metastatic CRC now with presumed sepsis    - Port removed at bedside 8/26  - F/u port catheter culture results  - Continue IV abx  - Regular diet  - Remainder per primary  - Please call with questions        Qasim Proctor MD  General Surgery  [facility] ICU 5th Floor

## 2020-08-28 NOTE — SUBJECTIVE & OBJECTIVE
Interval History: Patient complains of SOB with movement around in bed. WBC up today to 2.5. Port-a-cath tip positive for staph aureus > 15 colonies - BC from 8/26 positive for staph and BC pending today. Per Critical care team patient complaining of back pain - so back imaging ordered - agree with plan.     Review of Systems   Respiratory: Positive for shortness of breath.         Complains of SOB with movement around in bed   Gastrointestinal: Positive for diarrhea. Negative for nausea and vomiting.     Antibiotics (From admission, onward)    Start     Stop Route Frequency Ordered    08/29/20 0200  vancomycin (VANCOCIN) 1,250 mg in dextrose 5 % 250 mL IVPB      -- IV Every 12 hours (non-standard times) 08/28/20 1252    08/28/20 1200  cefepime in dextrose 5 % IVPB 2 g      -- IV Every 8 hours (non-standard times) 08/28/20 1109    08/26/20 1628  vancomycin - pharmacy to dose  (vancomycin IVPB)      -- IV pharmacy to manage frequency 08/26/20 1528        Antifungals (From admission, onward)    None          Objective:     Vital Signs (Most Recent):  Temp: 99.9 °F (37.7 °C) (08/28/20 1600)  Pulse: (!) 139 (08/28/20 1600)  Resp: (!) 35 (08/28/20 1600)  BP: (!) 144/90 (08/28/20 1600)  SpO2: (!) 91 % (08/28/20 1600) Vital Signs (24h Range):  Temp:  [98.1 °F (36.7 °C)-99.9 °F (37.7 °C)] 99.9 °F (37.7 °C)  Pulse:  [127-153] 139  Resp:  [28-39] 35  SpO2:  [91 %-100 %] 91 %  BP: (103-144)/(57-90) 144/90     Weight: 81.2 kg (179 lb)  Body mass index is 29.79 kg/m².    Estimated Creatinine Clearance: 96.2 mL/min (based on SCr of 0.9 mg/dL).    Physical Exam  Cardiovascular:      Heart sounds: Normal heart sounds.   Pulmonary:      Breath sounds: Normal breath sounds.   Abdominal:      General: Bowel sounds are normal. There is no distension.   Musculoskeletal:      Right lower leg: No edema.      Left lower leg: No edema.         Significant Labs:   Blood Culture:   Recent Labs   Lab 08/26/20  1140   LABBLOO Gram stain juan  bottle: Gram positive cocci in clusters resembling Staph  Results called to and read back by: Trudy Morales RN  08/27/2020    02:08  Gram stain aer bottle: Gram positive cocci in clusters resembling Staph  Positive results previously called 08/27/2020  03:19  STAPHYLOCOCCUS AUREUS  Susceptibility pending  ID consult required at [facility] and [place] locations.  *     CBC:   Recent Labs   Lab 08/27/20  0730 08/28/20  0430   WBC 0.99* 2.50*   HGB 8.4* 7.2*   HCT 26.5* 23.1*   * 122*     CMP:   Recent Labs   Lab 08/26/20  1920  08/27/20  0808 08/27/20  1459 08/27/20  2159 08/28/20  0430   *   < > 132* 132* 133* 133*  133*   K 3.6   < > 4.3 4.3 3.8 3.6  3.6   CL 98   < > 96 94* 93* 94*  94*   CO2 10*   < > 20* 23 25 27  27      < > 88 102 100 93  93   BUN 18   < > 13 12 15 16  16   CREATININE 1.5*   < > 0.8 0.8 0.9 0.9  0.9   CALCIUM 7.6*   < > 7.4* 7.5* 7.2* 7.1*  7.1*   PROT 6.1  --  6.0  --   --   --    ALBUMIN 2.4*   < > 2.2* 2.3* 2.1* 2.0*  2.0*   BILITOT 2.6*  --  2.5*  --   --   --    ALKPHOS 93  --  93  --   --   --    *  --  100*  --   --   --    ALT 83*  --  73*  --   --   --    ANIONGAP 22*   < > 16 15 15 12  12   EGFRNONAA 46*   < > >60 >60 >60 >60  >60    < > = values in this interval not displayed.     Urine Culture:   Recent Labs   Lab 06/06/20  0809   LABURIN ESCHERICHIA COLI  >100,000 cfu/ml  *     Urine Studies:   Recent Labs   Lab 07/30/20  1101  08/26/20  1612   COLORU Manuela   < > Yellow   APPEARANCEUA Hazy*   < > Cloudy*   PHUR 6.0   < > 6.0   SPECGRAV 1.020   < > 1.025   PROTEINUA 1+*   < > 1+*   GLUCUA Negative   < > Negative   KETONESU Negative   < > Negative   BILIRUBINUA Negative   < > Negative   OCCULTUA Negative   < > Trace*   NITRITE Negative   < > Positive*   UROBILINOGEN  --    < > Negative   LEUKOCYTESUR Negative   < > Negative   RBCUA 1   < > 0   WBCUA 3   < > 2   BACTERIA Few*   < > Many*   SQUAMEPITHEL 2  --   --    HYALINECASTS 0    < > 3*    < > = values in this interval not displayed.     Wound Culture:   Recent Labs   Lab 08/26/20  1448   LABAERO METHICILLIN RESISTANT STAPHYLOCOCCUS AUREUS  Many  *       Significant Imaging:   Spine imaging orered

## 2020-08-28 NOTE — PROGRESS NOTES
[facility] ICU 5th Floor  Hospital Medicine  Progress Note    Patient Name: Huy Cisneros  MRN: [id number]  Patient Class: IP- Inpatient   Admission Date: 8/26/2020  Length of Stay: 2 days  Attending Physician: Maryana Patel*  Primary Care Provider: Primary Doctor No        Subjective:     Principal Problem:Septic shock        HPI:  Ms. Huy Cisneros is a 28 y/o female who lives in [place], [place] at her residence with her children. The patients PCP is Imtiaz Lindsay PA-C. She is also followed by Dr. Jewel Arzola with Oncology. She has a pmh of colon cancer with liver mets, uterine cysts, and HTN. She has a past surgical history of a mediport placement and a colonoscopy. The patient does not smoke cigarettes, drink alcohol, or use illicit drugs.     She presents to the ED with complaints of fever, vomiting and feeling dizzy since yesterday.  Per the patient she states her port a cath to right chest wall busted a stitch last night she while was bathing. She also has complaints of feeling SOB with lower extremity swelling.     Her ED workup includes WBC--0.95, hemoglobin 9.9, Na--132, creatinine 2.00, magnesium--1.4, lactate--10.7, CPK--180, pH--7.2. Pending blood cultures and wound cultures. She is COVID-19 negative. CXR---There has been interval placement of a right subclavian venous line. Its tip is in the region of the junction of the superior vena cava with the right atrium.  There is no pneumothorax. There is no focal pulmonary infiltrate visualized. XR of the abd---There is a paucity of gas within the gastrointestinal system. There are multiple radiopaque leads projected over the abdomen and pelvis. She will be admitted to the [facility] department for further care.         Overview/Hospital Course:  She was admitted to the [facility] hospital medicine service for further care. We have consulted ID, nephrology, oncology, pulmonology, and general  surgery. Her tunneled line is the suspected source for her bacteremia/septic shock. Blood cultures positive for staph. TTE pending. General sx removed tunneled port at the bedside. Will cont to follow cultures. She was initially given Levophed and Amiodarone. Lactate has improved---she was given IVF and now Nephrology has started Bicarb gtt. Dr. Naqvi was consulted for trialysis line placement---She was started on CRRT, now stopped at this time. Will cont broad spectrum abx. G-CSF started by oncology for neutropenia.    Interval history: In bed, she states she feels better. IS is at the bedside. Tran noted with good UO. Will cont to monitor.     Review of Systems   Constitutional: Positive for activity change, appetite change and fatigue. Negative for chills and fever.   HENT: Negative for trouble swallowing.    Eyes: Negative for visual disturbance.   Respiratory: Positive for shortness of breath. Negative for cough and wheezing.    Cardiovascular: Positive for leg swelling.   Gastrointestinal: Negative for abdominal distention, abdominal pain, diarrhea, nausea and vomiting.   Genitourinary: Negative for difficulty urinating and hematuria.   Musculoskeletal: Negative for gait problem.   Skin: Positive for wound (right chest wall).   Allergic/Immunologic: Positive for immunocompromised state.   Neurological: Positive for weakness.   Hematological: Does not bruise/bleed easily.   Psychiatric/Behavioral: Negative for confusion. The patient is not nervous/anxious.      Objective:     Vital Signs (Most Recent):  Temp: 98.1 °F (36.7 °C) (08/28/20 0730)  Pulse: (!) 130 (08/28/20 0912)  Resp: (!) 39 (08/28/20 0912)  BP: 109/69 (08/28/20 0900)  SpO2: 96 % (08/28/20 0912) Vital Signs (24h Range):  Temp:  [98.1 °F (36.7 °C)-99.6 °F (37.6 °C)] 98.1 °F (36.7 °C)  Pulse:  [127-162] 130  Resp:  [25-43] 39  SpO2:  [90 %-100 %] 96 %  BP: ()/(56-99) 109/69     Weight: 81.2 kg (179 lb)  Body mass index is 29.79  kg/m².    Physical Exam  Vitals signs and nursing note reviewed.   Constitutional:       Appearance: Normal appearance.   HENT:      Head: Normocephalic and atraumatic.      Comments: trialysis line in place at [facility]      Mouth/Throat:      Mouth: Mucous membranes are moist.   Eyes:      Pupils: Pupils are equal, round, and reactive to light.   Neck:      Musculoskeletal: Normal range of motion.   Cardiovascular:      Rate and Rhythm: Normal rate and regular rhythm.      Pulses: Normal pulses.   Abdominal:      Palpations: Abdomen is soft.   Genitourinary:     Comments: Tran catheter  Musculoskeletal: Normal range of motion.   Skin:     General: Skin is warm and dry.      Capillary Refill: Capillary refill takes less than 2 seconds.      Comments: Dressing to the Right CW, clean dry and intact   Neurological:      Mental Status: She is alert and oriented to person, place, and time.   Psychiatric:         Mood and Affect: Mood normal.         Behavior: Behavior normal.           CRANIAL NERVES     CN III, IV, VI   Pupils are equal, round, and reactive to light.       Significant Labs:   BMP:   Recent Labs   Lab 08/28/20  0430   GLU 93  93   *  133*   K 3.6  3.6   CL 94*  94*   CO2 27  27   BUN 16  16   CREATININE 0.9  0.9   CALCIUM 7.1*  7.1*   MG 2.6     CBC:   Recent Labs   Lab 08/26/20  1127 08/27/20  0730 08/28/20  0430   WBC 0.95* 0.99* 2.50*   HGB 9.9* 8.4* 7.2*   HCT 32.9* 26.5* 23.1*    112* 122*     Lactic Acid:   Recent Labs   Lab 08/27/20  0808 08/27/20  1459 08/27/20  2159   LACTATE 10.3* 9.8* 6.9*     Magnesium:   Recent Labs   Lab 08/27/20  1459 08/27/20  2159 08/28/20  0430   MG 2.4 2.5 2.6     POCT Glucose:   Recent Labs   Lab 08/26/20  1757 08/27/20  1326 08/28/20  0044   POCTGLUCOSE 80 108 106     Urine Culture: No results for input(s): LABURIN in the last 48 hours.  Urine Studies:   Recent Labs   Lab 08/26/20  1612   COLORU Yellow   APPEARANCEUA Cloudy*   PHUR 6.0   SPECGRAV  1.025   PROTEINUA 1+*   GLUCUA Negative   KETONESU Negative   BILIRUBINUA Negative   OCCULTUA Trace*   NITRITE Positive*   UROBILINOGEN Negative   LEUKOCYTESUR Negative   RBCUA 0   WBCUA 2   BACTERIA Many*   HYALINECASTS 3*       Significant Imaging: I have reviewed all pertinent imaging results/findings within the past 24 hours.      Assessment/Plan:      * Septic shock  Infected venous access port  Metabolic Acidosis  Staph Bacteremia    The source is likely from dehiscence of Mediport-----will cont broad spectrum abx. ID has been consulted. Following wound cultures as well as blood cultures---BCX growing GP cocci resembling staph. Will redraw blood cultures.. General Sx has been consulted to remove her tunneled port. Lactate was initially elevated---Will reorder lactate--now down. IVF given in the ED. Nephrology was consulted. Bicarb gtt initiated by Nephrology. CRRT was started and now stopped.     Chest pain  Elevated troponin    No chest pain at this time. Troponin elevated---cardiology consulted. No changes on EKG. The troponin elevation could be related to demand--- echo with normal LVEF.    Nausea & vomiting  Prn Zofran ordered.       Hyponatremia  Monitor.       Hypomagnesemia  Replace and monitor.       Neutropenia  Pancytopenia    G-CSF started by oncology for neutropenia.      KIERSTEN (acute kidney injury)  We appreciate nephrology for management. IV fluids given in the ED. Tran catheter placed. Nephrology has ordered a renal ultrasound. Was initially started on CRRT---on 8/28 CRRT stopped overnight---Cr 0.9, urine output 760cc Monitor intake and output.      Metastatic disease  Secondary malignancy of liver  Adenocarcinoma of colon  Neutropenia  Anemia in neoplastic disease    She is followed by Dr. Arzola outpatient. Will re consult oncology while inpatient. Will order neutropenic precautions. Will cont to monitor CBC daily.         VTE Risk Mitigation (From admission, onward)         Ordered      heparin (porcine) injection 2,300 Units  As needed (PRN)      08/27/20 1649     enoxaparin injection 30 mg  Every 24 hours      08/26/20 1648     IP VTE HIGH RISK PATIENT  Once      08/26/20 1458     Place sequential compression device  Until discontinued      08/26/20 1458                Discharge Planning   ZOE:      Code Status: Full Code   Is the patient medically ready for discharge?:     Reason for patient still in hospital (select all that apply): Patient unstable, Patient trending condition, Laboratory test, Treatment and Consult recommendations  Discharge Plan A: Home with family            Critical care time spent on the evaluation and treatment of severe organ dysfunction, review of pertinent labs and imaging studies, discussions with consulting providers and discussions with patient/family: 45 minutes.      Tracy Medrano NP  Department of Hospital Medicine   [facility] ICU 5th Floor

## 2020-08-28 NOTE — SUBJECTIVE & OBJECTIVE
Interval history: In bed, she states she feels better. IS is at the bedside. Tran noted with good UO. Will cont to monitor.     Review of Systems   Constitutional: Positive for activity change, appetite change and fatigue. Negative for chills and fever.   HENT: Negative for trouble swallowing.    Eyes: Negative for visual disturbance.   Respiratory: Positive for shortness of breath. Negative for cough and wheezing.    Cardiovascular: Positive for leg swelling.   Gastrointestinal: Negative for abdominal distention, abdominal pain, diarrhea, nausea and vomiting.   Genitourinary: Negative for difficulty urinating and hematuria.   Musculoskeletal: Negative for gait problem.   Skin: Positive for wound (right chest wall).   Allergic/Immunologic: Positive for immunocompromised state.   Neurological: Positive for weakness.   Hematological: Does not bruise/bleed easily.   Psychiatric/Behavioral: Negative for confusion. The patient is not nervous/anxious.      Objective:     Vital Signs (Most Recent):  Temp: 98.1 °F (36.7 °C) (08/28/20 0730)  Pulse: (!) 130 (08/28/20 0912)  Resp: (!) 39 (08/28/20 0912)  BP: 109/69 (08/28/20 0900)  SpO2: 96 % (08/28/20 0912) Vital Signs (24h Range):  Temp:  [98.1 °F (36.7 °C)-99.6 °F (37.6 °C)] 98.1 °F (36.7 °C)  Pulse:  [127-162] 130  Resp:  [25-43] 39  SpO2:  [90 %-100 %] 96 %  BP: ()/(56-99) 109/69     Weight: 81.2 kg (179 lb)  Body mass index is 29.79 kg/m².    Physical Exam  Vitals signs and nursing note reviewed.   Constitutional:       Appearance: Normal appearance.   HENT:      Head: Normocephalic and atraumatic.      Comments: trialysis line in place at [facility]      Mouth/Throat:      Mouth: Mucous membranes are moist.   Eyes:      Pupils: Pupils are equal, round, and reactive to light.   Neck:      Musculoskeletal: Normal range of motion.   Cardiovascular:      Rate and Rhythm: Normal rate and regular rhythm.      Pulses: Normal pulses.   Abdominal:      Palpations: Abdomen is  soft.   Genitourinary:     Comments: Tran catheter  Musculoskeletal: Normal range of motion.   Skin:     General: Skin is warm and dry.      Capillary Refill: Capillary refill takes less than 2 seconds.      Comments: Dressing to the Right CW, clean dry and intact   Neurological:      Mental Status: She is alert and oriented to person, place, and time.   Psychiatric:         Mood and Affect: Mood normal.         Behavior: Behavior normal.           CRANIAL NERVES     CN III, IV, VI   Pupils are equal, round, and reactive to light.       Significant Labs:   BMP:   Recent Labs   Lab 08/28/20  0430   GLU 93  93   *  133*   K 3.6  3.6   CL 94*  94*   CO2 27  27   BUN 16  16   CREATININE 0.9  0.9   CALCIUM 7.1*  7.1*   MG 2.6     CBC:   Recent Labs   Lab 08/26/20  1127 08/27/20  0730 08/28/20  0430   WBC 0.95* 0.99* 2.50*   HGB 9.9* 8.4* 7.2*   HCT 32.9* 26.5* 23.1*    112* 122*     Lactic Acid:   Recent Labs   Lab 08/27/20  0808 08/27/20  1459 08/27/20  2159   LACTATE 10.3* 9.8* 6.9*     Magnesium:   Recent Labs   Lab 08/27/20  1459 08/27/20  2159 08/28/20  0430   MG 2.4 2.5 2.6     POCT Glucose:   Recent Labs   Lab 08/26/20  1757 08/27/20  1326 08/28/20  0044   POCTGLUCOSE 80 108 106     Urine Culture: No results for input(s): LABURIN in the last 48 hours.  Urine Studies:   Recent Labs   Lab 08/26/20  1612   COLORU Yellow   APPEARANCEUA Cloudy*   PHUR 6.0   SPECGRAV 1.025   PROTEINUA 1+*   GLUCUA Negative   KETONESU Negative   BILIRUBINUA Negative   OCCULTUA Trace*   NITRITE Positive*   UROBILINOGEN Negative   LEUKOCYTESUR Negative   RBCUA 0   WBCUA 2   BACTERIA Many*   HYALINECASTS 3*       Significant Imaging: I have reviewed all pertinent imaging results/findings within the past 24 hours.

## 2020-08-28 NOTE — ASSESSMENT & PLAN NOTE
29 F with PMH of metastatic adenocarcinoma of the colon with liver mets, uterine cysts, and HTN on whom ID is consulted for likely staph aureus bacteremia. She is neutropenic.   WBC up to 2.5 on 8/28. Port removed on 8/26 - positive for staph aureus > 15 colonies. TTE was negative 8/27 for vegetations      -agree with continuing cefepime and vanco for now - WBC just increased today - see if they stay elevated  -needs repeat blood cxs until negative  -follow up on final cultures  -recommend YOLANDA since patient's port was longstanding in place

## 2020-08-28 NOTE — NURSING
Spoke to FLORA Patiño about xray order and pt stating she will be unable to tolerate it. Haroon stated understanding and ordered to have patient complete MRI and CT. Will continue to monitor.

## 2020-08-28 NOTE — ASSESSMENT & PLAN NOTE
Infected venous access port  Metabolic Acidosis  Staph Bacteremia    The source is likely from dehiscence of Mediport-----will cont broad spectrum abx. ID has been consulted. Following wound cultures as well as blood cultures---BCX growing GP cocci resembling staph. Will redraw blood cultures.. General Sx has been consulted to remove her tunneled port. Lactate was initially elevated---Will reorder lactate--now down. IVF given in the ED. Nephrology was consulted. Bicarb gtt initiated by Nephrology. CRRT was started and now stopped.

## 2020-08-28 NOTE — PLAN OF CARE
POC reviewed, pt verbalized understanding. ST on monitor, vitals otherwise stable on RA. Pain managed with PRN pain meds and repositioning. Voids per [place], minimal UOP overnight, nephrology aware. Pt on renal/cardiac diet w/ no complaints of N/V. Pt slept between care. Frequent rounds made for safety, call light in reach. WCTM

## 2020-08-28 NOTE — H&P (VIEW-ONLY)
[facility] ICU 5th Floor  Cardiology  Consult Note    Patient Name: Huy Cisneros  MRN: [id number]  Admission Date: 8/26/2020  Hospital Length of Stay: 2 days  Code Status: Full Code   Attending Provider: Maryana Patel*   Consulting Provider: JOCELYNE Berry, JUANI  Primary Care Physician: Primary Doctor No  Principal Problem:Septic shock    Patient information was obtained from patient, past medical records and ER records.     Inpatient consult to [facility]  Consult performed by: JOCELYNE Orozco, JUANI  Consult ordered by: Silke Yuen NP  Reason for consult: chest pain and elevated troponin         Subjective:     Chief Complaint:  Vomiting, dizziness and fever      HPI:   28yo female with adenocarcinoma of colon with mets to liver, anemia secondary to neoplasm, KIERSTEN, infected venus port s/p removal, metabolic acidosis, neutropenia, lactic acidosis, bacteremia secondary to staph, hypomagnesemia, hyponatremia and elevated troponin who presented to the ER with complaints of vomiting. She is followed by Hem Onc- Dr. Friedman/Dr. Arzola and is currently undergoing chemotherapy with last treatment two weeks ago. She also complained of fever and dizziness upon presentation. She endorsed decreased po intake prior to admission. Upon presentation, she was found to be neutropenic with WBCs--0.95, hemoglobin 9.9, Na--132, creatinine 2.00, magnesium--1.4, lactate--10.7, CPK--180, pH--7.2. Blood culture and wound cultures were drawn and she was  COVID-19 negative. CXR with no acute findings. She was admitted to [facility] Medicine. She underwent port a cath removal with trialysis catheter placement and was temporarily on CRRT due to acidosis and KIERSTEN. Her acidosis and creatinine improved with CRRT as well as IVF. Her BP was soft with pressor use with Levophed but has since stabilized and has been weaned off. Her EKG demonstrated tachycardia with HR in the 130s-140s with  reports of HR up to 150s concerning for afib prompting initiation of IV Amiodarone with later discontinuation after afib ruled out and deemed to be ST. She complained of chest pain and back pain yesterday prompting troponin evaluation with slight elevation at .036. Her chest pain was described as sharp stabbing pain with no associated symptoms of radiation, SOB, diaphoresis, nausea or vomiting and has since resolved. She continues to complain of back pain but reports it has been chronic since the delivery of her last child. Cardiology consulted for evaluation of chest pain and elevated troponin      Hospital Course: per HPI   Past Medical History:   Diagnosis Date    Cancer     Colon CA with mets to the liver    Uterine cyst        Past Surgical History:   Procedure Laterality Date    COLONOSCOPY N/A 7/15/2020    Procedure: COLONOSCOPY;  Surgeon: Hi Drake MD;  Location: [facility] ENDO;  Service: Endoscopy;  Laterality: N/A;    COLONOSCOPY W/ BIOPSIES      INSERTION OF TUNNELED CENTRAL VENOUS CATHETER (CVC) WITH SUBCUTANEOUS PORT N/A 7/17/2020    Procedure: INSERTION, PORT-A-CATH;  Surgeon: Armani Naqvi MD;  Location: [facility] OR;  Service: General;  Laterality: N/A;       Review of patient's allergies indicates:   Allergen Reactions    Sulfa (sulfonamide antibiotics)        No current facility-administered medications on file prior to encounter.      Current Outpatient Medications on File Prior to Encounter   Medication Sig    docusate sodium (COLACE) 100 MG capsule Take 1 capsule (100 mg total) by mouth once daily.    folic acid (FOLVITE) 1 MG tablet Take 1 tablet (1 mg total) by mouth once daily.    HYDROcodone-acetaminophen (NORCO) 5-325 mg per tablet Take 1 tablet by mouth every 8 (eight) hours as needed for Pain (chronic cancer-related pain).    lidocaine-prilocaine (EMLA) cream Apply topically As instructed. Apply to skin overlying port site 30 minutes before chemotherapy.     promethazine (PHENERGAN) 25 MG tablet Take 1 tablet (25 mg total) by mouth every 6 (six) hours as needed for Nausea.     Family History     Problem Relation (Age of Onset)    Hypertension Mother, Maternal Grandmother, Maternal Grandfather, Paternal Grandmother        Tobacco Use    Smoking status: Never Smoker    Smokeless tobacco: Never Used   Substance and Sexual Activity    Alcohol use: No    Drug use: No    Sexual activity: Yes     Partners: Male     Birth control/protection: None     Review of Systems   Constitution: Negative for chills, decreased appetite, diaphoresis, fever, malaise/fatigue, weight gain and weight loss.   Cardiovascular: Positive for chest pain. Negative for claudication, dyspnea on exertion, irregular heartbeat, leg swelling, near-syncope, orthopnea, palpitations and paroxysmal nocturnal dyspnea.   Respiratory: Negative for cough, shortness of breath, snoring, sputum production and wheezing.    Endocrine: Negative for cold intolerance, heat intolerance, polydipsia, polyphagia and polyuria.   Skin: Negative for color change, dry skin, itching, nail changes and poor wound healing.   Musculoskeletal: Positive for back pain. Negative for gout, joint pain and joint swelling.   Gastrointestinal: Negative for bloating, abdominal pain, constipation, diarrhea, hematemesis, hematochezia, melena, nausea and vomiting.   Genitourinary: Negative for dysuria, hematuria and nocturia.   Neurological: Negative for dizziness, headaches, light-headedness, numbness, paresthesias and weakness.   Psychiatric/Behavioral: Negative for altered mental status, depression and memory loss.     Objective:     Vital Signs (Most Recent):  Temp: 98.1 °F (36.7 °C) (08/28/20 0730)  Pulse: (!) 130 (08/28/20 0912)  Resp: (!) 39 (08/28/20 0912)  BP: 109/69 (08/28/20 0900)  SpO2: 96 % (08/28/20 0912) Vital Signs (24h Range):  Temp:  [98.1 °F (36.7 °C)-99.6 °F (37.6 °C)] 98.1 °F (36.7 °C)  Pulse:  [127-162] 130  Resp:  [25-43]  39  SpO2:  [90 %-100 %] 96 %  BP: ()/(56-99) 109/69     Weight: 81.2 kg (179 lb)  Body mass index is 29.79 kg/m².    SpO2: 96 %  O2 Device (Oxygen Therapy): room air      Intake/Output Summary (Last 24 hours) at 8/28/2020 1030  Last data filed at 8/28/2020 0800  Gross per 24 hour   Intake 3016.32 ml   Output 1611 ml   Net 1405.32 ml       Lines/Drains/Airways     Central Venous Catheter Line            Port A Cath Single Lumen 07/17/20 42 days    Trialysis (Dialysis) Catheter 08/26/20 1734 right internal jugular 1 day          Drain                 Urethral Catheter 08/26/20 1636 16 Fr. 1 day          Peripheral Intravenous Line                 Peripheral IV - Single Lumen 08/26/20 1119 20 G Left Wrist 1 day                Physical Exam   Constitutional: She is oriented to person, place, and time. She appears well-developed and well-nourished. She appears ill. No distress.   Cardiovascular: Normal rate and regular rhythm. Exam reveals no gallop.   No murmur heard.  Pulmonary/Chest: Effort normal and breath sounds normal. No respiratory distress. She has no wheezes.   Shallow breathing noted with pulse ox 91% on room air    Abdominal: Soft. Bowel sounds are normal. She exhibits no distension. There is no abdominal tenderness.   Musculoskeletal:         General: Edema (trace BLE edema present ) present.   Neurological: She is alert and oriented to person, place, and time.   Skin: Skin is warm and dry.       Significant Labs:     Recent Labs   Lab 08/28/20  0430   WBC 2.50*   RBC 3.10*   HGB 7.2*   HCT 23.1*   *   MCV 75*   MCH 23.2*   MCHC 31.2*     Recent Labs   Lab 08/28/20  0430   *  133*   K 3.6  3.6   CL 94*  94*   CO2 27  27   BUN 16  16   CREATININE 0.9  0.9   MG 2.6     Recent Labs   Lab 08/28/20  0251   TROPONINI 0.036*       Significant Imaging: Echocardiogram:   Transthoracic echo (TTE) complete (Cupid Only):   Results for orders placed or performed during the hospital encounter of  08/26/20   Echo Color Flow Doppler? Yes   Result Value Ref Range    BSA 1.93 m2    TDI SEPTAL 0.13 m/s    LV LATERAL E/E' RATIO 5.20 m/s    LV SEPTAL E/E' RATIO 6.00 m/s    LA WIDTH 2.00 cm    TDI LATERAL 0.15 m/s    PV PEAK VELOCITY 1.35 cm/s    LVIDD 4.30 3.5 - 6.0 cm    IVS 0.80 0.6 - 1.1 cm    PW 0.80 0.6 - 1.1 cm    Ao root annulus 2.61 cm    LVIDS 1.80 2.1 - 4.0 cm    FS 58 28 - 44 %    LA volume 16.99 cm3    STJ 2.28 cm    Ascending aorta 2.31 cm    LV mass 105.33 g    LA size 2.85 cm    TAPSE 1.79 cm    Left Ventricle Relative Wall Thickness 0.37 cm    AV mean gradient 6 mmHg    AV valve area 2.47 cm2    AV Velocity Ratio 0.71     AV index (prosthetic) 0.79     MV valve area p 1/2 method 11.06 cm2    E/A ratio 0.84     Mean e' 0.14 m/s    E wave decelartion time 68.59 msec    LVOT diameter 2.00 cm    LVOT area 3.1 cm2    LVOT peak saravanan 1.25 m/s    LVOT peak VTI 15.67 cm    Ao peak saravanan 1.77 m/s    Ao VTI 19.90 cm    LVOT stroke volume 49.20 cm3    AV peak gradient 13 mmHg    E/E' ratio 5.57 m/s    MV Peak E Saravanan 0.78 m/s    MV stenosis pressure 1/2 time 19.89 ms    MV Peak A Saravanan 0.93 m/s    LV Systolic Volume 23.09 mL    LV Systolic Volume Index 12.2 mL/m2    LV Diastolic Volume 78.17 mL    LV Diastolic Volume Index 41.42 mL/m2    LA Volume Index 9.0 mL/m2    LV Mass Index 56 g/m2    RA Major Axis 3.20 cm    Left Atrium Minor Axis 3.20 cm    Left Atrium Major Axis 3.88 cm    RA Width 2.33 cm    Right Atrial Pressure (from IVC) 3 mmHg    RVDD 2.00 cm    Right ventricular length in diastole (apical 4-chamber view) 4.10 cm    Narrative    · Normal left ventricular systolic function. The estimated ejection   fraction is 55%.  · Normal LV diastolic function.  · Normal right ventricular systolic function.  · Normal central venous pressure (3 mmHg).        Assessment and Plan:     * Septic shock  - presented with hypotension and tachycardia  - blood cultures pending; wound culture to right port a cath site with prelim  results of staph- on IV abx     Elevated troponin  - troponin .036 and EKG with no acute STTWC; ST present with no evidence of afib  - feel troponin elevation demand related and no suspicion of ACS currently  - echo with normal LVEF and no WMA  - no further cardiac workup indicated at this time     Chest pain  - sharp and stabbing with spontaneous relief  - troponin .036 likely demand related in setting of tachycardia and sepsis  - echo with normal LVEF; no suspicion of ACS currently  - given CA history more concerned about possibility of PE; BLE venous ultrasound with no evidence of PE; no CTA given KIERSTEN upon presentation; shallow breathing noted on PE difficult to discern if true SOB or related to current back pain; pulse ox 91% on room; would not be opposed to evaluation of PE with either VQ scan or CTA will defer final decision to primary team  - on Lovenox for DVT prophylaxis; given improvement of renal function may be able to up titrate to 40mg    KIERSTEN (acute kidney injury)  - creatinine 2.0 upon presentation  - improved with IVF and CRRT  - creatinine .9 this AM; Nephrology on board         VTE Risk Mitigation (From admission, onward)         Ordered     heparin (porcine) injection 2,300 Units  As needed (PRN)      08/27/20 1649     enoxaparin injection 30 mg  Every 24 hours      08/26/20 1648     IP VTE HIGH RISK PATIENT  Once      08/26/20 1458     Place sequential compression device  Until discontinued      08/26/20 1458              > 40 minutes was spent in the care of this patient for evaluation, critical thinking and decision making. Also discussion with patient as to present condition. Not all time was spent in direct face to face with patient as time was spent reviewing ECGs, CXR, labs, medications and past studies.      Thank you for your consult. I will sign off. Please contact us if you have any additional questions.    JOCELYNE Berry, ANP  Cardiology   [facility] ICU  5th Floor

## 2020-08-28 NOTE — PT/OT/SLP PROGRESS
Occupational Therapy   Treatment    Name: Huy Cisneros  MRN: [id number]  Admitting Diagnosis:  Septic shock       Recommendations:     Discharge Recommendations: other (see comments)(TBD)  Discharge Equipment Recommendations:  other (see comments)(TBD)  Barriers to discharge:  None    Assessment:     Huy Cisneros is a 29 y.o. female with a medical diagnosis of Septic shock.  Performance deficits affecting function are weakness, impaired endurance, impaired self care skills, impaired functional mobilty, gait instability, impaired balance, decreased upper extremity function, decreased lower extremity function, decreased coordination, pain, impaired coordination, edema, impaired cardiopulmonary response to activity. Pt found in bed, agreeable to therapy, RN cleared pt for bed activity only secondary to increased HR.  Pt with good participation/tolerance of BUE therex.  She required rest breaks between sets and max vc's for PLB.  -135 throughout. Noted shallow, rapid breaths.  Continue OT services to address functional goals, progressing as able.      Rehab Prognosis:  Good; patient would benefit from acute skilled OT services to address these deficits and reach maximum level of function.       Plan:     Patient to be seen 5 x/week to address the above listed problems via self-care/home management, therapeutic activities, therapeutic exercises  · Plan of Care Expires: 09/27/20  · Plan of Care Reviewed with: patient    Subjective     Pain/Comfort:  · Pain Rating 1: 0/10  · Pain Rating Post-Intervention 1: 0/10    Objective:     Communicated with: RN prior to session.  Patient found HOB elevated with central line, rebolledo catheter(multipe ICU lines and monitors) upon OT entry to room.    General Precautions: Standard, fall, neutropenic   Orthopedic Precautions:N/A   Braces: N/A     Occupational Performance:     Activities of Daily Living:  · Grooming: stand by assistance bed level      [facility] 6 Click  ADL: 13    Treatment & Education:  Pt performed BUE AAROM for shld flex, abd and AROM for shld ir/er, elbow f/e, sup/pro, wrist f/e and finger f/e x 10 reps each.  Pt also performed active reaching 2 x 5 reps for L shld flex to ~60 degrees and x 10 reps for R shld flex to ~90 degrees. Pt tolerated well with rest breaks and PLB.    Pt instructed on PLB technique throughout therex.  Pt requires max vc's to perform efficiently.    Patient left HOB elevated with all lines intact, call button in reach and RN notifiedEducation:      GOALS:   Multidisciplinary Problems     Occupational Therapy Goals        Problem: Occupational Therapy Goal    Goal Priority Disciplines Outcome Interventions   Occupational Therapy Goal     OT, PT/OT Ongoing, Progressing    Description: Goals to be met by: 9/27    Patient will increase functional independence with ADLs by performing:    Feeding with Modified [place].  UE Dressing with Stand-by Assistance.  Grooming while seated at sink with Modified [place].  Toileting from bedside commode with Minimal Assistance for hygiene and clothing management.   Toilet transfer to bedside commode with Minimal Assistance.  Increased functional strength to 3+/5 for BUE.                     Time Tracking:     OT Date of Treatment: 08/28/20  OT Start Time: 1126  OT Stop Time: 1207  OT Total Time (min): 41 min    Billable Minutes:Self Care/Home Management 30  Therapeutic Exercise 11    CARLTON Puga  8/28/2020

## 2020-08-28 NOTE — CONSULTS
See [facility] ID Consult note from yesterday by Dr. Schumacher. ID is following patient - see progress note from today by me.

## 2020-08-28 NOTE — SUBJECTIVE & OBJECTIVE
Interval History:   8/28 - plan for MRI thoracic/lumbar spine and CT scan abdomen/pelvis today    Oncology Treatment Plan:   OP COLORECTAL FOLFOX + BEVACIZUMAB Q2W    Medications:  Continuous Infusions:   lactated ringers 100 mL/hr at 08/28/20 1108    norepinephrine bitartrate-D5W Stopped (08/27/20 0730)    sodium bicarbonate drip Stopped (08/27/20 1700)     Scheduled Meds:   ceFEPime (MAXIPIME) IVPB  2 g Intravenous Q8H    enoxaparin  40 mg Subcutaneous Q24H    [START ON 8/29/2020] vancomycin (VANCOCIN) IVPB  1,250 mg Intravenous Q12H     PRN Meds:acetaminophen, dextrose 50%, diphenhydrAMINE-zinc acetate 2-0.1%, glucagon (human recombinant), heparin (porcine), influenza, insulin aspart U-100, melatonin, ondansetron, ondansetron, ondansetron, ondansetron, pneumoc 13-davion conj-dip cr(PF), sodium chloride 0.9%, Pharmacy to dose Vancomycin consult **AND** vancomycin - pharmacy to dose     Review of Systems   Constitutional: Positive for activity change and fatigue. Negative for fever and unexpected weight change.   HENT: Negative for mouth sores and nosebleeds.    Respiratory: Negative for shortness of breath and wheezing.    Cardiovascular: Negative for chest pain and palpitations.   Gastrointestinal: Negative for abdominal pain and nausea.   Musculoskeletal: Positive for back pain.   Allergic/Immunologic: Negative for food allergies.   Neurological: Positive for weakness.   Psychiatric/Behavioral: Negative for behavioral problems and confusion.     Objective:     Vital Signs (Most Recent):  Temp: 99.9 °F (37.7 °C) (08/28/20 1600)  Pulse: (!) 139 (08/28/20 1600)  Resp: (!) 35 (08/28/20 1600)  BP: (!) 144/90 (08/28/20 1600)  SpO2: (!) 91 % (08/28/20 1600) Vital Signs (24h Range):  Temp:  [98.1 °F (36.7 °C)-99.9 °F (37.7 °C)] 99.9 °F (37.7 °C)  Pulse:  [127-153] 139  Resp:  [28-39] 35  SpO2:  [91 %-100 %] 91 %  BP: (103-144)/(57-90) 144/90     Weight: 81.2 kg (179 lb)  Body mass index is 29.79 kg/m².  Body surface  area is 1.93 meters squared.      Intake/Output Summary (Last 24 hours) at 8/28/2020 1704  Last data filed at 8/28/2020 1500  Gross per 24 hour   Intake 3566.32 ml   Output 1363 ml   Net 2203.32 ml       Physical Exam  Vitals signs and nursing note reviewed.   Constitutional:       General: She is awake.      Appearance: She is not ill-appearing or toxic-appearing.   HENT:      Mouth/Throat:      Pharynx: No oropharyngeal exudate.   Neck:      Musculoskeletal: Neck supple. No neck rigidity.      Thyroid: No thyromegaly.   Cardiovascular:      Rate and Rhythm: Regular rhythm. Tachycardia present.   Pulmonary:      Effort: Pulmonary effort is normal. No respiratory distress.      Breath sounds: No wheezing or rales.   Chest:       Lymphadenopathy:      Cervical: No cervical adenopathy.   Skin:     Findings: No bruising or petechiae.   Neurological:      Mental Status: She is alert and oriented to person, place, and time.   Psychiatric:         Behavior: Behavior is cooperative.         Significant Labs:   All pertinent labs from the last 24 hours have been reviewed.    Diagnostic Results:  I have reviewed all pertinent imaging results/findings within the past 24 hours.

## 2020-08-28 NOTE — PT/OT/SLP PROGRESS
Physical Therapy Treatment    Patient Name:  Huy Cisneros   MRN:  [id number]    Recommendations:     Discharge Recommendations:  (TBD)   Discharge Equipment Recommendations: (TBD)   Barriers to discharge: Decreased caregiver support and decreased functional mobility    Assessment:     Huy Cisneros is a 29 y.o. female admitted with a medical diagnosis of Septic shock.  She presents with the following impairments/functional limitations:  weakness, impaired functional mobilty, impaired cardiopulmonary response to activity, impaired endurance, impaired balance, impaired self care skills, decreased lower extremity function. Nursing requested bed level activity only at this time.    Rehab Prognosis: Good; patient would benefit from acute skilled PT services to address these deficits and reach maximum level of function.    Recent Surgery: * No surgery found *      Plan:     During this hospitalization, patient to be seen 5 x/week to address the identified rehab impairments via therapeutic activities, therapeutic exercises, neuromuscular re-education and progress toward the following goals:    · Plan of Care Expires:  09/23/20    Subjective     Chief Complaint: tired  Patient/Family Comments/goals: sleep  Pain/Comfort:  · Pain Rating 1: 2/10  · Location - Side 1: Bilateral  · Location 1: leg  · Pain Addressed 1: Reposition, Distraction, Cessation of Activity, Nurse notified  · Pain Rating Post-Intervention 1: 1/10      Objective:     Communicated with nurse prior to session.  Patient found HOB elevated with central line, rebolledo catheter upon PT entry to room.     General Precautions: Standard, fall, neutropenic   Orthopedic Precautions:N/A   Braces:       Functional Mobility:  · Deferred at nurses request      AM-PAC 6 CLICK MOBILITY  Turning over in bed (including adjusting bedclothes, sheets and blankets)?: 1  Sitting down on and standing up from a chair with arms (e.g., wheelchair, bedside commode, etc.):  "2  Moving from lying on back to sitting on the side of the bed?: 2  Moving to and from a bed to a chair (including a wheelchair)?: 2  Need to walk in hospital room?: 1  Climbing 3-5 steps with a railing?: 1  Basic Mobility Total Score: 9       Therapeutic Activities and Exercises:   Pt performed BLE AAROM 10 x 3 hip/knee flex/ext, abd/add, hip IR/ER, SAQ, AP.  Rx of achilles stretches 45" x 4 bilaterally.    Patient left HOB elevated with all lines intact, call button in reach and nurse notified..    GOALS:   Multidisciplinary Problems     Physical Therapy Goals        Problem: Physical Therapy Goal    Goal Priority Disciplines Outcome Goal Variances Interventions   Physical Therapy Goal     PT, PT/OT Ongoing, Progressing     Description: Goals to be met by: 20     Patient will increase functional independence with mobility by performin.  BLE AROM supine x 15  2.  Roll bilaterally with supervision  3.  Supine to/from sit with supervision  4.  Sit at EOB 15 min with supervision  5.  Sit to stand with RW with CG                     Time Tracking:     PT Received On: 20  PT Start Time: 1315     PT Stop Time: 1333  PT Total Time (min): 18 min     Billable Minutes: Therapeutic Exercise 18    Treatment Type: Treatment  PT/PTA: PT     PTA Visit Number: 0     Enedina Ashford, PT  2020  "

## 2020-08-28 NOTE — PROGRESS NOTES
[facility] ICU 5th Floor  Critical Care - Medicine  Progress Note    Patient Name: Huy Cisneros  MRN: [id number]  Admission Date: 8/26/2020  Hospital Length of Stay: 2 days  Code Status: Full Code  Attending Provider: Maryana Patel*  Primary Care Provider: Primary Doctor No   Principal Problem: Septic shock    Subjective:     HPI: 29 F with significant pmhx of metastatic adenocarcinoma of the colon with liver mets, uterine cysts, and HTN who presented to [facility] ED for intractable nausea/vomitting. Associated symptoms include fevers, chills, and general malaise which also started when nausea/vomiting started. Pt started on pressor support prior to arrival to ICU.      Hospital/ICU Course: Central line placed (trialysis), pt initiated on CRRT, pressure support continued.  Mediport cultured and removed.  Started on bicarp gtt, IVF, broad spectrum abx. Continues to be on broad spectrum abx, lactic acid trending down. Cultures growing staph aures. Pressors have now been stopped.     Interval History/Significant Events: Pt continues to report feeling better.  No episodes of nausea or vomiting per pt. Afebrile overnight.  Tachycardia improving. Reports bl lower ext pain is improving, now reporting upper back pain. CRRT being held at this time, LA continues to downtrend     Objective:     Vital Signs (Most Recent):  Temp: 99.2 °F (37.3 °C) (08/28/20 0400)  Pulse: (!) 130 (08/28/20 0700)  Resp: (!) 33 (08/28/20 0700)  BP: 111/60 (08/28/20 0700)  SpO2: 99 % (08/28/20 0700) Vital Signs (24h Range):  Temp:  [98.6 °F (37 °C)-99.6 °F (37.6 °C)] 99.2 °F (37.3 °C)  Pulse:  [130-162] 130  Resp:  [25-43] 33  SpO2:  [90 %-100 %] 99 %  BP: ()/(56-99) 111/60     Weight: 81.2 kg (179 lb)  Body mass index is 29.79 kg/m².      Intake/Output Summary (Last 24 hours) at 8/28/2020 0831  Last data filed at 8/28/2020 0600  Gross per 24 hour   Intake 3016.32 ml   Output 1678 ml   Net 1338.32 ml        Gen: Alert to person, place and time. Appears fatigued   Head: Atraumatic, normocephalic   Neck: No JVD present, neck supple, no LAD, trialysis line in place at [facility]   Eyes: Pupils equal and reactive to light,extra-ocular eye movements intact,   Mouth: no erythema, exudates, or lesions present in oropharynx   CV: tachycardic, no rubs, gallops, or murmurs.      Lungs: CTAB no crackles, wheezing, or rhonchi.  No fremitus noted.  Abd: Soft, non tender, non distended, bowel sounds present. No rebound tenderness or guarding present.     EXT: 2+ radial and dorsalis pedis pulses bilaterally.  No edema or cyanosis noted.  bl calf tenderness on palpation   Neuro: CN II: pupils equal, reactive to light. CN III, IV, VI: extra-ocular muscles intact. Lower ext weak 2/2 to pain.   Skin: Warm and dry.  No jaundice noted.      Oxygen Concentration (%): 2 (08/27/20 1300)    Lines/Drains/Airways     Central Venous Catheter Line            Port A Cath Single Lumen 07/17/20 42 days    Trialysis (Dialysis) Catheter 08/26/20 1734 right internal jugular 1 day          Drain                 Urethral Catheter 08/26/20 1636 16 Fr. 1 day          Peripheral Intravenous Line                 Peripheral IV - Single Lumen 08/26/20 1119 20 G Left Wrist 1 day                Significant Labs:    CBC/Anemia Profile:  Recent Labs   Lab 08/26/20  1127 08/27/20  0730 08/28/20  0430   WBC 0.95* 0.99* 2.50*   HGB 9.9* 8.4* 7.2*   HCT 32.9* 26.5* 23.1*    112* 122*   MCV 79* 75* 75*   RDW 18.8* 18.7* 18.9*        Chemistries:  Recent Labs   Lab 08/26/20  1127  08/26/20  1920  08/27/20  0808 08/27/20  1459 08/27/20  2159 08/28/20  0430   *   < > 130*   < > 132* 132* 133* 133*  133*   K 4.0   < > 3.6   < > 4.3 4.3 3.8 3.6  3.6   CL 98   < > 98   < > 96 94* 93* 94*  94*   CO2 10*   < > 10*   < > 20* 23 25 27  27   BUN 14   < > 18   < > 13 12 15 16  16   CREATININE 2.00*   < > 1.5*   < > 0.8 0.8 0.9 0.9  0.9   CALCIUM 8.6*   < > 7.6*    < > 7.4* 7.5* 7.2* 7.1*  7.1*   ALBUMIN 3.7   < > 2.4*   < > 2.2* 2.3* 2.1* 2.0*  2.0*   PROT 7.7  --  6.1  --  6.0  --   --   --    BILITOT 3.8*  --  2.6*  --  2.5*  --   --   --    ALKPHOS 126  --  93  --  93  --   --   --    ALT 93*  --  83*  --  73*  --   --   --    *  --  121*  --  100*  --   --   --    MG 1.4*  --   --    < > 2.5 2.4 2.5 2.6   PHOS  --    < >  --    < >  --  1.4* 2.4* 2.3*  2.3*    < > = values in this interval not displayed.     LA: >12 > 10.3 > 6.9    Assessment/Plan:     Active Diagnoses:    Diagnosis Date Noted POA    PRINCIPAL PROBLEM:  Septic shock [A41.9, R65.21] 08/26/2020 Yes    Chest pain [R07.9] 08/28/2020 No    Elevated troponin [R79.89] 08/28/2020 No    Bacteremia due to Staphylococcus [R78.81] 08/27/2020 Yes    KIERSTEN (acute kidney injury) [N17.9] 08/26/2020 Yes    Infected venous access port [T80.219A] 08/26/2020 Yes    Metabolic acidosis [E87.2] 08/26/2020 Yes    Neutropenia [D70.9] 08/26/2020 Yes    Hypomagnesemia [E83.42] 08/26/2020 Yes    Hyponatremia [E87.1] 08/26/2020 Yes    Nausea & vomiting [R11.2] 08/26/2020 Yes    Lactic acid acidosis [E87.2]  Unknown    Sepsis [A41.9]  Unknown    Anemia in neoplastic disease [D63.0] 08/10/2020 Yes    Metastatic disease [C79.9] 07/14/2020 Yes    Secondary malignancy of liver [C78.7] 07/14/2020 Yes    Adenocarcinoma of colon [C18.9] 07/14/2020 Yes      Problems Resolved During this Admission:     Neuro  -no acute issues, fatigued but able to hold conversation      CV  Shock, likely 2/2 sepsis vs tumor necrosis   -s/p IVF with out improvement of MAPs, iniitally on pressors has now been titrated off  maintain MAPs >65  -LA >12 > 10.3 >6.4, continue to trend   -source likely from dehiscence of Mediport;  -broad spectrum abx vanc + cefepime; follow culture data   -bcx growing GP cocci resembling Staph Aureus.   -cx from mediport growing Staph Aureus   Tachycardic, improving  -up to 162 in the ED, no history of  arrhythmias   -2/2 shock, dehydration  -continue IVF, EKG Sinus tach  -8/29 has been 130s-140s   -continue to monitor      Pulm  -no acute issues at this time  -CXR as above       Renal/Eelctrolytes   ARF  -BUN/CR on admit 14/2.0; baseline Cr 0.65  -Cr improving however still low urine output   -rebolledo in place, monitor I/O, renal US is normal,   -nephrology following CRRT  -8/28 CRRT stopped overnight  Cr 0.9, urine output 760cc   Hypomagnesium  -recommend replete and continue to monitor   AGMA   -AG 28, lactate >12 > 10.3 > 6.4  -on bicarb gtt, per nephrology will stop gtt once bicarp reaches 20   -CRRT was initiated, now stopped (8/28)   -daily CMP, trend LA      GI  Intractable nausea/vomiting  -prn zofran IV, IVF, adv diet as tolerated   Transmits   -T bili 3.8, , ALT 93  -likely 2/2 mets to liver   -hep panel, HIV, continue to monitor  Adenocarcinoma of colon with mets to liver   -s/p resection, receiving chemo through mediport that has now been removed  -[facility] recommends CT with contrast once Cr at baseline   -followed by Dr. Arzola as outpt, hemeOn consulted      ID/Heme  Staph Aures Bacteremia   -continue abx as above and adjust with culture data  -recommend MRI of spine as pt has point tenderness and weakness in lower ext bl  Wound dehiscence  -consult general surgery for assessment of Mediport site  -cultures and abx as above   Pancytopenia  -WC 2.50 likely 2/2 chemo, hemeonc consulted, neutropenic precautions   - H/H 7.2/23.1 no evidence of active bleeding , continue to monitor    -plt 122  -hemeOnc following       Critical secondary to Patient has a condition that poses threat to life and bodily function: Acute Renal Failure     Critical care was time spent personally by me on the following activities: development of treatment plan with patient or surrogate and bedside caregivers, discussions with consultants, evaluation of patient's response to treatment, examination of patient, ordering  and performing treatments and interventions, ordering and review of laboratory studies, ordering and review of radiographic studies, pulse oximetry, re-evaluation of patient's condition.  This critical care time did not overlap with that of any other provider or involve time for any procedures.     Jimmie Malin MD   [facility]  II   Critical Care - Medicine  [facility] ICU 5th Floor

## 2020-08-28 NOTE — CONSULTS
[facility] ICU 5th Floor  Cardiology  Consult Note    Patient Name: Huy Cisneros  MRN: [id number]  Admission Date: 8/26/2020  Hospital Length of Stay: 2 days  Code Status: Full Code   Attending Provider: Maryana Patel*   Consulting Provider: JOCELYNE Berry, JUANI  Primary Care Physician: Primary Doctor No  Principal Problem:Septic shock    Patient information was obtained from patient, past medical records and ER records.     Inpatient consult to [facility]  Consult performed by: JOCELYNE Orozco, JUANI  Consult ordered by: Silke Yuen NP  Reason for consult: chest pain and elevated troponin         Subjective:     Chief Complaint:  Vomiting, dizziness and fever      HPI:   28yo female with adenocarcinoma of colon with mets to liver, anemia secondary to neoplasm, KIERSTEN, infected venus port s/p removal, metabolic acidosis, neutropenia, lactic acidosis, bacteremia secondary to staph, hypomagnesemia, hyponatremia and elevated troponin who presented to the ER with complaints of vomiting. She is followed by Hem Onc- Dr. Friedman/Dr. Arzola and is currently undergoing chemotherapy with last treatment two weeks ago. She also complained of fever and dizziness upon presentation. She endorsed decreased po intake prior to admission. Upon presentation, she was found to be neutropenic with WBCs--0.95, hemoglobin 9.9, Na--132, creatinine 2.00, magnesium--1.4, lactate--10.7, CPK--180, pH--7.2. Blood culture and wound cultures were drawn and she was  COVID-19 negative. CXR with no acute findings. She was admitted to [facility]. She underwent port a cath removal with trialysis catheter placement and was temporarily on CRRT due to acidosis and KIERSTEN. Her acidosis and creatinine improved with CRRT as well as IVF. Her BP was soft with pressor use with Levophed but has since stabilized and has been weaned off. Her EKG demonstrated tachycardia with HR in the 130s-140s with  reports of HR up to 150s concerning for afib prompting initiation of IV Amiodarone with later discontinuation after afib ruled out and deemed to be ST. She complained of chest pain and back pain yesterday prompting troponin evaluation with slight elevation at .036. Her chest pain was described as sharp stabbing pain with no associated symptoms of radiation, SOB, diaphoresis, nausea or vomiting and has since resolved. She continues to complain of back pain but reports it has been chronic since the delivery of her last child. Cardiology consulted for evaluation of chest pain and elevated troponin      Hospital Course: per HPI   Past Medical History:   Diagnosis Date    Cancer     Colon CA with mets to the liver    Uterine cyst        Past Surgical History:   Procedure Laterality Date    COLONOSCOPY N/A 7/15/2020    Procedure: COLONOSCOPY;  Surgeon: Hi Drake MD;  Location: [facility] ENDO;  Service: Endoscopy;  Laterality: N/A;    COLONOSCOPY W/ BIOPSIES      INSERTION OF TUNNELED CENTRAL VENOUS CATHETER (CVC) WITH SUBCUTANEOUS PORT N/A 7/17/2020    Procedure: INSERTION, PORT-A-CATH;  Surgeon: Armani Naqvi MD;  Location: [facility] OR;  Service: General;  Laterality: N/A;       Review of patient's allergies indicates:   Allergen Reactions    Sulfa (sulfonamide antibiotics)        No current facility-administered medications on file prior to encounter.      Current Outpatient Medications on File Prior to Encounter   Medication Sig    docusate sodium (COLACE) 100 MG capsule Take 1 capsule (100 mg total) by mouth once daily.    folic acid (FOLVITE) 1 MG tablet Take 1 tablet (1 mg total) by mouth once daily.    HYDROcodone-acetaminophen (NORCO) 5-325 mg per tablet Take 1 tablet by mouth every 8 (eight) hours as needed for Pain (chronic cancer-related pain).    lidocaine-prilocaine (EMLA) cream Apply topically As instructed. Apply to skin overlying port site 30 minutes before chemotherapy.     promethazine (PHENERGAN) 25 MG tablet Take 1 tablet (25 mg total) by mouth every 6 (six) hours as needed for Nausea.     Family History     Problem Relation (Age of Onset)    Hypertension Mother, Maternal Grandmother, Maternal Grandfather, Paternal Grandmother        Tobacco Use    Smoking status: Never Smoker    Smokeless tobacco: Never Used   Substance and Sexual Activity    Alcohol use: No    Drug use: No    Sexual activity: Yes     Partners: Male     Birth control/protection: None     Review of Systems   Constitution: Negative for chills, decreased appetite, diaphoresis, fever, malaise/fatigue, weight gain and weight loss.   Cardiovascular: Positive for chest pain. Negative for claudication, dyspnea on exertion, irregular heartbeat, leg swelling, near-syncope, orthopnea, palpitations and paroxysmal nocturnal dyspnea.   Respiratory: Negative for cough, shortness of breath, snoring, sputum production and wheezing.    Endocrine: Negative for cold intolerance, heat intolerance, polydipsia, polyphagia and polyuria.   Skin: Negative for color change, dry skin, itching, nail changes and poor wound healing.   Musculoskeletal: Positive for back pain. Negative for gout, joint pain and joint swelling.   Gastrointestinal: Negative for bloating, abdominal pain, constipation, diarrhea, hematemesis, hematochezia, melena, nausea and vomiting.   Genitourinary: Negative for dysuria, hematuria and nocturia.   Neurological: Negative for dizziness, headaches, light-headedness, numbness, paresthesias and weakness.   Psychiatric/Behavioral: Negative for altered mental status, depression and memory loss.     Objective:     Vital Signs (Most Recent):  Temp: 98.1 °F (36.7 °C) (08/28/20 0730)  Pulse: (!) 130 (08/28/20 0912)  Resp: (!) 39 (08/28/20 0912)  BP: 109/69 (08/28/20 0900)  SpO2: 96 % (08/28/20 0912) Vital Signs (24h Range):  Temp:  [98.1 °F (36.7 °C)-99.6 °F (37.6 °C)] 98.1 °F (36.7 °C)  Pulse:  [127-162] 130  Resp:  [25-43]  39  SpO2:  [90 %-100 %] 96 %  BP: ()/(56-99) 109/69     Weight: 81.2 kg (179 lb)  Body mass index is 29.79 kg/m².    SpO2: 96 %  O2 Device (Oxygen Therapy): room air      Intake/Output Summary (Last 24 hours) at 8/28/2020 1030  Last data filed at 8/28/2020 0800  Gross per 24 hour   Intake 3016.32 ml   Output 1611 ml   Net 1405.32 ml       Lines/Drains/Airways     Central Venous Catheter Line            Port A Cath Single Lumen 07/17/20 42 days    Trialysis (Dialysis) Catheter 08/26/20 1734 right internal jugular 1 day          Drain                 Urethral Catheter 08/26/20 1636 16 Fr. 1 day          Peripheral Intravenous Line                 Peripheral IV - Single Lumen 08/26/20 1119 20 G Left Wrist 1 day                Physical Exam   Constitutional: She is oriented to person, place, and time. She appears well-developed and well-nourished. She appears ill. No distress.   Cardiovascular: Normal rate and regular rhythm. Exam reveals no gallop.   No murmur heard.  Pulmonary/Chest: Effort normal and breath sounds normal. No respiratory distress. She has no wheezes.   Shallow breathing noted with pulse ox 91% on room air    Abdominal: Soft. Bowel sounds are normal. She exhibits no distension. There is no abdominal tenderness.   Musculoskeletal:         General: Edema (trace BLE edema present ) present.   Neurological: She is alert and oriented to person, place, and time.   Skin: Skin is warm and dry.       Significant Labs:     Recent Labs   Lab 08/28/20  0430   WBC 2.50*   RBC 3.10*   HGB 7.2*   HCT 23.1*   *   MCV 75*   MCH 23.2*   MCHC 31.2*     Recent Labs   Lab 08/28/20  0430   *  133*   K 3.6  3.6   CL 94*  94*   CO2 27  27   BUN 16  16   CREATININE 0.9  0.9   MG 2.6     Recent Labs   Lab 08/28/20  0251   TROPONINI 0.036*       Significant Imaging: Echocardiogram:   Transthoracic echo (TTE) complete (Cupid Only):   Results for orders placed or performed during the hospital encounter of  08/26/20   Echo Color Flow Doppler? Yes   Result Value Ref Range    BSA 1.93 m2    TDI SEPTAL 0.13 m/s    LV LATERAL E/E' RATIO 5.20 m/s    LV SEPTAL E/E' RATIO 6.00 m/s    LA WIDTH 2.00 cm    TDI LATERAL 0.15 m/s    PV PEAK VELOCITY 1.35 cm/s    LVIDD 4.30 3.5 - 6.0 cm    IVS 0.80 0.6 - 1.1 cm    PW 0.80 0.6 - 1.1 cm    Ao root annulus 2.61 cm    LVIDS 1.80 2.1 - 4.0 cm    FS 58 28 - 44 %    LA volume 16.99 cm3    STJ 2.28 cm    Ascending aorta 2.31 cm    LV mass 105.33 g    LA size 2.85 cm    TAPSE 1.79 cm    Left Ventricle Relative Wall Thickness 0.37 cm    AV mean gradient 6 mmHg    AV valve area 2.47 cm2    AV Velocity Ratio 0.71     AV index (prosthetic) 0.79     MV valve area p 1/2 method 11.06 cm2    E/A ratio 0.84     Mean e' 0.14 m/s    E wave decelartion time 68.59 msec    LVOT diameter 2.00 cm    LVOT area 3.1 cm2    LVOT peak saravanan 1.25 m/s    LVOT peak VTI 15.67 cm    Ao peak saravanan 1.77 m/s    Ao VTI 19.90 cm    LVOT stroke volume 49.20 cm3    AV peak gradient 13 mmHg    E/E' ratio 5.57 m/s    MV Peak E Saravanan 0.78 m/s    MV stenosis pressure 1/2 time 19.89 ms    MV Peak A Saravanan 0.93 m/s    LV Systolic Volume 23.09 mL    LV Systolic Volume Index 12.2 mL/m2    LV Diastolic Volume 78.17 mL    LV Diastolic Volume Index 41.42 mL/m2    LA Volume Index 9.0 mL/m2    LV Mass Index 56 g/m2    RA Major Axis 3.20 cm    Left Atrium Minor Axis 3.20 cm    Left Atrium Major Axis 3.88 cm    RA Width 2.33 cm    Right Atrial Pressure (from IVC) 3 mmHg    RVDD 2.00 cm    Right ventricular length in diastole (apical 4-chamber view) 4.10 cm    Narrative    · Normal left ventricular systolic function. The estimated ejection   fraction is 55%.  · Normal LV diastolic function.  · Normal right ventricular systolic function.  · Normal central venous pressure (3 mmHg).        Assessment and Plan:     * Septic shock  - presented with hypotension and tachycardia  - blood cultures pending; wound culture to right port a cath site with prelim  results of staph- on IV abx     Elevated troponin  - troponin .036 and EKG with no acute STTWC; ST present with no evidence of afib  - feel troponin elevation demand related and no suspicion of ACS currently  - echo with normal LVEF and no WMA  - no further cardiac workup indicated at this time     Chest pain  - sharp and stabbing with spontaneous relief  - troponin .036 likely demand related in setting of tachycardia and sepsis  - echo with normal LVEF; no suspicion of ACS currently  - given CA history more concerned about possibility of PE; BLE venous ultrasound with no evidence of PE; no CTA given KIERSTEN upon presentation; shallow breathing noted on PE difficult to discern if true SOB or related to current back pain; pulse ox 91% on room; would not be opposed to evaluation of PE with either VQ scan or CTA will defer final decision to primary team  - on Lovenox for DVT prophylaxis; given improvement of renal function may be able to up titrate to 40mg    KIERSTEN (acute kidney injury)  - creatinine 2.0 upon presentation  - improved with IVF and CRRT  - creatinine .9 this AM; Nephrology on board         VTE Risk Mitigation (From admission, onward)         Ordered     heparin (porcine) injection 2,300 Units  As needed (PRN)      08/27/20 1649     enoxaparin injection 30 mg  Every 24 hours      08/26/20 1648     IP VTE HIGH RISK PATIENT  Once      08/26/20 1458     Place sequential compression device  Until discontinued      08/26/20 1458              > 40 minutes was spent in the care of this patient for evaluation, critical thinking and decision making. Also discussion with patient as to present condition. Not all time was spent in direct face to face with patient as time was spent reviewing ECGs, CXR, labs, medications and past studies.      Thank you for your consult. I will sign off. Please contact us if you have any additional questions.    JOCELYNE Berry, ANP  Cardiology   [facility] ICU  5th Floor

## 2020-08-28 NOTE — SUBJECTIVE & OBJECTIVE
Past Medical History:   Diagnosis Date    Cancer     Colon CA with mets to the liver    Uterine cyst        Past Surgical History:   Procedure Laterality Date    COLONOSCOPY N/A 7/15/2020    Procedure: COLONOSCOPY;  Surgeon: Hi Drake MD;  Location: [facility] ENDO;  Service: Endoscopy;  Laterality: N/A;    COLONOSCOPY W/ BIOPSIES      INSERTION OF TUNNELED CENTRAL VENOUS CATHETER (CVC) WITH SUBCUTANEOUS PORT N/A 7/17/2020    Procedure: INSERTION, PORT-A-CATH;  Surgeon: Armani Naqvi MD;  Location: [facility] OR;  Service: General;  Laterality: N/A;       Review of patient's allergies indicates:   Allergen Reactions    Sulfa (sulfonamide antibiotics)        No current facility-administered medications on file prior to encounter.      Current Outpatient Medications on File Prior to Encounter   Medication Sig    docusate sodium (COLACE) 100 MG capsule Take 1 capsule (100 mg total) by mouth once daily.    folic acid (FOLVITE) 1 MG tablet Take 1 tablet (1 mg total) by mouth once daily.    HYDROcodone-acetaminophen (NORCO) 5-325 mg per tablet Take 1 tablet by mouth every 8 (eight) hours as needed for Pain (chronic cancer-related pain).    lidocaine-prilocaine (EMLA) cream Apply topically As instructed. Apply to skin overlying port site 30 minutes before chemotherapy.    promethazine (PHENERGAN) 25 MG tablet Take 1 tablet (25 mg total) by mouth every 6 (six) hours as needed for Nausea.     Family History     Problem Relation (Age of Onset)    Hypertension Mother, Maternal Grandmother, Maternal Grandfather, Paternal Grandmother        Tobacco Use    Smoking status: Never Smoker    Smokeless tobacco: Never Used   Substance and Sexual Activity    Alcohol use: No    Drug use: No    Sexual activity: Yes     Partners: Male     Birth control/protection: None     Review of Systems   Constitution: Negative for chills, decreased appetite, diaphoresis, fever, malaise/fatigue, weight gain and weight loss.    Cardiovascular: Positive for chest pain. Negative for claudication, dyspnea on exertion, irregular heartbeat, leg swelling, near-syncope, orthopnea, palpitations and paroxysmal nocturnal dyspnea.   Respiratory: Negative for cough, shortness of breath, snoring, sputum production and wheezing.    Endocrine: Negative for cold intolerance, heat intolerance, polydipsia, polyphagia and polyuria.   Skin: Negative for color change, dry skin, itching, nail changes and poor wound healing.   Musculoskeletal: Positive for back pain. Negative for gout, joint pain and joint swelling.   Gastrointestinal: Negative for bloating, abdominal pain, constipation, diarrhea, hematemesis, hematochezia, melena, nausea and vomiting.   Genitourinary: Negative for dysuria, hematuria and nocturia.   Neurological: Negative for dizziness, headaches, light-headedness, numbness, paresthesias and weakness.   Psychiatric/Behavioral: Negative for altered mental status, depression and memory loss.     Objective:     Vital Signs (Most Recent):  Temp: 98.1 °F (36.7 °C) (08/28/20 0730)  Pulse: (!) 130 (08/28/20 0912)  Resp: (!) 39 (08/28/20 0912)  BP: 109/69 (08/28/20 0900)  SpO2: 96 % (08/28/20 0912) Vital Signs (24h Range):  Temp:  [98.1 °F (36.7 °C)-99.6 °F (37.6 °C)] 98.1 °F (36.7 °C)  Pulse:  [127-162] 130  Resp:  [25-43] 39  SpO2:  [90 %-100 %] 96 %  BP: ()/(56-99) 109/69     Weight: 81.2 kg (179 lb)  Body mass index is 29.79 kg/m².    SpO2: 96 %  O2 Device (Oxygen Therapy): room air      Intake/Output Summary (Last 24 hours) at 8/28/2020 1030  Last data filed at 8/28/2020 0800  Gross per 24 hour   Intake 3016.32 ml   Output 1611 ml   Net 1405.32 ml       Lines/Drains/Airways     Central Venous Catheter Line            Port A Cath Single Lumen 07/17/20 42 days    Trialysis (Dialysis) Catheter 08/26/20 1734 right internal jugular 1 day          Drain                 Urethral Catheter 08/26/20 1636 16 Fr. 1 day          Peripheral Intravenous  Line                 Peripheral IV - Single Lumen 08/26/20 1119 20 G Left Wrist 1 day                Physical Exam   Constitutional: She is oriented to person, place, and time. She appears well-developed and well-nourished. She appears ill. No distress.   Cardiovascular: Normal rate and regular rhythm. Exam reveals no gallop.   No murmur heard.  Pulmonary/Chest: Effort normal and breath sounds normal. No respiratory distress. She has no wheezes.   Shallow breathing noted with pulse ox 91% on room air    Abdominal: Soft. Bowel sounds are normal. She exhibits no distension. There is no abdominal tenderness.   Musculoskeletal:         General: Edema (trace BLE edema present ) present.   Neurological: She is alert and oriented to person, place, and time.   Skin: Skin is warm and dry.       Significant Labs:     Recent Labs   Lab 08/28/20  0430   WBC 2.50*   RBC 3.10*   HGB 7.2*   HCT 23.1*   *   MCV 75*   MCH 23.2*   MCHC 31.2*     Recent Labs   Lab 08/28/20  0430   *  133*   K 3.6  3.6   CL 94*  94*   CO2 27  27   BUN 16  16   CREATININE 0.9  0.9   MG 2.6     Recent Labs   Lab 08/28/20  0251   TROPONINI 0.036*       Significant Imaging: Echocardiogram:   Transthoracic echo (TTE) complete (Cupid Only):   Results for orders placed or performed during the hospital encounter of 08/26/20   Echo Color Flow Doppler? Yes   Result Value Ref Range    BSA 1.93 m2    TDI SEPTAL 0.13 m/s    LV LATERAL E/E' RATIO 5.20 m/s    LV SEPTAL E/E' RATIO 6.00 m/s    LA WIDTH 2.00 cm    TDI LATERAL 0.15 m/s    PV PEAK VELOCITY 1.35 cm/s    LVIDD 4.30 3.5 - 6.0 cm    IVS 0.80 0.6 - 1.1 cm    PW 0.80 0.6 - 1.1 cm    Ao root annulus 2.61 cm    LVIDS 1.80 2.1 - 4.0 cm    FS 58 28 - 44 %    LA volume 16.99 cm3    STJ 2.28 cm    Ascending aorta 2.31 cm    LV mass 105.33 g    LA size 2.85 cm    TAPSE 1.79 cm    Left Ventricle Relative Wall Thickness 0.37 cm    AV mean gradient 6 mmHg    AV valve area 2.47 cm2    AV Velocity Ratio  0.71     AV index (prosthetic) 0.79     MV valve area p 1/2 method 11.06 cm2    E/A ratio 0.84     Mean e' 0.14 m/s    E wave decelartion time 68.59 msec    LVOT diameter 2.00 cm    LVOT area 3.1 cm2    LVOT peak saravanan 1.25 m/s    LVOT peak VTI 15.67 cm    Ao peak saravanan 1.77 m/s    Ao VTI 19.90 cm    LVOT stroke volume 49.20 cm3    AV peak gradient 13 mmHg    E/E' ratio 5.57 m/s    MV Peak E Saravanan 0.78 m/s    MV stenosis pressure 1/2 time 19.89 ms    MV Peak A Saravanan 0.93 m/s    LV Systolic Volume 23.09 mL    LV Systolic Volume Index 12.2 mL/m2    LV Diastolic Volume 78.17 mL    LV Diastolic Volume Index 41.42 mL/m2    LA Volume Index 9.0 mL/m2    LV Mass Index 56 g/m2    RA Major Axis 3.20 cm    Left Atrium Minor Axis 3.20 cm    Left Atrium Major Axis 3.88 cm    RA Width 2.33 cm    Right Atrial Pressure (from IVC) 3 mmHg    RVDD 2.00 cm    Right ventricular length in diastole (apical 4-chamber view) 4.10 cm    Narrative    · Normal left ventricular systolic function. The estimated ejection   fraction is 55%.  · Normal LV diastolic function.  · Normal right ventricular systolic function.  · Normal central venous pressure (3 mmHg).

## 2020-08-28 NOTE — ASSESSMENT & PLAN NOTE
Elevated troponin    No chest pain at this time. Troponin elevated---cardiology consulted. No changes on EKG. The troponin elevation could be related to demand--- echo with normal LVEF.

## 2020-08-28 NOTE — NURSING
Spoke with Dr. Nguyen regarding patient lab results. CRRT being held at this moment and LR infusion started at 50cc/hr. PANKAJ

## 2020-08-28 NOTE — ASSESSMENT & PLAN NOTE
- sharp and stabbing with spontaneous relief  - troponin .036 likely demand related in setting of tachycardia and sepsis  - echo with normal LVEF; no suspicion of ACS currently  - given CA history more concerned about possibility of PE; BLE venous ultrasound with no evidence of PE; no CTA given KIERSTEN upon presentation; shallow breathing noted on PE difficult to discern if true SOB or related to current back pain; pulse ox 91% on room; would not be opposed to evaluation of PE with either VQ scan or CTA will defer final decision to primary team  - on Lovenox for DVT prophylaxis; given improvement of renal function may be able to up titrate to 40mg

## 2020-08-28 NOTE — ASSESSMENT & PLAN NOTE
We appreciate nephrology for management. IV fluids given in the ED. Tran catheter placed. Nephrology has ordered a renal ultrasound. Was initially started on CRRT---on 8/28 CRRT stopped overnight---Cr 0.9, urine output 760cc Monitor intake and output.

## 2020-08-28 NOTE — CHAPLAIN
When I visited with patient she was oriented and remembered me from last admission.  She appeared to be moving slowly and was anxious.  Everything was affected including her legs.  I sat with her a while and I also provided prayer per her request.  I reassured her that she was in good hands. I will visit again.

## 2020-08-28 NOTE — HPI
30yo female with adenocarcinoma of colon with mets to liver, anemia secondary to neoplasm, KIERSTEN, infected venus port s/p removal, metabolic acidosis, neutropenia, lactic acidosis, bacteremia secondary to staph, hypomagnesemia, hyponatremia and elevated troponin who presented to the ER with complaints of vomiting. She is followed by Hem Onc- Dr. Friedman/Dr. Arzola and is currently undergoing chemotherapy with last treatment two weeks ago. She also complained of fever and dizziness upon presentation. She endorsed decreased po intake prior to admission. Upon presentation, she was found to be neutropenic with WBCs--0.95, hemoglobin 9.9, Na--132, creatinine 2.00, magnesium--1.4, lactate--10.7, CPK--180, pH--7.2. Blood culture and wound cultures were drawn and she was  COVID-19 negative. CXR with no acute findings. She was admitted to [facility] Medicine. She underwent port a cath removal with trialysis catheter placement and was temporarily on CRRT due to acidosis and KIERSTEN. Her acidosis and creatinine improved with CRRT as well as IVF. Her BP was soft with pressor use with Levophed but has since stabilized and has been weaned off. Her EKG demonstrated tachycardia with HR in the 130s-140s with reports of HR up to 150s concerning for afib prompting initiation of IV Amiodarone with later discontinuation after afib ruled out and deemed to be ST. She complained of chest pain and back pain yesterday prompting troponin evaluation with slight elevation at .036. Her chest pain was described as sharp stabbing pain with no associated symptoms of radiation, SOB, diaphoresis, nausea or vomiting and has since resolved. She continues to complain of back pain but reports it has been chronic since the delivery of her last child. Cardiology consulted for evaluation of chest pain and elevated troponin

## 2020-08-28 NOTE — ASSESSMENT & PLAN NOTE
- presented with hypotension and tachycardia  - blood cultures pending; wound culture to right port a cath site with prelim results of staph- on IV abx

## 2020-08-28 NOTE — NURSING
"Pt woke up c/o chest pain 6/10. Stated "It just feels like chest pain" NP notified and orders placed. Will follow up. Will continue to monitor.   "

## 2020-08-28 NOTE — ASSESSMENT & PLAN NOTE
- creatinine 2.0 upon presentation  - improved with IVF and CRRT  - creatinine .9 this AM; Nephrology on board

## 2020-08-28 NOTE — ASSESSMENT & PLAN NOTE
29F on chemo for metastatic CRC now with presumed sepsis    - Port removed at bedside 8/26  - F/u port catheter culture results  - Continue IV abx  - Regular diet  - Remainder per primary  - Please call with questions

## 2020-08-28 NOTE — PROGRESS NOTES
Nephrology Progress Note      Consult Requested By: Maryana Patel*  Reason for Consult: ESRD    SUBJECTIVE:     Review of Systems   Constitutional: Negative for chills and fever.   Respiratory: Negative for cough and shortness of breath (much better but on 2L O2).    Cardiovascular: Negative for chest pain and leg swelling.   Gastrointestinal: Positive for nausea. Negative for vomiting.   Musculoskeletal:        B calf pain       Past Medical History:   Diagnosis Date    Cancer     Colon CA with mets to the liver    Uterine cyst      Past Surgical History:   Procedure Laterality Date    COLONOSCOPY N/A 7/15/2020    Procedure: COLONOSCOPY;  Surgeon: Hi Drake MD;  Location: [facility] ENDO;  Service: Endoscopy;  Laterality: N/A;    COLONOSCOPY W/ BIOPSIES      INSERTION OF TUNNELED CENTRAL VENOUS CATHETER (CVC) WITH SUBCUTANEOUS PORT N/A 7/17/2020    Procedure: INSERTION, PORT-A-CATH;  Surgeon: Armani Naqvi MD;  Location: [facility] OR;  Service: General;  Laterality: N/A;     Family History   Problem Relation Age of Onset    Hypertension Mother     Hypertension Maternal Grandmother     Hypertension Maternal Grandfather     Hypertension Paternal Grandmother      Social History     Tobacco Use    Smoking status: Never Smoker    Smokeless tobacco: Never Used   Substance Use Topics    Alcohol use: No    Drug use: No       Review of patient's allergies indicates:   Allergen Reactions    Sulfa (sulfonamide antibiotics)             OBJECTIVE:     Vital Signs (Most Recent)  Vitals:    08/28/20 0900 08/28/20 0912 08/28/20 1000 08/28/20 1100   BP: 109/69  116/76    BP Location:       Patient Position:       Pulse: (!) 127 (!) 130 (!) 131 (!) 131   Resp: (!) 35 (!) 39 (!) 38 (!) 42   Temp:       TempSrc:       SpO2: 95% 96% 96% 95%   Weight:       Height:             Date 08/28/20 0700 - 08/29/20 0659   Shift 1155-7039 6264-8854 5645-1197 24 Hour Total   INTAKE   Shift Total(mL/kg)        OUTPUT   Urine(mL/kg/hr) 175   175   Shift Total(mL/kg) 175(2.2)   175(2.2)   Weight (kg) 81.2 81.2 81.2 81.2           Medications:   ceFEPime (MAXIPIME) IVPB  2 g Intravenous Q8H    enoxaparin  40 mg Subcutaneous Q24H    tbo-filgrastim  480 mcg Subcutaneous Daily           Physical Exam   Constitutional: She is oriented to person, place, and time and well-developed, well-nourished, and in no distress. No distress.   HENT:   Head: Normocephalic and atraumatic.   Mouth/Throat: Oropharynx is clear and moist.   Eyes: EOM are normal. No scleral icterus.   Neck: Neck supple. No JVD present.   Cardiovascular: Regular rhythm. Tachycardia present. Exam reveals no friction rub.   No murmur heard.  Pulmonary/Chest: Effort normal and breath sounds normal. Tachypnea noted. No respiratory distress. She has no wheezes. She has no rales.   Abdominal: Soft. Bowel sounds are normal. She exhibits no distension. There is no abdominal tenderness.   Musculoskeletal:         General: No edema.   Neurological: She is alert and oriented to person, place, and time.   Skin: Skin is warm and dry. No rash noted. She is not diaphoretic. No erythema.   Psychiatric: Affect normal.       Laboratory:  Recent Labs   Lab 08/26/20  1127 08/27/20  0730 08/28/20  0430   WBC 0.95* 0.99* 2.50*   HGB 9.9* 8.4* 7.2*   HCT 32.9* 26.5* 23.1*    112* 122*   MONO 30.0* 50.0* 8.0  Test Not Performed     Recent Labs   Lab 08/27/20  1459 08/27/20  2159 08/28/20  0430   * 133* 133*  133*   K 4.3 3.8 3.6  3.6   CL 94* 93* 94*  94*   CO2 23 25 27  27   BUN 12 15 16  16   CREATININE 0.8 0.9 0.9  0.9   CALCIUM 7.5* 7.2* 7.1*  7.1*   PHOS 1.4* 2.4* 2.3*  2.3*       Diagnostic Results:  X-Ray: Reviewed  US: Reviewed  Echo: Reviewed  ASSESSMENT/PLAN:     Septic shock with multiorgan failure  ARF  NORMA    Neutropenia SP chemo - on neupogen  Hyponatremia - avoid hypotonic fluid all IVPB in NS when possible    Stopped CRRT ~ 2 pm yesterday  Good UO   ~100-200cc/hour  Minimal PO intake. Increase IVF to 100 cc/hour, but if able to increase PO intake then decrease IVF  no need to restrict to renal diet  Neutropenic diet            Thank you for consult, will follow  With any question please call [phone]  Emma Michaels    [facility]  PALOMO Quintana MD, VANESA PINEDO MD,   MD BREANN Van, NP  [address]  [place], [address] [phone]

## 2020-08-28 NOTE — PROGRESS NOTES
[facility] ICU 5th Floor  Infectious Disease  Progress Note    Patient Name: Huy Cisneros  MRN: [id number]  Admission Date: 8/26/2020  Length of Stay: 2 days  Attending Physician: Maryana Patel*  Primary Care Provider: Primary Doctor Gisselle    Isolation Status: Special Contact  Assessment/Plan:      * Septic shock  29 F with PMH of metastatic adenocarcinoma of the colon with liver mets, uterine cysts, and HTN on whom ID is consulted for likely staph aureus bacteremia. She is neutropenic.   WBC up to 2.5 on 8/28. Port removed on 8/26 - positive for staph aureus > 15 colonies. TTE was negative 8/27 for vegetations      -agree with continuing cefepime and vanco for now - WBC just increased today - see if they stay elevated  -needs repeat blood cxs until negative  -follow up on final cultures  -recommend YOLANDA since patient's port was longstanding in place        Anticipated Disposition: continued inpatient care    Thank you for your consult. I will follow-up with patient. Please contact us if you have any additional questions.[phone]    Pooja Yates MD  Infectious Disease  [facility] ICU 5th Floor    Subjective:     Principal Problem:Septic shock    HPI: 29 F with PMH of metastatic adenocarcinoma of the colon with liver mets, uterine cysts, and HTN who presented to [facility] ED for intractable nausea/vomitting that has been on going for about 1 day.  Pt describes vomiting as having some food contents but mostly saliva, she reports she has not been able to tolerated PO since symptoms began.  Associated symptoms include fevers, chills, and general malaise which also started when nausea/vomiting started. Pt denies abdominal pain, endorses 1 episode of diarrhea. Denies any chest pain, SOB, changes in urinary habits.  Pt reports she has never had symptoms like this in the past. She reports her cancer is s/p resection in July 2020 and currently undergoing  chemotherapy last given 2 weeks via port in the right chest.  In ED noticed wound dehiscence at right chest port site. She was admitted to the ICU. Was briefly on norepi but off it now. Blood cxs with GPCs and staph aureus from the port site. Port is removed. Currently on therapy with vanco and cefepime.    8/26 BC staph aureus  8/26 cath tip pending  8/28 BC pending  8/28 WBC up to 2.5K  Interval History: Patient complains of SOB with movement around in bed. WBC up today to 2.5. Port-a-cath tip positive for staph aureus > 15 colonies - BC from 8/26 positive for staph and BC pending today. Per Critical care team patient complaining of back pain - so back imaging ordered - agree with plan.     Review of Systems   Respiratory: Positive for shortness of breath.         Complains of SOB with movement around in bed   Gastrointestinal: Positive for diarrhea. Negative for nausea and vomiting.     Antibiotics (From admission, onward)    Start     Stop Route Frequency Ordered    08/29/20 0200  vancomycin (VANCOCIN) 1,250 mg in dextrose 5 % 250 mL IVPB      -- IV Every 12 hours (non-standard times) 08/28/20 1252    08/28/20 1200  cefepime in dextrose 5 % IVPB 2 g      -- IV Every 8 hours (non-standard times) 08/28/20 1109    08/26/20 1628  vancomycin - pharmacy to dose  (vancomycin IVPB)      -- IV pharmacy to manage frequency 08/26/20 1528        Antifungals (From admission, onward)    None          Objective:     Vital Signs (Most Recent):  Temp: 99.9 °F (37.7 °C) (08/28/20 1600)  Pulse: (!) 139 (08/28/20 1600)  Resp: (!) 35 (08/28/20 1600)  BP: (!) 144/90 (08/28/20 1600)  SpO2: (!) 91 % (08/28/20 1600) Vital Signs (24h Range):  Temp:  [98.1 °F (36.7 °C)-99.9 °F (37.7 °C)] 99.9 °F (37.7 °C)  Pulse:  [127-153] 139  Resp:  [28-39] 35  SpO2:  [91 %-100 %] 91 %  BP: (103-144)/(57-90) 144/90     Weight: 81.2 kg (179 lb)  Body mass index is 29.79 kg/m².    Estimated Creatinine Clearance: 96.2 mL/min (based on SCr of 0.9  mg/dL).    Physical Exam  Cardiovascular:      Heart sounds: Normal heart sounds.   Pulmonary:      Breath sounds: Normal breath sounds.   Abdominal:      General: Bowel sounds are normal. There is no distension.   Musculoskeletal:      Right lower leg: No edema.      Left lower leg: No edema.         Significant Labs:   Blood Culture:   Recent Labs   Lab 08/26/20  1140   LABBLOO Gram stain juan bottle: Gram positive cocci in clusters resembling Staph  Results called to and read back by: Trudy Morales RN  08/27/2020    02:08  Gram stain aer bottle: Gram positive cocci in clusters resembling Staph  Positive results previously called 08/27/2020  03:19  STAPHYLOCOCCUS AUREUS  Susceptibility pending  ID consult required at [facility].[place] and [place] locations.  *     CBC:   Recent Labs   Lab 08/27/20  0730 08/28/20  0430   WBC 0.99* 2.50*   HGB 8.4* 7.2*   HCT 26.5* 23.1*   * 122*     CMP:   Recent Labs   Lab 08/26/20  1920  08/27/20  0808 08/27/20  1459 08/27/20  2159 08/28/20  0430   *   < > 132* 132* 133* 133*  133*   K 3.6   < > 4.3 4.3 3.8 3.6  3.6   CL 98   < > 96 94* 93* 94*  94*   CO2 10*   < > 20* 23 25 27  27      < > 88 102 100 93  93   BUN 18   < > 13 12 15 16  16   CREATININE 1.5*   < > 0.8 0.8 0.9 0.9  0.9   CALCIUM 7.6*   < > 7.4* 7.5* 7.2* 7.1*  7.1*   PROT 6.1  --  6.0  --   --   --    ALBUMIN 2.4*   < > 2.2* 2.3* 2.1* 2.0*  2.0*   BILITOT 2.6*  --  2.5*  --   --   --    ALKPHOS 93  --  93  --   --   --    *  --  100*  --   --   --    ALT 83*  --  73*  --   --   --    ANIONGAP 22*   < > 16 15 15 12  12   EGFRNONAA 46*   < > >60 >60 >60 >60  >60    < > = values in this interval not displayed.     Urine Culture:   Recent Labs   Lab 06/06/20  0809   LABURIN ESCHERICHIA COLI  >100,000 cfu/ml  *     Urine Studies:   Recent Labs   Lab 07/30/20  1101  08/26/20  1612   COLORU Manuela   < > Yellow   APPEARANCEUA Hazy*   < > Cloudy*   PHUR 6.0   < > 6.0   SPECGRAV  1.020   < > 1.025   PROTEINUA 1+*   < > 1+*   GLUCUA Negative   < > Negative   KETONESU Negative   < > Negative   BILIRUBINUA Negative   < > Negative   OCCULTUA Negative   < > Trace*   NITRITE Negative   < > Positive*   UROBILINOGEN  --    < > Negative   LEUKOCYTESUR Negative   < > Negative   RBCUA 1   < > 0   WBCUA 3   < > 2   BACTERIA Few*   < > Many*   SQUAMEPITHEL 2  --   --    HYALINECASTS 0   < > 3*    < > = values in this interval not displayed.     Wound Culture:   Recent Labs   Lab 08/26/20  1448   LABAERO METHICILLIN RESISTANT STAPHYLOCOCCUS AUREUS  Many  *       Significant Imaging:   Spine imaging marjorie

## 2020-08-28 NOTE — NURSING
Pt informed that an xray was ordered. Process explained to patient. Pt stated she would not be able to tolerate procedure. Haroon, ordering provider called. No answer. Will attempt to contact again.

## 2020-08-28 NOTE — PLAN OF CARE
Problem: Occupational Therapy Goal  Goal: Occupational Therapy Goal  Description: Goals to be met by: 9/27    Patient will increase functional independence with ADLs by performing:    Feeding with Modified [place].  UE Dressing with Stand-by Assistance.  Grooming while seated at sink with Modified [place].  Toileting from bedside commode with Minimal Assistance for hygiene and clothing management.   Toilet transfer to bedside commode with Minimal Assistance.  Increased functional strength to 3+/5 for BUE.    Outcome: Ongoing, Progressing    Huy Cisneros is a 29 y.o. female with a medical diagnosis of Septic shock.  Performance deficits affecting function are weakness, impaired endurance, impaired self care skills, impaired functional mobilty, gait instability, impaired balance, decreased upper extremity function, decreased lower extremity function, decreased coordination, pain, impaired coordination, edema, impaired cardiopulmonary response to activity. Pt found in bed, agreeable to therapy, RN cleared pt for bed activity only secondary to increased HR.  Pt with good participation/tolerance of BUE therex.  She required rest breaks between sets and max vc's for PLB.  -135 throughout. Noted shallow, rapid breaths.  Continue OT services to address functional goals, progressing as able.      DAT Puga/L

## 2020-08-28 NOTE — PROGRESS NOTES
Pharmacist Renal Dose Adjustment Note    Huy Cisneros is a 29 y.o. female being treated with the medication enoxaparin, cefepime    Patient Data:    Vital Signs (Most Recent):  Temp: 98.1 °F (36.7 °C) (08/28/20 0730)  Pulse: (!) 130 (08/28/20 0912)  Resp: (!) 39 (08/28/20 0912)  BP: 109/69 (08/28/20 0900)  SpO2: 96 % (08/28/20 0912)   Vital Signs (72h Range):  Temp:  [98.1 °F (36.7 °C)-103.5 °F (39.7 °C)]   Pulse:  [127-165]   Resp:  [25-57]   BP: ()/(43-99)   SpO2:  [90 %-100 %]      Recent Labs   Lab 08/27/20  1459 08/27/20  2159 08/28/20  0430   CREATININE 0.8 0.9 0.9  0.9     Serum creatinine: 0.9 mg/dL 08/28/20 0430  Estimated creatinine clearance: 96.2 mL/min    Medication:Cefepime dose: 2g  frequency q 12 hours will be changed to medication:cefepime dose:2g  frequency:q 8 hours    Medication:Enoxaparin  dose: 30 mg frequency q 24 hours will be changed to medication:enoxaparin dose:40 mg frequency:q 24 hours    Pharmacist's Name: Eleanor Morales  Pharmacist's Extension: [phone]

## 2020-08-28 NOTE — PROGRESS NOTES
[facility] ICU 5th Floor  Hematology/Oncology  Progress Note    Patient Name: Huy Cisneros  Admission Date: 8/26/2020  Hospital Length of Stay: 2 days  Code Status: Full Code     Subjective:     HPI:  29-year-old female underwent right hemicolectomy 07/17/2020 for metastatic colon adenocarcinoma and received 1st cycle of FOLFOX 08/11/2020.    7/20/2020 - CT scan - Multiple hepatic hypodensities better evaluated on CT from 07/14/2020, consistent with metastatic disease.    She has been vomiting and feeling dizzy since yesterday.  Last night she busted a stitch to the Port-A-Cath site causing dehiscence and this morning she fell after going to the restroom.    She presented to the ED with tachycardia, heart rate up to 160 and was hypotensive with blood pressure down to 85/43.  She was started on antibiotics, IV fluids and pressors and transferred to [place] ICU.    WBC on admission 0.95, creatinine 2, lactate >12    Bilirubin 3.8, , ALT 93    Seen by . Right chest port removed.      Interval History:   8/28 - plan for MRI thoracic/lumbar spine and CT scan abdomen/pelvis today    Oncology Treatment Plan:   OP COLORECTAL FOLFOX + BEVACIZUMAB Q2W    Medications:  Continuous Infusions:   lactated ringers 100 mL/hr at 08/28/20 1108    norepinephrine bitartrate-D5W Stopped (08/27/20 0730)    sodium bicarbonate drip Stopped (08/27/20 1700)     Scheduled Meds:   ceFEPime (MAXIPIME) IVPB  2 g Intravenous Q8H    enoxaparin  40 mg Subcutaneous Q24H    [START ON 8/29/2020] vancomycin (VANCOCIN) IVPB  1,250 mg Intravenous Q12H     PRN Meds:acetaminophen, dextrose 50%, diphenhydrAMINE-zinc acetate 2-0.1%, glucagon (human recombinant), heparin (porcine), influenza, insulin aspart U-100, melatonin, ondansetron, ondansetron, ondansetron, ondansetron, pneumoc 13-davion conj-dip cr(PF), sodium chloride 0.9%, Pharmacy to dose Vancomycin consult **AND** vancomycin - pharmacy to dose      Review of Systems   Constitutional: Positive for activity change and fatigue. Negative for fever and unexpected weight change.   HENT: Negative for mouth sores and nosebleeds.    Respiratory: Negative for shortness of breath and wheezing.    Cardiovascular: Negative for chest pain and palpitations.   Gastrointestinal: Negative for abdominal pain and nausea.   Musculoskeletal: Positive for back pain.   Allergic/Immunologic: Negative for food allergies.   Neurological: Positive for weakness.   Psychiatric/Behavioral: Negative for behavioral problems and confusion.     Objective:     Vital Signs (Most Recent):  Temp: 99.9 °F (37.7 °C) (08/28/20 1600)  Pulse: (!) 139 (08/28/20 1600)  Resp: (!) 35 (08/28/20 1600)  BP: (!) 144/90 (08/28/20 1600)  SpO2: (!) 91 % (08/28/20 1600) Vital Signs (24h Range):  Temp:  [98.1 °F (36.7 °C)-99.9 °F (37.7 °C)] 99.9 °F (37.7 °C)  Pulse:  [127-153] 139  Resp:  [28-39] 35  SpO2:  [91 %-100 %] 91 %  BP: (103-144)/(57-90) 144/90     Weight: 81.2 kg (179 lb)  Body mass index is 29.79 kg/m².  Body surface area is 1.93 meters squared.      Intake/Output Summary (Last 24 hours) at 8/28/2020 1704  Last data filed at 8/28/2020 1500  Gross per 24 hour   Intake 3566.32 ml   Output 1363 ml   Net 2203.32 ml       Physical Exam  Vitals signs and nursing note reviewed.   Constitutional:       General: She is awake.      Appearance: She is not ill-appearing or toxic-appearing.   HENT:      Mouth/Throat:      Pharynx: No oropharyngeal exudate.   Neck:      Musculoskeletal: Neck supple. No neck rigidity.      Thyroid: No thyromegaly.   Cardiovascular:      Rate and Rhythm: Regular rhythm. Tachycardia present.   Pulmonary:      Effort: Pulmonary effort is normal. No respiratory distress.      Breath sounds: No wheezing or rales.   Chest:       Lymphadenopathy:      Cervical: No cervical adenopathy.   Skin:     Findings: No bruising or petechiae.   Neurological:      Mental Status: She is alert and  oriented to person, place, and time.   Psychiatric:         Behavior: Behavior is cooperative.         Significant Labs:   All pertinent labs from the last 24 hours have been reviewed.    Diagnostic Results:  I have reviewed all pertinent imaging results/findings within the past 24 hours.    Assessment/Plan:   1. Septic Shock likely 2/2 infected port-a-cath  2. Febrile neutropenia  3. Metastatic colon adenocarcinoma     T-max 99.9 degrees.  She has good urine output.  CRRT stopped 8/27.  Continue broad-spectrum antibiotics.     Started G-CSF support with granix on 8/26 for febrile neutropenia. Received 3rd dose 8/28.  WBC improved to 2.5 and her ANC today is 225.  Continue neutropenic precautions and continue to check daily CBC's.     Plan MRI thoracic/lumbar spine and CT abdomen/pelvis later this evening to evaluate back pain and status malignancy in the liver.    D/w nursing staff in the ICU.    Guido Friedman MD  Hematology/Oncology  [facility] ICU 5th Floor

## 2020-08-29 NOTE — ASSESSMENT & PLAN NOTE
Infected venous access port  Metabolic Acidosis  Staph Bacteremia    The source is likely from dehiscence of Mediport-----will cont broad spectrum abx. ID has been consulted. Following wound cultures as well as blood cultures---BCX growing GP cocci resembling staph. MRSA noted from the port. Blood cultures were redrawn on 8/28--NGTD. General Sx was consulted to remove her tunneled port. Lactate was initially elevated--now down. IVF given in the ED. Nephrology was consulted. Bicarb gtt initiated by Nephrology. CRRT was started and now stopped. MRI of the T/L spine suggestive of cavitary lung lesions. CT of the C/A/P today to r/o septic emboli. We appreciate ID. ID is recommending YOLANDA.

## 2020-08-29 NOTE — PROGRESS NOTES
Nephrology Progress Note      Consult Requested By: Maryana Patel*  Reason for Consult: ESRD    SUBJECTIVE:     Review of Systems   Constitutional: Negative for chills and fever.   Respiratory: Negative for cough and shortness of breath (much better but on 2L O2).    Cardiovascular: Negative for chest pain and leg swelling.   Gastrointestinal: Positive for nausea. Negative for vomiting.   Musculoskeletal:        B calf pain       Past Medical History:   Diagnosis Date    Cancer     Colon CA with mets to the liver    Uterine cyst      Past Surgical History:   Procedure Laterality Date    COLONOSCOPY N/A 7/15/2020    Procedure: COLONOSCOPY;  Surgeon: Hi Drake MD;  [facility] ENDO;  Service: Endoscopy;  Laterality: N/A;    COLONOSCOPY W/ BIOPSIES      INSERTION OF TUNNELED CENTRAL VENOUS CATHETER (CVC) WITH SUBCUTANEOUS PORT N/A 7/17/2020    Procedure: INSERTION, PORT-A-CATH;  Surgeon: Armani Naqvi MD;  Location: [facility] OR;  Service: General;  Laterality: N/A;     Family History   Problem Relation Age of Onset    Hypertension Mother     Hypertension Maternal Grandmother     Hypertension Maternal Grandfather     Hypertension Paternal Grandmother      Social History     Tobacco Use    Smoking status: Never Smoker    Smokeless tobacco: Never Used   Substance Use Topics    Alcohol use: No    Drug use: No       Review of patient's allergies indicates:   Allergen Reactions    Sulfa (sulfonamide antibiotics)             OBJECTIVE:     Vital Signs (Most Recent)  Vitals:    08/29/20 0600 08/29/20 0630 08/29/20 0853 08/29/20 0901   BP: (!) 156/102  (!) 142/93 129/83   BP Location: Right arm      Patient Position: Lying      Pulse: (!) 133 (!) 132 (!) 34 (!) 132   Resp: (!) 35 (!) 36  (!) 38   Temp:       TempSrc:       SpO2: 95% 96%  97%   Weight:       Height:                     Medications:   ceFEPime (MAXIPIME) IVPB  2 g Intravenous Q8H    enoxaparin  40 mg Subcutaneous  Q24H    metoprolol tartrate  25 mg Oral TID    potassium chloride  40 mEq Oral Once    vancomycin (VANCOCIN) IVPB  1,250 mg Intravenous Q12H           Physical Exam   Constitutional: She is oriented to person, place, and time and well-developed, well-nourished, and in no distress. No distress.   HENT:   Head: Normocephalic and atraumatic.   Mouth/Throat: Oropharynx is clear and moist.   Eyes: EOM are normal. No scleral icterus.   Neck: Neck supple. No JVD present.   Cardiovascular: Regular rhythm. Tachycardia present. Exam reveals no friction rub.   No murmur heard.  Pulmonary/Chest: Effort normal and breath sounds normal. Tachypnea noted. No respiratory distress. She has no wheezes. She has no rales.   Abdominal: Soft. Bowel sounds are normal. She exhibits no distension. There is no abdominal tenderness.   Musculoskeletal:         General: No edema.   Neurological: She is alert and oriented to person, place, and time.   Skin: Skin is warm and dry. No rash noted. She is not diaphoretic. No erythema.   Psychiatric: Affect normal.       Laboratory:  Recent Labs   Lab 08/27/20  0730 08/28/20  0430 08/29/20  0449   WBC 0.99* 2.50* 10.25   HGB 8.4* 7.2* 7.4*   HCT 26.5* 23.1* 23.5*   * 122* 114*   MONO 50.0* 8.0  Test Not Performed 7.0  Test Not Performed     Recent Labs   Lab 08/27/20  2159 08/28/20  0430 08/29/20  0449   * 133*  133* 131*   K 3.8 3.6  3.6 3.3*   CL 93* 94*  94* 96   CO2 25 27  27 25   BUN 15 16  16 15   CREATININE 0.9 0.9  0.9 0.7   CALCIUM 7.2* 7.1*  7.1* 7.6*   PHOS 2.4* 2.3*  2.3* 1.2*       Diagnostic Results:  X-Ray: Reviewed  US: Reviewed  Echo: Reviewed  ASSESSMENT/PLAN:     Septic shock with multiorgan failure - resolved  ARF  NORMA    Neutropenia SP chemo - on neupogen  Hyponatremia - avoid hypotonic fluid all IVPB in NS when possible    Stopped CRRT ~ 2 pm 8/27/20  Good UO  ~100-200cc/hour  Better PO intake PO intake. Stop all IVF  no need to restrict to renal  diet  Neutropenic diet    Hypokalemia give 80 Meq KCL (40 q 4h)    Will sign off        Thank you for consult,   With any question please call [phone]  Emma Michaels    [facility]  PALOMO Quintana MD, VANESA PINEDO MD,   MD BREANN Van, NP  [address]  [place], [address]  [phone]

## 2020-08-29 NOTE — PROGRESS NOTES
[facility] ICU 5th Floor  Hospital Medicine  Progress Note    Patient Name: Huy Cisneros  MRN: [id number]  Patient Class: IP- Inpatient   Admission Date: 8/26/2020  Length of Stay: 3 days  Attending Physician: Maryana Patel*  Primary Care Provider: Primary Doctor No        Subjective:     Principal Problem:Septic shock        HPI:  Ms. Huy Cisneros is a 30 y/o female who lives in [place], [place] at her residence with her children. The patients PCP is Imtiaz Lindsay PA-C. She is also followed by Dr. Jewel Arzola with Oncology. She has a pmh of colon cancer with liver mets, uterine cysts, and HTN. She has a past surgical history of a mediport placement and a colonoscopy. The patient does not smoke cigarettes, drink alcohol, or use illicit drugs.     She presents to the ED with complaints of fever, vomiting and feeling dizzy since yesterday.  Per the patient she states her port a cath to right chest wall busted a stitch last night she while was bathing. She also has complaints of feeling SOB with lower extremity swelling.     Her ED workup includes WBC--0.95, hemoglobin 9.9, Na--132, creatinine 2.00, magnesium--1.4, lactate--10.7, CPK--180, pH--7.2. Pending blood cultures and wound cultures. She is COVID-19 negative. CXR---There has been interval placement of a right subclavian venous line. Its tip is in the region of the junction of the superior vena cava with the right atrium.  There is no pneumothorax. There is no focal pulmonary infiltrate visualized. XR of the abd---There is a paucity of gas within the gastrointestinal system. There are multiple radiopaque leads projected over the abdomen and pelvis. She will be admitted to the [facility] department for further care.         Overview/Hospital Course:  She was admitted to the [facility] hospital medicine service for further care. We have consulted ID, nephrology, oncology, pulmonology, and general  surgery. Her tunneled line is the suspected source for her bacteremia/septic shock. Blood cultures positive for staph. TTE pending. General sx removed tunneled port at the bedside. Will cont to follow cultures. She was initially given Levophed and Amiodarone. Lactate has improved---she was given IVF and now Nephrology has started Bicarb gtt. Dr. Naqvi was consulted for trialysis line placement---She was started on CRRT, now stopped at this time. Will cont broad spectrum abx. G-CSF started by oncology for neutropenia. MRI of the thoracic and lumbar spine was completed on 8/28 to r/o spinal abscess---Her MRI did not find a spinal fluid collection, however the MRI was suggestive of possible cavitary lung lesions. She is to have a CT chest, abdomen, and pelvis today for assessment of possible cavities in her lungs and mets in her liver. MRSA grew out at the port site. She was weaned off of levophed and now started on BB because of 's. Pulmonology has ordered CT of the C/A/P this afternoon with contrast to evaluate the patient  for septic pulmonary emboli.    Interval history: In bed, she states she feels better. IS is at the bedside. Tran noted with good UO. She states she feels strong enough to get OOB today. Patient has been updated on her POC. Will follow.     Review of Systems   Constitutional: Positive for activity change, appetite change and fatigue. Negative for chills and fever.   HENT: Negative for trouble swallowing.    Eyes: Negative for visual disturbance.   Respiratory: Negative for cough, shortness of breath and wheezing.    Cardiovascular: Positive for leg swelling.   Gastrointestinal: Negative for abdominal distention, abdominal pain, diarrhea, nausea and vomiting.   Genitourinary: Negative for difficulty urinating and hematuria.   Musculoskeletal: Negative for gait problem.   Skin: Positive for wound (right chest wall).   Allergic/Immunologic: Positive for immunocompromised state.    Neurological: Positive for weakness.   Hematological: Does not bruise/bleed easily.   Psychiatric/Behavioral: Negative for confusion. The patient is not nervous/anxious.      Objective:     Vital Signs (Most Recent):  Temp: 99.1 °F (37.3 °C) (08/29/20 0305)  Pulse: (!) 132 (08/29/20 0901)  Resp: (!) 38 (08/29/20 0901)  BP: 129/83 (08/29/20 0901)  SpO2: 97 % (08/29/20 0901) Vital Signs (24h Range):  Temp:  [99 °F (37.2 °C)-101.6 °F (38.7 °C)] 99.1 °F (37.3 °C)  Pulse:  [] 132  Resp:  [10-58] 38  SpO2:  [91 %-99 %] 97 %  BP: (125-166)/() 129/83     Weight: 81.2 kg (179 lb)  Body mass index is 29.79 kg/m².    Physical Exam  Vitals signs and nursing note reviewed.   Constitutional:       Appearance: Normal appearance.   HENT:      Head: Normocephalic and atraumatic.      Comments: trialysis line in place at [facility]      Nose: Nose normal.      Mouth/Throat:      Mouth: Mucous membranes are moist.   Eyes:      Extraocular Movements: Extraocular movements intact.      Pupils: Pupils are equal, round, and reactive to light.   Neck:      Musculoskeletal: Normal range of motion.   Cardiovascular:      Rate and Rhythm: Regular rhythm. Tachycardia present.      Pulses: Normal pulses.   Abdominal:      Palpations: Abdomen is soft.   Genitourinary:     Comments: Tran catheter  Musculoskeletal: Normal range of motion.         General: Swelling present.      Right lower leg: Edema present.      Left lower leg: Edema present.   Skin:     General: Skin is warm and dry.      Capillary Refill: Capillary refill takes less than 2 seconds.      Comments: Dressing to the Right CW, clean dry and intact   Neurological:      Mental Status: She is alert and oriented to person, place, and time.   Psychiatric:         Mood and Affect: Mood normal.         Behavior: Behavior normal.           CRANIAL NERVES     CN III, IV, VI   Pupils are equal, round, and reactive to light.       Significant Labs:   BMP:   Recent Labs   Lab  08/29/20  0449   GLU 95   *   K 3.3*   CL 96   CO2 25   BUN 15   CREATININE 0.7   CALCIUM 7.6*   MG 2.8*     CBC:   Recent Labs   Lab 08/28/20  0430 08/29/20  0449   WBC 2.50* 10.25   HGB 7.2* 7.4*   HCT 23.1* 23.5*   * 114*     Lactic Acid:   Recent Labs   Lab 08/27/20  1459 08/27/20  2159   LACTATE 9.8* 6.9*     Magnesium:   Recent Labs   Lab 08/27/20  2159 08/28/20  0430 08/29/20  0449   MG 2.5 2.6 2.8*     POCT Glucose:   Recent Labs   Lab 08/28/20  0044 08/29/20  1331   POCTGLUCOSE 106 79     Urine Culture: No results for input(s): LABURIN in the last 48 hours.  Urine Studies:   No results for input(s): COLORU, APPEARANCEUA, PHUR, SPECGRAV, PROTEINUA, GLUCUA, KETONESU, BILIRUBINUA, OCCULTUA, NITRITE, UROBILINOGEN, LEUKOCYTESUR, RBCUA, WBCUA, BACTERIA, SQUAMEPITHEL, HYALINECASTS in the last 48 hours.    Invalid input(s): WRIGHTSUR    Significant Imaging: I have reviewed all pertinent imaging results/findings within the past 24 hours.      Assessment/Plan:      * Septic shock  Infected venous access port  Metabolic Acidosis  Staph Bacteremia    The source is likely from dehiscence of Mediport-----will cont broad spectrum abx. ID has been consulted. Following wound cultures as well as blood cultures---BCX growing GP cocci resembling staph. MRSA noted from the port. Blood cultures were redrawn on 8/28--NGTD. General Sx was consulted to remove her tunneled port. Lactate was initially elevated--now down. IVF given in the ED. Nephrology was consulted. Bicarb gtt initiated by Nephrology. CRRT was started and now stopped. MRI of the T/L spine suggestive of cavitary lung lesions. CT of the C/A/P today to r/o septic emboli. We appreciate ID. ID is recommending YOLANDA.    Chest pain  Elevated troponin    No chest pain at this time. Troponin elevated---cardiology consulted. No changes on EKG. The troponin elevation could be related to demand--- echo with normal LVEF.    Nausea & vomiting  Prn Zofran ordered.        Hyponatremia  Monitor.       Hypomagnesemia  Mg 2.8--monitor.      Neutropenia  Pancytopenia    G-CSF started by oncology for neutropenia---WBC improving--G-CSF now stopped. Will cont neutropenic precautions.      KIERSTEN (acute kidney injury)  We appreciate nephrology for management. IV fluids given in the ED. Tran catheter placed. Nephrology has ordered a renal ultrasound. Was initially started on CRRT---on 8/28 CRRT stopped. Monitor intake and output.      Metastatic disease  Secondary malignancy of liver  Adenocarcinoma of colon  Neutropenia  Anemia in neoplastic disease    She is followed by Dr. Arzola outpatient. We appreciate oncology. Will cont to folloe neutropenic precautions. Will cont to monitor CBC daily.         VTE Risk Mitigation (From admission, onward)         Ordered     enoxaparin injection 40 mg  Every 24 hours      08/28/20 1108     heparin (porcine) injection 2,300 Units  As needed (PRN)      08/27/20 1649     IP VTE HIGH RISK PATIENT  Once      08/26/20 1458     Place sequential compression device  Until discontinued      08/26/20 1458                Discharge Planning   ZOE:      Code Status: Full Code   Is the patient medically ready for discharge?:     Reason for patient still in hospital (select all that apply): Patient unstable, Patient new problem, Patient trending condition, Laboratory test, Treatment and Consult recommendations  Discharge Plan A: Home with family            Critical care time spent on the evaluation and treatment of severe organ dysfunction, review of pertinent labs and imaging studies, discussions with consulting providers and discussions with patient/family: 45 minutes.      Tracy Medrano NP  Department of Hospital Medicine   [facility] ICU 5th Floor

## 2020-08-29 NOTE — SUBJECTIVE & OBJECTIVE
Interval history: In bed, she states she feels better. IS is at the bedside. Tran noted with good UO. She states she feels strong enough to get OOB today. Patient has been updated on her POC. Will follow.     Review of Systems   Constitutional: Positive for activity change, appetite change and fatigue. Negative for chills and fever.   HENT: Negative for trouble swallowing.    Eyes: Negative for visual disturbance.   Respiratory: Negative for cough, shortness of breath and wheezing.    Cardiovascular: Positive for leg swelling.   Gastrointestinal: Negative for abdominal distention, abdominal pain, diarrhea, nausea and vomiting.   Genitourinary: Negative for difficulty urinating and hematuria.   Musculoskeletal: Negative for gait problem.   Skin: Positive for wound (right chest wall).   Allergic/Immunologic: Positive for immunocompromised state.   Neurological: Positive for weakness.   Hematological: Does not bruise/bleed easily.   Psychiatric/Behavioral: Negative for confusion. The patient is not nervous/anxious.      Objective:     Vital Signs (Most Recent):  Temp: 99.1 °F (37.3 °C) (08/29/20 0305)  Pulse: (!) 132 (08/29/20 0901)  Resp: (!) 38 (08/29/20 0901)  BP: 129/83 (08/29/20 0901)  SpO2: 97 % (08/29/20 0901) Vital Signs (24h Range):  Temp:  [99 °F (37.2 °C)-101.6 °F (38.7 °C)] 99.1 °F (37.3 °C)  Pulse:  [] 132  Resp:  [10-58] 38  SpO2:  [91 %-99 %] 97 %  BP: (125-166)/() 129/83     Weight: 81.2 kg (179 lb)  Body mass index is 29.79 kg/m².    Physical Exam  Vitals signs and nursing note reviewed.   Constitutional:       Appearance: Normal appearance.   HENT:      Head: Normocephalic and atraumatic.      Comments: trialysis line in place at [facility]      Nose: Nose normal.      Mouth/Throat:      Mouth: Mucous membranes are moist.   Eyes:      Extraocular Movements: Extraocular movements intact.      Pupils: Pupils are equal, round, and reactive to light.   Neck:      Musculoskeletal: Normal range of  motion.   Cardiovascular:      Rate and Rhythm: Regular rhythm. Tachycardia present.      Pulses: Normal pulses.   Abdominal:      Palpations: Abdomen is soft.   Genitourinary:     Comments: Tran catheter  Musculoskeletal: Normal range of motion.         General: Swelling present.      Right lower leg: Edema present.      Left lower leg: Edema present.   Skin:     General: Skin is warm and dry.      Capillary Refill: Capillary refill takes less than 2 seconds.      Comments: Dressing to the Right CW, clean dry and intact   Neurological:      Mental Status: She is alert and oriented to person, place, and time.   Psychiatric:         Mood and Affect: Mood normal.         Behavior: Behavior normal.           CRANIAL NERVES     CN III, IV, VI   Pupils are equal, round, and reactive to light.       Significant Labs:   BMP:   Recent Labs   Lab 08/29/20  0449   GLU 95   *   K 3.3*   CL 96   CO2 25   BUN 15   CREATININE 0.7   CALCIUM 7.6*   MG 2.8*     CBC:   Recent Labs   Lab 08/28/20  0430 08/29/20  0449   WBC 2.50* 10.25   HGB 7.2* 7.4*   HCT 23.1* 23.5*   * 114*     Lactic Acid:   Recent Labs   Lab 08/27/20  1459 08/27/20  2159   LACTATE 9.8* 6.9*     Magnesium:   Recent Labs   Lab 08/27/20  2159 08/28/20  0430 08/29/20  0449   MG 2.5 2.6 2.8*     POCT Glucose:   Recent Labs   Lab 08/28/20  0044 08/29/20  1331   POCTGLUCOSE 106 79     Urine Culture: No results for input(s): LABURIN in the last 48 hours.  Urine Studies:   No results for input(s): COLORU, APPEARANCEUA, PHUR, SPECGRAV, PROTEINUA, GLUCUA, KETONESU, BILIRUBINUA, OCCULTUA, NITRITE, UROBILINOGEN, LEUKOCYTESUR, RBCUA, WBCUA, BACTERIA, SQUAMEPITHEL, HYALINECASTS in the last 48 hours.    Invalid input(s): WRIGHTSUR    Significant Imaging: I have reviewed all pertinent imaging results/findings within the past 24 hours.

## 2020-08-29 NOTE — ASSESSMENT & PLAN NOTE
Secondary malignancy of liver  Adenocarcinoma of colon  Neutropenia  Anemia in neoplastic disease    She is followed by Dr. Arzola outpatient. We appreciate oncology. Will cont to folloe neutropenic precautions. Will cont to monitor CBC daily.
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
## 2020-08-29 NOTE — SUBJECTIVE & OBJECTIVE
Interval History:   8/29 - got ct scans     Oncology Treatment Plan:   OP COLORECTAL FOLFOX + BEVACIZUMAB Q2W    Medications:  Continuous Infusions:   norepinephrine bitartrate-D5W Stopped (08/27/20 0730)     Scheduled Meds:   enoxaparin  40 mg Subcutaneous Q24H    metoprolol tartrate  25 mg Oral TID    metronidazole  500 mg Intravenous Q6H    vancomycin (VANCOCIN) IVPB  1,250 mg Intravenous Q12H    vancomycin  500 mg Oral Q6H     PRN Meds:acetaminophen, diphenhydrAMINE-zinc acetate 2-0.1%, heparin (porcine), influenza, melatonin, ondansetron, ondansetron, ondansetron, ondansetron, pneumoc 13-davion conj-dip cr(PF), sodium chloride 0.9%, Pharmacy to dose Vancomycin consult **AND** vancomycin - pharmacy to dose     Review of Systems   Constitutional: Positive for activity change and fatigue. Negative for fever and unexpected weight change.   HENT: Negative for mouth sores and nosebleeds.    Respiratory: Negative for shortness of breath and wheezing.    Cardiovascular: Negative for chest pain and palpitations.   Gastrointestinal: Negative for abdominal pain and nausea.   Musculoskeletal: Positive for back pain.   Allergic/Immunologic: Negative for food allergies.   Neurological: Positive for weakness.   Psychiatric/Behavioral: Negative for behavioral problems and confusion.     Objective:     Vital Signs (Most Recent):  Temp: (!) 102.3 °F (39.1 °C) (08/29/20 1440)  Pulse: (!) 127 (08/29/20 1459)  Resp: (!) 38 (08/29/20 0901)  BP: 124/82 (08/29/20 1459)  SpO2: 97 % (08/29/20 0901) Vital Signs (24h Range):  Temp:  [99 °F (37.2 °C)-102.3 °F (39.1 °C)] 102.3 °F (39.1 °C)  Pulse:  [] 127  Resp:  [10-58] 38  SpO2:  [92 %-99 %] 97 %  BP: (124-166)/() 124/82     Weight: 81.2 kg (179 lb)  Body mass index is 29.79 kg/m².  Body surface area is 1.93 meters squared.      Intake/Output Summary (Last 24 hours) at 8/29/2020 1758  Last data filed at 8/29/2020 0600  Gross per 24 hour   Intake 2692.33 ml   Output 1885 ml    Net 807.33 ml       Physical Exam  Vitals signs and nursing note reviewed.   Constitutional:       General: She is awake.      Appearance: She is not ill-appearing or toxic-appearing.   HENT:      Mouth/Throat:      Pharynx: No oropharyngeal exudate.   Neck:      Musculoskeletal: Neck supple. No neck rigidity.      Thyroid: No thyromegaly.   Cardiovascular:      Rate and Rhythm: Regular rhythm. Tachycardia present.   Pulmonary:      Effort: Pulmonary effort is normal. No respiratory distress.      Breath sounds: No wheezing or rales.   Chest:       Lymphadenopathy:      Cervical: No cervical adenopathy.   Skin:     Findings: No bruising or petechiae.   Neurological:      Mental Status: She is alert and oriented to person, place, and time.   Psychiatric:         Behavior: Behavior is cooperative.         Significant Labs:   All pertinent labs from the last 24 hours have been reviewed.    Diagnostic Results:  I have reviewed all pertinent imaging results/findings within the past 24 hours.

## 2020-08-29 NOTE — PROGRESS NOTES
Pt being transported to MRI and CT with 2 RN's. Monitor and oxygen in place. Will continue to monitor.

## 2020-08-29 NOTE — ASSESSMENT & PLAN NOTE
We appreciate nephrology for management. IV fluids given in the ED. Tran catheter placed. Nephrology has ordered a renal ultrasound. Was initially started on CRRT---on 8/28 CRRT stopped. Monitor intake and output.

## 2020-08-29 NOTE — PLAN OF CARE
No acute events overnight. BP remains stable. Sinus tach on monitor. Tmax 101.6. 1L NC. Pt remains tachypneic; SOB upon activity. Pt had multiple BMs this shift; C-diff results still pending. Tran patent; adequate UOP for shift. No complaints of pain. Pt states she is very weak and fatigued. Neutropenic precautions maintained. Safety maintained. Will continue to monitor.

## 2020-08-29 NOTE — PROGRESS NOTES
Infectious Disease Follow up Note    Impression/Plan:    MRSA Bacteremia  29 F with PMH of metastatic adenocarcinoma of the colon with liver mets, uterine cysts, and HTN on whom ID is consulted for likely staph aureus bacteremia. She presented neutropenic.   WBC up to 2.5 on 8/28. Port removed on 8/26 - positive for staph aureus > 15 colonies. TTE was negative 8/27 for vegetations       - continue Vanc  - stop Cefepime  - needs repeat blood cxs until negative  - recommend YOLANDA since patient's port was longstanding in place  - would start emp angelo C diff treatment with IV flagyl and PO Vanc. Follow up C diff study.     Thanks! Please call for any questions [phone]  Imtiaz Albert  [employer] ID    Hospital Day 3  Principal Problem:Septic shock     HPI: 29 F with PMH of metastatic adenocarcinoma of the colon with liver mets, uterine cysts, and HTN who presented to [facility] ED for intractable nausea/vomitting that has been on going for about 1 day.  Pt describes vomiting as having some food contents but mostly saliva, she reports she has not been able to tolerated PO since symptoms began.  Associated symptoms include fevers, chills, and general malaise which also started when nausea/vomiting started. Pt denies abdominal pain, endorses 1 episode of diarrhea. Denies any chest pain, SOB, changes in urinary habits.  Pt reports she has never had symptoms like this in the past. She reports her cancer is s/p resection in July 2020 and currently undergoing chemotherapy last given 2 weeks via port in the right chest.  In ED noticed wound dehiscence at right chest port site. She was admitted to the ICU. Was briefly on norepi but off it now. Blood cxs with GPCs and staph aureus from the port site. Port is removed. Currently on therapy with vanco and cefepime.     8/26 BC MRSA  8/26 R chest wound culture MRSA   8/26 cath tip Staph aureus >14 colonies  8/28 BC NGTD  8/28 WBC up to 2.5K  8/29 WBC up to 10K  8/29 BC in  process    Interval History: Patient reporting persistent shortness of breath today, worse with minimal movement. Reporting fatigue. Tolerating PO. On the bed pan for liquid diarrhea. Continues to have fever.     Review of Symptoms:  ROS    Medications:  Antibiotics:   Antibiotics (From admission, onward)    Start     Stop Route Frequency Ordered    08/29/20 0200  vancomycin (VANCOCIN) 1,250 mg in dextrose 5 % 250 mL IVPB      -- IV Every 12 hours (non-standard times) 08/28/20 1252    08/28/20 1200  cefepime in dextrose 5 % IVPB 2 g      -- IV Every 8 hours (non-standard times) 08/28/20 1109    08/26/20 1628  vancomycin - pharmacy to dose  (vancomycin IVPB)      -- IV pharmacy to manage frequency 08/26/20 1528        Objective:  Physical Exam:  VS (24h):  Temp:  [99 °F (37.2 °C)-101.6 °F (38.7 °C)] 99.1 °F (37.3 °C)  Pulse:  [] 132  Resp:  [10-58] 38  SpO2:  [91 %-99 %] 97 %  BP: (125-166)/() 129/83     Physical Exam  GEN- Appears uncomfortable, tachypneic, alert  HEENT- PERRL, EOMI, MMM  NECK- No thyromegaly or other masses  CARDIAC- Tachycardic, no murmurs  RESP- Tachypnea, on 2L NC, CTAB  ABD- Soft, NT, ND, +BS; no masses or HSM  EXT- No clubbing, cyanosis, or edema; 2+ DP/PT pulses  NEURO- CN II-XII grossly intact; moves all four extremities equally  SKIN- Warm and dry; no rashes, site of prior port is clean and dry without tenderness.     Lines:  RIJ CVC  PIV  Tran catheter    Labs:  CBC:   Lab Results   Component Value Date    WBC 10.25 08/29/2020    WBC 2.50 (L) 08/28/2020    WBC 0.99 (LL) 08/27/2020    WBC 0.95 (LL) 08/26/2020    WBC 2.88 (L) 08/24/2020    HCT 23.5 (L) 08/29/2020     (L) 08/29/2020     BMP:   Recent Labs   Lab 08/29/20  0449   GLU 95   *   K 3.3*   CL 96   CO2 25   BUN 15   CREATININE 0.7   CALCIUM 7.6*   MG 2.8*     LFT:   Lab Results   Component Value Date    ALT 73 (H) 08/27/2020     (H) 08/27/2020    ALKPHOS 93 08/27/2020    BILITOT 2.5 (H) 08/27/2020      Microbiology x 7d:   Microbiology Results (last 7 days)     Procedure Component Value Units Date/Time    Blood culture [[id number]]  (Abnormal)  (Susceptibility) Collected: 08/26/20 1140    Order Status: Completed Specimen: Blood from Peripheral, Hand, Right Updated: 08/29/20 1221     Blood Culture, Routine Gram stain juan bottle: Gram positive cocci in clusters resembling Staph      Results called to and read back by: Trudy Morales RN  08/27/2020        02:08      Gram stain aer bottle: Gram positive cocci in clusters resembling Staph      Positive results previously called 08/27/2020  03:19      METHICILLIN RESISTANT STAPHYLOCOCCUS AUREUS  ID consult required at [facility].[place] and [place] locations.      Blood culture [[id number]] Collected: 08/28/20 1137    Order Status: Completed Specimen: Blood from Antecubital, Right Updated: 08/29/20 0745     Blood Culture, Routine No Growth to date    Blood culture [[id number]] Collected: 08/28/20 1137    Order Status: Completed Specimen: Blood Updated: 08/29/20 0745     Blood Culture, Routine No Growth to date    Clostridium difficile EIA [[id number]] Collected: 08/28/20 1808    Order Status: Sent Specimen: Stool Updated: 08/28/20 1811    Aerobic culture [[id number]]  (Abnormal)  (Susceptibility) Collected: 08/26/20 1448    Order Status: Completed Specimen: Incision site from Chest, Right Updated: 08/28/20 1327     Aerobic Bacterial Culture METHICILLIN RESISTANT STAPHYLOCOCCUS AUREUS  Many      Narrative:      Port incision site.    Clostridium difficile EIA [[id number]] Collected: 08/28/20 1309    Order Status: Canceled Specimen: Stool     IV catheter culture [[id number]]  (Abnormal) Collected: 08/26/20 1604    Order Status: Completed Specimen: Catheter Tip Updated: 08/28/20 1238     Aerobic Culture - Cath tip STAPHYLOCOCCUS AUREUS  > 15 colonies  Susceptibility pending      Culture, Anaerobe [[id number]] Collected: 08/26/20 1448    Order Status: Completed Specimen:  Incision site from Chest, Right Updated: 08/28/20 0922     Anaerobic Culture Culture in progress    Narrative:      Port incision site    Culture, Anaerobe [[id number]] Collected: 08/26/20 1602    Order Status: Completed Specimen: Catheter Tip from Chest, Right Updated: 08/28/20 0714     Anaerobic Culture Culture in progress    Aerobic culture [[id number]] Collected: 08/26/20 1604    Order Status: Canceled Specimen: Catheter Tip from Chest, Right     Blood culture [[id number]]     Order Status: Canceled Specimen: Blood         Imaging:  CXR 8/26/2020  Right chest MediPort catheter with tip overlying the atrium.  Lungs and heart demonstrate no significant interval detrimental change in the short interval.  No large pleural effusion or gross pneumothorax.    CXR 8/26/2020  Since the most recent study, the port catheter has been discontinued.  The tip of a newly placed right internal jugular approach central venous catheter is in the region of the right atrium.  No pneumothorax or pleural effusion.  Cardiomediastinal silhouette is unchanged.  Lungs remain clear.  Additional findings unchanged.    DVT US BL ANGUS 8/27/2020  No evidence of deep venous thrombosis in either lower extremity.    MRI T/L 8/28  1. Within limitations of lack of intravenous contrast, no findings of infection in the thoracic or lumbar spine are identified.  2. No significant spinal canal or foraminal narrowing.  Normal appearance of the thoracic spinal cord and conus medullaris.  3. Partially imaged are multiple bilateral peripheral predominant pulmonary nodules, some of which may be cavitary.  Given that these lesions appear to be new since the 07/20/2020 CT examination, the findings may represent multifocal infectious process or septic emboli.  Metastatic disease would be additional consideration.  Dedicated CT chest imaging can be considered for further evaluation.  4. Known hepatic metastatic disease partially imaged.    CT A/P 8/28  Postoperative  changes of bowel resection are identified.  There are innumerable hypodense lesions throughout the liver compatible with hepatic metastatic disease.  Focal peristomal prior imaging limited given the lack of intravenous contrast material.     Bibasilar consolidation, left greater than right with subtotal consolidation of the left lower lobe.  There are some nodular densities seen bilaterally in both visualized lung bases.  While this could represent metastatic disease, given the patient's history of sepsis, septic emboli not entirely excluded.  These findings are new as compared to the previous study of 07/14/2020.     Liquid stool throughout the colon suggesting diarrhea.        Assessment:    Active Hospital Problems    Diagnosis    *Septic shock    Chest pain    Elevated troponin    Pancytopenia    Bacteremia due to Staphylococcus    KIERSTEN (acute kidney injury)    Infected venous access port    Metabolic acidosis    Neutropenia    Hypomagnesemia    Hyponatremia    Nausea & vomiting    Lactic acid acidosis    Sepsis    Anemia in neoplastic disease    Metastatic disease    Secondary malignancy of liver    Adenocarcinoma of colon     Plan:  See top of page.

## 2020-08-29 NOTE — ASSESSMENT & PLAN NOTE
Pancytopenia    G-CSF started by oncology for neutropenia---WBC improving--G-CSF now stopped. Will cont neutropenic precautions.

## 2020-08-29 NOTE — PROGRESS NOTES
[facility] ICU 5th Floor  Hematology/Oncology  Progress Note    Patient Name: Huy Cisneros  Admission Date: 8/26/2020  Hospital Length of Stay: 3 days  Code Status: Full Code     Subjective:     HPI:  29-year-old female underwent right hemicolectomy 07/17/2020 for metastatic colon adenocarcinoma and received 1st cycle of FOLFOX 08/11/2020.    7/20/2020 - CT scan - Multiple hepatic hypodensities better evaluated on CT from 07/14/2020, consistent with metastatic disease.    She has been vomiting and feeling dizzy since yesterday.  Last night she busted a stitch to the Port-A-Cath site causing dehiscence and this morning she fell after going to the restroom.    She presented to the ED with tachycardia, heart rate up to 160 and was hypotensive with blood pressure down to 85/43.  She was started on antibiotics, IV fluids and pressors and transferred to [facility].    WBC on admission 0.95, creatinine 2, lactate >12    Bilirubin 3.8, , ALT 93    Seen by . Right chest port removed.      Interval History:   8/29 - got ct scans     Oncology Treatment Plan:   OP COLORECTAL FOLFOX + BEVACIZUMAB Q2W    Medications:  Continuous Infusions:   norepinephrine bitartrate-D5W Stopped (08/27/20 0730)     Scheduled Meds:   enoxaparin  40 mg Subcutaneous Q24H    metoprolol tartrate  25 mg Oral TID    metronidazole  500 mg Intravenous Q6H    vancomycin (VANCOCIN) IVPB  1,250 mg Intravenous Q12H    vancomycin  500 mg Oral Q6H     PRN Meds:acetaminophen, diphenhydrAMINE-zinc acetate 2-0.1%, heparin (porcine), influenza, melatonin, ondansetron, ondansetron, ondansetron, ondansetron, pneumoc 13-davion conj-dip cr(PF), sodium chloride 0.9%, Pharmacy to dose Vancomycin consult **AND** vancomycin - pharmacy to dose     Review of Systems   Constitutional: Positive for activity change and fatigue. Negative for fever and unexpected weight change.   HENT: Negative for mouth sores and nosebleeds.     Respiratory: Negative for shortness of breath and wheezing.    Cardiovascular: Negative for chest pain and palpitations.   Gastrointestinal: Negative for abdominal pain and nausea.   Musculoskeletal: Positive for back pain.   Allergic/Immunologic: Negative for food allergies.   Neurological: Positive for weakness.   Psychiatric/Behavioral: Negative for behavioral problems and confusion.     Objective:     Vital Signs (Most Recent):  Temp: (!) 102.3 °F (39.1 °C) (08/29/20 1440)  Pulse: (!) 127 (08/29/20 1459)  Resp: (!) 38 (08/29/20 0901)  BP: 124/82 (08/29/20 1459)  SpO2: 97 % (08/29/20 0901) Vital Signs (24h Range):  Temp:  [99 °F (37.2 °C)-102.3 °F (39.1 °C)] 102.3 °F (39.1 °C)  Pulse:  [] 127  Resp:  [10-58] 38  SpO2:  [92 %-99 %] 97 %  BP: (124-166)/() 124/82     Weight: 81.2 kg (179 lb)  Body mass index is 29.79 kg/m².  Body surface area is 1.93 meters squared.      Intake/Output Summary (Last 24 hours) at 8/29/2020 1758  Last data filed at 8/29/2020 0600  Gross per 24 hour   Intake 2692.33 ml   Output 1885 ml   Net 807.33 ml       Physical Exam  Vitals signs and nursing note reviewed.   Constitutional:       General: She is awake.      Appearance: She is not ill-appearing or toxic-appearing.   HENT:      Mouth/Throat:      Pharynx: No oropharyngeal exudate.   Neck:      Musculoskeletal: Neck supple. No neck rigidity.      Thyroid: No thyromegaly.   Cardiovascular:      Rate and Rhythm: Regular rhythm. Tachycardia present.   Pulmonary:      Effort: Pulmonary effort is normal. No respiratory distress.      Breath sounds: No wheezing or rales.   Chest:       Lymphadenopathy:      Cervical: No cervical adenopathy.   Skin:     Findings: No bruising or petechiae.   Neurological:      Mental Status: She is alert and oriented to person, place, and time.   Psychiatric:         Behavior: Behavior is cooperative.         Significant Labs:   All pertinent labs from the last 24 hours have been  reviewed.    Diagnostic Results:  I have reviewed all pertinent imaging results/findings within the past 24 hours.    Assessment/Plan:   1. Septic Shock likely 2/2 infected port-a-cath  2. Febrile neutropenia  3. Metastatic colon adenocarcinoma     T-max 102.3 F. She has good urine output.  CRRT stopped 8/27.  Continue broad-spectrum antibiotics.     S/p 3 doses of granix (last dose 8/28). WBC has now improved. She is no longer neutropenic. Ok to d/c neutropenic precautions.    CT scans show positive response of her cancer to one cycle of FOLFOX chemotherapy. She was very encouraged to hear this news.    Possible YOLANDA per ID to evaluate 'septic emboli' noted on imaging.    Guido Friedman MD  Hematology/Oncology  [facility] ICU 5th Floor

## 2020-08-30 PROBLEM — B95.62 MRSA BACTEREMIA: Status: ACTIVE | Noted: 2020-01-01

## 2020-08-30 PROBLEM — D70.9 NEUTROPENIA: Status: RESOLVED | Noted: 2020-01-01 | Resolved: 2020-01-01
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
## 2020-08-30 NOTE — PLAN OF CARE
Patient remained in bed for the shift. Bed in lowest position, wheels locked and call light within reach. Plan of care reviewed with patient, verbalized understanding. Neutropenic precautions maintained. VS remained WDL. ST on telemetry. 1L NC for comfort. Urine output adequate. Multiple Bms. No complaints of pain. Ativan given for anxiety towards shift end. Will report to oncoming shift.

## 2020-08-30 NOTE — PROGRESS NOTES
[facility] ICU 5th Floor  Infectious Disease  Progress Note    Patient Name: Huy Cisneros  MRN: [id number]  Admission Date: 8/26/2020  Length of Stay: 4 days  Attending Physician: Maryana Patel*  Primary Care Provider: Primary Doctor Gisselle    Isolation Status: Contact  Assessment/Plan:      MRSA bacteremia  29 F with PMH of metastatic adenocarcinoma of the colon with liver mets, uterine cysts, and HTN on whom ID is consulted for likely staph aureus bacteremia. Neutropenia resolved on 8/29. Port removed on 8/26 - positive for MRSA > 15 colonies. TTE was negative 8/27 for vegetations      -continue vancomycin for MRSA bacteremia and sepsis due to port infection   -contact precautions for MRSA  -needs repeat blood cxs until negative  -follow up on final cultures  -recommend YOLANDA since patient's port was longstanding in place  -stool c diff was negative - so have discontinued the IV flagyl and PO vanco        Anticipated Disposition: continued ICU care    Thank you for your consult. I will follow-up with patient. Please contact us if you have any additional questions.[phone]    Pooja Yates MD  Infectious Disease  [facility] ICU 5th Floor    Subjective:     Principal Problem:Septic shock    HPI: 29 F with PMH of metastatic adenocarcinoma of the colon with liver mets, uterine cysts, and HTN who presented to [facility] ED for intractable nausea/vomitting that has been on going for about 1 day.  Pt describes vomiting as having some food contents but mostly saliva, she reports she has not been able to tolerated PO since symptoms began.  Associated symptoms include fevers, chills, and general malaise which also started when nausea/vomiting started. Pt denies abdominal pain, endorses 1 episode of diarrhea. Denies any chest pain, SOB, changes in urinary habits.  Pt reports she has never had symptoms like this in the past. She reports her cancer is s/p resection in  July 2020 and currently undergoing chemotherapy last given 2 weeks via port in the right chest.  In ED noticed wound dehiscence at right chest port site. She was admitted to the ICU. Was briefly on norepi but off it now. Blood cxs with GPCs and staph aureus from the port site. Port is removed. Currently on therapy with vanco and cefepime.    8/26 BC MRSA  8/26 cath tip and chest MRSA  8/28 BC staph aureus  8/28 WBC up to 2.5K  8/28 stool c diff negative  8/30 BC pending  8/30 WBC up tp 25.9 K    Interval History: Nurse reports patient is very weak. Patient reports that she is very SOB with minimal exertion. Stool sent yesterday was negative for c diff - so discontinued IV flagyl and po vanco today.     Review of Systems   Constitutional:        Nurse reports patient is very weak   Respiratory: Positive for shortness of breath.         Patient SOB with minimal exertion    Gastrointestinal: Positive for diarrhea. Negative for nausea and vomiting.     Antibiotics (From admission, onward)    Start     Stop Route Frequency Ordered    08/29/20 0200  vancomycin (VANCOCIN) 1,250 mg in dextrose 5 % 250 mL IVPB      -- IV Every 12 hours (non-standard times) 08/28/20 1252    08/26/20 1628  vancomycin - pharmacy to dose  (vancomycin IVPB)      -- IV pharmacy to manage frequency 08/26/20 1528      off IV flagyl and PO vanco    Objective:     Vital Signs (Most Recent):  Temp: 99.7 °F (37.6 °C) (08/30/20 1530)  Pulse: (!) 122 (08/30/20 1600)  Resp: (!) 42 (08/30/20 1600)  BP: 132/82 (08/30/20 1600)  SpO2: 100 % (08/30/20 1600) Vital Signs (24h Range):  Temp:  [98.7 °F (37.1 °C)-99.8 °F (37.7 °C)] 99.7 °F (37.6 °C)  Pulse:  [108-139] 122  Resp:  [31-62] 42  SpO2:  [96 %-100 %] 100 %  BP: (105-134)/(69-91) 132/82     Weight: 83 kg (182 lb 15.7 oz)  Body mass index is 30.45 kg/m².    Estimated Creatinine Clearance: 125 mL/min (based on SCr of 0.7 mg/dL).    Physical Exam  Cardiovascular:      Heart sounds: Normal heart sounds.       Comments: Right chest with bandage where port was removed  Pulmonary:      Breath sounds: Normal breath sounds.   Abdominal:      General: Bowel sounds are normal. There is no distension.      Tenderness: There is no abdominal tenderness.   Musculoskeletal:      Right lower leg: No edema.      Left lower leg: No edema.         Significant Labs:   Blood Culture:   Recent Labs   Lab 08/26/20  1140 08/28/20  1137 08/29/20  1658   LABBLOO Gram stain juan bottle: Gram positive cocci in clusters resembling Staph  Results called to and read back by: Trudy Morales RN  08/27/2020    02:08  Gram stain aer bottle: Gram positive cocci in clusters resembling Staph  Positive results previously called 08/27/2020  03:19  METHICILLIN RESISTANT STAPHYLOCOCCUS AUREUS  ID consult required at [facility].  * Gram stain juan bottle: Gram positive cocci in clusters resembling Staph   Results called to and read back by: Nighat Craft  08/29/2020  15:26  STAPHYLOCOCCUS AUREUS  ID consult required at [facility].  For susceptibility see order #[id number]  *  Gram stain aer bottle: Gram positive cocci in clusters resembling Staph   Results called to and read back by: Nighat Craft  08/29/2020  15:26  Gram stain juan bottle: Gram positive cocci in clusters resembling Staph   Positive results previously called 08/29/2020  17:57  STAPHYLOCOCCUS AUREUS  Susceptibility pending  ID consult required at [facility].  * No Growth to date     CBC:   Recent Labs   Lab 08/29/20  0449 08/30/20  0121 08/30/20  0415   WBC 10.25 23.64* 25.94*  25.94*   HGB 7.4* 9.1* 8.6*  8.6*   HCT 23.5* 29.5* 27.1*  27.1*   * 145* 144*  144*     CMP:   Recent Labs   Lab 08/29/20  0449 08/30/20  0121 08/30/20  0415   * 132* 131*  131*   K 3.3* 3.9 3.9  3.9   CL 96 96 97  97   CO2 25 21* 21*  21*   GLU 95 88 110  110   BUN 15 14 14  14   CREATININE 0.7  0.7 0.7  0.7   CALCIUM 7.6* 8.0* 7.8*  7.8*   ALBUMIN 1.8* 1.9* 1.7*  1.7*   ANIONGAP 10 15 13  13   EGFRNONAA >60 >60 >60  >60     Urine Studies:   Recent Labs   Lab 20  1101  20  1612   COLORU Manuela   < > Yellow   APPEARANCEUA Hazy*   < > Cloudy*   PHUR 6.0   < > 6.0   SPECGRAV 1.020   < > 1.025   PROTEINUA 1+*   < > 1+*   GLUCUA Negative   < > Negative   KETONESU Negative   < > Negative   BILIRUBINUA Negative   < > Negative   OCCULTUA Negative   < > Trace*   NITRITE Negative   < > Positive*   UROBILINOGEN  --    < > Negative   LEUKOCYTESUR Negative   < > Negative   RBCUA 1   < > 0   WBCUA 3   < > 2   BACTERIA Few*   < > Many*   SQUAMEPITHEL 2  --   --    HYALINECASTS 0   < > 3*    < > = values in this interval not displayed.     Wound Culture:   Recent Labs   Lab 20  1448   LABAERO METHICILLIN RESISTANT STAPHYLOCOCCUS AUREUS  Many  *       Significant Imagin/29 ct chest abdomen and pelvis -    In this patient with a known history of colon cancer, postoperative changes of partial colectomy are identified.  Again noted is extensive hepatic metastatic disease, with the index lesion in the right hepatic lobe appearing slightly decreased in size as compared to before.  Additionally, other hepatic lesions also appear to be decreased in size suggesting treatment response.     Trace left and small right pleural effusions.  There is subtotal consolidative change of the left lower lobe with patchy regions of consolidation throughout both lungs with more focal peripheral patchy nodular densities in the periphery of both lungs.  In the setting of sepsis, septic emboli should be primarily considered.  Neoplasm not excluded given the patient's history; however, felt less likely.     Soft tissue edema of the left thigh, nonspecific.  Consider evaluation for DVT

## 2020-08-30 NOTE — ASSESSMENT & PLAN NOTE
Secondary malignancy of liver  Adenocarcinoma of colon  Neutropenia  Anemia in neoplastic disease    She is followed by Dr. Arzola outpatient. We appreciate oncology.  Will cont to monitor CBC daily.

## 2020-08-30 NOTE — SUBJECTIVE & OBJECTIVE
Interval History: Nurse reports patient is very weak. Patient reports that she is very SOB with minimal exertion. Stool sent yesterday was negative for c diff - so discontinued IV flagyl and po vanco today.     Review of Systems   Constitutional:        Nurse reports patient is very weak   Respiratory: Positive for shortness of breath.         Patient SOB with minimal exertion    Gastrointestinal: Positive for diarrhea. Negative for nausea and vomiting.     Antibiotics (From admission, onward)    Start     Stop Route Frequency Ordered    08/29/20 0200  vancomycin (VANCOCIN) 1,250 mg in dextrose 5 % 250 mL IVPB      -- IV Every 12 hours (non-standard times) 08/28/20 1252    08/26/20 1628  vancomycin - pharmacy to dose  (vancomycin IVPB)      -- IV pharmacy to manage frequency 08/26/20 1528      off IV flagyl and PO vanco    Objective:     Vital Signs (Most Recent):  Temp: 99.7 °F (37.6 °C) (08/30/20 1530)  Pulse: (!) 122 (08/30/20 1600)  Resp: (!) 42 (08/30/20 1600)  BP: 132/82 (08/30/20 1600)  SpO2: 100 % (08/30/20 1600) Vital Signs (24h Range):  Temp:  [98.7 °F (37.1 °C)-99.8 °F (37.7 °C)] 99.7 °F (37.6 °C)  Pulse:  [108-139] 122  Resp:  [31-62] 42  SpO2:  [96 %-100 %] 100 %  BP: (105-134)/(69-91) 132/82     Weight: 83 kg (182 lb 15.7 oz)  Body mass index is 30.45 kg/m².    Estimated Creatinine Clearance: 125 mL/min (based on SCr of 0.7 mg/dL).    Physical Exam  Cardiovascular:      Heart sounds: Normal heart sounds.      Comments: Right chest with bandage where port was removed  Pulmonary:      Breath sounds: Normal breath sounds.   Abdominal:      General: Bowel sounds are normal. There is no distension.      Tenderness: There is no abdominal tenderness.   Musculoskeletal:      Right lower leg: No edema.      Left lower leg: No edema.         Significant Labs:   Blood Culture:   Recent Labs   Lab 08/26/20  1140 08/28/20  1137 08/29/20  1658   LABBLOO Gram stain juan bottle: Gram positive cocci in clusters  resembling Staph  Results called to and read back by: Trudy Morales RN  08/27/2020    02:08  Gram stain aer bottle: Gram positive cocci in clusters resembling Staph  Positive results previously called 08/27/2020  03:19  METHICILLIN RESISTANT STAPHYLOCOCCUS AUREUS  ID consult required at [facility].  * Gram stain juan bottle: Gram positive cocci in clusters resembling Staph   Results called to and read back by: Nighat Craft  08/29/2020  15:26  STAPHYLOCOCCUS AUREUS  ID consult required at [facility].  For susceptibility see order #[id number]  *  Gram stain aer bottle: Gram positive cocci in clusters resembling Staph   Results called to and read back by: Nighat Craft  08/29/2020  15:26  Gram stain juan bottle: Gram positive cocci in clusters resembling Staph   Positive results previously called 08/29/2020  17:57  STAPHYLOCOCCUS AUREUS  Susceptibility pending  ID consult required at [facility].  * No Growth to date     CBC:   Recent Labs   Lab 08/29/20  0449 08/30/20  0121 08/30/20  0415   WBC 10.25 23.64* 25.94*  25.94*   HGB 7.4* 9.1* 8.6*  8.6*   HCT 23.5* 29.5* 27.1*  27.1*   * 145* 144*  144*     CMP:   Recent Labs   Lab 08/29/20  0449 08/30/20  0121 08/30/20  0415   * 132* 131*  131*   K 3.3* 3.9 3.9  3.9   CL 96 96 97  97   CO2 25 21* 21*  21*   GLU 95 88 110  110   BUN 15 14 14  14   CREATININE 0.7 0.7 0.7  0.7   CALCIUM 7.6* 8.0* 7.8*  7.8*   ALBUMIN 1.8* 1.9* 1.7*  1.7*   ANIONGAP 10 15 13  13   EGFRNONAA >60 >60 >60  >60     Urine Studies:   Recent Labs   Lab 07/30/20  1101  08/26/20  1612   COLORU Manuela   < > Yellow   APPEARANCEUA Hazy*   < > Cloudy*   PHUR 6.0   < > 6.0   SPECGRAV 1.020   < > 1.025   PROTEINUA 1+*   < > 1+*   GLUCUA Negative   < > Negative   KETONESU Negative   < > Negative   BILIRUBINUA Negative   < > Negative   OCCULTUA Negative   < > Trace*   NITRITE  Negative   < > Positive*   UROBILINOGEN  --    < > Negative   LEUKOCYTESUR Negative   < > Negative   RBCUA 1   < > 0   WBCUA 3   < > 2   BACTERIA Few*   < > Many*   SQUAMEPITHEL 2  --   --    HYALINECASTS 0   < > 3*    < > = values in this interval not displayed.     Wound Culture:   Recent Labs   Lab 20  1448   LABAERO METHICILLIN RESISTANT STAPHYLOCOCCUS AUREUS  Many  *       Significant Imagin/29 ct chest abdomen and pelvis -    In this patient with a known history of colon cancer, postoperative changes of partial colectomy are identified.  Again noted is extensive hepatic metastatic disease, with the index lesion in the right hepatic lobe appearing slightly decreased in size as compared to before.  Additionally, other hepatic lesions also appear to be decreased in size suggesting treatment response.     Trace left and small right pleural effusions.  There is subtotal consolidative change of the left lower lobe with patchy regions of consolidation throughout both lungs with more focal peripheral patchy nodular densities in the periphery of both lungs.  In the setting of sepsis, septic emboli should be primarily considered.  Neoplasm not excluded given the patient's history; however, felt less likely.     Soft tissue edema of the left thigh, nonspecific.  Consider evaluation for DVT

## 2020-08-30 NOTE — SUBJECTIVE & OBJECTIVE
Interval history: she is in bed resting. She states she did not rest well 2/2 the diarrhea. She is C-diff negative.     Review of Systems   Constitutional: Positive for activity change, appetite change, fatigue and fever. Negative for chills.   HENT: Negative for trouble swallowing.    Eyes: Negative for visual disturbance.   Respiratory: Negative for cough, shortness of breath and wheezing.    Cardiovascular: Positive for leg swelling.   Gastrointestinal: Positive for diarrhea. Negative for abdominal distention, abdominal pain, nausea and vomiting.   Genitourinary: Negative for difficulty urinating and hematuria.   Musculoskeletal: Negative for gait problem.   Skin: Positive for wound (right chest wall).   Allergic/Immunologic: Positive for immunocompromised state.   Neurological: Positive for weakness.   Hematological: Does not bruise/bleed easily.   Psychiatric/Behavioral: Negative for confusion. The patient is not nervous/anxious.      Objective:     Vital Signs (Most Recent):  Temp: 99.7 °F (37.6 °C) (08/30/20 1530)  Pulse: (!) 126 (08/30/20 1500)  Resp: (!) 40 (08/30/20 1500)  BP: 134/88 (08/30/20 1500)  SpO2: 100 % (08/30/20 1500) Vital Signs (24h Range):  Temp:  [98.7 °F (37.1 °C)-99.8 °F (37.7 °C)] 99.7 °F (37.6 °C)  Pulse:  [108-139] 126  Resp:  [31-62] 40  SpO2:  [96 %-100 %] 100 %  BP: (105-134)/(69-91) 134/88     Weight: 83 kg (182 lb 15.7 oz)  Body mass index is 30.45 kg/m².    Physical Exam  Vitals signs and nursing note reviewed.   HENT:      Head: Normocephalic and atraumatic.      Comments: trialysis line in place at [facility]      Nose: Nose normal.      Mouth/Throat:      Mouth: Mucous membranes are moist.   Eyes:      Extraocular Movements: Extraocular movements intact.      Pupils: Pupils are equal, round, and reactive to light.   Neck:      Musculoskeletal: Normal range of motion.   Cardiovascular:      Rate and Rhythm: Tachycardia present.      Pulses: Normal pulses.   Abdominal:      General:  There is no distension.      Palpations: Abdomen is soft.      Tenderness: There is no abdominal tenderness. There is no guarding.   Genitourinary:     Comments: Tran catheter  Musculoskeletal: Normal range of motion.         General: Swelling present.      Right lower leg: Edema present.      Left lower leg: Edema present.   Skin:     General: Skin is warm and dry.      Capillary Refill: Capillary refill takes less than 2 seconds.      Comments: Dressing to the Right CW, clean dry and intact   Neurological:      Mental Status: She is alert and oriented to person, place, and time.   Psychiatric:         Mood and Affect: Mood normal.         Behavior: Behavior normal.           CRANIAL NERVES     CN III, IV, VI   Pupils are equal, round, and reactive to light.       Significant Labs:   BMP:   Recent Labs   Lab 08/30/20  0415     110   *  131*   K 3.9  3.9   CL 97  97   CO2 21*  21*   BUN 14  14   CREATININE 0.7  0.7   CALCIUM 7.8*  7.8*   MG 2.3  2.3     CBC:   Recent Labs   Lab 08/29/20  0449 08/30/20  0121 08/30/20  0415   WBC 10.25 23.64* 25.94*  25.94*   HGB 7.4* 9.1* 8.6*  8.6*   HCT 23.5* 29.5* 27.1*  27.1*   * 145* 144*  144*     Lactic Acid:   Recent Labs   Lab 08/30/20  1114   LACTATE 5.5*     Magnesium:   Recent Labs   Lab 08/29/20  0449 08/30/20  0121 08/30/20  0415   MG 2.8* 2.5 2.3  2.3     POCT Glucose:   Recent Labs   Lab 08/29/20  1331 08/30/20  0414   POCTGLUCOSE 79 120*     Urine Culture: No results for input(s): LABURIN in the last 48 hours.  Urine Studies:   No results for input(s): COLORU, APPEARANCEUA, PHUR, SPECGRAV, PROTEINUA, GLUCUA, KETONESU, BILIRUBINUA, OCCULTUA, NITRITE, UROBILINOGEN, LEUKOCYTESUR, RBCUA, WBCUA, BACTERIA, SQUAMEPITHEL, HYALINECASTS in the last 48 hours.    Invalid input(s): WRIGHTSUR    Significant Imaging: I have reviewed all pertinent imaging results/findings within the past 24 hours.

## 2020-08-30 NOTE — PROGRESS NOTES
[facility] ICU 5th Floor  Hospital Medicine  Progress Note    Patient Name: Huy Cisneros  MRN: [id number]  Patient Class: IP- Inpatient   Admission Date: 8/26/2020  Length of Stay: 4 days  Attending Physician: Maryana Patel*  Primary Care Provider: Primary Doctor No        Subjective:     Principal Problem:Septic shock        HPI:  Ms. Huy Cisneros is a 30 y/o female who lives in [place], [place] at her residence with her children. The patients PCP is Imtiaz Lindsay PA-C. She is also followed by Dr. Jewel Arzola with Oncology. She has a pmh of colon cancer with liver mets, uterine cysts, and HTN. She has a past surgical history of a mediport placement and a colonoscopy. The patient does not smoke cigarettes, drink alcohol, or use illicit drugs.     She presents to the ED with complaints of fever, vomiting and feeling dizzy since yesterday.  Per the patient she states her port a cath to right chest wall busted a stitch last night she while was bathing. She also has complaints of feeling SOB with lower extremity swelling.     Her ED workup includes WBC--0.95, hemoglobin 9.9, Na--132, creatinine 2.00, magnesium--1.4, lactate--10.7, CPK--180, pH--7.2. Pending blood cultures and wound cultures. She is COVID-19 negative. CXR---There has been interval placement of a right subclavian venous line. Its tip is in the region of the junction of the superior vena cava with the right atrium.  There is no pneumothorax. There is no focal pulmonary infiltrate visualized. XR of the abd---There is a paucity of gas within the gastrointestinal system. There are multiple radiopaque leads projected over the abdomen and pelvis. She will be admitted to the [facility] department for further care.         Overview/Hospital Course:  She was admitted to the [facility] hospital medicine service for further care. We have consulted ID, nephrology, oncology, pulmonology, and general  surgery. Her tunneled line is the suspected source for her bacteremia/septic shock. Blood cultures positive for staph. TTE pending. General sx removed tunneled port at the bedside. Will cont to follow cultures. She was initially given Levophed and Amiodarone. Lactate has improved---she was given IVF and now Nephrology has started Bicarb gtt. Dr. Naqvi was consulted for trialysis line placement---She was started on CRRT, now stopped at this time. Will cont broad spectrum abx. G-CSF started by oncology for neutropenia. MRI of the thoracic and lumbar spine was completed on 8/28 to r/o spinal abscess---Her MRI did not find a spinal fluid collection, however the MRI was suggestive of possible cavitary lung lesions. She is to have a CT chest, abdomen, and pelvis today for assessment of possible cavities in her lungs and mets in her liver. MRSA grew out at the port site. She was weaned off of levophed and now started on BB because of 's. Pulmonology has ordered CT of the C/A/P with contrast to evaluate the patient  for septic pulmonary emboli. WBCs have increased and are now 26.  she has Staph  in her BCs and the one from yesterday is NGTD.  On her CT chest and abdomen she has small bilateral pleural effusions and her liver mets appear smaller.    Interval history: she is in bed resting. She states she did not rest well 2/2 the diarrhea. She is C-diff negative.     Review of Systems   Constitutional: Positive for activity change, appetite change, fatigue and fever. Negative for chills.   HENT: Negative for trouble swallowing.    Eyes: Negative for visual disturbance.   Respiratory: Negative for cough, shortness of breath and wheezing.    Cardiovascular: Positive for leg swelling.   Gastrointestinal: Positive for diarrhea. Negative for abdominal distention, abdominal pain, nausea and vomiting.   Genitourinary: Negative for difficulty urinating and hematuria.   Musculoskeletal: Negative for gait problem.   Skin:  Positive for wound (right chest wall).   Allergic/Immunologic: Positive for immunocompromised state.   Neurological: Positive for weakness.   Hematological: Does not bruise/bleed easily.   Psychiatric/Behavioral: Negative for confusion. The patient is not nervous/anxious.      Objective:     Vital Signs (Most Recent):  Temp: 99.7 °F (37.6 °C) (08/30/20 1530)  Pulse: (!) 126 (08/30/20 1500)  Resp: (!) 40 (08/30/20 1500)  BP: 134/88 (08/30/20 1500)  SpO2: 100 % (08/30/20 1500) Vital Signs (24h Range):  Temp:  [98.7 °F (37.1 °C)-99.8 °F (37.7 °C)] 99.7 °F (37.6 °C)  Pulse:  [108-139] 126  Resp:  [31-62] 40  SpO2:  [96 %-100 %] 100 %  BP: (105-134)/(69-91) 134/88     Weight: 83 kg (182 lb 15.7 oz)  Body mass index is 30.45 kg/m².    Physical Exam  Vitals signs and nursing note reviewed.   HENT:      Head: Normocephalic and atraumatic.      Comments: trialysis line in [facility] at [facility]      Nose: Nose normal.      Mouth/Throat:      Mouth: Mucous membranes are moist.   Eyes:      Extraocular Movements: Extraocular movements intact.      Pupils: Pupils are equal, round, and reactive to light.   Neck:      Musculoskeletal: Normal range of motion.   Cardiovascular:      Rate and Rhythm: Tachycardia present.      Pulses: Normal pulses.   Abdominal:      General: There is no distension.      Palpations: Abdomen is soft.      Tenderness: There is no abdominal tenderness. There is no guarding.   Genitourinary:     Comments: Tran catheter  Musculoskeletal: Normal range of motion.         General: Swelling present.      Right lower leg: Edema present.      Left lower leg: Edema present.   Skin:     General: Skin is warm and dry.      Capillary Refill: Capillary refill takes less than 2 seconds.      Comments: Dressing to the Right CW, clean dry and intact   Neurological:      Mental Status: She is alert and oriented to person, place, and time.   Psychiatric:         Mood and Affect: Mood normal.         Behavior: Behavior normal.            CRANIAL NERVES     CN III, IV, VI   Pupils are equal, round, and reactive to light.       Significant Labs:   BMP:   Recent Labs   Lab 08/30/20  0415     110   *  131*   K 3.9  3.9   CL 97  97   CO2 21*  21*   BUN 14  14   CREATININE 0.7  0.7   CALCIUM 7.8*  7.8*   MG 2.3  2.3     CBC:   Recent Labs   Lab 08/29/20  0449 08/30/20  0121 08/30/20  0415   WBC 10.25 23.64* 25.94*  25.94*   HGB 7.4* 9.1* 8.6*  8.6*   HCT 23.5* 29.5* 27.1*  27.1*   * 145* 144*  144*     Lactic Acid:   Recent Labs   Lab 08/30/20  1114   LACTATE 5.5*     Magnesium:   Recent Labs   Lab 08/29/20  0449 08/30/20  0121 08/30/20  0415   MG 2.8* 2.5 2.3  2.3     POCT Glucose:   Recent Labs   Lab 08/29/20  1331 08/30/20  0414   POCTGLUCOSE 79 120*     Urine Culture: No results for input(s): LABURIN in the last 48 hours.  Urine Studies:   No results for input(s): COLORU, APPEARANCEUA, PHUR, SPECGRAV, PROTEINUA, GLUCUA, KETONESU, BILIRUBINUA, OCCULTUA, NITRITE, UROBILINOGEN, LEUKOCYTESUR, RBCUA, WBCUA, BACTERIA, SQUAMEPITHEL, HYALINECASTS in the last 48 hours.    Invalid input(s): WRIGHTSUR    Significant Imaging: I have reviewed all pertinent imaging results/findings within the past 24 hours.      Assessment/Plan:      * Septic shock  Infected venous access port  Metabolic Acidosis  MRSA Bacteremia    The source is likely from dehiscence of Mediport-----will cont broad spectrum abx. ID has been consulted. Following wound cultures as well as blood cultures---BCX growing GP cocci resembling staph. MRSA noted from the port. Blood cultures were redrawn on 8/28--NGTD. General Sx was consulted to remove her tunneled port. Lactate was initially elevated--now down. IVF given in the ED. Nephrology was consulted. Bicarb gtt initiated by Nephrology. CRRT was started and now stopped. MRI of the T/L spine suggestive of cavitary lung lesions. CT of the C/A/P today to r/o septic emboli. We appreciate ID. ID is  recommending YOLANDA. The patient with diarrhea--she was placed on IV flagyl and PO vancomycin (now stopped) for C-diff that is noted at this time negative.       Chest pain  Elevated troponin    Could be related to demand. YOLANDA on tomorrow. Will follow    Nausea & vomiting  Prn Zofran ordered.       Hyponatremia  Monitor.       Hypomagnesemia  Monitor.      KIERSTEN (acute kidney injury)  We appreciate nephrology for management. IV fluids given in the ED. Tran catheter placed. Nephrology has ordered a renal ultrasound. Was initially started on CRRT---on 8/28 CRRT stopped. Monitor intake and output. Creatinine 0.7 today.    Metastatic disease  Secondary malignancy of liver  Adenocarcinoma of colon  Neutropenia  Anemia in neoplastic disease    She is followed by Dr. Arzola outpatient. We appreciate oncology.  Will cont to monitor CBC daily.         VTE Risk Mitigation (From admission, onward)         Ordered     enoxaparin injection 40 mg  Every 24 hours      08/28/20 1108     heparin (porcine) injection 2,300 Units  As needed (PRN)      08/27/20 1649     IP VTE HIGH RISK PATIENT  Once      08/26/20 1458     Place sequential compression device  Until discontinued      08/26/20 1458                Discharge Planning   ZOE:      Code Status: Full Code   Is the patient medically ready for discharge?:     Reason for patient still in hospital (select all that apply): Patient unstable, Patient trending condition, Laboratory test, Treatment, Imaging and Consult recommendations  Discharge Plan A: Home with family            Critical care time spent on the evaluation and treatment of severe organ dysfunction, review of pertinent labs and imaging studies, discussions with consulting providers and discussions with patient/family: 45 minutes.      Tracy Medrano NP  Department of Hospital Medicine   [facility] ICU 5th Floor

## 2020-08-30 NOTE — ASSESSMENT & PLAN NOTE
29 F with PMH of metastatic adenocarcinoma of the colon with liver mets, uterine cysts, and HTN on whom ID is consulted for likely staph aureus bacteremia. She is neutropenic.   WBC up to 2.5 on 8/28. Port removed on 8/26 - positive for MRSA > 15 colonies. TTE was negative 8/27 for vegetations      -continue vancomycin  -needs repeat blood cxs until negative  -follow up on final cultures  -recommend YOLANDA since patient's port was longstanding in place

## 2020-08-30 NOTE — EICU
Intervention Initiated From:  Bedside vai phone    Jeremy Communicated with Bedside Nurse regarding:  Medication--po anxiety meds per pt's request    Doctor Notified:  Yes--Dr Atkins via Gerald

## 2020-08-30 NOTE — ASSESSMENT & PLAN NOTE
Infected venous access port  Metabolic Acidosis  MRSA Bacteremia    The source is likely from dehiscence of Mediport-----will cont broad spectrum abx. ID has been consulted. Following wound cultures as well as blood cultures---BCX growing GP cocci resembling staph. MRSA noted from the port. Blood cultures were redrawn on 8/28--NGTD. General Sx was consulted to remove her tunneled port. Lactate was initially elevated--now down. IVF given in the ED. Nephrology was consulted. Bicarb gtt initiated by Nephrology. CRRT was started and now stopped. MRI of the T/L spine suggestive of cavitary lung lesions. CT of the C/A/P today to r/o septic emboli. We appreciate ID. ID is recommending YOLANDA. The patient with diarrhea--she was placed on IV flagyl and PO vancomycin (now stopped) for C-diff that is noted at this time negative.

## 2020-08-30 NOTE — PROGRESS NOTES
Pharmacokinetic Assessment Follow Up: IV Vancomycin    Vancomycin serum concentration assessment(s):    The trough level was drawn correctly and can be used to guide therapy at this time. The measurement is within the desired definitive target range of 15 to 20 mcg/mL.    Vancomycin Regimen Plan:  Continue regimen to Vancomycin 1250 mg IV every 12hours with next serum trough concentration measured at 13:00 prior to 4th dose on 8-31      Drug levels (last 3 results):  Recent Labs   Lab Result Units 08/27/20  0808 08/28/20  1137 08/30/20  0121   Vancomycin, Random ug/mL 6.9  --   --    Vancomycin-Trough ug/mL  --  6.0* 19.5       Pharmacy will continue to follow and monitor vancomycin.    Please contact pharmacy at extension [phone] for questions regarding this assessment.    Thank you for the consult,   Avel Lopez       Patient brief summary:  Huy Cisneros is a 29 y.o. female initiated on antimicrobial therapy with IV Vancomycin for treatment of sepsis    The patient's current regimen is 1250 q12    Drug Allergies:   Review of patient's allergies indicates:   Allergen Reactions    Sulfa (sulfonamide antibiotics)        Actual Body Weight:   81.2 kg    Renal Function:   Estimated Creatinine Clearance: 123.7 mL/min (based on SCr of 0.7 mg/dL).,     Dialysis Method (if applicable):  N/A    CBC (last 72 hours):  Recent Labs   Lab Result Units 08/27/20  0730 08/28/20  0430 08/29/20  0449   WBC K/uL 0.99* 2.50* 10.25   Hemoglobin g/dL 8.4* 7.2* 7.4*   Hematocrit % 26.5* 23.1* 23.5*   Platelets K/uL 112* 122* 114*   Gran% % 10.0* 9.0* 37.0*   Lymph% % 40.0 57.0* 22.0   Mono% % 50.0* 8.0 7.0   Eosinophil% % 0.0 0.0 0.0   Basophil% % 0.0 0.0 0.0   Differential Method  Manual Manual Manual       Metabolic Panel (last 72 hours):  Recent Labs   Lab Result Units 08/27/20  0405 08/27/20  0808 08/27/20  1459 08/27/20  2159 08/28/20  0430 08/29/20  0449   Sodium mmol/L 132*  132* 132* 132* 133* 133*  133* 131*    Potassium mmol/L 3.7  3.7 4.3 4.3 3.8 3.6  3.6 3.3*   Chloride mmol/L 96  96 96 94* 93* 94*  94* 96   CO2 mmol/L 18*  18* 20* 23 25 27  27 25   Glucose mg/dL 88  88 88 102 100 93  93 95   BUN, Bld mg/dL 14  14 13 12 15 16  16 15   Creatinine mg/dL 1.0  1.0 0.8 0.8 0.9 0.9  0.9 0.7   Albumin g/dL 2.2*  2.2* 2.2* 2.3* 2.1* 2.0*  2.0* 1.8*   Total Bilirubin mg/dL  --  2.5*  --   --   --   --    Alkaline Phosphatase U/L  --  93  --   --   --   --    AST U/L  --  100*  --   --   --   --    ALT U/L  --  73*  --   --   --   --    Magnesium mg/dL 2.5 2.5 2.4 2.5 2.6 2.8*   Phosphorus mg/dL 3.5  3.5  --  1.4* 2.4* 2.3*  2.3* 1.2*       Vancomycin Administrations:  vancomycin given in the last 96 hours                     vancomycin 25 mg/mL oral solution 500 mg (mg) 500 mg Given 08/29/20 2308     500 mg Given  2109    vancomycin (VANCOCIN) 1,250 mg in dextrose 5 % 250 mL IVPB (mg) 1,250 mg New Bag 08/29/20 1409     1,250 mg New Bag  0259    vancomycin (VANCOCIN) 2,000 mg in dextrose 5 % 500 mL IVPB (mg) 2,000 mg New Bag 08/28/20 1500    vancomycin (VANCOCIN) 1,750 mg in dextrose 5 % 500 mL IVPB (mg) 1,750 mg New Bag 08/27/20 1045    vancomycin (VANCOCIN) 1,750 mg in dextrose 5 % 500 mL IVPB (mg) 1,750 mg New Bag 08/26/20 1245                    Microbiologic Results:  Microbiology Results (last 7 days)       Procedure Component Value Units Date/Time    Blood culture [[id number]] Collected: 08/30/20 0121    Order Status: Sent Specimen: Blood from Peripheral, Right Updated: 08/30/20 0121    Blood culture [[id number]] Collected: 08/29/20 1658    Order Status: Sent Specimen: Blood from Antecubital, Right Updated: 08/30/20 0119    Clostridium difficile EIA [[id number]] Collected: 08/28/20 1808    Order Status: Completed Specimen: Stool Updated: 08/29/20 230     C. diff Antigen Negative     C difficile Toxins A+B, EIA Negative     Comment: Testing not recommended for children <24 months old.       Blood culture  [[id number]] Collected: 08/28/20 1137    Order Status: Completed Specimen: Blood Updated: 08/29/20 1758     Blood Culture, Routine Gram stain aer bottle: Gram positive cocci in clusters resembling Staph       Results called to and read back by: Nighat Craft  08/29/2020  15:26      Gram stain juan bottle: Gram positive cocci in clusters resembling Staph       Positive results previously called 08/29/2020  17:57    Blood culture [[id number]] Collected: 08/28/20 1137    Order Status: Completed Specimen: Blood from Antecubital, Right Updated: 08/29/20 1526     Blood Culture, Routine Gram stain juan bottle: Gram positive cocci in clusters resembling Staph       Results called to and read back by: Nighat Craft  08/29/2020  15:26    IV catheter culture [[id number]]  (Abnormal)  (Susceptibility) Collected: 08/26/20 1604    Order Status: Completed Specimen: Catheter Tip Updated: 08/29/20 1312     Aerobic Culture - Cath tip METHICILLIN RESISTANT STAPHYLOCOCCUS AUREUS  > 15 colonies      Blood culture [[id number]]  (Abnormal)  (Susceptibility) Collected: 08/26/20 1140    Order Status: Completed Specimen: Blood from Peripheral, Hand, Right Updated: 08/29/20 1221     Blood Culture, Routine Gram stain juan bottle: Gram positive cocci in clusters resembling Staph      Results called to and read back by: Trudy Morales RN  08/27/2020        02:08      Gram stain aer bottle: Gram positive cocci in clusters resembling Staph      Positive results previously called 08/27/2020  03:19      METHICILLIN RESISTANT STAPHYLOCOCCUS AUREUS  ID consult required at [facility],[place] and [facility] locations.      Aerobic culture [[id number]]  (Abnormal)  (Susceptibility) Collected: 08/26/20 1448    Order Status: Completed Specimen: Incision site from Chest, Right Updated: 08/28/20 1327     Aerobic Bacterial Culture METHICILLIN RESISTANT STAPHYLOCOCCUS AUREUS  Many      Narrative:      Port incision site.    Clostridium difficile EIA [[id number]]  Collected: 08/28/20 1309    Order Status: Canceled Specimen: Stool     Culture, Anaerobe [[id number]] Collected: 08/26/20 1448    Order Status: Completed Specimen: Incision site from Chest, Right Updated: 08/28/20 0922     Anaerobic Culture Culture in progress    Narrative:      Port incision site    Culture, Anaerobe [[id number]] Collected: 08/26/20 1602    Order Status: Completed Specimen: Catheter Tip from Chest, Right Updated: 08/28/20 0714     Anaerobic Culture Culture in progress    Aerobic culture [[id number]] Collected: 08/26/20 1604    Order Status: Canceled Specimen: Catheter Tip from Chest, Right     Blood culture [[id number]]     Order Status: Canceled Specimen: Blood

## 2020-08-30 NOTE — ASSESSMENT & PLAN NOTE
We appreciate nephrology for management. IV fluids given in the ED. Tran catheter placed. Nephrology has ordered a renal ultrasound. Was initially started on CRRT---on 8/28 CRRT stopped. Monitor intake and output. Creatinine 0.7 today.

## 2020-08-30 NOTE — ASSESSMENT & PLAN NOTE
29 F with PMH of metastatic adenocarcinoma of the colon with liver mets, uterine cysts, and HTN on whom ID is consulted for likely staph aureus bacteremia. Neutropenia resolved on 8/29. Port removed on 8/26 - positive for MRSA > 15 colonies. TTE was negative 8/27 for vegetations      -continue vancomycin for MRSA bacteremia and sepsis due to port infection   -contact precautions for MRSA  -needs repeat blood cxs until negative  -follow up on final cultures  -recommend YOLANDA since patient's port was longstanding in place  -stool c diff was negative - so have discontinued the IV flagyl and PO vanco
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
## 2020-08-31 NOTE — PLAN OF CARE
Problem: Physical Therapy Goal  Goal: Physical Therapy Goal  Description: Goals to be met by: 20     Patient will increase functional independence with mobility by performin.  BLE AROM supine x 15  2.  Roll bilaterally with supervision  3.  Supine to/from sit with supervision  4.  Sit at EOB 15 min with supervision  5.  Sit to stand with RW with CG    Outcome: Ongoing, Progressing   Pt progressed to sitting EOB; tolerated x ~15 minutes; required maxA x 2 sup to sit and total A x 2 sit to supine; pt anxious and exhibiting short shallow breaths; will cont with POC.

## 2020-08-31 NOTE — PLAN OF CARE
Patient remained in bed for the shift. Bed in lowest position, wheels locked and call light within reach. Plan of care reviewed with patient, verbalized understanding. Neutropenic precautions maintained. VS remained WDL. ST on telemetry. 1L NC for comfort. Urine output adequate. Multiple Bms. No complaints of pain. With with  anxiety towards shift end. Will report to oncoming shift.

## 2020-08-31 NOTE — TRANSFER OF CARE
"Anesthesia Transfer of Care Note    Patient: uHy Cisneros    Procedure(s) Performed: Procedure(s) (LRB):  Transesophageal echo (YOLANDA) intra-procedure log documentation (N/A)    Patient location: ICU    Anesthesia Type: MAC    Transport from OR: Transported from OR on 2-3 L/min O2 by NC with adequate spontaneous ventilation    Post pain: adequate analgesia    Post assessment: no apparent anesthetic complications and tolerated procedure well    Post vital signs: stable    Level of consciousness: awake, alert and oriented    Nausea/Vomiting: no nausea/vomiting    Complications: none    Transfer of care protocol was followed      Last vitals:   Visit Vitals  /81   Pulse (!) 113   Temp 36.9 °C (98.4 °F) (Oral)   Resp (!) 53   Ht 5' 5" (1.651 m)   Wt 84 kg (185 lb 3 oz)   LMP 07/17/2020   SpO2 100%   Breastfeeding No   BMI 30.82 kg/m²     "

## 2020-08-31 NOTE — PROGRESS NOTES
Seen this pm      She had a YOLANDA at the bedside with assistance with anesthesia        Probe was inserted without any complications  No vegetations were seen        Full report to follow

## 2020-08-31 NOTE — INTERVAL H&P NOTE
The patient has been examined and the H&P has been reviewed:      She is here for YOLANDA to rule out endocarditis            Active Hospital Problems    Diagnosis  POA    *Septic shock [A41.9, R65.21]  Yes    Bacteremia due to Staphylococcus [R78.81]  Yes    Chest pain [R07.9]  No    Elevated troponin [R79.89]  No    Pancytopenia [D61.818]  Yes    MRSA bacteremia [R78.81]  Yes    KIERSTEN (acute kidney injury) [N17.9]  Yes    Infected venous access port [T80.219A]  Yes    Metabolic acidosis [E87.2]  Yes    Hypomagnesemia [E83.42]  Yes    Hyponatremia [E87.1]  Yes    Nausea & vomiting [R11.2]  Yes    Lactic acid acidosis [E87.2]  Yes    Sepsis [A41.9]  Yes    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

## 2020-08-31 NOTE — NURSING
2045 inquired about follow up Lactic acid. Order obtained.    2130 Lactic 4.8. NP Wilfrid notified. LR bolus ordered. To recheck in 3 hours    0200 recheck 5.2. NP notified. LR bolus and infusion ordered. Recheck in 3 hours     0500 Patient woke up anxious. Stated she had a nightmare and was sore from coughing. Upon assessing patient seemed warm, was febrile. eICU contacted for something for anxiety. Tylenol given & ice applied for soreness and fever.    0550 Lactic returned 4.3. Wilfrid notified about lab value and fever.     0600 Patient wanted NC for comfort, added humidity for comfort. Tessalon luz ordered

## 2020-08-31 NOTE — PROGRESS NOTES
Pharmacokinetic Assessment Follow Up: IV Vancomycin    Vancomycin serum concentration assessment(s):    The trough level was drawn correctly and can be used to guide therapy at this time. The measurement is within the desired definitive target range of 15 to 20 mcg/mL.    Vancomycin Regimen Plan:    Will continue vancomycin 1250 mg IV q 12 hours. Will recheck vancomycin trough in 5-7 days unless renal function changes significantly.    Drug levels (last 3 results):  Recent Labs   Lab Result Units 08/30/20  0121 08/31/20  1243   Vancomycin-Trough ug/mL 19.5 17.3       Pharmacy will continue to follow and monitor vancomycin.    Please contact pharmacy at extension [phone] for questions regarding this assessment.    Thank you for the consult,   Eleanor Morales       Patient brief summary:  Huy Cisneros is a 29 y.o. female initiated on antimicrobial therapy with IV Vancomycin for treatment of bacteremia    The patient's current regimen is vancomycin 1250 mg IV q 12 hours    Drug Allergies:   Review of patient's allergies indicates:   Allergen Reactions    Sulfa (sulfonamide antibiotics)        Actual Body Weight:   84 kg    Renal Function:   Estimated Creatinine Clearance: 125.8 mL/min (based on SCr of 0.7 mg/dL).,     Dialysis Method (if applicable):  N/A    CBC (last 72 hours):  Recent Labs   Lab Result Units 08/29/20  0449 08/30/20  0121 08/30/20  0415 08/31/20  0508   WBC K/uL 10.25 23.64* 25.94*  25.94* 25.77*   Hemoglobin g/dL 7.4* 9.1* 8.6*  8.6* 7.8*   Hematocrit % 23.5* 29.5* 27.1*  27.1* 24.3*   Platelets K/uL 114* 145* 144*  144* 170   Gran% % 37.0* 63.0 72.0  72.0 67.0   Lymph% % 22.0 14.0* 6.0*  6.0* 9.0*   Mono% % 7.0 8.0 1.0*  1.0* 1.0*   Eosinophil% % 0.0 0.0 0.0  0.0 0.0   Basophil% % 0.0 0.0 0.0  0.0 0.0   Differential Method  Manual Manual Manual  Manual Manual       Metabolic Panel (last 72 hours):  Recent Labs   Lab Result Units 08/29/20  0449 08/30/20  0121 08/30/20  0415  08/30/20  1205 08/31/20  0508   Sodium mmol/L 131* 132* 131*  131*  --  131*   Sodium Urine Random mmol/L  --   --   --  <20*  --    Potassium mmol/L 3.3* 3.9 3.9  3.9  --  3.5   Chloride mmol/L 96 96 97  97  --  98   CO2 mmol/L 25 21* 21*  21*  --  20*   Glucose mg/dL 95 88 110  110  --  97   BUN, Bld mg/dL 15 14 14  14  --  17   Creatinine mg/dL 0.7 0.7 0.7  0.7  --  0.7   Albumin g/dL 1.8* 1.9* 1.7*  1.7*  --  1.7*   Magnesium mg/dL 2.8* 2.5 2.3  2.3  --  1.9   Phosphorus mg/dL 1.2* 1.6* 1.3*  1.3*  --  1.3*       Vancomycin Administrations:  vancomycin given in the last 96 hours                     vancomycin (VANCOCIN) 1,250 mg in dextrose 5 % 250 mL IVPB (mg) 1,250 mg New Bag 08/31/20 0111     1,250 mg New Bag 08/30/20 1455     1,250 mg New Bag  0216     1,250 mg New Bag 08/29/20 1409     1,250 mg New Bag  0259    vancomycin 25 mg/mL oral solution 500 mg (mg) 500 mg Given 08/30/20 1103     500 mg Given  0607     500 mg Given 08/29/20 2308     500 mg Given  2109    vancomycin (VANCOCIN) 2,000 mg in dextrose 5 % 500 mL IVPB (mg) 2,000 mg New Bag 08/28/20 1500                    Microbiologic Results:  Microbiology Results (last 7 days)       Procedure Component Value Units Date/Time    Culture, Anaerobe [[id number]] Collected: 08/26/20 1602    Order Status: Completed Specimen: Catheter Tip from Chest, Right Updated: 08/31/20 1305     Anaerobic Culture No anaerobes isolated    Blood culture [[id number]]  (Abnormal) Collected: 08/28/20 1137    Order Status: Completed Specimen: Blood from Antecubital, Right Updated: 08/31/20 1033     Blood Culture, Routine Gram stain juan bottle: Gram positive cocci in clusters resembling Staph       Results called to and read back by: Nigaht Craft  08/29/2020  15:26      METHICILLIN RESISTANT STAPHYLOCOCCUS AUREUS  ID consult required at [facility]y,Kenisha and [facility]iss[facility].  For susceptibility see order #[id number]      Blood culture [[id number]]  (Abnormal)   (Susceptibility) Collected: 08/28/20 1137    Order Status: Completed Specimen: Blood Updated: 08/31/20 1030     Blood Culture, Routine Gram stain aer bottle: Gram positive cocci in clusters resembling Staph       Results called to and read back by: Nighat Craft  08/29/2020  15:26      Gram stain juan bottle: Gram positive cocci in clusters resembling Staph       Positive results previously called 08/29/2020  17:57      METHICILLIN RESISTANT STAPHYLOCOCCUS AUREUS  ID consult required at [facility],[place] and [facility] locations.      Blood culture [[id number]] Collected: 08/30/20 0121    Order Status: Completed Specimen: Blood from Peripheral, Right Updated: 08/31/20 1022     Blood Culture, Routine No Growth to date      No Growth to date    Culture, Anaerobe [[id number]] Collected: 08/26/20 1448    Order Status: Completed Specimen: Incision site from Chest, Right Updated: 08/31/20 0938     Anaerobic Culture No anaerobes isolated    Narrative:      Port incision site    Blood culture [[id number]] Collected: 08/31/20 0631    Order Status: Sent Specimen: Blood from Antecubital, Right Arm Updated: 08/31/20 0632    Blood culture [[id number]] Collected: 08/29/20 1658    Order Status: Completed Specimen: Blood from Antecubital, Right Updated: 08/31/20 0613     Blood Culture, Routine No Growth to date      No Growth to date    Clostridium difficile EIA [[id number]] Collected: 08/28/20 1808    Order Status: Completed Specimen: Stool Updated: 08/29/20 2305     C. diff Antigen Negative     C difficile Toxins A+B, EIA Negative     Comment: Testing not recommended for children <24 months old.       IV catheter culture [[id number]]  (Abnormal)  (Susceptibility) Collected: 08/26/20 1604    Order Status: Completed Specimen: Catheter Tip Updated: 08/29/20 1312     Aerobic Culture - Cath tip METHICILLIN RESISTANT STAPHYLOCOCCUS AUREUS  > 15 colonies      Blood culture [[id number]]  (Abnormal)  (Susceptibility) Collected: 08/26/20 1140     Order Status: Completed Specimen: Blood from Peripheral, Hand, Right Updated: 08/29/20 1221     Blood Culture, Routine Gram stain juan bottle: Gram positive cocci in clusters resembling Staph      Results called to and read back by: Trudy Morales RN  08/27/2020        02:08      Gram stain aer bottle: Gram positive cocci in clusters resembling Staph      Positive results previously called 08/27/2020  03:19      METHICILLIN RESISTANT STAPHYLOCOCCUS AUREUS  ID consult required at [facility],[place] and [facility] locations.      Aerobic culture [[id number]]  (Abnormal)  (Susceptibility) Collected: 08/26/20 1448    Order Status: Completed Specimen: Incision site from Chest, Right Updated: 08/28/20 1327     Aerobic Bacterial Culture METHICILLIN RESISTANT STAPHYLOCOCCUS AUREUS  Many      Narrative:      Port incision site.    Clostridium difficile EIA [[id number]] Collected: 08/28/20 1309    Order Status: Canceled Specimen: Stool     Aerobic culture [[id number]] Collected: 08/26/20 1604    Order Status: Canceled Specimen: Catheter Tip from Chest, Right     Blood culture [[id number]]     Order Status: Canceled Specimen: Blood

## 2020-08-31 NOTE — EICU
Intervention Initiated From:  Bedside via phone    Jeremy Communicated with Bedside Nurse regarding:  Medication for c/o anxiety (po or IV)    Doctor Notified:  Yes--Dr Atkins via Gerald

## 2020-08-31 NOTE — SUBJECTIVE & OBJECTIVE
Interval history: She looks good this AM. Still with diarrhea. Will cont to follow.     Review of Systems   Constitutional: Positive for activity change, appetite change, fatigue and fever. Negative for chills.   HENT: Negative for trouble swallowing.    Eyes: Negative for visual disturbance.   Respiratory: Negative for cough, shortness of breath and wheezing.    Cardiovascular: Positive for leg swelling.   Gastrointestinal: Positive for diarrhea. Negative for abdominal distention, abdominal pain, nausea and vomiting.   Genitourinary: Negative for hematuria.   Musculoskeletal: Negative for gait problem.   Skin: Positive for wound (right chest wall).   Allergic/Immunologic: Positive for immunocompromised state.   Neurological: Positive for weakness.   Hematological: Does not bruise/bleed easily.   Psychiatric/Behavioral: Negative for confusion. The patient is not nervous/anxious.      Objective:     Vital Signs (Most Recent):  Temp: 98.4 °F (36.9 °C) (08/31/20 1500)  Pulse: (!) 115 (08/31/20 1715)  Resp: (!) 53 (08/31/20 1715)  BP: 114/75 (08/31/20 1715)  SpO2: 99 % (08/31/20 1715) Vital Signs (24h Range):  Temp:  [98.1 °F (36.7 °C)-101.5 °F (38.6 °C)] 98.4 °F (36.9 °C)  Pulse:  [106-131] 115  Resp:  [23-61] 53  SpO2:  [79 %-100 %] 99 %  BP: ()/(50-95) 114/75     Weight: 84 kg (185 lb 3 oz)  Body mass index is 30.82 kg/m².    Physical Exam  Vitals signs and nursing note reviewed.   HENT:      Head: Normocephalic and atraumatic.      Comments: trialysis line in place at [facility]      Nose: Nose normal.      Mouth/Throat:      Mouth: Mucous membranes are moist.   Eyes:      Extraocular Movements: Extraocular movements intact.      Pupils: Pupils are equal, round, and reactive to light.   Neck:      Musculoskeletal: Normal range of motion.   Cardiovascular:      Rate and Rhythm: Tachycardia present.      Pulses: Normal pulses.   Pulmonary:      Effort: Pulmonary effort is normal.   Abdominal:      General: There is no  distension.      Palpations: Abdomen is soft.      Tenderness: There is no abdominal tenderness. There is no guarding.   Genitourinary:     Comments: Tran catheter  Musculoskeletal: Normal range of motion.         General: Swelling present.      Right lower leg: Edema present.      Left lower leg: Edema present.   Skin:     General: Skin is warm and dry.      Capillary Refill: Capillary refill takes less than 2 seconds.      Comments: Dressing to the Right CW, clean dry and intact   Neurological:      Mental Status: She is alert and oriented to person, place, and time.   Psychiatric:         Mood and Affect: Mood normal.         Behavior: Behavior normal.           CRANIAL NERVES     CN III, IV, VI   Pupils are equal, round, and reactive to light.       Significant Labs:   BMP:   Recent Labs   Lab 08/31/20  0508   GLU 97   *   K 3.5   CL 98   CO2 20*   BUN 17   CREATININE 0.7   CALCIUM 7.6*   MG 1.9     CBC:   Recent Labs   Lab 08/30/20  0121 08/30/20  0415 08/31/20  0508   WBC 23.64* 25.94*  25.94* 25.77*   HGB 9.1* 8.6*  8.6* 7.8*   HCT 29.5* 27.1*  27.1* 24.3*   * 144*  144* 170     Lactic Acid:   Recent Labs   Lab 08/30/20  2355 08/31/20  0508 08/31/20  0831   LACTATE 5.2* 4.4* 4.7*     Magnesium:   Recent Labs   Lab 08/30/20  0121 08/30/20  0415 08/31/20  0508   MG 2.5 2.3  2.3 1.9     POCT Glucose:   Recent Labs   Lab 08/30/20  0414 08/30/20  2055   POCTGLUCOSE 120* 138*     Urine Culture: No results for input(s): LABURIN in the last 48 hours.  Urine Studies:   No results for input(s): COLORU, APPEARANCEUA, PHUR, SPECGRAV, PROTEINUA, GLUCUA, KETONESU, BILIRUBINUA, OCCULTUA, NITRITE, UROBILINOGEN, LEUKOCYTESUR, RBCUA, WBCUA, BACTERIA, SQUAMEPITHEL, HYALINECASTS in the last 48 hours.    Invalid input(s): WRIGHTSUR    Significant Imaging: I have reviewed all pertinent imaging results/findings within the past 24 hours.

## 2020-08-31 NOTE — PROGRESS NOTES
[facility] ICU 5th Floor  Hospital Medicine  Progress Note    Patient Name: Huy Cisneros  MRN: [id number]  Patient Class: IP- Inpatient   Admission Date: 8/26/2020  Length of Stay: 5 days  Attending Physician: Maryana Patel*  Primary Care Provider: Primary Doctor No        Subjective:     Principal Problem:Septic shock        HPI:  Ms. Huy Cisneros is a 28 y/o female who lives in [place], [place] at her residence with her children. The patients PCP is Imtiaz Lindsay PA-C. She is also followed by Dr. Jewel Arzola with Oncology. She has a pmh of colon cancer with liver mets, uterine cysts, and HTN. She has a past surgical history of a mediport placement and a colonoscopy. The patient does not smoke cigarettes, drink alcohol, or use illicit drugs.     She presents to the ED with complaints of fever, vomiting and feeling dizzy since yesterday.  Per the patient she states her port a cath to right chest wall busted a stitch last night she while was bathing. She also has complaints of feeling SOB with lower extremity swelling.     Her ED workup includes WBC--0.95, hemoglobin 9.9, Na--132, creatinine 2.00, magnesium--1.4, lactate--10.7, CPK--180, pH--7.2. Pending blood cultures and wound cultures. She is COVID-19 negative. CXR---There has been interval placement of a right subclavian venous line. Its tip is in the region of the junction of the superior vena cava with the right atrium.  There is no pneumothorax. There is no focal pulmonary infiltrate visualized. XR of the abd---There is a paucity of gas within the gastrointestinal system. There are multiple radiopaque leads projected over the abdomen and pelvis. She will be admitted to the [facility] department for further care.         Overview/Hospital Course:  She was admitted to the [facility] hospital medicine service for further care. We have consulted ID, nephrology, oncology, pulmonology, and general  surgery. Her tunneled line is the suspected source for her bacteremia/septic shock. Blood cultures positive for staph. TTE pending. General sx removed tunneled port at the bedside. Will cont to follow cultures. She was initially given Levophed and Amiodarone. Lactate has improved---she was given IVF and now Nephrology has started Bicarb gtt. Dr. Naqvi was consulted for trialysis line placement---She was started on CRRT, now stopped at this time. Will cont broad spectrum abx. G-CSF started by oncology for neutropenia. MRI of the thoracic and lumbar spine was completed on 8/28 to r/o spinal abscess---Her MRI did not find a spinal fluid collection, however the MRI was suggestive of possible cavitary lung lesions. She is to have a CT chest, abdomen, and pelvis today for assessment of possible cavities in her lungs and mets in her liver. MRSA grew out at the port site. She was weaned off of levophed and now started on BB because of 's. Pulmonology has ordered CT of the C/A/P with contrast to evaluate the patient  for septic pulmonary emboli. WBCs have increased and are now 26.  she has Staph  in her BCs and the one from yesterday is NGTD.  On her CT chest and abdomen she has small bilateral pleural effusions and her liver mets appear smaller. Will undergo YOLANDA today.    Interval history: She looks good this AM. Still with diarrhea. Will cont to follow.     Review of Systems   Constitutional: Positive for activity change, appetite change, fatigue and fever. Negative for chills.   HENT: Negative for trouble swallowing.    Eyes: Negative for visual disturbance.   Respiratory: Negative for cough, shortness of breath and wheezing.    Cardiovascular: Positive for leg swelling.   Gastrointestinal: Positive for diarrhea. Negative for abdominal distention, abdominal pain, nausea and vomiting.   Genitourinary: Negative for hematuria.   Musculoskeletal: Negative for gait problem.   Skin: Positive for wound (right chest  dana).   Allergic/Immunologic: Positive for immunocompromised state.   Neurological: Positive for weakness.   Hematological: Does not bruise/bleed easily.   Psychiatric/Behavioral: Negative for confusion. The patient is not nervous/anxious.      Objective:     Vital Signs (Most Recent):  Temp: 98.4 °F (36.9 °C) (08/31/20 1500)  Pulse: (!) 115 (08/31/20 1715)  Resp: (!) 53 (08/31/20 1715)  BP: 114/75 (08/31/20 1715)  SpO2: 99 % (08/31/20 1715) Vital Signs (24h Range):  Temp:  [98.1 °F (36.7 °C)-101.5 °F (38.6 °C)] 98.4 °F (36.9 °C)  Pulse:  [106-131] 115  Resp:  [23-61] 53  SpO2:  [79 %-100 %] 99 %  BP: ()/(50-95) 114/75     Weight: 84 kg (185 lb 3 oz)  Body mass index is 30.82 kg/m².    Physical Exam  Vitals signs and nursing note reviewed.   HENT:      Head: Normocephalic and atraumatic.      Comments: trialysis line in place at [facility]      Nose: Nose normal.      Mouth/Throat:      Mouth: Mucous membranes are moist.   Eyes:      Extraocular Movements: Extraocular movements intact.      Pupils: Pupils are equal, round, and reactive to light.   Neck:      Musculoskeletal: Normal range of motion.   Cardiovascular:      Rate and Rhythm: Tachycardia present.      Pulses: Normal pulses.   Pulmonary:      Effort: Pulmonary effort is normal.   Abdominal:      General: There is no distension.      Palpations: Abdomen is soft.      Tenderness: There is no abdominal tenderness. There is no guarding.   Genitourinary:     Comments: Tran catheter  Musculoskeletal: Normal range of motion.         General: Swelling present.      Right lower leg: Edema present.      Left lower leg: Edema present.   Skin:     General: Skin is warm and dry.      Capillary Refill: Capillary refill takes less than 2 seconds.      Comments: Dressing to the Right CW, clean dry and intact   Neurological:      Mental Status: She is alert and oriented to person, place, and time.   Psychiatric:         Mood and Affect: Mood normal.         Behavior:  Behavior normal.           CRANIAL NERVES     CN III, IV, VI   Pupils are equal, round, and reactive to light.       Significant Labs:   BMP:   Recent Labs   Lab 08/31/20  0508   GLU 97   *   K 3.5   CL 98   CO2 20*   BUN 17   CREATININE 0.7   CALCIUM 7.6*   MG 1.9     CBC:   Recent Labs   Lab 08/30/20  0121 08/30/20  0415 08/31/20  0508   WBC 23.64* 25.94*  25.94* 25.77*   HGB 9.1* 8.6*  8.6* 7.8*   HCT 29.5* 27.1*  27.1* 24.3*   * 144*  144* 170     Lactic Acid:   Recent Labs   Lab 08/30/20  2355 08/31/20  0508 08/31/20  0831   LACTATE 5.2* 4.4* 4.7*     Magnesium:   Recent Labs   Lab 08/30/20  0121 08/30/20  0415 08/31/20  0508   MG 2.5 2.3  2.3 1.9     POCT Glucose:   Recent Labs   Lab 08/30/20  0414 08/30/20  2055   POCTGLUCOSE 120* 138*     Urine Culture: No results for input(s): LABURIN in the last 48 hours.  Urine Studies:   No results for input(s): COLORU, APPEARANCEUA, PHUR, SPECGRAV, PROTEINUA, GLUCUA, KETONESU, BILIRUBINUA, OCCULTUA, NITRITE, UROBILINOGEN, LEUKOCYTESUR, RBCUA, WBCUA, BACTERIA, SQUAMEPITHEL, HYALINECASTS in the last 48 hours.    Invalid input(s): WRIGHTSUR    Significant Imaging: I have reviewed all pertinent imaging results/findings within the past 24 hours.      Assessment/Plan:      * Septic shock  Infected venous access port  Metabolic Acidosis  Bacteremia due to Staphylococcus    The source is likely from dehiscence of Mediport-----we started her on broad spectrum abx. ID has been consulted. Following wound cultures as well as blood cultures---BCX growing GP cocci resembling staph. MRSA noted from the port. Blood cultures were redrawn on 8/28--NGTD. General Sx was consulted to remove her tunneled port. Lactate was initially elevated--now down. IVF given in the ED. Nephrology was consulted. Bicarb gtt initiated by Nephrology. CRRT was started and now stopped. MRI of the T/L spine suggestive of cavitary lung lesions. CT of the C/A/P today to r/o septic emboli. We  appreciate ID. ID is recommending YOLANDA. The patient with diarrhea--she was placed on IV flagyl and PO vancomycin (now stopped) for C-diff that is noted at this time negative. She is now currently on Vancomycin.       Elevated troponin  Could be related to demand. YOLANDA on today. Will follow.       Chest pain  Elevated troponin    No chest pain at this time. Troponin elevated---cardiology consulted. No changes on EKG. The troponin elevation could be related to demand--- echo with normal LVEF.    Nausea & vomiting  Prn Zofran ordered.       Hyponatremia  Monitor.       Hypomagnesemia  Mg 1.9--monitor.      KIERSTEN (acute kidney injury)  We appreciate nephrology for management. IV fluids given in the ED. Tran catheter placed. Nephrology has ordered a renal ultrasound. Was initially started on CRRT---on 8/28 CRRT stopped. Monitor intake and output. Creatinine 0.7 today.    Metastatic disease  Secondary malignancy of liver  Adenocarcinoma of colon  Neutropenia  Anemia in neoplastic disease    She is followed by Dr. Arzola outpatient. We appreciate oncology.  Will cont to monitor CBC daily.         VTE Risk Mitigation (From admission, onward)         Ordered     enoxaparin injection 40 mg  Every 24 hours      08/28/20 1108     heparin (porcine) injection 2,300 Units  As needed (PRN)      08/27/20 1649     IP VTE HIGH RISK PATIENT  Once      08/26/20 1458     Place sequential compression device  Until discontinued      08/26/20 1458                Discharge Planning   ZOE:      Code Status: Full Code   Is the patient medically ready for discharge?:     Reason for patient still in hospital (select all that apply): Patient unstable, Patient new problem, Patient trending condition, Laboratory test, Treatment, Imaging, Consult recommendations and PT / OT recommendations  Discharge Plan A: Home Health            Critical care time spent on the evaluation and treatment of severe organ dysfunction, review of pertinent labs and imaging  studies, discussions with consulting providers and discussions with patient/family: 45 minutes.      Tracy Medrano NP  Department of Hospital Medicine   [facility] ICU 5th Floor

## 2020-08-31 NOTE — ASSESSMENT & PLAN NOTE
Infected venous access port  Metabolic Acidosis  Bacteremia due to Staphylococcus    The source is likely from dehiscence of Mediport-----we started her on broad spectrum abx. ID has been consulted. Following wound cultures as well as blood cultures---BCX growing GP cocci resembling staph. MRSA noted from the port. Blood cultures were redrawn on 8/28--NGTD. General Sx was consulted to remove her tunneled port. Lactate was initially elevated--now down. IVF given in the ED. Nephrology was consulted. Bicarb gtt initiated by Nephrology. CRRT was started and now stopped. MRI of the T/L spine suggestive of cavitary lung lesions. CT of the C/A/P today to r/o septic emboli. We appreciate ID. ID is recommending YOLANDA. The patient with diarrhea--she was placed on IV flagyl and PO vancomycin (now stopped) for C-diff that is noted at this time negative. She is now currently on Vancomycin.

## 2020-08-31 NOTE — ANESTHESIA PREPROCEDURE EVALUATION
08/31/2020  Huy Cisneros is a 29 y.o., female with h/o metastatic colon CA s/p colectomy with liver mets, uterine cysts, and HTN now admitted for septic shock 2/2 MRSA bacteremia/infected venous access port scheduled for YOLANDA.    Review of patient's allergies indicates:   Allergen Reactions    Sulfa (sulfonamide antibiotics)        Past Medical History:   Diagnosis Date    Cancer     Colon CA with mets to the liver    Uterine cyst        Past Surgical History:   Procedure Laterality Date    COLONOSCOPY N/A 7/15/2020    Procedure: COLONOSCOPY;  Surgeon: Hi Drake MD;  Location: [facility] ENDO;  Service: Endoscopy;  Laterality: N/A;    COLONOSCOPY W/ BIOPSIES      INSERTION OF TUNNELED CENTRAL VENOUS CATHETER (CVC) WITH SUBCUTANEOUS PORT N/A 7/17/2020    Procedure: INSERTION, PORT-A-CATH;  Surgeon: Armani Naqvi MD;  Location: [facility] OR;  Service: General;  Laterality: N/A;       Outpatient Meds  No current facility-administered medications on file prior to encounter.      Current Outpatient Medications on File Prior to Encounter   Medication Sig Dispense Refill    docusate sodium (COLACE) 100 MG capsule Take 1 capsule (100 mg total) by mouth once daily. 30 capsule 0    folic acid (FOLVITE) 1 MG tablet Take 1 tablet (1 mg total) by mouth once daily. 100 tablet 3    HYDROcodone-acetaminophen (NORCO) 5-325 mg per tablet Take 1 tablet by mouth every 8 (eight) hours as needed for Pain (chronic cancer-related pain). 60 tablet 0    lidocaine-prilocaine (EMLA) cream Apply topically As instructed. Apply to skin overlying port site 30 minutes before chemotherapy. 30 g 2       Inpatient Scheduled Meds   enoxaparin  40 mg Subcutaneous Q24H    metoprolol tartrate  25 mg Oral TID    vancomycin (VANCOCIN) IVPB  1,250 mg Intravenous Q12H           Anesthesia Evaluation    I have reviewed the  Patient Summary Reports.   I have reviewed the NPO Status.   I have reviewed the Medications.     Review of Systems  Anesthesia Hx:  No problems with previous Anesthesia Denies Hx of Anesthetic complications  Denies Family Hx of Anesthesia complications.   Denies Personal Hx of Anesthesia complications.   Social:  Non-Smoker, No Alcohol Use    Hematology/Oncology:         -- Anemia: Current/Recent Cancer.   Cardiovascular:   Exercise tolerance: poor Chest pain with elevated troponin 2/2 demand ischemia per cardiology     Pulmonary:   Shortness of breath Reports SOB at rest and with walking from room to room at home.   Renal/:   Chronic Renal Disease, ARF    Hepatic/GI:   Liver Disease, Colon CA with liver mets s/p colectomy   Musculoskeletal:  Musculoskeletal Normal    Neurological:  Neurology Normal    Endocrine:  Endocrine Normal        Physical Exam  General:  Obesity    Airway/Jaw/Neck:  Airway Findings: Mouth Opening: Small, but > 3cm Tongue: Normal  Mallampati: III  TM Distance: Normal, at least 6 cm         Dental:  DENTAL FINDINGS: Normal   Chest/Lungs:  Chest/Lungs Clear    Heart/Vascular:  Heart Findings: Normal       Mental Status:  Mental Status Findings:  Alert and Oriented, Cooperative         Trended Lab Data:  Recent Labs   Lab 08/26/20  1127  08/26/20  1920  08/27/20  0808  08/30/20  0121 08/30/20  0415 08/31/20  0508   WBC 0.95*  --   --    < >  --    < > 23.64* 25.94*  25.94* 25.77*   HGB 9.9*  --   --    < >  --    < > 9.1* 8.6*  8.6* 7.8*   HCT 32.9*  --   --    < >  --    < > 29.5* 27.1*  27.1* 24.3*     --   --    < >  --    < > 145* 144*  144* 170   MCV 79*  --   --    < >  --    < > 76* 74*  74* 74*   RDW 18.8*  --   --    < >  --    < > 19.7* 19.4*  19.4* 19.9*   *   < > 130*   < > 132*   < > 132* 131*  131* 131*   K 4.0   < > 3.6   < > 4.3   < > 3.9 3.9  3.9 3.5   CL 98   < > 98   < > 96   < > 96 97  97 98   CO2 10*   < > 10*   < > 20*   < > 21* 21*  21* 20*   BUN  14   < > 18   < > 13   < > 14 14  14 17   CREATININE 2.00*   < > 1.5*   < > 0.8   < > 0.7 0.7  0.7 0.7   GLU 89   < > 110   < > 88   < > 88 110  110 97   PROT 7.7  --  6.1  --  6.0  --   --   --   --    ALBUMIN 3.7   < > 2.4*   < > 2.2*   < > 1.9* 1.7*  1.7* 1.7*   BILITOT 3.8*  --  2.6*  --  2.5*  --   --   --   --    *  --  121*  --  100*  --   --   --   --    ALKPHOS 126  --  93  --  93  --   --   --   --    ALT 93*  --  83*  --  73*  --   --   --   --     < > = values in this interval not displayed.       Trended Cardiac Data:  Recent Labs   Lab 08/28/20  1137 08/28/20  1626 08/29/20  0908   TROPONINI <0.006 <0.006 0.068*         TTE 8/27/20  · Normal left ventricular systolic function. The estimated ejection fraction is 55%.  · Normal LV diastolic function.  · Normal right ventricular systolic function.  · Normal central venous pressure (3 mmHg).    ECG 8/29/20  Rate 132, sinus tach      Anesthesia Plan  Type of Anesthesia, risks & benefits discussed:  Anesthesia Type:  MAC, general  Patient's Preference:   Intra-op Monitoring Plan: standard ASA monitors  Intra-op Monitoring Plan Comments:   Post Op Pain Control Plan:   Post Op Pain Control Plan Comments:   Induction:   IV  Beta Blocker:  Patient is not currently on a Beta-Blocker (No further documentation required).       Informed Consent: Patient understands risks and agrees with Anesthesia plan.  Questions answered. Anesthesia consent signed with patient.  ASA Score: 3     Day of Surgery Review of History & Physical: I have interviewed and examined the patient. I have reviewed the patient's H&P dated:    H&P update referred to the provider.         Ready For Surgery From Anesthesia Perspective.

## 2020-08-31 NOTE — PLAN OF CARE
Dr. Little, made aware of trending down of urinary output 45 down to 25 over the last 3 hours, she reviewed the BUN and Creat, no new orders given Will continue to monitor.

## 2020-08-31 NOTE — PLAN OF CARE
Ms. Cisneros, will be encouraged and assisted to turn every 2 hours, supported with pillows, heels floated off the mattress with off load boots, to prevent pressure areas to the skin, she will continue to work with physical therapy, and occupational therapy for muscle strengthening , steady gait, and safe transfer. IV antibiotics will be continued, encouraged to eat and  to advance to sitting up in the chair at the bedside.

## 2020-08-31 NOTE — PROGRESS NOTES
[facility] ICU 5th Floor  Hematology/Oncology  Progress Note    Patient Name: Huy Cisneros  Admission Date: 8/26/2020  Hospital Length of Stay: 5 days  Code Status: Full Code     Subjective:     HPI:  29-year-old female underwent right hemicolectomy 07/17/2020 for metastatic colon adenocarcinoma and received 1st cycle of FOLFOX 08/11/2020.    7/20/2020 - CT scan - Multiple hepatic hypodensities better evaluated on CT from 07/14/2020, consistent with metastatic disease.    She has been vomiting and feeling dizzy since yesterday.  Last night she busted a stitch to the Port-A-Cath site causing dehiscence and this morning she fell after going to the restroom.    She presented to the ED with tachycardia, heart rate up to 160 and was hypotensive with blood pressure down to 85/43.  She was started on antibiotics, IV fluids and pressors and transferred to [facility].    WBC on admission 0.95, creatinine 2, lactate >12    Bilirubin 3.8, , ALT 93    Seen by . Right chest port removed.      Interval History:   8/31 - scheduled for YOLANDA    Oncology Treatment Plan:   OP COLORECTAL FOLFOX + BEVACIZUMAB Q2W    Medications:  Continuous Infusions:  Scheduled Meds:   enoxaparin  40 mg Subcutaneous Q24H    metoprolol tartrate  25 mg Oral TID    vancomycin (VANCOCIN) IVPB  1,250 mg Intravenous Q12H     PRN Meds:acetaminophen, benzonatate, diphenhydrAMINE-zinc acetate 2-0.1%, heparin (porcine), influenza, melatonin, ondansetron, ondansetron, ondansetron, ondansetron, pneumoc 13-davion conj-dip cr(PF), sodium chloride 0.9%, Pharmacy to dose Vancomycin consult **AND** vancomycin - pharmacy to dose     Review of Systems   Constitutional: Positive for activity change and fatigue. Negative for fever and unexpected weight change.   HENT: Negative for mouth sores and nosebleeds.    Respiratory: Negative for shortness of breath and wheezing.    Cardiovascular: Negative for chest pain and  palpitations.   Gastrointestinal: Negative for abdominal pain and nausea.   Musculoskeletal: Positive for back pain.   Allergic/Immunologic: Negative for food allergies.   Neurological: Positive for weakness.   Psychiatric/Behavioral: Negative for behavioral problems and confusion.     Objective:     Vital Signs (Most Recent):  Temp: 98.1 °F (36.7 °C) (08/31/20 0700)  Pulse: (!) 117 (08/31/20 0924)  Resp: (!) 53 (08/31/20 0915)  BP: 116/79 (08/31/20 0924)  SpO2: 100 % (08/31/20 0915) Vital Signs (24h Range):  Temp:  [98.1 °F (36.7 °C)-101.5 °F (38.6 °C)] 98.1 °F (36.7 °C)  Pulse:  [114-139] 117  Resp:  [23-61] 53  SpO2:  [97 %-100 %] 100 %  BP: (112-134)/(69-95) 116/79     Weight: 84 kg (185 lb 3 oz)  Body mass index is 30.82 kg/m².  Body surface area is 1.96 meters squared.      Intake/Output Summary (Last 24 hours) at 8/31/2020 1034  Last data filed at 8/31/2020 0707  Gross per 24 hour   Intake 2471.67 ml   Output 1015 ml   Net 1456.67 ml       Physical Exam  Vitals signs and nursing note reviewed.   Constitutional:       General: She is awake.      Appearance: She is not ill-appearing or toxic-appearing.   HENT:      Mouth/Throat:      Pharynx: No oropharyngeal exudate.   Neck:      Musculoskeletal: Neck supple. No neck rigidity.      Thyroid: No thyromegaly.   Cardiovascular:      Rate and Rhythm: Regular rhythm. Tachycardia present.   Pulmonary:      Effort: Pulmonary effort is normal. No respiratory distress.      Breath sounds: No wheezing or rales.   Chest:       Lymphadenopathy:      Cervical: No cervical adenopathy.   Skin:     Findings: No bruising or petechiae.   Neurological:      Mental Status: She is alert and oriented to person, place, and time.   Psychiatric:         Behavior: Behavior is cooperative.         Significant Labs:   All pertinent labs from the last 24 hours have been reviewed.    Diagnostic Results:  I have reviewed all pertinent imaging results/findings within the past 24  hours.    Assessment/Plan:   1. Septic Shock likely 2/2 infected port-a-cath  2. Febrile neutropenia  3. Metastatic colon adenocarcinoma     T-max 101.5. Persistent positive blood cultures    Scheduled for YOLANDA today     S/p 3 doses of granix (last dose 8/28). WBC much improved, no longer neutropenic.      CT scans show positive response of her cancer to one cycle of FOLFOX chemotherapy.     will follow-up 9/1 onwards    Guido Friedman MD  Hematology/Oncology  [facility] ICU 5th Floor

## 2020-08-31 NOTE — PLAN OF CARE
Problem: Occupational Therapy Goal  Goal: Occupational Therapy Goal  Description: Goals to be met by: 9/27    Patient will increase functional independence with ADLs by performing:    Feeding with Modified [place].  UE Dressing with Stand-by Assistance.  Grooming while seated at sink with Modified [place].  Toileting from bedside commode with Minimal Assistance for hygiene and clothing management.   Toilet transfer to bedside commode with Minimal Assistance.  Increased functional strength to 3+/5 for BUE.    Outcome: Ongoing, Progressing    Huy Cisneros is a 29 y.o. female with a medical diagnosis of Septic shock.   Performance deficits affecting function are weakness, impaired endurance, impaired self care skills, impaired functional mobilty, gait instability, impaired balance, decreased coordination, decreased upper extremity function, decreased lower extremity function, impaired coordination, impaired fine motor, edema, impaired cardiopulmonary response to activity. Pt found in bed, agreeable to therapy. She was able to transition to EOB and tolerated ~15 min seated with SBA/CGA for balance.  Deferred transfer to chair secondary to decreased /54, reports of dizziness, SOB and fatigue. Increased anxiety noted. Fair tolerance of therapy.  Continue OT services to address functional goals, progressing as able.      DTA [place]/L

## 2020-08-31 NOTE — PT/OT/SLP PROGRESS
Occupational Therapy   Treatment    Name: Huy Cisneros  MRN: [id number]  Admitting Diagnosis:  Septic shock       Recommendations:     Discharge Recommendations: other (see comments)(TBD)  Discharge Equipment Recommendations:  other (see comments)(TBD)  Barriers to discharge:  None    Assessment:     Huy Cisneros is a 29 y.o. female with a medical diagnosis of Septic shock.   Performance deficits affecting function are weakness, impaired endurance, impaired self care skills, impaired functional mobilty, gait instability, impaired balance, decreased coordination, decreased upper extremity function, decreased lower extremity function, impaired coordination, impaired fine motor, edema, impaired cardiopulmonary response to activity. Pt found in bed, agreeable to therapy. She was able to transition to EOB and tolerated ~15 min seated with SBA/CGA for balance.  Deferred transfer to chair secondary to decreased /54, reports of dizziness, SOB and fatigue. Increased anxiety noted. Fair tolerance of therapy.  Continue OT services to address functional goals, progressing as able.        Rehab Prognosis:  Fair; patient would benefit from acute skilled OT services to address these deficits and reach maximum level of function.       Plan:     Patient to be seen 5 x/week to address the above listed problems via self-care/home management, therapeutic activities, therapeutic exercises  · Plan of Care Expires: 09/27/20  · Plan of Care Reviewed with: patient    Subjective     Pain/Comfort:  · Pain Rating 1: 0/10  · Pain Rating Post-Intervention 1: 0/10    Objective:     Communicated with: RN prior to session.  Patient found HOB elevated with rebolledo catheter, central line, oxygen(multiple ICU lines and monitors) upon OT entry to room.    General Precautions: Standard, fall, neutropenic   Orthopedic Precautions:N/A   Braces: N/A     Occupational Performance:     Bed Mobility:    · Patient completed Rolling/Turning to  Left with  maximal assistance multiple times  · Patient completed Rolling/Turning to Right with maximal assistance multiple times  · Patient completed Scooting/Bridging with total assistance, 2 persons, supine to HOB  · Patient completed Supine to Sit with maximal assistance, 2 persons and HOB elevated, increased time and effort, vc's for effective technique  · Patient completed Sit to Supine with total assistance and 2 persons    · *Pt anxious in side lying secondary to SOB, encouraged PLB and relaxation techniques however pt unable to follow through and would return to supine.      Functional Mobility/Transfers:  · Unable    Activities of Daily Living:  · Lower Body Dressing: total assistance doff socks  · Toileting: total assistance        [facility] [address] 6 Click ADL: 13    Treatment & Education:  Pt sat EOB x ~15 min with SBA/CGA.  Pt able to scoot seated to EOB with CGA.      Reviewed PLB and relaxation techniques throughout session however pt unable to perform.  She continues with rapid, shallow breaths.      Patient left HOB elevated with all lines intact, call button in reach, bed alarm on and RN presentEducation:      GOALS:   Multidisciplinary Problems     Occupational Therapy Goals        Problem: Occupational Therapy Goal    Goal Priority Disciplines Outcome Interventions   Occupational Therapy Goal     OT, PT/OT Ongoing, Progressing    Description: Goals to be met by: 9/27    Patient will increase functional independence with ADLs by performing:    Feeding with Modified [place].  UE Dressing with Stand-by Assistance.  Grooming while seated at sink with Modified [place].  Toileting from bedside commode with Minimal Assistance for hygiene and clothing management.   Toilet transfer to bedside commode with Minimal Assistance.  Increased functional strength to 3+/5 for BUE.                     Time Tracking:     OT Date of Treatment: 08/31/20  OT Start Time: 0958  OT Stop Time: 1040  OT Total Time (min): 42  min    Billable Minutes:Self Care/Home Management 15  Therapeutic Activity 8   *cotx with PT    DAT Puga/JD  8/31/2020

## 2020-08-31 NOTE — PLAN OF CARE
29 F with PMH of metastatic adenocarcinoma of the colon with liver mets, uterine cysts, and HTN on whom ID is consulted for likely staph aureus bacteremia. Neutropenia resolved on 8/29. Port removed on 8/26 - positive for MRSA > 15 colonies. TTE was negative 8/27 for vegetations      The pt remains in ICU. The pt had a brinda done today at bedside,. The Sw visited with the pt today and offered emotional support. Therapy recs are TBD. The Sw will continue to follow the pt throughout her transitions of care and will assist with any d/c needs. The Sw will continue to follow the pt throughout her transitions of care and will assist with any d/c needs. The Sw spoke to the pt's mother Char via phone b/c the pt keeps calling her with stories that are not true.        08/31/20 1642   Discharge Reassessment   Assessment Type Discharge Planning Assessment   Do you have any problems affording any of your prescribed medications? No   Discharge Plan A Home Health   Discharge Plan B Skilled Nursing Facility   DME Needed Upon Discharge  other (see comments)  (TBD)   Anticipated Discharge Disposition Home-Health   Can the patient/caregiver answer the patient profile reliably? Yes, cognitively intact   How does the patient rate their overall health at the present time? Fair   Describe the patient's ability to walk at the present time. Major restrictions/daily assistance from another person   How often would a person be available to care for the patient? Whenever needed   Number of comorbid conditions (as recorded on the chart) Two   During the past month, has the patient often been bothered by little interest or pleasure in doing things? Yes  (the pt has cancer)   Post-Acute Status   Post-Acute Authorization Home Health

## 2020-08-31 NOTE — SUBJECTIVE & OBJECTIVE
Interval History:   8/31 - scheduled for YOLANDA    Oncology Treatment Plan:   OP COLORECTAL FOLFOX + BEVACIZUMAB Q2W    Medications:  Continuous Infusions:  Scheduled Meds:   enoxaparin  40 mg Subcutaneous Q24H    metoprolol tartrate  25 mg Oral TID    vancomycin (VANCOCIN) IVPB  1,250 mg Intravenous Q12H     PRN Meds:acetaminophen, benzonatate, diphenhydrAMINE-zinc acetate 2-0.1%, heparin (porcine), influenza, melatonin, ondansetron, ondansetron, ondansetron, ondansetron, pneumoc 13-davion conj-dip cr(PF), sodium chloride 0.9%, Pharmacy to dose Vancomycin consult **AND** vancomycin - pharmacy to dose     Review of Systems   Constitutional: Positive for activity change and fatigue. Negative for fever and unexpected weight change.   HENT: Negative for mouth sores and nosebleeds.    Respiratory: Negative for shortness of breath and wheezing.    Cardiovascular: Negative for chest pain and palpitations.   Gastrointestinal: Negative for abdominal pain and nausea.   Musculoskeletal: Positive for back pain.   Allergic/Immunologic: Negative for food allergies.   Neurological: Positive for weakness.   Psychiatric/Behavioral: Negative for behavioral problems and confusion.     Objective:     Vital Signs (Most Recent):  Temp: 98.1 °F (36.7 °C) (08/31/20 0700)  Pulse: (!) 117 (08/31/20 0924)  Resp: (!) 53 (08/31/20 0915)  BP: 116/79 (08/31/20 0924)  SpO2: 100 % (08/31/20 0915) Vital Signs (24h Range):  Temp:  [98.1 °F (36.7 °C)-101.5 °F (38.6 °C)] 98.1 °F (36.7 °C)  Pulse:  [114-139] 117  Resp:  [23-61] 53  SpO2:  [97 %-100 %] 100 %  BP: (112-134)/(69-95) 116/79     Weight: 84 kg (185 lb 3 oz)  Body mass index is 30.82 kg/m².  Body surface area is 1.96 meters squared.      Intake/Output Summary (Last 24 hours) at 8/31/2020 1034  Last data filed at 8/31/2020 0707  Gross per 24 hour   Intake 2471.67 ml   Output 1015 ml   Net 1456.67 ml       Physical Exam  Vitals signs and nursing note reviewed.   Constitutional:       General: She  is awake.      Appearance: She is not ill-appearing or toxic-appearing.   HENT:      Mouth/Throat:      Pharynx: No oropharyngeal exudate.   Neck:      Musculoskeletal: Neck supple. No neck rigidity.      Thyroid: No thyromegaly.   Cardiovascular:      Rate and Rhythm: Regular rhythm. Tachycardia present.   Pulmonary:      Effort: Pulmonary effort is normal. No respiratory distress.      Breath sounds: No wheezing or rales.   Chest:       Lymphadenopathy:      Cervical: No cervical adenopathy.   Skin:     Findings: No bruising or petechiae.   Neurological:      Mental Status: She is alert and oriented to person, place, and time.   Psychiatric:         Behavior: Behavior is cooperative.         Significant Labs:   All pertinent labs from the last 24 hours have been reviewed.    Diagnostic Results:  I have reviewed all pertinent imaging results/findings within the past 24 hours.

## 2020-08-31 NOTE — INTERVAL H&P NOTE
The patient has been examined and the H&P has been reviewed:      She will have a YOLANDA to rule out endocarditis        She agreed to proceed   Consent signed and placed in the chart          Active Hospital Problems    Diagnosis  POA    *Septic shock [A41.9, R65.21]  Yes    Bacteremia due to Staphylococcus [R78.81]  Yes    Chest pain [R07.9]  No    Elevated troponin [R79.89]  No    Pancytopenia [D61.818]  Yes    MRSA bacteremia [R78.81]  Yes    KIERSTEN (acute kidney injury) [N17.9]  Yes    Infected venous access port [T80.219A]  Yes    Metabolic acidosis [E87.2]  Yes    Hypomagnesemia [E83.42]  Yes    Hyponatremia [E87.1]  Yes    Nausea & vomiting [R11.2]  Yes    Lactic acid acidosis [E87.2]  Yes    Sepsis [A41.9]  Yes    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

## 2020-08-31 NOTE — PT/OT/SLP PROGRESS
Physical Therapy Treatment    Patient Name:  Huy Cisneros   MRN:  [id number]    Recommendations:     Discharge Recommendations:  (TBD)   Discharge Equipment Recommendations: (TBD)   Barriers to discharge: Decreased caregiver support and decreased functional mobility from baseline    Assessment:     Huy Cisneros is a 29 y.o. female admitted with a medical diagnosis of Septic shock.  She presents with the following impairments/functional limitations:  weakness, impaired endurance, impaired self care skills, impaired functional mobilty, gait instability, impaired balance, decreased lower extremity function Pt progressed to sitting EOB; tolerated x ~15 minutes; required maxA x 2 sup to sit and total A x 2 sit to supine; pt anxious and exhibiting short shallow breaths; will cont with POC..    Rehab Prognosis: Good; patient would benefit from acute skilled PT services to address these deficits and reach maximum level of function.    Recent Surgery: Procedure(s) (LRB):  Transesophageal echo (YOLANDA) intra-procedure log documentation (N/A)      Plan:     During this hospitalization, patient to be seen 5 x/week to address the identified rehab impairments via therapeutic activities, therapeutic exercises, neuromuscular re-education and progress toward the following goals:    · Plan of Care Expires:  09/23/20    Subjective     Pain/Comfort:  · Pain Rating 1: 0/10  · Pain Rating Post-Intervention 1: 0/10      Objective:     Communicated with nurse prior to session.  Patient found with rebolledo catheter, central line upon PT entry to room.     General Precautions: Standard, neutropenic, fall   Orthopedic Precautions:N/A   Braces: N/A     Functional Mobility:  · Bed Mobility:     · Rolling Left:  maximal assistance  · Rolling Right: maximal assistance  · Scooting: contact guard assistance and to EOB; total A x 2 to HOB in supine with drawsheet  · Supine to Sit: maximal assistance and of 2 persons  · Sit to Supine: total  assistance and of 2 persons      AM-PAC 6 CLICK MOBILITY  Turning over in bed (including adjusting bedclothes, sheets and blankets)?: 2  Sitting down on and standing up from a chair with arms (e.g., wheelchair, bedside commode, etc.): 1  Moving from lying on back to sitting on the side of the bed?: 2  Moving to and from a bed to a chair (including a wheelchair)?: 1  Need to walk in hospital room?: 1  Climbing 3-5 steps with a railing?: 1  Basic Mobility Total Score: 8       Therapeutic Activities and Exercises:   Patient rolled multiple times with maxA; pt with anxiety in sidelying 2/2 SOB-encouraged pursed lip breathing; completed sup to sit with max A x 2-increased time and effort and VCs for effective technique; pt scooted to EOB with CGA; pt sat x~15 min at EOB with SBA/CGA; pt with c/o dizziness -/54  O2 sats 98%; pt coached in pursed lip breathing and relaxation tech during session; returned to supine and scooted to HOB with total A x 2.    Patient left HOB elevated with all lines intact, call button in reach and nurse present..    GOALS:   Multidisciplinary Problems     Physical Therapy Goals        Problem: Physical Therapy Goal    Goal Priority Disciplines Outcome Goal Variances Interventions   Physical Therapy Goal     PT, PT/OT Ongoing, Progressing     Description: Goals to be met by: 20     Patient will increase functional independence with mobility by performin.  BLE AROM supine x 15  2.  Roll bilaterally with supervision  3.  Supine to/from sit with supervision  4.  Sit at EOB 15 min with supervision  5.  Sit to stand with RW with CG                     Time Tracking:     PT Received On: 20  PT Start Time: 947     PT Stop Time: 0  PT Total Time (min): 53 min with OT    Billable Minutes: Therapeutic Activity 23 minutes    Treatment Type: Treatment  PT/PTA: PT     PTA Visit Number: 0     Prisca Andrews, PT  2020

## 2020-09-01 NOTE — PROGRESS NOTES
Infectious Disease Follow up Note    Impression/Plan:    MRSA Bacteremia  29 F with PMH of metastatic adenocarcinoma of the colon with liver mets, uterine cysts, and HTN on whom ID is consulted for likely staph aureus bacteremia. She presented neutropenic.   WBC up to 2.5 on 8/28. Port removed on 8/26 - positive for staph aureus > 15 colonies. TTE was negative 8/27 for vegetations. YOLANDA negative for vegetations 8/31. Patient continues to be febrile.      -continue Vanc for MRSA bacteremia and sepsis due to port infection  -add Meropenem 1 g every 8 hours for gram negative and anaerobe coverage for now  -check CXR for any worsening airspace disease, pleural effusion and consider thoracentesis to evaluate for source  -needs repeat blood cxs until negative - if she has 3 sets in a row negative then stop daily BC unless febrile; would check BC for temp spike  -increased WBC may still be effect of tbo-filgrastim however high fever is concerning. Would obtain repeat bilateral lower extremity DVT US to evaluate for suppurative thrombophlebitis.     Thanks! Please call for any questions [phone]  Imtiaz Albert  [facility]    Hospital Day 6  Principal Problem: Septic shock     HPI: 29 F with PMH of metastatic adenocarcinoma of the colon with liver mets, uterine cysts, and HTN who presented to [facility] ED for intractable nausea/vomitting that has been on going for about 1 day.  Pt describes vomiting as having some food contents but mostly saliva, she reports she has not been able to tolerated PO since symptoms began.  Associated symptoms include fevers, chills, and general malaise which also started when nausea/vomiting started. Pt denies abdominal pain, endorses 1 episode of diarrhea. Denies any chest pain, SOB, changes in urinary habits.  Pt reports she has never had symptoms like this in the past. She reports her cancer is s/p resection in July 2020 and currently undergoing chemotherapy last given 2 weeks via port in  the right chest.  In ED noticed wound dehiscence at right chest port site. She was admitted to the ICU. Was briefly on norepi but off it now. Blood cxs with GPCs and staph aureus from the port site. Port is removed. Currently on therapy with vanco.     8/26 BC MRSA  8/26 R chest wound culture MRSA   8/26 cath tip Staph aureus >14 colonies  8/28 BC MRSA  8/28 WBC up to 2.5K  8/29 WBC up to 10K  8/29 BC GPCC  8/30 BC NGTD  8/31 BC NGTD  8/31 YOLANDA with no vegetations  8/31 Febrile to 101.3 at 7 PM  9/1 BC In process  9/1 started on Meropenem    Interval History: Patient reporting persistent shortness of breath today. Continues to have loose srtools. Primary complaint is the RLQ cramping pain. Denies any vaginal bleeding, vaginal discharge. Decreased appetite but tolerating some cereal.     Review of Symptoms:  ROS    Medications:  Antibiotics:   Antibiotics (From admission, onward)    Start     Stop Route Frequency Ordered    08/29/20 0200  vancomycin (VANCOCIN) 1,250 mg in dextrose 5 % 250 mL IVPB      -- IV Every 12 hours (non-standard times) 08/28/20 1252    08/26/20 1628  vancomycin - pharmacy to dose  (vancomycin IVPB)      -- IV pharmacy to manage frequency 08/26/20 1528        Objective:  Physical Exam:  VS (24h):  Temp:  [98.4 °F (36.9 °C)-101.9 °F (38.8 °C)] 98.9 °F (37.2 °C)  Pulse:  [105-123] 123  Resp:  [37-62] 53  SpO2:  [96 %-100 %] 100 %  BP: ()/(50-88) 121/81     Physical Exam  GEN- Appears uncomfortable, tachypneic, alert  HEENT- PERRL, EOMI, MMM  NECK- No thyromegaly or other masses  CARDIAC- Tachycardic, no murmurs  RESP- Tachypnea, on 2L NC, Crackles in L lung base  ABD- Soft, NT, ND, +BS; no masses or HSM  EXT- No clubbing, cyanosis, or edema; 2+ DP/PT pulses  NEURO- CN II-XII grossly intact; moves all four extremities equally  SKIN- Warm and dry; no rashes, site of prior port is clean and dry without tenderness.     Lines:  RIJ CVC  PIV  Tran catheter    Labs:  CBC:   Lab Results   Component  Value Date    WBC 29.81 (H) 09/01/2020    WBC 25.77 (H) 08/31/2020    WBC 25.94 (H) 08/30/2020    WBC 25.94 (H) 08/30/2020    WBC 23.64 (H) 08/30/2020    HCT 23.4 (L) 09/01/2020     09/01/2020     BMP:   Recent Labs   Lab 09/01/20  0336   GLU 94   *   K 3.8   CL 98   CO2 19*   BUN 21*   CREATININE 0.7   CALCIUM 7.8*   MG 2.1     LFT:   Lab Results   Component Value Date    ALT 73 (H) 08/27/2020     (H) 08/27/2020    ALKPHOS 93 08/27/2020    BILITOT 2.5 (H) 08/27/2020     Microbiology x 7d:   Microbiology Results (last 7 days)     Procedure Component Value Units Date/Time    Blood culture [[id number]] Collected: 08/30/20 0121    Order Status: Completed Specimen: Blood from Peripheral, Right Updated: 09/01/20 1022     Blood Culture, Routine No Growth to date      No Growth to date      No Growth to date    Blood culture [[id number]] Collected: 09/01/20 0655    Order Status: Sent Specimen: Blood from Antecubital, Right Arm Updated: 09/01/20 0939    Narrative:      Collection has been rescheduled by LLR at 09/01/2020 04:28 Reason:   nurse said to draw at at 6AM  Collection has been rescheduled by LLR at 09/01/2020 04:28 Reason:   nurse said to draw at at 6AM    Blood culture [[id number]] Collected: 08/29/20 1658    Order Status: Completed Specimen: Blood from Antecubital, Right Updated: 09/01/20 0613     Blood Culture, Routine No Growth to date      No Growth to date      No Growth to date    Blood culture [[id number]] Collected: 08/31/20 0631    Order Status: Completed Specimen: Blood from Antecubital, Right Arm Updated: 09/01/20 0115     Blood Culture, Routine No Growth to date    Culture, Anaerobe [[id number]] Collected: 08/26/20 1602    Order Status: Completed Specimen: Catheter Tip from Chest, Right Updated: 08/31/20 1305     Anaerobic Culture No anaerobes isolated    Blood culture [[id number]]  (Abnormal) Collected: 08/28/20 1137    Order Status: Completed Specimen: Blood from Antecubital, Right  Updated: 08/31/20 1033     Blood Culture, Routine Gram stain juan bottle: Gram positive cocci in clusters resembling Staph       Results called to and read back by: Nighat Craft  08/29/2020  15:26      METHICILLIN RESISTANT STAPHYLOCOCCUS AUREUS  ID consult required at [facility].  For susceptibility see order #[id number]      Blood culture [[id number]]  (Abnormal)  (Susceptibility) Collected: 08/28/20 1137    Order Status: Completed Specimen: Blood Updated: 08/31/20 1030     Blood Culture, Routine Gram stain aer bottle: Gram positive cocci in clusters resembling Staph       Results called to and read back by: Nighat Craft  08/29/2020  15:26      Gram stain juan bottle: Gram positive cocci in clusters resembling Staph       Positive results previously called 08/29/2020  17:57      METHICILLIN RESISTANT STAPHYLOCOCCUS AUREUS  ID consult required at [facility] and [facility].      Culture, Anaerobe [[id number]] Collected: 08/26/20 1448    Order Status: Completed Specimen: Incision site from Chest, Right Updated: 08/31/20 0938     Anaerobic Culture No anaerobes isolated    Narrative:      Port incision site    Clostridium difficile EIA [[id number]] Collected: 08/28/20 1808    Order Status: Completed Specimen: Stool Updated: 08/29/20 2305     C. diff Antigen Negative     C difficile Toxins A+B, EIA Negative     Comment: Testing not recommended for children <24 months old.       IV catheter culture [[id number]]  (Abnormal)  (Susceptibility) Collected: 08/26/20 1604    Order Status: Completed Specimen: Catheter Tip Updated: 08/29/20 1312     Aerobic Culture - Cath tip METHICILLIN RESISTANT STAPHYLOCOCCUS AUREUS  > 15 colonies      Blood culture [[id number]]  (Abnormal)  (Susceptibility) Collected: 08/26/20 1140    Order Status: Completed Specimen: Blood from Peripheral, Hand, Right Updated: 08/29/20 1221     Blood Culture, Routine Gram stain juan bottle: Gram positive cocci in  clusters resembling Staph      Results called to and read back by: Trudy Morales RN  08/27/2020        02:08      Gram stain aer bottle: Gram positive cocci in clusters resembling Staph      Positive results previously called 08/27/2020  03:19      METHICILLIN RESISTANT STAPHYLOCOCCUS AUREUS  ID consult required at [facility],[place] and [facility] locations.      Aerobic culture [[id number]]  (Abnormal)  (Susceptibility) Collected: 08/26/20 1448    Order Status: Completed Specimen: Incision site from Chest, Right Updated: 08/28/20 1327     Aerobic Bacterial Culture METHICILLIN RESISTANT STAPHYLOCOCCUS AUREUS  Many      Narrative:      Port incision site.    Clostridium difficile EIA [[id number]] Collected: 08/28/20 1309    Order Status: Canceled Specimen: Stool     Aerobic culture [[id number]] Collected: 08/26/20 1604    Order Status: Canceled Specimen: Catheter Tip from Chest, Right     Blood culture [[id number]]     Order Status: Canceled Specimen: Blood         Imaging:  CXR 8/26/2020  Right chest MediPort catheter with tip overlying the atrium.  Lungs and heart demonstrate no significant interval detrimental change in the short interval.  No large pleural effusion or gross pneumothorax.    CXR 8/26/2020  Since the most recent study, the port catheter has been discontinued.  The tip of a newly placed right internal jugular approach central venous catheter is in the region of the right atrium.  No pneumothorax or pleural effusion.  Cardiomediastinal silhouette is unchanged.  Lungs remain clear.  Additional findings unchanged.    DVT US BL ANGUS 8/27/2020  No evidence of deep venous thrombosis in either lower extremity.    MRI T/L 8/28  1. Within limitations of lack of intravenous contrast, no findings of infection in the thoracic or lumbar spine are identified.  2. No significant spinal canal or foraminal narrowing.  Normal appearance of the thoracic spinal cord and conus medullaris.  3. Partially imaged are multiple  bilateral peripheral predominant pulmonary nodules, some of which may be cavitary.  Given that these lesions appear to be new since the 07/20/2020 CT examination, the findings may represent multifocal infectious process or septic emboli.  Metastatic disease would be additional consideration.  Dedicated CT chest imaging can be considered for further evaluation.  4. Known hepatic metastatic disease partially imaged.    CT A/P 8/28  Postoperative changes of bowel resection are identified.  There are innumerable hypodense lesions throughout the liver compatible with hepatic metastatic disease.  Focal peristomal prior imaging limited given the lack of intravenous contrast material.     Bibasilar consolidation, left greater than right with subtotal consolidation of the left lower lobe.  There are some nodular densities seen bilaterally in both visualized lung bases.  While this could represent metastatic disease, given the patient's history of sepsis, septic emboli not entirely excluded.  These findings are new as compared to the previous study of 07/14/2020.     Liquid stool throughout the colon suggesting diarrhea.    YOLANDA 8/31/2020  - No vegetations seen    Assessment:    Active Hospital Problems    Diagnosis    *Septic shock    Bacteremia due to Staphylococcus    Chest pain    Elevated troponin    Pancytopenia    MRSA bacteremia    KIERSTEN (acute kidney injury)    Infected venous access port    Metabolic acidosis    Hypomagnesemia    Hyponatremia    Nausea & vomiting    Lactic acid acidosis    Sepsis    Anemia in neoplastic disease    Metastatic disease    Secondary malignancy of liver    Adenocarcinoma of colon     Plan:  See top of page.
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
## 2020-09-01 NOTE — PLAN OF CARE
Patient remained in bed for the shift. Bed in lowest position, wheels locked and call light within reach. Plan of care reviewed with patient, verbalized understanding. Neutropenic precautions maintained. VS remained WDL. ST on telemetry. 1L NC for comfort. Urine output adequate. Pain controlled with tylenol and icepacks. Febrile for most of shift.  With with  anxiety towards shift end. Will report to oncoming shift.

## 2020-09-01 NOTE — PROGRESS NOTES
[facility] ICU 5th Floor  Infectious Disease  Progress Note    Patient Name: Huy Cisneros  MRN: [id number]  Admission Date: 8/26/2020  Length of Stay: 5 days  Attending Physician: Maryana Patel*  Primary Care Provider: Primary Doctor Gisselle    Isolation Status: Contact  Assessment/Plan:      MRSA bacteremia  29 F with PMH of metastatic adenocarcinoma of the colon with liver mets, uterine cysts, and HTN on whom ID is consulted for likely staph aureus bacteremia. Neutropenia resolved on 8/29. Port removed on 8/26 - positive for MRSA > 15 colonies. TTE was negative 8/27 for vegetations; YOLANDA done on 8/31 - no vegetations    -continue vancomycin for MRSA bacteremia and sepsis due to port infection   -contact precautions for MRSA  -needs repeat blood cxs until negative - if she has 3 sets in a row negative then stop daily BC unless febrile; would check BC for temp spike        Anticipated Disposition: continued ICU care    Thank you for your consult. I will follow-up with patient. Please contact us if you have any additional questions.[phone]    Pooja Yates MD  Infectious Disease  [facility] ICU 5th Floor    Subjective:     Principal Problem:Septic shock    HPI: 29 F with PMH of metastatic adenocarcinoma of the colon with liver mets, uterine cysts, and HTN who presented to [facility] ED for intractable nausea/vomitting that has been on going for about 1 day.  Pt describes vomiting as having some food contents but mostly saliva, she reports she has not been able to tolerated PO since symptoms began.  Associated symptoms include fevers, chills, and general malaise which also started when nausea/vomiting started. Pt denies abdominal pain, endorses 1 episode of diarrhea. Denies any chest pain, SOB, changes in urinary habits.  Pt reports she has never had symptoms like this in the past. She reports her cancer is s/p resection in July 2020 and currently undergoing  chemotherapy last given 2 weeks via port in the right chest.  In ED noticed wound dehiscence at right chest port site. She was admitted to the ICU. Was briefly on norepi but off it now. Blood cxs with GPCs and staph aureus from the port site. Port is removed. Currently on therapy with vanco and cefepime.    8/26 BC MRSA  8/26 cath tip and chest MRSA  8/28 BC staph aureus  8/28 WBC up to 2.5K  8/28 stool c diff negative  8/30 BC pending  8/30 WBC up tp 25.9 K  8/31 WBC 25; febrile to 101.5 over night; had YOLANDA today    Interval History: Patient was febrile over night to over 101. Had YOLANDA today and was still sedated when we rounded. Nurse reports patient was slightly confused today. Per chart she had nightmares over night. BC negative from 8/29 and 8/30 so far.     Review of Systems   Unable to perform ROS: Acuity of condition (patient just had YOLANDA and is still very sedated)     Antibiotics (From admission, onward)    Start     Stop Route Frequency Ordered    08/29/20 0200  vancomycin (VANCOCIN) 1,250 mg in dextrose 5 % 250 mL IVPB      -- IV Every 12 hours (non-standard times) 08/28/20 1252    08/26/20 1628  vancomycin - pharmacy to dose  (vancomycin IVPB)      -- IV pharmacy to manage frequency 08/26/20 1528        Objective:     Vital Signs (Most Recent):  Temp: (!) 101.9 °F (38.8 °C) (08/31/20 1932)  Pulse: (!) 114 (08/31/20 1901)  Resp: (!) 58 (08/31/20 1901)  BP: 117/80 (08/31/20 1901)  SpO2: 99 % (08/31/20 1901) Vital Signs (24h Range):  Temp:  [98.1 °F (36.7 °C)-101.9 °F (38.8 °C)] 101.9 °F (38.8 °C)  Pulse:  [106-131] 114  Resp:  [23-61] 58  SpO2:  [79 %-100 %] 99 %  BP: ()/(50-95) 117/80     Weight: 84 kg (185 lb 3 oz)  Body mass index is 30.82 kg/m².    Estimated Creatinine Clearance: 125.8 mL/min (based on SCr of 0.7 mg/dL).    Physical Exam  Cardiovascular:      Heart sounds: Normal heart sounds.   Pulmonary:      Breath sounds: Normal breath sounds.   Abdominal:      General: Bowel sounds are  normal.   Musculoskeletal:      Right lower leg: No edema.      Left lower leg: No edema.         Significant Labs:   Blood Culture:   Recent Labs   Lab 08/26/20  1140 08/28/20  1137 08/29/20  1658 08/30/20  0121   LABBLOO Gram stain juan bottle: Gram positive cocci in clusters resembling Staph  Results called to and read back by: Trudy Morales RN  08/27/2020    02:08  Gram stain aer bottle: Gram positive cocci in clusters resembling Staph  Positive results previously called 08/27/2020  03:19  METHICILLIN RESISTANT STAPHYLOCOCCUS AUREUS  ID consult required at [facility].  * Gram stain juan bottle: Gram positive cocci in clusters resembling Staph   Results called to and read back by: Nighat Craft  08/29/2020  15:26  METHICILLIN RESISTANT STAPHYLOCOCCUS AUREUS  ID consult required at [facility].  For susceptibility see order #[id number]  *  Gram stain aer bottle: Gram positive cocci in clusters resembling Staph   Results called to and read back by: Nighat Craft  08/29/2020  15:26  Gram stain juan bottle: Gram positive cocci in clusters resembling Staph   Positive results previously called 08/29/2020  17:57  METHICILLIN RESISTANT STAPHYLOCOCCUS AUREUS  ID consult required at [facility].  * No Growth to date  No Growth to date No Growth to date  No Growth to date     CBC:   Recent Labs   Lab 08/30/20  0121 08/30/20  0415 08/31/20  0508   WBC 23.64* 25.94*  25.94* 25.77*   HGB 9.1* 8.6*  8.6* 7.8*   HCT 29.5* 27.1*  27.1* 24.3*   * 144*  144* 170     CMP:   Recent Labs   Lab 08/30/20  0121 08/30/20  0415 08/31/20  0508   * 131*  131* 131*   K 3.9 3.9  3.9 3.5   CL 96 97  97 98   CO2 21* 21*  21* 20*   GLU 88 110  110 97   BUN 14 14  14 17   CREATININE 0.7 0.7  0.7 0.7   CALCIUM 8.0* 7.8*  7.8* 7.6*   ALBUMIN 1.9* 1.7*  1.7* 1.7*   ANIONGAP 15 13  13 13   EGFRNONAA >60 >60  >60 >60      Urine Culture:   Recent Labs   Lab 20  0809   LABURIN ESCHERICHIA COLI  >100,000 cfu/ml  *     Urine Studies:   Recent Labs   Lab 20  1101  20  1612   COLORU Manuela   < > Yellow   APPEARANCEUA Hazy*   < > Cloudy*   PHUR 6.0   < > 6.0   SPECGRAV 1.020   < > 1.025   PROTEINUA 1+*   < > 1+*   GLUCUA Negative   < > Negative   KETONESU Negative   < > Negative   BILIRUBINUA Negative   < > Negative   OCCULTUA Negative   < > Trace*   NITRITE Negative   < > Positive*   UROBILINOGEN  --    < > Negative   LEUKOCYTESUR Negative   < > Negative   RBCUA 1   < > 0   WBCUA 3   < > 2   BACTERIA Few*   < > Many*   SQUAMEPITHEL 2  --   --    HYALINECASTS 0   < > 3*    < > = values in this interval not displayed.     Wound Culture:   Recent Labs   Lab 20  1448   LABAERO METHICILLIN RESISTANT STAPHYLOCOCCUS AUREUS  Many  *       Significant Imagin/28 ct chest abdomen pelvis - Impression:  In this patient with a known history of colon cancer, postoperative changes of partial colectomy are identified.  Again noted is extensive hepatic metastatic disease, with the index lesion in the right hepatic lobe appearing slightly decreased in size as compared to before.  Additionally, other hepatic lesions also appear to be decreased in size suggesting treatment response.     Trace left and small right pleural effusions.  There is subtotal consolidative change of the left lower lobe with patchy regions of consolidation throughout both lungs with more focal peripheral patchy nodular densities in the periphery of both lungs.  In the setting of sepsis, septic emboli should be primarily considered.  Neoplasm not excluded given the patient's history; however, felt less likely.     Soft tissue edema of the left thigh, nonspecific.  Consider evaluation for DVT

## 2020-09-01 NOTE — SUBJECTIVE & OBJECTIVE
Interval History:   - she endorses fatigue, generalized weakness. She was working with PT/OT at time of my examination.    Oncology Treatment Plan:   OP COLORECTAL FOLFOX + BEVACIZUMAB Q2W    Medications:  Continuous Infusions:  Scheduled Meds:   enoxaparin  40 mg Subcutaneous Q24H    metoprolol tartrate  25 mg Oral TID    sodium phosphate IVPB  15 mmol Intravenous Once    vancomycin (VANCOCIN) IVPB  1,250 mg Intravenous Q12H     PRN Meds:acetaminophen, benzonatate, dicyclomine, diphenhydrAMINE-zinc acetate 2-0.1%, heparin (porcine), influenza, melatonin, ondansetron, ondansetron, ondansetron, ondansetron, pneumoc 13-davion conj-dip cr(PF), sodium chloride 0.9%, Pharmacy to dose Vancomycin consult **AND** vancomycin - pharmacy to dose     Review of Systems   Constitutional: Positive for activity change and fatigue. Negative for fever and unexpected weight change.   HENT: Negative for mouth sores and nosebleeds.    Respiratory: Negative for shortness of breath and wheezing.    Cardiovascular: Negative for chest pain and palpitations.   Gastrointestinal: Negative for abdominal pain and nausea.   Musculoskeletal: Positive for back pain.   Allergic/Immunologic: Negative for food allergies.   Neurological: Positive for weakness.   Psychiatric/Behavioral: Negative for behavioral problems and confusion.     Objective:     Vital Signs (Most Recent):  Temp: 100.1 °F (37.8 °C) (09/01/20 1315)  Pulse: (!) 117 (09/01/20 1430)  Resp: (!) 49 (09/01/20 1430)  BP: 110/69 (09/01/20 1430)  SpO2: 99 % (09/01/20 1430) Vital Signs (24h Range):  Temp:  [98.9 °F (37.2 °C)-101.9 °F (38.8 °C)] 100.1 °F (37.8 °C)  Pulse:  [105-127] 117  Resp:  [37-68] 49  SpO2:  [94 %-100 %] 99 %  BP: ()/(50-91) 110/69     Weight: 85 kg (187 lb 6.3 oz)  Body mass index is 31.18 kg/m².  Body surface area is 1.97 meters squared.      Intake/Output Summary (Last 24 hours) at 9/1/2020 1503  Last data filed at 9/1/2020 0900  Gross per 24 hour   Intake 870  ml   Output 690 ml   Net 185 ml       Physical Exam  Vitals signs and nursing note reviewed.   Constitutional:       General: She is awake.      Appearance: She is not ill-appearing or toxic-appearing.   HENT:      Mouth/Throat:      Pharynx: No oropharyngeal exudate.   Neck:      Musculoskeletal: Neck supple. No neck rigidity.      Thyroid: No thyromegaly.   Cardiovascular:      Rate and Rhythm: Regular rhythm. Tachycardia present.   Pulmonary:      Effort: Pulmonary effort is normal. No respiratory distress.      Breath sounds: No wheezing or rales.   Chest:       Lymphadenopathy:      Cervical: No cervical adenopathy.   Skin:     Findings: No bruising or petechiae.   Neurological:      Mental Status: She is alert and oriented to person, place, and time.   Psychiatric:         Behavior: Behavior is cooperative.         Significant Labs:   Labs have been reviewed.    Lab Results   Component Value Date    WBC 29.81 (H) 09/01/2020    HGB 7.5 (L) 09/01/2020    HCT 23.4 (L) 09/01/2020    MCV 74 (L) 09/01/2020     09/01/2020           Diagnostic Results:  CT chest/abdomen/pelvis (8/29/20): I have personally reviewed the images     In this patient with a known history of colon cancer, postoperative changes of partial colectomy are identified.  Again noted is extensive hepatic metastatic disease, with the index lesion in the right hepatic lobe appearing slightly decreased in size as compared to before.  Additionally, other hepatic lesions also appear to be decreased in size suggesting treatment response.

## 2020-09-01 NOTE — PLAN OF CARE
Problem: Occupational Therapy Goal  Goal: Occupational Therapy Goal  Description: Goals to be met by: 9/27    Patient will increase functional independence with ADLs by performing:    Feeding with Modified Halls.  UE Dressing with Stand-by Assistance.  Grooming while seated at sink with Modified Halls.  Toileting from bedside commode with Minimal Assistance for hygiene and clothing management.   Toilet transfer to bedside commode with Minimal Assistance.  Increased functional strength to 3+/5 for BUE.    Outcome: Ongoing, Progressing    Huy Cisneros is a 29 y.o. female with a medical diagnosis of Septic shock.  Performance deficits affecting function are weakness, impaired endurance, impaired self care skills, impaired functional mobilty, gait instability, impaired balance, decreased coordination, decreased upper extremity function, decreased lower extremity function, decreased safety awareness, impaired skin, impaired cardiopulmonary response to activity. Pt found in bed, agreeable to therapy, RN ok'ed.  Pt able to transfer to chair this date with Mod A x 1-2.  Pt continues with increased anxiety and rapid, shallow breathing.  Instructed on PLB and relaxation techniques throughout however pt with poor carryover. Improved tolerance/particiaption with therapy. Continue OT services to address functional goals, progressing as able.       DAT Puga/L

## 2020-09-01 NOTE — NURSING
Upon shift start patient complaining of 10/10 chest pain. Stated to be sharp and worse with movement or when she coughed.Patient also stated she was anxious. Temp 101.9. NP Wilfrid notified. EKG and troponin ordered to rule out cardiac. Ativan given for anxiety, tylenol for pain and fever.    2115- Patient complaining of ABD pain described as cramping. Deffered to eICU for something to manage pain. Patient also inquiring about additional tests to run tonight. Stated that there was no more tests scheduled and that more can be ordered for case specific circumstances and the goal for shift was to monitor, reduce discomfort and get rest.    2330 Ice applied while waiting for orders    2215 Followed up with eICU regarding pain. Patient stated ice helped. When went to reassess patient found sleeping. Will offer tylenol if pain returns.

## 2020-09-01 NOTE — CONSULTS
This note is to link the consult order to Dr. Friedman's initial consult note dated 8/26/20.    Jewel Arzola M.D.  Hematology/Oncology  [facility] - [address], [address]  [place] [address]  Phone: [phone]  Fax: [phone]

## 2020-09-01 NOTE — PROGRESS NOTES
Patient seen and examined by me. I agree with the trainee's separate note except as modified.     29 year old with history of recently diagnosed metastatic colon CA who presented with septic shock. She was found to have MRSA bacteremia. Weaned off of vasopressors. Imaging c/w septic emboli, likely related to her PORT. YOLANDA done, no vegetations.      Major overnight events: None reported      Vitals: Tmax 101.9, HR 100s, 100% on RA     Significant lab findings: WBC up to 30 (received colony stimulating factor), Hgb down to 7.5. lactate stable at 4.6. Last + blood cx was 8/29 (just resulted).      CT chest reviewed by me: Trace left and small right pleural effusions.  There is subtotal consolidative change of the left lower lobe with patchy regions of consolidation throughout both lungs with more focal peripheral patchy nodular densities in the periphery of both lungs.  These findings were not present on her 7/20/2020 scan      Key exam findings:   Vasopressors: none  Other drips: none  Lines and invasive devices: right IJ trialysis   Fluid Balance: 24 hr: 1000/890  Overall: +4.9L  Creatinine: 0.7  Antibiotics/Day #: vancomycin, day 6 with appropriate levels   Nutrition: start diet   Glucose management: good  Prophylaxis:               DVT:  enoxaparin               GI: n/a  PT/OT: getting both actively   GOC: full code      On my exam: sitting in the chair, mildly tachypneic     Impression: MRSA bacteremia with septic emboli, related to her PORT. No endocarditis on YOLANDA     Recommendations:   -cont vanco   -remove trialysis catheter. Use peripherals or a midline if needed  -would transfer out of the ICU  -remove rebolledo     Critical care time (30min) spent personally by me on the following activities: development of treatment plan with patient or surrogate and bedside caregivers, discussions with consultants, evaluation of patient's response to treatment, examination of patient, ordering and performing treatments and  interventions, ordering and review of laboratory studies, ordering and review of radiographic studies, pulse oximetry, re-evaluation of patient's condition. This critical care time did not overlap with that of any other provider or involve time for any procedures.

## 2020-09-01 NOTE — PROGRESS NOTES
[facility] ICU 5th Floor  Hematology/Oncology  Progress Note    Patient Name: Huy Cisneros  Admission Date: 8/26/2020  Hospital Length of Stay: 6 days  Code Status: Full Code     Subjective:     HPI:  29-year-old female underwent right hemicolectomy 07/17/2020 for metastatic colon adenocarcinoma and received 1st cycle of FOLFOX 08/11/2020.    7/20/2020 - CT scan - Multiple hepatic hypodensities better evaluated on CT from 07/14/2020, consistent with metastatic disease.    She has been vomiting and feeling dizzy since yesterday.  Last night she busted a stitch to the Port-A-Cath site causing dehiscence and this morning she fell after going to the restroom.    She presented to the ED with tachycardia, heart rate up to 160 and was hypotensive with blood pressure down to 85/43.  She was started on antibiotics, IV fluids and pressors and transferred to [facility] ICU.    WBC on admission 0.95, creatinine 2, lactate >12    Bilirubin 3.8, , ALT 93    Seen by . Right chest port removed.      Interval History:   - she endorses fatigue, generalized weakness. She was working with PT/OT at time of my examination.    Oncology Treatment Plan:   OP COLORECTAL FOLFOX + BEVACIZUMAB Q2W    Medications:  Continuous Infusions:  Scheduled Meds:   enoxaparin  40 mg Subcutaneous Q24H    metoprolol tartrate  25 mg Oral TID    sodium phosphate IVPB  15 mmol Intravenous Once    vancomycin (VANCOCIN) IVPB  1,250 mg Intravenous Q12H     PRN Meds:acetaminophen, benzonatate, dicyclomine, diphenhydrAMINE-zinc acetate 2-0.1%, heparin (porcine), influenza, melatonin, ondansetron, ondansetron, ondansetron, ondansetron, pneumoc 13-davion conj-dip cr(PF), sodium chloride 0.9%, Pharmacy to dose Vancomycin consult **AND** vancomycin - pharmacy to dose     Review of Systems   Constitutional: Positive for activity change and fatigue. Negative for fever and unexpected weight change.   HENT: Negative for mouth  sores and nosebleeds.    Respiratory: Negative for shortness of breath and wheezing.    Cardiovascular: Negative for chest pain and palpitations.   Gastrointestinal: Negative for abdominal pain and nausea.   Musculoskeletal: Positive for back pain.   Allergic/Immunologic: Negative for food allergies.   Neurological: Positive for weakness.   Psychiatric/Behavioral: Negative for behavioral problems and confusion.     Objective:     Vital Signs (Most Recent):  Temp: 100.1 °F (37.8 °C) (09/01/20 1315)  Pulse: (!) 117 (09/01/20 1430)  Resp: (!) 49 (09/01/20 1430)  BP: 110/69 (09/01/20 1430)  SpO2: 99 % (09/01/20 1430) Vital Signs (24h Range):  Temp:  [98.9 °F (37.2 °C)-101.9 °F (38.8 °C)] 100.1 °F (37.8 °C)  Pulse:  [105-127] 117  Resp:  [37-68] 49  SpO2:  [94 %-100 %] 99 %  BP: ()/(50-91) 110/69     Weight: 85 kg (187 lb 6.3 oz)  Body mass index is 31.18 kg/m².  Body surface area is 1.97 meters squared.      Intake/Output Summary (Last 24 hours) at 9/1/2020 1503  Last data filed at 9/1/2020 0900  Gross per 24 hour   Intake 875 ml   Output 690 ml   Net 185 ml       Physical Exam  Vitals signs and nursing note reviewed.   Constitutional:       General: She is awake.      Appearance: She is not ill-appearing or toxic-appearing.   HENT:      Mouth/Throat:      Pharynx: No oropharyngeal exudate.   Neck:      Musculoskeletal: Neck supple. No neck rigidity.      Thyroid: No thyromegaly.   Cardiovascular:      Rate and Rhythm: Regular rhythm. Tachycardia present.   Pulmonary:      Effort: Pulmonary effort is normal. No respiratory distress.      Breath sounds: No wheezing or rales.   Chest:       Lymphadenopathy:      Cervical: No cervical adenopathy.   Skin:     Findings: No bruising or petechiae.   Neurological:      Mental Status: She is alert and oriented to person, place, and time.   Psychiatric:         Behavior: Behavior is cooperative.         Significant Labs:   Labs have been reviewed.    Lab Results   Component  Value Date    WBC 29.81 (H) 09/01/2020    HGB 7.5 (L) 09/01/2020    HCT 23.4 (L) 09/01/2020    MCV 74 (L) 09/01/2020     09/01/2020           Diagnostic Results:  CT chest/abdomen/pelvis (8/29/20): I have personally reviewed the images     In this patient with a known history of colon cancer, postoperative changes of partial colectomy are identified.  Again noted is extensive hepatic metastatic disease, with the index lesion in the right hepatic lobe appearing slightly decreased in size as compared to before.  Additionally, other hepatic lesions also appear to be decreased in size suggesting treatment response.    Assessment/Plan:     Adenocarcinoma of colon  - status post cycle #1 of FOLFOX in August 2020.  - CT scan (8/29/20) reveals a partial response to therapy with slight decrease in size of index lesions.  - will need to clear and fully treat MRSA bacteremia prior to proceeding with future cycles of chemotherapy. Follow up trans-esophageal echocardiogram from today.  - continue to monitor.        Jewel Arzola MD  Hematology/Oncology  [facility] ICU 5th Floor

## 2020-09-01 NOTE — ANESTHESIA POSTPROCEDURE EVALUATION
Anesthesia Post Evaluation    Patient: Huy Cisneros    Procedure(s) Performed: Procedure(s) (LRB):  Transesophageal echo (YOLANDA) intra-procedure log documentation (N/A)    Final Anesthesia Type: MAC    Patient location during evaluation: PACU  Patient participation: Yes- Able to Participate  Level of consciousness: awake and alert  Post-procedure vital signs: reviewed and stable  Pain management: adequate  Airway patency: patent    PONV status at discharge: No PONV  Anesthetic complications: no      Cardiovascular status: blood pressure returned to baseline and hemodynamically stable  Respiratory status: unassisted  Hydration status: euvolemic  Follow-up not needed.          Vitals Value Taken Time   /84 09/01/20 1701   Temp 37.8 °C (100.1 °F) 09/01/20 1315   Pulse 123 09/01/20 1717   Resp 54 09/01/20 1717   SpO2 97 % 09/01/20 1717   Vitals shown include unvalidated device data.      No case tracking events are documented in the log.      Pain/Rosa Maria Score: Pain Rating Prior to Med Admin: 8 (9/1/2020  3:41 AM)  Pain Rating Post Med Admin: 3 (8/31/2020 11:56 PM)

## 2020-09-01 NOTE — PT/OT/SLP PROGRESS
Occupational Therapy   Treatment    Name: Huy Cisneros  MRN: [id number]  Admitting Diagnosis:  Septic shock  1 Day Post-Op    Recommendations:     Discharge Recommendations: other (see comments)(TBD)  Discharge Equipment Recommendations:  other (see comments)(TBD)  Barriers to discharge:  None    Assessment:     Huy Cisneros is a 29 y.o. female with a medical diagnosis of Septic shock.  Performance deficits affecting function are weakness, impaired endurance, impaired self care skills, impaired functional mobilty, gait instability, impaired balance, decreased coordination, decreased upper extremity function, decreased lower extremity function, decreased safety awareness, impaired skin, impaired cardiopulmonary response to activity. Pt found in bed, agreeable to therapy, RN ok'ed.  Pt able to transfer to chair this date with Mod A x 1-2.  Pt continues with increased anxiety and rapid, shallow breathing.  Instructed on PLB and relaxation techniques throughout however pt with poor carryover. Improved tolerance/particiaption with therapy. Continue OT services to address functional goals, progressing as able.       Rehab Prognosis:  Good; patient would benefit from acute skilled OT services to address these deficits and reach maximum level of function.       Plan:     Patient to be seen 5 x/week to address the above listed problems via self-care/home management, therapeutic activities, therapeutic exercises  · Plan of Care Expires: 09/27/20  · Plan of Care Reviewed with: patient    Subjective     Pain/Comfort:  · Pain Rating 1: 0/10  · Pain Rating Post-Intervention 1: 0/10    Objective:     Communicated with: RN prior to session.  Patient found HOB elevated with rebolledo catheter, central line, oxygen(multiple ICU lines and monitors) upon OT entry to room.    General Precautions: Standard, fall, neutropenic   Orthopedic Precautions:N/A   Braces: N/A     Occupational Performance:     Bed Mobility:    · Patient  completed Rolling/Turning to Right with moderate assistance  · Patient completed Scooting/Bridging with maximal assistance to scoot seated to EOB  · Patient completed Supine to Sit with moderate assistance, 2 persons and HOB elevated, increased time and effort, vc's for effective technique     Functional Mobility/Transfers:  · Patient completed Sit <> Stand Transfer 1st trial with maximal assistance and of 2 persons with rolling walker, 2nd with Mod A x 1  · Patient completed Transfer EOB>chair  with Mod A x 1-2   · Functional Mobility: Unable    AMPAC 6 Click ADL: 13    Treatment & Education:  Pt sat EOB ~15 min with CGS/SBA however pt with 1 LOB to L side requiring Tot A to regain balance.      Patient left up in chair with all lines intact, call button in reach and RN notifiedEducation:      GOALS:   Multidisciplinary Problems     Occupational Therapy Goals        Problem: Occupational Therapy Goal    Goal Priority Disciplines Outcome Interventions   Occupational Therapy Goal     OT, PT/OT Ongoing, Progressing    Description: Goals to be met by: 9/27    Patient will increase functional independence with ADLs by performing:    Feeding with Modified [place].  UE Dressing with Stand-by Assistance.  Grooming while seated at sink with Modified [place].  Toileting from bedside commode with Minimal Assistance for hygiene and clothing management.   Toilet transfer to bedside commode with Minimal Assistance.  Increased functional strength to 3+/5 for BUE.                     Time Tracking:     OT Date of Treatment: 09/01/20  OT Start Time: 1145  OT Stop Time: 1219  OT Total Time (min): 34 min    Billable Minutes:Therapeutic Activity 12   *cotx with PT    CARLTON Puga  9/1/2020

## 2020-09-01 NOTE — PROGRESS NOTES
[facility] ICU 5th Floor  Hospital Medicine  Progress Note    Patient Name: Huy Cisneros  MRN: [id number]  Patient Class: IP- Inpatient   Admission Date: 8/26/2020  Length of Stay: 6 days  Attending Physician: Lex Yepez DO  Primary Care Provider: Primary Doctor Gisselle        Subjective:     Principal Problem:Septic shock        HPI:  Ms. Huy Cisneros is a 28 y/o female who lives in [place], [place] at her residence with her children. The patients PCP is Imtiaz Lindsay PA-C. She is also followed by Dr. Jewel Arzola with Oncology. She has a pmh of colon cancer with liver mets, uterine cysts, and HTN. She has a past surgical history of a mediport placement and a colonoscopy. The patient does not smoke cigarettes, drink alcohol, or use illicit drugs.     She presents to the ED with complaints of fever, vomiting and feeling dizzy since yesterday.  Per the patient she states her port a cath to right chest wall busted a stitch last night she while was bathing. She also has complaints of feeling SOB with lower extremity swelling.     Her ED workup includes WBC--0.95, hemoglobin 9.9, Na--132, creatinine 2.00, magnesium--1.4, lactate--10.7, CPK--180, pH--7.2. Pending blood cultures and wound cultures. She is COVID-19 negative. CXR---There has been interval placement of a right subclavian venous line. Its tip is in the region of the junction of the superior vena cava with the right atrium.  There is no pneumothorax. There is no focal pulmonary infiltrate visualized. XR of the abd---There is a paucity of gas within the gastrointestinal system. There are multiple radiopaque leads projected over the abdomen and pelvis. She will be admitted to the [facility] department for further care.         Overview/Hospital Course:  She was admitted to the [facility] hospital medicine service for further care. We have consulted ID, nephrology, oncology, pulmonology, and general surgery. Her  tunneled line is the suspected source for her bacteremia/septic shock. Blood cultures positive for staph. TTE pending. General sx removed tunneled port at the bedside. Will cont to follow cultures. She was initially given Levophed and Amiodarone. Lactate has improved---she was given IVF and now Nephrology has started Bicarb gtt. Dr. Naqvi was consulted for trialysis line placement---She was started on CRRT, now stopped at this time. Will cont broad spectrum abx. G-CSF started by oncology for neutropenia. MRI of the thoracic and lumbar spine was completed on 8/28 to r/o spinal abscess---Her MRI did not find a spinal fluid collection, however the MRI was suggestive of possible cavitary lung lesions. She is to have a CT chest, abdomen, and pelvis today for assessment of possible cavities in her lungs and mets in her liver. MRSA grew out at the port site. She was weaned off of levophed and now started on BB because of 's. Pulmonology has ordered CT of the C/A/P with contrast to evaluate the patient  for septic pulmonary emboli. WBCs have increased and are now 26.  she has Staph  in her BCs and the one from yesterday is NGTD.  On her CT chest and abdomen she has small bilateral pleural effusions and her liver mets appear smaller. YOLANDA with no vegetation noted. Cont daily blood cultures. Patient is on vancomycin and meropenem per ID recs. The patient remains with tachypnea and tachycardia. Up out of bed with PT.     Interval history: She looks good this AM. Still with diarrhea. Will cont to follow.     Review of Systems   Constitutional: Positive for activity change, appetite change and fatigue. Negative for chills and fever.   HENT: Negative for trouble swallowing.    Eyes: Negative for visual disturbance.   Respiratory: Negative for cough, shortness of breath and wheezing.    Cardiovascular: Positive for leg swelling.   Gastrointestinal: Positive for diarrhea. Negative for abdominal distention, abdominal pain,  nausea and vomiting.   Genitourinary: Negative for hematuria.   Musculoskeletal: Negative for gait problem.   Skin: Positive for wound (right chest wall).   Allergic/Immunologic: Positive for immunocompromised state.   Neurological: Positive for weakness.   Hematological: Does not bruise/bleed easily.   Psychiatric/Behavioral: Negative for confusion. The patient is not nervous/anxious.      Objective:     Vital Signs (Most Recent):  Temp: 97.9 °F (36.6 °C) (09/01/20 1630)  Pulse: (!) 124 (09/01/20 1830)  Resp: (!) 45 (09/01/20 1830)  BP: 133/82 (09/01/20 1830)  SpO2: 100 % (09/01/20 1830) Vital Signs (24h Range):  Temp:  [97.9 °F (36.6 °C)-101.9 °F (38.8 °C)] 97.9 °F (36.6 °C)  Pulse:  [105-127] 124  Resp:  [36-68] 45  SpO2:  [82 %-100 %] 100 %  BP: (108-134)/(64-91) 133/82     Weight: 85 kg (187 lb 6.3 oz)  Body mass index is 31.18 kg/m².    Physical Exam  Vitals signs and nursing note reviewed.   HENT:      Head: Normocephalic and atraumatic.      Comments: trialysis line in place at [facility]      Nose: Nose normal.      Mouth/Throat:      Mouth: Mucous membranes are moist.   Eyes:      Extraocular Movements: Extraocular movements intact.      Pupils: Pupils are equal, round, and reactive to light.   Neck:      Musculoskeletal: Normal range of motion.   Cardiovascular:      Rate and Rhythm: Regular rhythm. Tachycardia present.      Pulses: Normal pulses.      Heart sounds: Normal heart sounds.   Pulmonary:      Effort: Tachypnea present.      Breath sounds: Examination of the left-lower field reveals rales. Decreased breath sounds and rales present.   Abdominal:      General: There is no distension.      Palpations: Abdomen is soft.      Tenderness: There is no abdominal tenderness. There is no guarding.   Genitourinary:     Comments: Tran catheter removed today  Musculoskeletal: Normal range of motion.         General: Swelling present.      Right lower leg: Edema present.      Left lower leg: Edema present.    Skin:     General: Skin is warm and dry.      Capillary Refill: Capillary refill takes less than 2 seconds.      Comments: Dressing to the Right CW, clean dry and intact   Neurological:      Mental Status: She is alert and oriented to person, place, and time.   Psychiatric:         Mood and Affect: Mood normal.         Behavior: Behavior normal.           CRANIAL NERVES     CN III, IV, VI   Pupils are equal, round, and reactive to light.       Significant Labs:   BMP:   Recent Labs   Lab 09/01/20  0336   GLU 94   *   K 3.8   CL 98   CO2 19*   BUN 21*   CREATININE 0.7   CALCIUM 7.8*   MG 2.1     CBC:   Recent Labs   Lab 08/31/20  0508 09/01/20  0336   WBC 25.77* 29.81*   HGB 7.8* 7.5*   HCT 24.3* 23.4*    184     Lactic Acid:   Recent Labs   Lab 08/31/20  0508 08/31/20  0831 09/01/20  0003   LACTATE 4.4* 4.7* 4.6*     Magnesium:   Recent Labs   Lab 08/31/20  0508 09/01/20  0336   MG 1.9 2.1     POCT Glucose:   Recent Labs   Lab 08/30/20 2055 08/31/20  1958   POCTGLUCOSE 138* 109         Significant Imaging: I have reviewed all pertinent imaging results/findings within the past 24 hours.      Assessment/Plan:      * Septic shock  Infected venous access port  Metabolic Acidosis  Bacteremia due to Staphylococcus    The source is likely from dehiscence of Mediport-----we started her on broad spectrum abx. ID has been consulted. Following wound cultures as well as blood cultures---BCX growing GP cocci resembling staph. MRSA noted from the port. Blood cultures were redrawn on 8/28--NGTD. General Sx was consulted to remove her tunneled port. Lactate was initially elevated--now down. IVF given in the ED. Nephrology was consulted. Bicarb gtt initiated by Nephrology. CRRT was started and now stopped. MRI of the T/L spine suggestive of cavitary lung lesions. CT of the C/A/P today to r/o septic emboli. We appreciate ID. ID is recommending YOLANDA, showing no vegetation. The patient with diarrhea--she was placed on IV  flagyl and PO vancomycin (now stopped) for C-diff that is noted at this time negative. She is now currently on Vancomycin and meropenem. Remains with tachypnea and tachycardia. Tran catheter removed today and diet advanced. May be able to step down out of ICU tomorrow.     Bacteremia due to Staphylococcus        Elevated troponin  Could be related to demand. YOLANDA with no vegetation. Will follow.       Chest pain  Elevated troponin    No chest pain at this time. Troponin elevated---cardiology consulted. No changes on EKG. The troponin elevation could be related to demand--- echo with normal LVEF.    Nausea & vomiting  Prn Zofran ordered.       Hyponatremia  Monitor.       Hypomagnesemia  Monitor and replete as needed      Infected venous access port    Port removed.     KIERSTEN (acute kidney injury)  We appreciate nephrology for management. IV fluids given in the ED. Tran catheter placed. Nephrology has ordered a renal ultrasound. Was initially started on CRRT---on 8/28 CRRT stopped. Monitor intake and output. Creatinine stable, 0.7. Tran catheter removed.     Metastatic disease  Secondary malignancy of liver  Adenocarcinoma of colon  Neutropenia  Anemia in neoplastic disease    Seen by Dr. Arzola today--sees outpatient.  Will cont to monitor CBC daily.           VTE Risk Mitigation (From admission, onward)         Ordered     enoxaparin injection 40 mg  Every 24 hours      08/28/20 1108     heparin (porcine) injection 2,300 Units  As needed (PRN)      08/27/20 1649     IP VTE HIGH RISK PATIENT  Once      08/26/20 1458     Place sequential compression device  Until discontinued      08/26/20 1458                Discharge Planning   ZOE:      Code Status: Full Code   Is the patient medically ready for discharge?:     Reason for patient still in hospital (select all that apply): Patient unstable, Laboratory test, Treatment and Consult recommendations  Discharge Plan A: Home Health            Critical care time spent on  the evaluation and treatment of severe organ dysfunction, review of pertinent labs and imaging studies, discussions with consulting providers and discussions with patient/family: 45 minutes.      Jeri Montalvo NP  Department of Hospital Medicine   [facility] ICU 5th Floor

## 2020-09-01 NOTE — ASSESSMENT & PLAN NOTE
Infected venous access port  Metabolic Acidosis  Bacteremia due to Staphylococcus    The source is likely from dehiscence of Mediport-----we started her on broad spectrum abx. ID has been consulted. Following wound cultures as well as blood cultures---BCX growing GP cocci resembling staph. MRSA noted from the port. Blood cultures were redrawn on 8/28--NGTD. General Sx was consulted to remove her tunneled port. Lactate was initially elevated--now down. IVF given in the ED. Nephrology was consulted. Bicarb gtt initiated by Nephrology. CRRT was started and now stopped. MRI of the T/L spine suggestive of cavitary lung lesions. CT of the C/A/P today to r/o septic emboli. We appreciate ID. ID is recommending YOLANDA, showing no vegetation. The patient with diarrhea--she was placed on IV flagyl and PO vancomycin (now stopped) for C-diff that is noted at this time negative. She is now currently on Vancomycin and meropenem. Remains with tachypnea and tachycardia. Tran catheter removed today and diet advanced. May be able to step down out of ICU tomorrow.

## 2020-09-01 NOTE — ASSESSMENT & PLAN NOTE
29 F with PMH of metastatic adenocarcinoma of the colon with liver mets, uterine cysts, and HTN on whom ID is consulted for likely staph aureus bacteremia. Neutropenia resolved on 8/29. Port removed on 8/26 - positive for MRSA > 15 colonies. TTE was negative 8/27 for vegetations; YOLANDA done on 8/31 - no vegetations    -continue vancomycin for MRSA bacteremia and sepsis due to port infection   -contact precautions for MRSA  -needs repeat blood cxs until negative - if she has 3 sets in a row negative then stop daily BC unless febrile; would check BC for temp spike

## 2020-09-01 NOTE — NURSING
Received notification form [facility] at [facility] that pt has a positive blood culture from 08/29/2020-gram positive cocci in clusters resembling staph. Dr.'s Montalvo and Dariusz notified of results.

## 2020-09-01 NOTE — SUBJECTIVE & OBJECTIVE
Interval History: Patient was febrile over night to over 101. Had YOLANDA today and was still sedated when we rounded. Nurse reports patient was slightly confused today. Per chart she had nightmares over night. BC negative from 8/29 and 8/30 so far.     Review of Systems   Unable to perform ROS: Acuity of condition (patient just had YOLANDA and is still very sedated)     Antibiotics (From admission, onward)    Start     Stop Route Frequency Ordered    08/29/20 0200  vancomycin (VANCOCIN) 1,250 mg in dextrose 5 % 250 mL IVPB      -- IV Every 12 hours (non-standard times) 08/28/20 1252    08/26/20 1628  vancomycin - pharmacy to dose  (vancomycin IVPB)      -- IV pharmacy to manage frequency 08/26/20 1528        Objective:     Vital Signs (Most Recent):  Temp: (!) 101.9 °F (38.8 °C) (08/31/20 1932)  Pulse: (!) 114 (08/31/20 1901)  Resp: (!) 58 (08/31/20 1901)  BP: 117/80 (08/31/20 1901)  SpO2: 99 % (08/31/20 1901) Vital Signs (24h Range):  Temp:  [98.1 °F (36.7 °C)-101.9 °F (38.8 °C)] 101.9 °F (38.8 °C)  Pulse:  [106-131] 114  Resp:  [23-61] 58  SpO2:  [79 %-100 %] 99 %  BP: ()/(50-95) 117/80     Weight: 84 kg (185 lb 3 oz)  Body mass index is 30.82 kg/m².    Estimated Creatinine Clearance: 125.8 mL/min (based on SCr of 0.7 mg/dL).    Physical Exam  Cardiovascular:      Heart sounds: Normal heart sounds.   Pulmonary:      Breath sounds: Normal breath sounds.   Abdominal:      General: Bowel sounds are normal.   Musculoskeletal:      Right lower leg: No edema.      Left lower leg: No edema.         Significant Labs:   Blood Culture:   Recent Labs   Lab 08/26/20  1140 08/28/20  1137 08/29/20  1658 08/30/20  0121   LABBLOO Gram stain juan bottle: Gram positive cocci in clusters resembling Staph  Results called to and read back by: Trudy Morales RN  08/27/2020    02:08  Gram stain aer bottle: Gram positive cocci in clusters resembling Staph  Positive results previously called 08/27/2020  03:19  METHICILLIN RESISTANT  STAPHYLOCOCCUS AUREUS  ID consult required at [facility].  * Gram stain juan bottle: Gram positive cocci in clusters resembling Staph   Results called to and read back by: Nighat Craft  08/29/2020  15:26  METHICILLIN RESISTANT STAPHYLOCOCCUS AUREUS  ID consult required at [facility].  For susceptibility see order #[id number]  *  Gram stain aer bottle: Gram positive cocci in clusters resembling Staph   Results called to and read back by: Nighat Craft  08/29/2020  15:26  Gram stain juan bottle: Gram positive cocci in clusters resembling Staph   Positive results previously called 08/29/2020  17:57  METHICILLIN RESISTANT STAPHYLOCOCCUS AUREUS  ID consult required at [facility].  * No Growth to date  No Growth to date No Growth to date  No Growth to date     CBC:   Recent Labs   Lab 08/30/20  0121 08/30/20  0415 08/31/20  0508   WBC 23.64* 25.94*  25.94* 25.77*   HGB 9.1* 8.6*  8.6* 7.8*   HCT 29.5* 27.1*  27.1* 24.3*   * 144*  144* 170     CMP:   Recent Labs   Lab 08/30/20  0121 08/30/20  0415 08/31/20  0508   * 131*  131* 131*   K 3.9 3.9  3.9 3.5   CL 96 97  97 98   CO2 21* 21*  21* 20*   GLU 88 110  110 97   BUN 14 14  14 17   CREATININE 0.7 0.7  0.7 0.7   CALCIUM 8.0* 7.8*  7.8* 7.6*   ALBUMIN 1.9* 1.7*  1.7* 1.7*   ANIONGAP 15 13  13 13   EGFRNONAA >60 >60  >60 >60     Urine Culture:   Recent Labs   Lab 06/06/20  0809   LABURIN ESCHERICHIA COLI  >100,000 cfu/ml  *     Urine Studies:   Recent Labs   Lab 07/30/20  1101  08/26/20  1612   COLORU Manuela   < > Yellow   APPEARANCEUA Hazy*   < > Cloudy*   PHUR 6.0   < > 6.0   SPECGRAV 1.020   < > 1.025   PROTEINUA 1+*   < > 1+*   GLUCUA Negative   < > Negative   KETONESU Negative   < > Negative   BILIRUBINUA Negative   < > Negative   OCCULTUA Negative   < > Trace*   NITRITE Negative   < > Positive*   UROBILINOGEN  --    < > Negative   LEUKOCYTESUR  Negative   < > Negative   RBCUA 1   < > 0   WBCUA 3   < > 2   BACTERIA Few*   < > Many*   SQUAMEPITHEL 2  --   --    HYALINECASTS 0   < > 3*    < > = values in this interval not displayed.     Wound Culture:   Recent Labs   Lab 20  1448   LABAERO METHICILLIN RESISTANT STAPHYLOCOCCUS AUREUS  Many  *       Significant Imagin/28 ct chest abdomen pelvis - Impression:  In this patient with a known history of colon cancer, postoperative changes of partial colectomy are identified.  Again noted is extensive hepatic metastatic disease, with the index lesion in the right hepatic lobe appearing slightly decreased in size as compared to before.  Additionally, other hepatic lesions also appear to be decreased in size suggesting treatment response.     Trace left and small right pleural effusions.  There is subtotal consolidative change of the left lower lobe with patchy regions of consolidation throughout both lungs with more focal peripheral patchy nodular densities in the periphery of both lungs.  In the setting of sepsis, septic emboli should be primarily considered.  Neoplasm not excluded given the patient's history; however, felt less likely.     Soft tissue edema of the left thigh, nonspecific.  Consider evaluation for DVT

## 2020-09-01 NOTE — ASSESSMENT & PLAN NOTE
Secondary malignancy of liver  Adenocarcinoma of colon  Neutropenia  Anemia in neoplastic disease    Seen by Dr. Arzola today--sees outpatient.  Will cont to monitor CBC daily.

## 2020-09-01 NOTE — ASSESSMENT & PLAN NOTE
- status post cycle #1 of FOLFOX in August 2020.  - CT scan (8/29/20) reveals a partial response to therapy with slight decrease in size of index lesions.  - will need to clear and fully treat MRSA bacteremia prior to proceeding with future cycles of chemotherapy. Follow up trans-esophageal echocardiogram from today.  - continue to monitor.

## 2020-09-01 NOTE — SUBJECTIVE & OBJECTIVE
Interval history: She looks good this AM. Still with diarrhea. Will cont to follow.     Review of Systems   Constitutional: Positive for activity change, appetite change and fatigue. Negative for chills and fever.   HENT: Negative for trouble swallowing.    Eyes: Negative for visual disturbance.   Respiratory: Negative for cough, shortness of breath and wheezing.    Cardiovascular: Positive for leg swelling.   Gastrointestinal: Positive for diarrhea. Negative for abdominal distention, abdominal pain, nausea and vomiting.   Genitourinary: Negative for hematuria.   Musculoskeletal: Negative for gait problem.   Skin: Positive for wound (right chest wall).   Allergic/Immunologic: Positive for immunocompromised state.   Neurological: Positive for weakness.   Hematological: Does not bruise/bleed easily.   Psychiatric/Behavioral: Negative for confusion. The patient is not nervous/anxious.      Objective:     Vital Signs (Most Recent):  Temp: 97.9 °F (36.6 °C) (09/01/20 1630)  Pulse: (!) 124 (09/01/20 1830)  Resp: (!) 45 (09/01/20 1830)  BP: 133/82 (09/01/20 1830)  SpO2: 100 % (09/01/20 1830) Vital Signs (24h Range):  Temp:  [97.9 °F (36.6 °C)-101.9 °F (38.8 °C)] 97.9 °F (36.6 °C)  Pulse:  [105-127] 124  Resp:  [36-68] 45  SpO2:  [82 %-100 %] 100 %  BP: (108-134)/(64-91) 133/82     Weight: 85 kg (187 lb 6.3 oz)  Body mass index is 31.18 kg/m².    Physical Exam  Vitals signs and nursing note reviewed.   HENT:      Head: Normocephalic and atraumatic.      Comments: trialysis line in place at [facility]      Nose: Nose normal.      Mouth/Throat:      Mouth: Mucous membranes are moist.   Eyes:      Extraocular Movements: Extraocular movements intact.      Pupils: Pupils are equal, round, and reactive to light.   Neck:      Musculoskeletal: Normal range of motion.   Cardiovascular:      Rate and Rhythm: Regular rhythm. Tachycardia present.      Pulses: Normal pulses.      Heart sounds: Normal heart sounds.   Pulmonary:      Effort:  Tachypnea present.      Breath sounds: Examination of the left-lower field reveals rales. Decreased breath sounds and rales present.   Abdominal:      General: There is no distension.      Palpations: Abdomen is soft.      Tenderness: There is no abdominal tenderness. There is no guarding.   Genitourinary:     Comments: Tran catheter removed today  Musculoskeletal: Normal range of motion.         General: Swelling present.      Right lower leg: Edema present.      Left lower leg: Edema present.   Skin:     General: Skin is warm and dry.      Capillary Refill: Capillary refill takes less than 2 seconds.      Comments: Dressing to the Right CW, clean dry and intact   Neurological:      Mental Status: She is alert and oriented to person, place, and time.   Psychiatric:         Mood and Affect: Mood normal.         Behavior: Behavior normal.           CRANIAL NERVES     CN III, IV, VI   Pupils are equal, round, and reactive to light.       Significant Labs:   BMP:   Recent Labs   Lab 09/01/20  0336   GLU 94   *   K 3.8   CL 98   CO2 19*   BUN 21*   CREATININE 0.7   CALCIUM 7.8*   MG 2.1     CBC:   Recent Labs   Lab 08/31/20  0508 09/01/20  0336   WBC 25.77* 29.81*   HGB 7.8* 7.5*   HCT 24.3* 23.4*    184     Lactic Acid:   Recent Labs   Lab 08/31/20  0508 08/31/20  0831 09/01/20  0003   LACTATE 4.4* 4.7* 4.6*     Magnesium:   Recent Labs   Lab 08/31/20  0508 09/01/20  0336   MG 1.9 2.1     POCT Glucose:   Recent Labs   Lab 08/30/20 2055 08/31/20  1958   POCTGLUCOSE 138* 109         Significant Imaging: I have reviewed all pertinent imaging results/findings within the past 24 hours.

## 2020-09-01 NOTE — PT/OT/SLP PROGRESS
Physical Therapy Treatment    Patient Name:  Huy Cisneros   MRN:  [id number]    Recommendations:     Discharge Recommendations:  (TBD)   Discharge Equipment Recommendations: (TBD)   Barriers to discharge:  Decreased caregiver support and decreased functional mobility from baseline    Assessment:     Huy Cisneros is a 29 y.o. female admitted with a medical diagnosis of Septic shock.  She presents with the following impairments/functional limitations:  impaired balance, weakness, impaired endurance, impaired self care skills, impaired functional mobilty, gait instability, decreased coordination, decreased upper extremity function, decreased lower extremity function, decreased safety awareness, impaired skin, impaired cardiopulmonary response to activity   Patient progressed to transfer OOB to chair with modA x 1-2; Instructed on pursed lip breathing and relaxation techniques, however, pt continues with quick, shallow breaths; will cont with POC.        .    Rehab Prognosis: Good; patient would benefit from acute skilled PT services to address these deficits and reach maximum level of function.    Recent Surgery: Procedure(s) (LRB):  Transesophageal echo (YOLANDA) intra-procedure log documentation (N/A) 1 Day Post-Op    Plan:     During this hospitalization, patient to be seen 5 x/week to address the identified rehab impairments via gait training, therapeutic activities, therapeutic exercises, neuromuscular re-education and progress toward the following goals:    · Plan of Care Expires:  09/23/20    Subjective     Pain/Comfort:  · Pain Rating 1: 0/10  · Pain Rating Post-Intervention 1: 0/10      Objective:     Communicated with nurse prior to session.  Patient found with oxygen, central line, rebolledo catheter(full ICU monitoring) upon PT entry to room.     General Precautions: Standard, fall, neutropenic   Orthopedic Precautions:N/A   Braces: N/A     Functional Mobility:  · Bed Mobility:     · Rolling Right:  moderate assistance  · Scooting: maximal assistance and to EOB  · Supine to Sit: moderate assistance, of 2 persons and increased effort and VCs for effective technique  · Transfers:     · Sit to Stand:  maximal assistance, of 2 persons for first trial with modA x 1 for second trial  · Bed to Chair: moderate assistance of 1 person +1 for guidance with  no AD and using step transfer  · Gait: Patient took 2 steps from EOB to bedside chair with modA of 1 +1 for quidance      AM-PAC 6 CLICK MOBILITY  Turning over in bed (including adjusting bedclothes, sheets and blankets)?: 2  Sitting down on and standing up from a chair with arms (e.g., wheelchair, bedside commode, etc.): 2  Moving from lying on back to sitting on the side of the bed?: 2  Moving to and from a bed to a chair (including a wheelchair)?: 2  Need to walk in hospital room?: 1  Climbing 3-5 steps with a railing?: 1  Basic Mobility Total Score: 10       Therapeutic Activities and Exercises:   Patient performed limited BLE AROM ex 2 x 5-10 reps- APs, FAQ, knee fl, hip abd/add; pt transitioned to EOB with increased time and effort, scooted to EOB with maxA in 2-3 increments; pt sat at EOB x 15 minutes; pt sit to stand x 2 trials as described above; transferred to bedside chair with modA x 1 and guidance of another; arranged lines and furniture, set up pt with tray.    Patient left up in chair with all lines intact, call button in reach and nurse notified..    GOALS:   Multidisciplinary Problems     Physical Therapy Goals        Problem: Physical Therapy Goal    Goal Priority Disciplines Outcome Goal Variances Interventions   Physical Therapy Goal     PT, PT/OT Ongoing, Progressing     Description: Goals to be met by: 20     Patient will increase functional independence with mobility by performin.  BLE AROM supine x 15  2.  Roll bilaterally with supervision  3.  Supine to/from sit with supervision  4.  Sit at EOB 15 min with supervision  5.  Sit to  stand with RW with CG                     Time Tracking:     PT Received On: 09/01/20  PT Start Time: 1138     PT Stop Time: 1220  PT Total Time (min): 42 min with OT    Billable Minutes: Therapeutic Activity 20 minutes and Therapeutic Exercise 10 minutes    Treatment Type: Treatment  PT/PTA: PT     PTA Visit Number: 0     Prisca Andrews, PT  09/01/2020

## 2020-09-01 NOTE — EICU
EICU    Called regarding pt 28 yo f with met colon CA admitted with suspecte line sepsis with MRSA bacteremia, YOLANDA today neg for endocarditis.  PT reports cramping abd pain, similar to menstrual periods- not on period, none since chemotx started vss 118/64 hr 100s 100% sao2    - pt just received tylenol, wants to give it some time  - repeat lactate, intestinal ischemia a possibility carli if pt has mycotic aneurysm; lact 4.7

## 2020-09-01 NOTE — PLAN OF CARE
Problem: Physical Therapy Goal  Goal: Physical Therapy Goal  Description: Goals to be met by: 20     Patient will increase functional independence with mobility by performin.  BLE AROM supine x 15  2.  Roll bilaterally with supervision  3.  Supine to/from sit with supervision  4.  Sit at EOB 15 min with supervision  5.  Sit to stand with RW with CG    Outcome: Ongoing, Progressing   Patient progressed to transfer OOB to chair with modA x 1-2; Instructed on pursed lip breathing and relaxation techniques, however, pt continues with quick, shallow breaths; will cont with POC.

## 2020-09-01 NOTE — PLAN OF CARE
Pt did well throughout then shift today working with PT/OT and getting out of bed and sitting for several hours in the chair. Pt had several loose bowel movements during the day. BC + and results provided to MD's. Pt receiving multiple antibiotics and electrolyte replacements. Pt transitioned from NPO to 2 gram low sodium diet an tolerated it well. Bilateral LE US performed today to r/o DVT. Pt spoke with family via [employer] and had a visitor to the bedside today. Tran d/c'd per MD order.

## 2020-09-02 NOTE — NURSING
Pt c/o abdominal pain. Unable to give p/o meds d/t need to complete CT Scan ASAP.NP notified.Order placed for 2 mg morphine IV. Order placed for PICC line placement. Charge RN/House Supervisor notified.

## 2020-09-02 NOTE — PROGRESS NOTES
[facility] ICU 5th Floor  Hospital Medicine  Progress Note    Patient Name: Huy Cisneros  MRN: [id number]  Patient Class: IP- Inpatient   Admission Date: 8/26/2020  Length of Stay: 7 days  Attending Physician: Lex Yepez DO  Primary Care Provider: Primary Doctor Gisselle        Subjective:     Principal Problem:Septic shock        HPI:  Ms. Huy Cisneros is a 28 y/o female who lives in [place], [place] at her residence with her children. The patients PCP is Imtiaz Lindsay PA-C. She is also followed by Dr. Jewel Arzola with Oncology. She has a pmh of colon cancer with liver mets, uterine cysts, and HTN. She has a past surgical history of a mediport placement and a colonoscopy. The patient does not smoke cigarettes, drink alcohol, or use illicit drugs.     She presents to the ED with complaints of fever, vomiting and feeling dizzy since yesterday.  Per the patient she states her port a cath to right chest wall busted a stitch last night she while was bathing. She also has complaints of feeling SOB with lower extremity swelling.     Her ED workup includes WBC--0.95, hemoglobin 9.9, Na--132, creatinine 2.00, magnesium--1.4, lactate--10.7, CPK--180, pH--7.2. Pending blood cultures and wound cultures. She is COVID-19 negative. CXR---There has been interval placement of a right subclavian venous line. Its tip is in the region of the junction of the superior vena cava with the right atrium.  There is no pneumothorax. There is no focal pulmonary infiltrate visualized. XR of the abd---There is a paucity of gas within the gastrointestinal system. There are multiple radiopaque leads projected over the abdomen and pelvis. She will be admitted to the [facility] department for further care.         Overview/Hospital Course:  She was admitted to the [facility] hospital medicine service for further care. We have consulted ID, nephrology, oncology, pulmonology, and general surgery. Her  tunneled line is the suspected source for her bacteremia/septic shock. Blood cultures positive for staph. TTE pending. General sx removed tunneled port at the bedside. Will cont to follow cultures. She was initially given Levophed and Amiodarone. Lactate has improved---she was given IVF and now Nephrology has started Bicarb gtt. Dr. Naqvi was consulted for trialysis line placement---She was started on CRRT, now stopped at this time. Will cont broad spectrum abx. G-CSF started by oncology for neutropenia. MRI of the thoracic and lumbar spine was completed on 8/28 to r/o spinal abscess---Her MRI did not find a spinal fluid collection, however the MRI was suggestive of possible cavitary lung lesions. She is to have a CT chest, abdomen, and pelvis today for assessment of possible cavities in her lungs and mets in her liver. MRSA grew out at the port site. She was weaned off of levophed and now started on BB because of 's. Pulmonology has ordered CT of the C/A/P with contrast to evaluate the patient  for septic pulmonary emboli. WBCs have increased and are now 26.  she has Staph  in her BCs and the one from yesterday is NGTD.  On her CT chest and abdomen she has small bilateral pleural effusions and her liver mets appear smaller. YOLANDA with no vegetation noted. Cont daily blood cultures. Patient is on vancomycin and meropenem per ID recs. The patient remains with tachypnea and tachycardia. Up out of bed with PT. CT of chest and abdomen ordered. PICC line placed for inability to obtain peripheral IV.     Interval history: The patient was febrile overnight and remains tachypneic and tachycardic. Concerning for continued infection. C/o abdominal pain/cramps.     Review of Systems   Constitutional: Positive for activity change, appetite change and fatigue. Negative for chills and fever.   Respiratory: Positive for shortness of breath. Negative for cough.    Cardiovascular: Positive for leg swelling. Negative for chest  pain.   Gastrointestinal: Positive for abdominal pain and diarrhea. Negative for abdominal distention, nausea and vomiting.   Skin: Positive for wound (right chest wall).   Allergic/Immunologic: Positive for immunocompromised state.   Neurological: Positive for weakness.     Objective:     Vital Signs (Most Recent):  Temp: 99.1 °F (37.3 °C) (09/02/20 1100)  Pulse: (!) 126 (09/02/20 1345)  Resp: (!) 37 (09/02/20 1345)  BP: 132/85 (09/02/20 1330)  SpO2: 96 % (09/02/20 1345) Vital Signs (24h Range):  Temp:  [97.9 °F (36.6 °C)-101.5 °F (38.6 °C)] 99.1 °F (37.3 °C)  Pulse:  [107-129] 126  Resp:  [33-56] 37  SpO2:  [82 %-100 %] 96 %  BP: (110-136)/(66-86) 132/85     Weight: 85 kg (187 lb 6.3 oz)  Body mass index is 31.18 kg/m².    Physical Exam  Vitals signs and nursing note reviewed.   Constitutional:       Appearance: She is ill-appearing.   HENT:      Head:      Comments: trialysis line in [facility] at [facility]   Neck:      Musculoskeletal: Normal range of motion.   Cardiovascular:      Rate and Rhythm: Regular rhythm. Tachycardia present.      Pulses: Normal pulses.      Heart sounds: Normal heart sounds.   Pulmonary:      Effort: Tachypnea present.      Breath sounds: Decreased breath sounds present.   Abdominal:      General: Bowel sounds are normal. There is no distension.      Palpations: Abdomen is soft.      Tenderness: There is abdominal tenderness.   Musculoskeletal: Normal range of motion.         General: Swelling present.      Right lower leg: Edema present.      Left lower leg: Edema present.   Skin:     General: Skin is warm and dry.      Capillary Refill: Capillary refill takes less than 2 seconds.      Comments: Dressing to the Right CW, clean dry and intact   Neurological:      Mental Status: She is alert and oriented to person, place, and time.      Motor: Weakness present.   Psychiatric:         Mood and Affect: Mood normal.         Behavior: Behavior normal.             Significant Labs:   BMP:   Recent Labs    Lab 09/02/20  0440      *   K 3.5   CL 98   CO2 19*   BUN 20   CREATININE 0.7   CALCIUM 7.6*   MG 1.9     CBC:   Recent Labs   Lab 09/01/20  0336 09/02/20  0440   WBC 29.81* 32.88*   HGB 7.5* 7.3*   HCT 23.4* 22.7*    177     Lactic Acid:   Recent Labs   Lab 09/01/20  0003   LACTATE 4.6*     Magnesium:   Recent Labs   Lab 09/01/20  0336 09/02/20  0440   MG 2.1 1.9     POCT Glucose:   Recent Labs   Lab 08/31/20 1958   POCTGLUCOSE 109         Significant Imaging: I have reviewed all pertinent imaging results/findings within the past 24 hours.      Assessment/Plan:      * Septic shock  Infected venous access port  Metabolic Acidosis  Bacteremia due to Staphylococcus    The source is likely from dehiscence of Mediport-----we started her on broad spectrum abx. ID has been consulted. Following wound cultures as well as blood cultures---BCX growing GP cocci resembling staph. MRSA noted from the port. Blood cultures were redrawn on 8/28--NGTD. General Sx was consulted to remove her tunneled port. Lactate was initially elevated--now down. IVF given in the ED. Nephrology was consulted. Bicarb gtt initiated by Nephrology. CRRT was started and now stopped. MRI of the T/L spine suggestive of cavitary lung lesions. CT of the C/A/P today to r/o septic emboli. We appreciate ID. ID is recommending YOLANDA, showing no vegetation. The patient with diarrhea--she was placed on IV flagyl and PO vancomycin (now stopped) for C-diff that is noted at this time negative. She is now currently on Vancomycin and meropenem. Remains with tachypnea and tachycardia. Tran catheter removed and diet advanced. CT of chest and abdomen ordered. PICC line placed for IV access.      Bacteremia due to Staphylococcus        Elevated troponin  Could be related to demand. YOLANDA with no vegetation. Will follow.       Chest pain  Elevated troponin    No chest pain at this time. Troponin elevated---cardiology consulted. No changes on EKG. The  troponin elevation could be related to demand--- echo with normal LVEF.    Nausea & vomiting  Prn Zofran ordered.       Hyponatremia  Monitor.       Hypomagnesemia  Monitor and replete as needed      Infected venous access port    Port removed.     KIERSTEN (acute kidney injury)  We appreciate nephrology for management. IV fluids given in the ED. Tran catheter placed. Nephrology has ordered a renal ultrasound. Was initially started on CRRT---on 8/28 CRRT stopped. Monitor intake and output. Creatinine stable. Tran catheter removed.     Metastatic disease  Secondary malignancy of liver  Adenocarcinoma of colon  Neutropenia  Anemia in neoplastic disease    Seen by Dr. Arzola today--sees outpatient.  Will cont to monitor CBC daily.           VTE Risk Mitigation (From admission, onward)         Ordered     enoxaparin injection 40 mg  Every 24 hours      08/28/20 1108     heparin (porcine) injection 2,300 Units  As needed (PRN)      08/27/20 1649     IP VTE HIGH RISK PATIENT  Once      08/26/20 1458     Place sequential compression device  Until discontinued      08/26/20 1458                Discharge Planning   ZOE:      Code Status: Full Code   Is the patient medically ready for discharge?:     Reason for patient still in hospital (select all that apply): Patient unstable, Laboratory test, Treatment and Imaging  Discharge Plan A: Home Health            Critical care time spent on the evaluation and treatment of severe organ dysfunction, review of pertinent labs and imaging studies, discussions with consulting providers and discussions with patient/family: 40 minutes.      Jeri Montalvo NP  Department of Hospital Medicine   [facility] ICU 5th Floor

## 2020-09-02 NOTE — ASSESSMENT & PLAN NOTE
We appreciate nephrology for management. IV fluids given in the ED. Tran catheter placed. Nephrology has ordered a renal ultrasound. Was initially started on CRRT---on 8/28 CRRT stopped. Monitor intake and output. Creatinine stable. Tran catheter removed.

## 2020-09-02 NOTE — NURSING
2000 Patient requested something for gas pain. FLORA Mckee notified ordered simethicone.     0100 Patient asking for something for stomach cramps, injections given.    0230 Patient stated medication did not help, asked for muscle relaxer to help with cramps. Robaxin ordered and given    0345 Patient stated Robaxin didn't help much. Asked for phenergan. FLORA Mckee notified.     Per patient GI issues are ongiong but worsened upon admission. Patient could possibly benefit from GI consult.

## 2020-09-02 NOTE — NURSING
Unable to place PIV at bedside despite multiple attempts. Anesthesia consulted to place PIV for procedure. Dr. Roy, Anesthesia called and informed of consult.

## 2020-09-02 NOTE — SUBJECTIVE & OBJECTIVE
Interval history: She looks good this AM. Still with diarrhea. Will cont to follow.     Review of Systems   Constitutional: Positive for activity change, appetite change and fatigue. Negative for chills and fever.   Respiratory: Positive for shortness of breath. Negative for cough.    Cardiovascular: Positive for leg swelling. Negative for chest pain.   Gastrointestinal: Positive for abdominal pain and diarrhea. Negative for abdominal distention, nausea and vomiting.   Skin: Positive for wound (right chest wall).   Allergic/Immunologic: Positive for immunocompromised state.   Neurological: Positive for weakness.     Objective:     Vital Signs (Most Recent):  Temp: 99.1 °F (37.3 °C) (09/02/20 1100)  Pulse: (!) 126 (09/02/20 1345)  Resp: (!) 37 (09/02/20 1345)  BP: 132/85 (09/02/20 1330)  SpO2: 96 % (09/02/20 1345) Vital Signs (24h Range):  Temp:  [97.9 °F (36.6 °C)-101.5 °F (38.6 °C)] 99.1 °F (37.3 °C)  Pulse:  [107-129] 126  Resp:  [33-56] 37  SpO2:  [82 %-100 %] 96 %  BP: (110-136)/(66-86) 132/85     Weight: 85 kg (187 lb 6.3 oz)  Body mass index is 31.18 kg/m².    Physical Exam  Vitals signs and nursing note reviewed.   Constitutional:       Appearance: She is ill-appearing.   HENT:      Head:      Comments: trialysis line in place at [facility]   Neck:      Musculoskeletal: Normal range of motion.   Cardiovascular:      Rate and Rhythm: Regular rhythm. Tachycardia present.      Pulses: Normal pulses.      Heart sounds: Normal heart sounds.   Pulmonary:      Effort: Tachypnea present.      Breath sounds: Decreased breath sounds present.   Abdominal:      General: Bowel sounds are normal. There is no distension.      Palpations: Abdomen is soft.      Tenderness: There is abdominal tenderness.   Musculoskeletal: Normal range of motion.         General: Swelling present.      Right lower leg: Edema present.      Left lower leg: Edema present.   Skin:     General: Skin is warm and dry.      Capillary Refill: Capillary refill  takes less than 2 seconds.      Comments: Dressing to the Right CW, clean dry and intact   Neurological:      Mental Status: She is alert and oriented to person, place, and time.      Motor: Weakness present.   Psychiatric:         Mood and Affect: Mood normal.         Behavior: Behavior normal.             Significant Labs:   BMP:   Recent Labs   Lab 09/02/20  0440      *   K 3.5   CL 98   CO2 19*   BUN 20   CREATININE 0.7   CALCIUM 7.6*   MG 1.9     CBC:   Recent Labs   Lab 09/01/20 0336 09/02/20 0440   WBC 29.81* 32.88*   HGB 7.5* 7.3*   HCT 23.4* 22.7*    177     Lactic Acid:   Recent Labs   Lab 09/01/20  0003   LACTATE 4.6*     Magnesium:   Recent Labs   Lab 09/01/20 0336 09/02/20 0440   MG 2.1 1.9     POCT Glucose:   Recent Labs   Lab 08/31/20 1958   POCTGLUCOSE 109         Significant Imaging: I have reviewed all pertinent imaging results/findings within the past 24 hours.

## 2020-09-02 NOTE — PT/OT/SLP PROGRESS
Occupational Therapy      Patient Name:  Huy Cisneros   MRN:  [id number]    Patient not seen today secondary to Patient unwilling to participate(AM: PT reports RN requested to hold therapy at this time secondary to pt with abdominal pain and CT ordered.  PM 1412: Pt refused secondary to not feeling well.  RN aware.). Will follow-up tomorrow.    CARLTON Puga  9/2/2020

## 2020-09-02 NOTE — EICU
Intervention Initiated From:  Bedside   Called in for Time Out prior to PICC line insertion. KAVON Boo RN at bedside. Time out as charted. Pt able to state procedure and consent.  , /77, SP02 99%

## 2020-09-02 NOTE — PROGRESS NOTES
Infectious Disease Follow up Note    Impression/Plan:    MRSA Bacteremia  29 F with PMH of metastatic adenocarcinoma of the colon with liver mets, uterine cysts, and HTN on whom ID is consulted for likely staph aureus bacteremia. She presented neutropenic.   WBC up to 2.5 on 8/28. Port removed on 8/26 - positive for staph aureus > 15 colonies. TTE was negative 8/27 for vegetations. YOLANDA negative for vegetations 8/31. Patient continues to be febrile.      -continue Vanc for MRSA bacteremia  -continue Meropenem for now  -agree with CT A/P. Would include chest CT given pain with inspiration and cough and ongoing tachypnea  -needs repeat blood cxs until negative - if she has 3 sets in a row negative then stop daily BC unless febrile; would check BC for temp spike  -increased WBC may still be effect of tbo-filgrastim however high fever is concerning for ongoing infectious source.     Thanks! Please call for any questions [phone]  Imtiaz Albert  [employer] ID    Hospital Day 7  Principal Problem: Septic shock     HPI: 29 F with PMH of metastatic adenocarcinoma of the colon with liver mets, uterine cysts, and HTN who presented to [facility] ED for intractable nausea/vomitting that has been on going for about 1 day.  Pt describes vomiting as having some food contents but mostly saliva, she reports she has not been able to tolerated PO since symptoms began.  Associated symptoms include fevers, chills, and general malaise which also started when nausea/vomiting started. Pt denies abdominal pain, endorses 1 episode of diarrhea. Denies any chest pain, SOB, changes in urinary habits.  Pt reports she has never had symptoms like this in the past. She reports her cancer is s/p resection in July 2020 and currently undergoing chemotherapy last given 2 weeks via port in the right chest.  In ED noticed wound dehiscence at right chest port site. She was admitted to the ICU. Was briefly on norepi but off it now. Blood cxs with GPCs and  staph aureus from the port site. Port is removed. Currently on therapy with vanco.     8/26 BC MRSA  8/26 R chest wound culture MRSA   8/26 cath tip Staph aureus >14 colonies  8/28 BC MRSA  8/28 WBC up to 2.5K  8/29 WBC up to 10K  8/29 BC GPCC  8/30 BC NGTD  8/31 BC NGTD  8/31 YOLANDA with no vegetations  9/1 BC NGTD  9/1 started on Meropenem    Interval History: Patient reports feeling a little better this morning. Continues to have cramping to her right abdomen. Also reporting some pain with inspiration and cough. No chest pain. Continues to have fever overnight. No leg swelling or leg pain.     Review of Symptoms:  ROS    Medications:  Antibiotics:   Antibiotics (From admission, onward)    Start     Stop Route Frequency Ordered    09/01/20 1700  meropenem 1 g in sodium chloride 0.9 % 100 mL IVPB (ready to mix system)      -- IV Every 8 hours (non-standard times) 09/01/20 1509    08/29/20 0200  vancomycin (VANCOCIN) 1,250 mg in dextrose 5 % 250 mL IVPB      -- IV Every 12 hours (non-standard times) 08/28/20 1252    08/26/20 1628  vancomycin - pharmacy to dose  (vancomycin IVPB)      -- IV pharmacy to manage frequency 08/26/20 1528        Objective:  Physical Exam:  VS (24h):  Temp:  [97.9 °F (36.6 °C)-101.5 °F (38.6 °C)] 99.1 °F (37.3 °C)  Pulse:  [107-132] 132  Resp:  [33-56] 40  SpO2:  [94 %-100 %] 96 %  BP: (112-141)/(66-86) 141/77     Physical Exam  GEN- Appears uncomfortable, tachypneic, alert  HEENT- PERRL, EOMI, MMM  NECK- No thyromegaly or other masses  CARDIAC- Tachycardic, no murmurs  RESP- Tachypnea, on room air  ABD- Soft, mild tenderness to RLQ, ND, +BS; no masses or HSM  EXT- No clubbing, cyanosis, or edema; 2+ DP/PT pulses  NEURO- CN II-XII grossly intact; moves all four extremities equally  SKIN- Warm and dry; no rashes, site of prior port is clean and dry without tenderness.     Lines:  RIJ CVC  PIV  Tran catheter    Labs:  CBC:   Lab Results   Component Value Date    WBC 32.88 (H) 09/02/2020    WBC  29.81 (H) 09/01/2020    WBC 25.77 (H) 08/31/2020    WBC 25.94 (H) 08/30/2020    WBC 25.94 (H) 08/30/2020    HCT 22.7 (L) 09/02/2020     09/02/2020     BMP:   Recent Labs   Lab 09/02/20  0440      *   K 3.5   CL 98   CO2 19*   BUN 20   CREATININE 0.7   CALCIUM 7.6*   MG 1.9     LFT:   Lab Results   Component Value Date    ALT 73 (H) 08/27/2020     (H) 08/27/2020    ALKPHOS 93 08/27/2020    BILITOT 2.5 (H) 08/27/2020     Microbiology x 7d:   Microbiology Results (last 7 days)     Procedure Component Value Units Date/Time    Blood culture [[id number]]  (Abnormal) Collected: 08/29/20 1658    Order Status: Completed Specimen: Blood from Antecubital, Right Updated: 09/02/20 1048     Blood Culture, Routine Gram stain aer bottle: Gram positive cocci in clusters resembling Staph       Results called to and read back by: Jessica Hewitt 09/01/2020  12:07      METHICILLIN RESISTANT STAPHYLOCOCCUS AUREUS  ID consult required at [facility],[place] and [facility] locations.  For susceptibility see order #[id number]      Blood culture [[id number]] Collected: 08/30/20 0121    Order Status: Completed Specimen: Blood from Peripheral, Right Updated: 09/02/20 1022     Blood Culture, Routine No Growth to date      No Growth to date      No Growth to date      No Growth to date    Blood culture [[id number]] Collected: 09/01/20 0655    Order Status: Completed Specimen: Blood from Antecubital, Right Arm Updated: 09/02/20 1013     Blood Culture, Routine No Growth to date      No Growth to date    Narrative:      Collection has been rescheduled by LLR at 09/01/2020 04:28 Reason:   nurse said to draw at at 6AM  Collection has been rescheduled by LLR at 09/01/2020 04:28 Reason:   nurse said to draw at at 6AM    Blood culture [[id number]] Collected: 09/02/20 0721    Order Status: Sent Specimen: Blood from Peripheral, Right Wrist Updated: 09/02/20 0950    Blood culture [[id number]] Collected: 08/31/20 0631    Order Status:  Completed Specimen: Blood from Antecubital, Right Arm Updated: 09/01/20 2012     Blood Culture, Routine No Growth to date      No Growth to date    Culture, Anaerobe [[id number]] Collected: 08/26/20 1602    Order Status: Completed Specimen: Catheter Tip from Chest, Right Updated: 08/31/20 1305     Anaerobic Culture No anaerobes isolated    Blood culture [[id number]]  (Abnormal) Collected: 08/28/20 1137    Order Status: Completed Specimen: Blood from Antecubital, Right Updated: 08/31/20 1033     Blood Culture, Routine Gram stain juan bottle: Gram positive cocci in clusters resembling Staph       Results called to and read back by: Nighat Craft  08/29/2020  15:26      METHICILLIN RESISTANT STAPHYLOCOCCUS AUREUS  ID consult required at [facility].  For susceptibility see order #[id number]      Blood culture [[id number]]  (Abnormal)  (Susceptibility) Collected: 08/28/20 1137    Order Status: Completed Specimen: Blood Updated: 08/31/20 1030     Blood Culture, Routine Gram stain aer bottle: Gram positive cocci in clusters resembling Staph       Results called to and read back by: Nighat Craft  08/29/2020  15:26      Gram stain juan bottle: Gram positive cocci in clusters resembling Staph       Positive results previously called 08/29/2020  17:57      METHICILLIN RESISTANT STAPHYLOCOCCUS AUREUS  ID consult required at [facility].      Culture, Anaerobe [[id number]] Collected: 08/26/20 1448    Order Status: Completed Specimen: Incision site from Chest, Right Updated: 08/31/20 0938     Anaerobic Culture No anaerobes isolated    Narrative:      Port incision site    Clostridium difficile EIA [[id number]] Collected: 08/28/20 1808    Order Status: Completed Specimen: Stool Updated: 08/29/20 2305     C. diff Antigen Negative     C difficile Toxins A+B, EIA Negative     Comment: Testing not recommended for children <24 months old.       IV catheter culture [[id number]]   (Abnormal)  (Susceptibility) Collected: 08/26/20 1604    Order Status: Completed Specimen: Catheter Tip Updated: 08/29/20 1312     Aerobic Culture - Cath tip METHICILLIN RESISTANT STAPHYLOCOCCUS AUREUS  > 15 colonies      Blood culture [[id number]]  (Abnormal)  (Susceptibility) Collected: 08/26/20 1140    Order Status: Completed Specimen: Blood from Peripheral, Hand, Right Updated: 08/29/20 1221     Blood Culture, Routine Gram stain juan bottle: Gram positive cocci in clusters resembling Staph      Results called to and read back by: Trudy Morales RN  08/27/2020        02:08      Gram stain aer bottle: Gram positive cocci in clusters resembling Staph      Positive results previously called 08/27/2020  03:19      METHICILLIN RESISTANT STAPHYLOCOCCUS AUREUS  ID consult required at [facility].[place] and [place] locations.      Aerobic culture [[id number]]  (Abnormal)  (Susceptibility) Collected: 08/26/20 1448    Order Status: Completed Specimen: Incision site from Chest, Right Updated: 08/28/20 1327     Aerobic Bacterial Culture METHICILLIN RESISTANT STAPHYLOCOCCUS AUREUS  Many      Narrative:      Port incision site.    Clostridium difficile EIA [[id number]] Collected: 08/28/20 1309    Order Status: Canceled Specimen: Stool         Imaging:  CXR 8/26/2020  Right chest MediPort catheter with tip overlying the atrium.  Lungs and heart demonstrate no significant interval detrimental change in the short interval.  No large pleural effusion or gross pneumothorax.    CXR 8/26/2020  Since the most recent study, the port catheter has been discontinued.  The tip of a newly placed right internal jugular approach central venous catheter is in the region of the right atrium.  No pneumothorax or pleural effusion.  Cardiomediastinal silhouette is unchanged.  Lungs remain clear.  Additional findings unchanged.    DVT US BL ANGUS 8/27/2020  No evidence of deep venous thrombosis in either lower extremity.    MRI T/L 8/28  1. Within  limitations of lack of intravenous contrast, no findings of infection in the thoracic or lumbar spine are identified.  2. No significant spinal canal or foraminal narrowing.  Normal appearance of the thoracic spinal cord and conus medullaris.  3. Partially imaged are multiple bilateral peripheral predominant pulmonary nodules, some of which may be cavitary.  Given that these lesions appear to be new since the 07/20/2020 CT examination, the findings may represent multifocal infectious process or septic emboli.  Metastatic disease would be additional consideration.  Dedicated CT chest imaging can be considered for further evaluation.  4. Known hepatic metastatic disease partially imaged.    CT A/P 8/28  Postoperative changes of bowel resection are identified.  There are innumerable hypodense lesions throughout the liver compatible with hepatic metastatic disease.  Focal peristomal prior imaging limited given the lack of intravenous contrast material.     Bibasilar consolidation, left greater than right with subtotal consolidation of the left lower lobe.  There are some nodular densities seen bilaterally in both visualized lung bases.  While this could represent metastatic disease, given the patient's history of sepsis, septic emboli not entirely excluded.  These findings are new as compared to the previous study of 07/14/2020.     Liquid stool throughout the colon suggesting diarrhea.    YOLANDA 8/31/2020  - No vegetations seen    Lower Extremity US 9/1  - No evidence of deep venous thrombosis in either lower extremity        Assessment:    Active Hospital Problems    Diagnosis    *Septic shock    Bacteremia due to Staphylococcus    Chest pain    Elevated troponin    Pancytopenia    MRSA bacteremia    KIERSTEN (acute kidney injury)    Infected venous access port    Metabolic acidosis    Hypomagnesemia    Hyponatremia    Nausea & vomiting    Lactic acid acidosis    Sepsis    Anemia in neoplastic disease     Metastatic disease    Secondary malignancy of liver    Adenocarcinoma of colon     Plan:  See top of page.

## 2020-09-02 NOTE — PROCEDURES
"Huy Cisneros is a 29 y.o. female patient.    Temp: 99.7 °F (37.6 °C) (09/02/20 1600)  Pulse: (!) 130 (09/02/20 1615)  Resp: (!) 36 (09/02/20 1615)  BP: 134/75 (09/02/20 1600)  SpO2: 97 % (09/02/20 1615)  Weight: 85 kg (187 lb 6.3 oz) (09/01/20 0600)  Height: 5' 5" (165.1 cm) (08/27/20 1445)    PICC  Date/Time: 9/2/2020 5:15 PM  Performed by: Osmany Boo RN  Consent Done: Yes  Time out: Immediately prior to procedure a time out was called to verify the correct patient, procedure, equipment, support staff and site/side marked as required  Indications: med administration  Anesthesia: local infiltration  Local anesthetic: lidocaine 1% without epinephrine  Anesthetic Total (mL): 1  Preparation: skin prepped with ChloraPrep  Skin prep agent dried: skin prep agent completely dried prior to procedure  Sterile barriers: all five maximum sterile barriers used - cap, mask, sterile gown, sterile gloves, and large sterile sheet  Hand hygiene: hand hygiene performed prior to central venous catheter insertion  Location details: left basilic  Catheter type: double lumen  Catheter size: 5 Fr  Catheter Length: 41cm    Ultrasound guidance: yes  Vessel Caliber: medium and large and patent, compressibility normal  Needle advanced into vessel with real time Ultrasound guidance.  Guidewire confirmed in vessel.  Sterile sheath used.  Number of attempts: 1  Post-procedure: blood return through all ports, chlorhexidine patch and sterile dressing applied  Estimated blood loss (mL): 0            Osmany Boo  9/2/2020  "

## 2020-09-02 NOTE — PLAN OF CARE
Patient remained in bed for the shift. Bed in lowest position, wheels locked and call light within reach. Plan of care reviewed with patient, verbalized understanding. Neutropenic precautions maintained. VS remained WDL. ST on telemetry. 1L NC for comfort. Purewick placed but unable to collect urine due to BM. Pain controlled with tylenol and icepacks. Febrile at timest.  Will report to oncoming shift.

## 2020-09-02 NOTE — PLAN OF CARE
Problem: Physical Therapy Goal  Goal: Physical Therapy Goal  Description: Goals to be met by: 20     Patient will increase functional independence with mobility by performin.  BLE AROM supine x 15  2.  Roll bilaterally with supervision  3.  Supine to/from sit with supervision  4.  Sit at EOB 15 min with supervision  5.  Sit to stand with RW with CG    2020 by Prisca Andrews, PT  Outcome: Ongoing, Progressing  Patient returned to bed using stand pivot with maxA x 2 after sitting OOB x 2 1/2 hrs; will cont with POC.

## 2020-09-02 NOTE — PT/OT/SLP PROGRESS
"Physical Therapy      Patient Name:  Huy Cisneros   MRN:  [id number]    Patient not seen in AM secondary to "Pt not feeling well" per nursing.  Pt currently NPO for Abdominal CT.  Will follow-up in PM as time allows    Jalyn Bush PT    "

## 2020-09-02 NOTE — CHAPLAIN
Patient is anticipating a CT scan and is NPO.  She is also in pain.  I alerted the RN.  I sat at bedside with patient and placed my hand on her lower arm, and sat quietly with her.  I practiced guided imagery with her and talked her through some pleasant and happy images to try to distract her pain. Eventually, I offered that she think about her favorite [employer] Movies.  That image led to song. I gently sand the lyrics of some of my favorite Manuel songs.  I told her that I do not do this for everyone, only my friends.  She nodded.  (My concern is that she is going through all of this without someone with her.  She does have access to her phone to keep connections at times.)  I will continue to follow up with her.

## 2020-09-02 NOTE — CONSULTS
Consult received and acknowledged. Please refer to consult noted by Dr. Jimmie Malin on 8/26/20 and other progress notes.     Thank you for allowing us to participate in the care of this patient. Signed off. Please do not hesitate to contact us with further questions or concerns.     Rashmi Roy MD  [facility] Fellow  Pager #: [phone]

## 2020-09-02 NOTE — PT/OT/SLP PROGRESS
Physical Therapy Treatment    Patient Name:  Huy Cisneros   MRN:  [id number]    Recommendations:     Discharge Recommendations:  (TBD)   Discharge Equipment Recommendations: (TBD)   Barriers to discharge: Decreased caregiver support and decreased functional mobility from baseline  Assessment:     Huy Cisneros is a 29 y.o. female admitted with a medical diagnosis of Septic shock.  She presents with the following impairments/functional limitations:  weakness, impaired endurance, impaired self care skills, impaired functional mobilty, gait instability, impaired balance, decreased coordination, decreased upper extremity function, decreased lower extremity function, decreased safety awareness, impaired skin, impaired cardiopulmonary response to activity Patient returned to bed using stand pivot with maxA x 2 after sitting OOB x 2 1/2 hrs; will cont with POC..    Rehab Prognosis: Good; patient would benefit from acute skilled PT services to address these deficits and reach maximum level of function.    Recent Surgery: Procedure(s) (LRB):  Transesophageal echo (YOLANDA) intra-procedure log documentation (N/A) 1 Day Post-Op    Plan:     During this hospitalization, patient to be seen 5 x/week to address the identified rehab impairments via gait training, therapeutic activities, therapeutic exercises, neuromuscular re-education and progress toward the following goals:    · Plan of Care Expires:  09/23/20    Subjective     Pain/Comfort:  · Pain Rating 1: 0/10  · Pain Rating Post-Intervention 1: 0/10      Objective:     Communicated with nurse prior to session.  Patient found with oxygen, central line, rebolledo catheter upon PT entry to room.     General Precautions: Standard, neutropenic, fall   Orthopedic Precautions:N/A   Braces: N/A     Functional Mobility:  · Bed Mobility:     · Rolling Left:  moderate assistance  · Rolling Right: moderate assistance  · Scooting: total assistance, of 2 persons and toward  HOB  · Sit to Supine: maximal assistance and of 2 persons  · Transfers:     · Sit to Stand:  maximal assistance and of 2 persons with hand-held assist  · Chair to Bed: maximal assistance and of 2 persons with  hand-held assist  using  Stand Pivot      AM-PAC 6 CLICK MOBILITY  Turning over in bed (including adjusting bedclothes, sheets and blankets)?: 2  Sitting down on and standing up from a chair with arms (e.g., wheelchair, bedside commode, etc.): 2  Moving from lying on back to sitting on the side of the bed?: 2  Moving to and from a bed to a chair (including a wheelchair)?: 2  Need to walk in hospital room?: 1  Climbing 3-5 steps with a railing?: 1  Basic Mobility Total Score: 10       Therapeutic Activities and Exercises:   Patient scooted to edge of chair with modA; sit to stand with maxA x 1 but unable to maintain standing and had to sit back down; called additional assist and transferred pt with maxAx 2 from chair to bed; returned pt to supine and rolled L/  for cleansing with pt assisting by use of side rail and repositioned to HOB; pt cont with quick, shallow breaths; instructed in pursed lip breathing and coordination with movement during session.    Patient left HOB elevated with all lines intact, call button in reach and nurse present..    GOALS:   Multidisciplinary Problems     Physical Therapy Goals        Problem: Physical Therapy Goal    Goal Priority Disciplines Outcome Goal Variances Interventions   Physical Therapy Goal     PT, PT/OT Ongoing, Progressing     Description: Goals to be met by: 20     Patient will increase functional independence with mobility by performin.  BLE AROM supine x 15  2.  Roll bilaterally with supervision  3.  Supine to/from sit with supervision  4.  Sit at EOB 15 min with supervision  5.  Sit to stand with RW with CG                     Time Tracking:     PT Received On: 20  PT Start Time: 1437     PT Stop Time: 1505  PT Total Time (min): 28 min      Billable Minutes: Therapeutic Activity 28 minutes    Treatment Type: Treatment  PT/PTA: PT     PTA Visit Number: 0     Prisca Andrews, PT  09/01/2020

## 2020-09-02 NOTE — ASSESSMENT & PLAN NOTE
Infected venous access port  Metabolic Acidosis  Bacteremia due to Staphylococcus    The source is likely from dehiscence of Mediport-----we started her on broad spectrum abx. ID has been consulted. Following wound cultures as well as blood cultures---BCX growing GP cocci resembling staph. MRSA noted from the port. Blood cultures were redrawn on 8/28--NGTD. General Sx was consulted to remove her tunneled port. Lactate was initially elevated--now down. IVF given in the ED. Nephrology was consulted. Bicarb gtt initiated by Nephrology. CRRT was started and now stopped. MRI of the T/L spine suggestive of cavitary lung lesions. CT of the C/A/P today to r/o septic emboli. We appreciate ID. ID is recommending YOLANDA, showing no vegetation. The patient with diarrhea--she was placed on IV flagyl and PO vancomycin (now stopped) for C-diff that is noted at this time negative. She is now currently on Vancomycin and meropenem. Remains with tachypnea and tachycardia. Tran catheter removed and diet advanced. CT of chest and abdomen ordered. PICC line placed for IV access.

## 2020-09-02 NOTE — CARE UPDATE
This note also relates to the following rows which could not be included:  SpO2 - Cannot attach notes to unvalidated device data  Pulse - Cannot attach notes to unvalidated device data  Resp - Cannot attach notes to unvalidated device data    Pt. Has O2 on forehead in no apparent distress.

## 2020-09-03 PROBLEM — Z51.5 PALLIATIVE CARE ENCOUNTER: Status: ACTIVE | Noted: 2020-01-01

## 2020-09-03 PROBLEM — Z71.89 GOALS OF CARE, COUNSELING/DISCUSSION: Status: ACTIVE | Noted: 2020-01-01

## 2020-09-03 PROBLEM — Z71.89 COUNSELING REGARDING ADVANCE CARE PLANNING AND GOALS OF CARE: Status: ACTIVE | Noted: 2020-01-01

## 2020-09-03 NOTE — PROGRESS NOTES
[facility] ICU 5th Floor  Hospital Medicine  Progress Note    Patient Name: Huy Cisneros  MRN: [id number]  Patient Class: IP- Inpatient   Admission Date: 8/26/2020  Length of Stay: 8 days  Attending Physician: Lex Yepez DO  Primary Care Provider: Primary Doctor Gisselle        Subjective:     Principal Problem:Septic shock        HPI:  Ms. Huy Cisneros is a 28 y/o female who lives in [place], [place] at her residence with her children. The patients PCP is Imtiaz Lindsay PA-C. She is also followed by Dr. Jewel Arzola with Oncology. She has a pmh of colon cancer with liver mets, uterine cysts, and HTN. She has a past surgical history of a mediport placement and a colonoscopy. The patient does not smoke cigarettes, drink alcohol, or use illicit drugs.     She presents to the ED with complaints of fever, vomiting and feeling dizzy since yesterday.  Per the patient she states her port a cath to right chest wall busted a stitch last night she while was bathing. She also has complaints of feeling SOB with lower extremity swelling.     Her ED workup includes WBC--0.95, hemoglobin 9.9, Na--132, creatinine 2.00, magnesium--1.4, lactate--10.7, CPK--180, pH--7.2. Pending blood cultures and wound cultures. She is COVID-19 negative. CXR---There has been interval placement of a right subclavian venous line. Its tip is in the region of the junction of the superior vena cava with the right atrium.  There is no pneumothorax. There is no focal pulmonary infiltrate visualized. XR of the abd---There is a paucity of gas within the gastrointestinal system. There are multiple radiopaque leads projected over the abdomen and pelvis. She will be admitted to the [facility] department for further care.         Overview/Hospital Course:  She was admitted to the [facility] hospital medicine service for further care. We have consulted ID, nephrology, oncology, pulmonology, and general surgery. Her  tunneled line is the suspected source for her bacteremia/septic shock. Blood cultures positive for staph. TTE pending. General sx removed tunneled port at the bedside. Will cont to follow cultures. She was initially given Levophed and Amiodarone. Lactate has improved---she was given IVF and now Nephrology has started Bicarb gtt. Dr. Naqvi was consulted for trialysis line placement---She was started on CRRT, now stopped at this time. Will cont broad spectrum abx. G-CSF started by oncology for neutropenia. MRI of the thoracic and lumbar spine was completed on 8/28 to r/o spinal abscess---Her MRI did not find a spinal fluid collection, however the MRI was suggestive of possible cavitary lung lesions. She is to have a CT chest, abdomen, and pelvis today for assessment of possible cavities in her lungs and mets in her liver. MRSA grew out at the port site. She was weaned off of levophed and now started on BB because of 's. Pulmonology has ordered CT of the C/A/P with contrast to evaluate the patient  for septic pulmonary emboli. WBCs have increased and are now 26.  she has Staph  in her BCs and the one from yesterday is NGTD.  On her CT chest and abdomen she has small bilateral pleural effusions and her liver mets appear smaller. YOLANDA with no vegetation noted. Cont daily blood cultures. Patient is on vancomycin and meropenem per ID recs. The patient remains with tachypnea and tachycardia. Up out of bed with PT. CT of chest and abdomen ordered, showing increased chest infiltrates and ascites, probable septic emboli. PICC line placed for inability to obtain peripheral IV. CTA ordered to r/o PE. Patient remains tachypnic and tachycardic with A plfevers. Lasix was given. An abdominal US was ordered to assess ascites levels. The patient received a unit of PRBC. A palliative care consult was placed for patient and family counseling.     Interval history: The patient reports worsening SOB overnight with fevers. Unable  to speak due to SOB, abdominal pain has decreased.     Review of Systems   Constitutional: Positive for activity change, appetite change, fatigue and fever. Negative for chills.   Respiratory: Positive for cough and shortness of breath.    Cardiovascular: Positive for leg swelling. Negative for chest pain.   Gastrointestinal: Positive for diarrhea. Negative for abdominal distention, abdominal pain, nausea and vomiting.   Genitourinary: Negative for difficulty urinating.   Skin: Positive for wound (right chest wall).   Allergic/Immunologic: Positive for immunocompromised state.   Neurological: Positive for weakness.     Objective:     Vital Signs (Most Recent):  Temp: 99 °F (37.2 °C) (09/03/20 1530)  Pulse: (!) 116 (09/03/20 1530)  Resp: (!) 48 (09/03/20 1530)  BP: 125/86 (09/03/20 1530)  SpO2: 98 % (09/03/20 1530) Vital Signs (24h Range):  Temp:  [99 °F (37.2 °C)-101.4 °F (38.6 °C)] 99 °F (37.2 °C)  Pulse:  [102-132] 116  Resp:  [21-53] 48  SpO2:  [95 %-100 %] 98 %  BP: (118-160)/(70-95) 125/86     Weight: 86 kg (189 lb 9.5 oz)  Body mass index is 31.55 kg/m².    Physical Exam  Vitals signs and nursing note reviewed.   Constitutional:       Appearance: She is ill-appearing.   HENT:      Head:      Comments: trialysis line in place at [facility]   Neck:      Musculoskeletal: Normal range of motion.   Cardiovascular:      Rate and Rhythm: Regular rhythm. Tachycardia present.      Pulses: Normal pulses.      Heart sounds: Normal heart sounds.   Pulmonary:      Effort: Tachypnea present.      Breath sounds: Decreased breath sounds present.      Comments: Patient unable to speak due to SOB  Abdominal:      General: Bowel sounds are normal. There is no distension.      Palpations: Abdomen is soft.      Tenderness: There is abdominal tenderness.   Musculoskeletal: Normal range of motion.         General: Swelling present.      Right lower leg: Edema present.      Left lower leg: Edema present.   Skin:     General: Skin is warm  and dry.      Capillary Refill: Capillary refill takes less than 2 seconds.      Comments: Dressing to the Right CW, clean dry and intact   Neurological:      Mental Status: She is alert and oriented to person, place, and time.      Motor: Weakness present.   Psychiatric:         Mood and Affect: Mood is depressed. Affect is tearful.         Behavior: Behavior normal.             Significant Labs:   BMP:   Recent Labs   Lab 09/02/20 0440 09/03/20  0336    102   * 132*   K 3.5 3.6   CL 98 100   CO2 19* 20*   BUN 20 16   CREATININE 0.7 0.6   CALCIUM 7.6* 7.3*   MG 1.9  --      CBC:   Recent Labs   Lab 09/02/20 0440 09/03/20  0336   WBC 32.88* 27.95*   HGB 7.3* 6.4*   HCT 22.7* 20.4*    116*     Lactic Acid:   No results for input(s): LACTATE in the last 48 hours.  Magnesium:   Recent Labs   Lab 09/02/20  0440   MG 1.9     POCT Glucose:   No results for input(s): POCTGLUCOSE in the last 48 hours.      Significant Imaging: I have reviewed all pertinent imaging results/findings within the past 24 hours.      Assessment/Plan:      * Septic shock  Infected venous access port  Metabolic Acidosis  Bacteremia due to Staphylococcus    The source is likely from dehiscence of Mediport-----we started her on broad spectrum abx. ID has been consulted. Following wound cultures as well as blood cultures---BCX growing GP cocci resembling staph. MRSA noted from the port. Blood cultures were redrawn on 8/28--NGTD. General Sx was consulted to remove her tunneled port. Lactate was initially elevated--now down. IVF given in the ED. Nephrology was consulted. Bicarb gtt initiated by Nephrology. CRRT was started and now stopped. MRI of the T/L spine suggestive of cavitary lung lesions. CT of the C/A/P today to r/o septic emboli. We appreciate ID. ID is recommending YOLANDA, showing no vegetation. The patient with diarrhea--she was placed on IV flagyl and PO vancomycin (now stopped) for C-diff that is noted at this time  negative.     She is now currently on Vancomycin and meropenem. Remains with tachypnea and tachycardia. Tran catheter removed and diet advanced. CT of chest and abdomen showing increased chest infiltrates, septic emboli, and ascites. PICC line placed for IV access. 1 unit PRBC given for hgb 6.4. Lasix was given for pleural effusion. Abdominal US ordered for ascites level. CTA ordered for worsening SOB. A palliative care consult was placed.     Bacteremia due to Staphylococcus        Elevated troponin  Could be related to demand. YOLANDA with no vegetation. Will follow.       Chest pain  Elevated troponin    No chest pain at this time. Troponin elevated---cardiology consulted. No changes on EKG. The troponin elevation could be related to demand--- echo with normal LVEF.    Nausea & vomiting  Prn Zofran ordered.       Hyponatremia  Monitor.       Hypomagnesemia  Monitor and replete as needed      Infected venous access port    Port removed.     KIERSTEN (acute kidney injury)  We appreciate nephrology for management. IV fluids given in the ED. Tran catheter placed. Nephrology has ordered a renal ultrasound. Was initially started on CRRT---on 8/28 CRRT stopped. Monitor intake and output. Creatinine stable. Tran catheter removed. KIERSTEN Resolved.     Metastatic disease  Secondary malignancy of liver  Adenocarcinoma of colon  Neutropenia  Anemia in neoplastic disease    Oncology managing            VTE Risk Mitigation (From admission, onward)         Ordered     enoxaparin injection 90 mg  Every 12 hours      09/03/20 1549     heparin (porcine) injection 2,300 Units  As needed (PRN)      08/27/20 1649     IP VTE HIGH RISK PATIENT  Once      08/26/20 1458     Place sequential compression device  Until discontinued      08/26/20 1458                Discharge Planning   ZOE:      Code Status: Full Code   Is the patient medically ready for discharge?:     Reason for patient still in hospital (select all that apply): Patient unstable,  Laboratory test, Treatment and Imaging  Discharge Plan A: Home Health   Discharge Delays: (!) Change in Medical Condition        Critical care time spent on the evaluation and treatment of severe organ dysfunction, review of pertinent labs and imaging studies, discussions with consulting providers and discussions with patient/family: 45 minutes.      Jeri Montalvo NP  Department of Hospital Medicine   [facility] ICU 5th Floor

## 2020-09-03 NOTE — CONSULTS
"[facility] ICU 5th Floor  Palliative Medicine  Consult Note    Patient Name: Huy Cisneros  MRN: [id number]  Admission Date: 8/26/2020  Hospital Length of Stay: 8 days  Code Status: Full Code   Attending Provider: Lex Yepez DO  Consulting Provider: Celine Conde NP  Primary Care Physician: Primary Doctor No  Principal Problem:Septic shock    Patient information was obtained from patient, past medical records and ER records.      Inpatient consult to Palliative Care  Consult performed by: Celine Conde NP  Consult ordered by: Lex Yepez DO        Assessment/Plan:     No new Assessment & Plan notes have been filed under this hospital service since the last note was generated.  Service: Palliative Medicine      Thank you for your consult. I will follow-up with patient. Please contact us if you have any additional questions.    Subjective:     HPI:   Ms. Huy Cisneros is a 30 y/o female who lives in [place], [place] at her residence with her children. The patients PCP is Imtiaz Lindsay PA-C. She is also followed by Dr. Jewel Arzola with Oncology. She has a pmh of colon cancer with liver mets, uterine cysts, and HTN. She has a past surgical history of a mediport placement and a colonoscopy. The patient does not smoke cigarettes, drink alcohol, or use illicit drugs.     Palliative medicine met with patient this am for emotional support and disease education. Patient very anxious and having some SOB. Therapy present for treatment. Patient encouraged to work with therapy for strengthening.  Patient updated on current illness. "I just want to go home to my babies." Patient mother works and has not been able to visit. Palliative medicine will continue to follow during this admission. Thank you for the consult.       Hospital Course:  No notes on file        Past Medical History:   Diagnosis Date    Cancer     Colon CA with mets to the liver    Uterine cyst        Past " Surgical History:   Procedure Laterality Date    COLONOSCOPY N/A 7/15/2020    Procedure: COLONOSCOPY;  Surgeon: Hi Drake MD;  Location: [facility] ENDO;  Service: Endoscopy;  Laterality: N/A;    COLONOSCOPY W/ BIOPSIES      INSERTION OF TUNNELED CENTRAL VENOUS CATHETER (CVC) WITH SUBCUTANEOUS PORT N/A 7/17/2020    Procedure: INSERTION, PORT-A-CATH;  Surgeon: Armani Naqvi MD;  Location: [facility] OR;  Service: General;  Laterality: N/A;       Review of patient's allergies indicates:   Allergen Reactions    Sulfa (sulfonamide antibiotics)        Medications:  Continuous Infusions:  Scheduled Meds:   enoxaparin  40 mg Subcutaneous Q24H    meropenem (MERREM) IVPB  1 g Intravenous Q8H    metoprolol tartrate  25 mg Oral TID    sodium chloride 0.9%  10 mL Intravenous Q6H    vancomycin (VANCOCIN) IVPB  1,250 mg Intravenous Q12H     PRN Meds:sodium chloride, acetaminophen, benzonatate, diphenhydrAMINE-zinc acetate 2-0.1%, heparin (porcine), influenza, melatonin, ondansetron, ondansetron, ondansetron, ondansetron, pneumoc 13-davion conj-dip cr(PF), promethazine (PHENERGAN) IVPB, simethicone, sodium chloride 0.9%, Flushing PICC Protocol **AND** sodium chloride 0.9% **AND** sodium chloride 0.9%, Pharmacy to dose Vancomycin consult **AND** vancomycin - pharmacy to dose    Family History     Problem Relation (Age of Onset)    Hypertension Mother, Maternal Grandmother, Maternal Grandfather, Paternal Grandmother        Tobacco Use    Smoking status: Never Smoker    Smokeless tobacco: Never Used   Substance and Sexual Activity    Alcohol use: No    Drug use: No    Sexual activity: Yes     Partners: Male     Birth control/protection: None       Review of Systems   Constitutional: Positive for activity change, appetite change and fatigue. Negative for chills and fever.   Respiratory: Positive for shortness of breath. Negative for cough.    Cardiovascular: Positive for leg swelling. Negative for chest pain.    Gastrointestinal: Positive for abdominal pain and diarrhea. Negative for abdominal distention, nausea and vomiting.   Skin: Positive for wound (right chest wall).   Allergic/Immunologic: Positive for immunocompromised state.   Neurological: Positive for weakness.     Objective:     Vital Signs (Most Recent):  Temp: (!) 101.4 °F (38.6 °C) (09/03/20 1236)  Pulse: (!) 115 (09/03/20 1400)  Resp: (!) 47 (09/03/20 1400)  BP: 125/86 (09/03/20 1400)  SpO2: 98 % (09/03/20 1400) Vital Signs (24h Range):  Temp:  [99 °F (37.2 °C)-101.4 °F (38.6 °C)] 101.4 °F (38.6 °C)  Pulse:  [102-132] 115  Resp:  [22-53] 47  SpO2:  [95 %-100 %] 98 %  BP: (118-160)/(70-95) 125/86     Weight: 86 kg (189 lb 9.5 oz)  Body mass index is 31.55 kg/m².    Physical Exam  Vitals signs and nursing note reviewed.   Constitutional:       Appearance: She is ill-appearing.   HENT:      Head:      Comments: trialysis line in place at [facility]   Neck:      Musculoskeletal: Normal range of motion.   Cardiovascular:      Rate and Rhythm: Regular rhythm. Tachycardia present.      Pulses: Normal pulses.      Heart sounds: Normal heart sounds.   Pulmonary:      Effort: Tachypnea present.      Breath sounds: Decreased breath sounds present.   Abdominal:      General: Bowel sounds are normal. There is no distension.      Palpations: Abdomen is soft.      Tenderness: There is abdominal tenderness.   Musculoskeletal: Normal range of motion.         General: Swelling present.      Right lower leg: Edema present.      Left lower leg: Edema present.   Skin:     General: Skin is warm and dry.      Capillary Refill: Capillary refill takes less than 2 seconds.      Comments: Dressing to the Right CW, clean dry and intact   Neurological:      Mental Status: She is alert and oriented to person, place, and time.      Motor: Weakness present.   Psychiatric:         Mood and Affect: Mood normal.         Behavior: Behavior normal.         Advance Care Planning   Palliative Exam        Significant Labs: None  CBC:   Recent Labs   Lab 09/03/20  0336   WBC 27.95*   HGB 6.4*   HCT 20.4*   MCV 75*   *     BMP:  Recent Labs   Lab 09/03/20  0336      *   K 3.6      CO2 20*   BUN 16   CREATININE 0.6   CALCIUM 7.3*     LFT:  Lab Results   Component Value Date     (H) 08/27/2020    ALKPHOS 93 08/27/2020    BILITOT 2.5 (H) 08/27/2020     Albumin:   Albumin   Date Value Ref Range Status   09/03/2020 1.6 (L) 3.5 - 5.2 g/dL Final     Protein:   Total Protein   Date Value Ref Range Status   08/27/2020 6.0 6.0 - 8.4 g/dL Final     Lactic acid:   Lab Results   Component Value Date    LACTATE 4.6 (HH) 09/01/2020    LACTATE 4.7 (HH) 08/31/2020       Significant Imaging: I have reviewed all pertinent imaging results/findings within the past 24 hours.     Recommendations:  Continue medical treatment  Code status: Full code    Thank you for the consult and the opportunity to participate in care.       Celine Conde, MSN, APRN, NP-C   Palliative Medicine   [facility]  [phone] or [phone]        >50% of 60  min visit spent in chart review, face to face discussion of goals of care with patient, family, symptom assessment, coordination of care and emotional support.

## 2020-09-03 NOTE — PT/OT/SLP PROGRESS
Occupational Therapy   Treatment    Name: Huy Cisneros  MRN: [id number]  Admitting Diagnosis:  Septic shock  3 Days Post-Op    Recommendations:     Discharge Recommendations: other (see comments)(TBD)  Discharge Equipment Recommendations:  other (see comments)(TBD)  Barriers to discharge:  None    Assessment:     Huy Cisneros is a 29 y.o. female with a medical diagnosis of Septic shock.  Performance deficits affecting function are weakness, impaired endurance, impaired self care skills, impaired functional mobilty, gait instability, impaired balance, decreased upper extremity function, decreased lower extremity function, pain, impaired skin, impaired cardiopulmonary response to activity. Pt found seated EOB with PT, agreeable to transfer to chair.  Pt required Min A for stand pivot to chair.  She was more conversive with therapy and expresses desire to go home and see her children.  No complaints of pain. Continue OT services to address functional goals, progressing as able.      Rehab Prognosis:  Good; patient would benefit from acute skilled OT services to address these deficits and reach maximum level of function.       Plan:     Patient to be seen 5 x/week to address the above listed problems via self-care/home management, therapeutic activities, therapeutic exercises  · Plan of Care Expires: 09/27/20  · Plan of Care Reviewed with: patient    Subjective     Pain/Comfort:  · Pain Rating 1: 0/10  · Pain Rating Post-Intervention 1: 0/10    Objective:     Communicated with: RN prior to session.  Patient found seated EOB with PT with oxygen, central line, PureWick(multiple ICU lines and monitors) upon OT entry to room.    General Precautions: Standard, fall, neutropenic   Orthopedic Precautions:N/A   Braces: N/A     Occupational Performance:     Functional Mobility/Transfers:  · Patient completed Sit <> Stand Transfer with minimum assistance  with  no assistive device   · Patient completed Bed <> Chair  Transfer using Stand Pivot technique with minimum assistance with no assistive device      Activities of Daily Living:  · Grooming: modified independence chair level      [facility] 6 Click ADL: 13    Treatment & Education:  Pt sat EOB with CGA/SBA.  Scoot seated to EOB with Max A.    Patient left up in chair with all lines intact, call button in reach and RN notifiedEducation:      GOALS:   Multidisciplinary Problems     Occupational Therapy Goals        Problem: Occupational Therapy Goal    Goal Priority Disciplines Outcome Interventions   Occupational Therapy Goal     OT, PT/OT Ongoing, Progressing    Description: Goals to be met by: 9/27    Patient will increase functional independence with ADLs by performing:    Feeding with Modified [place].  UE Dressing with Stand-by Assistance.  Grooming while seated at sink with Modified [place].  Toileting from bedside commode with Minimal Assistance for hygiene and clothing management.   Toilet transfer to bedside commode with Minimal Assistance.  Increased functional strength to 3+/5 for BUE.                     Time Tracking:     OT Date of Treatment: 09/03/20  OT Start Time: 1148  OT Stop Time: 1218  OT Total Time (min): 30 min    Billable Minutes:Self Care/Home Management 15   *cotx with PT    CARLTON Puga  9/3/2020

## 2020-09-03 NOTE — PROGRESS NOTES
"[facility] ICU 5th Floor  Adult Nutrition  Progress Note    SUMMARY       Recommendations    Recommendation:   1. Continue cardiac diet as appropriate   2. Encourage pt to increase intake of meals   3. If intake does not improve, please order Boost Plus BID    Interventions:  Collaboration with other providers    Goals: consume >/=50 of meals by f/u  Nutrition Goal Status: new  Communication of RD Recs: (POC)    Reason for Assessment    Reason For Assessment: length of stay  Diagnosis: (Septic shock)  Relevant Medical History: colon cancer with liver mets, uterine cysts, and HTN    General Information Comments: RD working remotely, NFPE unable to be performed. Pt identified as LOS. Meal intake this AM recorded at 25%. No significant weight loss noted at this time, pt is obese class 1. Pt was experiencing n/v and abdominla pain; nausea nad vomiting has subsided. She continues to have diarrhea. Pt with colon CA.    Nutrition Discharge Planning: Regular Diet to meet nutritional needs    Nutrition Risk Screen    Nutrition Risk Screen: no indicators present    Nutrition/Diet History    Spiritual, Cultural Beliefs, Taoism Practices, Values that Affect Care: no  Food Allergies: NKFA  Factors Affecting Nutritional Intake: diarrhea, decreased appetite, abdominal pain    Anthropometrics    Temp: (!) 101.4 °F (38.6 °C)  Height Method: Stated  Height: 5' 5" (165.1 cm)  Height (inches): 65 in  Weight Method: Bed Scale  Weight: 86 kg (189 lb 9.5 oz)  Weight (lb): 189.6 lb  Ideal Body Weight (IBW), Female: 125 lb  % Ideal Body Weight, Female (lb): 143.2 %  BMI (Calculated): 31.6  BMI Grade: 30 - 34.9- obesity - grade I     Lab/Procedures/Meds    Pertinent Labs Reviewed: reviewed  Pertinent Labs Comments: Na 132, phosphorus 1.9, albumin 1.6  Pertinent Medications Reviewed: reviewed  Pertinent Medications Comments: lopressor, NaCl, sodium phosphate, abx    Estimated/Assessed Needs    Weight Used For Calorie " Calculations: 86 kg (189 lb 9.5 oz)  Energy Calorie Requirements (kcal): 1975  Energy Need Method: [place]-St Jeor(*1.25)  Protein Requirements:  g(1-1.2 g/kg for CA)  Weight Used For Protein Calculations: 86 kg (189 lb 9.5 oz)  Fluid Requirements (mL): 1 mL/kcal or per MD  Estimated Fluid Requirement Method: RDA Method  RDA Method (mL): 1975     Nutrition Prescription Ordered    Current Diet Order: Cardiac diet    Evaluation of Received Nutrient/Fluid Intake    I/O: adequate  Fluid Required: (per MD)  Comments: LBM 9/3    % Intake of Estimated Energy Needs: 25 - 50 %  % Meal Intake: 25 - 50 %    Nutrition Risk    Level of Risk/Frequency of Follow-up: low - moderate     Assessment and Plan  Nutrition Problem:  Inadequate energy intake    Related to (etiology):   CA    Signs and Symptoms (as evidenced by):   25% intake of meal, n/v/d, abdominal pain, change in appetite    Interventions:  Collaboration with other providers    Nutrition Diagnosis Status:   New    Monitor and Evaluation    Food and Nutrient Intake: energy intake, food and beverage intake  Food and Nutrient Adminstration: diet order  Anthropometric Measurements: weight, weight change, body mass index  Biochemical Data, Medical Tests and Procedures: electrolyte and renal panel, gastrointestinal profile, glucose/endocrine profile, inflammatory profile  Nutrition-Focused Physical Findings: overall appearance     Malnutrition Assessment  NFPE not completed per RD working remotely    Nutrition Follow-Up    RD Follow-up?: Yes

## 2020-09-03 NOTE — NURSING
Pt's mother was updated with poc and requested to receive and update from the doctor. Dr. Yepez paged to give an update to the family.

## 2020-09-03 NOTE — PLAN OF CARE
9/1 BC NGTD  9/1 started on Meropenem  9/3 CT C/A/P concerning for septic emboli      Interval History: Patient reports feeling weak. States that her breathing seems worse today. Improved abdominal pain. No nausea or vomiting. Patient continues to have diarrhea. Patient continues to have fevers.      The pt remains in ICU,possibly about to step down. The pt's d/c disposition in home with hh and her mother contacted [facility] to get the pt a PCA. The Sw will continue to follow the pt throughout her transitions of care and will assist with any d/c needs.        09/03/20 1224   Discharge Reassessment   Provided patient/caregiver education on the expected discharge date and the discharge plan Yes   Do you have any problems affording any of your prescribed medications? No   Discharge Plan A Home Health   Discharge Plan B Home with family   DME Needed Upon Discharge  other (see comments)  (TBD)   Anticipated Discharge Disposition Home-Health   Can the patient/caregiver answer the patient profile reliably? Yes, cognitively intact   How does the patient rate their overall health at the present time? Fair   Describe the patient's ability to walk at the present time. Minor restrictions or changes   How often would a person be available to care for the patient? Whenever needed   Number of comorbid conditions (as recorded on the chart) Two   During the past month, has the patient often been bothered by feeling down, depressed or hopeless? Yes   During the past month, has the patient often been bothered by little interest or pleasure in doing things? Yes   Post-Acute Status   Post-Acute Authorization Home Health   Home Health Status Referrals Sent

## 2020-09-03 NOTE — PROGRESS NOTES
Infectious Disease Follow up Note    Impression/Plan:    MRSA Bacteremia  29 F with PMH of metastatic adenocarcinoma of the colon with liver mets, uterine cysts, and HTN on whom ID is consulted for likely staph aureus bacteremia. She presented neutropenic.   WBC up to 2.5 on 8/28. Port removed on 8/26 - positive for staph aureus > 15 colonies. TTE was negative 8/27 for vegetations. YOLANDA negative for vegetations 8/31. Patient continues to be febrile.      -continue Vanc for MRSA bacteremia  -continue Meropenem for now (day 3)  -needs repeat blood cxs until negative - if she has 3 sets in a row negative then stop daily BC unless febrile; would check BC for temp spike   -elevated WBC may still be effect of tbo-filgrastim however high fever is concerning for ongoing infectious source  - Remove RIJ CVC if it isn't needed    Thanks! Please call for any questions [phone]  Imtiaz Albert  [employer] ID    Hospital Day 8  Principal Problem: Septic shock     HPI: 29 F with PMH of metastatic adenocarcinoma of the colon with liver mets, uterine cysts, and HTN who presented to [facility] ED for intractable nausea/vomitting that has been on going for about 1 day.  Pt describes vomiting as having some food contents but mostly saliva, she reports she has not been able to tolerated PO since symptoms began.  Associated symptoms include fevers, chills, and general malaise which also started when nausea/vomiting started. Pt denies abdominal pain, endorses 1 episode of diarrhea. Denies any chest pain, SOB, changes in urinary habits.  Pt reports she has never had symptoms like this in the past. She reports her cancer is s/p resection in July 2020 and currently undergoing chemotherapy last given 2 weeks via port in the right chest.  In ED noticed wound dehiscence at right chest port site. She was admitted to the ICU. Was briefly on norepi but off it now. Blood cxs with GPCs and staph aureus from the port site. Port is removed.      8/26 BC  MRSA  8/26 R chest wound culture MRSA   8/26 cath tip Staph aureus >14 colonies  8/28 BC MRSA  8/28 WBC up to 2.5K  8/29 WBC up to 10K  8/29 BC MRSA  8/30 BC NGTD  8/31 BC GPCC   8/31 YOLANDA with no vegetations  9/1 BC NGTD  9/1 started on Meropenem  9/3 CT C/A/P concerning for septic emboli     Interval History: Patient reports feeling weak. States that her breathing seems worse today. Improved abdominal pain. No nausea or vomiting. Patient continues to have diarrhea. Patient continues to have fevers.     Review of Symptoms:  ROS Otherwise negative    Medications:  Antibiotics:   Antibiotics (From admission, onward)    Start     Stop Route Frequency Ordered    09/01/20 1700  meropenem 1 g in sodium chloride 0.9 % 100 mL IVPB (ready to mix system)      -- IV Every 8 hours (non-standard times) 09/01/20 1509    08/29/20 0200  vancomycin (VANCOCIN) 1,250 mg in dextrose 5 % 250 mL IVPB      -- IV Every 12 hours (non-standard times) 08/28/20 1252    08/26/20 1628  vancomycin - pharmacy to dose  (vancomycin IVPB)      -- IV pharmacy to manage frequency 08/26/20 1528        Objective:  Physical Exam:  VS (24h):  Temp:  [99 °F (37.2 °C)-101.4 °F (38.6 °C)] 99 °F (37.2 °C)  Pulse:  [102-132] 111  Resp:  [28-51] 41  SpO2:  [94 %-100 %] 100 %  BP: (118-141)/(70-86) 131/85     Physical Exam   GEN- Appears uncomfortable, tachypneic, on nasal cannula  HEENT- PERRL, EOMI, MMM  NECK- No thyromegaly or other masses  CARDIAC- Tachycardic, no murmurs  RESP- Tachypnea, on room air, crackles noted bilaterally  ABD- Soft, mild tenderness to RLQ, ND, +BS; no masses or HSM  EXT- No clubbing, cyanosis, or edema; 2+ DP/PT pulses  NEURO- CN II-XII grossly intact; moves all four extremities equally  SKIN- Warm and dry; no rashes, site of prior port is clean and dry without tenderness.     Lines:  RIJ CVC  PICC L UE  PIV  Tran catheter    Labs:  CBC:   Lab Results   Component Value Date    WBC 27.95 (H) 09/03/2020    WBC 32.88 (H) 09/02/2020     WBC 29.81 (H) 09/01/2020    WBC 25.77 (H) 08/31/2020    WBC 25.94 (H) 08/30/2020    WBC 25.94 (H) 08/30/2020    HCT 20.4 (L) 09/03/2020     (L) 09/03/2020     BMP:   Recent Labs   Lab 09/03/20  0336      *   K 3.6      CO2 20*   BUN 16   CREATININE 0.6   CALCIUM 7.3*     LFT:   Lab Results   Component Value Date    ALT 73 (H) 08/27/2020     (H) 08/27/2020    ALKPHOS 93 08/27/2020    BILITOT 2.5 (H) 08/27/2020     Microbiology x 7d:   Microbiology Results (last 7 days)     Procedure Component Value Units Date/Time    Blood culture [[id number]]  (Abnormal) Collected: 08/29/20 1658    Order Status: Completed Specimen: Blood from Antecubital, Right Updated: 09/03/20 0753     Blood Culture, Routine Gram stain aer bottle: Gram positive cocci in clusters resembling Staph       Results called to and read back by: Jessica Hewitt 09/01/2020  12:07      METHICILLIN RESISTANT STAPHYLOCOCCUS AUREUS  ID consult required at [facility].  For susceptibility see order #[id number]      Blood culture [[id number]] Collected: 09/03/20 0416    Order Status: Sent Specimen: Blood from Antecubital, Right Arm Updated: 09/03/20 0416    Blood culture [[id number]] Collected: 08/31/20 0631    Order Status: Completed Specimen: Blood from Antecubital, Right Arm Updated: 09/03/20 0316     Blood Culture, Routine Gram stain aer bottle: Gram positive cocci in clusters resembling Staph      Results called to and read back by: Argentina Vicente RN. 09/03/2020        03:16    Blood culture [[id number]] Collected: 09/02/20 0721    Order Status: Completed Specimen: Blood from Peripheral, Right Wrist Updated: 09/02/20 1715     Blood Culture, Routine No Growth to date    Blood culture [[id number]] Collected: 08/30/20 0121    Order Status: Completed Specimen: Blood from Peripheral, Right Updated: 09/02/20 1022     Blood Culture, Routine No Growth to date      No Growth to date      No Growth to date       No Growth to date    Blood culture [[id number]] Collected: 09/01/20 0655    Order Status: Completed Specimen: Blood from Antecubital, Right Arm Updated: 09/02/20 1013     Blood Culture, Routine No Growth to date      No Growth to date    Narrative:      Collection has been rescheduled by LLR at 09/01/2020 04:28 Reason:   nurse said to draw at at 6AM  Collection has been rescheduled by LLR at 09/01/2020 04:28 Reason:   nurse said to draw at at 6AM    Culture, Anaerobe [[id number]] Collected: 08/26/20 1602    Order Status: Completed Specimen: Catheter Tip from Chest, Right Updated: 08/31/20 1305     Anaerobic Culture No anaerobes isolated    Blood culture [[id number]]  (Abnormal) Collected: 08/28/20 1137    Order Status: Completed Specimen: Blood from Antecubital, Right Updated: 08/31/20 1033     Blood Culture, Routine Gram stain juan bottle: Gram positive cocci in clusters resembling Staph       Results called to and read back by: Nighat Craft  08/29/2020  15:26      METHICILLIN RESISTANT STAPHYLOCOCCUS AUREUS  ID consult required at [facility].  For susceptibility see order #[id number]      Blood culture [[id number]]  (Abnormal)  (Susceptibility) Collected: 08/28/20 1137    Order Status: Completed Specimen: Blood Updated: 08/31/20 1030     Blood Culture, Routine Gram stain aer bottle: Gram positive cocci in clusters resembling Staph       Results called to and read back by: Nighat Craft  08/29/2020  15:26      Gram stain juan bottle: Gram positive cocci in clusters resembling Staph       Positive results previously called 08/29/2020  17:57      METHICILLIN RESISTANT STAPHYLOCOCCUS AUREUS  ID consult required at [facility].      Culture, Anaerobe [[id number]] Collected: 08/26/20 1448    Order Status: Completed Specimen: Incision site from Chest, Right Updated: 08/31/20 0938     Anaerobic Culture No anaerobes isolated    Narrative:      Port incision site     Clostridium difficile EIA [[id number]] Collected: 08/28/20 1808    Order Status: Completed Specimen: Stool Updated: 08/29/20 2305     C. diff Antigen Negative     C difficile Toxins A+B, EIA Negative     Comment: Testing not recommended for children <24 months old.       IV catheter culture [[id number]]  (Abnormal)  (Susceptibility) Collected: 08/26/20 1604    Order Status: Completed Specimen: Catheter Tip Updated: 08/29/20 1312     Aerobic Culture - Cath tip METHICILLIN RESISTANT STAPHYLOCOCCUS AUREUS  > 15 colonies      Blood culture [[id number]]  (Abnormal)  (Susceptibility) Collected: 08/26/20 1140    Order Status: Completed Specimen: Blood from Peripheral, Hand, Right Updated: 08/29/20 1221     Blood Culture, Routine Gram stain juan bottle: Gram positive cocci in clusters resembling Staph      Results called to and read back by: Trudy Morales RN  08/27/2020        02:08      Gram stain aer bottle: Gram positive cocci in clusters resembling Staph      Positive results previously called 08/27/2020  03:19      METHICILLIN RESISTANT STAPHYLOCOCCUS AUREUS  ID consult required at [facility] [facility].      Aerobic culture [[id number]]  (Abnormal)  (Susceptibility) Collected: 08/26/20 1448    Order Status: Completed Specimen: Incision site from Chest, Right Updated: 08/28/20 1327     Aerobic Bacterial Culture METHICILLIN RESISTANT STAPHYLOCOCCUS AUREUS  Many      Narrative:      Port incision site.    Clostridium difficile EIA [[id number]] Collected: 08/28/20 1309    Order Status: Canceled Specimen: Stool         Imaging:  CXR 8/26/2020  Right chest MediPort catheter with tip overlying the atrium.  Lungs and heart demonstrate no significant interval detrimental change in the short interval.  No large pleural effusion or gross pneumothorax.    CXR 8/26/2020  Since the most recent study, the port catheter has been discontinued.  The tip of a newly placed right internal jugular approach central venous  catheter is in the region of the right atrium.  No pneumothorax or pleural effusion.  Cardiomediastinal silhouette is unchanged.  Lungs remain clear.  Additional findings unchanged.    DVT US BL ANGUS 8/27/2020  No evidence of deep venous thrombosis in either lower extremity.    MRI T/L 8/28  1. Within limitations of lack of intravenous contrast, no findings of infection in the thoracic or lumbar spine are identified.  2. No significant spinal canal or foraminal narrowing.  Normal appearance of the thoracic spinal cord and conus medullaris.  3. Partially imaged are multiple bilateral peripheral predominant pulmonary nodules, some of which may be cavitary.  Given that these lesions appear to be new since the 07/20/2020 CT examination, the findings may represent multifocal infectious process or septic emboli.  Metastatic disease would be additional consideration.  Dedicated CT chest imaging can be considered for further evaluation.  4. Known hepatic metastatic disease partially imaged.    CT A/P 8/28  Postoperative changes of bowel resection are identified.  There are innumerable hypodense lesions throughout the liver compatible with hepatic metastatic disease.  Focal peristomal prior imaging limited given the lack of intravenous contrast material.     Bibasilar consolidation, left greater than right with subtotal consolidation of the left lower lobe.  There are some nodular densities seen bilaterally in both visualized lung bases.  While this could represent metastatic disease, given the patient's history of sepsis, septic emboli not entirely excluded.  These findings are new as compared to the previous study of 07/14/2020.     Liquid stool throughout the colon suggesting diarrhea.    YOLANDA 8/31/2020  - No vegetations seen    Lower Extremity US 9/1  - No evidence of deep venous thrombosis in either lower extremity    CXR 9/2  Left PICC catheter tip projects over the distal SVC.  Right central venous catheter tip projects  over the distal SVC.  The cardiomediastinal silhouette is not enlarged.  There is obscuration of the left costophrenic angle, may reflect small effusion versus attenuation related to overlying soft tissue.  The trachea is midline.  The lungs are symmetrically expanded bilaterally with patchy increased interstitial and parenchymal attenuation bilaterally, worsened since the previous exam, differential would include worsening edema or infection.  No large focal consolidation seen.  There is no pneumothorax.  The osseous structures are unremarkable..    CT C/A/P 9/2  - Similar distribution of ill-defined peripheral nodular densities and mixed consolidative opacities throughout both lungs, some demonstrating areas new and more conspicuous central cavitation.  Overall findings concerning for infectious process with component of septic emboli.  - race left pleural effusion.  - Rounded structure in the mid abdomen containing intraluminal gas focus, without significant surrounding inflammatory change.  Findings could relate to focal outpouching related to nearby anastomosis versus traversing bowel loop.  Small contained extraluminal collection cannot be definitively excluded.  Close attention at follow-up.  - Mild volume lower abdominopelvic ascites, slightly increased.  - Intrahepatic metastasis with likely metastatic node at the richard hepatis.    Assessment:    Active Hospital Problems    Diagnosis    *Septic shock    Bacteremia due to Staphylococcus    Chest pain    Elevated troponin    Pancytopenia    MRSA bacteremia    KIERSTEN (acute kidney injury)    Infected venous access port    Metabolic acidosis    Hypomagnesemia    Hyponatremia    Nausea & vomiting    Lactic acid acidosis    Sepsis    Anemia in neoplastic disease    Metastatic disease    Secondary malignancy of liver    Adenocarcinoma of colon     Plan:  See top of page.

## 2020-09-03 NOTE — ASSESSMENT & PLAN NOTE
Secondary malignancy of liver  Adenocarcinoma of colon  Neutropenia  Anemia in neoplastic disease    Oncology managing

## 2020-09-03 NOTE — SUBJECTIVE & OBJECTIVE
Past Medical History:   Diagnosis Date    Cancer     Colon CA with mets to the liver    Uterine cyst        Past Surgical History:   Procedure Laterality Date    COLONOSCOPY N/A 7/15/2020    Procedure: COLONOSCOPY;  Surgeon: Hi Drake MD;  Location: [facility] ENDO;  Service: Endoscopy;  Laterality: N/A;    COLONOSCOPY W/ BIOPSIES      INSERTION OF TUNNELED CENTRAL VENOUS CATHETER (CVC) WITH SUBCUTANEOUS PORT N/A 7/17/2020    Procedure: INSERTION, PORT-A-CATH;  Surgeon: Armani Naqvi MD;  Location: [facility] OR;  Service: General;  Laterality: N/A;       Review of patient's allergies indicates:   Allergen Reactions    Sulfa (sulfonamide antibiotics)        Medications:  Continuous Infusions:  Scheduled Meds:   enoxaparin  40 mg Subcutaneous Q24H    meropenem (MERREM) IVPB  1 g Intravenous Q8H    metoprolol tartrate  25 mg Oral TID    sodium chloride 0.9%  10 mL Intravenous Q6H    vancomycin (VANCOCIN) IVPB  1,250 mg Intravenous Q12H     PRN Meds:sodium chloride, acetaminophen, benzonatate, diphenhydrAMINE-zinc acetate 2-0.1%, heparin (porcine), influenza, melatonin, ondansetron, ondansetron, ondansetron, ondansetron, pneumoc 13-davion conj-dip cr(PF), promethazine (PHENERGAN) IVPB, simethicone, sodium chloride 0.9%, Flushing PICC Protocol **AND** sodium chloride 0.9% **AND** sodium chloride 0.9%, Pharmacy to dose Vancomycin consult **AND** vancomycin - pharmacy to dose    Family History     Problem Relation (Age of Onset)    Hypertension Mother, Maternal Grandmother, Maternal Grandfather, Paternal Grandmother        Tobacco Use    Smoking status: Never Smoker    Smokeless tobacco: Never Used   Substance and Sexual Activity    Alcohol use: No    Drug use: No    Sexual activity: Yes     Partners: Male     Birth control/protection: None       Review of Systems   Constitutional: Positive for activity change, appetite change and fatigue. Negative for chills and fever.   Respiratory: Positive  for shortness of breath. Negative for cough.    Cardiovascular: Positive for leg swelling. Negative for chest pain.   Gastrointestinal: Positive for abdominal pain and diarrhea. Negative for abdominal distention, nausea and vomiting.   Skin: Positive for wound (right chest wall).   Allergic/Immunologic: Positive for immunocompromised state.   Neurological: Positive for weakness.     Objective:     Vital Signs (Most Recent):  Temp: (!) 101.4 °F (38.6 °C) (09/03/20 1236)  Pulse: (!) 115 (09/03/20 1400)  Resp: (!) 47 (09/03/20 1400)  BP: 125/86 (09/03/20 1400)  SpO2: 98 % (09/03/20 1400) Vital Signs (24h Range):  Temp:  [99 °F (37.2 °C)-101.4 °F (38.6 °C)] 101.4 °F (38.6 °C)  Pulse:  [102-132] 115  Resp:  [22-53] 47  SpO2:  [95 %-100 %] 98 %  BP: (118-160)/(70-95) 125/86     Weight: 86 kg (189 lb 9.5 oz)  Body mass index is 31.55 kg/m².    Physical Exam  Vitals signs and nursing note reviewed.   Constitutional:       Appearance: She is ill-appearing.   HENT:      Head:      Comments: trialysis line in place at [facility]   Neck:      Musculoskeletal: Normal range of motion.   Cardiovascular:      Rate and Rhythm: Regular rhythm. Tachycardia present.      Pulses: Normal pulses.      Heart sounds: Normal heart sounds.   Pulmonary:      Effort: Tachypnea present.      Breath sounds: Decreased breath sounds present.   Abdominal:      General: Bowel sounds are normal. There is no distension.      Palpations: Abdomen is soft.      Tenderness: There is abdominal tenderness.   Musculoskeletal: Normal range of motion.         General: Swelling present.      Right lower leg: Edema present.      Left lower leg: Edema present.   Skin:     General: Skin is warm and dry.      Capillary Refill: Capillary refill takes less than 2 seconds.      Comments: Dressing to the Right CW, clean dry and intact   Neurological:      Mental Status: She is alert and oriented to person, place, and time.      Motor: Weakness present.   Psychiatric:          Mood and Affect: Mood normal.         Behavior: Behavior normal.         Advance Care Planning   Palliative Exam       Significant Labs: None  CBC:   Recent Labs   Lab 09/03/20  0336   WBC 27.95*   HGB 6.4*   HCT 20.4*   MCV 75*   *     BMP:  Recent Labs   Lab 09/03/20  0336      *   K 3.6      CO2 20*   BUN 16   CREATININE 0.6   CALCIUM 7.3*     LFT:  Lab Results   Component Value Date     (H) 08/27/2020    ALKPHOS 93 08/27/2020    BILITOT 2.5 (H) 08/27/2020     Albumin:   Albumin   Date Value Ref Range Status   09/03/2020 1.6 (L) 3.5 - 5.2 g/dL Final     Protein:   Total Protein   Date Value Ref Range Status   08/27/2020 6.0 6.0 - 8.4 g/dL Final     Lactic acid:   Lab Results   Component Value Date    LACTATE 4.6 (HH) 09/01/2020    LACTATE 4.7 (HH) 08/31/2020       Significant Imaging: I have reviewed all pertinent imaging results/findings within the past 24 hours.     Recommendations:  Continue medical treatment  Code status: Full code    Thank you for the consult and the opportunity to participate in care.       Celine Conde, MSN, APRN, NP-C   Palliative Medicine   [facility]  [phone] or [phone]        >50% of 60  min visit spent in chart review, face to face discussion of goals of care with patient, family, symptom assessment, coordination of care and emotional support.

## 2020-09-03 NOTE — PT/OT/SLP PROGRESS
Physical Therapy Treatment    Patient Name:  Huy Cisneros   MRN:  [id number]    Recommendations:     Discharge Recommendations:  home with home health, home health PT, home health OT   Discharge Equipment Recommendations: other (see comments)(TBD)   Barriers to discharge: None    Assessment:     Huy Cisneros is a 29 y.o. female admitted with a medical diagnosis of Septic shock.  She presents with the following impairments/functional limitations:weakness, impaired endurance, impaired self care skills, impaired functional mobilty, gait instability, impaired balance, decreased upper extremity function, decreased lower extremity function, pain, impaired skin, impaired cardiopulmonary response to activity    .  Pt agreeable to try to get to EOB.  Once there pt began to cry and wanted to lie back down.  MDs present and convinced pt to stay up and get into chair.      Rehab Prognosis: Good; patient would benefit from acute skilled PT services to address these deficits and reach maximum level of function.    Recent Surgery: Procedure(s) (LRB):  Transesophageal echo (YOLANDA) intra-procedure log documentation (N/A) 3 Days Post-Op    Plan:     During this hospitalization, patient to be seen 5 x/week to address the identified rehab impairments via gait training, therapeutic activities, therapeutic exercises, neuromuscular re-education and progress toward the following goals:    · Plan of Care Expires:  09/23/20    Subjective     Chief Complaint: SOB  Patient/Family Comments/goals: Im scared cause I get SOB when I get up.  I wish I didn't have cancer,  I want to go home and lie in my bed and see my babies.   Pain/Comfort:  · Pain Rating 1: 0/10  · Pain Rating Post-Intervention 1: 0/10      Objective:     Communicated with nurse prior to session.  Patient found HOB elevated with PureWick, oxygen, peripheral IV, central line, pulse ox (continuous), blood pressure cuff, telemetry upon PT entry to room. Pt had just  completed getting blood transfusion    General Precautions: Standard, fall, neutropenic, contact   Orthopedic Precautions:N/A   Braces: N/A     Functional Mobility:  · Bed Mobility:     · Supine to Sit: moderate assistance  · Sit to Supine: left up in chair   · Scooting:  Max assist using pad under pt to scoot to EOB and get feet on ground  · Transfers:     · Sit to Stand:  minimum assistance with hand-held assist  · Gait: 2-3 steps from EOB to bs chair with B HHA /min a  · Balance: fair standing      AM-PAC 6 CLICK MOBILITY  Turning over in bed (including adjusting bedclothes, sheets and blankets)?: 2  Sitting down on and standing up from a chair with arms (e.g., wheelchair, bedside commode, etc.): 3  Moving from lying on back to sitting on the side of the bed?: 2  Moving to and from a bed to a chair (including a wheelchair)?: 3  Need to walk in hospital room?: 1  Climbing 3-5 steps with a railing?: 1  Basic Mobility Total Score: 12       Therapeutic Activities and Exercises:   Pt agreeable to try to get to EOB.  Once there pt began to cry and wanted to lie back down.  MDs present and convinced pt to stay up and get into chair.  Transfer to BS chair with assist of OT.  Left with OT for ADL    Patient left up in chair with all lines intact, call button in reach and OT present..    GOALS:   Multidisciplinary Problems     Physical Therapy Goals        Problem: Physical Therapy Goal    Goal Priority Disciplines Outcome Goal Variances Interventions   Physical Therapy Goal     PT, PT/OT Ongoing, Progressing     Description: Goals to be met by: 20     Patient will increase functional independence with mobility by performin.  BLE AROM supine x 15  2.  Roll bilaterally with supervision  3.  Supine to/from sit with supervision  4.  Sit at EOB 15 min with supervision  5.  Sit to stand with RW with CG                     Time Tracking:     PT Received On: 20  PT Start Time: 1136     PT Stop Time: 1212  PT  Total Time (min): 36 min     Billable Minutes: Therapeutic Activity 27   Overlap with OT    Treatment Type: Treatment  PT/PTA: PT     PTA Visit Number: 0     Lydia Martinez, PT  09/03/2020

## 2020-09-03 NOTE — ASSESSMENT & PLAN NOTE
Infected venous access port  Metabolic Acidosis  Bacteremia due to Staphylococcus    The source is likely from dehiscence of Mediport-----we started her on broad spectrum abx. ID has been consulted. Following wound cultures as well as blood cultures---BCX growing GP cocci resembling staph. MRSA noted from the port. Blood cultures were redrawn on 8/28--NGTD. General Sx was consulted to remove her tunneled port. Lactate was initially elevated--now down. IVF given in the ED. Nephrology was consulted. Bicarb gtt initiated by Nephrology. CRRT was started and now stopped. MRI of the T/L spine suggestive of cavitary lung lesions. CT of the C/A/P today to r/o septic emboli. We appreciate ID. ID is recommending YOLANDA, showing no vegetation. The patient with diarrhea--she was placed on IV flagyl and PO vancomycin (now stopped) for C-diff that is noted at this time negative.     She is now currently on Vancomycin and meropenem. Remains with tachypnea and tachycardia. Tran catheter removed and diet advanced. CT of chest and abdomen showing increased chest infiltrates, septic emboli, and ascites. PICC line placed for IV access. 1 unit PRBC given for hgb 6.4. Lasix was given for pleural effusion. Abdominal US ordered for ascites level. CTA ordered for worsening SOB. A palliative care consult was placed.

## 2020-09-03 NOTE — PLAN OF CARE
Pt agreeable to try to get to EOB.  Once there pt began to cry and wanted to lie back down.  MDs present and convinced pt to stay up and get into chair.  Transfer to BS chair with assist of OT.  Left with OT for ADL  REC:  home with HH

## 2020-09-03 NOTE — PLAN OF CARE
Recommendation:   1. Continue cardiac diet as appropriate   2. Encourage pt to increase intake of meals   3. If intake does not improve, please order Boost Plus BID    Interventions:  Collaboration with other providers    Goals: consume >/=50 of meals by f/u  Nutrition Goal Status: new  Nutrition Discharge Planning: Regular Diet to meet nutritional needs

## 2020-09-03 NOTE — PLAN OF CARE
Problem: Occupational Therapy Goal  Goal: Occupational Therapy Goal  Description: Goals to be met by: 9/27    Patient will increase functional independence with ADLs by performing:    Feeding with Modified [place].  UE Dressing with Stand-by Assistance.  Grooming while seated at sink with Modified [place].  Toileting from bedside commode with Minimal Assistance for hygiene and clothing management.   Toilet transfer to bedside commode with Minimal Assistance.  Increased functional strength to 3+/5 for BUE.    Outcome: Ongoing, Progressing  Huy Cisneros is a 29 y.o. female with a medical diagnosis of Septic shock.  Performance deficits affecting function are weakness, impaired endurance, impaired self care skills, impaired functional mobilty, gait instability, impaired balance, decreased upper extremity function, decreased lower extremity function, pain, impaired skin, impaired cardiopulmonary response to activity. Pt found seated EOB with PT, agreeable to transfer to chair.  Pt required Min A for stand pivot to chair.  She was more conversive with therapy and expresses desire to go home and see her children.  No complaints of pain. Continue OT services to address functional goals, progressing as able.    CARLTON Puga

## 2020-09-03 NOTE — PLAN OF CARE
Pt had a fever of 101.4 treated with PRN Tylenol. ST. Uses 2L NC when she feels SOB, otherwise sats have been >95%. Bp stable. C/o of no pain this shift. Pt gets SOB with turning and is tachypnic. AAOx4. Mediport removal site is clean, intact no drainage. Pt is very sad about not being able to see her kids, moments of crying but is reassured by using her phone to video chat and see pictures of them. Purewick in place, u/o adequate. Safety maintained.

## 2020-09-03 NOTE — PLAN OF CARE
Pt is aaox4. Tachypneic on RA. 1unit of PRBC was given for low h/h. Pt tolerated well. Pt was informed about CT results during palliative care consult. Pt was able to tolerate sitting up in the chair for two hours. She was able to move back to the bed and use the bedside commode. Pt is still having watery bowel movements (3 today). Pt has had no appetite. Safety maintained. Call light in reach.

## 2020-09-03 NOTE — PROGRESS NOTES
Pharmacokinetic Assessment Follow Up: IV Vancomycin    Vancomycin serum concentration assessment(s):    The trough level was drawn correctly and can be used to guide therapy at this time. The measurement is within the desired definitive target range of 15 to 20 mcg/mL.    Vancomycin Regimen Plan:    Will continue vancomycin 1250 mg IV q 12 hours. Will recheck vancomycin trough in 5-7 days unless renal function changes significantly.    Drug levels (last 3 results):  Recent Labs   Lab Result Units 08/30/20  0121 08/31/20  1243   Vancomycin-Trough ug/mL 19.5 17.3       Pharmacy will continue to follow and monitor vancomycin.    Please contact pharmacy at extension [phone] for questions regarding this assessment.    Thank you for the consult,   Eleanor Morales       Patient brief summary:  Huy Cisneros is a 29 y.o. female initiated on antimicrobial therapy with IV Vancomycin for treatment of bacteremia    The patient's current regimen is vancomycin 1250 mg IV q 12 hours    Drug Allergies:   Review of patient's allergies indicates:   Allergen Reactions    Sulfa (sulfonamide antibiotics)        Actual Body Weight:   84 kg    Renal Function:   Estimated Creatinine Clearance: 125.8 mL/min (based on SCr of 0.7 mg/dL).,     Dialysis Method (if applicable):  N/A    CBC (last 72 hours):  Recent Labs   Lab Result Units 08/29/20  0449 08/30/20  0121 08/30/20  0415 08/31/20  0508   WBC K/uL 10.25 23.64* 25.94*  25.94* 25.77*   Hemoglobin g/dL 7.4* 9.1* 8.6*  8.6* 7.8*   Hematocrit % 23.5* 29.5* 27.1*  27.1* 24.3*   Platelets K/uL 114* 145* 144*  144* 170   Gran% % 37.0* 63.0 72.0  72.0 67.0   Lymph% % 22.0 14.0* 6.0*  6.0* 9.0*   Mono% % 7.0 8.0 1.0*  1.0* 1.0*   Eosinophil% % 0.0 0.0 0.0  0.0 0.0   Basophil% % 0.0 0.0 0.0  0.0 0.0   Differential Method  Manual Manual Manual  Manual Manual       Metabolic Panel (last 72 hours):  Recent Labs   Lab Result Units 08/29/20  0449 08/30/20  0121 08/30/20  0415  08/30/20  1205 08/31/20  0508   Sodium mmol/L 131* 132* 131*  131*  --  131*   Sodium Urine Random mmol/L  --   --   --  <20*  --    Potassium mmol/L 3.3* 3.9 3.9  3.9  --  3.5   Chloride mmol/L 96 96 97  97  --  98   CO2 mmol/L 25 21* 21*  21*  --  20*   Glucose mg/dL 95 88 110  110  --  97   BUN, Bld mg/dL 15 14 14  14  --  17   Creatinine mg/dL 0.7 0.7 0.7  0.7  --  0.7   Albumin g/dL 1.8* 1.9* 1.7*  1.7*  --  1.7*   Magnesium mg/dL 2.8* 2.5 2.3  2.3  --  1.9   Phosphorus mg/dL 1.2* 1.6* 1.3*  1.3*  --  1.3*       Vancomycin Administrations:  vancomycin given in the last 96 hours                     vancomycin (VANCOCIN) 1,250 mg in dextrose 5 % 250 mL IVPB (mg) 1,250 mg New Bag 08/31/20 0111     1,250 mg New Bag 08/30/20 1455     1,250 mg New Bag  0216     1,250 mg New Bag 08/29/20 1409     1,250 mg New Bag  0259    vancomycin 25 mg/mL oral solution 500 mg (mg) 500 mg Given 08/30/20 1103     500 mg Given  0607     500 mg Given 08/29/20 2308     500 mg Given  2109    vancomycin (VANCOCIN) 2,000 mg in dextrose 5 % 500 mL IVPB (mg) 2,000 mg New Bag 08/28/20 1500                    Microbiologic Results:  Microbiology Results (last 7 days)       Procedure Component Value Units Date/Time    Culture, Anaerobe [[id number]] Collected: 08/26/20 1602    Order Status: Completed Specimen: Catheter Tip from Chest, Right Updated: 08/31/20 1305     Anaerobic Culture No anaerobes isolated    Blood culture [[id number]]  (Abnormal) Collected: 08/28/20 1137    Order Status: Completed Specimen: Blood from Antecubital, Right Updated: 08/31/20 1033     Blood Culture, Routine Gram stain juan bottle: Gram positive cocci in clusters resembling Staph       Results called to and read back by: Nighat Craft  08/29/2020  15:26      METHICILLIN RESISTANT STAPHYLOCOCCUS AUREUS  ID consult required at [facility].Corey,Kenisha and Lisseth briggs.  For susceptibility see order #[id number]      Blood culture [[id number]]  (Abnormal)   (Susceptibility) Collected: 08/28/20 1137    Order Status: Completed Specimen: Blood Updated: 08/31/20 1030     Blood Culture, Routine Gram stain aer bottle: Gram positive cocci in clusters resembling Staph       Results called to and read back by: Nighat Craft  08/29/2020  15:26      Gram stain juan bottle: Gram positive cocci in clusters resembling Staph       Positive results previously called 08/29/2020  17:57      METHICILLIN RESISTANT STAPHYLOCOCCUS AUREUS  ID consult required at [facility],[place] and [facility] locations.      Blood culture [[id number]] Collected: 08/30/20 0121    Order Status: Completed Specimen: Blood from Peripheral, Right Updated: 08/31/20 1022     Blood Culture, Routine No Growth to date      No Growth to date    Culture, Anaerobe [[id number]] Collected: 08/26/20 1448    Order Status: Completed Specimen: Incision site from Chest, Right Updated: 08/31/20 0938     Anaerobic Culture No anaerobes isolated    Narrative:      Port incision site    Blood culture [[id number]] Collected: 08/31/20 0631    Order Status: Sent Specimen: Blood from Antecubital, Right Arm Updated: 08/31/20 0632    Blood culture [[id number]] Collected: 08/29/20 1658    Order Status: Completed Specimen: Blood from Antecubital, Right Updated: 08/31/20 0613     Blood Culture, Routine No Growth to date      No Growth to date    Clostridium difficile EIA [[id number]] Collected: 08/28/20 1808    Order Status: Completed Specimen: Stool Updated: 08/29/20 2305     C. diff Antigen Negative     C difficile Toxins A+B, EIA Negative     Comment: Testing not recommended for children <24 months old.       IV catheter culture [[id number]]  (Abnormal)  (Susceptibility) Collected: 08/26/20 1604    Order Status: Completed Specimen: Catheter Tip Updated: 08/29/20 1312     Aerobic Culture - Cath tip METHICILLIN RESISTANT STAPHYLOCOCCUS AUREUS  > 15 colonies      Blood culture [[id number]]  (Abnormal)  (Susceptibility) Collected: 08/26/20 1140     Order Status: Completed Specimen: Blood from Peripheral, Hand, Right Updated: 08/29/20 1221     Blood Culture, Routine Gram stain juan bottle: Gram positive cocci in clusters resembling Staph      Results called to and read back by: Trudy Morales RN  08/27/2020        02:08      Gram stain aer bottle: Gram positive cocci in clusters resembling Staph      Positive results previously called 08/27/2020  03:19      METHICILLIN RESISTANT STAPHYLOCOCCUS AUREUS  ID consult required at [facility],[place] and [facility] locations.      Aerobic culture [[id number]]  (Abnormal)  (Susceptibility) Collected: 08/26/20 1448    Order Status: Completed Specimen: Incision site from Chest, Right Updated: 08/28/20 1327     Aerobic Bacterial Culture METHICILLIN RESISTANT STAPHYLOCOCCUS AUREUS  Many      Narrative:      Port incision site.    Clostridium difficile EIA [[id number]] Collected: 08/28/20 1309    Order Status: Canceled Specimen: Stool     Aerobic culture [[id number]] Collected: 08/26/20 1604    Order Status: Canceled Specimen: Catheter Tip from Chest, Right     Blood culture [[id number]]     Order Status: Canceled Specimen: Blood

## 2020-09-03 NOTE — PLAN OF CARE
09/03/20 1201   Post-Acute Status   Post-Acute Authorization Home Health   Home Health Status Referrals Sent   Patient choice form signed by patient/caregiver List from System Post-Acute Care   Discharge Delays (!) Change in Medical Condition   Discharge Plan   Discharge Plan A Home Health   Discharge Plan B Home with family   The Sw spoke to the pt and her mother Char and both are agreeable to hh. The Sw notified them of the barriers with Medicaid for hh(PT/OT) but they're willing to only have nursing come out.

## 2020-09-03 NOTE — SUBJECTIVE & OBJECTIVE
Interval history: The patient reports worsening SOB overnight with fevers. Unable to speak due to SOB, abdominal pain has decreased.     Review of Systems   Constitutional: Positive for activity change, appetite change, fatigue and fever. Negative for chills.   Respiratory: Positive for cough and shortness of breath.    Cardiovascular: Positive for leg swelling. Negative for chest pain.   Gastrointestinal: Positive for diarrhea. Negative for abdominal distention, abdominal pain, nausea and vomiting.   Genitourinary: Negative for difficulty urinating.   Skin: Positive for wound (right chest wall).   Allergic/Immunologic: Positive for immunocompromised state.   Neurological: Positive for weakness.     Objective:     Vital Signs (Most Recent):  Temp: 99 °F (37.2 °C) (09/03/20 1530)  Pulse: (!) 116 (09/03/20 1530)  Resp: (!) 48 (09/03/20 1530)  BP: 125/86 (09/03/20 1530)  SpO2: 98 % (09/03/20 1530) Vital Signs (24h Range):  Temp:  [99 °F (37.2 °C)-101.4 °F (38.6 °C)] 99 °F (37.2 °C)  Pulse:  [102-132] 116  Resp:  [21-53] 48  SpO2:  [95 %-100 %] 98 %  BP: (118-160)/(70-95) 125/86     Weight: 86 kg (189 lb 9.5 oz)  Body mass index is 31.55 kg/m².    Physical Exam  Vitals signs and nursing note reviewed.   Constitutional:       Appearance: She is ill-appearing.   HENT:      Head:      Comments: trialysis line in place at [facility]   Neck:      Musculoskeletal: Normal range of motion.   Cardiovascular:      Rate and Rhythm: Regular rhythm. Tachycardia present.      Pulses: Normal pulses.      Heart sounds: Normal heart sounds.   Pulmonary:      Effort: Tachypnea present.      Breath sounds: Decreased breath sounds present.      Comments: Patient unable to speak due to SOB  Abdominal:      General: Bowel sounds are normal. There is no distension.      Palpations: Abdomen is soft.      Tenderness: There is abdominal tenderness.   Musculoskeletal: Normal range of motion.         General: Swelling present.      Right lower leg: Edema  present.      Left lower leg: Edema present.   Skin:     General: Skin is warm and dry.      Capillary Refill: Capillary refill takes less than 2 seconds.      Comments: Dressing to the Right CW, clean dry and intact   Neurological:      Mental Status: She is alert and oriented to person, place, and time.      Motor: Weakness present.   Psychiatric:         Mood and Affect: Mood is depressed. Affect is tearful.         Behavior: Behavior normal.             Significant Labs:   BMP:   Recent Labs   Lab 09/02/20 0440 09/03/20  0336    102   * 132*   K 3.5 3.6   CL 98 100   CO2 19* 20*   BUN 20 16   CREATININE 0.7 0.6   CALCIUM 7.6* 7.3*   MG 1.9  --      CBC:   Recent Labs   Lab 09/02/20 0440 09/03/20  0336   WBC 32.88* 27.95*   HGB 7.3* 6.4*   HCT 22.7* 20.4*    116*     Lactic Acid:   No results for input(s): LACTATE in the last 48 hours.  Magnesium:   Recent Labs   Lab 09/02/20  0440   MG 1.9     POCT Glucose:   No results for input(s): POCTGLUCOSE in the last 48 hours.      Significant Imaging: I have reviewed all pertinent imaging results/findings within the past 24 hours.

## 2020-09-03 NOTE — ASSESSMENT & PLAN NOTE
We appreciate nephrology for management. IV fluids given in the ED. Tran catheter placed. Nephrology has ordered a renal ultrasound. Was initially started on CRRT---on 8/28 CRRT stopped. Monitor intake and output. Creatinine stable. Tran catheter removed. KIERSTEN Resolved.

## 2020-09-03 NOTE — NURSING
"Pt explained to me that she did not want to be cleaned up after having a bowel movement. I explained to the pt the she is at risk for pressure ulcers and uti. Pt started to cry and explained, "I can clean myself, I know how to take care of myself if I get a uti. I did not come to the hospital this short of breath. Yall dont care that I can't breathe." Explained to pt that I understand her concerns and will notify the physician. Charge Nurse, Brittny notified of pt concerns. Dr. Roy-pulmonary updated with pt's concerns. Pt will assess pt to see if she needs breathing treatments.   "

## 2020-09-03 NOTE — HPI
"Ms. Huy Cisneros is a 30 y/o female who lives in [place], [place] at her residence with her children. The patients PCP is Imtiaz Lindsay PA-C. She is also followed by Dr. Jewel Arzola with Oncology. She has a pmh of colon cancer with liver mets, uterine cysts, and HTN. She has a past surgical history of a mediport placement and a colonoscopy. The patient does not smoke cigarettes, drink alcohol, or use illicit drugs.     Palliative medicine met with patient this am for emotional support and disease education. Patient very anxious and having some SOB. Therapy present for treatment. Patient encouraged to work with therapy for strengthening.  Patient updated on current illness. "I just want to go home to my babies." Patient mother works and has not been able to visit. Palliative medicine will continue to follow during this admission. Thank you for the consult.     "

## 2020-09-04 PROBLEM — D69.6 THROMBOCYTOPENIA: Status: ACTIVE | Noted: 2020-01-01

## 2020-09-04 PROBLEM — N17.9 AKI (ACUTE KIDNEY INJURY): Status: RESOLVED | Noted: 2020-01-01 | Resolved: 2020-01-01

## 2020-09-04 NOTE — MEDICAL/APP STUDENT
Consult Note  Palliative Care      Consult Requested By: Lex Yepez DO  Reason for Consult: Emotional Support    SUBJECTIVE:     History of Present Illness:  Disease Process: Cancer    29 F with PMH of metastatic adenocarcinoma of the colon with liver mets, uterine cysts, and HTN who presented to [facility] ED for intractable nausea/vomitting that has been on going for about 1 day.  Pt describes vomiting as having some food contents but mostly saliva, she reports she has not been able to tolerated PO since symptoms began.  Associated symptoms include fevers, chills, and general malaise which also started when nausea/vomiting started. Pt denies abdominal pain, endorses 1 episode of diarrhea. Denies any chest pain, SOB, changes in urinary habits.  Pt reports she has never had symptoms like this in the past. She reports her cancer is s/p resection in July 2020 and currently undergoing chemotherapy last given 2 weeks via port in the right chest.        Hospital Course:   In ED noticed wound dehiscence at right chest port site. She was admitted to the ICU. Was briefly on norepi but off it now. Blood cxs with GPCs and staph aureus from the port site. Port is removed.  Lactate has improved---she was given IVF and now Nephrology has started Bicarb gtt. Dr. Naqvi was consulted for trialysis line placement---She was started on CRRT, now stopped at this time. Will cont broad spectrum abx. G-CSF started by oncology for neutropenia. MRI of the thoracic and lumbar spine was completed on 8/28 to r/o spinal abscess---Her MRI did not find a spinal fluid collection, however the MRI was suggestive of possible cavitary lung lesions. She is to have a CT chest, abdomen, and pelvis today for assessment of possible cavities in her lungs and mets in her liver. MRSA grew out at the port site. She was weaned off of levophed and now started on BB because of 's. Pulmonology has ordered CT of the C/A/P with contrast to evaluate  the patient  for septic pulmonary emboli. WBCs have increased and are now 26.  she has Staph  in her BCs and the one from yesterday is NGTD.  On her CT chest and abdomen she has small bilateral pleural effusions and her liver mets appear smaller. YOLANDA with no vegetation noted. Cont daily blood cultures. Patient is on vancomycin and meropenem per ID recs. The patient remains with tachypnea and tachycardia. Up out of bed with PT. CT of chest and abdomen ordered, showing increased chest infiltrates and ascites, probable septic emboli. PICC line placed for inability to obtain peripheral IV. CTA ordered to r/o PE. Patient remains tachypnic and tachycardic with A plfevers. Lasix was given. An abdominal US was ordered to assess ascites levels. The patient received a unit of PRBC. A palliative care consult was placed for patient and family counseling.     Interval History:   Pt has no complaints at this time. States she is feeling good today, noting improvement with her SOB. She is not on any O2 currently. Will be stepped down to telemetry floor.     Discussed with patient about stepping down to telemetry floor today. We mentioned this means she is making excellent progress with her overall strength. She expressed that her only priority is to go home to be with her baby. We re-iterated in order for her to go home and receive chemotherapy she will have to continue working with PT/ OT while in hospital. PT seems agreeable and was able to sit up in the chair yesterday for a few hours and will try again today. She is not tearful today nor tachypnic. She says her breathing feels a lot better. She seems more optimistic today then she was yesterday. Emotional support was provided. All questions were answered and concerns addressed.       Past Medical History:   Diagnosis Date    Cancer     Colon CA with mets to the liver    Uterine cyst      Past Surgical History:   Procedure Laterality Date    COLONOSCOPY N/A 7/15/2020     Procedure: COLONOSCOPY;  Surgeon: Hi Drake MD;  Location: [facility] ENDO;  Service: Endoscopy;  Laterality: N/A;    COLONOSCOPY W/ BIOPSIES      INSERTION OF TUNNELED CENTRAL VENOUS CATHETER (CVC) WITH SUBCUTANEOUS PORT N/A 7/17/2020    Procedure: INSERTION, PORT-A-CATH;  Surgeon: Armani Naqvi MD;  Location: [facility] OR;  Service: General;  Laterality: N/A;     Family History   Problem Relation Age of Onset    Hypertension Mother     Hypertension Maternal Grandmother     Hypertension Maternal Grandfather     Hypertension Paternal Grandmother      Social History     Tobacco Use    Smoking status: Never Smoker    Smokeless tobacco: Never Used   Substance Use Topics    Alcohol use: No    Drug use: No       Mental Status: Oriented x3    ECOG Performance Status Grade: 3 - Confined to bed or chair 50% of waking hours    Review of Systems:  no complaints at this time    OBJECTIVE:     Pain Assessment: No pain reported at this time    Decision-Making Capacity: Patient answered questions    Advanced Directives:  Living Will: No  Do Not Resuscitate Status: No  Medical Power of : No  Registered Organ Donor: No    Living Arrangements: Lives with family, Lives in home    Psychosocial, Spiritual, Cultural:  Patient's most important priorities:  Her baby at home    Patient's biggest concerns/fears:  Leaving her children alone    Patient's goals/hopes:  She would like to be at home with her baby as much as possible    ASSESSMENT/PLAN:     Patient's emotional state has improved today. She appears to be resting more comfortably, not tearful or requiring any O2. We discussed her progress and that she will be stepped down to telemetry today. She is very excited at the prospect of leaving the hospital and returning home to see her baby.     Recommendations:  - Continue all current medical treatment  - Pt to remain full code  - provide O2 as needed for comfort  - Encourage efforts with PT/OT  - continue to  provide emotional support      Time Spent: 60  Martha Bliss MS4

## 2020-09-04 NOTE — PROGRESS NOTES
Pharmacokinetic Assessment Follow Up: IV Vancomycin    Vancomycin serum concentration assessment(s):    The trough level was drawn correctly and can be used to guide therapy at this time. The measurement is above the desired definitive target range of 15 to 20 mcg/mL.    Vancomycin Regimen Plan:    Change regimen to Vancomycin 1000 mg IV every 12 hours with next serum trough concentration measured at 0300 prior to 4th dose on 9/6    Drug levels (last 3 results):  Recent Labs   Lab Result Units 09/04/20  1410   Vancomycin-Trough ug/mL 22.7*       Pharmacy will continue to follow and monitor vancomycin.    Please contact pharmacy at extension [phone] for questions regarding this assessment.    Thank you for the consult,   Eleanor Morales       Patient brief summary:  Huy Cisneros is a 29 y.o. female initiated on antimicrobial therapy with IV Vancomycin for treatment of bacteremia    The patient's current regimen is vancomycin 1250 mg IV q 12hours.    Drug Allergies:   Review of patient's allergies indicates:   Allergen Reactions    Sulfa (sulfonamide antibiotics)        Actual Body Weight:   85.5    Renal Function:   Estimated Creatinine Clearance: 148 mL/min (based on SCr of 0.6 mg/dL).,     Dialysis Method (if applicable):  N/A    CBC (last 72 hours):  Recent Labs   Lab Result Units 09/02/20 0440 09/03/20  0336 09/04/20  0604   WBC K/uL 32.88* 27.95* 26.54*   Hemoglobin g/dL 7.3* 6.4* 7.9*   Hematocrit % 22.7* 20.4* 24.3*   Platelets K/uL 177 116* 77*   Gran% % 73.0 69.0 75.0*   Lymph% % 9.0* 19.0 9.0*   Mono% % 2.0* 4.0 3.0*   Eosinophil% % 0.0 0.0 0.0   Basophil% % 0.0 1.0 0.0   Differential Method  Manual Manual Manual       Metabolic Panel (last 72 hours):  Recent Labs   Lab Result Units 09/02/20  0440 09/03/20  0336 09/04/20  0604   Sodium mmol/L 130* 132* 133*   Potassium mmol/L 3.5 3.6 3.4*   Chloride mmol/L 98 100 99   CO2 mmol/L 19* 20* 22*   Glucose mg/dL 102 102 100   BUN, Bld mg/dL 20 16 12    Creatinine mg/dL 0.7 0.6 0.6   Albumin g/dL 1.8* 1.6* 1.7*   Total Bilirubin mg/dL  --   --  1.2*   Alkaline Phosphatase U/L  --   --  417*   AST U/L  --   --  80*   ALT U/L  --   --  20   Magnesium mg/dL 1.9  --  1.9   Phosphorus mg/dL 1.8* 1.9* 1.7*       Vancomycin Administrations:  vancomycin given in the last 96 hours                     vancomycin (VANCOCIN) 1,250 mg in dextrose 5 % 250 mL IVPB (mg) 1,250 mg New Bag 09/04/20 0259     1,250 mg New Bag 09/03/20 1355     1,250 mg New Bag  0205     1,250 mg New Bag 09/02/20 1443     1,250 mg New Bag  0145     1,250 mg New Bag 09/01/20 1500     1,250 mg New Bag  0123                    Microbiologic Results:  Microbiology Results (last 7 days)       Procedure Component Value Units Date/Time    Blood culture [[id number]] Collected: 09/03/20 0416    Order Status: Completed Specimen: Blood from Antecubital, Right Arm Updated: 09/04/20 1213     Blood Culture, Routine No Growth to date      No Growth to date    Blood culture [[id number]]  (Abnormal) Collected: 08/31/20 0631    Order Status: Completed Specimen: Blood from Antecubital, Right Arm Updated: 09/04/20 1101     Blood Culture, Routine Gram stain aer bottle: Gram positive cocci in clusters resembling Staph      Results called to and read back by: Argentina Vicente RN. 09/03/2020        03:16      STAPHYLOCOCCUS AUREUS  Susceptibility pending  ID consult required at [facility].[place] and [place] locations.      Blood culture [[id number]] Collected: 09/02/20 0721    Order Status: Completed Specimen: Blood from Peripheral, Right Wrist Updated: 09/04/20 1022     Blood Culture, Routine No Growth to date      No Growth to date      No Growth to date    Blood culture [[id number]] Collected: 08/30/20 0121    Order Status: Completed Specimen: Blood from Peripheral, Right Updated: 09/04/20 1022     Blood Culture, Routine No growth after 5 days.    Blood culture [[id number]] Collected: 09/01/20 0655    Order Status: Completed  Specimen: Blood from Antecubital, Right Arm Updated: 09/04/20 1012     Blood Culture, Routine No Growth to date      No Growth to date      No Growth to date      No Growth to date    Narrative:      Collection has been rescheduled by LLR at 09/01/2020 04:28 Reason:   nurse said to draw at at 6AM  Collection has been rescheduled by LLR at 09/01/2020 04:28 Reason:   nurse said to draw at at 6AM    Blood culture [[id number]] Collected: 09/04/20 0655    Order Status: Sent Specimen: Blood from Peripheral, Right Wrist Updated: 09/04/20 0949    Blood culture [[id number]]  (Abnormal) Collected: 08/29/20 1658    Order Status: Completed Specimen: Blood from Antecubital, Right Updated: 09/03/20 0753     Blood Culture, Routine Gram stain aer bottle: Gram positive cocci in clusters resembling Staph       Results called to and read back by: Jessica Hewitt 09/01/2020  12:07      METHICILLIN RESISTANT STAPHYLOCOCCUS AUREUS  ID consult required at [facility].  For susceptibility see order #[id number]      Culture, Anaerobe [[id number]] Collected: 08/26/20 1602    Order Status: Completed Specimen: Catheter Tip from Chest, Right Updated: 08/31/20 1305     Anaerobic Culture No anaerobes isolated    Blood culture [[id number]]  (Abnormal) Collected: 08/28/20 1137    Order Status: Completed Specimen: Blood from Antecubital, Right Updated: 08/31/20 1033     Blood Culture, Routine Gram stain juan bottle: Gram positive cocci in clusters resembling Staph       Results called to and read back by: Nighat Craft  08/29/2020  15:26      METHICILLIN RESISTANT STAPHYLOCOCCUS AUREUS  ID consult required at [facility].  For susceptibility see order #[id number]      Blood culture [[id number]]  (Abnormal)  (Susceptibility) Collected: 08/28/20 1137    Order Status: Completed Specimen: Blood Updated: 08/31/20 1030     Blood Culture, Routine Gram stain aer bottle: Gram positive cocci in clusters  resembling Staph       Results called to and read back by: Nighat Craft  08/29/2020  15:26      Gram stain juan bottle: Gram positive cocci in clusters resembling Staph       Positive results previously called 08/29/2020  17:57      METHICILLIN RESISTANT STAPHYLOCOCCUS AUREUS  [facility]      Culture, Anaerobe [[id number]] Collected: 08/26/20 1448    Order Status: Completed Specimen: Incision site from Chest, Right Updated: 08/31/20 0938     Anaerobic Culture No anaerobes isolated    Narrative:      Port incision site    Clostridium difficile EIA [[id number]] Collected: 08/28/20 1808    Order Status: Completed Specimen: Stool Updated: 08/29/20 2305     C. diff Antigen Negative     C difficile Toxins A+B, EIA Negative     Comment: Testing not recommended for children <24 months old.       IV catheter culture [[id number]]  (Abnormal)  (Susceptibility) Collected: 08/26/20 1604    Order Status: Completed Specimen: Catheter Tip Updated: 08/29/20 1312     Aerobic Culture - Cath tip METHICILLIN RESISTANT STAPHYLOCOCCUS AUREUS  > 15 colonies      Blood culture [[id number]]  (Abnormal)  (Susceptibility) Collected: 08/26/20 1140    Order Status: Completed Specimen: Blood from Peripheral, Hand, Right Updated: 08/29/20 1221     Blood Culture, Routine Gram stain juan bottle: Gram positive cocci in clusters resembling Staph      Results called to and read back by: Trudy Morales RN  08/27/2020        02:08      Gram stain aer bottle: Gram positive cocci in clusters resembling Staph      Positive results previously called 08/27/2020  03:19      METHICILLIN RESISTANT STAPHYLOCOCCUS AUREUS  [facility]

## 2020-09-04 NOTE — SUBJECTIVE & OBJECTIVE
Interval History:   [I saw patient this morning while she was still in room 560.]  - Tmax 101.4 at 1200 hrs on 9/3/20.  - patient was drowsy with flat affect during examination this morning. Limited answers to review-of-systems questions.    Oncology Treatment Plan:   OP COLORECTAL FOLFOX + BEVACIZUMAB Q2W    Medications:  Continuous Infusions:  Scheduled Meds:   enoxaparin  90 mg Subcutaneous Q12H    meropenem (MERREM) IVPB  1 g Intravenous Q8H    metoprolol tartrate  25 mg Oral TID    psyllium husk (aspartame)   Oral Daily    sodium chloride 0.9%  10 mL Intravenous Q6H    vancomycin (VANCOCIN) IVPB  1,250 mg Intravenous Q12H     PRN Meds:sodium chloride, acetaminophen, benzonatate, diphenhydrAMINE-zinc acetate 2-0.1%, heparin (porcine), influenza, melatonin, morphine, ondansetron, ondansetron, ondansetron, ondansetron, pneumoc 13-davion conj-dip cr(PF), promethazine (PHENERGAN) IVPB, simethicone, sodium chloride 0.9%, Flushing PICC Protocol **AND** sodium chloride 0.9% **AND** sodium chloride 0.9%, Pharmacy to dose Vancomycin consult **AND** vancomycin - pharmacy to dose     Review of Systems   Constitutional: Positive for activity change and fatigue. Negative for fever and unexpected weight change.   HENT: Negative for mouth sores and nosebleeds.    Respiratory: Negative for shortness of breath and wheezing.    Cardiovascular: Negative for chest pain and palpitations.   Gastrointestinal: Negative for abdominal pain and nausea.   Allergic/Immunologic: Negative for food allergies.   Neurological: Positive for weakness.   Psychiatric/Behavioral: Negative for behavioral problems and confusion.     Objective:     Vital Signs (Most Recent):  Temp: 100 °F (37.8 °C) (09/04/20 1148)  Pulse: 110 (09/04/20 1148)  Resp: (!) 22 (09/04/20 1148)  BP: 120/73 (09/04/20 1148)  SpO2: 96 % (09/04/20 1148) Vital Signs (24h Range):  Temp:  [98.3 °F (36.8 °C)-101.4 °F (38.6 °C)] 100 °F (37.8 °C)  Pulse:  [109-126] 110  Resp:   [21-54] 22  SpO2:  [94 %-100 %] 96 %  BP: (104-160)/(55-90) 120/73     Weight: 85.5 kg (188 lb 7.9 oz)  Body mass index is 31.37 kg/m².  Body surface area is 1.98 meters squared.      Intake/Output Summary (Last 24 hours) at 9/4/2020 1200  Last data filed at 9/4/2020 0600  Gross per 24 hour   Intake 1280 ml   Output 800 ml   Net 480 ml       Physical Exam  Vitals signs and nursing note reviewed.   Constitutional:       General: She is awake.      Appearance: She is not ill-appearing.      Comments: fatigued   HENT:      Mouth/Throat:      Pharynx: No oropharyngeal exudate.   Neck:      Musculoskeletal: Neck supple. No neck rigidity.      Thyroid: No thyromegaly.   Cardiovascular:      Rate and Rhythm: Regular rhythm. Tachycardia present.   Pulmonary:      Effort: Pulmonary effort is normal. No respiratory distress.      Breath sounds: No wheezing or rales.   Chest:       Lymphadenopathy:      Cervical: No cervical adenopathy.   Skin:     Findings: No bruising or petechiae.   Neurological:      Mental Status: She is alert and oriented to person, place, and time.   Psychiatric:         Behavior: Behavior is cooperative.         Significant Labs:   Labs have been reviewed.    Lab Results   Component Value Date    WBC 26.54 (H) 09/04/2020    HGB 7.9 (L) 09/04/2020    HCT 24.3 (L) 09/04/2020    MCV 75 (L) 09/04/2020    PLT 77 (L) 09/04/2020       Diagnostic Results:  CT chest/abdomen/pelvis (9/2/20): I have personally reviewed the images  Similar distribution of ill-defined peripheral nodular densities and mixed consolidative opacities throughout both lungs, some demonstrating areas new and more conspicuous central cavitation.  Overall findings concerning for infectious process with component of septic emboli.     Trace left pleural effusion.     Rounded structure in the mid abdomen containing intraluminal gas focus, without significant surrounding inflammatory change.  Findings could relate to focal outpouching related  to nearby anastomosis versus traversing bowel loop.  Small contained extraluminal collection cannot be definitively excluded.  Close attention at follow-up.     Mild volume lower abdominopelvic ascites, slightly increased.     Intrahepatic metastasis with likely metastatic node at the richard hepatis.

## 2020-09-04 NOTE — PLAN OF CARE
Patient arrived from ICU around 1200. A&Ox4.  Room air. Oxygen PRN with anxiety episodes.  Sinus Tach on telemetry.  Cardiac diet.  Up to the bathroom with assistance. Purwick in place.  RIJ trialysis. Double lumen PICC line. IV antibiotics given.  PRN morphine given.  Electrolytes replaced.  All questions answered.  Will continue to monitor.    Marycarmen Cohn RN        Problem: Fall Injury Risk  Goal: Absence of Fall and Fall-Related Injury  Outcome: Ongoing, Progressing  Intervention: Identify and Manage Contributors to Fall Injury Risk  Flowsheets (Taken 9/4/2020 1611)  Self-Care Promotion: independence encouraged  Medication Review/Management: medications reviewed     Problem: Adult Inpatient Plan of Care  Goal: Plan of Care Review  Outcome: Ongoing, Progressing  Flowsheets (Taken 9/4/2020 1611)  Plan of Care Reviewed With: patient     Problem: Infection (Sepsis/Septic Shock)  Goal: Absence of Infection Signs/Symptoms  Outcome: Ongoing, Progressing  Intervention: Prevent or Manage Infection Progression  Flowsheets (Taken 9/4/2020 1611)  Fever Reduction/Comfort Measures: medication administered  Isolation Precautions: protective environment maintained     Problem: Electrolyte Imbalance (Acute Kidney Injury/Impairment)  Goal: Serum Electrolyte Balance  Outcome: Ongoing, Progressing  Intervention: Monitor and Manage Electrolyte Imbalance  Flowsheets (Taken 9/4/2020 1611)  Fluid/Electrolyte Management: electrolyte supplement initiated

## 2020-09-04 NOTE — PROGRESS NOTES
"On assessment Pt complain of pain 8/10 repositioned, adjusted room temp and went over previous pain regimens that helped Pt previously. Pt stated, "Morphine really helped my pain before". MD contacted, reviewed VS all elevated d/t pain and Pt's preferred pain medication of morphine. Orders to be placed in MAR. Temp 100 oral placed Ice pack behind neck and adjusted blankets per Pt preference. Will cont to monitor.  "

## 2020-09-04 NOTE — PT/OT/SLP PROGRESS
Physical Therapy      Patient Name:  Huy Cisneros   MRN:  [id number]    Patient not seen today secondary to Patient unwilling to participate. Pt reports she did arms and leg exercises with OT, she sat EOB, and stood up.  Pt also reported that she just got back to bed after walking to the bathroom with nsg and she's exhausted.  Will follow-up able.    Dani Ford, PTA

## 2020-09-04 NOTE — PLAN OF CARE
Problem: Occupational Therapy Goal  Goal: Occupational Therapy Goal  Description: Goals to be met by: 9/27    Patient will increase functional independence with ADLs by performing:    Feeding with Modified [place].  UE Dressing with Stand-by Assistance.  Grooming while seated at sink with Modified [place].  Toileting from bedside commode with Minimal Assistance for hygiene and clothing management.   Toilet transfer to bedside commode with Minimal Assistance.  Increased functional strength to 3+/5 for BUE.    Outcome: Ongoing, Progressing     Huy Cisneros is a 29 y.o. female with a medical diagnosis of Septic shock.  Performance deficits affecting function are weakness, impaired endurance, impaired self care skills, impaired functional mobilty, gait instability, impaired balance, decreased upper extremity function, decreased lower extremity function, pain, impaired coordination, impaired fine motor, impaired skin, impaired cardiopulmonary response to activity. Pt found in bed, agreeable to bed level activity only secondary to continued drowsiness from morphine. She tolerated BUE therex well with improved UE AROM/strength.  Continue OT services to address functional goals, progressing as able.      DAT Puga/L

## 2020-09-04 NOTE — CONSULTS
Progress note  HPI & INTERVAL HISTORY:    Past Medical History:   Diagnosis Date    Cancer     Colon CA with mets to the liver    Uterine cyst       Past Surgical History:   Procedure Laterality Date    COLONOSCOPY N/A 7/15/2020    Procedure: COLONOSCOPY;  Surgeon: Hi Drake MD;  Location: [facility] ENDO;  Service: Endoscopy;  Laterality: N/A;    COLONOSCOPY W/ BIOPSIES      INSERTION OF TUNNELED CENTRAL VENOUS CATHETER (CVC) WITH SUBCUTANEOUS PORT N/A 2020    Procedure: INSERTION, PORT-A-CATH;  Surgeon: Armani Naqvi MD;  Location: [facility] OR;  Service: General;  Laterality: N/A;      Review of patient's allergies indicates:   Allergen Reactions    Sulfa (sulfonamide antibiotics)       Medications Prior to Admission   Medication Sig Dispense Refill Last Dose    docusate sodium (COLACE) 100 MG capsule Take 1 capsule (100 mg total) by mouth once daily. 30 capsule 0     folic acid (FOLVITE) 1 MG tablet Take 1 tablet (1 mg total) by mouth once daily. 100 tablet 3     HYDROcodone-acetaminophen (NORCO) 5-325 mg per tablet Take 1 tablet by mouth every 8 (eight) hours as needed for Pain (chronic cancer-related pain). 60 tablet 0     lidocaine-prilocaine (EMLA) cream Apply topically As instructed. Apply to skin overlying port site 30 minutes before chemotherapy. 30 g 2     [] promethazine (PHENERGAN) 25 MG tablet Take 1 tablet (25 mg total) by mouth every 6 (six) hours as needed for Nausea. 60 tablet 2        Social History     Socioeconomic History    Marital status: Single     Spouse name: Not on file    Number of children: Not on file    Years of education: Not on file    Highest education level: Not on file   Occupational History    Not on file   Social Needs    Financial resource strain: Not on file    Food insecurity     Worry: Not on file     Inability: Not on file    Transportation needs     Medical: Not on file     Non-medical: Not on file   Tobacco Use    Smoking  status: Never Smoker    Smokeless tobacco: Never Used   Substance and Sexual Activity    Alcohol use: No    Drug use: No    Sexual activity: Yes     Partners: Male     Birth control/protection: None   Lifestyle    Physical activity     Days per week: Not on file     Minutes per session: Not on file    Stress: Not on file   Relationships    Social connections     Talks on phone: Not on file     Gets together: Not on file     Attends Denominational service: Not on file     Active member of club or organization: Not on file     Attends meetings of clubs or organizations: Not on file     Relationship status: Not on file   Other Topics Concern    Not on file   Social History Narrative    Not on file        MEDS   enoxaparin  90 mg Subcutaneous Q12H    meropenem (MERREM) IVPB  1 g Intravenous Q8H    metoprolol tartrate  25 mg Oral TID    psyllium husk (aspartame)   Oral Daily    sodium chloride 0.9%  10 mL Intravenous Q6H    vancomycin (VANCOCIN) IVPB  1,250 mg Intravenous Q12H               CONTINOUS INFUSIONS:      Intake/Output Summary (Last 24 hours) at 9/4/2020 1218  Last data filed at 9/4/2020 0600  Gross per 24 hour   Intake 1280 ml   Output 800 ml   Net 480 ml        HEMODYNAMICS:    Temp:  [98.3 °F (36.8 °C)-101.4 °F (38.6 °C)] 100 °F (37.8 °C)  Pulse:  [109-126] 110  Resp:  [21-54] 22  SpO2:  [94 %-100 %] 96 %  BP: (104-160)/(55-90) 120/73   Gen: NAD  No CP  No SOB  No cough  No fever  No chills  No nausea   No vomiting  Right IJ catheter  Cardiology : Pulse 100  Pulmonary : diminished breath sounds  Abdomen soft   Extremities : edema   Skin:dry   Dialysis Access:    Asterexis  LABS   Lab Results   Component Value Date    WBC 26.54 (H) 09/04/2020    HGB 7.9 (L) 09/04/2020    HCT 24.3 (L) 09/04/2020    MCV 75 (L) 09/04/2020    PLT 77 (L) 09/04/2020        Recent Labs   Lab 09/04/20  0604      CALCIUM 7.5*   ALBUMIN 1.7*   PROT 6.3   *   K 3.4*   CO2 22*   CL 99   BUN 12   CREATININE 0.6    ALKPHOS 417*   ALT 20   AST 80*   BILITOT 1.2*      Lab Results   Component Value Date    CALCIUM 7.5 (L) 09/04/2020    PHOS 1.7 (L) 09/04/2020      Lab Results   Component Value Date    IRON 20 (L) 08/10/2020    TIBC 342 08/10/2020    FERRITIN 406 (H) 08/10/2020        ABG  No results for input(s): PH, PO2, PCO2, HCO3, BE in the last 168 hours.      IMAGING:  CXR    ASSESSMENT / PLAN  KIERSTEN resolved  Urine output 800 cc+  Creatinine 0.6  UA protein 1+  Hyponatremia  Hypokalemia  Replace  Gap Metabolic acidosis  Metabolic alkalosis  Hypophosphatemia  Replace  Poor nutrition  Albumin 1.7  Anemia Hb 7.9  Blood pressure 120/73  Supplements  I and O.  D/c dieudonne catheter.

## 2020-09-04 NOTE — PROGRESS NOTES
Infectious Disease Follow up Note    Impression/Plan:    MRSA Bacteremia  29 F with PMH of metastatic adenocarcinoma of the colon with liver mets, uterine cysts, and HTN on whom ID is consulted for likely staph aureus bacteremia. She presented neutropenic.   WBC up to 2.5 on 8/28. Port removed on 8/26 - positive for staph aureus > 15 colonies. TTE was negative 8/27 for vegetations. YOLANDA negative for vegetations 8/31. Patient continues to be febrile.      -continue Vanc for MRSA bacteremia  -recommend stopping Meropenem at this time  -OK to stop blood cultures at this time  -elevated WBC may still be effect of tbo-filgrastim however high fever is concerning for ongoing infectious source  - Remove RIJ CVC if it isn't needed    Thanks! Please call for any questions [phone]  Imtiaz Albert  [employer] ID    Hospital Day 9  Principal Problem: Septic shock     HPI: 29 F with PMH of metastatic adenocarcinoma of the colon with liver mets, uterine cysts, and HTN who presented to [facility] ED for intractable nausea/vomitting that has been on going for about 1 day.  Pt describes vomiting as having some food contents but mostly saliva, she reports she has not been able to tolerated PO since symptoms began.  Associated symptoms include fevers, chills, and general malaise which also started when nausea/vomiting started. Pt denies abdominal pain, endorses 1 episode of diarrhea. Denies any chest pain, SOB, changes in urinary habits.  Pt reports she has never had symptoms like this in the past. She reports her cancer is s/p resection in July 2020 and currently undergoing chemotherapy last given 2 weeks via port in the right chest.  In ED noticed wound dehiscence at right chest port site. She was admitted to the ICU. Was briefly on norepi but off it now. Blood cxs with GPCs and staph aureus from the port site. Port is removed.      8/26 BC MRSA  8/26 R chest wound culture MRSA   8/26 cath tip Staph aureus >14 colonies  8/28 BC  MRSA  8/28 WBC up to 2.5K  8/29 WBC up to 10K  8/29 BC MRSA  8/30 BC NGTD  8/31 BC Staph aureus   8/31 YOLANDA with no vegetations  9/1 BC NGTD  9/1 started on Meropenem  9/3 CT C/A/P concerning for septic emboli   9/4 BCx in process    Interval History: Patient states she feels a little better today. Breathing better and on room air. Denies chest pain. Diarrhea is still present but improved. No abdominal pain, leg pain. Tolerating diet.       Review of Symptoms:  ROS Otherwise negative    Medications:  Antibiotics:   Antibiotics (From admission, onward)    Start     Stop Route Frequency Ordered    09/04/20 1600  vancomycin in dextrose 5 % 1 gram/250 mL IVPB 1,000 mg      -- IV Every 12 hours (non-standard times) 09/04/20 1510    09/01/20 1700  meropenem 1 g in sodium chloride 0.9 % 100 mL IVPB (ready to mix system)      -- IV Every 8 hours (non-standard times) 09/01/20 1509    08/26/20 1628  vancomycin - pharmacy to dose  (vancomycin IVPB)      -- IV pharmacy to manage frequency 08/26/20 1528        Objective:  Physical Exam:  VS (24h):  Temp:  [97.5 °F (36.4 °C)-100.6 °F (38.1 °C)] 97.5 °F (36.4 °C)  Pulse:  [108-126] 110  Resp:  [20-54] 20  SpO2:  [94 %-100 %] 99 %  BP: (104-145)/(55-86) 118/81     Physical Exam   GEN- resting comfortably   HEENT- PERRL, EOMI, MMM  NECK- No thyromegaly or other masses  CARDIAC- Tachycardic, no murmurs  RESP- Tachypnea, on room air, crackles noted bilaterally  ABD- Soft, non-tender, ND, +BS; no masses or HSM  EXT- No clubbing, cyanosis, or edema; 2+ DP/PT pulses  NEURO- CN II-XII grossly intact; moves all four extremities equally  SKIN- Warm and dry; no rashes, site of prior port is clean and dry without tenderness.     Lines:  RIJ CVC  PICC L UE  PIV    Labs:  CBC:   Lab Results   Component Value Date    WBC 26.54 (H) 09/04/2020    WBC 27.95 (H) 09/03/2020    WBC 32.88 (H) 09/02/2020    WBC 29.81 (H) 09/01/2020    WBC 25.77 (H) 08/31/2020    HCT 24.3 (L) 09/04/2020    PLT 77 (L)  09/04/2020     BMP:   Recent Labs   Lab 09/04/20  0604      *   K 3.4*   CL 99   CO2 22*   BUN 12   CREATININE 0.6   CALCIUM 7.5*   MG 1.9     LFT:   Lab Results   Component Value Date    ALT 20 09/04/2020    AST 80 (H) 09/04/2020    ALKPHOS 417 (H) 09/04/2020    BILITOT 1.2 (H) 09/04/2020     Microbiology x 7d:   Microbiology Results (last 7 days)     Procedure Component Value Units Date/Time    Blood culture [[id number]] Collected: 09/03/20 0416    Order Status: Completed Specimen: Blood from Antecubital, Right Arm Updated: 09/04/20 1213     Blood Culture, Routine No Growth to date      No Growth to date    Blood culture [[id number]]  (Abnormal) Collected: 08/31/20 0631    Order Status: Completed Specimen: Blood from Antecubital, Right Arm Updated: 09/04/20 1101     Blood Culture, Routine Gram stain aer bottle: Gram positive cocci in clusters resembling Staph      Results called to and read back by: Argentina Vicente RN. 09/03/2020        03:16      STAPHYLOCOCCUS AUREUS  Susceptibility pending  ID consult required at [facility],[place] and [facility] locations.      Blood culture [[id number]] Collected: 09/02/20 0721    Order Status: Completed Specimen: Blood from Peripheral, Right Wrist Updated: 09/04/20 1022     Blood Culture, Routine No Growth to date      No Growth to date      No Growth to date    Blood culture [[id number]] Collected: 08/30/20 0121    Order Status: Completed Specimen: Blood from Peripheral, Right Updated: 09/04/20 1022     Blood Culture, Routine No growth after 5 days.    Blood culture [[id number]] Collected: 09/01/20 0655    Order Status: Completed Specimen: Blood from Antecubital, Right Arm Updated: 09/04/20 1012     Blood Culture, Routine No Growth to date      No Growth to date      No Growth to date      No Growth to date    Narrative:      Collection has been rescheduled by LLR at 09/01/2020 04:28 Reason:   nurse said to draw at at 6AM  Collection has been rescheduled by LLR at  09/01/2020 04:28 Reason:   nurse said to draw at at 6AM    Blood culture [[id number]] Collected: 09/04/20 0655    Order Status: Sent Specimen: Blood from Peripheral, Right Wrist Updated: 09/04/20 0949    Blood culture [[id number]]  (Abnormal) Collected: 08/29/20 1658    Order Status: Completed Specimen: Blood from Antecubital, Right Updated: 09/03/20 0753     Blood Culture, Routine Gram stain aer bottle: Gram positive cocci in clusters resembling Staph       Results called to and read back by: Jessica Hewitt 09/01/2020  12:07      METHICILLIN RESISTANT STAPHYLOCOCCUS AUREUS  ID consult required at [facility].  For susceptibility see order #[id number]      Culture, Anaerobe [[id number]] Collected: 08/26/20 1602    Order Status: Completed Specimen: Catheter Tip from Chest, Right Updated: 08/31/20 1305     Anaerobic Culture No anaerobes isolated    Blood culture [[id number]]  (Abnormal) Collected: 08/28/20 1137    Order Status: Completed Specimen: Blood from Antecubital, Right Updated: 08/31/20 1033     Blood Culture, Routine Gram stain juan bottle: Gram positive cocci in clusters resembling Staph       Results called to and read back by: Nighat Craft  08/29/2020  15:26      METHICILLIN RESISTANT STAPHYLOCOCCUS AUREUS  ID consult required at [facility].  For susceptibility see order #[id number]      Blood culture [[id number]]  (Abnormal)  (Susceptibility) Collected: 08/28/20 1137    Order Status: Completed Specimen: Blood Updated: 08/31/20 1030     Blood Culture, Routine Gram stain aer bottle: Gram positive cocci in clusters resembling Staph       Results called to and read back by: Nighat Craft  08/29/2020  15:26      Gram stain juan bottle: Gram positive cocci in clusters resembling Staph       Positive results previously called 08/29/2020  17:57      METHICILLIN RESISTANT STAPHYLOCOCCUS AUREUS  ID consult required at [facility].       Culture, Anaerobe [[id number]] Collected: 08/26/20 1448    Order Status: Completed Specimen: Incision site from Chest, Right Updated: 08/31/20 0938     Anaerobic Culture No anaerobes isolated    Narrative:      Port incision site    Clostridium difficile EIA [[id number]] Collected: 08/28/20 1808    Order Status: Completed Specimen: Stool Updated: 08/29/20 2305     C. diff Antigen Negative     C difficile Toxins A+B, EIA Negative     Comment: Testing not recommended for children <24 months old.       IV catheter culture [[id number]]  (Abnormal)  (Susceptibility) Collected: 08/26/20 1604    Order Status: Completed Specimen: Catheter Tip Updated: 08/29/20 1312     Aerobic Culture - Cath tip METHICILLIN RESISTANT STAPHYLOCOCCUS AUREUS  > 15 colonies      Blood culture [[id number]]  (Abnormal)  (Susceptibility) Collected: 08/26/20 1140    Order Status: Completed Specimen: Blood from Peripheral, Hand, Right Updated: 08/29/20 1221     Blood Culture, Routine Gram stain juan bottle: Gram positive cocci in clusters resembling Staph      Results called to and read back by: Trudy Morales RN  08/27/2020        02:08      Gram stain aer bottle: Gram positive cocci in clusters resembling Staph      Positive results previously called 08/27/2020  03:19      METHICILLIN RESISTANT STAPHYLOCOCCUS AUREUS  ID consult required at [facility].          Imaging:  CXR 8/26/2020  Right chest MediPort catheter with tip overlying the atrium.  Lungs and heart demonstrate no significant interval detrimental change in the short interval.  No large pleural effusion or gross pneumothorax.    CXR 8/26/2020  Since the most recent study, the port catheter has been discontinued.  The tip of a newly placed right internal jugular approach central venous catheter is in the region of the right atrium.  No pneumothorax or pleural effusion.  Cardiomediastinal silhouette is unchanged.  Lungs remain clear.  Additional findings  unchanged.    DVT US BL ANGUS 8/27/2020  No evidence of deep venous thrombosis in either lower extremity.    MRI T/L 8/28  1. Within limitations of lack of intravenous contrast, no findings of infection in the thoracic or lumbar spine are identified.  2. No significant spinal canal or foraminal narrowing.  Normal appearance of the thoracic spinal cord and conus medullaris.  3. Partially imaged are multiple bilateral peripheral predominant pulmonary nodules, some of which may be cavitary.  Given that these lesions appear to be new since the 07/20/2020 CT examination, the findings may represent multifocal infectious process or septic emboli.  Metastatic disease would be additional consideration.  Dedicated CT chest imaging can be considered for further evaluation.  4. Known hepatic metastatic disease partially imaged.    CT A/P 8/28  Postoperative changes of bowel resection are identified.  There are innumerable hypodense lesions throughout the liver compatible with hepatic metastatic disease.  Focal peristomal prior imaging limited given the lack of intravenous contrast material.     Bibasilar consolidation, left greater than right with subtotal consolidation of the left lower lobe.  There are some nodular densities seen bilaterally in both visualized lung bases.  While this could represent metastatic disease, given the patient's history of sepsis, septic emboli not entirely excluded.  These findings are new as compared to the previous study of 07/14/2020.     Liquid stool throughout the colon suggesting diarrhea.    YOLANDA 8/31/2020  - No vegetations seen    Lower Extremity US 9/1  - No evidence of deep venous thrombosis in either lower extremity    CXR 9/2  Left PICC catheter tip projects over the distal SVC.  Right central venous catheter tip projects over the distal SVC.  The cardiomediastinal silhouette is not enlarged.  There is obscuration of the left costophrenic angle, may reflect small effusion versus attenuation  related to overlying soft tissue.  The trachea is midline.  The lungs are symmetrically expanded bilaterally with patchy increased interstitial and parenchymal attenuation bilaterally, worsened since the previous exam, differential would include worsening edema or infection.  No large focal consolidation seen.  There is no pneumothorax.  The osseous structures are unremarkable..    CT C/A/P 9/2  - Similar distribution of ill-defined peripheral nodular densities and mixed consolidative opacities throughout both lungs, some demonstrating areas new and more conspicuous central cavitation.  Overall findings concerning for infectious process with component of septic emboli.  - race left pleural effusion.  - Rounded structure in the mid abdomen containing intraluminal gas focus, without significant surrounding inflammatory change.  Findings could relate to focal outpouching related to nearby anastomosis versus traversing bowel loop.  Small contained extraluminal collection cannot be definitively excluded.  Close attention at follow-up.  - Mild volume lower abdominopelvic ascites, slightly increased.  - Intrahepatic metastasis with likely metastatic node at the richard hepatis.    Assessment:    Active Hospital Problems    Diagnosis    *Septic shock    Thrombocytopenia    Palliative care encounter    Goals of care, counseling/discussion    Counseling regarding advance care planning and goals of care    Bacteremia due to Staphylococcus    Chest pain    Elevated troponin    Pancytopenia    MRSA bacteremia    KIERSTEN (acute kidney injury)    Infected venous access port    Metabolic acidosis    Hypomagnesemia    Hyponatremia    Nausea & vomiting    Lactic acid acidosis    Sepsis    Anemia in neoplastic disease    Metastatic disease    Secondary malignancy of liver    Adenocarcinoma of colon     Plan:  See top of page.

## 2020-09-04 NOTE — PLAN OF CARE
09/04/20 1000   Post-Acute Status   Post-Acute Authorization Home Health   Home Health Status Referrals Sent   Discharge Plan   Discharge Plan A Home Health  (the pt has been accepted to [facility] [phone] at d/c)   Discharge Plan B Hospice/home   The Sw spoke to Yolanda at [facility] [phone] who states they have accepted the pt. The  orders should be faxed and the agency should be notified when the pt's d/c is near. They have a hospice agency also and the pt can be transitioned to hospice when appropriate. The Sw informed the pt and her mother of this info mentioned above and they are both in agreement with the d/c plan. The pt's mother states she has been in communication with the [facility] for a PCA for the pt and they're in the process of mailing her the paperwork to complete. The Sw encouraged her to call if she has any questions or concerns.

## 2020-09-04 NOTE — SUBJECTIVE & OBJECTIVE
Interval history: The patient was stepped down from ICU today. She is still tachycardic and tachypneic but better today. Receiving morphine for pain.    Review of Systems   Constitutional: Positive for activity change, appetite change, fatigue and fever. Negative for chills.   Respiratory: Positive for cough and shortness of breath.    Cardiovascular: Positive for chest pain and leg swelling.   Gastrointestinal: Positive for abdominal pain and diarrhea. Negative for abdominal distention, nausea and vomiting.   Genitourinary: Negative for difficulty urinating.   Skin: Positive for wound (right chest wall).   Allergic/Immunologic: Positive for immunocompromised state.   Neurological: Positive for weakness.   Psychiatric/Behavioral: The patient is nervous/anxious.      Objective:     Vital Signs (Most Recent):  Temp: 97.5 °F (36.4 °C) (09/04/20 1607)  Pulse: 110 (09/04/20 1607)  Resp: 20 (09/04/20 1607)  BP: 118/81 (09/04/20 1607)  SpO2: 99 % (09/04/20 1607) Vital Signs (24h Range):  Temp:  [97.5 °F (36.4 °C)-100.6 °F (38.1 °C)] 97.5 °F (36.4 °C)  Pulse:  [108-126] 110  Resp:  [20-54] 20  SpO2:  [94 %-100 %] 99 %  BP: (104-145)/(55-86) 118/81     Weight: 85.5 kg (188 lb 7.9 oz)  Body mass index is 31.37 kg/m².    Physical Exam  Vitals signs and nursing note reviewed.   Constitutional:       Appearance: She is ill-appearing.   HENT:      Head:      Comments: trialysis line in place at [facility]   Neck:      Musculoskeletal: Normal range of motion.   Cardiovascular:      Rate and Rhythm: Regular rhythm. Tachycardia present.      Pulses: Normal pulses.      Heart sounds: Normal heart sounds.   Pulmonary:      Effort: Tachypnea present.      Breath sounds: Decreased breath sounds present.      Comments: Patient unable to speak due to SOB  Abdominal:      General: Bowel sounds are normal. There is no distension.      Palpations: Abdomen is soft.   Musculoskeletal: Normal range of motion.         General: Swelling present.       Right lower leg: Edema present.      Left lower leg: Edema present.   Skin:     General: Skin is warm and dry.      Capillary Refill: Capillary refill takes less than 2 seconds.      Comments: Dressing to the Right CW, clean dry and intact   Neurological:      Mental Status: She is alert and oriented to person, place, and time.      Motor: Weakness present.   Psychiatric:         Mood and Affect: Mood is anxious.         Behavior: Behavior normal.             Significant Labs:   BMP:   Recent Labs   Lab 09/04/20  0604      *   K 3.4*   CL 99   CO2 22*   BUN 12   CREATININE 0.6   CALCIUM 7.5*   MG 1.9     CBC:   Recent Labs   Lab 09/03/20  0336 09/04/20  0604   WBC 27.95* 26.54*   HGB 6.4* 7.9*   HCT 20.4* 24.3*   * 77*     Lactic Acid:   No results for input(s): LACTATE in the last 48 hours.  Magnesium:   Recent Labs   Lab 09/04/20  0604   MG 1.9     POCT Glucose:   No results for input(s): POCTGLUCOSE in the last 48 hours.      Significant Imaging: I have reviewed all pertinent imaging results/findings within the past 24 hours.

## 2020-09-04 NOTE — PROGRESS NOTES
[facility]  Hematology/Oncology  Progress Note    Patient Name: Huy Cisneros  Admission Date: 8/26/2020  Hospital Length of Stay: 9 days  Code Status: Full Code     Subjective:     HPI:  29-year-old female underwent right hemicolectomy 07/17/2020 for metastatic colon adenocarcinoma and received 1st cycle of FOLFOX 08/11/2020.    7/20/2020 - CT scan - Multiple hepatic hypodensities better evaluated on CT from 07/14/2020, consistent with metastatic disease.    She has been vomiting and feeling dizzy since yesterday.  Last night she busted a stitch to the Port-A-Cath site causing dehiscence and this morning she fell after going to the restroom.    She presented to the ED with tachycardia, heart rate up to 160 and was hypotensive with blood pressure down to 85/43.  She was started on antibiotics, IV fluids and pressors and transferred to [place] ICU.    WBC on admission 0.95, creatinine 2, lactate >12    Bilirubin 3.8, , ALT 93    Seen by . Right chest port removed.      Interval History:   [I saw patient this morning while she was still in room 560.]  - Tmax 101.4 at 1200 hrs on 9/3/20.  - patient was drowsy with flat affect during examination this morning. Limited answers to review-of-systems questions.    Oncology Treatment Plan:   OP COLORECTAL FOLFOX + BEVACIZUMAB Q2W    Medications:  Continuous Infusions:  Scheduled Meds:   enoxaparin  90 mg Subcutaneous Q12H    meropenem (MERREM) IVPB  1 g Intravenous Q8H    metoprolol tartrate  25 mg Oral TID    psyllium husk (aspartame)   Oral Daily    sodium chloride 0.9%  10 mL Intravenous Q6H    vancomycin (VANCOCIN) IVPB  1,250 mg Intravenous Q12H     PRN Meds:sodium chloride, acetaminophen, benzonatate, diphenhydrAMINE-zinc acetate 2-0.1%, heparin (porcine), influenza, melatonin, morphine, ondansetron, ondansetron, ondansetron, ondansetron, pneumoc 13-davion conj-dip cr(PF), promethazine (PHENERGAN) IVPB, simethicone, sodium  chloride 0.9%, Flushing PICC Protocol **AND** sodium chloride 0.9% **AND** sodium chloride 0.9%, Pharmacy to dose Vancomycin consult **AND** vancomycin - pharmacy to dose     Review of Systems   Constitutional: Positive for activity change and fatigue. Negative for fever and unexpected weight change.   HENT: Negative for mouth sores and nosebleeds.    Respiratory: Negative for shortness of breath and wheezing.    Cardiovascular: Negative for chest pain and palpitations.   Gastrointestinal: Negative for abdominal pain and nausea.   Allergic/Immunologic: Negative for food allergies.   Neurological: Positive for weakness.   Psychiatric/Behavioral: Negative for behavioral problems and confusion.     Objective:     Vital Signs (Most Recent):  Temp: 100 °F (37.8 °C) (09/04/20 1148)  Pulse: 110 (09/04/20 1148)  Resp: (!) 22 (09/04/20 1148)  BP: 120/73 (09/04/20 1148)  SpO2: 96 % (09/04/20 1148) Vital Signs (24h Range):  Temp:  [98.3 °F (36.8 °C)-101.4 °F (38.6 °C)] 100 °F (37.8 °C)  Pulse:  [109-126] 110  Resp:  [21-54] 22  SpO2:  [94 %-100 %] 96 %  BP: (104-160)/(55-90) 120/73     Weight: 85.5 kg (188 lb 7.9 oz)  Body mass index is 31.37 kg/m².  Body surface area is 1.98 meters squared.      Intake/Output Summary (Last 24 hours) at 9/4/2020 1200  Last data filed at 9/4/2020 0600  Gross per 24 hour   Intake 1280 ml   Output 800 ml   Net 480 ml       Physical Exam  Vitals signs and nursing note reviewed.   Constitutional:       General: She is awake.      Appearance: She is not ill-appearing.      Comments: fatigued   HENT:      Mouth/Throat:      Pharynx: No oropharyngeal exudate.   Neck:      Musculoskeletal: Neck supple. No neck rigidity.      Thyroid: No thyromegaly.   Cardiovascular:      Rate and Rhythm: Regular rhythm. Tachycardia present.   Pulmonary:      Effort: Pulmonary effort is normal. No respiratory distress.      Breath sounds: No wheezing or rales.   Chest:       Lymphadenopathy:      Cervical: No cervical  adenopathy.   Skin:     Findings: No bruising or petechiae.   Neurological:      Mental Status: She is alert and oriented to person, place, and time.   Psychiatric:         Behavior: Behavior is cooperative.         Significant Labs:   Labs have been reviewed.    Lab Results   Component Value Date    WBC 26.54 (H) 09/04/2020    HGB 7.9 (L) 09/04/2020    HCT 24.3 (L) 09/04/2020    MCV 75 (L) 09/04/2020    PLT 77 (L) 09/04/2020       Diagnostic Results:  CT chest/abdomen/pelvis (9/2/20): I have personally reviewed the images  Similar distribution of ill-defined peripheral nodular densities and mixed consolidative opacities throughout both lungs, some demonstrating areas new and more conspicuous central cavitation.  Overall findings concerning for infectious process with component of septic emboli.     Trace left pleural effusion.     Rounded structure in the mid abdomen containing intraluminal gas focus, without significant surrounding inflammatory change.  Findings could relate to focal outpouching related to nearby anastomosis versus traversing bowel loop.  Small contained extraluminal collection cannot be definitively excluded.  Close attention at follow-up.     Mild volume lower abdominopelvic ascites, slightly increased.     Intrahepatic metastasis with likely metastatic node at the richard hepatis.    Assessment/Plan:     Adenocarcinoma of colon  - status post cycle #1 of FOLFOX in August 2020.  - CT scan (8/29/20) reveals a partial response to therapy with slight decrease in size of index lesions.  - will need to clear and fully treat MRSA bacteremia prior to proceeding with future cycles of chemotherapy. Last positive blood culture was 8/31/20, which is encouraging. Transesophageal echocardiogram was negative for signs of endocarditis.    Thrombocytopenia  - Labs have been reviewed. Platelet count is 77 k/uL. I suspect this is related to sepsis and antibiotics. If platelet count continues to fall, may consider  drawing a heparin-induced thrombocytopenia platelet antibody test.  - continue to monitor.        Jewel Arzola MD  Hematology/Oncology  [facility]

## 2020-09-04 NOTE — PROGRESS NOTES
"NP informed of Temp 100 at 1945 and 100.6 at 2310 temp earlier in the day x1. Reviewed order for daily B.C. Informed her dropped temp to 74 degrees in room but Pt refused to take off heater. Pt stated, "I do not want to be messed with right now this the  Allowed me to place ice packs to B/L under arms. NP stated, "just monitor for now".  "

## 2020-09-04 NOTE — PT/OT/SLP PROGRESS
Occupational Therapy   Treatment    Name: Huy Cisneros  MRN: [id number]  Admitting Diagnosis:  Septic shock  4 Days Post-Op    Recommendations:     Discharge Recommendations: other (see comments)(TBD)  Discharge Equipment Recommendations:  other (see comments)(TBD)  Barriers to discharge:  Other (Comment)(Increased level of assistance)    Assessment:     Huy Cisneros is a 29 y.o. female with a medical diagnosis of Septic shock.  Performance deficits affecting function are weakness, impaired endurance, impaired self care skills, impaired functional mobilty, gait instability, impaired balance, decreased upper extremity function, decreased lower extremity function, pain, impaired coordination, impaired fine motor, impaired skin, impaired cardiopulmonary response to activity. Pt found in bed, agreeable to bed level activity only secondary to continued drowsiness from morphine. She tolerated BUE therex well with improved UE AROM/strength.  Continue OT services to address functional goals, progressing as able.      Rehab Prognosis:  Fair; patient would benefit from acute skilled OT services to address these deficits and reach maximum level of function.       Plan:     Patient to be seen 5 x/week to address the above listed problems via self-care/home management, therapeutic activities, therapeutic exercises  · Plan of Care Expires: 09/27/20  · Plan of Care Reviewed with: patient    Subjective     Pain/Comfort:  · Pain Rating 1: 4/10(pt reports morphine has helped manage overall pain.)    Objective:     Communicated with: RN prior to session.  Patient found HOB elevated with oxygen, PICC line, central line, PureWick(multiple ICU lines and monitors) upon OT entry to room.    General Precautions: Standard, fall, neutropenic   Orthopedic Precautions:N/A   Braces: N/A     Occupational Performance:     Bed Mobility:    · declined    Functional Mobility/Transfers:  · Declined. Pt reports she will transfer to chair  this afternoon when she is moved to 4th floor. PTA notified.     Activities of Daily Living:  · Grooming: modified independence        [facility] 6 Click ADL: 15    Treatment & Education:  Pt performed BUE AROM ex 2 x 10 reps all jts/planes.  Pt tolerated well with rest breaks 2/2 SOB and muscle fatigue.  Gentle stretches provided to B shlds.     Patient left HOB elevated with all lines intact, call button in reach and RN notifiedEducation:      GOALS:   Multidisciplinary Problems     Occupational Therapy Goals        Problem: Occupational Therapy Goal    Goal Priority Disciplines Outcome Interventions   Occupational Therapy Goal     OT, PT/OT Ongoing, Progressing    Description: Goals to be met by: 9/27    Patient will increase functional independence with ADLs by performing:    Feeding with Modified Edson.  UE Dressing with Stand-by Assistance.  Grooming while seated at sink with Modified Edson.  Toileting from bedside commode with Minimal Assistance for hygiene and clothing management.   Toilet transfer to bedside commode with Minimal Assistance.  Increased functional strength to 3+/5 for BUE.                     Time Tracking:     OT Date of Treatment: 09/04/20  OT Start Time: 0941  OT Stop Time: 1006  OT Total Time (min): 25 min    Billable Minutes:Therapeutic Exercise 25    CARLTON Puga  9/4/2020

## 2020-09-04 NOTE — PLAN OF CARE
VN cued into pt's room for introduction with pt's permission.  VN role explained and informed pt that VN would be working with bedside nurse and the rest of the care team.  Fall risk and bed alarm protocol education provided.  Instructed pt to call for assistance and agreeable.  Allowed time for questions. Pt denies pain and sob.NAD noted. Sepsis alert noted when pt arrived to unit. Sepsis screening checklist done and dr sheth notified.  Will cont to be available as needed.

## 2020-09-04 NOTE — PROGRESS NOTES
[facility] Hospital Medicine  Progress Note    Patient Name: Huy Cisneros  MRN: [id number]  Patient Class: IP- Inpatient   Admission Date: 8/26/2020  Length of Stay: 9 days  Attending Physician: Lex Yepez DO  Primary Care Provider: Primary Doctor Gisselle        Subjective:     Principal Problem:Septic shock        HPI:  Ms. Huy Cisneros is a 30 y/o female who lives in [place], [place] at her residence with her children. The patients PCP is Imtiaz Lindsay PA-C. She is also followed by Dr. Jewel Arzola with Oncology. She has a pmh of colon cancer with liver mets, uterine cysts, and HTN. She has a past surgical history of a mediport placement and a colonoscopy. The patient does not smoke cigarettes, drink alcohol, or use illicit drugs.     She presents to the ED with complaints of fever, vomiting and feeling dizzy since yesterday.  Per the patient she states her port a cath to right chest wall busted a stitch last night she while was bathing. She also has complaints of feeling SOB with lower extremity swelling.     Her ED workup includes WBC--0.95, hemoglobin 9.9, Na--132, creatinine 2.00, magnesium--1.4, lactate--10.7, CPK--180, pH--7.2. Pending blood cultures and wound cultures. She is COVID-19 negative. CXR---There has been interval placement of a right subclavian venous line. Its tip is in the region of the junction of the superior vena cava with the right atrium.  There is no pneumothorax. There is no focal pulmonary infiltrate visualized. XR of the abd---There is a paucity of gas within the gastrointestinal system. There are multiple radiopaque leads projected over the abdomen and pelvis. She will be admitted to the [facility] department for further care.         Overview/Hospital Course:  She was admitted to the [facility] hospital medicine service for further care. We have consulted ID, nephrology, oncology, pulmonology, and general surgery. Her tunneled line is  the suspected source for her bacteremia/septic shock. Blood cultures positive for staph. TTE pending. General sx removed tunneled port at the bedside. Will cont to follow cultures. She was initially given Levophed and Amiodarone. Lactate has improved---she was given IVF and now Nephrology has started Bicarb gtt. Dr. Naqvi was consulted for trialysis line placement---She was started on CRRT, now stopped at this time. Will cont broad spectrum abx. G-CSF started by oncology for neutropenia. MRI of the thoracic and lumbar spine was completed on 8/28 to r/o spinal abscess---Her MRI did not find a spinal fluid collection, however the MRI was suggestive of possible cavitary lung lesions. She is to have a CT chest, abdomen, and pelvis today for assessment of possible cavities in her lungs and mets in her liver. MRSA grew out at the port site. She was weaned off of levophed and now started on BB because of 's. Pulmonology has ordered CT of the C/A/P with contrast to evaluate the patient  for septic pulmonary emboli. WBCs have increased and are now 26.  she has Staph  in her BCs and the one from yesterday is NGTD.  On her CT chest and abdomen she has small bilateral pleural effusions and her liver mets appear smaller. YOLANDA with no vegetation noted. Cont daily blood cultures. Patient is on vancomycin and meropenem per ID recs. The patient remains with tachypnea and tachycardia. Up out of bed with PT. CT of chest and abdomen showing increased chest infiltrates and ascites, probable septic emboli, and extensive metastatic disease. PICC line placed for inability to obtain peripheral IV. CTA ordered to r/o PE; she was negative for PE. Patient remains tachypneic and tachycardic with fevers. Lasix was given. An abdominal US was ordered to assess ascites levels, no ascites noted. The patient received a unit of PRBC. A palliative care consult was placed for patient and family counseling. The patient was stepped down out of ICU  on 9/4. She was given morphine PRN for pain and tachypnea. Tylenol for fevers. Her labs were monitored and electrolytes were replaced. She continued to work with PT/OT. Meropenem was discontinued as well as daily blood cultures.      Interval history: The patient was stepped down from ICU today. She is still tachycardic and tachypneic but better today. Receiving morphine for pain.    Review of Systems   Constitutional: Positive for activity change, appetite change, fatigue and fever. Negative for chills.   Respiratory: Positive for cough and shortness of breath.    Cardiovascular: Positive for chest pain and leg swelling.   Gastrointestinal: Positive for abdominal pain and diarrhea. Negative for abdominal distention, nausea and vomiting.   Genitourinary: Negative for difficulty urinating.   Skin: Positive for wound (right chest wall).   Allergic/Immunologic: Positive for immunocompromised state.   Neurological: Positive for weakness.   Psychiatric/Behavioral: The patient is nervous/anxious.      Objective:     Vital Signs (Most Recent):  Temp: 97.5 °F (36.4 °C) (09/04/20 1607)  Pulse: 110 (09/04/20 1607)  Resp: 20 (09/04/20 1607)  BP: 118/81 (09/04/20 1607)  SpO2: 99 % (09/04/20 1607) Vital Signs (24h Range):  Temp:  [97.5 °F (36.4 °C)-100.6 °F (38.1 °C)] 97.5 °F (36.4 °C)  Pulse:  [108-126] 110  Resp:  [20-54] 20  SpO2:  [94 %-100 %] 99 %  BP: (104-145)/(55-86) 118/81     Weight: 85.5 kg (188 lb 7.9 oz)  Body mass index is 31.37 kg/m².    Physical Exam  Vitals signs and nursing note reviewed.   Constitutional:       Appearance: She is ill-appearing.   HENT:      Head:      Comments: trialysis line in place at [facility]   Neck:      Musculoskeletal: Normal range of motion.   Cardiovascular:      Rate and Rhythm: Regular rhythm. Tachycardia present.      Pulses: Normal pulses.      Heart sounds: Normal heart sounds.   Pulmonary:      Effort: Tachypnea present.      Breath sounds: Decreased breath sounds present.       Comments: Patient unable to speak due to SOB  Abdominal:      General: Bowel sounds are normal. There is no distension.      Palpations: Abdomen is soft.   Musculoskeletal: Normal range of motion.         General: Swelling present.      Right lower leg: Edema present.      Left lower leg: Edema present.   Skin:     General: Skin is warm and dry.      Capillary Refill: Capillary refill takes less than 2 seconds.      Comments: Dressing to the Right CW, clean dry and intact   Neurological:      Mental Status: She is alert and oriented to person, place, and time.      Motor: Weakness present.   Psychiatric:         Mood and Affect: Mood is anxious.         Behavior: Behavior normal.             Significant Labs:   BMP:   Recent Labs   Lab 09/04/20  0604      *   K 3.4*   CL 99   CO2 22*   BUN 12   CREATININE 0.6   CALCIUM 7.5*   MG 1.9     CBC:   Recent Labs   Lab 09/03/20  0336 09/04/20  0604   WBC 27.95* 26.54*   HGB 6.4* 7.9*   HCT 20.4* 24.3*   * 77*     Lactic Acid:   No results for input(s): LACTATE in the last 48 hours.  Magnesium:   Recent Labs   Lab 09/04/20  0604   MG 1.9     POCT Glucose:   No results for input(s): POCTGLUCOSE in the last 48 hours.      Significant Imaging: I have reviewed all pertinent imaging results/findings within the past 24 hours.      Assessment/Plan:      * Septic shock  Infected venous access port  Metabolic Acidosis  Bacteremia due to Staphylococcus    The source is likely from dehiscence of Mediport-----we started her on broad spectrum abx. ID has been consulted. Following wound cultures as well as blood cultures---BCX growing GP cocci resembling staph. MRSA noted from the port. Blood cultures were redrawn on 8/28--NGTD. General Sx was consulted to remove her tunneled port. Lactate was initially elevated--now down. IVF given in the ED. Nephrology was consulted. Bicarb gtt initiated by Nephrology. CRRT was started and now stopped. MRI of the T/L spine suggestive  of cavitary lung lesions. CT of the C/A/P today to r/o septic emboli. We appreciate ID. ID is recommending YOLANDA, showing no vegetation. The patient with diarrhea--she was placed on IV flagyl and PO vancomycin (now stopped) for C-diff that is noted at this time negative.     She is now currently on Vancomycin and meropenem. Remains tachypneic and tachycardic. Tran catheter removed and diet advanced. CT of chest and abdomen showing increased chest infiltrates, septic emboli, and ascites. PICC line placed for IV access. 1 unit PRBC given. Lasix was given for pleural effusion. Abdominal US ordered for ascites level, no ascites noted. CTA ordered for worsening SOB, negative for PE. A palliative care consult was placed.     She was stepped down from ICU on 9/4-slight improvement in breathing  Cont morphine for pain and tachypnea  Meropenem and daily blood cultures were discontinued, cont vancomycin  Renal recovery, needs lines removed    Bacteremia due to Staphylococcus        Elevated troponin  Could be related to demand. YOLANDA with no vegetation. Will follow.       Chest pain  Elevated troponin    No chest pain at this time. Troponin elevated---cardiology consulted. No changes on EKG. The troponin elevation could be related to demand--- echo with normal LVEF.      Nausea & vomiting  Prn Zofran ordered.       Hyponatremia  Monitor and replete      Hypomagnesemia  Monitor and replete as needed      Infected venous access port    Port removed.  Cont vanc     Metastatic disease  Secondary malignancy of liver  Adenocarcinoma of colon  Neutropenia  Anemia in neoplastic disease    Oncology managing            VTE Risk Mitigation (From admission, onward)         Ordered     enoxaparin injection 90 mg  Every 12 hours      09/03/20 1549     heparin (porcine) injection 2,300 Units  As needed (PRN)      08/27/20 1649     IP VTE HIGH RISK PATIENT  Once      08/26/20 1458     Place sequential compression device  Until discontinued       08/26/20 1458                Discharge Planning   ZOE:      Code Status: Full Code   Is the patient medically ready for discharge?:     Reason for patient still in hospital (select all that apply): Laboratory test and Treatment  Discharge Plan A: Home Health(the pt has been accepted to [facility]  [phone] at d/c)   Discharge Delays: (!) Change in Medical Condition              Jeri Montalvo NP  Department of Hospital Medicine   [facility]

## 2020-09-04 NOTE — ASSESSMENT & PLAN NOTE
- Labs have been reviewed. Platelet count is 77 k/uL. I suspect this is related to sepsis and antibiotics. If platelet count continues to fall, may consider drawing a heparin-induced thrombocytopenia platelet antibody test.  - continue to monitor.

## 2020-09-04 NOTE — PLAN OF CARE
Pt AAOx4. NAD. Current pain regimen effective. Able to make needs known. Breathing even and unlabored 95% O2 sat on RA. MAP in 80s. Sinus tach 118. Pt was able to use bedside commode on day shift and sat in chair on day shift. Pt had loose green stool x1 on my shift so far. Temps of 100 and 100.6 resolved with ice packs. Monitoring Blood cultures daily. No noted growth thus far. VTE lovenox. Cont on IV Vanc and Meropenem. Safety maintained. SR up x3. Call light in reach. Family not present at bedside.

## 2020-09-04 NOTE — ASSESSMENT & PLAN NOTE
- status post cycle #1 of FOLFOX in August 2020.  - CT scan (8/29/20) reveals a partial response to therapy with slight decrease in size of index lesions.  - will need to clear and fully treat MRSA bacteremia prior to proceeding with future cycles of chemotherapy. Last positive blood culture was 8/31/20, which is encouraging. Transesophageal echocardiogram was negative for signs of endocarditis.

## 2020-09-04 NOTE — PROGRESS NOTES
NP at bedside reviewed Orders, Loose stools no noted smell green in color. Pt has dx of cancer. To review and place orders in MAR.

## 2020-09-04 NOTE — NURSING TRANSFER
Nursing Transfer Note      9/4/2020     Transfer To: rm [id number] to [facility]    Transfer via bed    Transfer with cardiac monitoring    Transported by [facility]    Medicines sent: YES      Chart send with patient: Yes    Notified: MOTHERJULY    Patient reassessed at: 9/4/20 1145 (date, time)    Upon arrival to floor: CARDIAC MONITOR APPLIED

## 2020-09-05 PROBLEM — R04.0 EPISTAXIS: Status: ACTIVE | Noted: 2020-01-01

## 2020-09-05 NOTE — PLAN OF CARE
Patient A&Ox4.  2L of humidified oxygen.  Sinus tach on telemetry.  Cardiac diet.  Purwick. UP with assistance to the bathroom.  PT/OT working with patient.   Skin intact. Refuses repositioning.  PRN morphine given.  PRN tessalon given.  Replaced potassium/phosphorus.  All questions answered.  Will continue to monitor.    Marycarmen Cohn RN        Problem: Fall Injury Risk  Goal: Absence of Fall and Fall-Related Injury  Outcome: Ongoing, Progressing  Intervention: Identify and Manage Contributors to Fall Injury Risk  Flowsheets (Taken 9/5/2020 1353)  Self-Care Promotion: independence encouraged  Medication Review/Management: medications reviewed     Problem: Adult Inpatient Plan of Care  Goal: Plan of Care Review  Outcome: Ongoing, Progressing  Flowsheets (Taken 9/5/2020 1353)  Plan of Care Reviewed With: patient     Problem: Fatigue (Oncology Care)  Goal: Improved Activity Tolerance  Outcome: Ongoing, Progressing  Intervention: Promote Energy Conservation  Flowsheets (Taken 9/5/2020 1353)  Fatigue Management: activity schedule adjusted  Activity Management: activity adjusted per tolerance     Problem: Pain Chronic (Persistent) (Comorbidity Management)  Goal: Acceptable Pain Control and Functional Ability  Outcome: Ongoing, Progressing  Intervention: Develop Pain Management Plan  Flowsheets (Taken 9/5/2020 1353)  Pain Management Interventions: pain management plan reviewed with patient/caregiver

## 2020-09-05 NOTE — ASSESSMENT & PLAN NOTE
-Pt with epistaxis today in the setting of full dose anticoagulation and thrombocytopenia  -Will treat with Afrin  -Would stop full dose lovenox and transition to prophylactic dose to start tomorrow  -Would hold prophylactic dose lovenox if epistaxis continues and/or patient 's platelets drop below 50k

## 2020-09-05 NOTE — ASSESSMENT & PLAN NOTE
Infected venous access port  Metabolic Acidosis  Bacteremia due to Staphylococcus    Patient with dehiscence of Mediport---started on broad spectrum abx. ID has been consulted. BCX growing GP cocci resembling staph. MRSA noted from the port. Blood cultures were redrawn on 8/28--NGTD. General Sx was consulted to remove her tunneled port. Lactate was initially elevated--now down. IVF given. Nephrology was consulted. Bicarb gtt initiated by Nephrology. CRRT was started and now stopped. MRI of the T/L spine suggestive of cavitary lung lesions. CT of the C/A/P showing possible septic emboli. We appreciate ID. YOLANDA done showing no vegetation. The patient has diarrhea-neg for C-Diff.     Tran catheter removed and diet advanced. Repeat CT of chest and abdomen showing increased chest infiltrates, septic emboli, and ascites. PICC line placed for IV access. 1 unit PRBC given. Lasix was given for pleural effusion. Abdominal US ordered for ascites level, no ascites noted. CTA ordered for worsening SOB, negative for PE. A palliative care consult was placed.     She was stepped down from ICU on 9/4  Patient is looking better, denies abdominal pain, still with cough and chest pain, tachypnea and tachycardia improving, fevers improving  Cont morphine for pain and tachypnea  Renal recovery, needs lines removed  Surgery consulted to remove RIJ CVC  Cont vancomycin for 4 weeks per ID recs

## 2020-09-05 NOTE — PLAN OF CARE
Problem: Adult Inpatient Plan of Care  Goal: Plan of Care Review  9/4/2020 2335 by Gege Pastrana RN  Outcome: Ongoing, Progressing     Problem: Adjustment to Illness (Sepsis/Septic Shock)  Goal: Optimal Coping  Outcome: Met     Problem: Bleeding (Sepsis/Septic Shock)  Goal: Absence of Bleeding  Outcome: Met     Problem: Hemodynamic Instability (Sepsis/Septic Shock)  Goal: Effective Tissue Perfusion  Outcome: Met     Problem: Renal Function Impairment (Acute Kidney Injury/Impairment)  Goal: Effective Renal Function  Outcome: Met     Problem: Neutropenia  Goal: Absence of Infection  Outcome: Met     Problem: Device-Related Complication Risk (CRRT (Continuous Renal Replacement Therapy))  Goal: Safe, Effective Therapy Delivery  Outcome: Met     Problem: Infection (CRRT (Continuous Renal Replacement Therapy))  Goal: Absence of Infection Signs/Symptoms  Outcome: Met     Problem: Hypothermia (CRRT (Continuous Renal Replacement Therapy))  Goal: Body Temperature Maintained in Desired Range  Outcome: Met     VIRTUAL NURSE:  Labs, notes, orders, and careplan reviewed.

## 2020-09-05 NOTE — ASSESSMENT & PLAN NOTE
-Platelet count is 64k today  -Peripheral smear viewed showing no schistocytes  -4 T-score is 2 (2 points for platelet drop >50%, 0 for platelet count fall in less than 4 days upon admission, 0 for no evidence of thrombosis, 0 for definite other causes for thrombocytopenia) indicating a <5% chance of HIT  -Thrombocytopenia likely from antibiotics and sepsis  -Would consider alternative antibiotics if platelets continue to drop  -Would stop prophylactic lovenox if platelet count drops below 50k.
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
## 2020-09-05 NOTE — SUBJECTIVE & OBJECTIVE
Interval history: the patient is feeling better, fevers subsiding, bloody mucus from nose this morning. Still coughing with pain to chest.     Review of Systems   Constitutional: Positive for activity change, appetite change, fatigue and fever. Negative for chills.   HENT: Positive for nosebleeds.    Respiratory: Positive for cough and shortness of breath.    Cardiovascular: Positive for chest pain and leg swelling.   Gastrointestinal: Positive for abdominal pain and diarrhea. Negative for abdominal distention, nausea and vomiting.   Genitourinary: Negative for difficulty urinating.   Skin: Positive for wound (right chest wall).   Allergic/Immunologic: Positive for immunocompromised state.   Neurological: Positive for weakness.   Psychiatric/Behavioral: The patient is nervous/anxious.      Objective:     Vital Signs (Most Recent):  Temp: 97.6 °F (36.4 °C) (09/05/20 1124)  Pulse: 106 (09/05/20 1142)  Resp: (!) 24 (09/05/20 1325)  BP: 116/73 (09/05/20 1124)  SpO2: 95 % (09/05/20 1124) Vital Signs (24h Range):  Temp:  [97.5 °F (36.4 °C)-99.7 °F (37.6 °C)] 97.6 °F (36.4 °C)  Pulse:  [106-118] 106  Resp:  [16-30] 24  SpO2:  [95 %-99 %] 95 %  BP: (111-127)/(73-81) 116/73     Weight: 88.5 kg (195 lb 1.7 oz)  Body mass index is 32.47 kg/m².    Physical Exam  Vitals signs and nursing note reviewed.   Constitutional:       Appearance: She is ill-appearing.   HENT:      Head:      Comments: trialysis line in [facility] at [facility]   Neck:      Musculoskeletal: Normal range of motion.   Cardiovascular:      Rate and Rhythm: Regular rhythm. Tachycardia present.      Pulses: Normal pulses.      Heart sounds: Normal heart sounds.   Pulmonary:      Effort: Tachypnea present.      Breath sounds: Decreased breath sounds present.      Comments: Patient unable to speak due to SOB  Abdominal:      General: Bowel sounds are normal. There is no distension.      Palpations: Abdomen is soft.   Musculoskeletal: Normal range of motion.         General:  Swelling present.      Right lower leg: Edema present.      Left lower leg: Edema present.   Skin:     General: Skin is warm and dry.      Capillary Refill: Capillary refill takes less than 2 seconds.      Comments: Dressing to the Right CW, clean dry and intact   Neurological:      Mental Status: She is alert and oriented to person, place, and time.      Motor: Weakness present.   Psychiatric:         Mood and Affect: Mood is anxious.         Behavior: Behavior normal.             Significant Labs:   BMP:   Recent Labs   Lab 09/05/20  0457   GLU 97   *   K 3.4*   CL 99   CO2 24   BUN 14   CREATININE 0.6   CALCIUM 7.6*   MG 2.4     CBC:   Recent Labs   Lab 09/04/20  0604 09/05/20  0457   WBC 26.54* 21.81*   HGB 7.9* 7.2*   HCT 24.3* 22.4*   PLT 77* 64*     Lactic Acid:   No results for input(s): LACTATE in the last 48 hours.  Magnesium:   Recent Labs   Lab 09/04/20  0604 09/05/20  0457   MG 1.9 2.4     POCT Glucose:   No results for input(s): POCTGLUCOSE in the last 48 hours.      Significant Imaging: I have reviewed all pertinent imaging results/findings within the past 24 hours.
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
## 2020-09-05 NOTE — ASSESSMENT & PLAN NOTE
Epistaxis      Scant bloody mucus from nose today--O2 humidified, lovenox 40mg  monitor platelet count-likely dropping due to sepsis and antibiotics per hem/onc  Will DC lovenox if platelet less than 50  We appreciate hem/onc

## 2020-09-05 NOTE — PROGRESS NOTES
Infectious Disease Follow up Note    Impression/Plan:    MRSA Bacteremia  29 F with PMH of metastatic adenocarcinoma of the colon with liver mets, uterine cysts, and HTN on whom ID is consulted for likely staph aureus bacteremia. She presented neutropenic.   WBC up to 2.5 on 8/28. Port removed on 8/26 - positive for staph aureus > 15 colonies. TTE was negative 8/27 for vegetations. YOLANDA negative for vegetations 8/31. Patient continues to be febrile.      -continue Vanc for MRSA bacteremia.   -OK to stop blood cultures at this time  - Remove RIJ CVC if it isn't needed    Thanks! Please call for any questions [phone]  Imtiaz Albert  [employer] ID    Hospital Day 10  Principal Problem: Septic shock     HPI: 29 F with PMH of metastatic adenocarcinoma of the colon with liver mets, uterine cysts, and HTN who presented to [facility] ED for intractable nausea/vomitting that has been on going for about 1 day.  Pt describes vomiting as having some food contents but mostly saliva, she reports she has not been able to tolerated PO since symptoms began.  Associated symptoms include fevers, chills, and general malaise which also started when nausea/vomiting started. Pt denies abdominal pain, endorses 1 episode of diarrhea. Denies any chest pain, SOB, changes in urinary habits.  Pt reports she has never had symptoms like this in the past. She reports her cancer is s/p resection in July 2020 and currently undergoing chemotherapy last given 2 weeks via port in the right chest.  In ED noticed wound dehiscence at right chest port site. She was admitted to the ICU. Was briefly on norepi but off it now. Blood cxs with GPCs and staph aureus from the port site. Port is removed.      8/26 BC MRSA  8/26 R chest wound culture MRSA   8/26 cath tip Staph aureus >14 colonies  8/28 BC MRSA  8/28 WBC up to 2.5K  8/29 WBC up to 10K  8/29 BC MRSA  8/30 BC NGTD  8/31 BC Staph aureus   8/31 YOLANDA with no vegetations  9/1 BC NGTD  9/1 started on  Meropenem  9/3 CT C/A/P concerning for septic emboli   9/4 BCx in process    Interval History: Patient reports feeling well this morning. Denies fevers, chills, nausea or vomiting. Reports improved breathing. Continues to have naresh diarrhea but it is better then before.     Review of Symptoms:  ROS Otherwise negative    Medications:  Antibiotics:   Antibiotics (From admission, onward)    Start     Stop Route Frequency Ordered    09/04/20 1600  vancomycin in dextrose 5 % 1 gram/250 mL IVPB 1,000 mg      -- IV Every 12 hours (non-standard times) 09/04/20 1510    08/26/20 1628  vancomycin - pharmacy to dose  (vancomycin IVPB)      -- IV pharmacy to manage frequency 08/26/20 1528        Objective:  Physical Exam:  VS (24h):  Temp:  [97.5 °F (36.4 °C)-100 °F (37.8 °C)] 99.3 °F (37.4 °C)  Pulse:  [106-118] 118  Resp:  [16-40] 18  SpO2:  [96 %-99 %] 96 %  BP: (107-127)/(65-81) 127/76     Physical Exam   GEN- resting comfortably   HEENT- PERRL, EOMI, MMM  NECK- No thyromegaly or other masses  CARDIAC- Tachycardic, no murmurs  RESP- Tachypnea, on room air, crackles noted bilaterally  ABD- Soft, non-tender, ND, +BS; no masses or HSM  EXT- No clubbing, cyanosis, or edema; 2+ DP/PT pulses  NEURO- CN II-XII grossly intact; moves all four extremities equally  SKIN- Warm and dry; no rashes, site of prior port is clean and dry without tenderness.     Lines:  RIJ CVC  PICC L UE  PIV    Labs:  CBC:   Lab Results   Component Value Date    WBC 21.81 (H) 09/05/2020    WBC 26.54 (H) 09/04/2020    WBC 27.95 (H) 09/03/2020    WBC 32.88 (H) 09/02/2020    WBC 29.81 (H) 09/01/2020    HCT 22.4 (L) 09/05/2020    PLT 64 (L) 09/05/2020     BMP:   Recent Labs   Lab 09/05/20  0457   GLU 97   *   K 3.4*   CL 99   CO2 24   BUN 14   CREATININE 0.6   CALCIUM 7.6*   MG 2.4     LFT:   Lab Results   Component Value Date    ALT 24 09/05/2020     (H) 09/05/2020    ALKPHOS 397 (H) 09/05/2020    BILITOT 1.1 (H) 09/05/2020     Microbiology x 7d:    Microbiology Results (last 7 days)     Procedure Component Value Units Date/Time    Blood culture [[id number]] Collected: 09/02/20 0721    Order Status: Completed Specimen: Blood from Peripheral, Right Wrist Updated: 09/05/20 1022     Blood Culture, Routine No Growth to date      No Growth to date      No Growth to date      No Growth to date    Blood culture [[id number]]  (Abnormal)  (Susceptibility) Collected: 08/31/20 0631    Order Status: Completed Specimen: Blood from Antecubital, Right Arm Updated: 09/05/20 1018     Blood Culture, Routine Gram stain aer bottle: Gram positive cocci in clusters resembling Staph      Results called to and read back by: Argentina Vicente RN. 09/03/2020        03:16      METHICILLIN RESISTANT STAPHYLOCOCCUS AUREUS  ID consult required at [facility].Kenisha Nicole and Lisseth briggs.      Blood culture [[id number]] Collected: 09/01/20 0655    Order Status: Completed Specimen: Blood from Antecubital, Right Arm Updated: 09/05/20 1012     Blood Culture, Routine No Growth to date      No Growth to date      No Growth to date      No Growth to date      No Growth to date    Narrative:      Collection has been rescheduled by LLR at 09/01/2020 04:28 Reason:   nurse said to draw at at 6AM  Collection has been rescheduled by LLR at 09/01/2020 04:28 Reason:   nurse said to draw at at 6AM    Blood culture [[id number]] Collected: 09/04/20 0655    Order Status: Completed Specimen: Blood from Peripheral, Right Wrist Updated: 09/04/20 1915     Blood Culture, Routine No Growth to date    Blood culture [[id number]] Collected: 09/03/20 0416    Order Status: Completed Specimen: Blood from Antecubital, Right Arm Updated: 09/04/20 1213     Blood Culture, Routine No Growth to date      No Growth to date    Blood culture [[id number]] Collected: 08/30/20 0121    Order Status: Completed Specimen: Blood from Peripheral, Right Updated: 09/04/20 1022     Blood Culture, Routine No growth after 5 days.    Blood culture  [[id number]]  (Abnormal) Collected: 08/29/20 1658    Order Status: Completed Specimen: Blood from Antecubital, Right Updated: 09/03/20 0753     Blood Culture, Routine Gram stain aer bottle: Gram positive cocci in clusters resembling Staph       Results called to and read back by: Jessica Hewitt 09/01/2020  12:07      METHICILLIN RESISTANT STAPHYLOCOCCUS AUREUS  ID consult required at [facility].  For susceptibility see order #[id number]      Culture, Anaerobe [[id number]] Collected: 08/26/20 1602    Order Status: Completed Specimen: Catheter Tip from Chest, Right Updated: 08/31/20 1305     Anaerobic Culture No anaerobes isolated    Blood culture [[id number]]  (Abnormal) Collected: 08/28/20 1137    Order Status: Completed Specimen: Blood from Antecubital, Right Updated: 08/31/20 1033     Blood Culture, Routine Gram stain juan bottle: Gram positive cocci in clusters resembling Staph       Results called to and read back by: Nighat Craft  08/29/2020  15:26      METHICILLIN RESISTANT STAPHYLOCOCCUS AUREUS  ID consult required at [facility].  For susceptibility see order #[id number]      Blood culture [[id number]]  (Abnormal)  (Susceptibility) Collected: 08/28/20 1137    Order Status: Completed Specimen: Blood Updated: 08/31/20 1030     Blood Culture, Routine Gram stain aer bottle: Gram positive cocci in clusters resembling Staph       Results called to and read back by: Nighat Craft  08/29/2020  15:26      Gram stain juan bottle: Gram positive cocci in clusters resembling Staph       Positive results previously called 08/29/2020  17:57      METHICILLIN RESISTANT STAPHYLOCOCCUS AUREUS  ID consult required at [facility].      Culture, Anaerobe [[id number]] Collected: 08/26/20 1448    Order Status: Completed Specimen: Incision site from Chest, Right Updated: 08/31/20 0938     Anaerobic Culture No anaerobes isolated    Narrative:      Port  incision site    Clostridium difficile EIA [[id number]] Collected: 08/28/20 1808    Order Status: Completed Specimen: Stool Updated: 08/29/20 2305     C. diff Antigen Negative     C difficile Toxins A+B, EIA Negative     Comment: Testing not recommended for children <24 months old.       IV catheter culture [[id number]]  (Abnormal)  (Susceptibility) Collected: 08/26/20 1604    Order Status: Completed Specimen: Catheter Tip Updated: 08/29/20 1312     Aerobic Culture - Cath tip METHICILLIN RESISTANT STAPHYLOCOCCUS AUREUS  > 15 colonies      Blood culture [[id number]]  (Abnormal)  (Susceptibility) Collected: 08/26/20 1140    Order Status: Completed Specimen: Blood from Peripheral, Hand, Right Updated: 08/29/20 1221     Blood Culture, Routine Gram stain juan bottle: Gram positive cocci in clusters resembling Staph      Results called to and read back by: Trudy Morales RN  08/27/2020        02:08      Gram stain aer bottle: Gram positive cocci in clusters resembling Staph      Positive results previously called 08/27/2020  03:19      METHICILLIN RESISTANT STAPHYLOCOCCUS AUREUS  ID consult required at [facility].Corey,Kenisha and Lisseth locations.          Imaging:  CXR 8/26/2020  Right chest MediPort catheter with tip overlying the atrium.  Lungs and heart demonstrate no significant interval detrimental change in the short interval.  No large pleural effusion or gross pneumothorax.    CXR 8/26/2020  Since the most recent study, the port catheter has been discontinued.  The tip of a newly placed right internal jugular approach central venous catheter is in the region of the right atrium.  No pneumothorax or pleural effusion.  Cardiomediastinal silhouette is unchanged.  Lungs remain clear.  Additional findings unchanged.    DVT US BL ANGUS 8/27/2020  No evidence of deep venous thrombosis in either lower extremity.    MRI T/L 8/28  1. Within limitations of lack of intravenous contrast, no findings of infection in the thoracic or  lumbar spine are identified.  2. No significant spinal canal or foraminal narrowing.  Normal appearance of the thoracic spinal cord and conus medullaris.  3. Partially imaged are multiple bilateral peripheral predominant pulmonary nodules, some of which may be cavitary.  Given that these lesions appear to be new since the 07/20/2020 CT examination, the findings may represent multifocal infectious process or septic emboli.  Metastatic disease would be additional consideration.  Dedicated CT chest imaging can be considered for further evaluation.  4. Known hepatic metastatic disease partially imaged.    CT A/P 8/28  Postoperative changes of bowel resection are identified.  There are innumerable hypodense lesions throughout the liver compatible with hepatic metastatic disease.  Focal peristomal prior imaging limited given the lack of intravenous contrast material.     Bibasilar consolidation, left greater than right with subtotal consolidation of the left lower lobe.  There are some nodular densities seen bilaterally in both visualized lung bases.  While this could represent metastatic disease, given the patient's history of sepsis, septic emboli not entirely excluded.  These findings are new as compared to the previous study of 07/14/2020.     Liquid stool throughout the colon suggesting diarrhea.    YOLANDA 8/31/2020  - No vegetations seen    Lower Extremity US 9/1  - No evidence of deep venous thrombosis in either lower extremity    CXR 9/2  Left PICC catheter tip projects over the distal SVC.  Right central venous catheter tip projects over the distal SVC.  The cardiomediastinal silhouette is not enlarged.  There is obscuration of the left costophrenic angle, may reflect small effusion versus attenuation related to overlying soft tissue.  The trachea is midline.  The lungs are symmetrically expanded bilaterally with patchy increased interstitial and parenchymal attenuation bilaterally, worsened since the previous exam,  differential would include worsening edema or infection.  No large focal consolidation seen.  There is no pneumothorax.  The osseous structures are unremarkable..    CT C/A/P 9/2  - Similar distribution of ill-defined peripheral nodular densities and mixed consolidative opacities throughout both lungs, some demonstrating areas new and more conspicuous central cavitation.  Overall findings concerning for infectious process with component of septic emboli.  - race left pleural effusion.  - Rounded structure in the mid abdomen containing intraluminal gas focus, without significant surrounding inflammatory change.  Findings could relate to focal outpouching related to nearby anastomosis versus traversing bowel loop.  Small contained extraluminal collection cannot be definitively excluded.  Close attention at follow-up.  - Mild volume lower abdominopelvic ascites, slightly increased.  - Intrahepatic metastasis with likely metastatic node at the richard hepatis.    Assessment:    Active Hospital Problems    Diagnosis    *Septic shock    Thrombocytopenia    Palliative care encounter    Goals of care, counseling/discussion    Counseling regarding advance care planning and goals of care    Bacteremia due to Staphylococcus    Chest pain    Elevated troponin    Pancytopenia    MRSA bacteremia    Infected venous access port    Metabolic acidosis    Hypomagnesemia    Hyponatremia    Nausea & vomiting    Lactic acid acidosis    Sepsis    Anemia in neoplastic disease    Metastatic disease    Secondary malignancy of liver    Adenocarcinoma of colon     Plan:  See top of page.

## 2020-09-05 NOTE — PLAN OF CARE
VN rounds:  VN cued into pt's room with pt's permission, role of VN explained to pt.  Pt resting in bed In low position with bed alarm in place, call bell within reach.  VN instructed to call for assistance.    No acute distress noted.  Pt reports slight bleeding from right nare, instructed to call if worsens.  Pt's chart, labs and vital signs reviewed.  Allowed time for questions.  Will continue to be available and intervene as needed.

## 2020-09-05 NOTE — PLAN OF CARE
Problem: Physical Therapy Goal  Goal: Physical Therapy Goal  Description: Goals to be met by: 20     Patient will increase functional independence with mobility by performin.  BLE AROM supine x 15  2.  Roll bilaterally with supervision  3.  Supine to/from sit with supervision  4.  Sit at EOB 15 min with supervision  5.  Sit to stand with RW with CG    Outcome: Ongoing, Progressing     Pt reporting fatigue and HA but agreed to therex participation with encouragement and ed for importance of activity provided.  Rec cont acute therapy services to improve endurance, strength and mobility independence levels.

## 2020-09-05 NOTE — ASSESSMENT & PLAN NOTE
-Pt is status post cycle #1 of FOLFOX in August 2020.  - CT scan (8/29/20) revealed a partial response to therapy with slight decrease in size of index lesions.  -Would consider restarting treatment as an outpatient after the patient has recovered form her current infection and off of antibiotics

## 2020-09-05 NOTE — PLAN OF CARE
Care plan explained & understood by pt. Sinus Tachy on Tele. On Oxygen @2L NC, Sats 97%. Meds given as per MAR. Pt complained of pain, analgesia given. Safety maintained at all times. Call bell within reach. Bed on low position. Bed alarm on. Will continue to monitor.

## 2020-09-05 NOTE — SUBJECTIVE & OBJECTIVE
Interval History: Pt with complaint of epistaxis today with cough productive of bloody sputum.  The patient endorses a cough with associated CP.  She denies current SOB and fever.  The patient denies CP, SOB, abdominal pain, N/V, constipation, diarrhea.    Oncology Treatment Plan:   OP COLORECTAL FOLFOX + BEVACIZUMAB Q2W    Medications:  Continuous Infusions:  Scheduled Meds:   [START ON 9/6/2020] enoxaparin  40 mg Subcutaneous Q24H    metoprolol tartrate  25 mg Oral TID    psyllium husk (aspartame)   Oral Daily    sodium chloride 0.9%  10 mL Intravenous Q6H    spironolactone  25 mg Oral Daily    vancomycin (VANCOCIN) IVPB  1,000 mg Intravenous Q12H     PRN Meds:sodium chloride, acetaminophen, benzonatate, diphenhydrAMINE-zinc acetate 2-0.1%, heparin (porcine), influenza, melatonin, morphine, ondansetron, ondansetron, ondansetron, ondansetron, oxymetazoline, pneumoc 13-davion conj-dip cr(PF), promethazine (PHENERGAN) IVPB, simethicone, sodium chloride 0.9%, Flushing PICC Protocol **AND** sodium chloride 0.9% **AND** sodium chloride 0.9%, Pharmacy to dose Vancomycin consult **AND** vancomycin - pharmacy to dose     Review of Systems   Constitutional: Negative for chills and fever.   HENT:        Epistaxis   Respiratory: Positive for cough (productive of bloddy sputum). Negative for shortness of breath.    Cardiovascular: Negative for chest pain and palpitations.   Gastrointestinal: Negative for abdominal pain, constipation, diarrhea, nausea and vomiting.   Musculoskeletal:        Chest pain worse with cough   Skin: Negative for rash.   Neurological: Negative for headaches.     Objective:     Vital Signs (Most Recent):  Temp: 97.6 °F (36.4 °C) (09/05/20 1124)  Pulse: 106 (09/05/20 1142)  Resp: (!) 24 (09/05/20 1325)  BP: 116/73 (09/05/20 1124)  SpO2: 95 % (09/05/20 1124) Vital Signs (24h Range):  Temp:  [97.5 °F (36.4 °C)-99.7 °F (37.6 °C)] 97.6 °F (36.4 °C)  Pulse:  [106-118] 106  Resp:  [16-30] 24  SpO2:  [95  %-99 %] 95 %  BP: (111-127)/(73-81) 116/73     Weight: 88.5 kg (195 lb 1.7 oz)  Body mass index is 32.47 kg/m².  Body surface area is 2.01 meters squared.      Intake/Output Summary (Last 24 hours) at 9/5/2020 1420  Last data filed at 9/5/2020 0856  Gross per 24 hour   Intake 1250 ml   Output 350 ml   Net 900 ml       Physical Exam  Constitutional:       Appearance: She is well-developed.   HENT:      Ears:      Comments: Bloody nasapharnyx  Eyes:      General:         Right eye: No discharge.         Left eye: No discharge.   Cardiovascular:      Rate and Rhythm: Regular rhythm. Tachycardia present.      Heart sounds: Normal heart sounds. No murmur. No friction rub. No gallop.    Pulmonary:      Effort: Pulmonary effort is normal. No respiratory distress.      Breath sounds: Normal breath sounds. No wheezing or rales.   Abdominal:      General: Bowel sounds are normal. There is no distension.      Palpations: Abdomen is soft.      Tenderness: There is no abdominal tenderness. There is no guarding or rebound.   Neurological:      Mental Status: She is alert and oriented to person, place, and time.         Significant Labs:   Recent Results (from the past 24 hour(s))   Microalbumin/creatinine urine ratio    Collection Time: 09/04/20  3:35 PM   Result Value Ref Range    Microalbum.,U,Random 6.0 ug/mL    Creatinine, Random Ur 9.0 (L) 15.0 - 325.0 mg/dL    Microalb Creat Ratio 66.7 (H) 0.0 - 30.0 ug/mg   Protein / creatinine ratio, urine    Collection Time: 09/04/20  3:35 PM   Result Value Ref Range    Protein, Urine Random 8 0 - 15 mg/dL    Creatinine, Random Ur 9.0 (L) 15.0 - 325.0 mg/dL    Prot/Creat Ratio, Ur 0.89 (H) 0.00 - 0.20   Urinalysis    Collection Time: 09/04/20  3:35 PM   Result Value Ref Range    Specimen UA Urine, Clean Catch     Color, UA Yellow Yellow, Straw, Manuela    Appearance, UA Clear Clear    pH, UA 7.0 5.0 - 8.0    Specific Gravity, UA <=1.005 (A) 1.005 - 1.030    Protein, UA Negative Negative     Glucose, UA Negative Negative    Ketones, UA Negative Negative    Bilirubin (UA) Negative Negative    Occult Blood UA Trace (A) Negative    Nitrite, UA Negative Negative    Urobilinogen, UA Negative <2.0 EU/dL    Leukocytes, UA Negative Negative   CBC auto differential    Collection Time: 09/05/20  4:57 AM   Result Value Ref Range    WBC 21.81 (H) 3.90 - 12.70 K/uL    RBC 2.95 (L) 4.00 - 5.40 M/uL    Hemoglobin 7.2 (L) 12.0 - 16.0 g/dL    Hematocrit 22.4 (L) 37.0 - 48.5 %    Mean Corpuscular Volume 76 (L) 82 - 98 fL    Mean Corpuscular Hemoglobin 24.4 (L) 27.0 - 31.0 pg    Mean Corpuscular Hemoglobin Conc 32.1 32.0 - 36.0 g/dL    RDW 20.4 (H) 11.5 - 14.5 %    Platelets 64 (L) 150 - 350 K/uL    MPV SEE COMMENT 9.2 - 12.9 fL    Immature Granulocytes Test Not Performed 0.0 - 0.5 %    Immature Grans (Abs) Test Not Performed 0.00 - 0.04 K/uL    nRBC 1 (A) 0 /100 WBC    Gran% 75.0 (H) 38.0 - 73.0 %    Lymph% 5.0 (L) 18.0 - 48.0 %    Mono% 4.0 4.0 - 15.0 %    Eosinophil% 2.0 0.0 - 8.0 %    Basophil% 0.0 0.0 - 1.9 %    Bands 7.0 %    Metamyelocytes 5.0 %    Myelocytes 2.0 %    Platelet Estimate Decreased (A)     Aniso Moderate     Poik Moderate     Poly Occasional     Hypo Occasional     Ovalocytes Occasional     Target Cells Occasional     Tear Drop Cells Occasional     Basophilic Stippling Occasional     Differential Method Manual    Magnesium    Collection Time: 09/05/20  4:57 AM   Result Value Ref Range    Magnesium 2.4 1.6 - 2.6 mg/dL   Phosphorus    Collection Time: 09/05/20  4:57 AM   Result Value Ref Range    Phosphorus 2.6 (L) 2.7 - 4.5 mg/dL   Comprehensive metabolic panel    Collection Time: 09/05/20  4:57 AM   Result Value Ref Range    Sodium 134 (L) 136 - 145 mmol/L    Potassium 3.4 (L) 3.5 - 5.1 mmol/L    Chloride 99 95 - 110 mmol/L    CO2 24 23 - 29 mmol/L    Glucose 97 70 - 110 mg/dL    BUN, Bld 14 6 - 20 mg/dL    Creatinine 0.6 0.5 - 1.4 mg/dL    Calcium 7.6 (L) 8.7 - 10.5 mg/dL    Total Protein 6.5 6.0 -  8.4 g/dL    Albumin 1.8 (L) 3.5 - 5.2 g/dL    Total Bilirubin 1.1 (H) 0.1 - 1.0 mg/dL    Alkaline Phosphatase 397 (H) 55 - 135 U/L     (H) 10 - 40 U/L    ALT 24 10 - 44 U/L    Anion Gap 11 8 - 16 mmol/L    eGFR if African American >60 >60 mL/min/1.73 m^2    eGFR if non African American >60 >60 mL/min/1.73 m^2         Diagnostic Results:  CTA 9/04/20    This is an adequate pulmonary embolus study which is negative for pulmonary embolus.  There is no thoracic aortic dissection or aneurysm.     The soft tissues and vascular structures at the base the neck are significant for a right-sided central venous line with its tip within the SVC.  The mediastinum including the heart and great vessels is unremarkable.  The visualized intra-abdominal content is significant for innumerable hepatic metastatic lesions better characterized on CT performed 2 days earlier.  The osseous structures are unremarkable.     The trachea is patent and free of any intraluminal filling defects.  The lungs are significant for innumerable scattered pulmonary nodules and masses many of which are cavitary findings which are unchanged compared to previous CT performed 2 days earlier.  This is highly concerning for metastatic disease versus septic emboli.  There is minimal left-sided pleural fluid.

## 2020-09-05 NOTE — PROGRESS NOTES
[facility]  Hematology/Oncology  Progress Note    Patient Name: Huy Cisneros  Admission Date: 8/26/2020  Hospital Length of Stay: 10 days  Code Status: Full Code     Subjective:     HPI:  29-year-old female underwent right hemicolectomy 07/17/2020 for metastatic colon adenocarcinoma and received 1st cycle of FOLFOX 08/11/2020.    7/20/2020 - CT scan - Multiple hepatic hypodensities better evaluated on CT from 07/14/2020, consistent with metastatic disease.    She has been vomiting and feeling dizzy since yesterday.  Last night she busted a stitch to the Port-A-Cath site causing dehiscence and this morning she fell after going to the restroom.    She presented to the ED with tachycardia, heart rate up to 160 and was hypotensive with blood pressure down to 85/43.  She was started on antibiotics, IV fluids and pressors and transferred to [place] ICU.    WBC on admission 0.95, creatinine 2, lactate >12    Bilirubin 3.8, , ALT 93    Seen by . Right chest port removed.      Interval History: Pt with complaint of epistaxis today with cough productive of bloody sputum.  The patient endorses a cough with associated CP.  She denies current SOB and fever.  The patient denies CP, SOB, abdominal pain, N/V, constipation, diarrhea.    Oncology Treatment Plan:   OP COLORECTAL FOLFOX + BEVACIZUMAB Q2W    Medications:  Continuous Infusions:  Scheduled Meds:   [START ON 9/6/2020] enoxaparin  40 mg Subcutaneous Q24H    metoprolol tartrate  25 mg Oral TID    psyllium husk (aspartame)   Oral Daily    sodium chloride 0.9%  10 mL Intravenous Q6H    spironolactone  25 mg Oral Daily    vancomycin (VANCOCIN) IVPB  1,000 mg Intravenous Q12H     PRN Meds:sodium chloride, acetaminophen, benzonatate, diphenhydrAMINE-zinc acetate 2-0.1%, heparin (porcine), influenza, melatonin, morphine, ondansetron, ondansetron, ondansetron, ondansetron, oxymetazoline, pneumoc 13-davion conj-dip cr(PF), promethazine  (PHENERGAN) IVPB, simethicone, sodium chloride 0.9%, Flushing PICC Protocol **AND** sodium chloride 0.9% **AND** sodium chloride 0.9%, Pharmacy to dose Vancomycin consult **AND** vancomycin - pharmacy to dose     Review of Systems   Constitutional: Negative for chills and fever.   HENT:        Epistaxis   Respiratory: Positive for cough (productive of bloddy sputum). Negative for shortness of breath.    Cardiovascular: Negative for chest pain and palpitations.   Gastrointestinal: Negative for abdominal pain, constipation, diarrhea, nausea and vomiting.   Musculoskeletal:        Chest pain worse with cough   Skin: Negative for rash.   Neurological: Negative for headaches.     Objective:     Vital Signs (Most Recent):  Temp: 97.6 °F (36.4 °C) (09/05/20 1124)  Pulse: 106 (09/05/20 1142)  Resp: (!) 24 (09/05/20 1325)  BP: 116/73 (09/05/20 1124)  SpO2: 95 % (09/05/20 1124) Vital Signs (24h Range):  Temp:  [97.5 °F (36.4 °C)-99.7 °F (37.6 °C)] 97.6 °F (36.4 °C)  Pulse:  [106-118] 106  Resp:  [16-30] 24  SpO2:  [95 %-99 %] 95 %  BP: (111-127)/(73-81) 116/73     Weight: 88.5 kg (195 lb 1.7 oz)  Body mass index is 32.47 kg/m².  Body surface area is 2.01 meters squared.      Intake/Output Summary (Last 24 hours) at 9/5/2020 1420  Last data filed at 9/5/2020 0856  Gross per 24 hour   Intake 1250 ml   Output 350 ml   Net 900 ml       Physical Exam  Constitutional:       Appearance: She is well-developed.   HENT:      Ears:      Comments: Bloody nasapharnyx  Eyes:      General:         Right eye: No discharge.         Left eye: No discharge.   Cardiovascular:      Rate and Rhythm: Regular rhythm. Tachycardia present.      Heart sounds: Normal heart sounds. No murmur. No friction rub. No gallop.    Pulmonary:      Effort: Pulmonary effort is normal. No respiratory distress.      Breath sounds: Normal breath sounds. No wheezing or rales.   Abdominal:      General: Bowel sounds are normal. There is no distension.      Palpations:  Abdomen is soft.      Tenderness: There is no abdominal tenderness. There is no guarding or rebound.   Neurological:      Mental Status: She is alert and oriented to person, place, and time.         Significant Labs:   Recent Results (from the past 24 hour(s))   Microalbumin/creatinine urine ratio    Collection Time: 09/04/20  3:35 PM   Result Value Ref Range    Microalbum.,U,Random 6.0 ug/mL    Creatinine, Random Ur 9.0 (L) 15.0 - 325.0 mg/dL    Microalb Creat Ratio 66.7 (H) 0.0 - 30.0 ug/mg   Protein / creatinine ratio, urine    Collection Time: 09/04/20  3:35 PM   Result Value Ref Range    Protein, Urine Random 8 0 - 15 mg/dL    Creatinine, Random Ur 9.0 (L) 15.0 - 325.0 mg/dL    Prot/Creat Ratio, Ur 0.89 (H) 0.00 - 0.20   Urinalysis    Collection Time: 09/04/20  3:35 PM   Result Value Ref Range    Specimen UA Urine, Clean Catch     Color, UA Yellow Yellow, Straw, Manuela    Appearance, UA Clear Clear    pH, UA 7.0 5.0 - 8.0    Specific Gravity, UA <=1.005 (A) 1.005 - 1.030    Protein, UA Negative Negative    Glucose, UA Negative Negative    Ketones, UA Negative Negative    Bilirubin (UA) Negative Negative    Occult Blood UA Trace (A) Negative    Nitrite, UA Negative Negative    Urobilinogen, UA Negative <2.0 EU/dL    Leukocytes, UA Negative Negative   CBC auto differential    Collection Time: 09/05/20  4:57 AM   Result Value Ref Range    WBC 21.81 (H) 3.90 - 12.70 K/uL    RBC 2.95 (L) 4.00 - 5.40 M/uL    Hemoglobin 7.2 (L) 12.0 - 16.0 g/dL    Hematocrit 22.4 (L) 37.0 - 48.5 %    Mean Corpuscular Volume 76 (L) 82 - 98 fL    Mean Corpuscular Hemoglobin 24.4 (L) 27.0 - 31.0 pg    Mean Corpuscular Hemoglobin Conc 32.1 32.0 - 36.0 g/dL    RDW 20.4 (H) 11.5 - 14.5 %    Platelets 64 (L) 150 - 350 K/uL    MPV SEE COMMENT 9.2 - 12.9 fL    Immature Granulocytes Test Not Performed 0.0 - 0.5 %    Immature Grans (Abs) Test Not Performed 0.00 - 0.04 K/uL    nRBC 1 (A) 0 /100 WBC    Gran% 75.0 (H) 38.0 - 73.0 %    Lymph% 5.0 (L)  18.0 - 48.0 %    Mono% 4.0 4.0 - 15.0 %    Eosinophil% 2.0 0.0 - 8.0 %    Basophil% 0.0 0.0 - 1.9 %    Bands 7.0 %    Metamyelocytes 5.0 %    Myelocytes 2.0 %    Platelet Estimate Decreased (A)     Aniso Moderate     Poik Moderate     Poly Occasional     Hypo Occasional     Ovalocytes Occasional     Target Cells Occasional     Tear Drop Cells Occasional     Basophilic Stippling Occasional     Differential Method Manual    Magnesium    Collection Time: 09/05/20  4:57 AM   Result Value Ref Range    Magnesium 2.4 1.6 - 2.6 mg/dL   Phosphorus    Collection Time: 09/05/20  4:57 AM   Result Value Ref Range    Phosphorus 2.6 (L) 2.7 - 4.5 mg/dL   Comprehensive metabolic panel    Collection Time: 09/05/20  4:57 AM   Result Value Ref Range    Sodium 134 (L) 136 - 145 mmol/L    Potassium 3.4 (L) 3.5 - 5.1 mmol/L    Chloride 99 95 - 110 mmol/L    CO2 24 23 - 29 mmol/L    Glucose 97 70 - 110 mg/dL    BUN, Bld 14 6 - 20 mg/dL    Creatinine 0.6 0.5 - 1.4 mg/dL    Calcium 7.6 (L) 8.7 - 10.5 mg/dL    Total Protein 6.5 6.0 - 8.4 g/dL    Albumin 1.8 (L) 3.5 - 5.2 g/dL    Total Bilirubin 1.1 (H) 0.1 - 1.0 mg/dL    Alkaline Phosphatase 397 (H) 55 - 135 U/L     (H) 10 - 40 U/L    ALT 24 10 - 44 U/L    Anion Gap 11 8 - 16 mmol/L    eGFR if African American >60 >60 mL/min/1.73 m^2    eGFR if non African American >60 >60 mL/min/1.73 m^2         Diagnostic Results:  CTA 9/04/20    This is an adequate pulmonary embolus study which is negative for pulmonary embolus.  There is no thoracic aortic dissection or aneurysm.     The soft tissues and vascular structures at the base the neck are significant for a right-sided central venous line with its tip within the SVC.  The mediastinum including the heart and great vessels is unremarkable.  The visualized intra-abdominal content is significant for innumerable hepatic metastatic lesions better characterized on CT performed 2 days earlier.  The osseous structures are unremarkable.     The  trachea is patent and free of any intraluminal filling defects.  The lungs are significant for innumerable scattered pulmonary nodules and masses many of which are cavitary findings which are unchanged compared to previous CT performed 2 days earlier.  This is highly concerning for metastatic disease versus septic emboli.  There is minimal left-sided pleural fluid.    Assessment/Plan:     Epistaxis  -Pt with epistaxis today in the setting of full dose anticoagulation and thrombocytopenia  -Will treat with Afrin  -Would stop full dose lovenox and transition to prophylactic dose to start tomorrow  -Would hold prophylactic dose lovenox if epistaxis continues and/or patient 's platelets drop below 50k    Thrombocytopenia  -Platelet count is 64k today  -Peripheral smear viewed showing no schistocytes  -4 T-score is 2 (2 points for platelet drop >50%, 0 for platelet count fall in less than 4 days upon admission, 0 for no evidence of thrombosis, 0 for definite other causes for thrombocytopenia) indicating a <5% chance of HIT  -Thrombocytopenia likely from antibiotics and sepsis  -Would consider alternative antibiotics if platelets continue to drop  -Would stop prophylactic lovenox if platelet count drops below 50k.    MRSA bacteremia  -Repeat blood cultures negative  -Echo showed no endocarditis  -Central line removed  -Pt on vancomycin which is likely contributing to thrombocytopenia     Adenocarcinoma of colon  -Pt is status post cycle #1 of FOLFOX in August 2020.  - CT scan (8/29/20) revealed a partial response to therapy with slight decrease in size of index lesions.  -Would consider restarting treatment as an outpatient after the patient has recovered form her current infection and off of antibiotics    Hematology/Oncology service will follow-up with patient. Please contact us if you have any additional questions.       Maximiliano Jovel MD  Hematology/Oncology  [facility]

## 2020-09-05 NOTE — PT/OT/SLP PROGRESS
Physical Therapy Treatment    Patient Name:  Huy Cisneros   MRN:  [id number]    Recommendations:     Discharge Recommendations:  home health PT, home health OT   Discharge Equipment Recommendations: other (see comments)   Barriers to discharge: ongoing assessment as pt medically progresses    Assessment:     Huy Cisneros is a 29 y.o. female admitted with a medical diagnosis of Septic shock.  She presents with the following impairments/functional limitations:  weakness, impaired endurance, impaired self care skills, impaired functional mobilty, gait instability, impaired balance, decreased upper extremity function, decreased lower extremity function, pain, impaired cardiopulmonary response to activity.  Pt reporting fatigue and HA but agreed to therex participation with encouragement and ed for importance of activity provided.  Rec cont acute therapy services to improve endurance, strength and mobility independence levels.    Rehab Prognosis: Fair; patient would benefit from acute skilled PT services to address these deficits and reach maximum level of function.    Recent Surgery: Procedure(s) (LRB):  Transesophageal echo (YOLANDA) intra-procedure log documentation (N/A) 5 Days Post-Op    Plan:     During this hospitalization, patient to be seen 6 x/week to address the identified rehab impairments via gait training, therapeutic activities, therapeutic exercises, neuromuscular re-education and progress toward the following goals:    · Plan of Care Expires:  09/23/20    Subjective     Chief Complaint: c/o HA and reports significant fatigue and bleeding nose (w MD and nsg aware) and cough  Patient/Family Comments/goals: PLOF  Pain/Comfort:  · Pain Rating 1: (no numeric value reported)  · Location - Side 1: Bilateral  · Location - Orientation 1: generalized  · Location 1: head  · Pain Addressed 1: Pre-medicate for activity, Reposition, Distraction, Nurse notified  · Pain Rating Post-Intervention 1: (no change  in pain levels reported following activity)      Objective:     Initial attempt to see pt in early AM w pt requested rest and for therapist to return after eating breakfast.  Upon return , perf therapy session.  Communicated with nsg prior to session.  Patient found supine with bed alarm, oxygen, peripheral IV, PICC line, telemetry upon PT entry to room.     General Precautions: Standard, contact   Orthopedic Precautions:N/A   Braces: N/A     Functional Mobility:  · Bed mobility:  Mod assist w positioning and scoot and bedding mod  · Pt refused txfs attempt stating she had sat EOB and walked to bathroom earlier and reporting current HA and significant fatigue      AM-PAC 6 CLICK MOBILITY  Turning over in bed (including adjusting bedclothes, sheets and blankets)?: 3  Sitting down on and standing up from a chair with arms (e.g., wheelchair, bedside commode, etc.): 3  Moving from lying on back to sitting on the side of the bed?: 2  Moving to and from a bed to a chair (including a wheelchair)?: 2  Need to walk in hospital room?: 2  Climbing 3-5 steps with a railing?: 1  Basic Mobility Total Score: 13       Therapeutic Activities and Exercises:   safety education w mobility and fit and use of AD provided;  Safety ed for mobility in home and community settings in regards to AD use and uneven surfaces and change in tacho in home;  Ed for importance of upright activity as akua during recovery;  Provided pt with VC, man cues and ed for independent performance of AP, QS, and GS each time vitals were taken throughout the day in order to improve overall mm endurance and activity levels;  perf BLE AP, GS, QS 3 x 15 reps, abd/add slides and SAQ 2 x 10 reps and HS active assisted 2 x 5 reps w great effort required;  Increased time and rests required throughout performance of therex and bed mobility activities    Patient left supine with all lines intact, call button in reach, bed alarm on and nsg notified..    GOALS:    Multidisciplinary Problems     Physical Therapy Goals        Problem: Physical Therapy Goal    Goal Priority Disciplines Outcome Goal Variances Interventions   Physical Therapy Goal     PT, PT/OT Ongoing, Progressing     Description: Goals to be met by: 20     Patient will increase functional independence with mobility by performin.  BLE AROM supine x 15  2.  Roll bilaterally with supervision  3.  Supine to/from sit with supervision  4.  Sit at EOB 15 min with supervision  5.  Sit to stand with RW with CG                     Time Tracking:     PT Received On: 20  PT Start Time: 1106     PT Stop Time: 1130  PT Total Time (min): 24 min     Billable Minutes: Therapeutic Exercise 24    Treatment Type: Treatment  PT/PTA: PT     PTA Visit Number: 0     Gisela Paz, PT  2020

## 2020-09-05 NOTE — ASSESSMENT & PLAN NOTE
-Repeat blood cultures negative  -Echo showed no endocarditis  -Central line removed  -Pt on vancomycin which is likely contributing to thrombocytopenia

## 2020-09-06 NOTE — PLAN OF CARE
.VN rounds:  VN cued into pt's room with pt's permission, Pt is alert, lying in bed in low position with call bell  within reach.  VN instructed to call for assistance, verbalized understanding, pt denies pain, anxiety or dyspnea, Oxygen infusing per NC>   No acute distress noted.  Pt's chart, labs and vital signs reviewed.  Allowed time for questions.  Will continue to be available and intervene as needed.

## 2020-09-06 NOTE — PROGRESS NOTES
[facility]  Hematology/Oncology  Progress Note    Patient Name: Huy Cisneros  Admission Date: 8/26/2020  Hospital Length of Stay: 11 days  Code Status: Full Code     Subjective:     HPI:  29-year-old female underwent right hemicolectomy 07/17/2020 for metastatic colon adenocarcinoma and received 1st cycle of FOLFOX 08/11/2020.    7/20/2020 - CT scan - Multiple hepatic hypodensities better evaluated on CT from 07/14/2020, consistent with metastatic disease.    She has been vomiting and feeling dizzy since yesterday.  Last night she busted a stitch to the Port-A-Cath site causing dehiscence and this morning she fell after going to the restroom.    She presented to the ED with tachycardia, heart rate up to 160 and was hypotensive with blood pressure down to 85/43.  She was started on antibiotics, IV fluids and pressors and transferred to [facility].    WBC on admission 0.95, creatinine 2, lactate >12    Bilirubin 3.8, , ALT 93    Seen by . Right chest port removed.      Interval History: Pt states epistaxis improved.  The patient continues to have a cough but states it is no longer productive.  She complains of chest pain with cough.  She endorses continued SOB.  The patient denies abdominal pain, N/V, constipation, diarrhea.    Oncology Treatment Plan:   OP COLORECTAL FOLFOX + BEVACIZUMAB Q2W    Medications:  Continuous Infusions:  Scheduled Meds:   enoxaparin  40 mg Subcutaneous Q24H    metoprolol tartrate  25 mg Oral TID    psyllium husk (aspartame)   Oral Daily    sodium chloride 0.9%  10 mL Intravenous Q6H    sodium phosphate IVPB  30 mmol Intravenous Once    spironolactone  25 mg Oral Daily    vancomycin (VANCOCIN) IVPB  1,000 mg Intravenous Q12H     PRN Meds:sodium chloride, acetaminophen, benzonatate, diphenhydrAMINE-zinc acetate 2-0.1%, heparin (porcine), influenza, lorazepam, melatonin, morphine, ondansetron, ondansetron, ondansetron, ondansetron,  oxymetazoline, pneumoc 13-davion conj-dip cr(PF), promethazine (PHENERGAN) IVPB, simethicone, sodium chloride 0.9%, Flushing PICC Protocol **AND** sodium chloride 0.9% **AND** sodium chloride 0.9%, Pharmacy to dose Vancomycin consult **AND** vancomycin - pharmacy to dose     Review of Systems   Constitutional: Negative for chills and fever.   Respiratory: Positive for cough and shortness of breath.    Cardiovascular: Negative for chest pain and palpitations.   Gastrointestinal: Negative for abdominal pain, constipation, diarrhea, nausea and vomiting.   Musculoskeletal:        Chest pain worse with cough   Skin: Negative for rash.   Neurological: Negative for headaches.     Objective:     Vital Signs (Most Recent):  Temp: 99.6 °F (37.6 °C) (09/06/20 1154)  Pulse: (!) 120 (09/06/20 1154)  Resp: (!) 0 (09/06/20 1315)  BP: (!) 136/91 (09/06/20 1154)  SpO2: (!) 93 % (09/06/20 1154) Vital Signs (24h Range):  Temp:  [97.4 °F (36.3 °C)-100.3 °F (37.9 °C)] 99.6 °F (37.6 °C)  Pulse:  [112-128] 120  Resp:  [0-22] 0  SpO2:  [93 %-98 %] 93 %  BP: (123-139)/(76-92) 136/91     Weight: 88 kg (194 lb 0.1 oz)  Body mass index is 32.28 kg/m².  Body surface area is 2.01 meters squared.      Intake/Output Summary (Last 24 hours) at 9/6/2020 1513  Last data filed at 9/6/2020 0435  Gross per 24 hour   Intake 650 ml   Output 800 ml   Net -150 ml       Physical Exam  Constitutional:       Appearance: She is well-developed.   Eyes:      General:         Right eye: No discharge.         Left eye: No discharge.   Cardiovascular:      Rate and Rhythm: Regular rhythm. Tachycardia present.      Heart sounds: Normal heart sounds. No murmur. No friction rub. No gallop.    Pulmonary:      Effort: Pulmonary effort is normal. No respiratory distress.      Breath sounds: Normal breath sounds. No wheezing or rales.   Abdominal:      General: Bowel sounds are normal. There is no distension.      Palpations: Abdomen is soft.      Tenderness: There is no  abdominal tenderness. There is no guarding or rebound.   Neurological:      Mental Status: She is alert and oriented to person, place, and time.         Significant Labs:   Recent Results (from the past 24 hour(s))   CBC auto differential    Collection Time: 09/06/20  3:20 AM   Result Value Ref Range    WBC 21.43 (H) 3.90 - 12.70 K/uL    RBC 2.94 (L) 4.00 - 5.40 M/uL    Hemoglobin 7.2 (L) 12.0 - 16.0 g/dL    Hematocrit 22.3 (L) 37.0 - 48.5 %    Mean Corpuscular Volume 76 (L) 82 - 98 fL    Mean Corpuscular Hemoglobin 24.5 (L) 27.0 - 31.0 pg    Mean Corpuscular Hemoglobin Conc 32.3 32.0 - 36.0 g/dL    RDW 20.2 (H) 11.5 - 14.5 %    Platelets 65 (L) 150 - 350 K/uL    MPV SEE COMMENT 9.2 - 12.9 fL    Immature Granulocytes Test Not Performed 0.0 - 0.5 %    Gran # (ANC) Test Not Performed 1.8 - 7.7 K/uL    Immature Grans (Abs) Test Not Performed 0.00 - 0.04 K/uL    Lymph # Test Not Performed 1.0 - 4.8 K/uL    Mono # Test Not Performed 0.3 - 1.0 K/uL    Eos # Test Not Performed 0.0 - 0.5 K/uL    Baso # Test Not Performed 0.00 - 0.20 K/uL    nRBC 1 (A) 0 /100 WBC    Gran% 85.0 (H) 38.0 - 73.0 %    Lymph% 9.0 (L) 18.0 - 48.0 %    Mono% 1.0 (L) 4.0 - 15.0 %    Eosinophil% 0.0 0.0 - 8.0 %    Basophil% 1.0 0.0 - 1.9 %    Bands 1.0 %    Myelocytes 3.0 %    Platelet Estimate Decreased (A)     Aniso Moderate     Poik Moderate     Poly Occasional     Hypo Occasional     Ovalocytes Occasional     Fragmented Cells Occasional     Differential Method Manual    Magnesium    Collection Time: 09/06/20  3:20 AM   Result Value Ref Range    Magnesium 2.1 1.6 - 2.6 mg/dL   Phosphorus    Collection Time: 09/06/20  3:20 AM   Result Value Ref Range    Phosphorus 2.0 (L) 2.7 - 4.5 mg/dL   Comprehensive metabolic panel    Collection Time: 09/06/20  3:20 AM   Result Value Ref Range    Sodium 133 (L) 136 - 145 mmol/L    Potassium 3.7 3.5 - 5.1 mmol/L    Chloride 99 95 - 110 mmol/L    CO2 24 23 - 29 mmol/L    Glucose 82 70 - 110 mg/dL    BUN, Bld 11 6  - 20 mg/dL    Creatinine 0.6 0.5 - 1.4 mg/dL    Calcium 7.5 (L) 8.7 - 10.5 mg/dL    Total Protein 6.2 6.0 - 8.4 g/dL    Albumin 1.8 (L) 3.5 - 5.2 g/dL    Total Bilirubin 1.3 (H) 0.1 - 1.0 mg/dL    Alkaline Phosphatase 383 (H) 55 - 135 U/L     (H) 10 - 40 U/L    ALT 37 10 - 44 U/L    Anion Gap 10 8 - 16 mmol/L    eGFR if African American >60 >60 mL/min/1.73 m^2    eGFR if non African American >60 >60 mL/min/1.73 m^2   VANCOMYCIN, TROUGH    Collection Time: 09/06/20  3:20 AM   Result Value Ref Range    Vancomycin-Trough 19.9 10.0 - 22.0 ug/mL     Microbiology Results (last 7 days)     Procedure Component Value Units Date/Time    Blood culture [[id number]] Collected: 09/04/20 0655    Order Status: Completed Specimen: Blood from Peripheral, Right Wrist Updated: 09/06/20 1412     Blood Culture, Routine No Growth to date      No Growth to date      No Growth to date    Blood culture [[id number]] Collected: 09/03/20 0416    Order Status: Completed Specimen: Blood from Antecubital, Right Arm Updated: 09/06/20 1212     Blood Culture, Routine No Growth to date      No Growth to date      No Growth to date      No Growth to date    Blood culture [[id number]] Collected: 09/02/20 0721    Order Status: Completed Specimen: Blood from Peripheral, Right Wrist Updated: 09/06/20 1022     Blood Culture, Routine No Growth to date      No Growth to date      No Growth to date      No Growth to date      No Growth to date    Blood culture [[id number]] Collected: 09/01/20 0655    Order Status: Completed Specimen: Blood from Antecubital, Right Arm Updated: 09/06/20 1012     Blood Culture, Routine No growth after 5 days.    Narrative:      Collection has been rescheduled by LLR at 09/01/2020 04:28 Reason:   nurse said to draw at at 6AM  Collection has been rescheduled by LLR at 09/01/2020 04:28 Reason:   nurse said to draw at at 6AM    Blood culture [[id number]]  (Abnormal)  (Susceptibility) Collected: 08/31/20 0631    Order Status:  Completed Specimen: Blood from Antecubital, Right Arm Updated: 09/05/20 1018     Blood Culture, Routine Gram stain aer bottle: Gram positive cocci in clusters resembling Staph      Results called to and read back by: Argentina Vicente RN. 09/03/2020        03:16      METHICILLIN RESISTANT STAPHYLOCOCCUS AUREUS  ID consult required at [facility].      Blood culture [[id number]] Collected: 08/30/20 0121    Order Status: Completed Specimen: Blood from Peripheral, Right Updated: 09/04/20 1022     Blood Culture, Routine No growth after 5 days.    Blood culture [[id number]]  (Abnormal) Collected: 08/29/20 1658    Order Status: Completed Specimen: Blood from Antecubital, Right Updated: 09/03/20 0753     Blood Culture, Routine Gram stain aer bottle: Gram positive cocci in clusters resembling Staph       Results called to and read back by: Jessica Hewitt 09/01/2020  12:07      METHICILLIN RESISTANT STAPHYLOCOCCUS AUREUS  ID consult required at [facility].  For susceptibility see order #[id number]      Culture, Anaerobe [[id number]] Collected: 08/26/20 1602    Order Status: Completed Specimen: Catheter Tip from Chest, Right Updated: 08/31/20 1305     Anaerobic Culture No anaerobes isolated    Blood culture [[id number]]  (Abnormal) Collected: 08/28/20 1137    Order Status: Completed Specimen: Blood from Antecubital, Right Updated: 08/31/20 1033     Blood Culture, Routine Gram stain juan bottle: Gram positive cocci in clusters resembling Staph       Results called to and read back by: Nighat Craft  08/29/2020  15:26      METHICILLIN RESISTANT STAPHYLOCOCCUS AUREUS  ID consult required at [facility].  For susceptibility see order #[id number]      Blood culture [[id number]]  (Abnormal)  (Susceptibility) Collected: 08/28/20 1137    Order Status: Completed Specimen: Blood Updated: 08/31/20 1030     Blood Culture, Routine Gram stain aer bottle: Gram  positive cocci in clusters resembling Staph       Results called to and read back by: Nighat Craft  08/29/2020  15:26      Gram stain juan bottle: Gram positive cocci in clusters resembling Staph       Positive results previously called 08/29/2020  17:57      METHICILLIN RESISTANT STAPHYLOCOCCUS AUREUS  ID consult required at [facility] and [facility] locations.      Culture, Anaerobe [[id number]] Collected: 08/26/20 1448    Order Status: Completed Specimen: Incision site from Chest, Right Updated: 08/31/20 0938     Anaerobic Culture No anaerobes isolated    Narrative:      Port incision site            Diagnostic Results:  I have reviewed all pertinent imaging results/findings within the past 24 hours.    Assessment/Plan:     Epistaxis  -Epistaxis improved  -Would monitor    Thrombocytopenia  -Platelet count is 65k today  -Peripheral smear previously viewed showing no schistocytes  -4 T-score is 2 (2 points for platelet drop >50%, 0 for platelet count fall in less than 4 days upon admission, 0 for no evidence of thrombosis, 0 for definite other causes for thrombocytopenia) indicating a <5% chance of HIT  -Thrombocytopenia likely from antibiotics and sepsis  -Would consider alternative antibiotics if platelets continue to drop  -Would stop prophylactic lovenox if platelet count drops below 50k.    MRSA bacteremia  -Repeat blood cultures 9/04/20 negative  -Echo showed no endocarditis  -Central line removed  -Pt on vancomycin which is likely contributing to thrombocytopenia     Adenocarcinoma of colon  -Pt is status post cycle #1 of FOLFOX in August 2020.  - CT scan (8/29/20) revealed a partial response to therapy with slight decrease in size of index lesions.  -Would consider restarting treatment as an outpatient after the patient has recovered form her current infection and off of antibiotics  -Pt will need follow up with Dr Arzola on d/c.    Hematology/Oncology service will follow peripherally. Please contact  us if you have any additional questions.       Maximiliano Jovel MD  Hematology/Oncology  [facility]

## 2020-09-06 NOTE — ASSESSMENT & PLAN NOTE
-Platelet count is 65k today  -Peripheral smear previously viewed showing no schistocytes  -4 T-score is 2 (2 points for platelet drop >50%, 0 for platelet count fall in less than 4 days upon admission, 0 for no evidence of thrombosis, 0 for definite other causes for thrombocytopenia) indicating a <5% chance of HIT  -Thrombocytopenia likely from antibiotics and sepsis  -Would consider alternative antibiotics if platelets continue to drop  -Would stop prophylactic lovenox if platelet count drops below 50k.

## 2020-09-06 NOTE — SUBJECTIVE & OBJECTIVE
Interval History: Pt states epistaxis improved.  The patient continues to have a cough but states it is no longer productive.  She complains of chest pain with cough.  She endorses continued SOB.  The patient denies abdominal pain, N/V, constipation, diarrhea.    Oncology Treatment Plan:   OP COLORECTAL FOLFOX + BEVACIZUMAB Q2W    Medications:  Continuous Infusions:  Scheduled Meds:   enoxaparin  40 mg Subcutaneous Q24H    metoprolol tartrate  25 mg Oral TID    psyllium husk (aspartame)   Oral Daily    sodium chloride 0.9%  10 mL Intravenous Q6H    sodium phosphate IVPB  30 mmol Intravenous Once    spironolactone  25 mg Oral Daily    vancomycin (VANCOCIN) IVPB  1,000 mg Intravenous Q12H     PRN Meds:sodium chloride, acetaminophen, benzonatate, diphenhydrAMINE-zinc acetate 2-0.1%, heparin (porcine), influenza, lorazepam, melatonin, morphine, ondansetron, ondansetron, ondansetron, ondansetron, oxymetazoline, pneumoc 13-davion conj-dip cr(PF), promethazine (PHENERGAN) IVPB, simethicone, sodium chloride 0.9%, Flushing PICC Protocol **AND** sodium chloride 0.9% **AND** sodium chloride 0.9%, Pharmacy to dose Vancomycin consult **AND** vancomycin - pharmacy to dose     Review of Systems   Constitutional: Negative for chills and fever.   Respiratory: Positive for cough and shortness of breath.    Cardiovascular: Negative for chest pain and palpitations.   Gastrointestinal: Negative for abdominal pain, constipation, diarrhea, nausea and vomiting.   Musculoskeletal:        Chest pain worse with cough   Skin: Negative for rash.   Neurological: Negative for headaches.     Objective:     Vital Signs (Most Recent):  Temp: 99.6 °F (37.6 °C) (09/06/20 1154)  Pulse: (!) 120 (09/06/20 1154)  Resp: (!) 0 (09/06/20 1315)  BP: (!) 136/91 (09/06/20 1154)  SpO2: (!) 93 % (09/06/20 1154) Vital Signs (24h Range):  Temp:  [97.4 °F (36.3 °C)-100.3 °F (37.9 °C)] 99.6 °F (37.6 °C)  Pulse:  [112-128] 120  Resp:  [0-22] 0  SpO2:  [93 %-98 %]  93 %  BP: (123-139)/(76-92) 136/91     Weight: 88 kg (194 lb 0.1 oz)  Body mass index is 32.28 kg/m².  Body surface area is 2.01 meters squared.      Intake/Output Summary (Last 24 hours) at 9/6/2020 1519  Last data filed at 9/6/2020 0435  Gross per 24 hour   Intake 650 ml   Output 800 ml   Net -150 ml       Physical Exam  Constitutional:       Appearance: She is well-developed.   Eyes:      General:         Right eye: No discharge.         Left eye: No discharge.   Cardiovascular:      Rate and Rhythm: Regular rhythm. Tachycardia present.      Heart sounds: Normal heart sounds. No murmur. No friction rub. No gallop.    Pulmonary:      Effort: Pulmonary effort is normal. No respiratory distress.      Breath sounds: Normal breath sounds. No wheezing or rales.   Abdominal:      General: Bowel sounds are normal. There is no distension.      Palpations: Abdomen is soft.      Tenderness: There is no abdominal tenderness. There is no guarding or rebound.   Neurological:      Mental Status: She is alert and oriented to person, place, and time.         Significant Labs:   Recent Results (from the past 24 hour(s))   CBC auto differential    Collection Time: 09/06/20  3:20 AM   Result Value Ref Range    WBC 21.43 (H) 3.90 - 12.70 K/uL    RBC 2.94 (L) 4.00 - 5.40 M/uL    Hemoglobin 7.2 (L) 12.0 - 16.0 g/dL    Hematocrit 22.3 (L) 37.0 - 48.5 %    Mean Corpuscular Volume 76 (L) 82 - 98 fL    Mean Corpuscular Hemoglobin 24.5 (L) 27.0 - 31.0 pg    Mean Corpuscular Hemoglobin Conc 32.3 32.0 - 36.0 g/dL    RDW 20.2 (H) 11.5 - 14.5 %    Platelets 65 (L) 150 - 350 K/uL    MPV SEE COMMENT 9.2 - 12.9 fL    Immature Granulocytes Test Not Performed 0.0 - 0.5 %    Gran # (ANC) Test Not Performed 1.8 - 7.7 K/uL    Immature Grans (Abs) Test Not Performed 0.00 - 0.04 K/uL    Lymph # Test Not Performed 1.0 - 4.8 K/uL    Mono # Test Not Performed 0.3 - 1.0 K/uL    Eos # Test Not Performed 0.0 - 0.5 K/uL    Baso # Test Not Performed 0.00 - 0.20  K/uL    nRBC 1 (A) 0 /100 WBC    Gran% 85.0 (H) 38.0 - 73.0 %    Lymph% 9.0 (L) 18.0 - 48.0 %    Mono% 1.0 (L) 4.0 - 15.0 %    Eosinophil% 0.0 0.0 - 8.0 %    Basophil% 1.0 0.0 - 1.9 %    Bands 1.0 %    Myelocytes 3.0 %    Platelet Estimate Decreased (A)     Aniso Moderate     Poik Moderate     Poly Occasional     Hypo Occasional     Ovalocytes Occasional     Fragmented Cells Occasional     Differential Method Manual    Magnesium    Collection Time: 09/06/20  3:20 AM   Result Value Ref Range    Magnesium 2.1 1.6 - 2.6 mg/dL   Phosphorus    Collection Time: 09/06/20  3:20 AM   Result Value Ref Range    Phosphorus 2.0 (L) 2.7 - 4.5 mg/dL   Comprehensive metabolic panel    Collection Time: 09/06/20  3:20 AM   Result Value Ref Range    Sodium 133 (L) 136 - 145 mmol/L    Potassium 3.7 3.5 - 5.1 mmol/L    Chloride 99 95 - 110 mmol/L    CO2 24 23 - 29 mmol/L    Glucose 82 70 - 110 mg/dL    BUN, Bld 11 6 - 20 mg/dL    Creatinine 0.6 0.5 - 1.4 mg/dL    Calcium 7.5 (L) 8.7 - 10.5 mg/dL    Total Protein 6.2 6.0 - 8.4 g/dL    Albumin 1.8 (L) 3.5 - 5.2 g/dL    Total Bilirubin 1.3 (H) 0.1 - 1.0 mg/dL    Alkaline Phosphatase 383 (H) 55 - 135 U/L     (H) 10 - 40 U/L    ALT 37 10 - 44 U/L    Anion Gap 10 8 - 16 mmol/L    eGFR if African American >60 >60 mL/min/1.73 m^2    eGFR if non African American >60 >60 mL/min/1.73 m^2   VANCOMYCIN, TROUGH    Collection Time: 09/06/20  3:20 AM   Result Value Ref Range    Vancomycin-Trough 19.9 10.0 - 22.0 ug/mL     Microbiology Results (last 7 days)     Procedure Component Value Units Date/Time    Blood culture [[id number]] Collected: 09/04/20 0655    Order Status: Completed Specimen: Blood from Peripheral, Right Wrist Updated: 09/06/20 1412     Blood Culture, Routine No Growth to date      No Growth to date      No Growth to date    Blood culture [[id number]] Collected: 09/03/20 0416    Order Status: Completed Specimen: Blood from Antecubital, Right Arm Updated: 09/06/20 1212     Blood  Culture, Routine No Growth to date      No Growth to date      No Growth to date      No Growth to date    Blood culture [[id number]] Collected: 09/02/20 0721    Order Status: Completed Specimen: Blood from Peripheral, Right Wrist Updated: 09/06/20 1022     Blood Culture, Routine No Growth to date      No Growth to date      No Growth to date      No Growth to date      No Growth to date    Blood culture [[id number]] Collected: 09/01/20 0655    Order Status: Completed Specimen: Blood from Antecubital, Right Arm Updated: 09/06/20 1012     Blood Culture, Routine No growth after 5 days.    Narrative:      Collection has been rescheduled by LLR at 09/01/2020 04:28 Reason:   nurse said to draw at at 6AM  Collection has been rescheduled by LLR at 09/01/2020 04:28 Reason:   nurse said to draw at at 6AM    Blood culture [[id number]]  (Abnormal)  (Susceptibility) Collected: 08/31/20 0631    Order Status: Completed Specimen: Blood from Antecubital, Right Arm Updated: 09/05/20 1018     Blood Culture, Routine Gram stain aer bottle: Gram positive cocci in clusters resembling Staph      Results called to and read back by: Argentina Vicente RN. 09/03/2020        03:16      METHICILLIN RESISTANT STAPHYLOCOCCUS AUREUS  ID consult required at [facility].[place] [facility].      Blood culture [[id number]] Collected: 08/30/20 0121    Order Status: Completed Specimen: Blood from Peripheral, Right Updated: 09/04/20 1022     Blood Culture, Routine No growth after 5 days.    Blood culture [[id number]]  (Abnormal) Collected: 08/29/20 1658    Order Status: Completed Specimen: Blood from Antecubital, Right Updated: 09/03/20 0753     Blood Culture, Routine Gram stain aer bottle: Gram positive cocci in clusters resembling Staph       Results called to and read back by: Jessica Hewitt 09/01/2020  12:07      METHICILLIN RESISTANT STAPHYLOCOCCUS AUREUS  [facility].  For susceptibility see  order #[id number]      Culture, Anaerobe [[id number]] Collected: 08/26/20 1602    Order Status: Completed Specimen: Catheter Tip from Chest, Right Updated: 08/31/20 1305     Anaerobic Culture No anaerobes isolated    Blood culture [[id number]]  (Abnormal) Collected: 08/28/20 1137    Order Status: Completed Specimen: Blood from Antecubital, Right Updated: 08/31/20 1033     Blood Culture, Routine Gram stain juan bottle: Gram positive cocci in clusters resembling Staph       Results called to and read back by: Nighat Craft  08/29/2020  15:26      METHICILLIN RESISTANT STAPHYLOCOCCUS AUREUS  ID consult required at [facility] and [facility].  For susceptibility see order #[id number]      Blood culture [[id number]]  (Abnormal)  (Susceptibility) Collected: 08/28/20 1137    Order Status: Completed Specimen: Blood Updated: 08/31/20 1030     Blood Culture, Routine Gram stain aer bottle: Gram positive cocci in clusters resembling Staph       Results called to and read back by: Nighat Craft  08/29/2020  15:26      Gram stain juan bottle: Gram positive cocci in clusters resembling Staph       Positive results previously called 08/29/2020  17:57      METHICILLIN RESISTANT STAPHYLOCOCCUS AUREUS  ID consult required at [facility] and [facility].      Culture, Anaerobe [[id number]] Collected: 08/26/20 1448    Order Status: Completed Specimen: Incision site from Chest, Right Updated: 08/31/20 0938     Anaerobic Culture No anaerobes isolated    Narrative:      Port incision site            Diagnostic Results:  I have reviewed all pertinent imaging results/findings within the past 24 hours.

## 2020-09-06 NOTE — PROGRESS NOTES
Infectious Disease Follow up Note    Impression/Plan:    MRSA Bacteremia  29 F with PMH of metastatic adenocarcinoma of the colon with liver mets, uterine cysts, and HTN on whom ID is consulted for likely staph aureus bacteremia. She presented neutropenic.   WBC up to 2.5 on 8/28. Port removed on 8/26 - positive for staph aureus > 15 colonies. TTE was negative 8/27 for vegetations. YOLANDA negative for vegetations 8/31. Patient afebrile today.     - Continue Vanc for MRSA bacteremia.   - OK to stop blood cultures at this time  - Remove RIJ CVC if it isn't needed  - No new recommendations at this time. We will follow for one more day due to 100.3 temp. Sign off tomorrow if afebrile.    Thank you for allowing us to participate in the care for this patient. We will sign off at this time. Please call with any questions.  Torsten Castaneda  [facility]  Infectious Disease Consult Service  [phone]    Hospital Day 11  Principal Problem: Septic shock     HPI: 29 F with PMH of metastatic adenocarcinoma of the colon with liver mets, uterine cysts, and HTN who presented to [facility] ED for intractable nausea/vomitting that has been on going for about 1 day.  Pt describes vomiting as having some food contents but mostly saliva, she reports she has not been able to tolerated PO since symptoms began.  Associated symptoms include fevers, chills, and general malaise which also started when nausea/vomiting started. Pt denies abdominal pain, endorses 1 episode of diarrhea. Denies any chest pain, SOB, changes in urinary habits.  Pt reports she has never had symptoms like this in the past. She reports her cancer is s/p resection in July 2020 and currently undergoing chemotherapy last given 2 weeks via port in the right chest.  In ED noticed wound dehiscence at right chest port site. She was admitted to the ICU. Was briefly on norepi but off it now. Blood cxs with GPCs and staph aureus from the port site. Port is  removed.      8/26 BC MRSA  8/26 R chest wound culture MRSA   8/26 cath tip Staph aureus >14 colonies  8/28 BC MRSA  8/28 WBC up to 2.5K  8/29 WBC up to 10K  8/29 BC MRSA  8/30 BC NGTD  8/31 BC Staph aureus   8/31 YOLANDA with no vegetations  9/1 BC NGTD  9/1 started on Meropenem  9/3 CT C/A/P concerning for septic emboli   9/4 BCx NGTD    Interval History: Patient reports feeling well this morning. Denies fevers, chills, nausea or vomiting. Reports improved breathing. YOLANDA yesterday showed no vegetations.    Review of Symptoms:  ROS Otherwise negative    Medications:  Antibiotics:   Antibiotics (From admission, onward)    Start     Stop Route Frequency Ordered    09/04/20 1600  vancomycin in dextrose 5 % 1 gram/250 mL IVPB 1,000 mg      -- IV Every 12 hours (non-standard times) 09/04/20 1510    08/26/20 1628  vancomycin - pharmacy to dose  (vancomycin IVPB)      -- IV pharmacy to manage frequency 08/26/20 1528        Objective:  Physical Exam:  VS (24h):  Temp:  [97.4 °F (36.3 °C)-100.3 °F (37.9 °C)] 99.6 °F (37.6 °C)  Pulse:  [112-128] 120  Resp:  [0-24] 0  SpO2:  [93 %-98 %] 93 %  BP: (123-139)/(76-92) 136/91     Physical Exam   GEN- resting comfortably   HEENT- PERRL, EOMI, MMM  NECK- No thyromegaly or other masses  CARDIAC- Tachycardic, no murmurs  RESP- Tachypnea, on room air, crackles noted bilaterally  ABD- Soft, non-tender, ND, +BS; no masses or HSM  EXT- No clubbing, cyanosis, or edema; 2+ DP/PT pulses  NEURO- CN II-XII grossly intact; moves all four extremities equally  SKIN- Warm and dry; no rashes, site of prior port is clean and dry without tenderness.     Lines:  RIJ CVC  PICC L UE  PIV    Labs:  CBC:   Lab Results   Component Value Date    WBC 21.43 (H) 09/06/2020    WBC 21.81 (H) 09/05/2020    WBC 26.54 (H) 09/04/2020    WBC 27.95 (H) 09/03/2020    WBC 32.88 (H) 09/02/2020    HCT 22.3 (L) 09/06/2020    PLT 65 (L) 09/06/2020     BMP:   Recent Labs   Lab 09/06/20  0320   GLU 82   *   K 3.7   CL 99    CO2 24   BUN 11   CREATININE 0.6   CALCIUM 7.5*   MG 2.1     LFT:   Lab Results   Component Value Date    ALT 37 09/06/2020     (H) 09/06/2020    ALKPHOS 383 (H) 09/06/2020    BILITOT 1.3 (H) 09/06/2020     Microbiology x 7d:   Microbiology Results (last 7 days)     Procedure Component Value Units Date/Time    Blood culture [[id number]] Collected: 09/03/20 0416    Order Status: Completed Specimen: Blood from Antecubital, Right Arm Updated: 09/06/20 1212     Blood Culture, Routine No Growth to date      No Growth to date      No Growth to date      No Growth to date    Blood culture [[id number]] Collected: 09/02/20 0721    Order Status: Completed Specimen: Blood from Peripheral, Right Wrist Updated: 09/06/20 1022     Blood Culture, Routine No Growth to date      No Growth to date      No Growth to date      No Growth to date      No Growth to date    Blood culture [[id number]] Collected: 09/01/20 0655    Order Status: Completed Specimen: Blood from Antecubital, Right Arm Updated: 09/06/20 1012     Blood Culture, Routine No growth after 5 days.    Narrative:      Collection has been rescheduled by LLR at 09/01/2020 04:28 Reason:   nurse said to draw at at 6AM  Collection has been rescheduled by LLR at 09/01/2020 04:28 Reason:   nurse said to draw at at 6AM    Blood culture [[id number]] Collected: 09/04/20 0655    Order Status: Completed Specimen: Blood from Peripheral, Right Wrist Updated: 09/05/20 1412     Blood Culture, Routine No Growth to date      No Growth to date    Blood culture [[id number]]  (Abnormal)  (Susceptibility) Collected: 08/31/20 0631    Order Status: Completed Specimen: Blood from Antecubital, Right Arm Updated: 09/05/20 1018     Blood Culture, Routine Gram stain aer bottle: Gram positive cocci in clusters resembling Staph      Results called to and read back by: Argentina Vicente RN. 09/03/2020        03:16      METHICILLIN RESISTANT STAPHYLOCOCCUS AUREUS  ID consult required at [facility].      Blood culture [[id number]] Collected: 08/30/20 0121    Order Status: Completed Specimen: Blood from Peripheral, Right Updated: 09/04/20 1022     Blood Culture, Routine No growth after 5 days.    Blood culture [[id number]]  (Abnormal) Collected: 08/29/20 1658    Order Status: Completed Specimen: Blood from Antecubital, Right Updated: 09/03/20 0753     Blood Culture, Routine Gram stain aer bottle: Gram positive cocci in clusters resembling Staph       Results called to and read back by: Jessica Hewitt 09/01/2020  12:07      METHICILLIN RESISTANT STAPHYLOCOCCUS AUREUS  ID consult required at [facility].  For susceptibility see order #[id number]      Culture, Anaerobe [[id number]] Collected: 08/26/20 1602    Order Status: Completed Specimen: Catheter Tip from Chest, Right Updated: 08/31/20 1305     Anaerobic Culture No anaerobes isolated    Blood culture [[id number]]  (Abnormal) Collected: 08/28/20 1137    Order Status: Completed Specimen: Blood from Antecubital, Right Updated: 08/31/20 1033     Blood Culture, Routine Gram stain juan bottle: Gram positive cocci in clusters resembling Staph       Results called to and read back by: Nighat Craft  08/29/2020  15:26      METHICILLIN RESISTANT STAPHYLOCOCCUS AUREUS  ID consult required at [facility].  For susceptibility see order #[id number]      Blood culture [[id number]]  (Abnormal)  (Susceptibility) Collected: 08/28/20 1137    Order Status: Completed Specimen: Blood Updated: 08/31/20 1030     Blood Culture, Routine Gram stain aer bottle: Gram positive cocci in clusters resembling Staph       Results called to and read back by: Nighat Craft  08/29/2020  15:26      Gram stain juan bottle: Gram positive cocci in clusters resembling Staph       Positive results previously called 08/29/2020  17:57      METHICILLIN RESISTANT STAPHYLOCOCCUS AUREUS  [place] [facility]  [place].      Culture, Anaerobe [[id number]] Collected: 08/26/20 1448    Order Status: Completed Specimen: Incision site from Chest, Right Updated: 08/31/20 0938     Anaerobic Culture No anaerobes isolated    Narrative:      Port incision site        Imaging:  CXR 8/26/2020  Right chest MediPort catheter with tip overlying the atrium.  Lungs and heart demonstrate no significant interval detrimental change in the short interval.  No large pleural effusion or gross pneumothorax.    CXR 8/26/2020  Since the most recent study, the port catheter has been discontinued.  The tip of a newly placed right internal jugular approach central venous catheter is in the region of the right atrium.  No pneumothorax or pleural effusion.  Cardiomediastinal silhouette is unchanged.  Lungs remain clear.  Additional findings unchanged.    DVT US BL ANGUS 8/27/2020  No evidence of deep venous thrombosis in either lower extremity.    MRI T/L 8/28  1. Within limitations of lack of intravenous contrast, no findings of infection in the thoracic or lumbar spine are identified.  2. No significant spinal canal or foraminal narrowing.  Normal appearance of the thoracic spinal cord and conus medullaris.  3. Partially imaged are multiple bilateral peripheral predominant pulmonary nodules, some of which may be cavitary.  Given that these lesions appear to be new since the 07/20/2020 CT examination, the findings may represent multifocal infectious process or septic emboli.  Metastatic disease would be additional consideration.  Dedicated CT chest imaging can be considered for further evaluation.  4. Known hepatic metastatic disease partially imaged.    CT A/P 8/28  Postoperative changes of bowel resection are identified.  There are innumerable hypodense lesions throughout the liver compatible with hepatic metastatic disease.  Focal peristomal prior imaging limited given the lack of intravenous contrast  material.     Bibasilar consolidation, left greater than right with subtotal consolidation of the left lower lobe.  There are some nodular densities seen bilaterally in both visualized lung bases.  While this could represent metastatic disease, given the patient's history of sepsis, septic emboli not entirely excluded.  These findings are new as compared to the previous study of 07/14/2020.     Liquid stool throughout the colon suggesting diarrhea.    YOLANDA 8/31/2020  - No vegetations seen    Lower Extremity US 9/1  - No evidence of deep venous thrombosis in either lower extremity    CXR 9/2  Left PICC catheter tip projects over the distal SVC.  Right central venous catheter tip projects over the distal SVC.  The cardiomediastinal silhouette is not enlarged.  There is obscuration of the left costophrenic angle, may reflect small effusion versus attenuation related to overlying soft tissue.  The trachea is midline.  The lungs are symmetrically expanded bilaterally with patchy increased interstitial and parenchymal attenuation bilaterally, worsened since the previous exam, differential would include worsening edema or infection.  No large focal consolidation seen.  There is no pneumothorax.  The osseous structures are unremarkable..    CT C/A/P 9/2  - Similar distribution of ill-defined peripheral nodular densities and mixed consolidative opacities throughout both lungs, some demonstrating areas new and more conspicuous central cavitation.  Overall findings concerning for infectious process with component of septic emboli.  - race left pleural effusion.  - Rounded structure in the mid abdomen containing intraluminal gas focus, without significant surrounding inflammatory change.  Findings could relate to focal outpouching related to nearby anastomosis versus traversing bowel loop.  Small contained extraluminal collection cannot be definitively excluded.  Close attention at follow-up.  - Mild volume lower abdominopelvic  ascites, slightly increased.  - Intrahepatic metastasis with likely metastatic node at the richard hepatis.    YOLANDA (9/5)  · Normal left ventricular systolic function. The estimated ejection fraction is 55%.  · Normal LV diastolic function.  · Normal right ventricular systolic function.  · No interatrial septal defect present.  · Normal appearing left atrial appendage. No thrombus is present in the appendage. Normal appendage velocities.  · No vegetations    Assessment:    Active Hospital Problems    Diagnosis    *Septic shock    Epistaxis    Thrombocytopenia    Palliative care encounter    Goals of care, counseling/discussion    Counseling regarding advance care planning and goals of care    Bacteremia due to Staphylococcus    Chest pain    Elevated troponin    Pancytopenia    MRSA bacteremia    Infected venous access port    Metabolic acidosis    Hypomagnesemia    Hyponatremia    Nausea & vomiting    Lactic acid acidosis    Sepsis    Anemia in neoplastic disease    Metastatic disease    Secondary malignancy of liver    Adenocarcinoma of colon     Plan:  See top of page.

## 2020-09-06 NOTE — PLAN OF CARE
"  Problem: Adult Inpatient Plan of Care  Goal: Plan of Care Review  Outcome: Ongoing, Progressing     VIRTUAL NURSE:  Cued into patient's room.  Patient not responding to introduction and permission inquiry.  Patient resting comfortably in bed watching TV; shakes head "no" when asked if she needs anything.  No distress noted.    Labs, notes, orders, and careplan reviewed.    "

## 2020-09-06 NOTE — SUBJECTIVE & OBJECTIVE
Interval history: patient with fevers overnight, continues to have tachypnea and tachycardia, pain has improved, denies abdominal discomfort or diarrhea    Review of Systems   Constitutional: Positive for activity change, appetite change, fatigue and fever. Negative for chills.   HENT: Negative for nosebleeds.    Respiratory: Positive for cough and shortness of breath.    Cardiovascular: Positive for chest pain and leg swelling.   Gastrointestinal: Negative for abdominal distention, abdominal pain, diarrhea, nausea and vomiting.   Genitourinary: Negative for difficulty urinating.   Skin: Positive for wound (right chest wall).   Allergic/Immunologic: Positive for immunocompromised state.   Neurological: Positive for weakness.   Psychiatric/Behavioral: The patient is nervous/anxious.      Objective:     Vital Signs (Most Recent):  Temp: 99.6 °F (37.6 °C) (09/06/20 1154)  Pulse: (!) 120 (09/06/20 1154)  Resp: 19 (09/06/20 1154)  BP: (!) 136/91 (09/06/20 1154)  SpO2: (!) 93 % (09/06/20 1154) Vital Signs (24h Range):  Temp:  [97.4 °F (36.3 °C)-100.3 °F (37.9 °C)] 99.6 °F (37.6 °C)  Pulse:  [112-128] 120  Resp:  [18-24] 19  SpO2:  [93 %-98 %] 93 %  BP: (123-139)/(76-92) 136/91     Weight: 88 kg (194 lb 0.1 oz)  Body mass index is 32.28 kg/m².    Physical Exam  Vitals signs and nursing note reviewed.   Constitutional:       Appearance: She is ill-appearing.   HENT:      Head:      Comments: trialysis line in place at [facility]   Cardiovascular:      Rate and Rhythm: Regular rhythm. Tachycardia present.      Pulses: Normal pulses.      Heart sounds: Normal heart sounds.   Pulmonary:      Effort: Tachypnea present.      Breath sounds: Decreased breath sounds present.   Abdominal:      General: Bowel sounds are normal. There is no distension.      Palpations: Abdomen is soft.   Musculoskeletal: Normal range of motion.         General: Swelling present.   Skin:     General: Skin is warm and dry.      Capillary Refill: Capillary  refill takes less than 2 seconds.      Comments: Dressing to the Right CW, clean dry and intact   Neurological:      Mental Status: She is alert and oriented to person, place, and time.      Motor: Weakness present.   Psychiatric:         Behavior: Behavior normal.             Significant Labs:   BMP:   Recent Labs   Lab 09/06/20  0320   GLU 82   *   K 3.7   CL 99   CO2 24   BUN 11   CREATININE 0.6   CALCIUM 7.5*   MG 2.1     CBC:   Recent Labs   Lab 09/05/20 0457 09/06/20  0320   WBC 21.81* 21.43*   HGB 7.2* 7.2*   HCT 22.4* 22.3*   PLT 64* 65*     Lactic Acid:   No results for input(s): LACTATE in the last 48 hours.  Magnesium:   Recent Labs   Lab 09/05/20 0457 09/06/20  0320   MG 2.4 2.1     POCT Glucose:   No results for input(s): POCTGLUCOSE in the last 48 hours.      Significant Imaging: I have reviewed all pertinent imaging results/findings within the past 24 hours.

## 2020-09-06 NOTE — ASSESSMENT & PLAN NOTE
-Repeat blood cultures 9/04/20 negative  -Echo showed no endocarditis  -Central line removed  -Pt on vancomycin which is likely contributing to thrombocytopenia

## 2020-09-06 NOTE — PLAN OF CARE
Patient is awake and orientedx4. Care plan explained. On oxygen 1lpm/nasal cannula, tachypneic. Complaints of cough, tessalon luz was administered earlier. Sinus Tachycardia running 110s. Complaints of chest tightness, morphine given. Vital signs stable. Maintained on low salt, low fat diet. Right IJ trialysis, for possible removal 9/6. Left basilic PICC line double lumen, blood return present and saline locked. Purewick in place. Bed alarm on, call light within reach. Contact isolation initiated as per NP's order.

## 2020-09-06 NOTE — ASSESSMENT & PLAN NOTE
Epistaxis      Denies bloody drainage from nose today--humidified oxygen seems to be helping , lovenox 40mg  monitor platelet count-likely decreased due to sepsis and antibiotics per hem/onc  Will DC lovenox if platelet less than 50  We appreciate hem/onc

## 2020-09-06 NOTE — ASSESSMENT & PLAN NOTE
-Pt is status post cycle #1 of FOLFOX in August 2020.  - CT scan (8/29/20) revealed a partial response to therapy with slight decrease in size of index lesions.  -Would consider restarting treatment as an outpatient after the patient has recovered form her current infection and off of antibiotics  -Pt will need follow up with Dr Arzola on d/c.

## 2020-09-06 NOTE — ASSESSMENT & PLAN NOTE
Infected venous access port  Metabolic Acidosis  Bacteremia due to Staphylococcus    Patient with dehiscence of Mediport---started on broad spectrum abx. ID has been consulted. BCX growing GP cocci resembling staph. MRSA noted from the port. Blood cultures were redrawn on 8/28--NGTD. General Sx was consulted to remove her tunneled port. Lactate was initially elevated--now down. IVF given. Nephrology was consulted. Bicarb gtt initiated by Nephrology. CRRT was started and now stopped. MRI of the T/L spine suggestive of cavitary lung lesions. CT of the C/A/P showing possible septic emboli. We appreciate ID. YOLANDA done showing no vegetation. The patient has diarrhea-neg for C-Diff.     Tran catheter removed and diet advanced. Repeat CT of chest and abdomen showing increased chest infiltrates, septic emboli, and ascites. PICC line placed for IV access. 1 unit PRBC given. Lasix was given for pleural effusion. Abdominal US ordered for ascites level, no ascites noted. CTA ordered for worsening SOB, negative for PE. A palliative care consult was placed.     She was stepped down from ICU on 9/4  Patient is looking better, denies abdominal pain or diarrhea, still with cough and chest pain, tachypnea and tachycardia still present, fevers overnight  Cont morphine for pain and tachypnea  Ativan PRN for anxiety  Renal recovery, needs line removed  Surgery consulted to remove RIJ CVC  Cont vancomycin for 4 weeks per ID recs  Patient has PICC line--home IV antibiotics soon?

## 2020-09-06 NOTE — PROGRESS NOTES
Pharmacokinetic Assessment Follow Up: IV Vancomycin    Vancomycin serum concentration assessment(s):    The trough level was drawn correctly and can be used to guide therapy at this time. The measurement is within the desired definitive target range of 15 to 20 mcg/mL.    Vancomycin Regimen Plan:    Continue regimen to Vancomycin 1g mg IV every 12 hours with next serum trough concentration measured at 9/7/20 prior to 4 dose on 1500    Drug levels (last 3 results):  Recent Labs   Lab Result Units 09/04/20  1410 09/06/20  0320   Vancomycin-Trough ug/mL 22.7* 19.9       Pharmacy will continue to follow and monitor vancomycin.    Please contact pharmacy at extension [phone] for questions regarding this assessment.    Thank you for the consult,   Guille Tijerina       Patient brief summary:  Huy Cisneros is a 29 y.o. female initiated on antimicrobial therapy with IV Vancomycin for treatment of bacteremia    The patient's current regimen is 1g q12    Drug Allergies:   Review of patient's allergies indicates:   Allergen Reactions    Sulfa (sulfonamide antibiotics)        Actual Body Weight:   88kg    Renal Function:   Estimated Creatinine Clearance: 150.6 mL/min (based on SCr of 0.6 mg/dL).,     Dialysis Method (if applicable):  N/A    CBC (last 72 hours):  Recent Labs   Lab Result Units 09/04/20  0604 09/05/20  0457 09/06/20  0320   WBC K/uL 26.54* 21.81* 21.43*   Hemoglobin g/dL 7.9* 7.2* 7.2*   Hematocrit % 24.3* 22.4* 22.3*   Platelets K/uL 77* 64* 65*   Gran% % 75.0* 75.0* 71.3   Lymph% % 9.0* 5.0* 11.1*   Mono% % 3.0* 4.0 6.5   Eosinophil% % 0.0 2.0 0.0   Basophil% % 0.0 0.0 0.3   Differential Method  Manual Manual Automated       Metabolic Panel (last 72 hours):  Recent Labs   Lab Result Units 09/04/20  0604 09/04/20  1535 09/05/20  0457   Sodium mmol/L 133*  --  134*   Potassium mmol/L 3.4*  --  3.4*   Chloride mmol/L 99  --  99   CO2 mmol/L 22*  --  24   Glucose mg/dL 100  --  97   Glucose, UA   --  Negative   --    BUN, Bld mg/dL 12  --  14   Creatinine mg/dL 0.6  --  0.6   Creatinine, Random Ur mg/dL  --  9.0*  9.0*  --    Albumin g/dL 1.7*  --  1.8*   Total Bilirubin mg/dL 1.2*  --  1.1*   Alkaline Phosphatase U/L 417*  --  397*   AST U/L 80*  --  104*   ALT U/L 20  --  24   Magnesium mg/dL 1.9  --  2.4   Phosphorus mg/dL 1.7*  --  2.6*       Vancomycin Administrations:  vancomycin given in the last 96 hours                     vancomycin in dextrose 5 % 1 gram/250 mL IVPB 1,000 mg (mg) 1,000 mg New Bag 09/05/20 1703     1,000 mg New Bag  0453     1,000 mg New Bag 09/04/20 1529    vancomycin (VANCOCIN) 1,250 mg in dextrose 5 % 250 mL IVPB (mg) 1,250 mg New Bag 09/04/20 0259     1,250 mg New Bag 09/03/20 1355     1,250 mg New Bag  0205     1,250 mg New Bag 09/02/20 1443                    Microbiologic Results:  Microbiology Results (last 7 days)       Procedure Component Value Units Date/Time    Blood culture [[id number]] Collected: 09/04/20 0655    Order Status: Completed Specimen: Blood from Peripheral, Right Wrist Updated: 09/05/20 1412     Blood Culture, Routine No Growth to date      No Growth to date    Blood culture [[id number]] Collected: 09/03/20 0416    Order Status: Completed Specimen: Blood from Antecubital, Right Arm Updated: 09/05/20 1212     Blood Culture, Routine No Growth to date      No Growth to date      No Growth to date    Blood culture [[id number]] Collected: 09/02/20 0721    Order Status: Completed Specimen: Blood from Peripheral, Right Wrist Updated: 09/05/20 1022     Blood Culture, Routine No Growth to date      No Growth to date      No Growth to date      No Growth to date    Blood culture [[id number]]  (Abnormal)  (Susceptibility) Collected: 08/31/20 0631    Order Status: Completed Specimen: Blood from Antecubital, Right Arm Updated: 09/05/20 1018     Blood Culture, Routine Gram stain aer bottle: Gram positive cocci in clusters resembling Staph      Results called to and read back by:  Argentina Vicente RN. 09/03/2020        03:16      METHICILLIN RESISTANT STAPHYLOCOCCUS AUREUS  ID consult required at [facility] and [facility] locations.      Blood culture [[id number]] Collected: 09/01/20 0655    Order Status: Completed Specimen: Blood from Antecubital, Right Arm Updated: 09/05/20 1012     Blood Culture, Routine No Growth to date      No Growth to date      No Growth to date      No Growth to date      No Growth to date    Narrative:      Collection has been rescheduled by LLR at 09/01/2020 04:28 Reason:   nurse said to draw at at 6AM  Collection has been rescheduled by LLR at 09/01/2020 04:28 Reason:   nurse said to draw at at 6AM    Blood culture [[id number]] Collected: 08/30/20 0121    Order Status: Completed Specimen: Blood from Peripheral, Right Updated: 09/04/20 1022     Blood Culture, Routine No growth after 5 days.    Blood culture [[id number]]  (Abnormal) Collected: 08/29/20 1658    Order Status: Completed Specimen: Blood from Antecubital, Right Updated: 09/03/20 0753     Blood Culture, Routine Gram stain aer bottle: Gram positive cocci in clusters resembling Staph       Results called to and read back by: Jessica Hewitt 09/01/2020  12:07      METHICILLIN RESISTANT STAPHYLOCOCCUS AUREUS  ID consult required at [facility] and [facility] locations.  For susceptibility see order #[id number]      Culture, Anaerobe [[id number]] Collected: 08/26/20 1602    Order Status: Completed Specimen: Catheter Tip from Chest, Right Updated: 08/31/20 1305     Anaerobic Culture No anaerobes isolated    Blood culture [[id number]]  (Abnormal) Collected: 08/28/20 1137    Order Status: Completed Specimen: Blood from Antecubital, Right Updated: 08/31/20 1033     Blood Culture, Routine Gram stain juan bottle: Gram positive cocci in clusters resembling Staph       Results called to and read back by: Nighat Craft  08/29/2020  15:26      METHICILLIN RESISTANT STAPHYLOCOCCUS AUREUS  ID consult required at [facility] and [facility].  For susceptibility see order #[id number]      Blood culture [[id number]]  (Abnormal)  (Susceptibility) Collected: 08/28/20 1137    Order Status: Completed Specimen: Blood Updated: 08/31/20 1030     Blood Culture, Routine Gram stain aer bottle: Gram positive cocci in clusters resembling Staph       Results called to and read back by: Nighat Craft  08/29/2020  15:26      Gram stain juan bottle: Gram positive cocci in clusters resembling Staph       Positive results previously called 08/29/2020  17:57      METHICILLIN RESISTANT STAPHYLOCOCCUS AUREUS  ID consult required at [facility] and [facility] locations.      Culture, Anaerobe [[id number]] Collected: 08/26/20 1448    Order Status: Completed Specimen: Incision site from Chest, Right Updated: 08/31/20 0938     Anaerobic Culture No anaerobes isolated    Narrative:      Port incision site
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
## 2020-09-07 NOTE — PROGRESS NOTES
Pharmacokinetic Assessment Follow Up: IV Vancomycin    Vancomycin serum concentration assessment(s):    The trough level was drawn correctly and can be used to guide therapy at this time. The measurement is below the desired definitive target range of 15 to 20 mcg/mL. (trough level was late by 3 hours but still very low -10.2 ) .     Vancomycin Regimen Plan:    Change regimen to Vancomycin 1250 mg IV every 12 hours with next serum trough concentration measured at 1800 prior to 3rd dose on 9/8/20    Drug levels (last 3 results):  Recent Labs   Lab Result Units 09/06/20 0320 09/07/20  1803   Vancomycin-Trough ug/mL 19.9 10.2       Pharmacy will continue to follow and monitor vancomycin.    Please contact pharmacy at extension [phone] for questions regarding this assessment.    Thank you for the consult,   Mirtha Rey       Patient brief summary:  Huy Cisneros is a 29 y.o. female initiated on antimicrobial therapy with IV Vancomycin for treatment of bacteremia    The patient's current regimen is 1250 mg Q 12 hrs changed from 1000 mg Q 12 hrs     Drug Allergies:   Review of patient's allergies indicates:   Allergen Reactions    Sulfa (sulfonamide antibiotics)        Actual Body Weight:   88 kg     Renal Function:   Estimated Creatinine Clearance: 128.7 mL/min (based on SCr of 0.7 mg/dL).,       CBC (last 72 hours):  Recent Labs   Lab Result Units 09/05/20 0457 09/06/20 0320 09/07/20 0417   WBC K/uL 21.81* 21.43* 20.10*   Hemoglobin g/dL 7.2* 7.2* 7.0*   Hematocrit % 22.4* 22.3* 22.3*   Platelets K/uL 64* 65* 138*   Gran% % 75.0* 85.0* 87.0*   Lymph% % 5.0* 9.0* 4.0*   Mono% % 4.0 1.0* 4.0   Eosinophil% % 2.0 0.0 0.0   Basophil% % 0.0 1.0 2.0*   Differential Method  Manual Manual Manual       Metabolic Panel (last 72 hours):  Recent Labs   Lab Result Units 09/05/20 0457 09/06/20 0320 09/07/20  0417   Sodium mmol/L 134* 133* 132*   Potassium mmol/L 3.4* 3.7 4.0   Chloride mmol/L 99 99 98   CO2 mmol/L 24 24  23   Glucose mg/dL 97 82 98   BUN, Bld mg/dL 14 11 10   Creatinine mg/dL 0.6 0.6 0.7   Albumin g/dL 1.8* 1.8* 1.8*   Total Bilirubin mg/dL 1.1* 1.3* 1.6*   Alkaline Phosphatase U/L 397* 383* 313*   AST U/L 104* 200* 153*   ALT U/L 24 37 37   Magnesium mg/dL 2.4 2.1 1.8   Phosphorus mg/dL 2.6* 2.0* 2.5*       Vancomycin Administrations:  vancomycin given in the last 96 hours                     vancomycin in dextrose 5 % 1 gram/250 mL IVPB 1,000 mg (mg) 1,000 mg New Bag 09/07/20 0327     1,000 mg New Bag 09/06/20 1639     1,000 mg New Bag  0435     1,000 mg New Bag 09/05/20 1703     1,000 mg New Bag  0453     1,000 mg New Bag 09/04/20 1529    vancomycin (VANCOCIN) 1,250 mg in dextrose 5 % 250 mL IVPB (mg) 1,250 mg New Bag 09/04/20 0259                    Microbiologic Results:  Microbiology Results (last 7 days)       Procedure Component Value Units Date/Time    Blood culture [[id number]] Collected: 09/04/20 0655    Order Status: Completed Specimen: Blood from Peripheral, Right Wrist Updated: 09/07/20 1412     Blood Culture, Routine No Growth to date      No Growth to date      No Growth to date      No Growth to date    Blood culture [[id number]] Collected: 09/03/20 0416    Order Status: Completed Specimen: Blood from Antecubital, Right Arm Updated: 09/07/20 1212     Blood Culture, Routine No Growth to date      No Growth to date      No Growth to date      No Growth to date      No Growth to date    Blood culture [[id number]] Collected: 09/02/20 0721    Order Status: Completed Specimen: Blood from Peripheral, Right Wrist Updated: 09/07/20 1022     Blood Culture, Routine No growth after 5 days.    Blood culture [[id number]] Collected: 09/01/20 0655    Order Status: Completed Specimen: Blood from Antecubital, Right Arm Updated: 09/06/20 1012     Blood Culture, Routine No growth after 5 days.    Narrative:      Collection has been rescheduled by LLR at 09/01/2020 04:28 Reason:   nurse said to draw at at 6AM  Collection  has been rescheduled by LLR at 09/01/2020 04:28 Reason:   nurse said to draw at at 6AM    Blood culture [[id number]]  (Abnormal)  (Susceptibility) Collected: 08/31/20 0631    Order Status: Completed Specimen: Blood from Antecubital, Right Arm Updated: 09/05/20 1018     Blood Culture, Routine Gram stain aer bottle: Gram positive cocci in clusters resembling Staph      Results called to and read back by: Argentina Vicente RN. 09/03/2020        03:16      METHICILLIN RESISTANT STAPHYLOCOCCUS AUREUS  [facility]      Blood culture [[id number]] Collected: 08/30/20 0121    Order Status: Completed Specimen: Blood from Peripheral, Right Updated: 09/04/20 1022     Blood Culture, Routine No growth after 5 days.    Blood culture [[id number]]  (Abnormal) Collected: 08/29/20 1658    Order Status: Completed Specimen: Blood from Antecubital, Right Updated: 09/03/20 0753     Blood Culture, Routine Gram stain aer bottle: Gram positive cocci in clusters resembling Staph       Results called to and read back by: Jessica Hewitt 09/01/2020  12:07      METHICILLIN RESISTANT STAPHYLOCOCCUS AUREUS  [facility].  For susceptibility see order #[id number]

## 2020-09-07 NOTE — SUBJECTIVE & OBJECTIVE
Interval history: patient with continued mild fevers, tachycardia and tachypnea, c/o pain to chest, hgb dropping-will need a unit of blood today    Review of Systems   Constitutional: Positive for activity change, appetite change, fatigue and fever. Negative for chills.   HENT: Negative for nosebleeds.    Respiratory: Positive for cough and shortness of breath.    Cardiovascular: Positive for chest pain and leg swelling.   Gastrointestinal: Negative for abdominal distention, abdominal pain, diarrhea, nausea and vomiting.   Genitourinary: Negative for difficulty urinating.   Skin: Positive for wound (right chest wall).   Allergic/Immunologic: Positive for immunocompromised state.   Neurological: Positive for weakness.   Psychiatric/Behavioral: The patient is nervous/anxious.      Objective:     Vital Signs (Most Recent):  Temp: 98.2 °F (36.8 °C) (09/07/20 1315)  Pulse: 109 (09/07/20 1315)  Resp: 18 (09/07/20 1315)  BP: (!) 137/99 (09/07/20 1315)  SpO2: 99 % (09/07/20 1315) Vital Signs (24h Range):  Temp:  [97.7 °F (36.5 °C)-100.1 °F (37.8 °C)] 98.2 °F (36.8 °C)  Pulse:  [] 109  Resp:  [17-22] 18  SpO2:  [94 %-99 %] 99 %  BP: (133-143)/(7-99) 137/99     Weight: 88 kg (194 lb 0.1 oz)  Body mass index is 32.28 kg/m².    Physical Exam  Vitals signs and nursing note reviewed.   HENT:      Head:      Comments: trialysis line in [facility] at [facility]   Cardiovascular:      Rate and Rhythm: Regular rhythm. Tachycardia present.      Pulses: Normal pulses.      Heart sounds: Normal heart sounds.   Pulmonary:      Effort: Tachypnea present.      Breath sounds: Decreased breath sounds present.   Abdominal:      General: Bowel sounds are normal. There is no distension.      Palpations: Abdomen is soft.   Musculoskeletal: Normal range of motion.         General: Swelling present.   Skin:     General: Skin is warm and dry.      Capillary Refill: Capillary refill takes less than 2 seconds.      Comments: Dressing to the Right CW, clean  dry and intact   Neurological:      Mental Status: She is alert and oriented to person, place, and time.      Motor: Weakness present.   Psychiatric:         Behavior: Behavior normal.             Significant Labs:   BMP:   Recent Labs   Lab 09/07/20 0417   GLU 98   *   K 4.0   CL 98   CO2 23   BUN 10   CREATININE 0.7   CALCIUM 7.8*   MG 1.8     CBC:   Recent Labs   Lab 09/06/20 0320 09/07/20 0417   WBC 21.43* 20.10*   HGB 7.2* 7.0*   HCT 22.3* 22.3*   PLT 65* 138*     Lactic Acid:   No results for input(s): LACTATE in the last 48 hours.  Magnesium:   Recent Labs   Lab 09/06/20 0320 09/07/20 0417   MG 2.1 1.8     POCT Glucose:   No results for input(s): POCTGLUCOSE in the last 48 hours.      Significant Imaging: I have reviewed all pertinent imaging results/findings within the past 24 hours.

## 2020-09-07 NOTE — PROGRESS NOTES
Infectious Disease Follow up Note    Impression/Plan:    MRSA Bacteremia  29 F with PMH of metastatic adenocarcinoma of the colon with liver mets, uterine cysts, and HTN on whom ID is consulted for likely staph aureus bacteremia. She presented neutropenic.   WBC up to 2.5 on 8/28. Port removed on 8/26 - positive for staph aureus > 15 colonies. TTE was negative 8/27 for vegetations. YOLANDA negative for vegetations 8/31. Patient afebrile today.     - Continue Vanc for MRSA bacteremia.   - OK to stop blood cultures at this time  - Remove RIJ CVC if it isn't needed  - No new recommendations at this time.    [facility] ID Outpatient Antibiotic Therapy Plan    Start date: 9/1/2020    Stop date: 9/29/2020 for a duration of 4 weeks    IV antibiotic: Vanc    Weekly labs: CBC, CMP, weekly vanc trough with goal level of 15-20    Schedule patient a follow up appointment at [facility] within 1 - 2 weeks after discharge.    Fax labs to [facility] Academic Office at [phone] C/O [place] until patient gets established at [facility]    Let [facility] ID Consult Service know if the [facility] appointment cannot be made [phone].     Thank you for allowing us to participate in the care for this patient. We will sign off at this time. Please call with any questions.  Torsten Castaneda  [id number]  Infectious Disease Consult Service  [phone]    Hospital Day 12  Principal Problem: Septic shock     HPI: 29 F with PMH of metastatic adenocarcinoma of the colon with liver mets, uterine cysts, and HTN who presented to [facility] ED for intractable nausea/vomitting that has been on going for about 1 day.  Pt describes vomiting as having some food contents but mostly saliva, she reports she has not been able to tolerated PO since symptoms began.  Associated symptoms include fevers, chills, and general malaise which also started when nausea/vomiting started. Pt denies abdominal pain, endorses 1 episode of diarrhea. Denies any  chest pain, SOB, changes in urinary habits.  Pt reports she has never had symptoms like this in the past. She reports her cancer is s/p resection in July 2020 and currently undergoing chemotherapy last given 2 weeks via port in the right chest.  In ED noticed wound dehiscence at right chest port site. She was admitted to the ICU. Was briefly on norepi but off it now. Blood cxs with GPCs and staph aureus from the port site. Port is removed.      8/26 BC MRSA  8/26 R chest wound culture MRSA   8/26 cath tip Staph aureus >14 colonies  8/28 BC MRSA  8/28 WBC up to 2.5K  8/29 WBC up to 10K  8/29 BC MRSA  8/30 BC NGTD  8/31 BC Staph aureus   8/31 YOLANDA with no vegetations  9/1 BC NGTD  9/1 started on Meropenem  9/3 CT C/A/P concerning for septic emboli   9/4 BCx NGTD    Interval History: Patient reports feeling well this morning. Denies fevers, chills, nausea or vomiting. Reports improved breathing. YOLANDA yesterday showed no vegetations.    Review of Symptoms:  ROS Otherwise negative    Medications:  Antibiotics:   Antibiotics (From admission, onward)    Start     Stop Route Frequency Ordered    09/04/20 1600  vancomycin in dextrose 5 % 1 gram/250 mL IVPB 1,000 mg      -- IV Every 12 hours (non-standard times) 09/04/20 1510    08/26/20 1628  vancomycin - pharmacy to dose  (vancomycin IVPB)      -- IV pharmacy to manage frequency 08/26/20 1528        Objective:  Physical Exam:  VS (24h):  Temp:  [97.7 °F (36.5 °C)-99.6 °F (37.6 °C)] 97.7 °F (36.5 °C)  Pulse:  [] 125  Resp:  [0-22] 17  SpO2:  [93 %-98 %] 95 %  BP: (134-143)/(81-98) 134/95     Physical Exam   GEN- resting comfortably   HEENT- PERRL, EOMI, MMM  NECK- No thyromegaly or other masses  CARDIAC- Tachycardic, no murmurs  RESP- Tachypnea, on room air, crackles noted bilaterally  ABD- Soft, non-tender, ND, +BS; no masses or HSM  EXT- No clubbing, cyanosis, or edema; 2+ DP/PT pulses  NEURO- CN II-XII grossly intact; moves all four extremities equally  SKIN- Warm and  dry; no rashes, site of prior port is clean and dry without tenderness.     Lines:  RIJ CVC  PICC L UE  PIV    Labs:  CBC:   Lab Results   Component Value Date    WBC 20.10 (H) 09/07/2020    WBC 21.43 (H) 09/06/2020    WBC 21.81 (H) 09/05/2020    WBC 26.54 (H) 09/04/2020    WBC 27.95 (H) 09/03/2020    HCT 22.3 (L) 09/07/2020     (L) 09/07/2020     BMP:   Recent Labs   Lab 09/07/20 0417   GLU 98   *   K 4.0   CL 98   CO2 23   BUN 10   CREATININE 0.7   CALCIUM 7.8*   MG 1.8     LFT:   Lab Results   Component Value Date    ALT 37 09/07/2020     (H) 09/07/2020    ALKPHOS 313 (H) 09/07/2020    BILITOT 1.6 (H) 09/07/2020     Microbiology x 7d:   Microbiology Results (last 7 days)     Procedure Component Value Units Date/Time    Blood culture [[id number]] Collected: 09/04/20 0655    Order Status: Completed Specimen: Blood from Peripheral, Right Wrist Updated: 09/06/20 1412     Blood Culture, Routine No Growth to date      No Growth to date      No Growth to date    Blood culture [[id number]] Collected: 09/03/20 0416    Order Status: Completed Specimen: Blood from Antecubital, Right Arm Updated: 09/06/20 1212     Blood Culture, Routine No Growth to date      No Growth to date      No Growth to date      No Growth to date    Blood culture [[id number]] Collected: 09/02/20 0721    Order Status: Completed Specimen: Blood from Peripheral, Right Wrist Updated: 09/06/20 1022     Blood Culture, Routine No Growth to date      No Growth to date      No Growth to date      No Growth to date      No Growth to date    Blood culture [[id number]] Collected: 09/01/20 0655    Order Status: Completed Specimen: Blood from Antecubital, Right Arm Updated: 09/06/20 1012     Blood Culture, Routine No growth after 5 days.    Narrative:      Collection has been rescheduled by LLR at 09/01/2020 04:28 Reason:   nurse said to draw at at 6AM  Collection has been rescheduled by LLR at 09/01/2020 04:28 Reason:   nurse said to draw at at  6AM    Blood culture [[id number]]  (Abnormal)  (Susceptibility) Collected: 08/31/20 0631    Order Status: Completed Specimen: Blood from Antecubital, Right Arm Updated: 09/05/20 1018     Blood Culture, Routine Gram stain aer bottle: Gram positive cocci in clusters resembling Staph      Results called to and read back by: Argentina Vicente RN. 09/03/2020        03:16      METHICILLIN RESISTANT STAPHYLOCOCCUS AUREUS  ID consult required at [facility].      Blood culture [[id number]] Collected: 08/30/20 0121    Order Status: Completed Specimen: Blood from Peripheral, Right Updated: 09/04/20 1022     Blood Culture, Routine No growth after 5 days.    Blood culture [[id number]]  (Abnormal) Collected: 08/29/20 1658    Order Status: Completed Specimen: Blood from Antecubital, Right Updated: 09/03/20 0753     Blood Culture, Routine Gram stain aer bottle: Gram positive cocci in clusters resembling Staph       Results called to and read back by: Jessica Hewitt 09/01/2020  12:07      METHICILLIN RESISTANT STAPHYLOCOCCUS AUREUS  ID consult required at [facility] and [facility].  For susceptibility see order #[id number]      Culture, Anaerobe [[id number]] Collected: 08/26/20 1602    Order Status: Completed Specimen: Catheter Tip from Chest, Right Updated: 08/31/20 1305     Anaerobic Culture No anaerobes isolated    Blood culture [[id number]]  (Abnormal) Collected: 08/28/20 1137    Order Status: Completed Specimen: Blood from Antecubital, Right Updated: 08/31/20 1033     Blood Culture, Routine Gram stain juan bottle: Gram positive cocci in clusters resembling Staph       Results called to and read back by: Nighat Craft  08/29/2020  15:26      METHICILLIN RESISTANT STAPHYLOCOCCUS AUREUS  ID consult required at [facility].  For susceptibility see order #[id number]      Blood culture [[id number]]  (Abnormal)  (Susceptibility) Collected: 08/28/20 1137    Order Status:  Completed Specimen: Blood Updated: 08/31/20 1030     Blood Culture, Routine Gram stain aer bottle: Gram positive cocci in clusters resembling Staph       Results called to and read back by: Nighat Craft  08/29/2020  15:26      Gram stain juan bottle: Gram positive cocci in clusters resembling Staph       Positive results previously called 08/29/2020  17:57      METHICILLIN RESISTANT STAPHYLOCOCCUS AUREUS  ID consult required at [facility] and [facility] locations.          Imaging:  CXR 8/26/2020  Right chest MediPort catheter with tip overlying the atrium.  Lungs and heart demonstrate no significant interval detrimental change in the short interval.  No large pleural effusion or gross pneumothorax.    CXR 8/26/2020  Since the most recent study, the port catheter has been discontinued.  The tip of a newly placed right internal jugular approach central venous catheter is in the region of the right atrium.  No pneumothorax or pleural effusion.  Cardiomediastinal silhouette is unchanged.  Lungs remain clear.  Additional findings unchanged.    DVT US BL ANGUS 8/27/2020  No evidence of deep venous thrombosis in either lower extremity.    MRI T/L 8/28  1. Within limitations of lack of intravenous contrast, no findings of infection in the thoracic or lumbar spine are identified.  2. No significant spinal canal or foraminal narrowing.  Normal appearance of the thoracic spinal cord and conus medullaris.  3. Partially imaged are multiple bilateral peripheral predominant pulmonary nodules, some of which may be cavitary.  Given that these lesions appear to be new since the 07/20/2020 CT examination, the findings may represent multifocal infectious process or septic emboli.  Metastatic disease would be additional consideration.  Dedicated CT chest imaging can be considered for further evaluation.  4. Known hepatic metastatic disease partially imaged.    CT A/P 8/28  Postoperative changes of bowel resection are identified.   There are innumerable hypodense lesions throughout the liver compatible with hepatic metastatic disease.  Focal peristomal prior imaging limited given the lack of intravenous contrast material.     Bibasilar consolidation, left greater than right with subtotal consolidation of the left lower lobe.  There are some nodular densities seen bilaterally in both visualized lung bases.  While this could represent metastatic disease, given the patient's history of sepsis, septic emboli not entirely excluded.  These findings are new as compared to the previous study of 07/14/2020.     Liquid stool throughout the colon suggesting diarrhea.    YOLANDA 8/31/2020  - No vegetations seen    Lower Extremity US 9/1  - No evidence of deep venous thrombosis in either lower extremity    CXR 9/2  Left PICC catheter tip projects over the distal SVC.  Right central venous catheter tip projects over the distal SVC.  The cardiomediastinal silhouette is not enlarged.  There is obscuration of the left costophrenic angle, may reflect small effusion versus attenuation related to overlying soft tissue.  The trachea is midline.  The lungs are symmetrically expanded bilaterally with patchy increased interstitial and parenchymal attenuation bilaterally, worsened since the previous exam, differential would include worsening edema or infection.  No large focal consolidation seen.  There is no pneumothorax.  The osseous structures are unremarkable..    CT C/A/P 9/2  - Similar distribution of ill-defined peripheral nodular densities and mixed consolidative opacities throughout both lungs, some demonstrating areas new and more conspicuous central cavitation.  Overall findings concerning for infectious process with component of septic emboli.  - race left pleural effusion.  - Rounded structure in the mid abdomen containing intraluminal gas focus, without significant surrounding inflammatory change.  Findings could relate to focal outpouching related to nearby  anastomosis versus traversing bowel loop.  Small contained extraluminal collection cannot be definitively excluded.  Close attention at follow-up.  - Mild volume lower abdominopelvic ascites, slightly increased.  - Intrahepatic metastasis with likely metastatic node at the richard hepatis.    YOLANDA (9/5)  · Normal left ventricular systolic function. The estimated ejection fraction is 55%.  · Normal LV diastolic function.  · Normal right ventricular systolic function.  · No interatrial septal defect present.  · Normal appearing left atrial appendage. No thrombus is present in the appendage. Normal appendage velocities.  · No vegetations    Assessment:    Active Hospital Problems    Diagnosis    *Septic shock    Epistaxis    Thrombocytopenia    Palliative care encounter    Goals of care, counseling/discussion    Counseling regarding advance care planning and goals of care    Bacteremia due to Staphylococcus    Chest pain    Elevated troponin    Pancytopenia    MRSA bacteremia    Infected venous access port    Metabolic acidosis    Hypomagnesemia    Hyponatremia    Nausea & vomiting    Lactic acid acidosis    Sepsis    Anemia in neoplastic disease    Metastatic disease    Secondary malignancy of liver    Adenocarcinoma of colon     Plan:  See top of page.

## 2020-09-07 NOTE — PROGRESS NOTES
[facility] Hospital Medicine  Progress Note    Patient Name: Huy Cisneros  MRN: [id number]  Patient Class: IP- Inpatient   Admission Date: 8/26/2020  Length of Stay: 12 days  Attending Physician: Lex Yepez DO  Primary Care Provider: Primary Doctor Gisselle        Subjective:     Principal Problem:Septic shock        HPI:  Ms. Huy Cisneros is a 28 y/o female who lives in [place], [place] at her residence with her children. The patients PCP is Imtiaz Lindsay PA-C. She is also followed by Dr. Jewel Arzola with Oncology. She has a pmh of colon cancer with liver mets, uterine cysts, and HTN. She has a past surgical history of a mediport placement and a colonoscopy. The patient does not smoke cigarettes, drink alcohol, or use illicit drugs.     She presents to the ED with complaints of fever, vomiting and feeling dizzy since yesterday.  Per the patient she states her port a cath to right chest wall busted a stitch last night she while was bathing. She also has complaints of feeling SOB with lower extremity swelling.     Her ED workup includes WBC--0.95, hemoglobin 9.9, Na--132, creatinine 2.00, magnesium--1.4, lactate--10.7, CPK--180, pH--7.2. Pending blood cultures and wound cultures. She is COVID-19 negative. CXR---There has been interval placement of a right subclavian venous line. Its tip is in the region of the junction of the superior vena cava with the right atrium.  There is no pneumothorax. There is no focal pulmonary infiltrate visualized. XR of the abd---There is a paucity of gas within the gastrointestinal system. There are multiple radiopaque leads projected over the abdomen and pelvis. She will be admitted to the [facility] Medicine department for further care.         Overview/Hospital Course:  She was admitted to the [facility] medicine service for further care. We have consulted ID, nephrology, oncology, pulmonology, and general surgery. Her tunneled line is  the suspected source for her bacteremia/septic shock. Blood cultures positive for staph. TTE pending. General sx removed tunneled port at the bedside. Will cont to follow cultures. She was initially given Levophed and Amiodarone. Lactate has improved---she was given IVF and now Nephrology has started Bicarb gtt. Dr. Naqvi was consulted for trialysis line placement---She was started on CRRT, now stopped at this time. Will cont broad spectrum abx. G-CSF started by oncology for neutropenia. MRI of the thoracic and lumbar spine was completed on 8/28 to r/o spinal abscess---Her MRI did not find a spinal fluid collection, however the MRI was suggestive of possible cavitary lung lesions. She is to have a CT chest, abdomen, and pelvis today for assessment of possible cavities in her lungs and mets in her liver. MRSA grew out at the port site. She was weaned off of levophed and now started on BB because of 's. Pulmonology has ordered CT of the C/A/P with contrast to evaluate the patient  for septic pulmonary emboli. WBCs have increased and are now 26.  she has Staph  in her BCs and the one from yesterday is NGTD.  On her CT chest and abdomen she has small bilateral pleural effusions and her liver mets appear smaller. YOLANDA with no vegetation noted. Cont daily blood cultures. Patient is on vancomycin and meropenem per ID recs. The patient remains with tachypnea and tachycardia. Up out of bed with PT. CT of chest and abdomen showing increased chest infiltrates and ascites, probable septic emboli, and extensive metastatic disease. PICC line placed for inability to obtain peripheral IV. CTA ordered to r/o PE; she was negative for PE. Patient remains tachypneic and tachycardic with fevers. Lasix was given. An abdominal US was ordered to assess ascites levels, no ascites noted. The patient received a unit of PRBC. A palliative care consult was placed for patient and family counseling. The patient was stepped down out of ICU  on 9/4. She was given morphine PRN for pain and tachypnea and tylenol for fevers. Her labs were monitored and electrolytes were replaced. She continued to work with PT/OT. Meropenem was discontinued as well as daily blood cultures. The patient received an additional unit of blood for hgb 7. ID has signed off with recommendations for 4 weeks of vancomycin. The patient was started on amlodipine and her metoprolol dosage was adjusted.     Interval history: patient with continued mild fevers, tachycardia and tachypnea, c/o pain to chest, hgb dropping-will need a unit of blood today    Review of Systems   Constitutional: Positive for activity change, appetite change, fatigue and fever. Negative for chills.   HENT: Negative for nosebleeds.    Respiratory: Positive for cough and shortness of breath.    Cardiovascular: Positive for chest pain and leg swelling.   Gastrointestinal: Negative for abdominal distention, abdominal pain, diarrhea, nausea and vomiting.   Genitourinary: Negative for difficulty urinating.   Skin: Positive for wound (right chest wall).   Allergic/Immunologic: Positive for immunocompromised state.   Neurological: Positive for weakness.   Psychiatric/Behavioral: The patient is nervous/anxious.      Objective:     Vital Signs (Most Recent):  Temp: 98.2 °F (36.8 °C) (09/07/20 1315)  Pulse: 109 (09/07/20 1315)  Resp: 18 (09/07/20 1315)  BP: (!) 137/99 (09/07/20 1315)  SpO2: 99 % (09/07/20 1315) Vital Signs (24h Range):  Temp:  [97.7 °F (36.5 °C)-100.1 °F (37.8 °C)] 98.2 °F (36.8 °C)  Pulse:  [] 109  Resp:  [17-22] 18  SpO2:  [94 %-99 %] 99 %  BP: (133-143)/(7-99) 137/99     Weight: 88 kg (194 lb 0.1 oz)  Body mass index is 32.28 kg/m².    Physical Exam  Vitals signs and nursing note reviewed.   HENT:      Head:      Comments: trialysis line in place at [facility]   Cardiovascular:      Rate and Rhythm: Regular rhythm. Tachycardia present.      Pulses: Normal pulses.      Heart sounds: Normal heart sounds.    Pulmonary:      Effort: Tachypnea present.      Breath sounds: Decreased breath sounds present.   Abdominal:      General: Bowel sounds are normal. There is no distension.      Palpations: Abdomen is soft.   Musculoskeletal: Normal range of motion.         General: Swelling present.   Skin:     General: Skin is warm and dry.      Capillary Refill: Capillary refill takes less than 2 seconds.      Comments: Dressing to the Right CW, clean dry and intact   Neurological:      Mental Status: She is alert and oriented to person, place, and time.      Motor: Weakness present.   Psychiatric:         Behavior: Behavior normal.             Significant Labs:   BMP:   Recent Labs   Lab 09/07/20 0417   GLU 98   *   K 4.0   CL 98   CO2 23   BUN 10   CREATININE 0.7   CALCIUM 7.8*   MG 1.8     CBC:   Recent Labs   Lab 09/06/20 0320 09/07/20 0417   WBC 21.43* 20.10*   HGB 7.2* 7.0*   HCT 22.3* 22.3*   PLT 65* 138*     Lactic Acid:   No results for input(s): LACTATE in the last 48 hours.  Magnesium:   Recent Labs   Lab 09/06/20 0320 09/07/20 0417   MG 2.1 1.8     POCT Glucose:   No results for input(s): POCTGLUCOSE in the last 48 hours.      Significant Imaging: I have reviewed all pertinent imaging results/findings within the past 24 hours.      Assessment/Plan:      * Septic shock  Infected venous access port  Metabolic Acidosis  Bacteremia due to Staphylococcus    Patient with dehiscence of Mediport---started on broad spectrum abx. ID has been consulted. BCX growing GP cocci resembling staph. MRSA noted from the port. Blood cultures were redrawn on 8/28--NGTD. General Sx was consulted to remove her tunneled port. Lactate was initially elevated--now down. IVF given. Nephrology was consulted. Bicarb gtt initiated by Nephrology. CRRT was started and now stopped. MRI of the T/L spine suggestive of cavitary lung lesions. CT of the C/A/P showing possible septic emboli. We appreciate ID. YOLANDA done showing no vegetation. The  patient has diarrhea-neg for C-Diff.     Tran catheter removed and diet advanced. Repeat CT of chest and abdomen showing increased chest infiltrates, septic emboli, and ascites. PICC line placed for IV access. 1 unit PRBC given. Lasix was given for pleural effusion. Abdominal US ordered for ascites level, no ascites noted. CTA ordered for worsening SOB, negative for PE. A palliative care consult was placed.     She was stepped down from ICU on 9/4  Patient is looking better, denies abdominal pain or diarrhea, still with cough and chest pain, tachypnea and tachycardia still present, mild fevers   EKG repeated--sinus tach  Working with PT  Changed metoprolol dosing, added amlodipine  Additional unit of PRBC today for hgb 7.0 with tachycardia  Cont morphine for pain and tachypnea  Cont Ativan PRN for anxiety  Surgery consulted to remove RIJ trialysis  Cont vancomycin for 4 weeks per ID recs  Patient has PICC line--plan is for home with home health and IV antibiotics soon    Thrombocytopenia    Epistaxis      humidified oxygen, lovenox 40mg  monitor platelet count-likely decreased due to sepsis and antibiotics per hem/onc  Will DC lovenox if platelet less than 50  We appreciate hem/onc  Epistaxis resolved, platelets increased to 138 today          Bacteremia due to Staphylococcus        Elevated troponin  Could be related to demand. YOLANDA with no vegetation. Will follow.       Chest pain  Elevated troponin    No chest pain at this time. Troponin elevated---cardiology consulted. No changes on EKG. The troponin elevation could be related to demand--- echo with normal LVEF.      Nausea & vomiting  Prn Zofran ordered.       Hyponatremia  Monitor and replete      Hypomagnesemia  Monitor and replete as needed      Infected venous access port    Port removed.  Cont vanc     Metastatic disease  Secondary malignancy of liver  Adenocarcinoma of colon  Neutropenia  Anemia in neoplastic disease    Oncology managing            VTE  Risk Mitigation (From admission, onward)         Ordered     enoxaparin injection 40 mg  Every 24 hours      09/05/20 1419     heparin (porcine) injection 2,300 Units  As needed (PRN)      08/27/20 1649     IP VTE HIGH RISK PATIENT  Once      08/26/20 1458     Place sequential compression device  Until discontinued      08/26/20 1458                Discharge Planning   ZOE:      Code Status: Full Code   Is the patient medically ready for discharge?:     Reason for patient still in hospital (select all that apply): Patient trending condition, Laboratory test and Treatment  Discharge Plan A: Home Health(the pt has been accepted to [facility]  [phone] at d/c)   Discharge Delays: (!) Change in Medical Condition              Jeri Montalvo NP  Department of Hospital Medicine   [facility]

## 2020-09-07 NOTE — PT/OT/SLP PROGRESS
Physical Therapy Treatment    Patient Name:  Huy Cisneros   MRN:  [id number]    Recommendations:     Discharge Recommendations:  home with home health, home health PT, home health OT   Discharge Equipment Recommendations: (TBD)   Barriers to discharge: None    Assessment:     Huy Cisneros is a 29 y.o. female admitted with a medical diagnosis of Septic shock.  She presents with the following impairments/functional limitations:  weakness, impaired endurance, impaired functional mobilty, impaired self care skills, decreased lower extremity function, decreased upper extremity function, pain, decreased ROM, impaired cardiopulmonary response to activity Pt would continue to benefit from P.T. To address impairments listed above.  .    Rehab Prognosis: Fair; patient would benefit from acute skilled PT services to address these deficits and reach maximum level of function.    Recent Surgery: Procedure(s) (LRB):  Transesophageal echo (YOLANDA) intra-procedure log documentation (N/A) 7 Days Post-Op    Plan:     During this hospitalization, patient to be seen 6 x/week to address the identified rehab impairments via gait training, therapeutic activities, therapeutic exercises, neuromuscular re-education and progress toward the following goals:    · Plan of Care Expires:  09/23/20    Subjective     Patient/Family Comments/goals: Pt agreed to tx.  Pain/Comfort:  · Pain Rating 1: (grimacing with bed mobility, but did not rate)  · Pain Addressed 1: (as above)      Objective:     Communicated with RN ([place]) prior to session.  Patient found supine with bed alarm, oxygen, PureWick, peripheral IV upon PT entry to room.     General Precautions: Standard, contact   Orthopedic Precautions:N/A   Braces:       Functional Mobility:  · Bed Mobility:     · Rolling Left:  minimum assistance and moderate assistance  · Rolling Right: minimum assistance and moderate assistance  · Scooting: maximal assistance, of 2 persons and up to  HOB       AM-PAC 6 CLICK MOBILITY  Turning over in bed (including adjusting bedclothes, sheets and blankets)?: 2  Sitting down on and standing up from a chair with arms (e.g., wheelchair, bedside commode, etc.): 3  Moving from lying on back to sitting on the side of the bed?: 2  Moving to and from a bed to a chair (including a wheelchair)?: 2  Need to walk in hospital room?: 2  Climbing 3-5 steps with a railing?: 1  Basic Mobility Total Score: 12       Therapeutic Activities and Exercises:   Pt receiving blood at this time, and was agreeable to BLE therex in bed.  Bed mobility as above to assist pt with hygiene needs, bed pad change, and Purewick change.  Supine BLE therex: AP x 20, SAQs x 15, heelslides, hip ABD/ADD with CGA/Ellen x 15 reps.  Stretch to B HCs 3 x 30 sec hold.    Patient left supine with all lines intact, call button in reach, bed alarm on and RN notified..    GOALS:   Multidisciplinary Problems     Physical Therapy Goals        Problem: Physical Therapy Goal    Goal Priority Disciplines Outcome Goal Variances Interventions   Physical Therapy Goal     PT, PT/OT Ongoing, Progressing     Description: Goals to be met by: 20     Patient will increase functional independence with mobility by performin.  BLE AROM supine x 15  2.  Roll bilaterally with supervision  3.  Supine to/from sit with supervision  4.  Sit at EOB 15 min with supervision  5.  Sit to stand with RW with CG                     Time Tracking:     PT Received On: 20  PT Start Time: 1435     PT Stop Time: 1500  PT Total Time (min): 25 min     Billable Minutes: Therapeutic Activity 12 and Therapeutic Exercise 13    Treatment Type: Treatment  PT/PTA: PTA     PTA Visit Number: 1     Lizzy Amezquita PTA  2020
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
## 2020-09-07 NOTE — PLAN OF CARE
Problem: Physical Therapy Goal  Goal: Physical Therapy Goal  Description: Goals to be met by: 20     Patient will increase functional independence with mobility by performin.  BLE AROM supine x 15  2.  Roll bilaterally with supervision  3.  Supine to/from sit with supervision  4.  Sit at EOB 15 min with supervision  5.  Sit to stand with RW with CG    Outcome: Ongoing, Progressing   Continue working toward goals.

## 2020-09-07 NOTE — ASSESSMENT & PLAN NOTE
Epistaxis      humidified oxygen, lovenox 40mg  monitor platelet count-likely decreased due to sepsis and antibiotics per hem/onc  Will DC lovenox if platelet less than 50  We appreciate hem/onc  Epistaxis resolved, platelets increased to 138 today

## 2020-09-07 NOTE — ASSESSMENT & PLAN NOTE
Infected venous access port  Metabolic Acidosis  Bacteremia due to Staphylococcus    Patient with dehiscence of Mediport---started on broad spectrum abx. ID has been consulted. BCX growing GP cocci resembling staph. MRSA noted from the port. Blood cultures were redrawn on 8/28--NGTD. General Sx was consulted to remove her tunneled port. Lactate was initially elevated--now down. IVF given. Nephrology was consulted. Bicarb gtt initiated by Nephrology. CRRT was started and now stopped. MRI of the T/L spine suggestive of cavitary lung lesions. CT of the C/A/P showing possible septic emboli. We appreciate ID. YOLANDA done showing no vegetation. The patient has diarrhea-neg for C-Diff.     Tran catheter removed and diet advanced. Repeat CT of chest and abdomen showing increased chest infiltrates, septic emboli, and ascites. PICC line placed for IV access. 1 unit PRBC given. Lasix was given for pleural effusion. Abdominal US ordered for ascites level, no ascites noted. CTA ordered for worsening SOB, negative for PE. A palliative care consult was placed.     She was stepped down from ICU on 9/4  Patient is looking better, denies abdominal pain or diarrhea, still with cough and chest pain, tachypnea and tachycardia still present, mild fevers   EKG repeated--sinus tach  Working with PT  Changed metoprolol dosing, added amlodipine  Additional unit of PRBC today for hgb 7.0 with tachycardia  Cont morphine for pain and tachypnea  Cont Ativan PRN for anxiety  Surgery consulted to remove RIJ trialysis  Cont vancomycin for 4 weeks per ID recs  Patient has PICC line--plan is for home with home health and IV antibiotics soon

## 2020-09-07 NOTE — PLAN OF CARE
Plan of care reviewed with patient, understanding verbalized. Pt remains ST on tele. 1L o2/NC maintained. Morphine administered x1 no acute distress noted. Instructed to call for any assistance, understanding verbalize.d Bed alarm on, call light in reach, fall precautions in place. Will continue to monitor.

## 2020-09-07 NOTE — PLAN OF CARE
VN cued into room.  Pt resting comfortably in bed with call light and personal belongings within reach.  Pt currently having breakfast delivered and set up.  Pt states she's feeling ok.  NADN.  No family at bedside.  Pt reports no pain.  No other needs or complaints at this time.  Reminded pt to use call light as needed.  VN will continue to follow and be available as needed.
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
## 2020-09-08 NOTE — PT/OT/SLP PROGRESS
Occupational Therapy   Treatment    Name: Huy Cisneros  MRN: [id number]  Admitting Diagnosis:  Septic shock  8 Days Post-Op    Recommendations:     Discharge Recommendations: home health PT, home health OT  Discharge Equipment Recommendations:  other (see comments)(TBD)  Barriers to discharge:  Other (Comment)(Increased level of assistance)    Assessment:     Huy Cisneros is a 29 y.o. female with a medical diagnosis of Septic shock.  Performance deficits affecting function are weakness, impaired endurance, impaired self care skills, impaired functional mobilty, gait instability, impaired balance, decreased lower extremity function, decreased upper extremity function, decreased safety awareness, pain, impaired cardiopulmonary response to activity. Pt found in bed, agreeable to therapy.  Pt with improved participation and tolerance of therapy.  She was able to assist with toileting and transfered to chair with Min A using RW.  Pt continues to require frequent rest breaks secondary to SOB and fatigue. Continue OT services to address functional goals, progressing as able.      Rehab Prognosis:  Good; patient would benefit from acute skilled OT services to address these deficits and reach maximum level of function.       Plan:     Patient to be seen 5 x/week to address the above listed problems via self-care/home management, therapeutic activities, therapeutic exercises  · Plan of Care Expires: 09/27/20  · Plan of Care Reviewed with: patient    Subjective     Pain/Comfort:  · Pain Rating 1: 0/10  · Pain Rating Post-Intervention 1: 0/10    Objective:     Communicated with: RN prior to session.  Patient found HOB elevated with oxygen, PureWick, peripheral IV, telemetry upon OT entry to room.    General Precautions: Standard, contact   Orthopedic Precautions:N/A   Braces: N/A     Occupational Performance:     Bed Mobility:    · Patient completed Rolling/Turning to Right with stand by assistance  · Patient  completed Scooting/Bridging with stand by assistance  · Patient completed Supine to Sit with stand by assistance     Functional Mobility/Transfers:  · Patient completed Sit <> Stand Transfer with minimum assistance  with  rolling walker and vc's for hand placement  · Patient completed Bed <> Chair Transfer using Stand Pivot technique with minimum assistance with rolling walker.  Pt sat quickly with poor control 2/2 BLE weakness.    · Functional Mobility: Pt took 4 steps to chair with Min A using RW.     Activities of Daily Living:  · Grooming: modified independence chair level  · Lower Body Dressing: stand by assistance phuc slippers long sitting in bed  · Toileting: stand by assistance pt able to clean self front and back with SBA while seated.        [facility] 6 Click ADL: 18      Patient left up in chair with all lines intact, call button in reach, chair alarm on and RN notifiedEducation:      GOALS:   Multidisciplinary Problems     Occupational Therapy Goals        Problem: Occupational Therapy Goal    Goal Priority Disciplines Outcome Interventions   Occupational Therapy Goal     OT, PT/OT Ongoing, Progressing    Description: Goals to be met by: 9/27    Patient will increase functional independence with ADLs by performing:    Feeding with Modified [place].  UE Dressing with Stand-by Assistance.  Grooming while seated at sink with Modified [place].  Toileting from bedside commode with Minimal Assistance for hygiene and clothing management.   Toilet transfer to bedside commode with Minimal Assistance.  Increased functional strength to 3+/5 for BUE.                     Time Tracking:     OT Date of Treatment: 09/08/20  OT Start Time: 0951  OT Stop Time: 1035  OT Total Time (min): 44 min    Billable Minutes:Self Care/Home Management 34  Therapeutic Activity 10    CARLTNO Puga  9/8/2020

## 2020-09-08 NOTE — PLAN OF CARE
Pt's mews score noted to be elevated at 4. VN cued in to pt's room for rounding. HR sustaining 129-low 130's on telemetry. Pt c/o intermittent chest pain 8/10 with coughing. Respirations even and unlabored. No distress noted. Pt reports a nonproductive cough and requesting prn tessalon perles. Pt reports that the prn morphine helps relax her. Bedside nurse, henrik, notified of info. nurse to administer prn morphine at next available time. Pt received schedule lopressor and prn per bedside nurse. FLORA Sinclair also notified of above info. No new orders received at this time. Bedside nurse updated.

## 2020-09-08 NOTE — SUBJECTIVE & OBJECTIVE
Interval History:   - she is doing better. She is hoping to go home soon.    Oncology Treatment Plan:   OP COLORECTAL FOLFOX + BEVACIZUMAB Q2W    Medications:  Continuous Infusions:  Scheduled Meds:   amLODIPine  5 mg Oral Daily    enoxaparin  40 mg Subcutaneous Q24H    metoprolol tartrate  50 mg Oral BID    potassium phosphate (monobasic)  500 mg Oral TID    psyllium husk (aspartame)   Oral Daily    sodium chloride 0.9%  10 mL Intravenous Q6H    spironolactone  25 mg Oral Daily    vancomycin (VANCOCIN) IVPB  1,250 mg Intravenous Q12H     PRN Meds:sodium chloride, sodium chloride, acetaminophen, benzonatate, diphenhydrAMINE-zinc acetate 2-0.1%, heparin (porcine), influenza, lorazepam, melatonin, morphine, ondansetron, ondansetron, ondansetron, ondansetron, pneumoc 13-davion conj-dip cr(PF), promethazine (PHENERGAN) IVPB, simethicone, sodium chloride 0.9%, Flushing PICC Protocol **AND** sodium chloride 0.9% **AND** sodium chloride 0.9%, Pharmacy to dose Vancomycin consult **AND** vancomycin - pharmacy to dose     Review of Systems   Constitutional: Negative for chills and fever.   Respiratory: Positive for cough.    Cardiovascular: Negative for chest pain and palpitations.   Gastrointestinal: Negative for abdominal pain, constipation, diarrhea, nausea and vomiting.   Musculoskeletal:        Chest pain worse with cough   Skin: Negative for rash.   Neurological: Negative for headaches.     Objective:     Vital Signs (Most Recent):  Temp: 96.7 °F (35.9 °C) (09/08/20 1603)  Pulse: (!) 115 (09/08/20 1603)  Resp: 18 (09/08/20 1224)  BP: (!) 140/94 (09/08/20 1603)  SpO2: 99 % (09/08/20 1603) Vital Signs (24h Range):  Temp:  [96.7 °F (35.9 °C)-100 °F (37.8 °C)] 96.7 °F (35.9 °C)  Pulse:  [105-135] 115  Resp:  [16-20] 18  SpO2:  [94 %-99 %] 99 %  BP: (128-140)/(87-97) 140/94     Weight: 88 kg (194 lb 0.1 oz)  Body mass index is 32.28 kg/m².  Body surface area is 2.01 meters squared.      Intake/Output Summary (Last 24  hours) at 9/8/2020 1631  Last data filed at 9/8/2020 0500  Gross per 24 hour   Intake --   Output 900 ml   Net -900 ml       Physical Exam  Constitutional:       Appearance: She is well-developed.   Eyes:      General:         Right eye: No discharge.         Left eye: No discharge.   Cardiovascular:      Rate and Rhythm: Regular rhythm. Tachycardia present.      Heart sounds: Normal heart sounds. No murmur. No friction rub. No gallop.    Pulmonary:      Effort: Pulmonary effort is normal. No respiratory distress.      Breath sounds: Normal breath sounds. No wheezing or rales.   Abdominal:      General: Bowel sounds are normal. There is no distension.      Palpations: Abdomen is soft.      Tenderness: There is no abdominal tenderness. There is no guarding or rebound.   Neurological:      Mental Status: She is alert and oriented to person, place, and time.         Significant Labs:   Labs have been reviewed.    Lab Results   Component Value Date    WBC 14.04 (H) 09/08/2020    HGB 8.1 (L) 09/08/2020    HCT 25.9 (L) 09/08/2020    MCV 80 (L) 09/08/2020     09/08/2020

## 2020-09-08 NOTE — PROGRESS NOTES
[facility]  Hematology/Oncology  Progress Note    Patient Name: Huy Cisneros  Admission Date: 8/26/2020  Hospital Length of Stay: 13 days  Code Status: Full Code     Subjective:     HPI:  29-year-old female underwent right hemicolectomy 07/17/2020 for metastatic colon adenocarcinoma and received 1st cycle of FOLFOX 08/11/2020.    7/20/2020 - CT scan - Multiple hepatic hypodensities better evaluated on CT from 07/14/2020, consistent with metastatic disease.    She has been vomiting and feeling dizzy since yesterday.  Last night she busted a stitch to the Port-A-Cath site causing dehiscence and this morning she fell after going to the restroom.    She presented to the ED with tachycardia, heart rate up to 160 and was hypotensive with blood pressure down to 85/43.  She was started on antibiotics, IV fluids and pressors and transferred to [facility].    WBC on admission 0.95, creatinine 2, lactate >12    Bilirubin 3.8, , ALT 93    Seen by . Right chest port removed.      Interval History:   - she is doing better. She is hoping to go home soon.    Oncology Treatment Plan:   OP COLORECTAL FOLFOX + BEVACIZUMAB Q2W    Medications:  Continuous Infusions:  Scheduled Meds:   amLODIPine  5 mg Oral Daily    enoxaparin  40 mg Subcutaneous Q24H    metoprolol tartrate  50 mg Oral BID    potassium phosphate (monobasic)  500 mg Oral TID    psyllium husk (aspartame)   Oral Daily    sodium chloride 0.9%  10 mL Intravenous Q6H    spironolactone  25 mg Oral Daily    vancomycin (VANCOCIN) IVPB  1,250 mg Intravenous Q12H     PRN Meds:sodium chloride, sodium chloride, acetaminophen, benzonatate, diphenhydrAMINE-zinc acetate 2-0.1%, heparin (porcine), influenza, lorazepam, melatonin, morphine, ondansetron, ondansetron, ondansetron, ondansetron, pneumoc 13-davion conj-dip cr(PF), promethazine (PHENERGAN) IVPB, simethicone, sodium chloride 0.9%, Flushing PICC Protocol **AND** sodium chloride  0.9% **AND** sodium chloride 0.9%, Pharmacy to dose Vancomycin consult **AND** vancomycin - pharmacy to dose     Review of Systems   Constitutional: Negative for chills and fever.   Respiratory: Positive for cough.    Cardiovascular: Negative for chest pain and palpitations.   Gastrointestinal: Negative for abdominal pain, constipation, diarrhea, nausea and vomiting.   Musculoskeletal:        Chest pain worse with cough   Skin: Negative for rash.   Neurological: Negative for headaches.     Objective:     Vital Signs (Most Recent):  Temp: 96.7 °F (35.9 °C) (09/08/20 1603)  Pulse: (!) 115 (09/08/20 1603)  Resp: 18 (09/08/20 1224)  BP: (!) 140/94 (09/08/20 1603)  SpO2: 99 % (09/08/20 1603) Vital Signs (24h Range):  Temp:  [96.7 °F (35.9 °C)-100 °F (37.8 °C)] 96.7 °F (35.9 °C)  Pulse:  [105-135] 115  Resp:  [16-20] 18  SpO2:  [94 %-99 %] 99 %  BP: (128-140)/(87-97) 140/94     Weight: 88 kg (194 lb 0.1 oz)  Body mass index is 32.28 kg/m².  Body surface area is 2.01 meters squared.      Intake/Output Summary (Last 24 hours) at 9/8/2020 1631  Last data filed at 9/8/2020 0500  Gross per 24 hour   Intake --   Output 900 ml   Net -900 ml       Physical Exam  Constitutional:       Appearance: She is well-developed.   Eyes:      General:         Right eye: No discharge.         Left eye: No discharge.   Cardiovascular:      Rate and Rhythm: Regular rhythm. Tachycardia present.      Heart sounds: Normal heart sounds. No murmur. No friction rub. No gallop.    Pulmonary:      Effort: Pulmonary effort is normal. No respiratory distress.      Breath sounds: Normal breath sounds. No wheezing or rales.   Abdominal:      General: Bowel sounds are normal. There is no distension.      Palpations: Abdomen is soft.      Tenderness: There is no abdominal tenderness. There is no guarding or rebound.   Neurological:      Mental Status: She is alert and oriented to person, place, and time.         Significant Labs:   Labs have been  reviewed.    Lab Results   Component Value Date    WBC 14.04 (H) 09/08/2020    HGB 8.1 (L) 09/08/2020    HCT 25.9 (L) 09/08/2020    MCV 80 (L) 09/08/2020     09/08/2020         Assessment/Plan:     Adenocarcinoma of colon  - Pt is status post cycle #1 of FOLFOX in August 2020.  - CT scan (8/29/20) revealed a partial response to therapy with slight decrease in size of index lesions.  - plan to resume chemotherapy once she completes antibiotic therapy for MRSA bacteremia.    Thrombocytopenia  - resolved.    MRSA bacteremia  - Repeat blood cultures x 3 have been negative  - Echo showed no endocarditis  - Central line removed  - continue antibiotic as recommended by infectious disease.        Thank you for your consult.      Jewel Arzola MD  Hematology/Oncology  [facility]

## 2020-09-08 NOTE — PLAN OF CARE
09/08/20 1100   Discharge Reassessment   Assessment Type Discharge Planning Reassessment   Provided patient/caregiver education on the expected discharge date and the discharge plan Yes   Do you have any problems affording any of your prescribed medications? No   Discharge Plan A Home with family;Home Health  (Home Infusions)   DME Needed Upon Discharge  none   Patient choice form signed by patient/caregiver N/A   Anticipated Discharge Disposition Home-Health  ([facility] H/H - Updated clinicals sent)   Can the patient/caregiver answer the patient profile reliably? Yes, cognitively intact   How does the patient rate their overall health at the present time? Fair   Describe the patient's ability to walk at the present time. Minor restrictions or changes   How often would a person be available to care for the patient? Whenever needed   Number of comorbid conditions (as recorded on the chart) Two   During the past month, has the patient often been bothered by feeling down, depressed or hopeless? Yes   During the past month, has the patient often been bothered by little interest or pleasure in doing things? Yes   Post-Acute Status   Post-Acute Authorization Home Health;Medications   Home Health Status Authorization Obtained  (Pt has acceptance in [facility] from [facility]. Updated clinicals sent)   Medication Status Pending Service Contract  (Referrals sent to [facility] for Vancomycin until 9/29. PICC placed 9/2/2020.)     Madelaine Golden RN  RN Transition Navigator  [phone]

## 2020-09-08 NOTE — ASSESSMENT & PLAN NOTE
- Pt is status post cycle #1 of FOLFOX in August 2020.  - CT scan (8/29/20) revealed a partial response to therapy with slight decrease in size of index lesions.  - plan to resume chemotherapy once she completes antibiotic therapy for MRSA bacteremia.

## 2020-09-08 NOTE — PLAN OF CARE
Problem: Occupational Therapy Goal  Goal: Occupational Therapy Goal  Description: Goals to be met by: 9/27    Patient will increase functional independence with ADLs by performing:    Feeding with Modified [place].  UE Dressing with Stand-by Assistance.  Grooming while seated at sink with Modified [place].  Toileting from bedside commode with Minimal Assistance for hygiene and clothing management.   Toilet transfer to bedside commode with Minimal Assistance.  Increased functional strength to 3+/5 for BUE.    Outcome: Ongoing, Progressing     Huy Cisneros is a 29 y.o. female with a medical diagnosis of Septic shock.  Performance deficits affecting function are weakness, impaired endurance, impaired self care skills, impaired functional mobilty, gait instability, impaired balance, decreased lower extremity function, decreased upper extremity function, decreased safety awareness, pain, impaired cardiopulmonary response to activity. Pt found in bed, agreeable to therapy.  Pt with improved participation and tolerance of therapy.  She was able to assist with toileting and transfered to chair with Min A using RW.  Pt continues to require frequent rest breaks secondary to SOB and fatigue. Continue OT services to address functional goals, progressing as able.    CARLTON Puga

## 2020-09-08 NOTE — PROGRESS NOTES
[facility] Hospital Medicine  Progress Note    Patient Name: Huy Cisneros  MRN: [id number]  Patient Class: IP- Inpatient   Admission Date: 8/26/2020  Length of Stay: 13 days  Attending Physician: Maryana Patel*  Primary Care Provider: Primary Doctor No        Subjective:     Principal Problem:Septic shock        HPI:  Ms. Huy Cisneros is a 28 y/o female who lives in [place], [place] at her residence with her children. The patients PCP is Imtiaz Lindsay PA-C. She is also followed by Dr. Jewel Arzola with Oncology. She has a pmh of colon cancer with liver mets, uterine cysts, and HTN. She has a past surgical history of a mediport placement and a colonoscopy. The patient does not smoke cigarettes, drink alcohol, or use illicit drugs.     She presents to the ED with complaints of fever, vomiting and feeling dizzy since yesterday.  Per the patient she states her port a cath to right chest wall busted a stitch last night she while was bathing. She also has complaints of feeling SOB with lower extremity swelling.     Her ED workup includes WBC--0.95, hemoglobin 9.9, Na--132, creatinine 2.00, magnesium--1.4, lactate--10.7, CPK--180, pH--7.2. Pending blood cultures and wound cultures. She is COVID-19 negative. CXR---There has been interval placement of a right subclavian venous line. Its tip is in the region of the junction of the superior vena cava with the right atrium.  There is no pneumothorax. There is no focal pulmonary infiltrate visualized. XR of the abd---There is a paucity of gas within the gastrointestinal system. There are multiple radiopaque leads projected over the abdomen and pelvis. She will be admitted to the [facility] Medicine department for further care.         Overview/Hospital Course:  She was admitted to the [facility] medicine service for further care. We have consulted ID, nephrology, oncology, pulmonology, and general surgery. Her tunneled  line is the suspected source for her bacteremia/septic shock. Blood cultures positive for staph. TTE pending. General sx removed tunneled port at the bedside. Will cont to follow cultures. She was initially given Levophed and Amiodarone. Lactate has improved---she was given IVF and now Nephrology has started Bicarb gtt. Dr. Naqvi was consulted for trialysis line placement---She was started on CRRT, now stopped at this time. Will cont broad spectrum abx. G-CSF started by oncology for neutropenia. MRI of the thoracic and lumbar spine was completed on 8/28 to r/o spinal abscess---Her MRI did not find a spinal fluid collection, however the MRI was suggestive of possible cavitary lung lesions. She is to have a CT chest, abdomen, and pelvis today for assessment of possible cavities in her lungs and mets in her liver. MRSA grew out at the port site. She was weaned off of levophed and now started on BB because of 's. Pulmonology has ordered CT of the C/A/P with contrast to evaluate the patient  for septic pulmonary emboli. WBCs have increased and are now 26.  she has Staph  in her BCs and the one from yesterday is NGTD.  On her CT chest and abdomen she has small bilateral pleural effusions and her liver mets appear smaller. YOLANDA with no vegetation noted. Cont daily blood cultures. Patient is on vancomycin and meropenem per ID recs. The patient remains with tachypnea and tachycardia. Up out of bed with PT. CT of chest and abdomen showing increased chest infiltrates and ascites, probable septic emboli, and extensive metastatic disease. PICC line placed for inability to obtain peripheral IV. CTA ordered to r/o PE; she was negative for PE. Patient remains tachypneic and tachycardic with fevers. Lasix was given. An abdominal US was ordered to assess ascites levels, no ascites noted. The patient received a unit of PRBC. A palliative care consult was placed for patient and family counseling. The patient was stepped down  out of ICU on 9/4. She was given morphine PRN for pain and tachypnea and tylenol for fevers. Her labs were monitored and electrolytes were replaced. She continued to work with PT/OT. Meropenem was discontinued as well as daily blood cultures. The patient received an additional unit of blood for hgb 7. ID has signed off with recommendations for 4 weeks of vancomycin. The patient was started on amlodipine and her metoprolol dosage was adjusted.     Interval history: patient with continued mild fevers, tachycardia and tachypnea, c/o pain to chest, hgb dropping-will need a unit of blood today    Review of Systems   Constitutional: Positive for activity change, appetite change, fatigue and fever. Negative for chills.   HENT: Negative for nosebleeds.    Respiratory: Positive for cough and shortness of breath.    Cardiovascular: Positive for leg swelling. Negative for chest pain.   Gastrointestinal: Negative for abdominal distention, abdominal pain, diarrhea, nausea and vomiting.   Genitourinary: Negative for difficulty urinating.   Skin: Positive for wound (right chest wall).   Allergic/Immunologic: Positive for immunocompromised state.   Neurological: Positive for weakness.   Psychiatric/Behavioral: Negative for confusion. The patient is not nervous/anxious.      Objective:     Vital Signs (Most Recent):  Temp: 96.7 °F (35.9 °C) (09/08/20 1603)  Pulse: (!) 115 (09/08/20 1603)  Resp: 18 (09/08/20 1224)  BP: (!) 140/94 (09/08/20 1603)  SpO2: 99 % (09/08/20 1603) Vital Signs (24h Range):  Temp:  [96.7 °F (35.9 °C)-100 °F (37.8 °C)] 96.7 °F (35.9 °C)  Pulse:  [105-135] 115  Resp:  [16-20] 18  SpO2:  [94 %-99 %] 99 %  BP: (128-140)/(87-97) 140/94     Weight: 88 kg (194 lb 0.1 oz)  Body mass index is 32.28 kg/m².    Physical Exam  Vitals signs and nursing note reviewed.   Eyes:      Pupils: Pupils are equal, round, and reactive to light.   Cardiovascular:      Rate and Rhythm: Regular rhythm. Tachycardia present.      Pulses:  Normal pulses.      Heart sounds: Normal heart sounds.   Pulmonary:      Effort: Tachypnea present.      Breath sounds: Decreased breath sounds present.   Abdominal:      General: Bowel sounds are normal. There is no distension.      Palpations: Abdomen is soft.   Musculoskeletal: Normal range of motion.         General: Swelling present.   Skin:     General: Skin is warm and dry.      Capillary Refill: Capillary refill takes less than 2 seconds.      Comments: Dressing to the Right CW, clean dry and intact   Neurological:      Mental Status: She is alert and oriented to person, place, and time.      Motor: Weakness present.   Psychiatric:         Behavior: Behavior normal.           CRANIAL NERVES     CN III, IV, VI   Pupils are equal, round, and reactive to light.       Significant Labs:   BMP:   Recent Labs   Lab 09/08/20  0551   GLU 92   *   K 4.2      CO2 22*   BUN 10   CREATININE 0.6   CALCIUM 8.0*   MG 1.7     CBC:   Recent Labs   Lab 09/07/20  0417 09/08/20  0551   WBC 20.10* 14.04*   HGB 7.0* 8.1*   HCT 22.3* 25.9*   * 191     Lactic Acid:   No results for input(s): LACTATE in the last 48 hours.  Magnesium:   Recent Labs   Lab 09/07/20  0417 09/08/20  0551   MG 1.8 1.7     POCT Glucose:   No results for input(s): POCTGLUCOSE in the last 48 hours.      Significant Imaging: I have reviewed all pertinent imaging results/findings within the past 24 hours.      Assessment/Plan:      * Septic shock  Infected venous access port  Metabolic Acidosis  Bacteremia due to Staphylococcus    Patient with dehiscence of Mediport---started on broad spectrum abx. ID has been consulted. BCX growing GP cocci resembling staph. MRSA noted from the port. Blood cultures were redrawn on 8/28--NGTD. General Sx was consulted to remove her tunneled port. Lactate was initially elevated--now down. IVF given. Nephrology was consulted. Bicarb gtt initiated by Nephrology. CRRT was started and now stopped. MRI of the T/L  spine suggestive of cavitary lung lesions. CT of the C/A/P showing possible septic emboli. We appreciate ID. YOLANDA done showing no vegetation. The patient has diarrhea-neg for C-Diff.     Tran catheter removed and diet advanced. Repeat CT of chest and abdomen showing increased chest infiltrates, septic emboli, and ascites. PICC line placed for IV access. 1 unit PRBC given. Lasix was given for pleural effusion. Abdominal US ordered for ascites level, no ascites noted. CTA ordered for worsening SOB, negative for PE. A palliative care consult was placed.     She was stepped down from ICU on 9/4  Patient is looking better, denies abdominal pain or diarrhea, still with cough and chest pain, tachypnea and tachycardia still present, mild fevers   EKG repeated--sinus tach  Working with PT  Changed metoprolol dosing, added amlodipine  Additional unit of PRBC today for hgb 7.0 with tachycardia  Cont morphine for pain and tachypnea  Cont Ativan PRN for anxiety  Surgery consulted to remove RIJ trialysis  Cont vancomycin for 4 weeks per ID recs  Patient has PICC line--plan is for home with home health and IV antibiotics soon    Thrombocytopenia  Epistaxis    humidified oxygen, lovenox 40mg  monitor platelet count-likely decreased due to sepsis and antibiotics per hem/onc  Will DC lovenox if platelet less than 50  We appreciate hem/onc  Epistaxis resolved, platelets increased to 191 today          Bacteremia due to Staphylococcus        Elevated troponin  Could be related to demand. YOLANDA with no vegetation. Will follow.       Chest pain  Elevated troponin    No chest pain at this time. Troponin elevated---cardiology consulted. No changes on EKG. The troponin elevation could be related to demand--- echo with normal LVEF.      Nausea & vomiting  Prn Zofran ordered.       Hyponatremia  Monitor and replete      Hypomagnesemia  Monitor and replete as needed      Infected venous access port    Port removed.  Cont vanc     Metastatic  disease  Secondary malignancy of liver  Adenocarcinoma of colon  Neutropenia  Anemia in neoplastic disease    Oncology managing            VTE Risk Mitigation (From admission, onward)         Ordered     enoxaparin injection 40 mg  Every 24 hours      09/05/20 1419     heparin (porcine) injection 2,300 Units  As needed (PRN)      08/27/20 1649     IP VTE HIGH RISK PATIENT  Once      08/26/20 1458     Place sequential compression device  Until discontinued      08/26/20 1458                Discharge Planning   ZOE:      Code Status: Full Code   Is the patient medically ready for discharge?:     Reason for patient still in hospital (select all that apply): Patient trending condition  Discharge Plan A: Home with family, Home Health(Home Infusions)   Discharge Delays: (!) Change in Medical Condition              Tracy Medrano NP  Department of Hospital Medicine   [facility]

## 2020-09-08 NOTE — PT/OT/SLP PROGRESS
Physical Therapy Treatment    Patient Name:  Huy Cisneros   MRN:  [id number]    Recommendations:     Discharge Recommendations:  home with home health, home health PT, home health OT   Discharge Equipment Recommendations: (TBD)   Barriers to discharge: decreased strength, endurance, and assist needed for functional mobility    Assessment:     Huy Cisneros is a 29 y.o. female admitted with a medical diagnosis of Septic shock.  She presents with the following impairments/functional limitations:  weakness, impaired endurance, impaired functional mobilty, gait instability, impaired balance, decreased upper extremity function, decreased lower extremity function, decreased safety awareness, impaired cardiopulmonary response to activity.  Pt would continue to benefit from P.T. To address impairments listed above.  .    Rehab Prognosis: Fair; patient would benefit from acute skilled PT services to address these deficits and reach maximum level of function.    Recent Surgery: Procedure(s) (LRB):  Transesophageal echo (YOLANDA) intra-procedure log documentation (N/A) 8 Days Post-Op    Plan:     During this hospitalization, patient to be seen 6 x/week to address the identified rehab impairments via gait training, therapeutic activities, therapeutic exercises, neuromuscular re-education and progress toward the following goals:    · Plan of Care Expires:  09/23/20    Subjective       Patient/Family Comments/goals: Pt agreed to tx.  Pain/Comfort:  · Pain Rating 1: 0/10  · Pain Rating Post-Intervention 1: 0/10      Objective:     Communicated with RN (Hazel) prior to session.  Patient found supine with telemetry, peripheral IV, PureWick, oxygen upon PT entry to room.     General Precautions: Standard, contact   Orthopedic Precautions:N/A   Braces:       Functional Mobility:  · Bed Mobility:     · Rolling Right: stand by assistance and with b/r for assist  · Scooting: stand by assistance and to EOB and back onto  toilet  · Supine to Sit: stand by assistance and wtih HOB elevated and b/r for assist  · Sit to Supine: moderate assistance and for BLES  · Transfers:     · Sit to Stand:  moderate assistance with rolling walker and vc's for technique and hand placement from EOB.  Max A from toilet with use of GB and RW.  · Toilet Transfer: moderate assistance with  rolling walker and grab bars  using  Step Transfer  · Gait: 15ft around bed to toilet with Rw and Ellen /close CGA with assist to management O2 line.  Pt ambulates with decreased flaco, step length, and mild trunk flexion.  VC's for PLB and to calm secondary to SOB and increased nervousness.  Pt then ambulated 6ft BTB with RW and Ellen/close CGA with assist for O2 line.  · Balance: sitting good-, standing fair, gait fair-/fair      AM-PAC 6 CLICK MOBILITY  Turning over in bed (including adjusting bedclothes, sheets and blankets)?: 3  Sitting down on and standing up from a chair with arms (e.g., wheelchair, bedside commode, etc.): 2  Moving from lying on back to sitting on the side of the bed?: 3  Moving to and from a bed to a chair (including a wheelchair)?: 3  Need to walk in hospital room?: 3  Climbing 3-5 steps with a railing?: 1  Basic Mobility Total Score: 15       Therapeutic Activities and Exercises:   Transfer onto toilet with Mod A secondary to decreased control when desending to sit with knees buckling. After initially sitting on toilet, pt began to spit up with increased coughing with increased respirations and LE shaking.  Pt was given a wet wash cloth, instructed in PLB as able, and eventually calmed down.  Pt was able to perform self hygiene to bowel and bladder.  Seated BLE LAQs x 10 reps each with rest breaks between bouts secondary to SOB, instructed in PLB.  Supine heelslides x 10 and AP x 20.    Patient left supine with all lines intact, call button in reach, bed alarm on and RN notified..    GOALS:   Multidisciplinary Problems     Physical Therapy  Goals        Problem: Physical Therapy Goal    Goal Priority Disciplines Outcome Goal Variances Interventions   Physical Therapy Goal     PT, PT/OT Ongoing, Progressing     Description: Goals to be met by: 20     Patient will increase functional independence with mobility by performin.  BLE AROM supine x 15  2.  Roll bilaterally with supervision  3.  Supine to/from sit with supervision  4.  Sit at EOB 15 min with supervision  5.  Sit to stand with RW with CG                     Time Tracking:     PT Received On: 20  PT Start Time: 1541     PT Stop Time: 1619  PT Total Time (min): 38 min     Billable Minutes: Gait Training 11, Therapeutic Activity 16 and Therapeutic Exercise 11    Treatment Type: Treatment  PT/PTA: PTA     PTA Visit Number: 2     Lizzy Amezquita PTA  2020

## 2020-09-08 NOTE — PROGRESS NOTES
[facility]  General Surgery  Progress Note    Subjective:     Interval History:   29F doing well now  No pain at right chest site  Afebrile. Still on IV abx  Saulo reg diet  Good UOP  Not needing HD     Post-Op Info:  Procedure(s) (LRB):  Transesophageal echo (YOLANDA) intra-procedure log documentation (N/A)   8 Days Post-Op      Medications:  Continuous Infusions:  Scheduled Meds:   amLODIPine  5 mg Oral Daily    enoxaparin  40 mg Subcutaneous Q24H    magnesium sulfate IVPB  2 g Intravenous Once    metoprolol tartrate  50 mg Oral BID    potassium phosphate (monobasic)  1,000 mg Oral Once    potassium phosphate (monobasic)  500 mg Oral TID    psyllium husk (aspartame)   Oral Daily    sodium chloride 0.9%  10 mL Intravenous Q6H    spironolactone  25 mg Oral Daily    vancomycin (VANCOCIN) IVPB  1,250 mg Intravenous Q12H     PRN Meds:sodium chloride, sodium chloride, acetaminophen, benzonatate, diphenhydrAMINE-zinc acetate 2-0.1%, heparin (porcine), influenza, lorazepam, melatonin, morphine, ondansetron, ondansetron, ondansetron, ondansetron, oxymetazoline, pneumoc 13-davion conj-dip cr(PF), promethazine (PHENERGAN) IVPB, simethicone, sodium chloride 0.9%, Flushing PICC Protocol **AND** sodium chloride 0.9% **AND** sodium chloride 0.9%, Pharmacy to dose Vancomycin consult **AND** vancomycin - pharmacy to dose     Objective:     Vital Signs (Most Recent):  Temp: 99 °F (37.2 °C) (09/08/20 0747)  Pulse: (!) 118 (09/08/20 0747)  Resp: 16 (09/08/20 0853)  BP: (!) 128/91 (09/08/20 0747)  SpO2: 98 % (09/08/20 0747) Vital Signs (24h Range):  Temp:  [97.8 °F (36.6 °C)-100.1 °F (37.8 °C)] 99 °F (37.2 °C)  Pulse:  [105-135] 118  Resp:  [16-20] 16  SpO2:  [94 %-99 %] 98 %  BP: (127-137)/(7-99) 128/91       Intake/Output Summary (Last 24 hours) at 9/8/2020 1021  Last data filed at 9/8/2020 0500  Gross per 24 hour   Intake 225 ml   Output 900 ml   Net -675 ml       Physical Exam  Constitutional:       Appearance:  Normal appearance.   Neck:      Musculoskeletal: Normal range of motion.   Pulmonary:      Effort: Pulmonary effort is normal.      Breath sounds: Normal breath sounds.   Abdominal:      General: There is no distension.      Tenderness: There is no abdominal tenderness.   Neurological:      General: No focal deficit present.      Mental Status: She is alert and oriented to person, place, and time.     Right chest site open, no erythema    Significant Labs:  CBC:   Recent Labs   Lab 09/08/20  0551   WBC 14.04*   RBC 3.24*   HGB 8.1*   HCT 25.9*      MCV 80*   MCH 25.0*   MCHC 31.3*     CMP:   Recent Labs   Lab 09/08/20  0551   GLU 92   CALCIUM 8.0*   ALBUMIN 1.8*   PROT 6.6   *   K 4.2   CO2 22*      BUN 10   CREATININE 0.6   ALKPHOS 280*   ALT 38   *   BILITOT 2.2*       Significant Diagnostics:  I have reviewed all pertinent imaging results/findings within the past 24 hours.    Assessment/Plan:     Active Diagnoses:    Diagnosis Date Noted POA    PRINCIPAL PROBLEM:  Septic shock [A41.9, R65.21] 08/26/2020 Yes    Epistaxis [R04.0] 09/05/2020 Unknown    Thrombocytopenia [D69.6] 09/04/2020 No    Palliative care encounter [Z51.5] 09/03/2020 Not Applicable    Goals of care, counseling/discussion [Z71.89] 09/03/2020 Not Applicable    Counseling regarding advance care planning and goals of care [Z71.89] 09/03/2020 Not Applicable    Bacteremia due to Staphylococcus [R78.81]  Yes    Chest pain [R07.9] 08/28/2020 No    Elevated troponin [R79.89] 08/28/2020 No    Pancytopenia [D61.818] 08/28/2020 Yes    MRSA bacteremia [R78.81] 08/27/2020 Yes    Infected venous access port [T80.219A] 08/26/2020 Yes    Metabolic acidosis [E87.2] 08/26/2020 Yes    Hypomagnesemia [E83.42] 08/26/2020 Yes    Hyponatremia [E87.1] 08/26/2020 Yes    Nausea & vomiting [R11.2] 08/26/2020 Yes    Lactic acid acidosis [E87.2]  Yes    Sepsis [A41.9]  Yes    Anemia in neoplastic disease [D63.0] 08/10/2020 Yes     Metastatic disease [C79.9] 07/14/2020 Yes    Secondary malignancy of liver [C78.7] 07/14/2020 Yes    Adenocarcinoma of colon [C18.9] 07/14/2020 Yes      Problems Resolved During this Admission:    Diagnosis Date Noted Date Resolved POA    KIERSTEN (acute kidney injury) [N17.9] 08/26/2020 09/04/2020 Yes    Neutropenia [D70.9] 08/26/2020 08/30/2020 Yes     29F with bacteremia, sepsis s/p removal of infected mediport, s/p placement of right IJ HDC  HDC removed at bedside  Will follow up with Dr Arzola. Will need port replaced on other side once infection cleared      Armani Naqvi MD  General Surgery  [facility]

## 2020-09-08 NOTE — ASSESSMENT & PLAN NOTE
- Repeat blood cultures x 3 have been negative  - Echo showed no endocarditis  - Central line removed  - continue antibiotic as recommended by infectious disease.

## 2020-09-08 NOTE — PLAN OF CARE
Patient resting in bed.  1 unit blood given, no adverse reactions noted.  Patient had 1 BM during morning.

## 2020-09-08 NOTE — ASSESSMENT & PLAN NOTE
Infected venous access port  Metabolic Acidosis  Bacteremia due to Staphylococcus    Patient with dehiscence of Mediport---started on broad spectrum abx. ID has been consulted. BCX growing GP cocci resembling staph. MRSA noted from the port. Blood cultures were redrawn on 8/28--NGTD. General Sx was consulted to remove her tunneled port. Lactate was initially elevated--now down. IVF given. Nephrology was consulted. Bicarb gtt initiated by Nephrology. CRRT was started and now stopped. MRI of the T/L spine suggestive of cavitary lung lesions. CT of the C/A/P showing possible septic emboli. We appreciate ID. YOLANDA done showing no vegetation. The patient has diarrhea-neg for C-Diff.     Rtan catheter removed and diet advanced. Repeat CT of chest and abdomen showing increased chest infiltrates, septic emboli, and ascites. PICC line placed for IV access. 1 unit PRBC given. Lasix was given for pleural effusion. Abdominal US ordered for ascites level, no ascites noted. CTA ordered for worsening SOB, negative for PE. A palliative care consult was placed.     She was stepped down from ICU on 9/4  Patient is looking better, denies abdominal pain or diarrhea, still with cough and chest pain, tachypnea and tachycardia still present, mild fevers   EKG repeated--sinus tach  Working with PT  Changed metoprolol dosing, added amlodipine  Additional unit of PRBC today for hgb 7.0 with tachycardia  Cont morphine for pain and tachypnea  Cont Ativan PRN for anxiety  Surgery consulted to remove RIJ trialysis  Cont vancomycin for 4 weeks per ID recs  Patient has PICC line--plan is for home with home health and IV antibiotics soon

## 2020-09-08 NOTE — ASSESSMENT & PLAN NOTE
Epistaxis    humidified oxygen, lovenox 40mg  monitor platelet count-likely decreased due to sepsis and antibiotics per hem/onc  Will DC lovenox if platelet less than 50  We appreciate hem/onc  Epistaxis resolved, platelets increased to 191 today

## 2020-09-08 NOTE — SUBJECTIVE & OBJECTIVE
Interval history: patient with continued mild fevers, tachycardia and tachypnea, c/o pain to chest, hgb dropping-will need a unit of blood today    Review of Systems   Constitutional: Positive for activity change, appetite change, fatigue and fever. Negative for chills.   HENT: Negative for nosebleeds.    Respiratory: Positive for cough and shortness of breath.    Cardiovascular: Positive for leg swelling. Negative for chest pain.   Gastrointestinal: Negative for abdominal distention, abdominal pain, diarrhea, nausea and vomiting.   Genitourinary: Negative for difficulty urinating.   Skin: Positive for wound (right chest wall).   Allergic/Immunologic: Positive for immunocompromised state.   Neurological: Positive for weakness.   Psychiatric/Behavioral: Negative for confusion. The patient is not nervous/anxious.      Objective:     Vital Signs (Most Recent):  Temp: 96.7 °F (35.9 °C) (09/08/20 1603)  Pulse: (!) 115 (09/08/20 1603)  Resp: 18 (09/08/20 1224)  BP: (!) 140/94 (09/08/20 1603)  SpO2: 99 % (09/08/20 1603) Vital Signs (24h Range):  Temp:  [96.7 °F (35.9 °C)-100 °F (37.8 °C)] 96.7 °F (35.9 °C)  Pulse:  [105-135] 115  Resp:  [16-20] 18  SpO2:  [94 %-99 %] 99 %  BP: (128-140)/(87-97) 140/94     Weight: 88 kg (194 lb 0.1 oz)  Body mass index is 32.28 kg/m².    Physical Exam  Vitals signs and nursing note reviewed.   Eyes:      Pupils: Pupils are equal, round, and reactive to light.   Cardiovascular:      Rate and Rhythm: Regular rhythm. Tachycardia present.      Pulses: Normal pulses.      Heart sounds: Normal heart sounds.   Pulmonary:      Effort: Tachypnea present.      Breath sounds: Decreased breath sounds present.   Abdominal:      General: Bowel sounds are normal. There is no distension.      Palpations: Abdomen is soft.   Musculoskeletal: Normal range of motion.         General: Swelling present.   Skin:     General: Skin is warm and dry.      Capillary Refill: Capillary refill takes less than 2 seconds.       Comments: Dressing to the Right CW, clean dry and intact   Neurological:      Mental Status: She is alert and oriented to person, place, and time.      Motor: Weakness present.   Psychiatric:         Behavior: Behavior normal.           CRANIAL NERVES     CN III, IV, VI   Pupils are equal, round, and reactive to light.       Significant Labs:   BMP:   Recent Labs   Lab 09/08/20 0551   GLU 92   *   K 4.2      CO2 22*   BUN 10   CREATININE 0.6   CALCIUM 8.0*   MG 1.7     CBC:   Recent Labs   Lab 09/07/20 0417 09/08/20 0551   WBC 20.10* 14.04*   HGB 7.0* 8.1*   HCT 22.3* 25.9*   * 191     Lactic Acid:   No results for input(s): LACTATE in the last 48 hours.  Magnesium:   Recent Labs   Lab 09/07/20 0417 09/08/20 0551   MG 1.8 1.7     POCT Glucose:   No results for input(s): POCTGLUCOSE in the last 48 hours.      Significant Imaging: I have reviewed all pertinent imaging results/findings within the past 24 hours.

## 2020-09-08 NOTE — TELEPHONE ENCOUNTER
----- Message from Mireille Pulido sent at 2020  8:57 AM CDT -----  CONSULTS:     Patient: Huy Cisneros    : 1990    Clinic#: [id number]    Room number: 422    Referring MD: Dr. Lindsay    Diagnosis: remove dieudonne    Person calling: Bette romero/ [facility] [phone]

## 2020-09-09 PROBLEM — T80.219A INFECTED VENOUS ACCESS PORT: Status: RESOLVED | Noted: 2020-01-01 | Resolved: 2020-01-01

## 2020-09-09 PROBLEM — R07.9 CHEST PAIN: Status: RESOLVED | Noted: 2020-01-01 | Resolved: 2020-01-01

## 2020-09-09 PROBLEM — R11.2 NAUSEA & VOMITING: Status: RESOLVED | Noted: 2020-01-01 | Resolved: 2020-01-01

## 2020-09-09 PROBLEM — E87.20 METABOLIC ACIDOSIS: Status: RESOLVED | Noted: 2020-01-01 | Resolved: 2020-01-01

## 2020-09-09 PROBLEM — R04.0 EPISTAXIS: Status: RESOLVED | Noted: 2020-01-01 | Resolved: 2020-01-01

## 2020-09-09 NOTE — PT/OT/SLP PROGRESS
Physical Therapy      Patient Name:  Huy Cisneros   MRN:  [id number]    Patient not seen today secondary to pending d/c, wanting to save her energy for when she goes home. Will follow-up if patient is not d/c'd.    Enedina Ashford, PT
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
## 2020-09-09 NOTE — PLAN OF CARE
09/09/20 1002   Post-Acute Status   Post-Acute Authorization Home Health;Medications   Home Health Status Awaiting Orders for HH  ([facility] accepted. Awaiting orders to send)   Medication Status Pending Service Contract  ([facility] declined payor not accepted. Referral sent to [facility] and accepted pt. Updated Fly this am that pt will discharge today. Pt will need teaching prior to discharge.)   Discharge Delays (!) Patient/Caregiver Education Not Complete  (Pt will need teaching for IV ABX administration at home. Will assess if will be done here or at the home per pt preference.)   Discharge Plan   Discharge Plan A Home with family;Home Health  (Home infusion)     Outpt PT/OT recommended to address pt's therapy needs that her insurance will cover.      Madelaine Golden RN  RN Transition Navigator  [phone]

## 2020-09-09 NOTE — NURSING
Medications and wheelchair have been delivered to bedside. Discharge instructions reviewed by virtual nurse. Vancomycin infusing at this time. Patients mother will pick her up after IV antibiotic complete.

## 2020-09-09 NOTE — PT/OT/SLP PROGRESS
Physical Therapy      Patient Name:  Huy Cisneros   MRN:  [id number]    After having reviewed the patient's current functional status, the patient would benefit from use of a wheelchair for easier transport in the home and in the community since she has very low endurance and requires increased assistance with mobility.    Prisca Andrews, PT   9/9/2020

## 2020-09-09 NOTE — PLAN OF CARE
VN reviewed discharge instructions with pt.  AVS printed and handed to pt by bedside nurse.  Reviewed follow-up appointments, medications, diet, and importance of medication compliance.  Reviewed home care instructions, treatment plan, self-management, and when to seek medical attention.  Allowed time for questions.  All questions answered.  Patient verbalized complete understanding of discharge instructions and voices no concerns.     Discharge instructions complete.  Pt waiting on Bedside delivery . Bedside nurse notified.

## 2020-09-09 NOTE — PLAN OF CARE
Discharge rounds on patient. Discussed followup appointments, blue discharge folder, discharge nurse will go over home medications and reasons for medications and final discharge instructions. All patient/caregiver questions answered. Patient verbalized understanding.    NP at home referral placed for patient. Msg sent to Emperatriz Coronado to make aware.    Pt has completed teaching with [employer] and he reports pt did excellent with teaching. Pt reports she feels well prepared to administer medication herself with her mother for assistance. Updated bedside nurse Estrella.  Pt's mother to pick her up on discharge.    Call [facility]  As needed, REHAB, FOLLOW UP WITH THERAPY; OFFICE TO CALL PATIENT Physical therapy and Occupation Therapy   [phone]   Office will attempt contact 3 times to set up location and visit times.           Call [facility] [facility]  As needed [address]   [place] [id number] [phone]          Call [facility]  As needed, NURSE PRACTIONER AT HOME PROGRAM, OFFICE TO CALL PATIENT/FAMILY TO SET UP APPT TIME [phone]          Call [facility]  As needed, HOME HEALTH [address]   [place] [address]  [phone]   Sep15 Established Patient Visit with Jewel Arzola MD  Tuesday Sep 15, 2020 8:20 AM  Arrive at check-in approximately 15 minutes before your scheduled appointment time. Bring all outside medical records and imaging, along with a list of your current medications and insurance card.  3rd Floor [facility], [address]   At this time [facility] will only use these entries [facility], [facility] side, and Emergency Department due to COVID-19 precautions.  [place] [facility]  [address], [address]   [place] [address]  [phone]          Infusion 240 Min  Tuesday Sep 15, 2020 9:00 AM  Arrive at check-in approximately 15 minutes before your scheduled appointment time. Bring all outside medical records and  imaging, along with a list of your current medications and insurance card.  [address]nd Floor [address]   At this time [facility] will only use these entries [facility], [facility] side, and Emergency Department due to COVID-19 precautions.  [facility]  [address], [address]   [place] [id number] [phone]          Go to Jewel Arzola MD  Tuesday Sep 15, 2020  at 0820am; ONCOLOGY/HEMATOLOGY FOLLOW  [address]   [address]   [place] [address]  [phone]   Sep21 Urine  Monday Sep 21, 2020 10:50 AM  Arrive at check-in approximately 15 minutes before your scheduled appointment time. Bring all outside medical records and imaging, along with a list of your current medications and insurance card. [facility]  [address] [address] [phone]          Non-Fasting Lab  Monday Sep 21, 2020 11:00 AM  Arrive at check-in approximately 15 minutes before your scheduled appointment time. Bring all outside medical records and imaging, along with a list of your current medications and insurance card. [facility]  [address] [address] [phone]   Sep22 Established Patient Visit with Jewel Arzola MD  Tuesday Sep 22, 2020 9:20 AM  Arrive at check-in approximately 15 minutes before your scheduled appointment time. Bring all outside medical records and imaging, along with a list of your current medications and insurance card.  3rd Floor [address], [address]   At this time [facility] will only use these entries Main Hospital, [facility] side, and Emergency Department due to COVID-19 precautions.  [place] [facility]  [address], [address]   [place] [id number] [phone]   Sep28 Go to [place] [facility] Infectious Disease  Monday Sep 28, 2020  at 11 am; FOLLOW UP WITH INFECTIOUS DISEASE AFTER DISCHARGE [address], [address]   [place] [id number] [phone]  "        Future Appointments   Date Time Provider Department Center   9/15/2020  8:20 AM Jewel Arzola MD [facility] HEM ONC [facility]   9/21/2020 10:50 AM LAB, [facility] LAB [facility]   9/21/2020 11:00 AM LAB, [facility] LAB [facility]   9/22/2020  9:20 AM Jewel Arzola MD [facility] HEM ONC [place] [facility]   9/28/2020 11:00 AM Brandon Schumacher MD [facility]   10/5/2020 10:00 AM LAB, [facility] LAB [facility]   10/5/2020 10:20 AM LAB, [facility] LAB [facility]   10/6/2020 10:20 AM Jewel Arzola MD [facility] HEM ONC [place] [facility]   10/19/2020 10:00 AM LAB, [facility] LAB [facility]     /80 (BP Location: Left arm, Patient Position: Lying)   Pulse 108   Temp 100.2 °F (37.9 °C) (Oral)   Resp 20   Ht 5' 5" (1.651 m)   Wt 88 kg (194 lb 0.1 oz)   LMP 07/17/2020   SpO2 95%   Breastfeeding No   BMI 32.28 kg/m²        09/09/20 1215   Final Note   Assessment Type Final Discharge Note   Anticipated Discharge Disposition Home-Health  ([facility] - awaiting confrimation of reciept of orders)   Hospital Follow Up  Appt(s) scheduled? Yes   Discharge plans and expectations educations in teach back method with documentation complete? Yes   Right Care Referral Info   Post Acute Recommendation Home-care   Facility Name [facility] and [facility] for Home infusions   Post-Acute Status   Post-Acute Authorization Home Health;Medications   Medication Status Set-up Complete  ([facility] with [employer] to come in for teaching with pt.)   Discharge Delays (!) Patient/Caregiver Education Not Complete  (Pt will need teaching for IV ABX administration at home. Will assess if will be done here or at the home per pt preference.)     Madelaine Golden RN  RN Transition Navigator  [phone]    "

## 2020-09-09 NOTE — DISCHARGE SUMMARY
[facility] Hospital Medicine  Discharge Summary      Patient Name: Huy Cisneros  MRN: [id number]  Admission Date: 8/26/2020  Hospital Length of Stay: 14 days  Discharge Date and Time:  09/09/2020 10:03 AM  Attending Physician: Maryana Patel*   Discharging Provider: Tracy Medrano NP  Primary Care Provider: Primary Doctor No      HPI:   Ms. Huy Cisneros is a 30 y/o female who lives in [place], [place] at her residence with her children. The patients PCP is Imtiaz Lindsay PA-C. She is also followed by Dr. Jewel Arzola with Oncology. She has a pmh of colon cancer with liver mets, uterine cysts, and HTN. She has a past surgical history of a mediport placement and a colonoscopy. The patient does not smoke cigarettes, drink alcohol, or use illicit drugs.     She presents to the ED with complaints of fever, vomiting and feeling dizzy since yesterday.  Per the patient she states her port a cath to right chest wall busted a stitch last night she while was bathing. She also has complaints of feeling SOB with lower extremity swelling.     Her ED workup includes WBC--0.95, hemoglobin 9.9, Na--132, creatinine 2.00, magnesium--1.4, lactate--10.7, CPK--180, pH--7.2. Pending blood cultures and wound cultures. She is COVID-19 negative. CXR---There has been interval placement of a right subclavian venous line. Its tip is in the region of the junction of the superior vena cava with the right atrium.  There is no pneumothorax. There is no focal pulmonary infiltrate visualized. XR of the abd---There is a paucity of gas within the gastrointestinal system. There are multiple radiopaque leads projected over the abdomen and pelvis. She will be admitted to the [facility] department for further care.         Procedure(s) (LRB):  Transesophageal echo (YOLANDA) intra-procedure log documentation (N/A)      Hospital Course:   She was admitted to the [facility] medicine  service for further care. We have consulted ID, nephrology, oncology, pulmonology, and general surgery. Her tunneled line is the suspected source for her bacteremia/septic shock. Blood cultures positive for staph. TTE pending. General sx removed tunneled port at the bedside. Will cont to follow cultures. She was initially given Levophed and Amiodarone. Lactate has improved---she was given IVF and now Nephrology has started Bicarb gtt. Dr. Naqvi was consulted for trialysis line placement---She was started on CRRT, now stopped at this time. Will cont broad spectrum abx. G-CSF started by oncology for neutropenia. MRI of the thoracic and lumbar spine was completed on 8/28 to r/o spinal abscess---Her MRI did not find a spinal fluid collection, however the MRI was suggestive of possible cavitary lung lesions. She is to have a CT chest, abdomen, and pelvis today for assessment of possible cavities in her lungs and mets in her liver. MRSA grew out at the port site. She was weaned off of levophed and now started on BB because of 's. Pulmonology has ordered CT of the C/A/P with contrast to evaluate the patient  for septic pulmonary emboli. WBCs have increased and are now 26.  she has Staph  in her BCs and the one from yesterday is NGTD.  On her CT chest and abdomen she has small bilateral pleural effusions and her liver mets appear smaller. YOLANDA with no vegetation noted. Cont daily blood cultures. Patient is on vancomycin and meropenem per ID recs. The patient remains with tachypnea and tachycardia. Up out of bed with PT. CT of chest and abdomen showing increased chest infiltrates and ascites, probable septic emboli, and extensive metastatic disease. PICC line placed for inability to obtain peripheral IV. CTA ordered to r/o PE; she was negative for PE. Patient remains tachypneic and tachycardic with fevers. Lasix was given. An abdominal US was ordered to assess ascites levels, no ascites noted. The patient received a  unit of PRBC. A palliative care consult was placed for patient and family counseling. The patient was stepped down out of ICU on 9/4. She was given morphine PRN for pain and tachypnea and tylenol for fevers. Her labs were monitored and electrolytes were replaced. She continued to work with PT/OT. Meropenem was discontinued as well as daily blood cultures. The patient received an additional unit of blood for hgb 7. ID has signed off with recommendations for 4 weeks of vancomycin. The patient was started on amlodipine and her metoprolol dosage was adjusted. She will d/c home with HH on today. She will need to follow up with ID in 2 weeks and also ONC in 1 week.     Consults:   Consults (From admission, onward)        Status Ordering Provider     Inpatient consult to Anesthesiology  Once     Provider:  (Not yet assigned)    Acknowledged LUIS ALBERTO CORNELIUS     Inpatient consult to [facility]iology-[facility]  Once     Provider:  (Not yet assigned)    Completed DORIS MENDEZ     Inpatient consult to General Surgery  Once     Provider:  Armani Naqvi MD    Completed MARYANA DOUGLAS     Inpatient consult to General Surgery  Once     Provider:  Armani Naqvi MD    Acknowledged ANN MONREAL     Inpatient consult to General Surgery  Once     Provider:  Armani Naqvi MD    Acknowledged MICHELE WEBB     Inpatient consult to Hematology/Oncology  Once     Provider:  Guido Friedman MD    Completed JESUS SZYMANSKI     Inpatient consult to Infectious Diseases  Once     Provider:  (Not yet assigned)    Completed DORIS MENDEZ     Inpatient consult to Infectious Diseases  Once     Provider:  Brandon Schumacher MD    Completed JESUS SZYMANSKI     Inpatient consult to Nephrology-[facility] ([facility])  Once     Provider:  (Not yet assigned)    Acknowledged MARYANA DOUGLAS     Inpatient consult to Palliative Care  Once     Provider:  Maryana LEYVA  MD Jorge    Completed LUIS ALBERTO CORNELIUS     [facility]  Once     Provider:  (Not yet assigned)    Completed JESUS SZYMANSKI     Inpatient consult to PICC team ([facility])  Once     Provider:  (Not yet assigned)    Completed ANN MONREAL     Inpatient consult to Pulmonology  Once     Provider:  Sandra Lombardi MD    Completed LUDMILA DOUGLAS     Inpatient consult to Pulmonology  Once     Provider:  (Not yet assigned)    Completed LUDMILA DOUGLAS     Pharmacy to dose Vancomycin consult  Once     Provider:  (Not yet assigned)    Acknowledged LUDMILA DOUGLAS          No new Assessment & Plan notes have been filed under this hospital service since the last note was generated.  Service: Hospital Medicine    Final Active Diagnoses:    Diagnosis Date Noted POA    PRINCIPAL PROBLEM:  Septic shock [A41.9, R65.21] 08/26/2020 Yes    Thrombocytopenia [D69.6] 09/04/2020 No    Palliative care encounter [Z51.5] 09/03/2020 Not Applicable    Goals of care, counseling/discussion [Z71.89] 09/03/2020 Not Applicable    Counseling regarding advance care planning and goals of care [Z71.89] 09/03/2020 Not Applicable    Bacteremia due to Staphylococcus [R78.81]  Yes    Elevated troponin [R79.89] 08/28/2020 No    Pancytopenia [D61.818] 08/28/2020 Yes    MRSA bacteremia [R78.81] 08/27/2020 Yes    Hypomagnesemia [E83.42] 08/26/2020 Yes    Hyponatremia [E87.1] 08/26/2020 Yes    Sepsis [A41.9]  Yes    Anemia in neoplastic disease [D63.0] 08/10/2020 Yes    Metastatic disease [C79.9] 07/14/2020 Yes    Secondary malignancy of liver [C78.7] 07/14/2020 Yes    Adenocarcinoma of colon [C18.9] 07/14/2020 Yes      Problems Resolved During this Admission:    Diagnosis Date Noted Date Resolved POA    Epistaxis [R04.0] 09/05/2020 09/09/2020 Unknown    Chest pain [R07.9] 08/28/2020 09/09/2020 No    KIERSTEN (acute kidney injury) [N17.9] 08/26/2020 09/04/2020 Yes     Infected venous access port [T80.219A] 08/26/2020 09/09/2020 Yes    Metabolic acidosis [E87.2] 08/26/2020 09/09/2020 Yes    Neutropenia [D70.9] 08/26/2020 08/30/2020 Yes    Nausea & vomiting [R11.2] 08/26/2020 09/09/2020 Yes    Lactic acid acidosis [E87.2]  09/09/2020 Yes       Discharged Condition: stable    Disposition:     Follow Up:  Follow-up Information     Guido Friedman MD In 1 week.    Specialties: Hematology and Oncology, Hematology  Why: follow up  Contact information:  [address]  [address]  [place] [address]  [phone]             id clinic In 2 weeks.    Why: follow up               Patient Instructions:      Diet Cardiac     Notify your health care provider if you experience any of the following:  temperature >100.4     Notify your health care provider if you experience any of the following:  persistent nausea and vomiting or diarrhea     Notify your health care provider if you experience any of the following:  severe uncontrolled pain     Notify your health care provider if you experience any of the following:  redness, tenderness, or signs of infection (pain, swelling, redness, odor or green/yellow discharge around incision site)     Notify your health care provider if you experience any of the following:  difficulty breathing or increased cough     Notify your health care provider if you experience any of the following:  severe persistent headache     Notify your health care provider if you experience any of the following:  worsening rash     Notify your health care provider if you experience any of the following:  persistent dizziness, light-headedness, or visual disturbances     Notify your health care provider if you experience any of the following:  increased confusion or weakness     Notify your health care provider if you experience any of the following:     Activity as tolerated       Significant Diagnostic Studies: Labs:   BMP:   Recent Labs   Lab 09/08/20  0551  09/09/20  0938   GLU 92 99   * 131*   K 4.2 4.8    99   CO2 22* 19*   BUN 10 11   CREATININE 0.6 0.7   CALCIUM 8.0* 8.4*   MG 1.7 2.0    and CBC   Recent Labs   Lab 09/08/20  0551 09/09/20  0938   WBC 14.04* 12.42   HGB 8.1* 8.6*   HCT 25.9* 27.7*    414*       Pending Diagnostic Studies:     Procedure Component Value Units Date/Time    X-Ray Lumbar Spine Ap And Lateral [[id number]]     Order Status: Sent Lab Status: No result     X-Ray Thoracic Spine AP Lateral [[id number]]     Order Status: Sent Lab Status: No result          Medications:  Reconciled Home Medications:      Medication List      START taking these medications    amLODIPine 5 MG tablet  Commonly known as: NORVASC  Take 1 tablet (5 mg total) by mouth once daily.  Start taking on: September 10, 2020     metoprolol tartrate 100 MG tablet  Commonly known as: LOPRESSOR  Take 1 tablet (100 mg total) by mouth 2 (two) times daily.     simethicone 125 MG chewable tablet  Commonly known as: MYLICON  Take 1 tablet (125 mg total) by mouth every 6 (six) hours as needed for Flatulence.     spironolactone 25 MG tablet  Commonly known as: ALDACTONE  Take 1 tablet (25 mg total) by mouth once daily.     vancomycin in dextrose 5 % 1 gram/250 mL Soln  Inject 250 mLs (1,000 mg total) into the vein every 12 (twelve) hours.        CONTINUE taking these medications    folic acid 1 MG tablet  Commonly known as: FOLVITE  Take 1 tablet (1 mg total) by mouth once daily.     HYDROcodone-acetaminophen 5-325 mg per tablet  Commonly known as: NORCO  Take 1 tablet by mouth every 8 (eight) hours as needed for Pain (chronic cancer-related pain).     lidocaine-prilocaine cream  Commonly known as: EMLA  Apply topically As instructed. Apply to skin overlying port site 30 minutes before chemotherapy.     STOOL SOFTENER 100 MG capsule  Generic drug: docusate sodium  Take 1 capsule (100 mg total) by mouth once daily.        STOP taking these medications    promethazine 25  MG tablet  Commonly known as: PHENERGAN            Indwelling Lines/Drains at time of discharge:   Lines/Drains/Airways     Peripherally Inserted Central Catheter Line            PICC Double Lumen 09/02/20 1715 left basilic 6 days          Drain            Female External Urinary Catheter 09/02/20 1226 6 days                Time spent on the discharge of patient: 35 minutes  Patient was seen and examined on the date of discharge and determined to be suitable for discharge.         Tracy Medrano NP  Department of Hospital Medicine  [facility]

## 2020-09-09 NOTE — PLAN OF CARE
[facility] HOME HEALTH ORDERS  FACE TO FACE ENCOUNTER    Patient Name: Huy Cisneros  YOB: 1990    PCP: Primary Doctor No   PCP Address: None  PCP Phone Number: None  PCP Fax: None    Encounter Date: 09/09/2020    Admit to Home Health    Diagnoses:  Active Hospital Problems    Diagnosis  POA    *Septic shock [A41.9, R65.21]  Yes    Thrombocytopenia [D69.6]  No    Palliative care encounter [Z51.5]  Not Applicable    Goals of care, counseling/discussion [Z71.89]  Not Applicable    Counseling regarding advance care planning and goals of care [Z71.89]  Not Applicable    Bacteremia due to Staphylococcus [R78.81]  Yes    Elevated troponin [R79.89]  No    Pancytopenia [D61.818]  Yes    MRSA bacteremia [R78.81]  Yes    Hypomagnesemia [E83.42]  Yes    Hyponatremia [E87.1]  Yes    Sepsis [A41.9]  Yes    Anemia in neoplastic disease [D63.0]  Yes    Metastatic disease [C79.9]  Yes    Secondary malignancy of liver [C78.7]  Yes    Adenocarcinoma of colon [C18.9]  Yes      Resolved Hospital Problems    Diagnosis Date Resolved POA    Epistaxis [R04.0] 09/09/2020 Unknown    Chest pain [R07.9] 09/09/2020 No    KIERSTEN (acute kidney injury) [N17.9] 09/04/2020 Yes    Infected venous access port [T80.219A] 09/09/2020 Yes    Metabolic acidosis [E87.2] 09/09/2020 Yes    Neutropenia [D70.9] 08/30/2020 Yes    Nausea & vomiting [R11.2] 09/09/2020 Yes    Lactic acid acidosis [E87.2] 09/09/2020 Yes       Future Appointments   Date Time Provider Department Center   9/15/2020  8:20 AM Jewel Arzola MD [facility] HEM [facility]C [place] [facility]   9/15/2020  9:00 AM CHAIR [facility] CHEMO [facility]   9/21/2020 10:50 AM LAB, [place] [facility]   9/21/2020 11:00 AM LAB, [place] [facility]   9/22/2020  9:20 AM Jewel Arzola MD [facility] HEM ONC [facility]   9/28/2020 11:00 AM [facility] [place] [facility]   10/5/2020 10:00 AM LAB, [place] [facility]  LAB [facility]   10/5/2020 10:20 AM LAB [facility]   10/6/2020 10:20 AM Jewel Arzola MD [facility] HEM ONC [place] [facility]   10/19/2020 10:00 AM [facility]     Follow-up Information     Guido Friedman MD In 1 week.    Specialties: Hematology and Oncology, Hematology  Why: follow up  Contact information:  [address]  [address]  [place] [address]  [phone]             id clinic In 2 weeks.    Why: follow up                   I have seen and examined this patient face to face today. My clinical findings that support the need for the home health skilled services and home bound status are the following:  Weakness/numbness causing balance and gait disturbance due to Infection, Weakness/Debility and Malignancy/Cancer making it taxing to leave home.    Allergies:  Review of patient's allergies indicates:   Allergen Reactions    Sulfa (sulfonamide antibiotics)        Diet: cardiac diet    Activities: activity as tolerated    Nursing:   SN to complete comprehensive assessment including routine vital signs. Instruct on disease process and s/s of complications to report to MD. Review/verify medication list sent home with the patient at time of discharge  and instruct patient/caregiver as needed. Frequency may be adjusted depending on start of care date.    Notify MD if SBP > 160 or < 90; DBP > 90 or < 50; HR > 120 or < 50; Temp > 101    Labs: Weekly CBC, CMP, Vanco trough with a goal level of 15-20  Fax labs to [facility] ID Academic Office at [phone] C/O [place] until patient gets established at [facility].    PICC Line: Use strict sterile technique   Maintain occlusive dressing unless soiled, damp, unable to assess insertion site.  Change injection caps every 7 days and/or as needed when soiled or if catheter-related infection is suspected or documented.    Flush PICC Line: Always clamp the PICC tubing when not in use.  Use only 10 mL syringes to flush PICC;  flush each lumen of PICC every 6 hours with 10 mL preservative-free saline flush solution.    Drawing blood from PICC Line: Stop infusions to all lumens,  withdraw 10 ml of blood  with 10 ml syringe and discard. Draw blood specimen.   Flush lumen with 20 ml of preservative-free saline-flush solution and resume infusion.    PICC line can be discontinued after the last abx therapy on 9/29/2020.      MISCELLANEOUS CARE:  Routine Skin for Bedridden Patients: Instruct patient/caregiver to apply moisture barrier cream to all skin folds and wet areas in perineal area daily and after baths and all bowel movements.    WOUND CARE ORDERS  Keep Right chest wall dressing clean and dry. You can change the dressing with 4x4 and paper tape to secure.      Medications: Review discharge medications with patient and family and provide education.      Current Discharge Medication List      START taking these medications    Details   amLODIPine (NORVASC) 5 MG tablet Take 1 tablet (5 mg total) by mouth once daily.  Qty: 30 tablet, Refills: 11    Comments: .      metoprolol tartrate (LOPRESSOR) 100 MG tablet Take 1 tablet (100 mg total) by mouth 2 (two) times daily.  Qty: 60 tablet, Refills: 11    Comments: .      simethicone (MYLICON) 125 MG chewable tablet Take 1 tablet (125 mg total) by mouth every 6 (six) hours as needed for Flatulence.  Qty:  , Refills: 0      spironolactone (ALDACTONE) 25 MG tablet Take 1 tablet (25 mg total) by mouth once daily.  Qty: 30 tablet, Refills: 11    Comments: .      vancomycin HCl in 5 % dextrose (VANCOMYCIN IN DEXTROSE 5 %) 1 gram/250 mL Soln Inject 250 mLs (1,000 mg total) into the vein every 12 (twelve) hours.  Qty:     Start 9/10/2020 and End 9/29/2020      CONTINUE these medications which have NOT CHANGED    Details   docusate sodium (COLACE) 100 MG capsule Take 1 capsule (100 mg total) by mouth once daily.  Qty: 30 capsule, Refills: 0      folic acid (FOLVITE) 1 MG tablet Take 1 tablet (1 mg total)  by mouth once daily.  Qty: 100 tablet, Refills: 3    Associated Diagnoses: Anemia in neoplastic disease      HYDROcodone-acetaminophen (NORCO) 5-325 mg per tablet Take 1 tablet by mouth every 8 (eight) hours as needed for Pain (chronic cancer-related pain).  Qty: 60 tablet, Refills: 0    Comments: Quantity prescribed more than 7 day supply? Yes - known cancer diagnosis  Associated Diagnoses: Chronic neoplasm-related pain      lidocaine-prilocaine (EMLA) cream Apply topically As instructed. Apply to skin overlying port site 30 minutes before chemotherapy.  Qty: 30 g, Refills: 2    Associated Diagnoses: Adenocarcinoma of colon         STOP taking these medications       promethazine (PHENERGAN) 25 MG tablet Comments:   Reason for Stopping:               I certify that this patient is confined to her home and needs intermittent skilled nursing care.

## 2020-09-09 NOTE — PT/OT/SLP PROGRESS
Occupational Therapy      Patient Name:  Huy Cisneros   MRN:  [id number]    Patient not seen today secondary to Patient unwilling to participate([id number]: Pt refused 2/2 drowsy from morphine.  PM 1323: Pt refused 2/2 discharging this evening and wants to conserve energy.). Will follow-up tomorrow if pt remains in hospital.    CARLTON Puga  9/9/2020

## 2020-09-09 NOTE — PROGRESS NOTES
Pharmacokinetic Assessment Follow Up: IV Vancomycin    Vancomycin serum concentration assessment(s):    The trough level was drawn correctly and can be used to guide therapy at this time. The measurement is above the desired definitive target range of 15 to 20 mcg/mL.    Vancomycin Regimen Plan:    Change regimen to Vancomycin 1000 mg IV every 12 hours with next serum trough concentration measured at 0030 prior to third dose on 09/10/2020    Drug levels (last 3 results):  Recent Labs   Lab Result Units 09/06/20  0320 09/07/20  1803 09/08/20  1749   Vancomycin-Trough ug/mL 19.9 10.2 23.0*       Pharmacy will continue to follow and monitor vancomycin.    Please contact pharmacy at extension [phone] for questions regarding this assessment.    Thank you for the consult,   Kirsten Garcia       Patient brief summary:  Huy Cisneros is a 29 y.o. female initiated on antimicrobial therapy with IV Vancomycin for treatment of bacteremia    The patient's current regimen is vancomycin 1250 mg q12h    Drug Allergies:   Review of patient's allergies indicates:   Allergen Reactions    Sulfa (sulfonamide antibiotics)        Actual Body Weight:   88 kg    Renal Function:   Estimated Creatinine Clearance: 150.2 mL/min (based on SCr of 0.6 mg/dL).,     Dialysis Method (if applicable):  N/A    CBC (last 72 hours):  Recent Labs   Lab Result Units 09/06/20 0320 09/07/20 0417 09/08/20  0551   WBC K/uL 21.43* 20.10* 14.04*   Hemoglobin g/dL 7.2* 7.0* 8.1*   Hematocrit % 22.3* 22.3* 25.9*   Platelets K/uL 65* 138* 191   Gran% % 85.0* 87.0* 91.0*   Lymph% % 9.0* 4.0* 2.0*   Mono% % 1.0* 4.0 3.0*   Eosinophil% % 0.0 0.0 0.0   Basophil% % 1.0 2.0* 0.0   Differential Method  Manual Manual Manual       Metabolic Panel (last 72 hours):  Recent Labs   Lab Result Units 09/06/20 0320 09/07/20  0417 09/08/20  0551   Sodium mmol/L 133* 132* 133*   Potassium mmol/L 3.7 4.0 4.2   Chloride mmol/L 99 98 100   CO2 mmol/L 24 23 22*    Glucose mg/dL 82 98 92   BUN, Bld mg/dL 11 10 10   Creatinine mg/dL 0.6 0.7 0.6   Albumin g/dL 1.8* 1.8* 1.8*   Total Bilirubin mg/dL 1.3* 1.6* 2.2*   Alkaline Phosphatase U/L 383* 313* 280*   AST U/L 200* 153* 138*   ALT U/L 37 37 38   Magnesium mg/dL 2.1 1.8 1.7   Phosphorus mg/dL 2.0* 2.5* 2.1*       Vancomycin Administrations:  vancomycin given in the last 96 hours                     vancomycin (VANCOCIN) 1,250 mg in dextrose 5 % 250 mL IVPB (mg) 1,250 mg New Bag 09/08/20 0641     1,250 mg New Bag 09/07/20 2043    vancomycin in dextrose 5 % 1 gram/250 mL IVPB 1,000 mg (mg) 1,000 mg New Bag 09/07/20 0327     1,000 mg New Bag 09/06/20 1639     1,000 mg New Bag  0435     1,000 mg New Bag 09/05/20 1703     1,000 mg New Bag  0453                    Microbiologic Results:  Microbiology Results (last 7 days)       Procedure Component Value Units Date/Time    Blood culture [[id number]] Collected: 09/04/20 0655    Order Status: Completed Specimen: Blood from Peripheral, Right Wrist Updated: 09/08/20 1412     Blood Culture, Routine No Growth to date      No Growth to date      No Growth to date      No Growth to date      No Growth to date    Blood culture [[id number]] Collected: 09/03/20 0416    Order Status: Completed Specimen: Blood from Antecubital, Right Arm Updated: 09/08/20 1212     Blood Culture, Routine No growth after 5 days.    Blood culture [[id number]] Collected: 09/02/20 0721    Order Status: Completed Specimen: Blood from Peripheral, Right Wrist Updated: 09/07/20 1022     Blood Culture, Routine No growth after 5 days.    Blood culture [[id number]] Collected: 09/01/20 0655    Order Status: Completed Specimen: Blood from Antecubital, Right Arm Updated: 09/06/20 1012     Blood Culture, Routine No growth after 5 days.    Narrative:      Collection has been rescheduled by LLR at 09/01/2020 04:28 Reason:   nurse said to draw at at 6AM  Collection has been rescheduled by LLR at 09/01/2020 04:28 Reason:   nurse said  to draw at at 6AM    Blood culture [[id number]]  (Abnormal)  (Susceptibility) Collected: 08/31/20 0631    Order Status: Completed Specimen: Blood from Antecubital, Right Arm Updated: 09/05/20 1018     Blood Culture, Routine Gram stain aer bottle: Gram positive cocci in clusters resembling Staph      Results called to and read back by: Argentina Vicente RN. 09/03/2020        03:16      METHICILLIN RESISTANT STAPHYLOCOCCUS AUREUS  [facility].      Blood culture [[id number]] Collected: 08/30/20 0121    Order Status: Completed Specimen: Blood from Peripheral, Right Updated: 09/04/20 1022     Blood Culture, Routine No growth after 5 days.    Blood culture [[id number]]  (Abnormal) Collected: 08/29/20 1658    Order Status: Completed Specimen: Blood from Antecubital, Right Updated: 09/03/20 0753     Blood Culture, Routine Gram stain aer bottle: Gram positive cocci in clusters resembling Staph       Results called to and read back by: Jessica Hewitt 09/01/2020  12:07      METHICILLIN RESISTANT STAPHYLOCOCCUS AUREUS  [facility]  For susceptibility see order #[id number]

## 2020-09-09 NOTE — PLAN OF CARE
09/09/20 1630   Post-Acute Status   Post-Acute Authorization HME   HME Status Pending Payor Medical Review  (Pt is pending auth from insurance. Yas Gino did give [place] the okay to deliver WC and if not approved they will  from pt's home. TN updated pt at bedside and she was in agreement young if not authorized they will . Documents signed)   Medication Status Set-up Complete  (Medication delivered to bedside by [employer].)   Discharge Delays (!) Medication Delay (Cost, Insurance)  (Mother outside to transport home. Vanc infusing. Dc home once Vanc complete.)   Discharge Plan   Discharge Plan A Home with family;Home Health  (Home infusion)     Madelaine Golden RN  RN Transition Navigator  [phone]

## 2020-09-09 NOTE — PLAN OF CARE
TN communicated with Yas Rojasora and she reports that pt's auth for a WC will not come back in time for discharge. She will submit for authorization and when obtained she will have the WC delivered to the home. Pt reports she would be able to use her mother's electric scooter to get into the home and have family at the home to help her when she gets home.  TN updated pt at bedside to status of DME. Pt has today's dose of Vancomycin infusing currently prior to discharge. Pt is ready for discharge home and reports she feels well enough for discharge today.     Future Appointments   Date Time Provider Department Center   9/15/2020  8:20 AM Jewel Arzola MD [facility] HEM ONC [facility]   9/21/2020 10:50 AM [facility] [place] [facility]   9/21/2020 11:00 AM [facility] [place] [facility]   9/22/2020  9:20 AM Jewel Arzola MD [facility] HEM ONC [facility]   9/28/2020 11:00 AM Brandon Schumacher MD [facility]   10/5/2020 10:00 AM [facility] [place] [facility]   10/5/2020 10:20 AM [facility] [facility]   10/6/2020 10:20 AM Jewel Arzola MD [facility]   10/19/2020 10:00 AM LAB, [place] [facility]     TN spoke with  with [facility] to get F/U appt with MANOJ Key. She reports that pt already has appt with Dr. Schumacher and that visit will be a hospital F/U and ID F/u in one visit. ID with [facility] 9/28 at 11 am.       09/09/20 1556   Post-Acute Status   Post-Acute Authorization HME   HME Status Pending Payor Medical Review     Madelaine Golden RN  RN Transition Navigator  [phone]'

## 2020-09-14 NOTE — PROGRESS NOTES
PATIENT: Huy Cisneros  MRN: [id number]  DATE: 9/14/2020    Diagnosis:   1. Adenocarcinoma of colon    2. Secondary malignancy of liver    3. Chronic neoplasm-related pain    4. Anemia in neoplastic disease    5. Chemotherapy-induced nausea and vomiting    6. Bacteremia due to Staphylococcus    7. MRSA bacteremia    8. Hyperbilirubinemia      Chief Complaint: Colon Cancer    Oncologic History:      Oncologic History 1. Colon adenocarcinoma      Oncologic Treatment 1. Right hemicolectomy (7/17/20)  2. Planned therapy: FOLFOX +/- bevacizumab      Pathology 7/17/20:  Right hemicolectomy:  MODERATELY DIFFERENTIATED ADENOCARCINOMA WITH INFILTRATION THROUGH THE   MUSCULARIS INTO PERICOLONIC ADIPOSE TISSUE   METASTATIC CARCINOMA  TO 6 OF 15 LYMPH NODES   MULTIPLE METASTASES TO THE FAT ITSELF   NO SIGNIFICANT ABNORMALITY OF THE TERMINAL ILEUM OR APPENDIX   MMR--NORMAL FOR COLORECTAL CARCINOMA   Immunohistochemistry (IHC) Testing for Mismatch Repair (MMR) Proteins:   MLH1 - Intact nuclear expression   MSH2 - Intact nuclear expression   MSH6 - Intact nuclear expression   PMS2 - Intact nuclear expression   Background nonneoplastic tissue/internal control with intact nuclear   expression   IHC Interpretation   No loss of nuclear expression of MMR proteins: low probability of   microsatellite instability     Procedure:  Excision of right and transverse colon   Tumor Site:  transverse colon   Tumor Size:  3.2 cm   Macroscopic Tumor Perforation:  No   Histologic Type:  Adenocarcinoma   Histologic Grade:  Moderately differentiated   Tumor Extension:  Through the muscularis into pericolonic adipose tissue and   with tumor deposits extending to the most circumferential pericolonic tissue.   Margins:  The tumor is  9 cm from the distal margin and 44 from the proximal.   Treatment Effect:  No known presurgical therapy   Lymphovascular Invasion:  Identified in pericolonic tissue   Perineural Invasion:  Present   Tumor Deposits:   Present--approximately 12   Number of Lymph Nodes Examined:15   Number of Lymph Nodes Involved:  6   Pathologic Stage Classification: pT4a pN2A pM1a--liver metastases observed at     BRAF Mutation Analysis(V600E), Tumor  Result Summary:  NO MUTATION IDENTIFIED  Result:  Provided diagnosis: colorectal adenocarcinoma  BRAF status: Wild-type  KRAS Mutation Analysis, Colorectal  Result Summary:  NO MUTATION IDENTIFIED  Result:  Provided diagnosis: colorectal adenocarcinoma  KRAS status: Wild-type    7/15/20:  Transverse colon biopsy:  Invasive moderately differentiated colonic   adenocarcinoma       7/14/20:  Liver, biopsy:   Adenocarcinoma, moderately differentiated   Cytomorphologic features and immunohistochemical staining pattern supports   colonic primary       Subjective:    History of Present Illness: Ms. Cisneros is a 29 y.o. female who presented in July 2020 for evaluation and management of metastatic colon cancer. I had initially seen her during a hospitalization in July 2020.    Telemedicine visit:  The patient location is: home  The chief complaint leading to consultation is: colon cancer    Visit type: audio only    Face to Face time with patient: 15 minutes  25 minutes of total time spent on the encounter, which includes face to face time and non-face to face time preparing to see the patient (eg, review of tests), Obtaining and/or reviewing separately obtained history, Documenting clinical information in the electronic or other health record, Independently interpreting results (not separately reported) and communicating results to the patient/family/caregiver, or Care coordination (not separately reported).     Each patient to whom he or she provides medical services by telemedicine is:  (1) informed of the relationship between the physician and patient and the respective role of any other health care provider with respect to management of the patient; and (2) notified that he or she may decline to  receive medical services by telemedicine and may withdraw from such care at any time.    Notes: see below      - biopsy of liver mass (7/14/20) revealed metastatic colon adenocarcinoma.  - she underwent right hemicolectomy on 7/17/20. A port-a-cath was placed during that surgery.    Interval history:  - she presents for a follow-up appointment for her colon cancer.  - she was hospitalized from 8/26/20 - 9/9/20 for MRSA bacteremia. Her port-a-cath was removed. She is scheduled to continue antibiotics through the end of September 2020.  - today, she is doing okay. She endorses fatigue, generalized weakness.  - She denies shortness of breath, chest pain, nausea, vomiting, diarrhea.    Past medical, surgical, family, and social histories have been reviewed and updated below.    Past Medical History:   Past Medical History:   Diagnosis Date    Cancer     Colon CA with mets to the liver    Uterine cyst        Past Surgical History:   Past Surgical History:   Procedure Laterality Date    COLONOSCOPY N/A 7/15/2020    Procedure: COLONOSCOPY;  Surgeon: Hi Drake MD;  Location: [facility] ENDO;  Service: Endoscopy;  Laterality: N/A;    COLONOSCOPY W/ BIOPSIES      INSERTION OF TUNNELED CENTRAL VENOUS CATHETER (CVC) WITH SUBCUTANEOUS PORT N/A 7/17/2020    Procedure: INSERTION, PORT-A-CATH;  Surgeon: Armani Naqvi MD;  Location: [facility] OR;  Service: General;  Laterality: N/A;       Family History:   Family History   Problem Relation Age of Onset    Hypertension Mother     Hypertension Maternal Grandmother     Hypertension Maternal Grandfather     Hypertension Paternal Grandmother        Social History:  reports that she has never smoked. She has never used smokeless tobacco. She reports that she does not drink alcohol or use drugs.    Allergies:  Review of patient's allergies indicates:   Allergen Reactions    Sulfa (sulfonamide antibiotics)        Medications:  Current Outpatient Medications   Medication  Sig Dispense Refill    amLODIPine (NORVASC) 5 MG tablet Take 1 tablet (5 mg total) by mouth once daily. 30 tablet 11    docusate sodium (COLACE) 100 MG capsule Take 1 capsule (100 mg total) by mouth once daily. 30 capsule 0    folic acid (FOLVITE) 1 MG tablet Take 1 tablet (1 mg total) by mouth once daily. 100 tablet 3    lidocaine-prilocaine (EMLA) cream Apply topically As instructed. Apply to skin overlying port site 30 minutes before chemotherapy. 30 g 2    metoprolol tartrate (LOPRESSOR) 100 MG tablet Take 1 tablet (100 mg total) by mouth 2 (two) times daily. 60 tablet 11    oxyCODONE-acetaminophen (PERCOCET)  mg per tablet Take 1 tablet by mouth every 4 (four) hours as needed for Pain. 30 tablet 0    simethicone (MYLICON) 125 MG chewable tablet Take 1 tablet (125 mg total) by mouth every 6 (six) hours as needed for Flatulence.  0    spironolactone (ALDACTONE) 25 MG tablet Take 1 tablet (25 mg total) by mouth once daily. 30 tablet 11    vancomycin HCl in 5 % dextrose (VANCOMYCIN IN DEXTROSE 5 %) 1 gram/250 mL Soln Inject 250 mLs (1,000 mg total) into the vein every 12 (twelve) hours.       No current facility-administered medications for this visit.        Review of Systems   Constitutional: Positive for fatigue.   HENT: Negative for sore throat.    Eyes: Negative for visual disturbance.   Respiratory: Negative for cough and shortness of breath.    Cardiovascular: Negative for chest pain.   Gastrointestinal: Negative for constipation, diarrhea, nausea and vomiting.   Genitourinary: Negative for dysuria.   Musculoskeletal: Negative for back pain.   Skin: Negative for rash.   Neurological: Positive for weakness. Negative for headaches.   Hematological: Negative for adenopathy.   Psychiatric/Behavioral: The patient is not nervous/anxious.      ECOG Performance Status:   ECOG SCORE 1       Objective:      Vitals:   There were no vitals filed for this visit.  BMI: There is no height or weight on file  to calculate BMI.  Deferred since telemedicine visit.    Physical Exam  Deferred since telemedicine visit.    Laboratory Data:  Labs have been reviewed.    Lab Results   Component Value Date    WBC 12.42 09/09/2020    HGB 8.6 (L) 09/09/2020    HCT 27.7 (L) 09/09/2020    MCV 82 09/09/2020     (H) 09/09/2020       Imaging:     Colonoscopy (7/15/20):  Findings:        The perianal and digital rectal examinations were normal.        A fungating partially obstructing mass was found in the transverse        colon. The mass was circumferential. It could not be traversed by        the pediatric colonoscope. No bleeding was present on finding but        did bleed on contact. This was biopsied with a cold forceps for        histology. Verification of patient identification for the specimen        was done. Estimated blood loss was minimal.   Impression:             - Likely malignant partially obstructing tumor in the transverse colon. Biopsied.     CT abdomen/pelvis (7/14/20): I have personally reviewed the images  4.5 cm length annular lesion in the midline transverse colon suspicious for colonic neoplasm, with associated innumerable large hepatic metastatic lesions.  Fluid noted in the right colon suggesting impending diarrheal illness.  No evidence of bowel obstruction.      Assessment:       1. Adenocarcinoma of colon    2. Secondary malignancy of liver    3. Chronic neoplasm-related pain    4. Anemia in neoplastic disease    5. Chemotherapy-induced nausea and vomiting    6. Bacteremia due to Staphylococcus    7. MRSA bacteremia    8. Hyperbilirubinemia           Plan:     1. Adenocarcinoma of right colon - stage IV  - I have reviewed her chart/labs/imaging/pathology  - biopsy of liver mass (7/14/20) revealed metastatic colon adenocarcinoma.  - she underwent right hemicolectomy on 7/17/20.  - I and [facility] recommend FOLFOX + bevacizumab.  - BRAF wild-type, KRAS wild-type. I will message pathology about NRAS  status. If NRAS wild-type, I will consider using panitumumab instead of bevacizumab.  - The risks and benefits of chemotherapy were discussed, written information was given, and informed consent was obtained.  - port was placed at time of right hemicolectomy.  - unfortunately, due to elevated liver function tests, she was determined not to be a candidate for the clinical trial.  - following cycle #1, she was hospitalized from 8/26/20 - 9/9/20 for MRSA bacteremia. Her port-a-cath was removed. She is scheduled to continue antibiotics through the end of September 2020.  - labs on 9/9/20, on date of discharge, revealed hyperbilirubinemia and elevated liver function tests.  - I am a little concerned about postponing cycle #2 of FOLFOX until the end of September 2020 due to her intrahepatic metastases.  - check labs this week. If her liver function tests have worsened, we may need to figure out how to proceed with cycle #2 of FOLFOX while she is completing her antibiotic therapy.  - refer back to surgery for port placement.  - return to clinic in one week.    2. Chronic neoplasm-related pain  - controlled with norco.  - continue to monitor.    3. Chemotherapy-induced nausea and vomiting  - controlled. Continue ondansetron ODT as needed.  - continue to monitor    4. Anemia in neoplastic disease  - Labs have been reviewed. Hemoglobin is 8.6 g/dL.  - iron studies (8/10/20) suggest anemia in neoplastic disease / anemia of chronic disease.  - repeat labs this week.  - continue to monitor    5. Advance Care Planning   Power of   I initiated the process of advance care planning today and explained the importance of this process to the patient.  I introduced the concept of advance directives to the patient, as well. Then the patient received detailed information about the importance of designating a Health Care Power of  (HCPOA). She was also instructed to communicate with this person about their wishes for future  healthcare, should she become sick and lose decision-making capacity. The patient has not previously appointed a HCPOA. After our discussion, the patient has decided to complete a HCPOA and has appointed her mother Char Cisneros ([phone]) and aunt Alannah Mcintyre ([phone]).        - return to clinic in one week.    Jewel Arzola M.D.  Hematology/Oncology  [facility] - [address], [address]  [place] [address]  Phone: [phone]  Fax: [phone]

## 2020-09-14 NOTE — Clinical Note
1. Schedule labs CBC, CMP, mag, phos near her house on 9/16 or 9/17.  2. Schedule f/u virtual appt on 9/23/20.     Thanks!

## 2020-09-14 NOTE — Clinical Note
Good afternoon,    She's supposed to receive antibiotics through the end of September 2020. I'm somewhat concerned about her liver function, not sure we can wait that long before resuming chemotherapy. Would you be able to place another port in the next week or so?    That way, we can hit the ground running with chemo.    Thanks!

## 2020-09-16 NOTE — TELEPHONE ENCOUNTER
----- Message from Armani Naqvi MD sent at 9/15/2020  3:44 PM CDT -----  I booked this lady for a port sept 22 tuesday 9/16/20  4:11pm  Attempted to reach patient regarding preop covid testing. No answer. Message left via voicemail instructing patient on location, date, and time of test.

## 2020-09-21 PROBLEM — R00.0 TACHYCARDIA: Status: ACTIVE | Noted: 2020-01-01

## 2020-09-21 PROBLEM — D64.9 ANEMIA: Status: ACTIVE | Noted: 2020-01-01

## 2020-09-21 PROBLEM — R79.89 ELEVATED LFTS: Status: ACTIVE | Noted: 2020-01-01

## 2020-09-21 PROBLEM — I10 HYPERTENSION: Status: ACTIVE | Noted: 2020-01-01

## 2020-09-21 PROBLEM — N30.80 EMPHYSEMATOUS CYSTITIS: Status: ACTIVE | Noted: 2020-01-01

## 2020-09-21 PROBLEM — R93.41 ABNORMAL CT SCAN, BLADDER: Status: ACTIVE | Noted: 2020-01-01

## 2020-09-21 NOTE — ED NOTES
Patient is able to move all extremities freely, but refuses to help staff when moving her around. Patient is weak r/t cancer treatments. Patient has a flat affect and appears to be giving up.

## 2020-09-21 NOTE — SUBJECTIVE & OBJECTIVE
Past Medical History:   Diagnosis Date    Cancer     Colon CA with mets to the liver    Hypertension 9/21/2020    Uterine cyst        Past Surgical History:   Procedure Laterality Date    COLONOSCOPY N/A 7/15/2020    Procedure: COLONOSCOPY;  Surgeon: Hi Drake MD;  Location: [facility] ENDO;  Service: Endoscopy;  Laterality: N/A;    COLONOSCOPY W/ BIOPSIES      INSERTION OF TUNNELED CENTRAL VENOUS CATHETER (CVC) WITH SUBCUTANEOUS PORT N/A 7/17/2020    Procedure: INSERTION, PORT-A-CATH;  Surgeon: Armani Naqvi MD;  Location: [facility] OR;  Service: General;  Laterality: N/A;       Review of patient's allergies indicates:   Allergen Reactions    Sulfa (sulfonamide antibiotics)        Medications:  Continuous Infusions:  Scheduled Meds:   enoxaparin  40 mg Subcutaneous Q24H    metoprolol tartrate  50 mg Oral TID    oxyCODONE-acetaminophen  1 tablet Oral Once    vancomycin (VANCOCIN) IVPB  1,500 mg Intravenous Q12H     PRN Meds:morphine, ondansetron, sodium chloride 0.9%, Pharmacy to dose Vancomycin consult **AND** vancomycin - pharmacy to dose    Family History     Problem Relation (Age of Onset)    Hypertension Mother, Maternal Grandmother, Maternal Grandfather, Paternal Grandmother        Tobacco Use    Smoking status: Never Smoker    Smokeless tobacco: Never Used   Substance and Sexual Activity    Alcohol use: No    Drug use: No    Sexual activity: Yes     Partners: Male     Birth control/protection: None       Review of Systems   Constitutional: Positive for fatigue.   HENT: Negative for sore throat.    Eyes: Negative for visual disturbance.   Respiratory: Positive for shortness of breath. Negative for cough.    Cardiovascular: Negative for chest pain.   Gastrointestinal: Positive for abdominal pain. Negative for nausea and vomiting.   Genitourinary: Negative for dysuria.   Musculoskeletal: Negative for back pain.   Skin: Negative for rash.   Neurological: Positive for weakness.  "Negative for headaches.   Hematological: Negative for adenopathy.   Psychiatric/Behavioral: The patient is not nervous/anxious.      Objective:     Vital Signs (Most Recent):  Temp: 98.6 °F (37 °C) (09/21/20 1130)  Pulse: (!) 135 (09/21/20 1415)  Resp: (!) 32 (09/21/20 1415)  BP: 139/80 (09/21/20 1415)  SpO2: 100 % (09/21/20 1415) Vital Signs (24h Range):  Temp:  [98.2 °F (36.8 °C)-98.6 °F (37 °C)] 98.6 °F (37 °C)  Pulse:  [129-144] 135  Resp:  [12-44] 32  SpO2:  [99 %-100 %] 100 %  BP: (123-150)/(74-94) 139/80     Weight: 84.5 kg (186 lb 4.6 oz)  Body mass index is 31 kg/m².    Physical Exam  Vitals signs and nursing note reviewed.   Constitutional:       Appearance: She is well-developed.      Comments: Fatigued, appears weak   HENT:      Head: Normocephalic and atraumatic.   Eyes:      Pupils: Pupils are equal, round, and reactive to light.   Neck:      Musculoskeletal: Normal range of motion and neck supple.   Cardiovascular:      Rate and Rhythm: Regular rhythm. Tachycardia present.   Pulmonary:      Effort: Pulmonary effort is normal.      Breath sounds: Normal breath sounds.   Abdominal:      General: Bowel sounds are normal.      Palpations: Abdomen is soft.      Tenderness: There is abdominal tenderness.   Musculoskeletal: Normal range of motion.   Skin:     General: Skin is warm and dry.   Neurological:      Mental Status: She is alert.      Comments: Drowsy, answers questions in a whisper, unclear answers         Review of Symptoms    Symptom Assessment (ESAS 0-10 Scale)  Pain:  0  Dyspnea:  2  Anxiety:  0  Nausea:  0  Depression:  5  Anorexia:  0  Fatigue:  5  Insomnia:  0  Restlessness:  0  Agitation:  0     CAM / Delirium:  Negative  Constipation:  Negative  Diarrhea:  Negative    Bowel Management Plan (BMP):  Yes            ECOG Performance Status Grade:  1 - Ambulates, capable of light work    Living Arrangements:  Lives with family    Spiritual:  F - Sania and Belief:  "I have prayed to God. I want " "to live."      Advance Care Planning   Advance Care Planning       Significant Labs: None  CBC:   Recent Labs   Lab 09/21/20  0517   WBC 9.36   HGB 7.0*   HCT 22.3*   MCV 85        BMP:  Recent Labs   Lab 09/21/20 0517      *   K 4.0      CO2 14*   BUN 13   CREATININE 0.7   CALCIUM 7.9*     LFT:  Lab Results   Component Value Date     (H) 09/21/2020    ALKPHOS 372 (H) 09/21/2020    BILITOT 2.8 (H) 09/21/2020     Albumin:   Albumin   Date Value Ref Range Status   09/21/2020 2.3 (L) 3.5 - 5.2 g/dL Final     Protein:   Total Protein   Date Value Ref Range Status   09/21/2020 8.2 6.0 - 8.4 g/dL Final     Lactic acid:   Lab Results   Component Value Date    LACTATE 3.1 (H) 09/21/2020    LACTATE 4.3 (HH) 09/21/2020       Significant Imaging: None     Recommendations  Continue with treatment  Full code  Lexapro 10mg  for depression  Palliative medicine clinic appointment upon discharge.       "

## 2020-09-21 NOTE — ASSESSMENT & PLAN NOTE
Metastatic disease  CT abdomen/pelvis: Extensive abnormal appearance of the liver again noted consistent with hepatic metastatic disease with evidence for progression.  Mild free fluid of the abdomen and pelvis noted, with progression  Consult hematology  Consult palliative care

## 2020-09-21 NOTE — ASSESSMENT & PLAN NOTE
- CT scan (9/21/20) reveals progression of disease. She had only received one cycle of FOLFOX. Subsequent cycles of FOLFOX have been delayed due to MRSA bacteremia.  - I am concerned that the hepatic burden of metastases is beginning to cause liver failure. While waiting for her to complete antibiotic therapy for her MRSA bacteremia, her disease has worsened.  - check ammonia.  - follow up blood cultures. Treat underlying issues that prompted her presentation to emergency room. However, her underlying colon cancer is likely causing the majority of her issues.  - agree with palliative care consult.  - the window for receiving more chemotherapy is beginning to close. I think that if she is unable to get chemotherapy within the next 1-2 weeks, her liver function will worsen to a point at which she will be unable to receive chemotherapy.

## 2020-09-21 NOTE — ASSESSMENT & PLAN NOTE
- UA non-infectious, 3 RBCs, rare bacteria. Culture pending.  -  Air in bladder could be secondary to catheterized urine sample obtained before CT scan done. Follow-up on urine culture.   - Microscopic hematuria present. If negative urine culture patient would meet criteria for workup with CT urogram and cystoscopy, which would be beneficial to further assess for possible fistula. Workup can be completed outpatient.

## 2020-09-21 NOTE — PLAN OF CARE
The Sw faxed the pt's info via [employer] to her current hh agency [facility]. The pt was current with [facility] and [facility].        09/21/20 1810   Post-Acute Status   Post-Acute Authorization Home Health   Post-Acute Placement Status Additional Clinical Requested   Discharge Plan   Discharge Plan [facility]

## 2020-09-21 NOTE — HPI
Huy Cisneros is a 28 y/o F with PMH of Cancer of the colon with liver mets, tachycardia, HTN, and Uterine cyst, present to the ED with RLQ abdominal pain, she denies fever, chills, nausea, vomiting, constipation dysuria, urgency or frequency.   In the ED Lactic acid 5.0, wbc wnl, CT abdomen/ pelvis described metastatic tumor progression with Mild urinary bladder wall thickening may relate to incomplete distention however there is an air bubble within the urinary bladder that may be due to instrumentation however correlation for UTI/cystitis is needed

## 2020-09-21 NOTE — SUBJECTIVE & OBJECTIVE
Past Medical History:   Diagnosis Date    Cancer     Colon CA with mets to the liver    Uterine cyst        Past Surgical History:   Procedure Laterality Date    COLONOSCOPY N/A 7/15/2020    Procedure: COLONOSCOPY;  Surgeon: Hi Drake MD;  Location: [facility] ENDO;  Service: Endoscopy;  Laterality: N/A;    COLONOSCOPY W/ BIOPSIES      INSERTION OF TUNNELED CENTRAL VENOUS CATHETER (CVC) WITH SUBCUTANEOUS PORT N/A 7/17/2020    Procedure: INSERTION, PORT-A-CATH;  Surgeon: Armani Naqvi MD;  Location: [facility] OR;  Service: General;  Laterality: N/A;       Review of patient's allergies indicates:   Allergen Reactions    Sulfa (sulfonamide antibiotics)        No current facility-administered medications on file prior to encounter.      Current Outpatient Medications on File Prior to Encounter   Medication Sig    amLODIPine (NORVASC) 5 MG tablet Take 1 tablet (5 mg total) by mouth once daily.    docusate sodium (COLACE) 100 MG capsule Take 1 capsule (100 mg total) by mouth once daily.    folic acid (FOLVITE) 1 MG tablet Take 1 tablet (1 mg total) by mouth once daily.    lidocaine-prilocaine (EMLA) cream Apply topically As instructed. Apply to skin overlying port site 30 minutes before chemotherapy.    metoprolol tartrate (LOPRESSOR) 100 MG tablet Take 1 tablet (100 mg total) by mouth 2 (two) times daily.    oxyCODONE-acetaminophen (PERCOCET)  mg per tablet Take 1 tablet by mouth every 4 (four) hours as needed for Pain.    simethicone (MYLICON) 125 MG chewable tablet Take 1 tablet (125 mg total) by mouth every 6 (six) hours as needed for Flatulence.    spironolactone (ALDACTONE) 25 MG tablet Take 1 tablet (25 mg total) by mouth once daily.    vancomycin HCl in 5 % dextrose (VANCOMYCIN IN DEXTROSE 5 %) 1 gram/250 mL Soln Inject 250 mLs (1,000 mg total) into the vein every 12 (twelve) hours.     Family History     Problem Relation (Age of Onset)    Hypertension Mother, Maternal Grandmother,  Maternal Grandfather, Paternal Grandmother        Tobacco Use    Smoking status: Never Smoker    Smokeless tobacco: Never Used   Substance and Sexual Activity    Alcohol use: No    Drug use: No    Sexual activity: Yes     Partners: Male     Birth control/protection: None     Review of Systems   Constitutional: Negative for fatigue and fever.   HENT: Negative for congestion.    Eyes: Negative for photophobia.   Respiratory: Negative for cough and chest tightness.    Cardiovascular: Negative for chest pain.   Gastrointestinal: Positive for abdominal distention. Negative for abdominal pain, anal bleeding, blood in stool, constipation, diarrhea, nausea and vomiting.   Endocrine: Negative for polyphagia and polyuria.   Genitourinary: Negative for dysuria.   Musculoskeletal: Negative for arthralgias.   Skin: Negative for color change and wound.   Neurological: Negative for dizziness and weakness.   Psychiatric/Behavioral: Negative for agitation and behavioral problems.     Objective:     Vital Signs (Most Recent):  Temp: 98.6 °F (37 °C) (09/21/20 0730)  Pulse: (!) 130 (09/21/20 1000)  Resp: (!) 35 (09/21/20 1000)  BP: (!) 144/84 (09/21/20 1000)  SpO2: 100 % (09/21/20 1000) Vital Signs (24h Range):  Temp:  [98.2 °F (36.8 °C)-98.6 °F (37 °C)] 98.6 °F (37 °C)  Pulse:  [129-144] 130  Resp:  [12-44] 35  SpO2:  [99 %-100 %] 100 %  BP: (123-150)/(74-94) 144/84     Weight: 84.5 kg (186 lb 4.6 oz)  Body mass index is 31 kg/m².    Physical Exam  Constitutional:       Appearance: She is well-developed.   HENT:      Head: Normocephalic and atraumatic.      Mouth/Throat:      Mouth: Mucous membranes are moist.   Eyes:      Pupils: Pupils are equal, round, and reactive to light.   Neck:      Musculoskeletal: Neck supple.   Cardiovascular:      Rate and Rhythm: Regular rhythm. Tachycardia present.      Heart sounds: Normal heart sounds. No murmur. No friction rub. No gallop.    Pulmonary:      Breath sounds: Normal breath sounds.    Abdominal:      General: Bowel sounds are normal. There is no distension.      Palpations: Abdomen is soft. There is no mass.      Tenderness: There is abdominal tenderness. There is guarding. There is no rebound.   Musculoskeletal:         General: No tenderness.   Skin:     General: Skin is warm.   Neurological:      Mental Status: She is alert and oriented to person, place, and time.      Deep Tendon Reflexes: Reflexes are normal and symmetric.   Psychiatric:         Behavior: Behavior normal.           CRANIAL NERVES     CN III, IV, VI   Pupils are equal, round, and reactive to light.       Significant Labs:   ABGs: No results for input(s): PH, PCO2, HCO3, POCSATURATED, BE, TOTALHB, COHB, METHB, O2HB, POCFIO2 in the last 48 hours.  Blood Culture:   Recent Labs   Lab 09/21/20 0043 09/21/20  0131   LABBLOO No Growth to date No Growth to date     CBC:   Recent Labs   Lab 09/21/20 0044 09/21/20  0517   WBC 11.63 9.36   HGB 7.8* 7.0*   HCT 24.8* 22.3*    294     CMP:   Recent Labs   Lab 09/21/20 0044 09/21/20  0517   * 131*   K 4.4 4.0   CL 98 102   CO2 16* 14*   GLU 87 105   BUN 14 13   CREATININE 0.8 0.7   CALCIUM 8.9 7.9*   PROT 8.2  --    ALBUMIN 2.3*  --    BILITOT 2.8*  --    ALKPHOS 372*  --    *  --    ALT 58*  --    ANIONGAP 17* 15   EGFRNONAA >60 >60     Cardiac Markers: No results for input(s): CKMB, MYOGLOBIN, BNP, TROPISTAT in the last 48 hours.  Coagulation: No results for input(s): PT, INR, APTT in the last 48 hours.  Lactic Acid:   Recent Labs   Lab 09/21/20 0044 09/21/20  0517   LACTATE 5.0* 4.3*     Lipase: No results for input(s): LIPASE in the last 48 hours.  Lipid Panel: No results for input(s): CHOL, HDL, LDLCALC, TRIG, CHOLHDL in the last 48 hours.  Magnesium: No results for input(s): MG in the last 48 hours.  Prealbumin: No results for input(s): PREALBUMIN in the last 48 hours.  Troponin: No results for input(s): TROPONINI in the last 48 hours.  TSH:   Recent Labs    Lab 07/14/20  0959   TSH 1.876     Urine Culture: No results for input(s): LABURIN in the last 48 hours.  Urine Studies:   Recent Labs   Lab 09/21/20  0049   COLORU Yellow   APPEARANCEUA Clear   PHUR 7.0   SPECGRAV 1.020   PROTEINUA 1+*   GLUCUA Negative   KETONESU Negative   BILIRUBINUA 1+*   OCCULTUA Trace*   NITRITE Negative   UROBILINOGEN Negative   LEUKOCYTESUR Negative   RBCUA 3   WBCUA 1   BACTERIA Rare   HYALINECASTS 0     Imaging Results          CT Abdomen Pelvis With Contrast (Final result)  Result time 09/21/20 02:51:18    Final result by Wale Carmona MD (09/21/20 02:51:18)                 Impression:      Extensive abnormal appearance of the liver again noted consistent with hepatic metastatic disease with evidence for progression.    Mild free fluid of the abdomen and pelvis noted, with progression.    Multiple pulmonary nodules and abnormal opacities some of which demonstrate cavitary appearance may relate to pulmonary metastatic disease, or as previously discussed a component of septic emboli, this is noted on prior examinations.    Cholelithiasis with mild pericholecystic fluid without pericholecystic inflammatory appearance, this pericholecystic fluid may relate to the mild free fluid of the abdomen and pelvis, correlation for gallbladder symptomatology is otherwise needed.    Mild urinary bladder wall thickening may relate to incomplete distention however there is an air bubble within the urinary bladder that may be due to instrumentation however correlation for UTI/cystitis is needed.    Mildly prominent small bowel loops not thought to represent obstruction, may relate to ileus however if there is persistent or worsening symptomatology additional follow-up to exclude developing obstruction recommended.      Electronically signed by: Wale Carmona  Date:    09/21/2020  Time:    02:51             Narrative:    EXAMINATION:  CT ABDOMEN PELVIS WITH CONTRAST    CLINICAL HISTORY:  Abdominal  pain, fever;    TECHNIQUE:  Low dose axial images, sagittal and coronal reformations were obtained from the lung bases to the pubic symphysis following the IV administration of 100 mL of Omnipaque 350 .  Oral contrast was not given.    COMPARISON:  CT examination chest abdomen pelvis September 2, 2020    FINDINGS:  Single-phase CT examination of the abdomen and pelvis was performed.  Intravenous contrast was utilized.  Oral contrast was not utilized.    The lung bases demonstrate multiple pulmonary nodules and mass lesions, some of which demonstrate cavitary abnormality, as seen on prior chest CT.  There is minimal pleural fluid noted.  The stomach is decompressed, with mild fluid and air in the lumen, nonspecific appearance.  The liver is enlarged and there are multiple hepatic mass lesions consistent with hepatic metastatic disease again noted, there is appearance of progression as the lesions appears somewhat more prominent.  There is abnormal appearance of the gallbladder, there is intraluminal complexity likely relating to cholelithiasis, there is pericholecystic fluid, without significant pericholecystic inflammatory change, and although pericholecystic fluid may relate to gallbladder pathology, this may relate to mild free fluid of the abdomen and pelvis, with some free fluid adjacent to the inferior aspect of the right lobe of the liver, and along the right pericolic gutter as well as free fluid in the pelvis.  The volume of free fluid has increased when compared to the prior study.  There is suspected heterogeneous enlarged lymph node at the richard hepatis again noted.  There is no evidence for peripancreatic inflammatory change, there is no evidence for abnormal pancreatic or biliary ductal dilatation.  The portal venous structures demonstrate appropriate opacification.  The spleen and adrenal glands appear stable.  There is no evidence for hydronephrosis or obstructive uropathy bilaterally.  The abdominal  aorta demonstrates appropriate opacification.  The uterus and adnexa appear appropriate without evidence for acute process, free fluid of the pelvis noted as discussed above.  The urinary bladder is incompletely distended, mild wall thickening may relate to incomplete distention, there is however an air bubble within the urinary bladder, this may relate to instrumentation however can be seen with infection, clinical and historical correlation for UTI/cystitis is needed.    Postoperative change of the bowel is noted, there are some mildly prominent small bowel loops, however not thought to represent obstruction may represent ileus, there is no evidence for inflammatory or obstructive process of the colon.  If there is persistent or worsening symptomatology additional follow-up recommended to exclude the possibility of developing partial obstruction.  There is no evidence for free intraperitoneal air.  The visualized osseous structures appear intact.                               X-Ray Chest AP Portable (Final result)  Result time 09/21/20 01:16:40    Final result by Federico Barron MD (09/21/20 01:16:40)                 Impression:      Removal of Kana catheter from right jugular approach now with PICC line tip over the innominate/subclavian vein on the right.    Improved patchy lung airspace opacities most significantly in the left lung base with decreased pleural fluid.      Electronically signed by: Federico Barron  Date:    09/21/2020  Time:    01:16             Narrative:    EXAMINATION:  XR CHEST AP PORTABLE    CLINICAL HISTORY:  Sepsis;    TECHNIQUE:  Single frontal view of the chest was performed.    COMPARISON:  Chest x-ray, 09/02/2020 at 17:33 hours    FINDINGS:  Kana dialysis catheter is been removed.  PICC line tip previously at the atrial caval junction is now overlying the innominate vein on the right.    The hazy opacities in the periphery a little spaces of the lungs most confluent in the  left retrocardiac region are mildly to moderately improved.  Pleural effusion on the left is less apparent.  There is no pneumothorax.  The heart mediastinal contours remain stable.                                Significant Imaging: I have reviewed all pertinent imaging results/findings within the past 24 hours.

## 2020-09-21 NOTE — SUBJECTIVE & OBJECTIVE
Past Medical History:   Diagnosis Date    Cancer     Colon CA with mets to the liver    Hypertension 9/21/2020    Uterine cyst        Past Surgical History:   Procedure Laterality Date    COLONOSCOPY N/A 7/15/2020    Procedure: COLONOSCOPY;  Surgeon: Hi Drake MD;  Location: [facility] ENDO;  Service: Endoscopy;  Laterality: N/A;    COLONOSCOPY W/ BIOPSIES      INSERTION OF TUNNELED CENTRAL VENOUS CATHETER (CVC) WITH SUBCUTANEOUS PORT N/A 7/17/2020    Procedure: INSERTION, PORT-A-CATH;  Surgeon: Armani Naqvi MD;  Location: [facility] OR;  Service: General;  Laterality: N/A;       Review of patient's allergies indicates:   Allergen Reactions    Sulfa (sulfonamide antibiotics)        Family History     Problem Relation (Age of Onset)    Hypertension Mother, Maternal Grandmother, Maternal Grandfather, Paternal Grandmother          Tobacco Use    Smoking status: Never Smoker    Smokeless tobacco: Never Used   Substance and Sexual Activity    Alcohol use: No    Drug use: No    Sexual activity: Yes     Partners: Male     Birth control/protection: None       Review of Systems   Constitutional: Negative for fever.   HENT: Negative for facial swelling.    Eyes: Negative for visual disturbance.   Cardiovascular: Negative for chest pain.   Genitourinary: Negative for difficulty urinating, dysuria, frequency and hematuria.   Neurological: Negative for facial asymmetry.   Psychiatric/Behavioral: Negative for agitation.       Objective:     Temp:  [98.2 °F (36.8 °C)-98.6 °F (37 °C)] 98.5 °F (36.9 °C)  Pulse:  [129-144] 130  Resp:  [12-44] 31  SpO2:  [99 %-100 %] 100 %  BP: (123-150)/(74-94) 133/83     Body mass index is 31 kg/m².           Drains     Drain            Female External Urinary Catheter 09/02/20 1226 19 days                Physical Exam   Constitutional: She is oriented to person, place, and time.   HENT:   Head: Normocephalic and atraumatic.   Neck: Neck supple.   Pulmonary/Chest: Effort  normal.   Neurological: She is alert and oriented to person, place, and time.   Skin: Skin is dry.     Psychiatric: Mood normal.       Significant Labs:    BMP:  Recent Labs   Lab 09/21/20 0044 09/21/20  0517   * 131*   K 4.4 4.0   CL 98 102   CO2 16* 14*   BUN 14 13   CREATININE 0.8 0.7   CALCIUM 8.9 7.9*       CBC:  Recent Labs   Lab 09/21/20 0044 09/21/20 0517   WBC 11.63 9.36   HGB 7.8* 7.0*   HCT 24.8* 22.3*    294       All pertinent labs results from the past 24 hours have been reviewed.    Significant Imaging:  All pertinent imaging results/findings from the past 24 hours have been reviewed.

## 2020-09-21 NOTE — CONSULTS
[facility] ICU 5th Floor  Urology  Consult Note    Patient Name: Huy Cisneros  MRN: [id number]  Admission Date: 9/20/2020  Hospital Length of Stay: 0   Code Status: Full Code   Attending Provider: Maryana Patel*   Consulting Provider: Ute Canchola NP  Primary Care Physician: Primary Doctor No  Principal Problem:Sepsis    Inpatient consult to Urology  Consult performed by: Ute Canchola NP  Consult ordered by: Maryana Patel MD  Assessment/Recommendations: - UA non-infectious, 3 RBCs, rare bacteria. Culture pending.  -  Air in bladder could be secondary to catheterized urine sample obtained before CT scan done. Follow-up on urine culture.   - Microscopic hematuria present. If negative urine culture patient would meet criteria for workup with CT urogram and cystoscopy, which would be beneficial to further assess for possible fistula. Workup can be completed outpatient.           Subjective:     HPI:  Huy Cisneros is a 30 yo female with a past medical history of colon cancer with liver mets, tachycardia, HTN, uterine cyst. She presented to the ED with complaints of RLQ abdominal pain and SOB. CT abdomen pelvis demonstrating mild urinary bladder wall thickening and air bubble within the urinary bladder. Patient denies dysuria, urgency, frequency, hematuria or pneumaturia.     Past Medical History:   Diagnosis Date    Cancer     Colon CA with mets to the liver    Hypertension 9/21/2020    Uterine cyst        Past Surgical History:   Procedure Laterality Date    COLONOSCOPY N/A 7/15/2020    Procedure: COLONOSCOPY;  Surgeon: Hi Drake MD;  Location: [facility] ENDO;  Service: Endoscopy;  Laterality: N/A;    COLONOSCOPY W/ BIOPSIES      INSERTION OF TUNNELED CENTRAL VENOUS CATHETER (CVC) WITH SUBCUTANEOUS PORT N/A 7/17/2020    Procedure: INSERTION, PORT-A-CATH;  Surgeon: Armani Naqvi MD;  Location: [facility] OR;  Service: General;   Laterality: N/A;       Review of patient's allergies indicates:   Allergen Reactions    Sulfa (sulfonamide antibiotics)        Family History     Problem Relation (Age of Onset)    Hypertension Mother, Maternal Grandmother, Maternal Grandfather, Paternal Grandmother          Tobacco Use    Smoking status: Never Smoker    Smokeless tobacco: Never Used   Substance and Sexual Activity    Alcohol use: No    Drug use: No    Sexual activity: Yes     Partners: Male     Birth control/protection: None       Review of Systems   Constitutional: Negative for fever.   HENT: Negative for facial swelling.    Eyes: Negative for visual disturbance.   Cardiovascular: Negative for chest pain.   Genitourinary: Negative for difficulty urinating, dysuria, frequency and hematuria.   Neurological: Negative for facial asymmetry.   Psychiatric/Behavioral: Negative for agitation.       Objective:     Temp:  [98.2 °F (36.8 °C)-98.6 °F (37 °C)] 98.5 °F (36.9 °C)  Pulse:  [129-144] 130  Resp:  [12-44] 31  SpO2:  [99 %-100 %] 100 %  BP: (123-150)/(74-94) 133/83     Body mass index is 31 kg/m².           Drains     Drain            Female External Urinary Catheter 09/02/20 1226 19 days                Physical Exam   Constitutional: She is oriented to person, place, and time.   HENT:   Head: Normocephalic and atraumatic.   Neck: Neck supple.   Pulmonary/Chest: Effort normal.   Neurological: She is alert and oriented to person, place, and time.   Skin: Skin is dry.     Psychiatric: Mood normal.       Significant Labs:    BMP:  Recent Labs   Lab 09/21/20  0044 09/21/20  0517   * 131*   K 4.4 4.0   CL 98 102   CO2 16* 14*   BUN 14 13   CREATININE 0.8 0.7   CALCIUM 8.9 7.9*       CBC:  Recent Labs   Lab 09/21/20  0044 09/21/20  0517   WBC 11.63 9.36   HGB 7.8* 7.0*   HCT 24.8* 22.3*    294       All pertinent labs results from the past 24 hours have been reviewed.    Significant Imaging:  All pertinent imaging results/findings from  the past 24 hours have been reviewed.                    Assessment and Plan:     Abnormal CT scan, bladder  - UA non-infectious, 3 RBCs, rare bacteria. Culture pending.  -  Air in bladder could be secondary to catheterized urine sample obtained before CT scan done. Follow-up on urine culture.   - Microscopic hematuria present. If negative urine culture patient would meet criteria for workup with CT urogram and cystoscopy, which would be beneficial to further assess for possible fistula. Workup can be completed outpatient.         VTE Risk Mitigation (From admission, onward)         Ordered     enoxaparin injection 40 mg  Every 24 hours      09/21/20 1405     IP VTE HIGH RISK PATIENT  Once      09/21/20 0409     Place sequential compression device  Until discontinued      09/21/20 0409                Thank you for your consult.      Ute Canchola NP  Urology  [facility] ICU 5th Floor

## 2020-09-21 NOTE — H&P
[facility] ICU 5th Floor  Hospital Medicine  History & Physical    Patient Name: Huy Cisneros  MRN: [id number]  Admission Date: 9/20/2020  Attending Physician: Maryana Patel MD  Primary Care Provider: Primary Doctor No         Patient information was obtained from patient and ER records.     Subjective:     Principal Problem:Sepsis    Chief Complaint:   Chief Complaint   Patient presents with    Shortness of Breath     Complaining of SOB and abdominal pain.  Stage 4 cancer to the colon and liver per EMT.          HPI: Huy Cisneros is a 28 y/o F with PMH of Cancer of the colon with liver mets, tachycardia, HTN, and Uterine cyst, present to the ED with RLQ abdominal pain, she denies fever, chills, nausea, vomiting, constipation dysuria, urgency or frequency.   In the ED Lactic acid 5.0, wbc wnl, CT abdomen/ pelvis described metastatic tumor progression with Mild urinary bladder wall thickening may relate to incomplete distention however there is an air bubble within the urinary bladder that may be due to instrumentation however correlation for UTI/cystitis is needed    Past Medical History:   Diagnosis Date    Cancer     Colon CA with mets to the liver    Uterine cyst        Past Surgical History:   Procedure Laterality Date    COLONOSCOPY N/A 7/15/2020    Procedure: COLONOSCOPY;  Surgeon: Hi Drake MD;  Location: [facility];  Service: Endoscopy;  Laterality: N/A;    COLONOSCOPY W/ BIOPSIES      INSERTION OF TUNNELED CENTRAL VENOUS CATHETER (CVC) WITH SUBCUTANEOUS PORT N/A 7/17/2020    Procedure: INSERTION, PORT-A-CATH;  Surgeon: Armani Naqvi MD;  Location: [facility] OR;  Service: General;  Laterality: N/A;       Review of patient's allergies indicates:   Allergen Reactions    Sulfa (sulfonamide antibiotics)        No current facility-administered medications on file prior to encounter.      Current Outpatient Medications on File Prior to Encounter    Medication Sig    amLODIPine (NORVASC) 5 MG tablet Take 1 tablet (5 mg total) by mouth once daily.    docusate sodium (COLACE) 100 MG capsule Take 1 capsule (100 mg total) by mouth once daily.    folic acid (FOLVITE) 1 MG tablet Take 1 tablet (1 mg total) by mouth once daily.    lidocaine-prilocaine (EMLA) cream Apply topically As instructed. Apply to skin overlying port site 30 minutes before chemotherapy.    metoprolol tartrate (LOPRESSOR) 100 MG tablet Take 1 tablet (100 mg total) by mouth 2 (two) times daily.    oxyCODONE-acetaminophen (PERCOCET)  mg per tablet Take 1 tablet by mouth every 4 (four) hours as needed for Pain.    simethicone (MYLICON) 125 MG chewable tablet Take 1 tablet (125 mg total) by mouth every 6 (six) hours as needed for Flatulence.    spironolactone (ALDACTONE) 25 MG tablet Take 1 tablet (25 mg total) by mouth once daily.    vancomycin HCl in 5 % dextrose (VANCOMYCIN IN DEXTROSE 5 %) 1 gram/250 mL Soln Inject 250 mLs (1,000 mg total) into the vein every 12 (twelve) hours.     Family History     Problem Relation (Age of Onset)    Hypertension Mother, Maternal Grandmother, Maternal Grandfather, Paternal Grandmother        Tobacco Use    Smoking status: Never Smoker    Smokeless tobacco: Never Used   Substance and Sexual Activity    Alcohol use: No    Drug use: No    Sexual activity: Yes     Partners: Male     Birth control/protection: None     Review of Systems   Constitutional: Negative for fatigue and fever.   HENT: Negative for congestion.    Eyes: Negative for photophobia.   Respiratory: Negative for cough and chest tightness.    Cardiovascular: Negative for chest pain.   Gastrointestinal: Positive for abdominal distention. Negative for abdominal pain, anal bleeding, blood in stool, constipation, diarrhea, nausea and vomiting.   Endocrine: Negative for polyphagia and polyuria.   Genitourinary: Negative for dysuria.   Musculoskeletal: Negative for arthralgias.   Skin:  Negative for color change and wound.   Neurological: Negative for dizziness and weakness.   Psychiatric/Behavioral: Negative for agitation and behavioral problems.     Objective:     Vital Signs (Most Recent):  Temp: 98.6 °F (37 °C) (09/21/20 0730)  Pulse: (!) 130 (09/21/20 1000)  Resp: (!) 35 (09/21/20 1000)  BP: (!) 144/84 (09/21/20 1000)  SpO2: 100 % (09/21/20 1000) Vital Signs (24h Range):  Temp:  [98.2 °F (36.8 °C)-98.6 °F (37 °C)] 98.6 °F (37 °C)  Pulse:  [129-144] 130  Resp:  [12-44] 35  SpO2:  [99 %-100 %] 100 %  BP: (123-150)/(74-94) 144/84     Weight: 84.5 kg (186 lb 4.6 oz)  Body mass index is 31 kg/m².    Physical Exam  Constitutional:       Appearance: She is well-developed.   HENT:      Head: Normocephalic and atraumatic.      Mouth/Throat:      Mouth: Mucous membranes are moist.   Eyes:      Pupils: Pupils are equal, round, and reactive to light.   Neck:      Musculoskeletal: Neck supple.   Cardiovascular:      Rate and Rhythm: Regular rhythm. Tachycardia present.      Heart sounds: Normal heart sounds. No murmur. No friction rub. No gallop.    Pulmonary:      Breath sounds: Normal breath sounds.   Abdominal:      General: Bowel sounds are normal. There is no distension.      Palpations: Abdomen is soft. There is no mass.      Tenderness: There is abdominal tenderness. There is guarding. There is no rebound.   Musculoskeletal:         General: No tenderness.   Skin:     General: Skin is warm.   Neurological:      Mental Status: She is alert and oriented to person, place, and time.      Deep Tendon Reflexes: Reflexes are normal and symmetric.   Psychiatric:         Behavior: Behavior normal.           CRANIAL NERVES     CN III, IV, VI   Pupils are equal, round, and reactive to light.       Significant Labs:   ABGs: No results for input(s): PH, PCO2, HCO3, POCSATURATED, BE, TOTALHB, COHB, METHB, O2HB, POCFIO2 in the last 48 hours.  Blood Culture:   Recent Labs   Lab 09/21/20  0043 09/21/20  0131    LABBLOO No Growth to date No Growth to date     CBC:   Recent Labs   Lab 09/21/20 0044 09/21/20  0517   WBC 11.63 9.36   HGB 7.8* 7.0*   HCT 24.8* 22.3*    294     CMP:   Recent Labs   Lab 09/21/20 0044 09/21/20  0517   * 131*   K 4.4 4.0   CL 98 102   CO2 16* 14*   GLU 87 105   BUN 14 13   CREATININE 0.8 0.7   CALCIUM 8.9 7.9*   PROT 8.2  --    ALBUMIN 2.3*  --    BILITOT 2.8*  --    ALKPHOS 372*  --    *  --    ALT 58*  --    ANIONGAP 17* 15   EGFRNONAA >60 >60     Cardiac Markers: No results for input(s): CKMB, MYOGLOBIN, BNP, TROPISTAT in the last 48 hours.  Coagulation: No results for input(s): PT, INR, APTT in the last 48 hours.  Lactic Acid:   Recent Labs   Lab 09/21/20 0044 09/21/20 0517   LACTATE 5.0* 4.3*     Lipase: No results for input(s): LIPASE in the last 48 hours.  Lipid Panel: No results for input(s): CHOL, HDL, LDLCALC, TRIG, CHOLHDL in the last 48 hours.  Magnesium: No results for input(s): MG in the last 48 hours.  Prealbumin: No results for input(s): PREALBUMIN in the last 48 hours.  Troponin: No results for input(s): TROPONINI in the last 48 hours.  TSH:   Recent Labs   Lab 07/14/20  0959   TSH 1.876     Urine Culture: No results for input(s): LABURIN in the last 48 hours.  Urine Studies:   Recent Labs   Lab 09/21/20  0049   COLORU Yellow   APPEARANCEUA Clear   PHUR 7.0   SPECGRAV 1.020   PROTEINUA 1+*   GLUCUA Negative   KETONESU Negative   BILIRUBINUA 1+*   OCCULTUA Trace*   NITRITE Negative   UROBILINOGEN Negative   LEUKOCYTESUR Negative   RBCUA 3   WBCUA 1   BACTERIA Rare   HYALINECASTS 0     Imaging Results          CT Abdomen Pelvis With Contrast (Final result)  Result time 09/21/20 02:51:18    Final result by Wale Carmona MD (09/21/20 02:51:18)                 Impression:      Extensive abnormal appearance of the liver again noted consistent with hepatic metastatic disease with evidence for progression.    Mild free fluid of the abdomen and pelvis noted,  with progression.    Multiple pulmonary nodules and abnormal opacities some of which demonstrate cavitary appearance may relate to pulmonary metastatic disease, or as previously discussed a component of septic emboli, this is noted on prior examinations.    Cholelithiasis with mild pericholecystic fluid without pericholecystic inflammatory appearance, this pericholecystic fluid may relate to the mild free fluid of the abdomen and pelvis, correlation for gallbladder symptomatology is otherwise needed.    Mild urinary bladder wall thickening may relate to incomplete distention however there is an air bubble within the urinary bladder that may be due to instrumentation however correlation for UTI/cystitis is needed.    Mildly prominent small bowel loops not thought to represent obstruction, may relate to ileus however if there is persistent or worsening symptomatology additional follow-up to exclude developing obstruction recommended.      Electronically signed by: aWle Carmona  Date:    09/21/2020  Time:    02:51             Narrative:    EXAMINATION:  CT ABDOMEN PELVIS WITH CONTRAST    CLINICAL HISTORY:  Abdominal pain, fever;    TECHNIQUE:  Low dose axial images, sagittal and coronal reformations were obtained from the lung bases to the pubic symphysis following the IV administration of 100 mL of Omnipaque 350 .  Oral contrast was not given.    COMPARISON:  CT examination chest abdomen pelvis September 2, 2020    FINDINGS:  Single-phase CT examination of the abdomen and pelvis was performed.  Intravenous contrast was utilized.  Oral contrast was not utilized.    The lung bases demonstrate multiple pulmonary nodules and mass lesions, some of which demonstrate cavitary abnormality, as seen on prior chest CT.  There is minimal pleural fluid noted.  The stomach is decompressed, with mild fluid and air in the lumen, nonspecific appearance.  The liver is enlarged and there are multiple hepatic mass lesions consistent  with hepatic metastatic disease again noted, there is appearance of progression as the lesions appears somewhat more prominent.  There is abnormal appearance of the gallbladder, there is intraluminal complexity likely relating to cholelithiasis, there is pericholecystic fluid, without significant pericholecystic inflammatory change, and although pericholecystic fluid may relate to gallbladder pathology, this may relate to mild free fluid of the abdomen and pelvis, with some free fluid adjacent to the inferior aspect of the right lobe of the liver, and along the right pericolic gutter as well as free fluid in the pelvis.  The volume of free fluid has increased when compared to the prior study.  There is suspected heterogeneous enlarged lymph node at the richard hepatis again noted.  There is no evidence for peripancreatic inflammatory change, there is no evidence for abnormal pancreatic or biliary ductal dilatation.  The portal venous structures demonstrate appropriate opacification.  The spleen and adrenal glands appear stable.  There is no evidence for hydronephrosis or obstructive uropathy bilaterally.  The abdominal aorta demonstrates appropriate opacification.  The uterus and adnexa appear appropriate without evidence for acute process, free fluid of the pelvis noted as discussed above.  The urinary bladder is incompletely distended, mild wall thickening may relate to incomplete distention, there is however an air bubble within the urinary bladder, this may relate to instrumentation however can be seen with infection, clinical and historical correlation for UTI/cystitis is needed.    Postoperative change of the bowel is noted, there are some mildly prominent small bowel loops, however not thought to represent obstruction may represent ileus, there is no evidence for inflammatory or obstructive process of the colon.  If there is persistent or worsening symptomatology additional follow-up recommended to exclude the  possibility of developing partial obstruction.  There is no evidence for free intraperitoneal air.  The visualized osseous structures appear intact.                               X-Ray Chest AP Portable (Final result)  Result time 09/21/20 01:16:40    Final result by Federico Barron MD (09/21/20 01:16:40)                 Impression:      Removal of Kana catheter from right jugular approach now with PICC line tip over the innominate/subclavian vein on the right.    Improved patchy lung airspace opacities most significantly in the left lung base with decreased pleural fluid.      Electronically signed by: Federico Barron  Date:    09/21/2020  Time:    01:16             Narrative:    EXAMINATION:  XR CHEST AP PORTABLE    CLINICAL HISTORY:  Sepsis;    TECHNIQUE:  Single frontal view of the chest was performed.    COMPARISON:  Chest x-ray, 09/02/2020 at 17:33 hours    FINDINGS:  Kana dialysis catheter is been removed.  PICC line tip previously at the atrial caval junction is now overlying the innominate vein on the right.    The hazy opacities in the periphery a little spaces of the lungs most confluent in the left retrocardiac region are mildly to moderately improved.  Pleural effusion on the left is less apparent.  There is no pneumothorax.  The heart mediastinal contours remain stable.                                Significant Imaging: I have reviewed all pertinent imaging results/findings within the past 24 hours.    Assessment/Plan:     * Sepsis  Elevated lactic acid-trend  Continue Vanc  Add Meropenem  Blood cx pending       Elevated LFTs  Likely due to tumor burden      Hypertension  Tachycardia  Chronic  Continue Meotprolol      Emphysematous cystitis  Ct abdomen /pelvis with mild urinary bladder wall thickening may relate to incomplete distention however there is an air bubble within the urinary bladder that may be due to instrumentation however correlation for UTI/cystitis is needed  Continue  Vancomycin  Add Meropenem  Consult urology    Hyponatremia    Monitor     Anemia  monitor      Adenocarcinoma of colon  Metastatic disease  CT abdomen/pelvis: Extensive abnormal appearance of the liver again noted consistent with hepatic metastatic disease with evidence for progression.  Mild free fluid of the abdomen and pelvis noted, with progression  Consult hematology  Consult palliative care      VTE Risk Mitigation (From admission, onward)         Ordered     enoxaparin injection 40 mg  Every 24 hours      09/21/20 1405     IP VTE HIGH RISK PATIENT  Once      09/21/20 0409     Place sequential compression device  Until discontinued      09/21/20 0409              Critical care time spent on the evaluation and treatment of severe organ dysfunction, review of pertinent labs and imaging studies, discussions with consulting providers and discussions with patient/family: 35 minutes.     Maryana Patel MD  Department of Hospital Medicine   [facility] ICU 5th Floor

## 2020-09-21 NOTE — ED PROVIDER NOTES
Encounter Date: 9/20/2020       History     Chief Complaint   Patient presents with    Shortness of Breath     Complaining of SOB and abdominal pain.  Stage 4 cancer to the colon and liver per EMT.       Huy Cisneros is a 29 y.o. female who  has a past medical history of Cancer and Uterine cyst.    The patient presents to the ED due to SOB, fatigue, and abdominal pain.   Patient reports symptoms have been present ever since she got out of the hospital. She reports her abdominal pain is unchanged. She describes pain all over the middle and lower part of her abdomen.  She was recently diagnosed with colon cancer, and was admitted to the hospital due to sepsis.   She was discharged on IV ABX via PICC.  She denies any associated chest pain, N/V, fever, neck/back pain, leg pain, focal weakness/numbness, or urinary complaints. She endorses compliance with all of her medications.    The history is provided by the patient.     Review of patient's allergies indicates:   Allergen Reactions    Sulfa (sulfonamide antibiotics)      Past Medical History:   Diagnosis Date    Cancer     Colon CA with mets to the liver    Hypertension 9/21/2020    Uterine cyst      Past Surgical History:   Procedure Laterality Date    COLONOSCOPY N/A 7/15/2020    Procedure: COLONOSCOPY;  Surgeon: Hi Drake MD;  Location: [facility] ENDO;  Service: Endoscopy;  Laterality: N/A;    COLONOSCOPY W/ BIOPSIES      INSERTION OF TUNNELED CENTRAL VENOUS CATHETER (CVC) WITH SUBCUTANEOUS PORT N/A 7/17/2020    Procedure: INSERTION, PORT-A-CATH;  Surgeon: Armani Naqvi MD;  Location: [facility] OR;  Service: General;  Laterality: N/A;     Family History   Problem Relation Age of Onset    Hypertension Mother     Hypertension Maternal Grandmother     Hypertension Maternal Grandfather     Hypertension Paternal Grandmother      Social History     Tobacco Use    Smoking status: Never Smoker    Smokeless tobacco: Never Used   Substance  Use Topics    Alcohol use: No    Drug use: No     Review of Systems   Constitutional: Negative for chills and fever.   HENT: Negative for sore throat.    Respiratory: Positive for shortness of breath. Negative for cough.    Cardiovascular: Negative for chest pain.   Gastrointestinal: Negative for nausea and vomiting.   Genitourinary: Negative for dysuria, frequency and urgency.   Musculoskeletal: Negative for back pain.   Skin: Negative for rash and wound.   Neurological: Negative for syncope and weakness.   Hematological: Does not bruise/bleed easily.   Psychiatric/Behavioral: Negative for agitation, behavioral problems and confusion.       Physical Exam     Initial Vitals [09/20/20 2345]   BP Pulse Resp Temp SpO2   134/74 (!) 144 18 98.4 °F (36.9 °C) 100 %      MAP       --         Physical Exam    Nursing note and vitals reviewed.  Constitutional: She appears well-developed and well-nourished. She is not diaphoretic. No distress.   HENT:   Head: Normocephalic and atraumatic.   Mouth/Throat: Oropharynx is clear and moist.   Eyes: EOM are normal. Pupils are equal, round, and reactive to light.   Neck: No tracheal deviation present.   Cardiovascular: Regular rhythm, normal heart sounds and intact distal pulses. Tachycardia present.    Pulmonary/Chest: Breath sounds normal. No stridor. No respiratory distress. She has no wheezes.   Abdominal: Soft. Bowel sounds are normal. She exhibits no distension and no mass. There is no abdominal tenderness.   Musculoskeletal: Normal range of motion. No edema.   Neurological: She is alert and oriented to person, place, and time. She has normal strength. No cranial nerve deficit or sensory deficit.   Skin: Skin is warm and dry. Capillary refill takes less than 2 seconds. No pallor.   Psychiatric: She has a normal mood and affect. Her behavior is normal. Thought content normal.         ED Course   Procedures  Labs Reviewed   CBC W/ AUTO DIFFERENTIAL - Abnormal; Notable for the  following components:       Result Value    RBC 2.94 (*)     Hemoglobin 7.8 (*)     Hematocrit 24.8 (*)     Mean Corpuscular Hemoglobin 26.5 (*)     Mean Corpuscular Hemoglobin Conc 31.5 (*)     RDW 27.7 (*)     Immature Granulocytes 2.8 (*)     Gran # (ANC) 8.9 (*)     Immature Grans (Abs) 0.32 (*)     Gran% 76.6 (*)     Lymph% 11.2 (*)     All other components within normal limits   COMPREHENSIVE METABOLIC PANEL - Abnormal; Notable for the following components:    Sodium 131 (*)     CO2 16 (*)     Albumin 2.3 (*)     Total Bilirubin 2.8 (*)     Alkaline Phosphatase 372 (*)      (*)     ALT 58 (*)     Anion Gap 17 (*)     All other components within normal limits   LACTIC ACID, PLASMA - Abnormal; Notable for the following components:    Lactate (Lactic Acid) 5.0 (*)     All other components within normal limits    Narrative:     LA   critical result(s) called and verbal readback obtained from   Amena STEWART by AMChristian 09/21/2020 01:45   URINALYSIS, REFLEX TO URINE CULTURE - Abnormal; Notable for the following components:    Protein, UA 1+ (*)     Bilirubin (UA) 1+ (*)     Occult Blood UA Trace (*)     All other components within normal limits    Narrative:     Specimen Source->Urine   LACTIC ACID, PLASMA - Abnormal; Notable for the following components:    Lactate (Lactic Acid) 4.3 (*)     All other components within normal limits    Narrative:       LACT critical result(s) called and verbal readback obtained from Jaden GLASER RN by SUSHANT 09/21/2020 06:52   BASIC METABOLIC PANEL - Abnormal; Notable for the following components:    Sodium 131 (*)     CO2 14 (*)     Calcium 7.9 (*)     All other components within normal limits   CBC W/ AUTO DIFFERENTIAL - Abnormal; Notable for the following components:    RBC 2.63 (*)     Hemoglobin 7.0 (*)     Hematocrit 22.3 (*)     Mean Corpuscular Hemoglobin 26.6 (*)     Mean Corpuscular Hemoglobin Conc 31.4 (*)     RDW 27.5 (*)     Immature Granulocytes 3.1 (*)     Immature  Grans (Abs) 0.29 (*)     Gran% 74.1 (*)     Lymph% 13.1 (*)     All other components within normal limits   URINALYSIS MICROSCOPIC    Narrative:     Specimen Source->Urine   SARS-COV-2 RNA AMPLIFICATION, QUAL   POCT URINE PREGNANCY     EKG Readings: (Independently Interpreted)   Sinus tachycardia at 136 bpm with no st changes or ischemia and normal intervals. Stable from 09/2020.       ECG Results          EKG 12-lead (Final result)  Result time 09/21/20 08:53:06    Final result by Interface, Lab In [facility] (09/21/20 08:53:06)                 Narrative:    Test Reason : R00.0,    Vent. Rate : 136 BPM     Atrial Rate : 136 BPM     P-R Int : 120 ms          QRS Dur : 064 ms      QT Int : 298 ms       P-R-T Axes : 054 063 078 degrees     QTc Int : 448 ms    Sinus tachycardia  Otherwise normal ECG  When compared with ECG of 07-SEP-2020 12:38,  No significant change was found  Confirmed by Osmany GASPAR MD, Solis CRAIN ([id number]) on 9/21/2020 8:52:45 AM    Referred By: [email or web address]   SELF           Confirmed By:Solis Ceron III, MD                            Imaging Results          CT Abdomen Pelvis With Contrast (Final result)  Result time 09/21/20 02:51:18    Final result by Wale Carmona MD (09/21/20 02:51:18)                 Impression:      Extensive abnormal appearance of the liver again noted consistent with hepatic metastatic disease with evidence for progression.    Mild free fluid of the abdomen and pelvis noted, with progression.    Multiple pulmonary nodules and abnormal opacities some of which demonstrate cavitary appearance may relate to pulmonary metastatic disease, or as previously discussed a component of septic emboli, this is noted on prior examinations.    Cholelithiasis with mild pericholecystic fluid without pericholecystic inflammatory appearance, this pericholecystic fluid may relate to the mild free fluid of the abdomen and pelvis, correlation for gallbladder symptomatology is otherwise  needed.    Mild urinary bladder wall thickening may relate to incomplete distention however there is an air bubble within the urinary bladder that may be due to instrumentation however correlation for UTI/cystitis is needed.    Mildly prominent small bowel loops not thought to represent obstruction, may relate to ileus however if there is persistent or worsening symptomatology additional follow-up to exclude developing obstruction recommended.      Electronically signed by: Wale Carmona  Date:    09/21/2020  Time:    02:51             Narrative:    EXAMINATION:  CT ABDOMEN PELVIS WITH CONTRAST    CLINICAL HISTORY:  Abdominal pain, fever;    TECHNIQUE:  Low dose axial images, sagittal and coronal reformations were obtained from the lung bases to the pubic symphysis following the IV administration of 100 mL of Omnipaque 350 .  Oral contrast was not given.    COMPARISON:  CT examination chest abdomen pelvis September 2, 2020    FINDINGS:  Single-phase CT examination of the abdomen and pelvis was performed.  Intravenous contrast was utilized.  Oral contrast was not utilized.    The lung bases demonstrate multiple pulmonary nodules and mass lesions, some of which demonstrate cavitary abnormality, as seen on prior chest CT.  There is minimal pleural fluid noted.  The stomach is decompressed, with mild fluid and air in the lumen, nonspecific appearance.  The liver is enlarged and there are multiple hepatic mass lesions consistent with hepatic metastatic disease again noted, there is appearance of progression as the lesions appears somewhat more prominent.  There is abnormal appearance of the gallbladder, there is intraluminal complexity likely relating to cholelithiasis, there is pericholecystic fluid, without significant pericholecystic inflammatory change, and although pericholecystic fluid may relate to gallbladder pathology, this may relate to mild free fluid of the abdomen and pelvis, with some free fluid adjacent  to the inferior aspect of the right lobe of the liver, and along the right pericolic gutter as well as free fluid in the pelvis.  The volume of free fluid has increased when compared to the prior study.  There is suspected heterogeneous enlarged lymph node at the richard hepatis again noted.  There is no evidence for peripancreatic inflammatory change, there is no evidence for abnormal pancreatic or biliary ductal dilatation.  The portal venous structures demonstrate appropriate opacification.  The spleen and adrenal glands appear stable.  There is no evidence for hydronephrosis or obstructive uropathy bilaterally.  The abdominal aorta demonstrates appropriate opacification.  The uterus and adnexa appear appropriate without evidence for acute process, free fluid of the pelvis noted as discussed above.  The urinary bladder is incompletely distended, mild wall thickening may relate to incomplete distention, there is however an air bubble within the urinary bladder, this may relate to instrumentation however can be seen with infection, clinical and historical correlation for UTI/cystitis is needed.    Postoperative change of the bowel is noted, there are some mildly prominent small bowel loops, however not thought to represent obstruction may represent ileus, there is no evidence for inflammatory or obstructive process of the colon.  If there is persistent or worsening symptomatology additional follow-up recommended to exclude the possibility of developing partial obstruction.  There is no evidence for free intraperitoneal air.  The visualized osseous structures appear intact.                               X-Ray Chest AP Portable (Final result)  Result time 09/21/20 01:16:40    Final result by Feedrico Barron MD (09/21/20 01:16:40)                 Impression:      Removal of Kana catheter from right jugular approach now with PICC line tip over the innominate/subclavian vein on the right.    Improved patchy lung  airspace opacities most significantly in the left lung base with decreased pleural fluid.      Electronically signed by: Federico Barron  Date:    09/21/2020  Time:    01:16             Narrative:    EXAMINATION:  XR CHEST AP PORTABLE    CLINICAL HISTORY:  Sepsis;    TECHNIQUE:  Single frontal view of the chest was performed.    COMPARISON:  Chest x-ray, 09/02/2020 at 17:33 hours    FINDINGS:  Kana dialysis catheter is been removed.  PICC line tip previously at the atrial caval junction is now overlying the innominate vein on the right.    The hazy opacities in the periphery a little spaces of the lungs most confluent in the left retrocardiac region are mildly to moderately improved.  Pleural effusion on the left is less apparent.  There is no pneumothorax.  The heart mediastinal contours remain stable.                                 Medical Decision Making:   History:   Old Medical Records: I decided to obtain old medical records.  Old Records Summarized: other records.       <> Summary of Records: 8/26-9/9: admitted to [facility] due to sepsis, with likely source as tunneled HD line. Blood cultures positive for staph. General sx removed tunneled port at the bedside. She was started on CRRT. Also found to have cavitary lung lesions. MRSA grew out at the port site. Vancomycin and meropenem per ID recs. CT with chest infiltrates and ascites, probable septic emboli, and extensive metastatic disease. No PE. PICC line placed for inability to obtain peripheral IV.  Patient remains tachypneic and tachycardic with fevers. PRBC transfusion given. A palliative care consult was placed for patient and family counseling. The patient was stepped down out of ICU on 9/4. ID recommended 4 weeks of vancomycin. She will d/c home with .  Initial Assessment:   30 yo F presents to ED with shortness of breath, generalized weakness.  Vitals concerning for sepsis due to tachycardia and tachypnea, as well as history of septic shock from  infected catheter and bilateral septic emboli in lungs.  Will obtain infectious eval, including blood cultures, lactate, labs. Will give IVF and broad-spectrum ABX.  Will continue to monitor.  Differential Diagnosis:   Differential Diagnosis includes, but is not limited to:  PE, MI/ACS, pneumothorax, pericardial effusion/tamonade, pneumonia, lung abscess, pericarditis/myocarditis, pleural effusion, lung mass, CHF exacerbation, asthma exacerbation, COPD exacerbation, aspirated/ingested foreign body, airway obstruction, CO poisoning, anemia, metabolic derangement, allergy/atopy, influenza, viral URI, viral syndrome.    Clinical Tests:   Lab Tests: Reviewed and Ordered  Radiological Study: Reviewed and Ordered  Medical Tests: Reviewed and Ordered  ED Management:  Labs with worsening LFT elevation and hyperbilirubinemia.  CT A/P with extensive metastatic disease, no acute process.  Vitals have remained stable.   contacted for admission for continued IV ABX and further management.    On re-evaluation, the patient's status has improved.  At this time, I believe the patient should be admitted to the hospital for further evaluation and management of sepsis, SOB.   service was contacted and the case was discussed.   The consulting physician/team agrees with plan and will admit under their service.   The patient and family were updated with test results, overall impression, and further plan of care. All questions were answered. The patient expressed understanding and agrees with the current plan.                Attending Attestation:         Attending Critical Care:   Critical Care Times:   Direct Patient Care (initial evaluation, reassessments, and time considering the case)................................................................10 minutes.   Additional History from reviewing old medical records or taking additional history from the family, EMS, PCP, etc.......................5 minutes.   Ordering, Reviewing, and  Interpreting Diagnostic Studies...............................................................................................................5 minutes.   Documentation..................................................................................................................................................................................10 minutes.   Consultation with other Physicians. .................................................................................................................................................5 minutes.   Consultation with the patient's family directly relating to the patient's condition, care, and DNR status (when patient unable)......5 minutes.   ==============================================================  · Total Critical Care Time - exclusive of procedural time: 40 minutes.  ==============================================================  Critical care was necessary to treat or prevent imminent or life-threatening deterioration of the following conditions: sepsis.   Critical Care Condition: critical                         Clinical Impression:     ICD-10-CM ICD-9-CM   1. Sepsis, due to unspecified organism, unspecified whether acute organ dysfunction present  A41.9 038.9     995.91   2. Tachycardia  R00.0 785.0   3. Sepsis  A41.9 038.9     995.91   4. Palliative care encounter  Z51.5 V66.7   5. Goals of care, counseling/discussion  Z71.89 V65.49   6. Counseling regarding advance care planning and goals of care  Z71.89 V65.49   7. Metastatic disease  C79.9 199.1                          ED Disposition Condition    Admit                             Varghese Saenz MD  09/24/20 [id number]

## 2020-09-21 NOTE — PROGRESS NOTES
Pharmacokinetic Initial Assessment: IV Vancomycin    Assessment/Plan:    Initiate intravenous vancomycin with loading dose of 2000 mg once     Please contact pharmacy at extension [phone] with any questions regarding this assessment.     Thank you for the consult,   Guille Tijerina       Patient brief summary:  Huy Cisneros is a 29 y.o. female initiated on antimicrobial therapy with IV Vancomycin for treatment of suspected sepsis    Drug Allergies:   Review of patient's allergies indicates:   Allergen Reactions    Sulfa (sulfonamide antibiotics)        Actual Body Weight:   84kg    Renal Function:   Estimated Creatinine Clearance: 110.4 mL/min (based on SCr of 0.8 mg/dL).,     Dialysis Method (if applicable):  N/A    CBC (last 72 hours):  Recent Labs   Lab Result Units 09/21/20  0044   WBC K/uL 11.63   Hemoglobin g/dL 7.8*   Hematocrit % 24.8*   Platelets K/uL 336   Gran% % 76.6*   Lymph% % 11.2*   Mono% % 8.7   Eosinophil% % 0.3   Basophil% % 0.4   Differential Method  Automated       Metabolic Panel (last 72 hours):  Recent Labs   Lab Result Units 09/21/20  0044   Sodium mmol/L 131*   Potassium mmol/L 4.4   Chloride mmol/L 98   CO2 mmol/L 16*   Glucose mg/dL 87   BUN, Bld mg/dL 14   Creatinine mg/dL 0.8   Albumin g/dL 2.3*   Total Bilirubin mg/dL 2.8*   Alkaline Phosphatase U/L 372*   AST U/L 237*   ALT U/L 58*       Drug levels (last 3 results):  No results for input(s): VANCOMYCINRA, VANCOMYCINPE, VANCOMYCINTR in the last 72 hours.    Microbiologic Results:  Microbiology Results (last 7 days)       Procedure Component Value Units Date/Time    Blood culture x two cultures. Draw prior to antibiotics. [[id number]] Collected: 09/21/20 0131    Order Status: Sent Specimen: Blood from Peripheral, Wrist, Left Updated: 09/21/20 0145    Blood culture x two cultures. Draw prior to antibiotics. [[id number]] Collected: 09/21/20 0043    Order Status: Sent Specimen: Blood from Peripheral, Antecubital, Right Updated:  09/21/20 0118

## 2020-09-21 NOTE — HPI
"29 year-old female with metastatic colon cancer was admitted on 9/21/20 for worsening abdominal pain/dyspnea upon exertion. She was recently discharged in early September 2020 after a prolonged hospitalization for MRSA bacteremia.     Palliative medicine has been consulted for [facility] discussion.   Patient very tearful on today. Very concerned about her condition. Primary MD had spoken to patient that her cancer has progressed. "I was a person who always took care of myself and this has happened to me. I want to live for my babies." Patient has 10, 6, 3 year old children. Her mother cares for the children. Patient would like to continue with seeking treatment at this time. "I want to get everything done to keep me alive." Patient very soft spoken and depressed. She can benefit from lexapro for depression. At this time she would like to remain a full code. All questions and concerns addressed. Contact information given. Thanks for the consult.   "

## 2020-09-21 NOTE — CONSULTS
[facility] ICU 5th Floor  Hematology/Oncology  Consult Note    Patient Name: Huy Cisneros  MRN: [id number]  Admission Date: 9/20/2020  Hospital Length of Stay: 0 days  Code Status: Full Code   Attending Provider: Maryana Patel*  Consulting Provider: Jewel Arzola MD  Primary Care Physician: Primary Doctor No  Principal Problem:Sepsis    Inpatient consult to Hematology/Oncology  Consult performed by: Jewel Arzola MD  Consult ordered by: Perla Mckee NP  Reason for consult: metastatic colon cancer        Subjective:     HPI:  29 year-old female with metastatic colon cancer was admitted on 9/21/20 for worsening abdominal pain/dyspnea upon exertion. She was recently discharged in early September 2020 after a prolonged hospitalization for MRSA bacteremia. Consult is for metastatic colon cancer.    [patient is drowsy upon examination, so review of systems is limited.]    She endorses fatigue, weakness, shortness of breath, abdominal pain. She denies fever.    Oncology Treatment Plan:   OP COLORECTAL FOLFOX + BEVACIZUMAB Q2W    Medications:  Continuous Infusions:  Scheduled Meds:   metoprolol tartrate  50 mg Oral TID    vancomycin (VANCOCIN) IVPB  1,500 mg Intravenous Q12H     PRN Meds:morphine, ondansetron, sodium chloride 0.9%, Pharmacy to dose Vancomycin consult **AND** vancomycin - pharmacy to dose     Review of patient's allergies indicates:   Allergen Reactions    Sulfa (sulfonamide antibiotics)         Past Medical History:   Diagnosis Date    Cancer     Colon CA with mets to the liver    Uterine cyst      Past Surgical History:   Procedure Laterality Date    COLONOSCOPY N/A 7/15/2020    Procedure: COLONOSCOPY;  Surgeon: Hi Drake MD;  Location: [facility];  Service: Endoscopy;  Laterality: N/A;    COLONOSCOPY W/ BIOPSIES      INSERTION OF TUNNELED CENTRAL VENOUS CATHETER (CVC) WITH SUBCUTANEOUS PORT N/A 7/17/2020    Procedure: INSERTION,  PORT-A-CATH;  Surgeon: Armani Naqvi MD;  Location: [facility];  Service: General;  Laterality: N/A;     Family History     Problem Relation (Age of Onset)    Hypertension Mother, Maternal Grandmother, Maternal Grandfather, Paternal Grandmother        Tobacco Use    Smoking status: Never Smoker    Smokeless tobacco: Never Used   Substance and Sexual Activity    Alcohol use: No    Drug use: No    Sexual activity: Yes     Partners: Male     Birth control/protection: None       Review of Systems   Constitutional: Positive for fatigue.   HENT: Negative for sore throat.    Eyes: Negative for visual disturbance.   Respiratory: Positive for shortness of breath. Negative for cough.    Cardiovascular: Negative for chest pain.   Gastrointestinal: Positive for abdominal pain. Negative for nausea and vomiting.   Genitourinary: Negative for dysuria.   Musculoskeletal: Negative for back pain.   Skin: Negative for rash.   Neurological: Positive for weakness. Negative for headaches.   Hematological: Negative for adenopathy.   Psychiatric/Behavioral: The patient is not nervous/anxious.      Objective:     Vital Signs (Most Recent):  Temp: 98.6 °F (37 °C) (09/21/20 1130)  Pulse: (!) 133 (09/21/20 1130)  Resp: (!) 36 (09/21/20 1237)  BP: 139/82 (09/21/20 1130)  SpO2: 100 % (09/21/20 1130) Vital Signs (24h Range):  Temp:  [98.2 °F (36.8 °C)-98.6 °F (37 °C)] 98.6 °F (37 °C)  Pulse:  [129-144] 133  Resp:  [12-44] 36  SpO2:  [99 %-100 %] 100 %  BP: (123-150)/(74-94) 139/82     Weight: 84.5 kg (186 lb 4.6 oz)  Body mass index is 31 kg/m².  Body surface area is 1.97 meters squared.      Intake/Output Summary (Last 24 hours) at 9/21/2020 1300  Last data filed at 9/21/2020 0608  Gross per 24 hour   Intake 2635 ml   Output --   Net 2635 ml       Physical Exam  Vitals signs and nursing note reviewed.   Constitutional:       Appearance: She is well-developed.      Comments: Fatigued, appears weak   HENT:      Head: Normocephalic and  atraumatic.   Eyes:      Pupils: Pupils are equal, round, and reactive to light.   Neck:      Musculoskeletal: Normal range of motion and neck supple.   Cardiovascular:      Rate and Rhythm: Regular rhythm. Tachycardia present.   Pulmonary:      Effort: Pulmonary effort is normal.      Breath sounds: Normal breath sounds.   Abdominal:      General: Bowel sounds are normal.      Palpations: Abdomen is soft.      Tenderness: There is abdominal tenderness.   Musculoskeletal: Normal range of motion.   Skin:     General: Skin is warm and dry.   Neurological:      Mental Status: She is alert.      Comments: Drowsy, answers questions in a whisper, unclear answers         Significant Labs:   Labs have been reviewed.    Lab Results   Component Value Date    WBC 9.36 09/21/2020    HGB 7.0 (L) 09/21/2020    HCT 22.3 (L) 09/21/2020    MCV 85 09/21/2020     09/21/2020           Diagnostic Results:  CT abdomen/pelvis (9/21/20): I have personally reviewed the images  Extensive abnormal appearance of the liver again noted consistent with hepatic metastatic disease with evidence for progression.     Mild free fluid of the abdomen and pelvis noted, with progression.     Multiple pulmonary nodules and abnormal opacities some of which demonstrate cavitary appearance may relate to pulmonary metastatic disease, or as previously discussed a component of septic emboli, this is noted on prior examinations.     Cholelithiasis with mild pericholecystic fluid without pericholecystic inflammatory appearance, this pericholecystic fluid may relate to the mild free fluid of the abdomen and pelvis, correlation for gallbladder symptomatology is otherwise needed.     Mild urinary bladder wall thickening may relate to incomplete distention however there is an air bubble within the urinary bladder that may be due to instrumentation however correlation for UTI/cystitis is needed.     Mildly prominent small bowel loops not thought to represent  obstruction, may relate to ileus however if there is persistent or worsening symptomatology additional follow-up to exclude developing obstruction recommended.       Assessment/Plan:     Adenocarcinoma of colon  - CT scan (9/21/20) reveals progression of disease. She had only received one cycle of FOLFOX. Subsequent cycles of FOLFOX have been delayed due to MRSA bacteremia.  - I am concerned that the hepatic burden of metastases is beginning to cause liver failure. While waiting for her to complete antibiotic therapy for her MRSA bacteremia, her disease has worsened.  - check ammonia.  - follow up blood cultures. Treat underlying issues that prompted her presentation to emergency room. However, her underlying colon cancer is likely causing the majority of her issues.  - agree with palliative care consult.  - the window for receiving more chemotherapy is beginning to close. I think that if she is unable to get chemotherapy within the next 1-2 weeks, her liver function will worsen to a point at which she will be unable to receive chemotherapy.        Thank you for your consult.     Jewel Arzola MD  Hematology/Oncology  [facility] ICU 5th Floor

## 2020-09-21 NOTE — PROGRESS NOTES
Pt AAOx4 throughout shift. VSS throughout shift, but tachycardic. Lactic trending down. Antibiotics continued. Pt updated by palliative care and oncology about spread of cancer. Grieving supported. Safety maintained with side rails upx2, call light within reach, purewick in place, mom at bedside. Pt and mother updated on plan of care.

## 2020-09-21 NOTE — SUBJECTIVE & OBJECTIVE
[patient is drowsy upon examination, so review of systems is limited.]    She endorses fatigue, weakness, shortness of breath, abdominal pain. She denies fever.    Oncology Treatment Plan:   OP COLORECTAL FOLFOX + BEVACIZUMAB Q2W    Medications:  Continuous Infusions:  Scheduled Meds:   metoprolol tartrate  50 mg Oral TID    vancomycin (VANCOCIN) IVPB  1,500 mg Intravenous Q12H     PRN Meds:morphine, ondansetron, sodium chloride 0.9%, Pharmacy to dose Vancomycin consult **AND** vancomycin - pharmacy to dose     Review of patient's allergies indicates:   Allergen Reactions    Sulfa (sulfonamide antibiotics)         Past Medical History:   Diagnosis Date    Cancer     Colon CA with mets to the liver    Uterine cyst      Past Surgical History:   Procedure Laterality Date    COLONOSCOPY N/A 7/15/2020    Procedure: COLONOSCOPY;  Surgeon: Hi Drake MD;  Location: [facility];  Service: Endoscopy;  Laterality: N/A;    COLONOSCOPY W/ BIOPSIES      INSERTION OF TUNNELED CENTRAL VENOUS CATHETER (CVC) WITH SUBCUTANEOUS PORT N/A 7/17/2020    Procedure: INSERTION, PORT-A-CATH;  Surgeon: Armani Naqvi MD;  Location: [facility] OR;  Service: General;  Laterality: N/A;     Family History     Problem Relation (Age of Onset)    Hypertension Mother, Maternal Grandmother, Maternal Grandfather, Paternal Grandmother        Tobacco Use    Smoking status: Never Smoker    Smokeless tobacco: Never Used   Substance and Sexual Activity    Alcohol use: No    Drug use: No    Sexual activity: Yes     Partners: Male     Birth control/protection: None       Review of Systems   Constitutional: Positive for fatigue.   HENT: Negative for sore throat.    Eyes: Negative for visual disturbance.   Respiratory: Positive for shortness of breath. Negative for cough.    Cardiovascular: Negative for chest pain.   Gastrointestinal: Positive for abdominal pain. Negative for nausea and vomiting.   Genitourinary: Negative for dysuria.    Musculoskeletal: Negative for back pain.   Skin: Negative for rash.   Neurological: Positive for weakness. Negative for headaches.   Hematological: Negative for adenopathy.   Psychiatric/Behavioral: The patient is not nervous/anxious.      Objective:     Vital Signs (Most Recent):  Temp: 98.6 °F (37 °C) (09/21/20 1130)  Pulse: (!) 133 (09/21/20 1130)  Resp: (!) 36 (09/21/20 1237)  BP: 139/82 (09/21/20 1130)  SpO2: 100 % (09/21/20 1130) Vital Signs (24h Range):  Temp:  [98.2 °F (36.8 °C)-98.6 °F (37 °C)] 98.6 °F (37 °C)  Pulse:  [129-144] 133  Resp:  [12-44] 36  SpO2:  [99 %-100 %] 100 %  BP: (123-150)/(74-94) 139/82     Weight: 84.5 kg (186 lb 4.6 oz)  Body mass index is 31 kg/m².  Body surface area is 1.97 meters squared.      Intake/Output Summary (Last 24 hours) at 9/21/2020 1300  Last data filed at 9/21/2020 0608  Gross per 24 hour   Intake 2635 ml   Output --   Net 2635 ml       Physical Exam  Vitals signs and nursing note reviewed.   Constitutional:       Appearance: She is well-developed.      Comments: Fatigued, appears weak   HENT:      Head: Normocephalic and atraumatic.   Eyes:      Pupils: Pupils are equal, round, and reactive to light.   Neck:      Musculoskeletal: Normal range of motion and neck supple.   Cardiovascular:      Rate and Rhythm: Regular rhythm. Tachycardia present.   Pulmonary:      Effort: Pulmonary effort is normal.      Breath sounds: Normal breath sounds.   Abdominal:      General: Bowel sounds are normal.      Palpations: Abdomen is soft.      Tenderness: There is abdominal tenderness.   Musculoskeletal: Normal range of motion.   Skin:     General: Skin is warm and dry.   Neurological:      Mental Status: She is alert.      Comments: Drowsy, answers questions in a whisper, unclear answers         Significant Labs:   Labs have been reviewed.    Lab Results   Component Value Date    WBC 9.36 09/21/2020    HGB 7.0 (L) 09/21/2020    HCT 22.3 (L) 09/21/2020    MCV 85 09/21/2020    PLT  294 09/21/2020           Diagnostic Results:  CT abdomen/pelvis (9/21/20): I have personally reviewed the images  Extensive abnormal appearance of the liver again noted consistent with hepatic metastatic disease with evidence for progression.     Mild free fluid of the abdomen and pelvis noted, with progression.     Multiple pulmonary nodules and abnormal opacities some of which demonstrate cavitary appearance may relate to pulmonary metastatic disease, or as previously discussed a component of septic emboli, this is noted on prior examinations.     Cholelithiasis with mild pericholecystic fluid without pericholecystic inflammatory appearance, this pericholecystic fluid may relate to the mild free fluid of the abdomen and pelvis, correlation for gallbladder symptomatology is otherwise needed.     Mild urinary bladder wall thickening may relate to incomplete distention however there is an air bubble within the urinary bladder that may be due to instrumentation however correlation for UTI/cystitis is needed.     Mildly prominent small bowel loops not thought to represent obstruction, may relate to ileus however if there is persistent or worsening symptomatology additional follow-up to exclude developing obstruction recommended.

## 2020-09-21 NOTE — ASSESSMENT & PLAN NOTE
"Advance Care Planning     [facility]  I engaged the patient in a conversation about advance care planning and we specifically addressed what the goals of care would be moving forward, in light of the patient's change in clinical status, specifically patient cancer has progressed and causing liver issues. Patient very tearful and concerned about her condition. "I was a person who took care of herself and this happened to me. I have my babies to take care of."  We did not specifically address the patient's likely prognosis. We explored the patient's values and preferences for future care.  The patient endorses that what is most important right now is to focus on extending life as long as possible, even it it means sacrificing quality    Accordingly, we have decided that the best plan to meet the patient's goals includes continuing with treatment    I did not explain the role for hospice care at this stage of the patient's illness, including its ability to help the patient live with the best quality of life possible.  We will not be making a hospice referral.    I spent a total of 30 minutes engaging the patient in this advance care planning discussion.         Code Status  In light of the patients advanced and life limiting illness,I engaged the the patient in a conversation about the patient's preferences for care  at the very end of life. The patient wishes to have a natural, peaceful death.  Along those lines, the patient does not wish to have CPR or other invasive treatments performed when her heart and/or breathing stops."I want everything done for me. I have my babies to live for."   I spent a total of 30 minutes engaging the patient in this advance care planning discussion.        "

## 2020-09-21 NOTE — PLAN OF CARE
Principal Problem:Sepsis     Chief Complaint:        Chief Complaint   Patient presents with    Shortness of Breath       Complaining of SOB and abdominal pain.  Stage 4 cancer to the colon and liver per EMT.           HPI: Huy Cisneros is a 28 y/o F with PMH of Cancer of the colon with liver mets, tachycardia, HTN, and Uterine cyst, present to the ED with RLQ abdominal pain, she denies fever, chills, nausea, vomiting, constipation dysuria, urgency or frequency.   In the ED Lactic acid 5.0, wbc wnl, CT abdomen/ pelvis described metastatic tumor progression with Mild urinary bladder wall thickening may relate to incomplete distention however there is an air bubble within the urinary bladder that may be due to instrumentation however correlation for UTI/cystitis is needed     The pt's currently in ICU. The pt is a 30 day readmit. The pt's known to the sw from past admits. The pt's followed by Dr. Dariusz almeida. The pt was d/c'd from [facility] with [facility]. The pt is current with both companies. The pt lives at home with her mother Char Spence [phone] along with her 3 sons ages 3,8 and 10 and her other 3 siblings. The pt has a very supportive family. The pt receives disability checks for 2 of her children from the state. The pt's mother will transport her home at d/c. The Sw spoke to the pt's mother who states she doesn't want to get out of bed anymore. The Sw spoke to Celine in Palliative Care today and she informed her she wants to continue all treatment b/c she has 3 sons at home that need her. The pt's mother states there have been other family members that have  recently of cancer and it's finally setting in with the pt that she has cancer. The Sw spoke to the pt in reference to how she was feeling but she was very lethargic and really couldn't answer. The Sw will f/u with the pt to offer emotional support when she's more alert. The Sw completed the white board  in the pt's room and left her a d/c brochure. The Sw encouraged the pt and her mother to call if they have any further questions or concerns. The Sw will continue to follow the pt throughout her transitions of care and will assist with any d/c needs.         09/21/20 2838   Discharge Assessment   Assessment Type Discharge Planning Assessment   Confirmed/corrected address and phone number on facesheet? Yes   Assessment information obtained from? Caregiver   Prior to hospitilization cognitive status: Alert/Oriented   Prior to hospitalization functional status: Assistive Equipment;Needs Assistance   Current cognitive status: Alert/Oriented   Current Functional Status: Assistive Equipment;Needs Assistance   Lives With child(larisa), dependent;parent(s)   Able to Return to Prior Arrangements yes   Is patient able to care for self after discharge? Unable to determine at this time (comments)   Who are your caregiver(s) and their phone number(s)? Char Spence(mother)[phone]   Patient's perception of discharge disposition home health  (the pt's current with [facility])   Readmission Within the Last 30 Days previous discharge plan unsuccessful   If yes, most recent facility name: [facility]   Patient currently being followed by outpatient case management? No   Patient currently receives any other outside agency services? No   Equipment Currently Used at Home wheelchair;bedside commode;shower chair   Do you have any problems affording any of your prescribed medications? No   Is the patient taking medications as prescribed? yes   Does the patient have transportation home? Yes   Transportation Anticipated family or friend will provide   Does the patient receive services at the [facility]? No   Discharge Plan A Home Health  (current with [facility])   DME Needed Upon Discharge  other (see comments)  (TBD)   Readmission Questionnaire   At the time of your discharge, did someone  talk to you about what your health problems were? Yes   At the time of discharge, did someone talk to you about what to watch out for regarding worsening of your health problem? Yes   At the time of discharge, did someone talk to you about what to do if you experienced worsening of your health problem? Yes   At the time of discharge, did someone talk to you about which medication to take when you left the hospital and which ones to stop taking? Yes   At the time of discharge, did someone talk to you about when and where to follow up with a doctor after you left the hospital? Yes   What do you believe caused you to be sick enough to be re-admitted? weak   How often do you need to have someone help you when you read instructions, pamphlets, or other written material from your doctor or pharmacy? Rarely   Do you have problems taking your medications as prescribed? No   Do you have any problems affording any of  your prescribed medications? No   Do you have problems obtaining/receiving your medications? No   Does the patient have transportation to healthcare appointments? Yes   Living Arrangements house   Does the patient have family/friends to help with healtcare needs after discharge? yes   Does your caregiver provide all the help you need? Yes   Are you currently feeling confused? No   Are you currently having problems thinking? No   Are you currently having memory problems? No   Have you felt down, depressed, or hopeless? 3  (the pt has small children and now stage 4 cancer)   Have you felt little interest or pleasure in doing things? 3  (the pt's mother states she doesn't want to get out of the bed anymore)   In the last 7 days, my sleep quality was: poor

## 2020-09-21 NOTE — ACP (ADVANCE CARE PLANNING)
".Advance Care Planning       [facility]  I engaged the patient in a conversation about advance care planning and we specifically addressed what the goals of care would be moving forward, in light of the patient's change in clinical status, specifically patient cancer has progressed and causing liver issues. Patient very tearful and concerned about her condition. "I was a person who took care of herself and this happened to me. I have my babies to take care of."  We did not specifically address the patient's likely prognosis. We explored the patient's values and preferences for future care.  The patient endorses that what is most important right now is to focus on extending life as long as possible, even it it means sacrificing quality    Accordingly, we have decided that the best plan to meet the patient's goals includes continuing with treatment    I did not explain the role for hospice care at this stage of the patient's illness, including its ability to help the patient live with the best quality of life possible.  We will not be making a hospice referral.    I spent a total of 30 minutes engaging the patient in this advance care planning discussion.         Code Status  In light of the patients advanced and life limiting illness,I engaged the the patient in a conversation about the patient's preferences for care  at the very end of life. The patient wishes to have a natural, peaceful death.  Along those lines, the patient does not wish to have CPR or other invasive treatments performed when her heart and/or breathing stops."I want everything done for me. I have my babies to live for."   I spent a total of 30 minutes engaging the patient in this advance care planning discussion.      Celine Conde, MSN, APRN, NP-C  "

## 2020-09-21 NOTE — HPI
29 year-old female with metastatic colon cancer was admitted on 9/21/20 for worsening abdominal pain/dyspnea upon exertion. She was recently discharged in early September 2020 after a prolonged hospitalization for MRSA bacteremia. Consult is for metastatic colon cancer.

## 2020-09-21 NOTE — PROGRESS NOTES
Pharmacokinetic Initial Assessment: IV Vancomycin    Assessment/Plan:    Initiate intravenous vancomycin with loading dose of 2000 mg once followed by a maintenance dose of vancomycin 1500mg IV every 12 hours  Desired empiric serum trough concentration is 15 to 20 mcg/mL  Draw vancomycin trough level 60 min prior to fourth dose on 9/22/20 at approximately 1500  Pharmacy will continue to follow and monitor vancomycin.      Please contact pharmacy at extension [phone] with any questions regarding this assessment.     Thank you for the consult,   Guille Tijerina       Patient brief summary:  Huy Cisneros is a 29 y.o. female initiated on antimicrobial therapy with IV Vancomycin for treatment of suspected bacteremia    Drug Allergies:   Review of patient's allergies indicates:   Allergen Reactions    Sulfa (sulfonamide antibiotics)        Actual Body Weight:   84kg    Renal Function:   Estimated Creatinine Clearance: 126.2 mL/min (based on SCr of 0.7 mg/dL).,     Dialysis Method (if applicable):  N/A    CBC (last 72 hours):  Recent Labs   Lab Result Units 09/21/20  0044 09/21/20  0517   WBC K/uL 11.63 9.36   Hemoglobin g/dL 7.8* 7.0*   Hematocrit % 24.8* 22.3*   Platelets K/uL 336 294   Gran% % 76.6* 74.1*   Lymph% % 11.2* 13.1*   Mono% % 8.7 9.3   Eosinophil% % 0.3 0.1   Basophil% % 0.4 0.3   Differential Method  Automated Automated       Metabolic Panel (last 72 hours):  Recent Labs   Lab Result Units 09/21/20  0044 09/21/20  0049 09/21/20  0517   Sodium mmol/L 131*  --  131*   Potassium mmol/L 4.4  --  4.0   Chloride mmol/L 98  --  102   CO2 mmol/L 16*  --  14*   Glucose mg/dL 87  --  105   Glucose, UA   --  Negative  --    BUN, Bld mg/dL 14  --  13   Creatinine mg/dL 0.8  --  0.7   Albumin g/dL 2.3*  --   --    Total Bilirubin mg/dL 2.8*  --   --    Alkaline Phosphatase U/L 372*  --   --    AST U/L 237*  --   --    ALT U/L 58*  --   --        Drug levels (last 3 results):  No results for input(s): VANCOMYCINRA,  VANCOMYCINPE, VANCOMYCINTR in the last 72 hours.    Microbiologic Results:  Microbiology Results (last 7 days)       Procedure Component Value Units Date/Time    Blood culture x two cultures. Draw prior to antibiotics. [[id number]] Collected: 09/21/20 0043    Order Status: Sent Specimen: Blood from Peripheral, Antecubital, Right Updated: 09/21/20 0228    Blood culture x two cultures. Draw prior to antibiotics. [[id number]] Collected: 09/21/20 0131    Order Status: Sent Specimen: Blood from Peripheral, Wrist, Left Updated: 09/21/20 0228

## 2020-09-21 NOTE — ASSESSMENT & PLAN NOTE
Ct abdomen /pelvis with mild urinary bladder wall thickening may relate to incomplete distention however there is an air bubble within the urinary bladder that may be due to instrumentation however correlation for UTI/cystitis is needed  Continue Vancomycin  Add Meropenem  Consult urology

## 2020-09-21 NOTE — HPI
Huy Cisneros is a 30 yo female with a past medical history of colon cancer with liver mets, tachycardia, HTN, uterine cyst. She presented to the ED with complaints of RLQ abdominal pain and SOB. CT abdomen pelvis demonstrating mild urinary bladder wall thickening and air bubble within the urinary bladder. Patient denies dysuria, urgency, frequency, hematuria or pneumaturia.

## 2020-09-22 NOTE — PLAN OF CARE
Problem: Adult Inpatient Plan of Care  Goal: Chart Reviewed  Outcome: Ongoing, Progressing   VN completed admission questions with patient.  All questions answered.   Education given on safety measures and using call light before getting out of bed by herself. Patient verbalized understanding. Bed locked and in lowest position. Alarm on.

## 2020-09-22 NOTE — SUBJECTIVE & OBJECTIVE
Interval History: awake and alert, abdominal pain is improved  Tachy cardia improved  Appreciates hem/onc rec's   Plan for possible discharge in AM if stable    Review of Systems   Constitutional: Negative for fatigue and fever.   Respiratory: Negative for cough and chest tightness.    Cardiovascular: Negative for chest pain.   Gastrointestinal: Negative for abdominal distention, abdominal pain, anal bleeding, blood in stool, diarrhea and nausea.   Endocrine: Negative for polyuria.   Genitourinary: Negative for dysuria.   Musculoskeletal: Negative for arthralgias.   Skin: Negative for color change and wound.   Neurological: Negative for dizziness and weakness.   Psychiatric/Behavioral: Negative for agitation and behavioral problems.     Objective:     Vital Signs (Most Recent):  Temp: 99.2 °F (37.3 °C) (09/22/20 0842)  Pulse: (!) 114 (09/22/20 1144)  Resp: 18 (09/22/20 0931)  BP: 117/79 (09/22/20 0842)  SpO2: 97 % (09/22/20 0842) Vital Signs (24h Range):  Temp:  [97.1 °F (36.2 °C)-99.4 °F (37.4 °C)] 99.2 °F (37.3 °C)  Pulse:  [102-138] 114  Resp:  [18-39] 18  SpO2:  [97 %-100 %] 97 %  BP: (117-141)/(79-95) 117/79     Weight: 84.5 kg (186 lb 4.6 oz)  Body mass index is 31 kg/m².    Intake/Output Summary (Last 24 hours) at 9/22/2020 1317  Last data filed at 9/22/2020 0600  Gross per 24 hour   Intake 740 ml   Output 1350 ml   Net -610 ml      Physical Exam  Constitutional:       Appearance: She is well-developed.   HENT:      Head: Normocephalic and atraumatic.   Neck:      Musculoskeletal: Neck supple.   Cardiovascular:      Rate and Rhythm: Normal rate and regular rhythm.      Heart sounds: Normal heart sounds. No murmur. No friction rub. No gallop.    Pulmonary:      Breath sounds: Normal breath sounds.   Abdominal:      General: Bowel sounds are normal. There is no distension.      Palpations: Abdomen is soft. There is no mass.      Tenderness: There is no rebound.   Musculoskeletal:         General: No tenderness.    Skin:     General: Skin is warm.   Neurological:      Mental Status: She is alert and oriented to person, place, and time.      Deep Tendon Reflexes: Reflexes are normal and symmetric.   Psychiatric:         Behavior: Behavior normal.         Significant Labs:   Blood Culture:   Recent Labs   Lab 09/21/20  0043 09/21/20  0131   LABBLOO No Growth to date  No Growth to date No Growth to date  No Growth to date     CBC:   Recent Labs   Lab 09/21/20  0044 09/21/20  0517 09/22/20  0500   WBC 11.63 9.36 9.83   HGB 7.8* 7.0* 7.2*   HCT 24.8* 22.3* 23.3*    294 239     Cardiac Markers: No results for input(s): CKMB, MYOGLOBIN, BNP, TROPISTAT in the last 48 hours.  Lactic Acid:   Recent Labs   Lab 09/21/20  0517 09/21/20  1233 09/21/20  1813   LACTATE 4.3* 3.1* 3.1*     Troponin: No results for input(s): TROPONINI in the last 48 hours.  TSH:   Recent Labs   Lab 07/14/20  0959   TSH 1.876     Urine Culture: No results for input(s): LABURIN in the last 48 hours.  Urine Studies:   Recent Labs   Lab 09/21/20  0049   COLORU Yellow   APPEARANCEUA Clear   PHUR 7.0   SPECGRAV 1.020   PROTEINUA 1+*   GLUCUA Negative   KETONESU Negative   BILIRUBINUA 1+*   OCCULTUA Trace*   NITRITE Negative   UROBILINOGEN Negative   LEUKOCYTESUR Negative   RBCUA 3   WBCUA 1   BACTERIA Rare   HYALINECASTS 0     Imaging Results          CT Abdomen Pelvis With Contrast (Final result)  Result time 09/21/20 02:51:18    Final result by Wale Carmona MD (09/21/20 02:51:18)                 Impression:      Extensive abnormal appearance of the liver again noted consistent with hepatic metastatic disease with evidence for progression.    Mild free fluid of the abdomen and pelvis noted, with progression.    Multiple pulmonary nodules and abnormal opacities some of which demonstrate cavitary appearance may relate to pulmonary metastatic disease, or as previously discussed a component of septic emboli, this is noted on prior  examinations.    Cholelithiasis with mild pericholecystic fluid without pericholecystic inflammatory appearance, this pericholecystic fluid may relate to the mild free fluid of the abdomen and pelvis, correlation for gallbladder symptomatology is otherwise needed.    Mild urinary bladder wall thickening may relate to incomplete distention however there is an air bubble within the urinary bladder that may be due to instrumentation however correlation for UTI/cystitis is needed.    Mildly prominent small bowel loops not thought to represent obstruction, may relate to ileus however if there is persistent or worsening symptomatology additional follow-up to exclude developing obstruction recommended.      Electronically signed by: Wale Carmona  Date:    09/21/2020  Time:    02:51             Narrative:    EXAMINATION:  CT ABDOMEN PELVIS WITH CONTRAST    CLINICAL HISTORY:  Abdominal pain, fever;    TECHNIQUE:  Low dose axial images, sagittal and coronal reformations were obtained from the lung bases to the pubic symphysis following the IV administration of 100 mL of Omnipaque 350 .  Oral contrast was not given.    COMPARISON:  CT examination chest abdomen pelvis September 2, 2020    FINDINGS:  Single-phase CT examination of the abdomen and pelvis was performed.  Intravenous contrast was utilized.  Oral contrast was not utilized.    The lung bases demonstrate multiple pulmonary nodules and mass lesions, some of which demonstrate cavitary abnormality, as seen on prior chest CT.  There is minimal pleural fluid noted.  The stomach is decompressed, with mild fluid and air in the lumen, nonspecific appearance.  The liver is enlarged and there are multiple hepatic mass lesions consistent with hepatic metastatic disease again noted, there is appearance of progression as the lesions appears somewhat more prominent.  There is abnormal appearance of the gallbladder, there is intraluminal complexity likely relating to  cholelithiasis, there is pericholecystic fluid, without significant pericholecystic inflammatory change, and although pericholecystic fluid may relate to gallbladder pathology, this may relate to mild free fluid of the abdomen and pelvis, with some free fluid adjacent to the inferior aspect of the right lobe of the liver, and along the right pericolic gutter as well as free fluid in the pelvis.  The volume of free fluid has increased when compared to the prior study.  There is suspected heterogeneous enlarged lymph node at the richard hepatis again noted.  There is no evidence for peripancreatic inflammatory change, there is no evidence for abnormal pancreatic or biliary ductal dilatation.  The portal venous structures demonstrate appropriate opacification.  The spleen and adrenal glands appear stable.  There is no evidence for hydronephrosis or obstructive uropathy bilaterally.  The abdominal aorta demonstrates appropriate opacification.  The uterus and adnexa appear appropriate without evidence for acute process, free fluid of the pelvis noted as discussed above.  The urinary bladder is incompletely distended, mild wall thickening may relate to incomplete distention, there is however an air bubble within the urinary bladder, this may relate to instrumentation however can be seen with infection, clinical and historical correlation for UTI/cystitis is needed.    Postoperative change of the bowel is noted, there are some mildly prominent small bowel loops, however not thought to represent obstruction may represent ileus, there is no evidence for inflammatory or obstructive process of the colon.  If there is persistent or worsening symptomatology additional follow-up recommended to exclude the possibility of developing partial obstruction.  There is no evidence for free intraperitoneal air.  The visualized osseous structures appear intact.                               X-Ray Chest AP Portable (Final result)  Result time  09/21/20 01:16:40    Final result by Federico Barron MD (09/21/20 01:16:40)                 Impression:      Removal of Kana catheter from right jugular approach now with PICC line tip over the innominate/subclavian vein on the right.    Improved patchy lung airspace opacities most significantly in the left lung base with decreased pleural fluid.      Electronically signed by: Federico Barron  Date:    09/21/2020  Time:    01:16             Narrative:    EXAMINATION:  XR CHEST AP PORTABLE    CLINICAL HISTORY:  Sepsis;    TECHNIQUE:  Single frontal view of the chest was performed.    COMPARISON:  Chest x-ray, 09/02/2020 at 17:33 hours    FINDINGS:  Kana dialysis catheter is been removed.  PICC line tip previously at the atrial caval junction is now overlying the innominate vein on the right.    The hazy opacities in the periphery a little spaces of the lungs most confluent in the left retrocardiac region are mildly to moderately improved.  Pleural effusion on the left is less apparent.  There is no pneumothorax.  The heart mediastinal contours remain stable.                                Significant Imaging: I have reviewed all pertinent imaging results/findings within the past 24 hours.

## 2020-09-22 NOTE — PROGRESS NOTES
Pharmacokinetic Assessment Follow Up: IV Vancomycin    Vancomycin serum concentration assessment(s):    The trough level was drawn correctly and can be used to guide therapy at this time. The measurement is above the desired definitive target range of 15 to 20 mcg/mL.    Vancomycin Regimen Plan:    Discontinue the scheduled vancomycin regimen and re-dose when the random level is less than 25 mcg/mL, next level to be drawn at AM lab draw on 9/23.    Drug levels (last 3 results):  Recent Labs   Lab Result Units 09/22/20  1505   Vancomycin-Trough ug/mL 39.1*       Pharmacy will continue to follow and monitor vancomycin.    Please contact pharmacy at extension [phone] for questions regarding this assessment.    Thank you for the consult,   Lyla Quan       Patient brief summary:  Huy Cisneros is a 29 y.o. female initiated on antimicrobial therapy with IV Vancomycin for treatment of bacteremia    The patient's current regimen is 1500 mg q12h    Drug Allergies:   Review of patient's allergies indicates:   Allergen Reactions    Sulfa (sulfonamide antibiotics)        Actual Body Weight:   84.5 kg    Renal Function:   Estimated Creatinine Clearance: 126.2 mL/min (based on SCr of 0.7 mg/dL).,     Dialysis Method (if applicable):  N/A    CBC (last 72 hours):  Recent Labs   Lab Result Units 09/21/20  0044 09/21/20  0517 09/22/20  0500   WBC K/uL 11.63 9.36 9.83   Hemoglobin g/dL 7.8* 7.0* 7.2*   Hematocrit % 24.8* 22.3* 23.3*   Platelets K/uL 336 294 239   Gran% % 76.6* 74.1* 75.0*   Lymph% % 11.2* 13.1* 12.0*   Mono% % 8.7 9.3 9.2   Eosinophil% % 0.3 0.1 0.7   Basophil% % 0.4 0.3 0.5   Differential Method  Automated Automated Automated       Metabolic Panel (last 72 hours):  Recent Labs   Lab Result Units 09/21/20  0044 09/21/20  0049 09/21/20  0517 09/22/20  0500   Sodium mmol/L 131*  --  131* 130*   Potassium mmol/L 4.4  --  4.0 4.5   Chloride mmol/L 98  --  102 101   CO2 mmol/L 16*  --  14* 16*   Glucose  mg/dL 87  --  105 76   Glucose, UA   --  Negative  --   --    BUN, Bld mg/dL 14  --  13 12   Creatinine mg/dL 0.8  --  0.7 0.7   Albumin g/dL 2.3*  --   --   --    Total Bilirubin mg/dL 2.8*  --   --   --    Alkaline Phosphatase U/L 372*  --   --   --    AST U/L 237*  --   --   --    ALT U/L 58*  --   --   --        Vancomycin Administrations:  vancomycin given in the last 96 hours                     vancomycin 1.5 g in dextrose 5 % 250 mL IVPB (ready to mix) (mg) 1,500 mg New Bag 09/22/20 0349     1,500 mg New Bag 09/21/20 1511    vancomycin (VANCOCIN) 2,000 mg in dextrose 5 % 500 mL IVPB (mg) 2,000 mg New Bag 09/21/20 0411                    Microbiologic Results:  Microbiology Results (last 7 days)       Procedure Component Value Units Date/Time    Blood culture x two cultures. Draw prior to antibiotics. [[id number]] Collected: 09/21/20 0043    Order Status: Completed Specimen: Blood from Peripheral, Antecubital, Right Updated: 09/22/20 0613     Blood Culture, Routine No Growth to date      No Growth to date    Narrative:      Aerobic and anaerobic    Blood culture x two cultures. Draw prior to antibiotics. [[id number]] Collected: 09/21/20 0131    Order Status: Completed Specimen: Blood from Peripheral, Wrist, Left Updated: 09/22/20 0613     Blood Culture, Routine No Growth to date      No Growth to date    Narrative:      Aerobic and anaerobic

## 2020-09-22 NOTE — PROGRESS NOTES
[facility] Hospital Medicine  Progress Note    Patient Name: Huy Cisneros  MRN: [id number]  Patient Class: IP- Inpatient   Admission Date: 9/20/2020  Length of Stay: 1 days  Attending Physician: Maryana Patel*  Primary Care Provider: Primary Doctor No        Subjective:     Principal Problem:Sepsis        HPI:  Huy Cisneros is a 28 y/o F with PMH of Cancer of the colon with liver mets, tachycardia, HTN, and Uterine cyst, present to the ED with RLQ abdominal pain, she denies fever, chills, nausea, vomiting, constipation dysuria, urgency or frequency.   In the ED Lactic acid 5.0, wbc wnl, CT abdomen/ pelvis described metastatic tumor progression with Mild urinary bladder wall thickening may relate to incomplete distention however there is an air bubble within the urinary bladder that may be due to instrumentation however correlation for UTI/cystitis is needed    Overview/Hospital Course:  No notes on file    Interval History: awake and alert, abdominal pain is improved  Tachy cardia improved  Appreciates hem/onc rec's   Plan for possible discharge in AM if stable    Review of Systems   Constitutional: Negative for fatigue and fever.   Respiratory: Negative for cough and chest tightness.    Cardiovascular: Negative for chest pain.   Gastrointestinal: Negative for abdominal distention, abdominal pain, anal bleeding, blood in stool, diarrhea and nausea.   Endocrine: Negative for polyuria.   Genitourinary: Negative for dysuria.   Musculoskeletal: Negative for arthralgias.   Skin: Negative for color change and wound.   Neurological: Negative for dizziness and weakness.   Psychiatric/Behavioral: Negative for agitation and behavioral problems.     Objective:     Vital Signs (Most Recent):  Temp: 99.2 °F (37.3 °C) (09/22/20 0842)  Pulse: (!) 114 (09/22/20 1144)  Resp: 18 (09/22/20 0931)  BP: 117/79 (09/22/20 0842)  SpO2: 97 % (09/22/20 0842) Vital Signs (24h Range):  Temp:  [97.1  °F (36.2 °C)-99.4 °F (37.4 °C)] 99.2 °F (37.3 °C)  Pulse:  [102-138] 114  Resp:  [18-39] 18  SpO2:  [97 %-100 %] 97 %  BP: (117-141)/(79-95) 117/79     Weight: 84.5 kg (186 lb 4.6 oz)  Body mass index is 31 kg/m².    Intake/Output Summary (Last 24 hours) at 9/22/2020 1317  Last data filed at 9/22/2020 0600  Gross per 24 hour   Intake 740 ml   Output 1350 ml   Net -610 ml      Physical Exam  Constitutional:       Appearance: She is well-developed.   HENT:      Head: Normocephalic and atraumatic.   Neck:      Musculoskeletal: Neck supple.   Cardiovascular:      Rate and Rhythm: Normal rate and regular rhythm.      Heart sounds: Normal heart sounds. No murmur. No friction rub. No gallop.    Pulmonary:      Breath sounds: Normal breath sounds.   Abdominal:      General: Bowel sounds are normal. There is no distension.      Palpations: Abdomen is soft. There is no mass.      Tenderness: There is no rebound.   Musculoskeletal:         General: No tenderness.   Skin:     General: Skin is warm.   Neurological:      Mental Status: She is alert and oriented to person, place, and time.      Deep Tendon Reflexes: Reflexes are normal and symmetric.   Psychiatric:         Behavior: Behavior normal.         Significant Labs:   Blood Culture:   Recent Labs   Lab 09/21/20  0043 09/21/20  0131   LABBLOO No Growth to date  No Growth to date No Growth to date  No Growth to date     CBC:   Recent Labs   Lab 09/21/20  0044 09/21/20  0517 09/22/20  0500   WBC 11.63 9.36 9.83   HGB 7.8* 7.0* 7.2*   HCT 24.8* 22.3* 23.3*    294 239     Cardiac Markers: No results for input(s): CKMB, MYOGLOBIN, BNP, TROPISTAT in the last 48 hours.  Lactic Acid:   Recent Labs   Lab 09/21/20  0517 09/21/20  1233 09/21/20  1813   LACTATE 4.3* 3.1* 3.1*     Troponin: No results for input(s): TROPONINI in the last 48 hours.  TSH:   Recent Labs   Lab 07/14/20  0959   TSH 1.876     Urine Culture: No results for input(s): LABURIN in the last 48  hours.  Urine Studies:   Recent Labs   Lab 09/21/20  0049   COLORU Yellow   APPEARANCEUA Clear   PHUR 7.0   SPECGRAV 1.020   PROTEINUA 1+*   GLUCUA Negative   KETONESU Negative   BILIRUBINUA 1+*   OCCULTUA Trace*   NITRITE Negative   UROBILINOGEN Negative   LEUKOCYTESUR Negative   RBCUA 3   WBCUA 1   BACTERIA Rare   HYALINECASTS 0     Imaging Results          CT Abdomen Pelvis With Contrast (Final result)  Result time 09/21/20 02:51:18    Final result by Wale Carmona MD (09/21/20 02:51:18)                 Impression:      Extensive abnormal appearance of the liver again noted consistent with hepatic metastatic disease with evidence for progression.    Mild free fluid of the abdomen and pelvis noted, with progression.    Multiple pulmonary nodules and abnormal opacities some of which demonstrate cavitary appearance may relate to pulmonary metastatic disease, or as previously discussed a component of septic emboli, this is noted on prior examinations.    Cholelithiasis with mild pericholecystic fluid without pericholecystic inflammatory appearance, this pericholecystic fluid may relate to the mild free fluid of the abdomen and pelvis, correlation for gallbladder symptomatology is otherwise needed.    Mild urinary bladder wall thickening may relate to incomplete distention however there is an air bubble within the urinary bladder that may be due to instrumentation however correlation for UTI/cystitis is needed.    Mildly prominent small bowel loops not thought to represent obstruction, may relate to ileus however if there is persistent or worsening symptomatology additional follow-up to exclude developing obstruction recommended.      Electronically signed by: Wale Carmona  Date:    09/21/2020  Time:    02:51             Narrative:    EXAMINATION:  CT ABDOMEN PELVIS WITH CONTRAST    CLINICAL HISTORY:  Abdominal pain, fever;    TECHNIQUE:  Low dose axial images, sagittal and coronal reformations were obtained  from the lung bases to the pubic symphysis following the IV administration of 100 mL of Omnipaque 350 .  Oral contrast was not given.    COMPARISON:  CT examination chest abdomen pelvis September 2, 2020    FINDINGS:  Single-phase CT examination of the abdomen and pelvis was performed.  Intravenous contrast was utilized.  Oral contrast was not utilized.    The lung bases demonstrate multiple pulmonary nodules and mass lesions, some of which demonstrate cavitary abnormality, as seen on prior chest CT.  There is minimal pleural fluid noted.  The stomach is decompressed, with mild fluid and air in the lumen, nonspecific appearance.  The liver is enlarged and there are multiple hepatic mass lesions consistent with hepatic metastatic disease again noted, there is appearance of progression as the lesions appears somewhat more prominent.  There is abnormal appearance of the gallbladder, there is intraluminal complexity likely relating to cholelithiasis, there is pericholecystic fluid, without significant pericholecystic inflammatory change, and although pericholecystic fluid may relate to gallbladder pathology, this may relate to mild free fluid of the abdomen and pelvis, with some free fluid adjacent to the inferior aspect of the right lobe of the liver, and along the right pericolic gutter as well as free fluid in the pelvis.  The volume of free fluid has increased when compared to the prior study.  There is suspected heterogeneous enlarged lymph node at the richard hepatis again noted.  There is no evidence for peripancreatic inflammatory change, there is no evidence for abnormal pancreatic or biliary ductal dilatation.  The portal venous structures demonstrate appropriate opacification.  The spleen and adrenal glands appear stable.  There is no evidence for hydronephrosis or obstructive uropathy bilaterally.  The abdominal aorta demonstrates appropriate opacification.  The uterus and adnexa appear appropriate without  evidence for acute process, free fluid of the pelvis noted as discussed above.  The urinary bladder is incompletely distended, mild wall thickening may relate to incomplete distention, there is however an air bubble within the urinary bladder, this may relate to instrumentation however can be seen with infection, clinical and historical correlation for UTI/cystitis is needed.    Postoperative change of the bowel is noted, there are some mildly prominent small bowel loops, however not thought to represent obstruction may represent ileus, there is no evidence for inflammatory or obstructive process of the colon.  If there is persistent or worsening symptomatology additional follow-up recommended to exclude the possibility of developing partial obstruction.  There is no evidence for free intraperitoneal air.  The visualized osseous structures appear intact.                               X-Ray Chest AP Portable (Final result)  Result time 09/21/20 01:16:40    Final result by Federico Barron MD (09/21/20 01:16:40)                 Impression:      Removal of Kana catheter from right jugular approach now with PICC line tip over the innominate/subclavian vein on the right.    Improved patchy lung airspace opacities most significantly in the left lung base with decreased pleural fluid.      Electronically signed by: Federico Barron  Date:    09/21/2020  Time:    01:16             Narrative:    EXAMINATION:  XR CHEST AP PORTABLE    CLINICAL HISTORY:  Sepsis;    TECHNIQUE:  Single frontal view of the chest was performed.    COMPARISON:  Chest x-ray, 09/02/2020 at 17:33 hours    FINDINGS:  Kana dialysis catheter is been removed.  PICC line tip previously at the atrial caval junction is now overlying the innominate vein on the right.    The hazy opacities in the periphery a little spaces of the lungs most confluent in the left retrocardiac region are mildly to moderately improved.  Pleural effusion on the left is less  apparent.  There is no pneumothorax.  The heart mediastinal contours remain stable.                                Significant Imaging: I have reviewed all pertinent imaging results/findings within the past 24 hours.      Assessment/Plan:      * Sepsis  Elevated lactic acid-trend  Continue Vanc  Add Meropenem  Blood cx pending       Elevated LFTs  Likely due to tumor burden      Hypertension  Tachycardia  Chronic  Continue Meotprolol      Emphysematous cystitis  Ct abdomen /pelvis with mild urinary bladder wall thickening may relate to incomplete distention however there is an air bubble within the urinary bladder that may be due to instrumentation however correlation for UTI/cystitis is needed  Continue Vancomycin  Add Meropenem  Consult urology    Hyponatremia    Monitor     Anemia  monitor      Adenocarcinoma of colon  Metastatic disease  CT abdomen/pelvis: Extensive abnormal appearance of the liver again noted consistent with hepatic metastatic disease with evidence for progression.  Mild free fluid of the abdomen and pelvis noted, with progression  Consult hematology  Consult palliative care      VTE Risk Mitigation (From admission, onward)         Ordered     enoxaparin injection 40 mg  Every 24 hours      09/21/20 1405     IP VTE HIGH RISK PATIENT  Once      09/21/20 0409     Place sequential compression device  Until discontinued      09/21/20 0409                Discharge Planning   ZOE:      Code Status: Full Code   Is the patient medically ready for discharge?:     Reason for patient still in hospital (select all that apply): Patient trending condition  Discharge Plan A: Home Health                  Maryana Patel MD  Department of Hospital Medicine   [facility]

## 2020-09-22 NOTE — PLAN OF CARE
Tn visited with pt and updated on POC. Tn updated whiteboard and encouraged pt to call for any needs/concerns.    Per  note,pt was d/c'd from [facility] 9/9/20 with [facility] and Violet.     09/22/20 1441   Discharge Reassessment   Assessment Type Discharge Planning Reassessment   Provided patient/caregiver education on the expected discharge date and the discharge plan Yes   Do you have any problems affording any of your prescribed medications? No   Discharge Plan A Home Health   Discharge Plan B Home Health;Home with family   DME Needed Upon Discharge  none   Anticipated Discharge Disposition Home-Health   Can the patient/caregiver answer the patient profile reliably? Yes, cognitively intact   How does the patient rate their overall health at the present time? Fair   Describe the patient's ability to walk at the present time. Minor restrictions or changes   How often would a person be available to care for the patient? Often   Number of comorbid conditions (as recorded on the chart) Five or more   During the past month, has the patient often been bothered by feeling down, depressed or hopeless? No   During the past month, has the patient often been bothered by little interest or pleasure in doing things? No   Post-Acute Status   Post-Acute Authorization Home Health

## 2020-09-22 NOTE — NURSING TRANSFER
Nursing Transfer Note      9/21/2020     Transfer To: 481    Transfer via bed    Transfer with cardiac monitoring    Transported by RN    Medicines sent: no medications to send    Chart send with patient: Yes    Notified: mother Char    Patient reassessed at: 9/21/2020 @ 1850    Upon arrival to floor: cardiac monitor applied, patient oriented to room, call bell in reach and bed in lowest position, new RN @ bedside.

## 2020-09-22 NOTE — CHAPLAIN
Visited with patient and explored her concerns.  She appeared lethargic at first but was verbal.  She is sad about her condition and concerned about her children and wants to be able to raise them. She said her children are upset as well.  I tried to assist her with her tray but she quickly became nauseous.  I suggested she request popcyciles and snacks until she can eat real food again. I will continue to follow up with her.

## 2020-09-22 NOTE — PROGRESS NOTES
Pharmacokinetic Assessment Follow Up: IV Vancomycin    Vancomycin serum concentration assessment(s):    The trough level was drawn correctly and can be used to guide therapy at this time. The measurement is above the desired definitive target range of 15 to 20 mcg/mL.    Vancomycin Regimen Plan:    Discontinue the scheduled vancomycin regimen and re-dose when the random level is less than 25 mcg/mL, next level to be drawn at AM lab draw on 9/23.    Drug levels (last 3 results):  Recent Labs   Lab Result Units 09/22/20  1505   Vancomycin-Trough ug/mL 39.1*       Pharmacy will continue to follow and monitor vancomycin.    Please contact pharmacy at extension [phone] for questions regarding this assessment.    Thank you for the consult,   Lyla Quan       Patient brief summary:  Huy Cisneros is a 29 y.o. female initiated on antimicrobial therapy with IV Vancomycin for treatment of bacteremia    The patient's current regimen is 1500 mg q12h    Drug Allergies:   Review of patient's allergies indicates:   Allergen Reactions    Sulfa (sulfonamide antibiotics)        Actual Body Weight:   84.5 kg    Renal Function:   Estimated Creatinine Clearance: 126.2 mL/min (based on SCr of 0.7 mg/dL).,     Dialysis Method (if applicable):  N/A    CBC (last 72 hours):  Recent Labs   Lab Result Units 09/21/20  0044 09/21/20  0517 09/22/20  0500   WBC K/uL 11.63 9.36 9.83   Hemoglobin g/dL 7.8* 7.0* 7.2*   Hematocrit % 24.8* 22.3* 23.3*   Platelets K/uL 336 294 239   Gran% % 76.6* 74.1* 75.0*   Lymph% % 11.2* 13.1* 12.0*   Mono% % 8.7 9.3 9.2   Eosinophil% % 0.3 0.1 0.7   Basophil% % 0.4 0.3 0.5   Differential Method  Automated Automated Automated       Metabolic Panel (last 72 hours):  Recent Labs   Lab Result Units 09/21/20  0044 09/21/20  0049 09/21/20  0517 09/22/20  0500   Sodium mmol/L 131*  --  131* 130*   Potassium mmol/L 4.4  --  4.0 4.5   Chloride mmol/L 98  --  102 101   CO2 mmol/L 16*  --  14* 16*   Glucose  mg/dL 87  --  105 76   Glucose, UA   --  Negative  --   --    BUN, Bld mg/dL 14  --  13 12   Creatinine mg/dL 0.8  --  0.7 0.7   Albumin g/dL 2.3*  --   --   --    Total Bilirubin mg/dL 2.8*  --   --   --    Alkaline Phosphatase U/L 372*  --   --   --    AST U/L 237*  --   --   --    ALT U/L 58*  --   --   --        Vancomycin Administrations:  vancomycin given in the last 96 hours                     vancomycin 1.5 g in dextrose 5 % 250 mL IVPB (ready to mix) (mg) 1,500 mg New Bag 09/22/20 0349     1,500 mg New Bag 09/21/20 1511    vancomycin (VANCOCIN) 2,000 mg in dextrose 5 % 500 mL IVPB (mg) 2,000 mg New Bag 09/21/20 0411                    Microbiologic Results:  Microbiology Results (last 7 days)       Procedure Component Value Units Date/Time    Blood culture x two cultures. Draw prior to antibiotics. [[id number]] Collected: 09/21/20 0043    Order Status: Completed Specimen: Blood from Peripheral, Antecubital, Right Updated: 09/22/20 0613     Blood Culture, Routine No Growth to date      No Growth to date    Narrative:      Aerobic and anaerobic    Blood culture x two cultures. Draw prior to antibiotics. [[id number]] Collected: 09/21/20 0131    Order Status: Completed Specimen: Blood from Peripheral, Wrist, Left Updated: 09/22/20 0613     Blood Culture, Routine No Growth to date      No Growth to date    Narrative:      Aerobic and anaerobic

## 2020-09-22 NOTE — ASSESSMENT & PLAN NOTE
Ct abdomen /pelvis with mild urinary bladder wall thickening may relate to incomplete distention however there is an air bubble within the urinary bladder that may be due to instrumentation however correlation for UTI/cystitis is needed  Continue Vancomycin  Add Meropenem  Consult urology- appreciates rec's

## 2020-09-23 NOTE — PLAN OF CARE
VN note: VN cued into patient's room for introduction. Patient asleep at this time, no distress noted. VN will continue to be available to patient and intervene prn.

## 2020-09-23 NOTE — PT/OT/SLP EVAL
Occupational Therapy   Evaluation    Name: Huy Cisneros  MRN: [id number]  Admitting Diagnosis:  Sepsis      Recommendations:     Discharge Recommendations: (Pt's family states they will be able to take pt to OP OT/PT as pt unable to receive HHOT/PT with insurance benefits)  Discharge Equipment Recommendations:  (likely hospital bed, possibly RW)  Barriers to discharge:  None    Assessment:     Huy Cisneros is a 29 y.o. female with a medical diagnosis of Sepsis.  She presents with deconditioning, pain limiting occupational performance. Performance deficits affecting function: weakness, impaired endurance, gait instability, impaired functional mobilty, impaired self care skills, impaired balance, decreased lower extremity function, decreased upper extremity function, decreased coordination, pain, decreased ROM, impaired cardiopulmonary response to activity.      Rehab Prognosis: Fair; patient would benefit from acute skilled OT services to address these deficits and reach maximum level of function.       Plan:     Patient to be seen 5 x/week, 3 x/week to address the above listed problems via self-care/home management, therapeutic activities, therapeutic exercises  · Plan of Care Expires: 10/23/20  · Plan of Care Reviewed with: patient    Subjective     Chief Complaint: Pt reports generalized fatigue, back pain   Patient/Family Comments/goals: Pt states she wants to rest    Occupational Profile:  Living Environment: Pt lives with mother and 23 year old brother 16 year old sister (possibly another sibling) and her three children ages 3, 8, 10 in [place], 0STE, tub/sh combo  Previous level of function: Pt required assist for ADLs and has been increasingly bed bound since prior admittal  Roles and Routines: Caretaker to self, family helps to care for pt's three children. Pt is a poor historian and initially stated there were no children in the home to care for and that mother worked during the day. Pt then  stated that her 23 year old brother and 16 year old sister assist with  when the pt's mother is at work; unclear if mother is currently working full time  Equipment Used at Home:  bedside commode, wheelchair, shower chair  Assistance upon Discharge: Family    Pain/Comfort:  Pain Rating 1: 8/10  Location - Orientation 1: generalized  Location 1: back  Pain Addressed 1: Reposition, Distraction, Cessation of Activity  Pain Rating Post-Intervention 1: (did not rate, reduced pain once returned to supine in bed)    Patients cultural, spiritual, Hindu conflicts given the current situation:      Objective:     Communicated with: nsdenny prior to session.  Patient found HOB elevated with peripheral IV, telemetry, PICC line upon OT entry to room.    General Precautions: Standard, fall   Orthopedic Precautions:N/A   Braces: N/A     Occupational Performance:    Bed Mobility:    · Patient completed Rolling/Turning to Left with  moderate assistance  · Patient completed Scooting/Bridging with minimum assistance  · Patient completed Supine to Sit with moderate/maximum assistance  · Patient completed Sit to Supine with minimum/moderate assistance    Functional Mobility/Transfers:  · Patient completed Sit <> Stand Transfer with moderate assistance  with  hand-held assist   Functional Mobility: Pt with fair to fair- dynamic seated and standing balance.    Activities of Daily Living:  · Upper Body Dressing: moderate assistance to don gown as robe seated EOB  · Lower Body Dressing: maximal assistance to don/doff B socks seated EOB    Cognitive/Visual Perceptual:  Cognitive/Psychosocial Skills:     -       Oriented to: Person, Place, loosely to situation   -       Follows Commands/attention:Follows two step commands  -       Communication: clear/fluent  -       Memory: Impaired STM, LTM  -       Safety awareness/insight to disability: impaired   -       Mood/Affect/Coping skills/emotional control: Despondent, flat  affect    Physical Exam:  Postural examination/scapula alignment:    -       Rounded shoulders  Motor Planning:    -       WFL  Dominant hand:    -       R handed  Upper Extremity Range of Motion: BUE WFL     Upper Extremity Strength:  BUE 3/5   Strength:  B hands 4-/5  Fine Motor Coordination:    -       Impaired in fucntional task practice  Gross motor coordination:   WFL, slowed motor movement noted, unclear if 2/2 weakness/pain/anticipated pain    AMPAC 6 Click ADL:  AMPAC Total Score: 15    Treatment & Education:  Pt educated on role of OT and POC.   Pt performing skills as listed above.   Education:    Patient left with bed in chair position (unable to tolerate bed in full chair position, adjusted to toleration) with all lines intact, call button in reach, bed alarm on and nsg notified    GOALS:   Multidisciplinary Problems     Occupational Therapy Goals        Problem: Occupational Therapy Goal    Goal Priority Disciplines Outcome Interventions   Occupational Therapy Goal     OT, PT/OT Ongoing, Progressing    Description: Goals to be met by: 10/23/2020      Patient will increase functional independence with ADLs by performing:    UE Dressing with Stand-by Assistance.  LE Dressing with Moderate Assistance.  Grooming while seated with Minimal Assistance.  Toileting from bedside commode with Minimal Assistance for hygiene and clothing management.   Toilet transfer to bedside commode with Minimal Assistance.  Increased functional strength to WFL for self care.  Upper extremity exercise program x10 reps per handout, with assistance as needed.                      History:     Past Medical History:   Diagnosis Date    Cancer     Colon CA with mets to the liver    Hypertension 9/21/2020    Uterine cyst          Past Surgical History:   Procedure Laterality Date    COLONOSCOPY N/A 7/15/2020    Procedure: COLONOSCOPY;  Surgeon: Hi Drake MD;  Location: [facility];  Service: Endoscopy;   Laterality: N/A;    COLONOSCOPY W/ BIOPSIES      INSERTION OF TUNNELED CENTRAL VENOUS CATHETER (CVC) WITH SUBCUTANEOUS PORT N/A 7/17/2020    Procedure: INSERTION, PORT-A-CATH;  Surgeon: Armani Naqvi MD;  Location: [facility];  Service: General;  Laterality: N/A;       Time Tracking:     OT Date of Treatment: 09/23/20  OT Start Time: 1234  OT Stop Time: 1257  OT Total Time (min): 23 min    Billable Minutes:Evaluation 13  Therapeutic Activity 10    Wandy Bowens OT  9/23/2020

## 2020-09-23 NOTE — PT/OT/SLP PROGRESS
Physical Therapy      Patient Name:  Huy Cisneros   MRN:  [id number]    Patient not seen today secondary to Patient unwilling to participate, Patient fatigue, Other (Comment)(requesting later visit). Pt was followed by PT and OT on earlier admission with recs for  PT and OT, however pt is Medicaid. Pt likely not appropriate or able to get to outpatient therapy.  Pt did state she was cold and had leg pain. Placed pillows under lower legs, put on her slippers and assured patient was comfortable. RequestedTV to be turned off as well. Pt somnolent, sad, flat affect.  Very low soft voice. O2 NC adjusted for comfort as well and call bell left within reach for patient.     Maite Jones, PT

## 2020-09-23 NOTE — PROGRESS NOTES
Pharmacokinetic Assessment Follow Up: IV Vancomycin    Vancomycin serum concentration assessment(s):    The random level was drawn correctly and can be used to guide therapy at this time. The measurement is within the desired definitive target range of 15 to 20 mcg/mL.    Vancomycin Regimen Plan:    Change regimen to Vancomycin 1500 mg IV every 24 hours with next serum trough concentration measured at 1000 prior to 3rd dose on 9/25    Drug levels (last 3 results):  Recent Labs   Lab Result Units 09/22/20  1505 09/23/20  0523   Vancomycin, Random ug/mL  --  22.3   Vancomycin-Trough ug/mL 39.1*  --        Pharmacy will continue to follow and monitor vancomycin.    Please contact pharmacy at extension [phone] for questions regarding this assessment.    Thank you for the consult,   Robi Borja       Patient brief summary:  Huy Cisneros is a 29 y.o. female initiated on antimicrobial therapy with IV Vancomycin for treatment of bacteremia    The patient's current regimen is vancomycin 1500mg q24h    Drug Allergies:   Review of patient's allergies indicates:   Allergen Reactions    Sulfa (sulfonamide antibiotics)        Actual Body Weight:   84.5kg    Renal Function:   Estimated Creatinine Clearance: 126.2 mL/min (based on SCr of 0.7 mg/dL).,     Dialysis Method (if applicable):      CBC (last 72 hours):  Recent Labs   Lab Result Units 09/21/20  0044 09/21/20  0517 09/22/20  0500   WBC K/uL 11.63 9.36 9.83   Hemoglobin g/dL 7.8* 7.0* 7.2*   Hematocrit % 24.8* 22.3* 23.3*   Platelets K/uL 336 294 239   Gran% % 76.6* 74.1* 75.0*   Lymph% % 11.2* 13.1* 12.0*   Mono% % 8.7 9.3 9.2   Eosinophil% % 0.3 0.1 0.7   Basophil% % 0.4 0.3 0.5   Differential Method  Automated Automated Automated       Metabolic Panel (last 72 hours):  Recent Labs   Lab Result Units 09/21/20  0044 09/21/20  0049 09/21/20  0517 09/22/20  0500   Sodium mmol/L 131*  --  131* 130*   Potassium mmol/L 4.4  --  4.0 4.5   Chloride mmol/L 98  --  102 101    CO2 mmol/L 16*  --  14* 16*   Glucose mg/dL 87  --  105 76   Glucose, UA   --  Negative  --   --    BUN, Bld mg/dL 14  --  13 12   Creatinine mg/dL 0.8  --  0.7 0.7   Albumin g/dL 2.3*  --   --   --    Total Bilirubin mg/dL 2.8*  --   --   --    Alkaline Phosphatase U/L 372*  --   --   --    AST U/L 237*  --   --   --    ALT U/L 58*  --   --   --        Vancomycin Administrations:  vancomycin given in the last 96 hours                     vancomycin 1.5 g in dextrose 5 % 250 mL IVPB (ready to mix) (mg) 1,500 mg New Bag 09/22/20 0349     1,500 mg New Bag 09/21/20 1511    vancomycin (VANCOCIN) 2,000 mg in dextrose 5 % 500 mL IVPB (mg) 2,000 mg New Bag 09/21/20 0411                    Microbiologic Results:  Microbiology Results (last 7 days)       Procedure Component Value Units Date/Time    Blood culture x two cultures. Draw prior to antibiotics. [[id number]] Collected: 09/21/20 0043    Order Status: Completed Specimen: Blood from Peripheral, Antecubital, Right Updated: 09/23/20 0613     Blood Culture, Routine No Growth to date      No Growth to date      No Growth to date    Narrative:      Aerobic and anaerobic    Blood culture x two cultures. Draw prior to antibiotics. [[id number]] Collected: 09/21/20 0131    Order Status: Completed Specimen: Blood from Peripheral, Wrist, Left Updated: 09/23/20 0613     Blood Culture, Routine No Growth to date      No Growth to date      No Growth to date    Narrative:      Aerobic and anaerobic

## 2020-09-23 NOTE — DISCHARGE INSTRUCTIONS
Sepsis (English) View Edit Remove   Urinary Tract Infections in Women (English) View Edit Remove   Promethazine tablets (English) View Edit Remove   Caring for Your Peripherally Inserted Central Catheter (PICC), Discharge Instructions for (English) View Edit Remove

## 2020-09-23 NOTE — PLAN OF CARE
Pt to be d/c home as pt does not want hospice and prefers to resume home IV antibiotics until 9/29/2020 as prior to readmit. Pt was current with [facility] and [facility]; referrals sent and Tn informed Fly at [facility] of change in frequency of Vancomycin. Guardian   informed Tn they can no longer service pt due to staffing in [place]. Tn informed pt/mother/aunt and sent multiple referrals via [employer] to family preferences and pt denied due to insurance or agency can not meet needs. [place] informed Fly at [facility] and there agency unable to locate  agency as well so will send out their nurse to do PICC Care and draw Vancomycin trough when due. TN will refer pt to NP program. Tn informed Dr. Little of above and informed her family requesting out patient therapy and psych referrals.   Pt's mother said she is on the way to pick pt up ; mom and aunt aware and now accepting we can not get pt hh, pt/ot, and aide and informed Infusion comapny will send nurse to do picc care and draw trough and ambulatory referral for PT/OT and psych placed per family request and someone will call them to schedule. I am also going to refer pt to [facility] program. Family informed Tn they have applied for PCA through Medicaid waiver program.   TN gave pt Tn card and d/c folder and encouraged to call for any further needs concerns.    TN informed floor nurse and VN pt ready from TN standpoint.  \     09/23/20 1655   Final Note   Assessment Type Final Discharge Note   Anticipated Discharge Disposition IV Therapy   What phone number can be called within the next 1-3 days to see how you are doing after discharge? [phone]   Hospital Follow Up  Appt(s) scheduled? Yes   Discharge plans and expectations educations in teach back method with documentation complete? Yes   Right [employer] Referral Info   Post Acute Recommendation Other   Referral Type infusion   Facility Name [facility]   Post-Acute Status   Post-Acute  Authorization Medications   Post-Acute Placement Status Set-up Complete

## 2020-09-23 NOTE — PLAN OF CARE
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       Future Appointments   Date Time Provider Department Center   9/28/2020 11:00 AM Brandon Schumacher MD [facility]   10/5/2020 10:00 AM LAB, [facility] LAB [facility]   10/5/2020 10:20 AM LAB, [facility]   10/6/2020 10:20 AM Jewel Arzola MD [facility] HEM ONC [facility]   10/19/2020 10:00 AM LAB, [facility]           I have seen and examined this patient face to face today. My clinical findings that support the need for the home health skilled services and home bound status are the following:  Patient with medication mismanagement issues requiring home bound status as evidenced by  none.    Allergies:  Review of patient's allergies indicates:   Allergen Reactions    Sulfa (sulfonamide antibiotics)        Diet: regular diet    Activities: activity as tolerated    Nursing:   SN to complete comprehensive assessment including routine vital signs. Instruct on disease process and s/s of complications to report to MD. Review/verify medication list sent home with the patient at time of discharge  and instruct  patient/caregiver as needed. Frequency may be adjusted depending on start of care date.    Notify MD if SBP > 160 or < 90; DBP > 90 or < 50; HR > 120 or < 50; Temp > 101; Other:       PICC Line: Use strict sterile technique   Maintain occlusive dressing unless soiled, damp, unable to assess insertion site.  Change injection caps every 7 days and/or as needed when soiled or if catheter-related infection is suspected or documented.     Flush PICC Line: Always clamp the PICC tubing when not in use.  Use only 10 mL syringes to flush PICC; flush each lumen of PICC every 6 hours with 10 mL preservative-free saline flush solution.     Drawing blood from PICC Line: Stop infusions to all lumens,  withdraw 10 ml of blood  with 10 ml syringe and discard. Draw blood specimen.   Flush lumen with 20 ml of preservative-free saline-flush solution and resume infusion.     PICC line can be discontinued after the last abx therapy on 9/29/2020.           Medications: Review discharge medications with patient and family and provide education.      Current Discharge Medication List      START taking these medications    Details   promethazine (PHENERGAN) 12.5 MG Tab Take 1 tablet (12.5 mg total) by mouth every 6 (six) hours as needed.  Qty: 30 tablet, Refills: 2         CONTINUE these medications which have CHANGED    Details   vancomycin HCl in 5 % dextrose (VANCOMYCIN IN DEXTROSE 5 %) 1 gram/250 mL Soln Inject 375 mLs (1,500 mg total) the vein every  24( twenty-four)  hours. for 6 days End date 9/29/2020         CONTINUE these medications which have NOT CHANGED    Details   amLODIPine (NORVASC) 5 MG tablet Take 1 tablet (5 mg total) by mouth once daily.  Qty: 30 tablet, Refills: 11    Comments: .      docusate sodium (COLACE) 100 MG capsule Take 1 capsule (100 mg total) by mouth once daily.  Qty: 30 capsule, Refills: 0      folic acid (FOLVITE) 1 MG tablet Take 1 tablet (1 mg total) by mouth once daily.  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 Apply to skin overlying port site 30 minutes before chemotherapy.  Qty: 30 g, Refills: 2    Associated Diagnoses: Adenocarcinoma of colon      metoprolol tartrate (LOPRESSOR) 100 MG tablet Take 1 tablet (100 mg total) by mouth 2 (two) times daily.  Qty: 60 tablet, Refills: 11    Comments: .      oxyCODONE-acetaminophen (PERCOCET)  mg per tablet Take 1 tablet by mouth every 4 (four) hours as needed for Pain.  Qty: 30 tablet, Refills: 0    Comments: Quantity prescribed more than 7 day supply? Yes, quantity medically necessary      simethicone (MYLICON) 125 MG chewable tablet Take 1 tablet (125 mg total) by mouth every 6 (six) hours as needed for Flatulence.  Qty:  , Refills: 0      spironolactone (ALDACTONE) 25 MG tablet Take 1 tablet (25 mg total) by mouth once daily.  Qty: 30 tablet, Refills: 11    Comments: .           Check Vanc trough q 3rd dose    I certify that this patient is confined to her home and needs

## 2020-09-23 NOTE — PLAN OF CARE
Problem: Occupational Therapy Goal  Goal: Occupational Therapy Goal  Description: Goals to be met by: 10/23/2020      Patient will increase functional independence with ADLs by performing:    UE Dressing with Stand-by Assistance.  LE Dressing with Moderate Assistance.  Grooming while seated with Minimal Assistance.  Toileting from bedside commode with Minimal Assistance for hygiene and clothing management.   Toilet transfer to bedside commode with Minimal Assistance.  Increased functional strength to WFL for self care.  Upper extremity exercise program x10 reps per handout, with assistance as needed.     Outcome: Ongoing, Progressing   Pt would benefit from continued OT to address deficits in self care and functional mobility. Recommending HHOT/PT but as pt's insurance does not cover this, family reported they would like OP OT/PT; DME needs likely hospital bed, possibly RW

## 2020-09-23 NOTE — NURSING
As patient was dressed with IV out and Heart monitor off, tech and this nurse was attempting to get her into the wheelchair. Patient started sobbing and crying stating she didn't want to go and she wanted to be left alone. This nurse asked if she wanted to stay another night and not go home and patient was agreeable. Dr. Little called and informed of patient not wanting to go. Agreeable for patient to stay tonight and reevaluate tomorrow. Patient's mother who was waiting in the ED made aware. PRN morphine and zofran given per patient request at 1837. Heart monitor placed back on. PICC line still in place. Night shift nurse updated.    Marycarmen Cohn RN

## 2020-09-23 NOTE — SUBJECTIVE & OBJECTIVE
Interval History: awake and alert, abdominal pain resolved tolerating Po  Plan for possible discharge today.   vanc trough elevated yesterday.  Hold vanc and monitor. Appreciates pharmacy future dose decrease to daily           Review of Systems   Constitutional: Negative for fatigue and fever.   Respiratory: Negative for cough and chest tightness.    Cardiovascular: Negative for chest pain.   Gastrointestinal: Negative for abdominal distention, abdominal pain, anal bleeding, blood in stool, diarrhea and nausea.   Endocrine: Negative for polyuria.   Genitourinary: Negative for dysuria.   Musculoskeletal: Negative for arthralgias.   Skin: Negative for color change and wound.   Neurological: Negative for dizziness and weakness.   Psychiatric/Behavioral: Negative for agitation and behavioral problems.     Objective:     Vital Signs (Most Recent):  Temp: 97.4 °F (36.3 °C) (09/23/20 0820)  Pulse: 107 (09/23/20 0820)  Resp: (!) 22 (09/23/20 0825)  BP: 117/88 (09/23/20 0820)  SpO2: 100 % (09/23/20 0820) Vital Signs (24h Range):  Temp:  [97.4 °F (36.3 °C)-98.4 °F (36.9 °C)] 97.4 °F (36.3 °C)  Pulse:  [101-114] 107  Resp:  [16-22] 22  SpO2:  [100 %] 100 %  BP: (116-130)/(57-89) 117/88     Weight: 84.5 kg (186 lb 4.6 oz)  Body mass index is 31 kg/m².    Intake/Output Summary (Last 24 hours) at 9/23/2020 0939  Last data filed at 9/23/2020 0600  Gross per 24 hour   Intake 240 ml   Output 850 ml   Net -610 ml      Physical Exam  Constitutional:       Appearance: She is well-developed.   HENT:      Head: Normocephalic and atraumatic.   Neck:      Musculoskeletal: Neck supple.   Cardiovascular:      Rate and Rhythm: Normal rate and regular rhythm.      Heart sounds: Normal heart sounds. No murmur. No friction rub. No gallop.    Pulmonary:      Breath sounds: Normal breath sounds.   Abdominal:      General: Bowel sounds are normal. There is no distension.      Palpations: Abdomen is soft. There is no mass.      Tenderness: There is  no rebound.   Musculoskeletal:         General: No tenderness.   Skin:     General: Skin is warm.   Neurological:      Mental Status: She is alert and oriented to person, place, and time.      Deep Tendon Reflexes: Reflexes are normal and symmetric.   Psychiatric:         Behavior: Behavior normal.         Significant Labs:   Blood Culture:   No results for input(s): LABBLOO in the last 48 hours.  CBC:   Recent Labs   Lab 09/22/20  0500   WBC 9.83   HGB 7.2*   HCT 23.3*        Cardiac Markers: No results for input(s): CKMB, MYOGLOBIN, BNP, TROPISTAT in the last 48 hours.  Lactic Acid:   Recent Labs   Lab 09/21/20  1233 09/21/20  1813   LACTATE 3.1* 3.1*     Troponin: No results for input(s): TROPONINI in the last 48 hours.  TSH:   Recent Labs   Lab 07/14/20  0959   TSH 1.876     Urine Culture: No results for input(s): LABURIN in the last 48 hours.  Urine Studies:   No results for input(s): COLORU, APPEARANCEUA, PHUR, SPECGRAV, PROTEINUA, GLUCUA, KETONESU, BILIRUBINUA, OCCULTUA, NITRITE, UROBILINOGEN, LEUKOCYTESUR, RBCUA, WBCUA, BACTERIA, SQUAMEPITHEL, HYALINECASTS in the last 48 hours.    Invalid input(s): WRIGHTSUR  Imaging Results          CT Abdomen Pelvis With Contrast (Final result)  Result time 09/21/20 02:51:18    Final result by Wale Carmona MD (09/21/20 02:51:18)                 Impression:      Extensive abnormal appearance of the liver again noted consistent with hepatic metastatic disease with evidence for progression.    Mild free fluid of the abdomen and pelvis noted, with progression.    Multiple pulmonary nodules and abnormal opacities some of which demonstrate cavitary appearance may relate to pulmonary metastatic disease, or as previously discussed a component of septic emboli, this is noted on prior examinations.    Cholelithiasis with mild pericholecystic fluid without pericholecystic inflammatory appearance, this pericholecystic fluid may relate to the mild free fluid of the abdomen  and pelvis, correlation for gallbladder symptomatology is otherwise needed.    Mild urinary bladder wall thickening may relate to incomplete distention however there is an air bubble within the urinary bladder that may be due to instrumentation however correlation for UTI/cystitis is needed.    Mildly prominent small bowel loops not thought to represent obstruction, may relate to ileus however if there is persistent or worsening symptomatology additional follow-up to exclude developing obstruction recommended.      Electronically signed by: Wale Carmona  Date:    09/21/2020  Time:    02:51             Narrative:    EXAMINATION:  CT ABDOMEN PELVIS WITH CONTRAST    CLINICAL HISTORY:  Abdominal pain, fever;    TECHNIQUE:  Low dose axial images, sagittal and coronal reformations were obtained from the lung bases to the pubic symphysis following the IV administration of 100 mL of Omnipaque 350 .  Oral contrast was not given.    COMPARISON:  CT examination chest abdomen pelvis September 2, 2020    FINDINGS:  Single-phase CT examination of the abdomen and pelvis was performed.  Intravenous contrast was utilized.  Oral contrast was not utilized.    The lung bases demonstrate multiple pulmonary nodules and mass lesions, some of which demonstrate cavitary abnormality, as seen on prior chest CT.  There is minimal pleural fluid noted.  The stomach is decompressed, with mild fluid and air in the lumen, nonspecific appearance.  The liver is enlarged and there are multiple hepatic mass lesions consistent with hepatic metastatic disease again noted, there is appearance of progression as the lesions appears somewhat more prominent.  There is abnormal appearance of the gallbladder, there is intraluminal complexity likely relating to cholelithiasis, there is pericholecystic fluid, without significant pericholecystic inflammatory change, and although pericholecystic fluid may relate to gallbladder pathology, this may relate to mild  free fluid of the abdomen and pelvis, with some free fluid adjacent to the inferior aspect of the right lobe of the liver, and along the right pericolic gutter as well as free fluid in the pelvis.  The volume of free fluid has increased when compared to the prior study.  There is suspected heterogeneous enlarged lymph node at the richard hepatis again noted.  There is no evidence for peripancreatic inflammatory change, there is no evidence for abnormal pancreatic or biliary ductal dilatation.  The portal venous structures demonstrate appropriate opacification.  The spleen and adrenal glands appear stable.  There is no evidence for hydronephrosis or obstructive uropathy bilaterally.  The abdominal aorta demonstrates appropriate opacification.  The uterus and adnexa appear appropriate without evidence for acute process, free fluid of the pelvis noted as discussed above.  The urinary bladder is incompletely distended, mild wall thickening may relate to incomplete distention, there is however an air bubble within the urinary bladder, this may relate to instrumentation however can be seen with infection, clinical and historical correlation for UTI/cystitis is needed.    Postoperative change of the bowel is noted, there are some mildly prominent small bowel loops, however not thought to represent obstruction may represent ileus, there is no evidence for inflammatory or obstructive process of the colon.  If there is persistent or worsening symptomatology additional follow-up recommended to exclude the possibility of developing partial obstruction.  There is no evidence for free intraperitoneal air.  The visualized osseous structures appear intact.                               X-Ray Chest AP Portable (Final result)  Result time 09/21/20 01:16:40    Final result by Federico Barron MD (09/21/20 01:16:40)                 Impression:      Removal of Kana catheter from right jugular approach now with PICC line tip over the  innominate/subclavian vein on the right.    Improved patchy lung airspace opacities most significantly in the left lung base with decreased pleural fluid.      Electronically signed by: Federico Barron  Date:    09/21/2020  Time:    01:16             Narrative:    EXAMINATION:  XR CHEST AP PORTABLE    CLINICAL HISTORY:  Sepsis;    TECHNIQUE:  Single frontal view of the chest was performed.    COMPARISON:  Chest x-ray, 09/02/2020 at 17:33 hours    FINDINGS:  Kana dialysis catheter is been removed.  PICC line tip previously at the atrial caval junction is now overlying the innominate vein on the right.    The hazy opacities in the periphery a little spaces of the lungs most confluent in the left retrocardiac region are mildly to moderately improved.  Pleural effusion on the left is less apparent.  There is no pneumothorax.  The heart mediastinal contours remain stable.                                Significant Imaging: I have reviewed all pertinent imaging results/findings within the past 24 hours.

## 2020-09-23 NOTE — PROGRESS NOTES
[facility] Hospital Medicine  Progress Note    Patient Name: Huy Cisneros  MRN: [id number]  Patient Class: IP- Inpatient   Admission Date: 9/20/2020  Length of Stay: 2 days  Attending Physician: Maryana Patel*  Primary Care Provider: Primary Doctor No        Subjective:     Principal Problem:Sepsis        HPI:  Huy Cisneros is a 28 y/o F with PMH of Cancer of the colon with liver mets, tachycardia, HTN, and Uterine cyst, present to the ED with RLQ abdominal pain, she denies fever, chills, nausea, vomiting, constipation dysuria, urgency or frequency.   In the ED Lactic acid 5.0, wbc wnl, CT abdomen/ pelvis described metastatic tumor progression with Mild urinary bladder wall thickening may relate to incomplete distention however there is an air bubble within the urinary bladder that may be due to instrumentation however correlation for UTI/cystitis is needed    Overview/Hospital Course:  No notes on file    Interval History: awake and alert, abdominal pain resolved tolerating Po  Plan for possible discharge today.   vanc trough elevated yesterday.  Hold vanc and monitor. Appreciates pharmacy future dose decrease to daily           Review of Systems   Constitutional: Negative for fatigue and fever.   Respiratory: Negative for cough and chest tightness.    Cardiovascular: Negative for chest pain.   Gastrointestinal: Negative for abdominal distention, abdominal pain, anal bleeding, blood in stool, diarrhea and nausea.   Endocrine: Negative for polyuria.   Genitourinary: Negative for dysuria.   Musculoskeletal: Negative for arthralgias.   Skin: Negative for color change and wound.   Neurological: Negative for dizziness and weakness.   Psychiatric/Behavioral: Negative for agitation and behavioral problems.     Objective:     Vital Signs (Most Recent):  Temp: 97.4 °F (36.3 °C) (09/23/20 0820)  Pulse: 107 (09/23/20 0820)  Resp: (!) 22 (09/23/20 0825)  BP: 117/88 (09/23/20  0820)  SpO2: 100 % (09/23/20 0820) Vital Signs (24h Range):  Temp:  [97.4 °F (36.3 °C)-98.4 °F (36.9 °C)] 97.4 °F (36.3 °C)  Pulse:  [101-114] 107  Resp:  [16-22] 22  SpO2:  [100 %] 100 %  BP: (116-130)/(57-89) 117/88     Weight: 84.5 kg (186 lb 4.6 oz)  Body mass index is 31 kg/m².    Intake/Output Summary (Last 24 hours) at 9/23/2020 0939  Last data filed at 9/23/2020 0600  Gross per 24 hour   Intake 240 ml   Output 850 ml   Net -610 ml      Physical Exam  Constitutional:       Appearance: She is well-developed.   HENT:      Head: Normocephalic and atraumatic.   Neck:      Musculoskeletal: Neck supple.   Cardiovascular:      Rate and Rhythm: Normal rate and regular rhythm.      Heart sounds: Normal heart sounds. No murmur. No friction rub. No gallop.    Pulmonary:      Breath sounds: Normal breath sounds.   Abdominal:      General: Bowel sounds are normal. There is no distension.      Palpations: Abdomen is soft. There is no mass.      Tenderness: There is no rebound.   Musculoskeletal:         General: No tenderness.   Skin:     General: Skin is warm.   Neurological:      Mental Status: She is alert and oriented to person, place, and time.      Deep Tendon Reflexes: Reflexes are normal and symmetric.   Psychiatric:         Behavior: Behavior normal.         Significant Labs:   Blood Culture:   No results for input(s): LABBLOO in the last 48 hours.  CBC:   Recent Labs   Lab 09/22/20  0500   WBC 9.83   HGB 7.2*   HCT 23.3*        Cardiac Markers: No results for input(s): CKMB, MYOGLOBIN, BNP, TROPISTAT in the last 48 hours.  Lactic Acid:   Recent Labs   Lab 09/21/20  1233 09/21/20  1813   LACTATE 3.1* 3.1*     Troponin: No results for input(s): TROPONINI in the last 48 hours.  TSH:   Recent Labs   Lab 07/14/20  0959   TSH 1.876     Urine Culture: No results for input(s): LABURIN in the last 48 hours.  Urine Studies:   No results for input(s): COLORU, APPEARANCEUA, PHUR, SPECGRAV, PROTEINUA, GLUCUA, KETONESU,  BILIRUBINUA, OCCULTUA, NITRITE, UROBILINOGEN, LEUKOCYTESUR, RBCUA, WBCUA, BACTERIA, SQUAMEPITHEL, HYALINECASTS in the last 48 hours.    Invalid input(s): WRIGHTSUR  Imaging Results          CT Abdomen Pelvis With Contrast (Final result)  Result time 09/21/20 02:51:18    Final result by Wale Carmona MD (09/21/20 02:51:18)                 Impression:      Extensive abnormal appearance of the liver again noted consistent with hepatic metastatic disease with evidence for progression.    Mild free fluid of the abdomen and pelvis noted, with progression.    Multiple pulmonary nodules and abnormal opacities some of which demonstrate cavitary appearance may relate to pulmonary metastatic disease, or as previously discussed a component of septic emboli, this is noted on prior examinations.    Cholelithiasis with mild pericholecystic fluid without pericholecystic inflammatory appearance, this pericholecystic fluid may relate to the mild free fluid of the abdomen and pelvis, correlation for gallbladder symptomatology is otherwise needed.    Mild urinary bladder wall thickening may relate to incomplete distention however there is an air bubble within the urinary bladder that may be due to instrumentation however correlation for UTI/cystitis is needed.    Mildly prominent small bowel loops not thought to represent obstruction, may relate to ileus however if there is persistent or worsening symptomatology additional follow-up to exclude developing obstruction recommended.      Electronically signed by: Wale Carmona  Date:    09/21/2020  Time:    02:51             Narrative:    EXAMINATION:  CT ABDOMEN PELVIS WITH CONTRAST    CLINICAL HISTORY:  Abdominal pain, fever;    TECHNIQUE:  Low dose axial images, sagittal and coronal reformations were obtained from the lung bases to the pubic symphysis following the IV administration of 100 mL of Omnipaque 350 .  Oral contrast was not given.    COMPARISON:  CT examination chest  abdomen pelvis September 2, 2020    FINDINGS:  Single-phase CT examination of the abdomen and pelvis was performed.  Intravenous contrast was utilized.  Oral contrast was not utilized.    The lung bases demonstrate multiple pulmonary nodules and mass lesions, some of which demonstrate cavitary abnormality, as seen on prior chest CT.  There is minimal pleural fluid noted.  The stomach is decompressed, with mild fluid and air in the lumen, nonspecific appearance.  The liver is enlarged and there are multiple hepatic mass lesions consistent with hepatic metastatic disease again noted, there is appearance of progression as the lesions appears somewhat more prominent.  There is abnormal appearance of the gallbladder, there is intraluminal complexity likely relating to cholelithiasis, there is pericholecystic fluid, without significant pericholecystic inflammatory change, and although pericholecystic fluid may relate to gallbladder pathology, this may relate to mild free fluid of the abdomen and pelvis, with some free fluid adjacent to the inferior aspect of the right lobe of the liver, and along the right pericolic gutter as well as free fluid in the pelvis.  The volume of free fluid has increased when compared to the prior study.  There is suspected heterogeneous enlarged lymph node at the richard hepatis again noted.  There is no evidence for peripancreatic inflammatory change, there is no evidence for abnormal pancreatic or biliary ductal dilatation.  The portal venous structures demonstrate appropriate opacification.  The spleen and adrenal glands appear stable.  There is no evidence for hydronephrosis or obstructive uropathy bilaterally.  The abdominal aorta demonstrates appropriate opacification.  The uterus and adnexa appear appropriate without evidence for acute process, free fluid of the pelvis noted as discussed above.  The urinary bladder is incompletely distended, mild wall thickening may relate to incomplete  distention, there is however an air bubble within the urinary bladder, this may relate to instrumentation however can be seen with infection, clinical and historical correlation for UTI/cystitis is needed.    Postoperative change of the bowel is noted, there are some mildly prominent small bowel loops, however not thought to represent obstruction may represent ileus, there is no evidence for inflammatory or obstructive process of the colon.  If there is persistent or worsening symptomatology additional follow-up recommended to exclude the possibility of developing partial obstruction.  There is no evidence for free intraperitoneal air.  The visualized osseous structures appear intact.                               X-Ray Chest AP Portable (Final result)  Result time 09/21/20 01:16:40    Final result by Federico Barron MD (09/21/20 01:16:40)                 Impression:      Removal of Kana catheter from right jugular approach now with PICC line tip over the innominate/subclavian vein on the right.    Improved patchy lung airspace opacities most significantly in the left lung base with decreased pleural fluid.      Electronically signed by: Federico Barron  Date:    09/21/2020  Time:    01:16             Narrative:    EXAMINATION:  XR CHEST AP PORTABLE    CLINICAL HISTORY:  Sepsis;    TECHNIQUE:  Single frontal view of the chest was performed.    COMPARISON:  Chest x-ray, 09/02/2020 at 17:33 hours    FINDINGS:  Kana dialysis catheter is been removed.  PICC line tip previously at the atrial caval junction is now overlying the innominate vein on the right.    The hazy opacities in the periphery a little spaces of the lungs most confluent in the left retrocardiac region are mildly to moderately improved.  Pleural effusion on the left is less apparent.  There is no pneumothorax.  The heart mediastinal contours remain stable.                                Significant Imaging: I have reviewed all pertinent imaging  results/findings within the past 24 hours.      Assessment/Plan:      * Sepsis  Elevated lactic acid-trend  Continue Vanc  Add Meropenem  Blood cx pending       Elevated LFTs  Likely due to tumor burden      Hypertension  Tachycardia  Chronic  Continue Meotprolol      Emphysematous cystitis  Ct abdomen /pelvis with mild urinary bladder wall thickening may relate to incomplete distention however there is an air bubble within the urinary bladder that may be due to instrumentation however correlation for UTI/cystitis is needed  Continue Vancomycin  Add Meropenem  Consult urology- appreciates rec's    Hyponatremia    Monitor     Anemia  monitor      Adenocarcinoma of colon  Metastatic disease  CT abdomen/pelvis: Extensive abnormal appearance of the liver again noted consistent with hepatic metastatic disease with evidence for progression.  Mild free fluid of the abdomen and pelvis noted, with progression  Consult hematology  Consult palliative care      VTE Risk Mitigation (From admission, onward)         Ordered     enoxaparin injection 40 mg  Every 24 hours      09/21/20 1405     IP VTE HIGH RISK PATIENT  Once      09/21/20 0409     Place sequential compression device  Until discontinued      09/21/20 0409                Discharge Planning   ZOE: 9/23/2020     Code Status: Full Code   Is the patient medically ready for discharge?:     Reason for patient still in hospital (select all that apply): Other (specify) discharge today  Discharge Plan A: Home Health                  Maryana Patel MD  Department of Hospital Medicine   [facility]

## 2020-09-24 NOTE — PLAN OF CARE
MUSE score of 3. No distress.  States a little cold. Sent message to bedside nurse Ahsan about MUSE score and requested to bring the patient a blanket and pillow.  Will continue to monitor VS and MUSE score.

## 2020-09-24 NOTE — PHYSICIAN QUERY
PT Name: Huy Cisneros  MR #: [id number]     Documentation Clarification      CDS: Magnolia Nieves RN, CCDS         Contact information :ext [phone] [email or web address]       This form is a permanent document in the medical record.     Query Date: September 24, 2020    By submitting this query, we are merely seeking further clarification of documentation. Please utilize your independent clinical judgment when addressing the question(s) below.    The Medical Record reflects the following:    Clinical Findings Location in Medical Record     Sepsis  Elevated lactic acid-trend  Continue Vanc  Add Meropenem  Blood cx pending   Elevated LFTs  Likely due to tumor burden  28 y/o F with PMH of Cancer of the colon with liver mets, tachycardia, HTN, and Uterine cyst, present to the ED with RLQ abdominal pain, she denies fever, chills, nausea, vomiting, constipation dysuria, urgency or frequency.   In the ED Lactic acid 5.0, wbc wnl, CT abdomen/ pelvis described metastatic tumor progression with Mild urinary bladder wall thickening may relate to incomplete distention however there is an air bubble within the urinary bladder that may be due to instrumentation however correlation for UTI/cystitis is needed    HR  , RR 18-24, T 96.3  Lactate 5.0, Total bilirubin 2.8  U/A Nitrite negative, bacteria rare    Abnormal CT scan, bladder  UA non-infectious, 3 RBCs, rare bacteria. Culture pending.  -  Air in bladder could be secondary to catheterized urine sample obtained before CT scan done. Follow-up on urine culture.   - Microscopic hematuria present. If negative urine culture patient would meet criteria for workup with CT urogram and cystoscopy, which would be beneficial to further assess for possible fistula. Workup can be completed outpatient.     Adenocarcinoma of colon  - CT scan (9/21/20) reveals progression of disease. She had only received one cycle of FOLFOX. Subsequent cycles of FOLFOX have been delayed due  to MRSA bacteremia.  Treat underlying issues that prompted her presentation to emergency room. However, her underlying colon cancer is likely causing the majority of her issues.    Sepsis  Emphysematous cystitis  Continue Vancomycin   H&P 9/21/20                                    VS 9/21/20  Lab 9/21/20  Lab 9/21/20    Urology consult 9/21/20                      Hematology Consult 9/21/20                  Hospital medicine PN 9/24/20                                                                                Provider, please clarify the diagnosis of Sepsis:    [   ] Sepsis is confirmed and additional clinical support/decision-making indicators for the diagnosis include (please specify):____Lactic acid 5______  Please document known or suspected source:      [    ] Emphysematous cystitis      [   x ] UTI unspecified      [    ] MRSA bacteremia.      [    ] Unknown or other source, please specify, ______  Please document known or suspected associated organism(s):      [    ] MRSA      [    ] Other suspected organism, please specify, _______      [    ] Unknown   [   ] Sepsis  is not confirmed and/or it has been ruled out,      [   ] Sepsis is not confirmed and/or it has been ruled out, other diagnosis ruled in (please specify):_______________   [   ] Other clarification (please specify): ___________________   [  ] Clinically undetermined

## 2020-09-24 NOTE — PROGRESS NOTES
"TN noted discharge canceled yesterday as per nursing note:  "As patient was dressed with IV out and Heart monitor off, tech and this nurse was attempting to get her into the wheelchair. Patient started sobbing and crying stating she didn't want to go and she wanted to be left alone. This nurse asked if she wanted to stay another night and not go home and patient was agreeable. Dr. Little called and informed of patient not wanting to go. Agreeable for patient to stay tonight and reevaluate tomorrow. "    TN spoke with Dr. Little this am and MD will notify TN when she rounds on pt and we will collaborate with pt and mother regarding discharge. TN updated KURT Berry, Director Case Management as well as pt is a 30 day readmit and remains high risk .      1330- TN met in room with Dr. Little, pt, pt's mother and pt's aunt on speaker phone. Dr. Little again discussed prognosis and POC. Pt wishing to to continue antibiotic treatment until 9/29/2020 then chemo. Pt does not want to pursue hospice. Pt's aunt will come to room to get pt around 1630. Tn updated Fly at [employer]. Tn also informed Pricila, floor nurse of plan to d/c at 1630 when aunt arrives.     "

## 2020-09-24 NOTE — PT/OT/SLP PROGRESS
Physical Therapy  Eval Attempt    Patient Name:  Huy Cisneros   MRN:  [id number]    Patient not seen today secondary to Patient unwilling to participate. Will follow-up.    Pt reports no pain this date. Pt reports not ambulating at home, only completing transfers to/from w/c and self-propelling w/c with UE use only. Pt repeatedly shaking head no, refusing EOB activity. Pt with increased respiratory rate throughout session, educated on PLB but did not complete. SaO2 100%.    Lillian Kirby, SPT   9/24/2020

## 2020-09-24 NOTE — PT/OT/SLP DISCHARGE
Occupational Therapy Discharge Summary    Huy Cisneros  MRN: [id number]   Principal Problem: Sepsis      Patient Discharged from acute Occupational Therapy on 9/24/21020.  Please refer to prior OT note dated 9/23/2020 for functional status.    Assessment:      Patient appropriate for care in another setting.    Objective:     GOALS:   Multidisciplinary Problems     Occupational Therapy Goals        Problem: Occupational Therapy Goal    Goal Priority Disciplines Outcome Interventions   Occupational Therapy Goal     OT, PT/OT Adequate for Care Transition    Description: Goals to be met by: 10/23/2020      Patient will increase functional independence with ADLs by performing:    UE Dressing with Stand-by Assistance.  LE Dressing with Moderate Assistance.  Grooming while seated with Minimal Assistance.  Toileting from bedside commode with Minimal Assistance for hygiene and clothing management.   Toilet transfer to bedside commode with Minimal Assistance.  Increased functional strength to WFL for self care.  Upper extremity exercise program x10 reps per handout, with assistance as needed.                      Reasons for Discontinuation of Therapy Services  Transfer to alternate level of care.      Plan:     Patient Discharged to: Home with Home Health Service    Suri Earl, OT  9/24/2020

## 2020-09-24 NOTE — PHYSICIAN QUERY
PT Name: Huy Cisneros  MR #: [id number]     ACUITY OF CONDITION CLARIFICATION      CDS: Magnolia Nieves RN, CCDS         Contact information :ext [phone] [email or web address]     This form is a permanent document in the medical record.     Query Date: September 24, 2020    By submitting this query, we are merely seeking further clarification of documentation to reflect the severity of illness of your patient. Please utilize your independent clinical judgment when addressing the question(s) below.    The Medical record reflects the following:     Indicators   Supporting Clinical Findings Location in Medical Record   x Documentation of condition I am concerned that the hepatic burden of metastases is beginning to cause liver failure Hematology consult 9/21/20   x Lab Value(s) Total bilirubin 2.8    ALT 58 Lab 9/21/20   x Radiology Findings Extensive abnormal appearance of the liver again noted consistent with hepatic metastatic disease with evidence for progression CT 9/21/20   x Treatment/Medication Treat underlying issues that prompted her presentation to emergency room. However, her underlying colon cancer is likely causing the majority of her issues.    x Other Drowsy, answers questions in a whisper, unclear answers  Mental Status: She is alert and oriented to person, place, and time.      Deep Tendon Reflexes: Reflexes are normal and symmetric.    There is abdominal tenderness. There is guarding. There is no rebound.   Elevated LFTs  Likely due to tumor burden Hematology consult 9/21/20    H&P 9/21/20      Provider, please specify the acuity/chronicity of Liver Failure :    [ x  ] Acute   [   ] Chronic   [   ] Subacute   [   ] Acute on chronic   [   ] Ruled out    [   ]  Clinically Undetermined

## 2020-09-24 NOTE — PROGRESS NOTES
[facility] Hospital Medicine  Progress Note    Patient Name: Huy Cisneros  MRN: [id number]  Patient Class: IP- Inpatient   Admission Date: 9/20/2020  Length of Stay: 3 days  Attending Physician: Maryana Patel*  Primary Care Provider: Primary Doctor No        Subjective:     Principal Problem:Sepsis        HPI:  Huy Cisneros is a 28 y/o F with PMH of Cancer of the colon with liver mets, tachycardia, HTN, and Uterine cyst, present to the ED with RLQ abdominal pain, she denies fever, chills, nausea, vomiting, constipation dysuria, urgency or frequency.   In the ED Lactic acid 5.0, wbc wnl, CT abdomen/ pelvis described metastatic tumor progression with Mild urinary bladder wall thickening may relate to incomplete distention however there is an air bubble within the urinary bladder that may be due to instrumentation however correlation for UTI/cystitis is needed    Overview/Hospital Course:  No notes on file    Interval History: awake and alert,   Had a family meeting with patient family - mother and aunt over the phone, disease progression and need to FU with hem/onc evaluation for possible chemo. worried patient is debilitated and weak. Aunt stated not ready for hospice for now?   Ok to discharge    Review of Systems   Constitutional: Negative for fatigue and fever.   Respiratory: Negative for cough and chest tightness.    Cardiovascular: Negative for chest pain.   Gastrointestinal: Negative for abdominal distention, abdominal pain, anal bleeding, blood in stool, diarrhea and nausea.   Endocrine: Negative for polyuria.   Genitourinary: Negative for dysuria.   Musculoskeletal: Negative for arthralgias.   Skin: Negative for color change and wound.   Neurological: Negative for dizziness and weakness.   Psychiatric/Behavioral: Negative for agitation and behavioral problems.     Objective:     Vital Signs (Most Recent):  Temp: 98.3 °F (36.8 °C) (09/24/20 1231)  Pulse: 105  (09/24/20 1231)  Resp: (!) 24 (09/24/20 1231)  BP: 115/74 (09/24/20 1231)  SpO2: 100 % (09/24/20 1231) Vital Signs (24h Range):  Temp:  [96.3 °F (35.7 °C)-99.1 °F (37.3 °C)] 98.3 °F (36.8 °C)  Pulse:  [] 105  Resp:  [18-24] 24  SpO2:  [98 %-100 %] 100 %  BP: (113-128)/(74-94) 115/74     Weight: 84.5 kg (186 lb 4.6 oz)  Body mass index is 31 kg/m².    Intake/Output Summary (Last 24 hours) at 9/24/2020 1354  Last data filed at 9/24/2020 0500  Gross per 24 hour   Intake --   Output 500 ml   Net -500 ml      Physical Exam  Constitutional:       Appearance: She is well-developed.   HENT:      Head: Normocephalic and atraumatic.   Neck:      Musculoskeletal: Neck supple.   Cardiovascular:      Rate and Rhythm: Normal rate and regular rhythm.      Heart sounds: Normal heart sounds. No murmur. No friction rub. No gallop.    Pulmonary:      Breath sounds: Normal breath sounds.   Abdominal:      General: Bowel sounds are normal. There is no distension.      Palpations: Abdomen is soft. There is no mass.      Tenderness: There is no rebound.   Musculoskeletal:         General: No tenderness.   Skin:     General: Skin is warm.   Neurological:      Mental Status: She is alert and oriented to person, place, and time.      Deep Tendon Reflexes: Reflexes are normal and symmetric.   Psychiatric:         Behavior: Behavior normal.         Significant Labs:   Blood Culture:   No results for input(s): LABBLOO in the last 48 hours.  CBC:   No results for input(s): WBC, HGB, HCT, PLT in the last 48 hours.  Cardiac Markers: No results for input(s): CKMB, MYOGLOBIN, BNP, TROPISTAT in the last 48 hours.  Lactic Acid:   No results for input(s): LACTATE in the last 48 hours.  Troponin: No results for input(s): TROPONINI in the last 48 hours.  TSH:   Recent Labs   Lab 07/14/20  0959   TSH 1.876     Urine Culture: No results for input(s): LABURIN in the last 48 hours.  Urine Studies:   No results for input(s): COLORU, APPEARANCEUA, PHUR,  SPECGRAV, PROTEINUA, GLUCUA, KETONESU, BILIRUBINUA, OCCULTUA, NITRITE, UROBILINOGEN, LEUKOCYTESUR, RBCUA, WBCUA, BACTERIA, SQUAMEPITHEL, HYALINECASTS in the last 48 hours.    Invalid input(s): WRIGHTSUR  Imaging Results          CT Abdomen Pelvis With Contrast (Final result)  Result time 09/21/20 02:51:18    Final result by Wale Carmona MD (09/21/20 02:51:18)                 Impression:      Extensive abnormal appearance of the liver again noted consistent with hepatic metastatic disease with evidence for progression.    Mild free fluid of the abdomen and pelvis noted, with progression.    Multiple pulmonary nodules and abnormal opacities some of which demonstrate cavitary appearance may relate to pulmonary metastatic disease, or as previously discussed a component of septic emboli, this is noted on prior examinations.    Cholelithiasis with mild pericholecystic fluid without pericholecystic inflammatory appearance, this pericholecystic fluid may relate to the mild free fluid of the abdomen and pelvis, correlation for gallbladder symptomatology is otherwise needed.    Mild urinary bladder wall thickening may relate to incomplete distention however there is an air bubble within the urinary bladder that may be due to instrumentation however correlation for UTI/cystitis is needed.    Mildly prominent small bowel loops not thought to represent obstruction, may relate to ileus however if there is persistent or worsening symptomatology additional follow-up to exclude developing obstruction recommended.      Electronically signed by: Wale Carmona  Date:    09/21/2020  Time:    02:51             Narrative:    EXAMINATION:  CT ABDOMEN PELVIS WITH CONTRAST    CLINICAL HISTORY:  Abdominal pain, fever;    TECHNIQUE:  Low dose axial images, sagittal and coronal reformations were obtained from the lung bases to the pubic symphysis following the IV administration of 100 mL of Omnipaque 350 .  Oral contrast was not  given.    COMPARISON:  CT examination chest abdomen pelvis September 2, 2020    FINDINGS:  Single-phase CT examination of the abdomen and pelvis was performed.  Intravenous contrast was utilized.  Oral contrast was not utilized.    The lung bases demonstrate multiple pulmonary nodules and mass lesions, some of which demonstrate cavitary abnormality, as seen on prior chest CT.  There is minimal pleural fluid noted.  The stomach is decompressed, with mild fluid and air in the lumen, nonspecific appearance.  The liver is enlarged and there are multiple hepatic mass lesions consistent with hepatic metastatic disease again noted, there is appearance of progression as the lesions appears somewhat more prominent.  There is abnormal appearance of the gallbladder, there is intraluminal complexity likely relating to cholelithiasis, there is pericholecystic fluid, without significant pericholecystic inflammatory change, and although pericholecystic fluid may relate to gallbladder pathology, this may relate to mild free fluid of the abdomen and pelvis, with some free fluid adjacent to the inferior aspect of the right lobe of the liver, and along the right pericolic gutter as well as free fluid in the pelvis.  The volume of free fluid has increased when compared to the prior study.  There is suspected heterogeneous enlarged lymph node at the richard hepatis again noted.  There is no evidence for peripancreatic inflammatory change, there is no evidence for abnormal pancreatic or biliary ductal dilatation.  The portal venous structures demonstrate appropriate opacification.  The spleen and adrenal glands appear stable.  There is no evidence for hydronephrosis or obstructive uropathy bilaterally.  The abdominal aorta demonstrates appropriate opacification.  The uterus and adnexa appear appropriate without evidence for acute process, free fluid of the pelvis noted as discussed above.  The urinary bladder is incompletely distended,  mild wall thickening may relate to incomplete distention, there is however an air bubble within the urinary bladder, this may relate to instrumentation however can be seen with infection, clinical and historical correlation for UTI/cystitis is needed.    Postoperative change of the bowel is noted, there are some mildly prominent small bowel loops, however not thought to represent obstruction may represent ileus, there is no evidence for inflammatory or obstructive process of the colon.  If there is persistent or worsening symptomatology additional follow-up recommended to exclude the possibility of developing partial obstruction.  There is no evidence for free intraperitoneal air.  The visualized osseous structures appear intact.                               X-Ray Chest AP Portable (Final result)  Result time 09/21/20 01:16:40    Final result by Federico Barron MD (09/21/20 01:16:40)                 Impression:      Removal of Kana catheter from right jugular approach now with PICC line tip over the innominate/subclavian vein on the right.    Improved patchy lung airspace opacities most significantly in the left lung base with decreased pleural fluid.      Electronically signed by: Federico Barron  Date:    09/21/2020  Time:    01:16             Narrative:    EXAMINATION:  XR CHEST AP PORTABLE    CLINICAL HISTORY:  Sepsis;    TECHNIQUE:  Single frontal view of the chest was performed.    COMPARISON:  Chest x-ray, 09/02/2020 at 17:33 hours    FINDINGS:  Kana dialysis catheter is been removed.  PICC line tip previously at the atrial caval junction is now overlying the innominate vein on the right.    The hazy opacities in the periphery a little spaces of the lungs most confluent in the left retrocardiac region are mildly to moderately improved.  Pleural effusion on the left is less apparent.  There is no pneumothorax.  The heart mediastinal contours remain stable.                                Significant  Imaging: I have reviewed all pertinent imaging results/findings within the past 24 hours.      Assessment/Plan:      * Sepsis  Elevated lactic acid-trend  Continue Vanc  Add Meropenem  Blood cx pending       Elevated LFTs  Likely due to tumor burden      Hypertension  Tachycardia  Chronic  Continue Meotprolol      Emphysematous cystitis  Ct abdomen /pelvis with mild urinary bladder wall thickening may relate to incomplete distention however there is an air bubble within the urinary bladder that may be due to instrumentation however correlation for UTI/cystitis is needed  Continue Vancomycin  Add Meropenem  Consult urology- appreciates rec's    Hyponatremia    Monitor     Anemia  monitor      Adenocarcinoma of colon  Metastatic disease  CT abdomen/pelvis: Extensive abnormal appearance of the liver again noted consistent with hepatic metastatic disease with evidence for progression.  Mild free fluid of the abdomen and pelvis noted, with progression  Consult hematology  Consult palliative care      VTE Risk Mitigation (From admission, onward)         Ordered     enoxaparin injection 40 mg  Every 24 hours      09/21/20 1405     IP VTE HIGH RISK PATIENT  Once      09/21/20 0409     Place sequential compression device  Until discontinued      09/21/20 0409                Discharge Planning   ZOE: 9/23/2020     Code Status: Full Code   Is the patient medically ready for discharge?:     Reason for patient still in hospital (select all that apply): Other (specify) discharge today  Discharge Plan A: Home Health                  Maryana Patel MD  Department of Hospital Medicine   [facility]

## 2020-09-24 NOTE — ED NOTES
"Patient identifiers for Huy Cisneros 29 y.o. female checked and correct.  Chief Complaint   Patient presents with    Fatigue     patient was just discharged from [facility], Patient has colon CA. N/V.      Past Medical History:   Diagnosis Date    Cancer     Colon CA with mets to the liver    Hypertension 9/21/2020    Uterine cyst      Allergies reported:   Review of patient's allergies indicates:   Allergen Reactions    Sulfa (sulfonamide antibiotics)          LOC: Patient is awake, alert, and aware of environment with an appropriate affect. Patient is oriented x 4 and speaking appropriately. Reports anxiety. Patient family reports she stated, "I'm tired of fighting." Patient appears exhausted. Confusion noted, states she went [place] an amusement park after being discharged from hospital today, but family reports that did not happen.  APPEARANCE: Patient resting comfortably and in no acute distress. Patient is clean and well groomed, patient's clothing is properly fastened.  HEENT: Wearing mask. States dry mouth.  SKIN: The skin is warm and dry. Patient has normal skin turgor and moist mucus membranes. PICC line to left basilic. States chills.   MUSKULOSKELETAL: Patient is moving all extremities well, no obvious deformities noted. Pulses intact. Reports generalized weakness and fatigue.  RESPIRATORY: Airway is open and patent. Respirations are spontaneous and non-labored with normal effort and rate. Reports SOB earlier.  CARDIAC: Patient has a normal rate and rhythm. 96 on cardiac monitor. No peripheral edema noted. Denies chest pain.  ABDOMEN: No distention noted. Soft and tender upon palpation. Reports nausea and vomiting x2 that began an hour ago. Patient has colon cancer. Reports abdominal pain 5/10. Brief in place.  NEUROLOGICAL: pupils 3mm, PERRL. Facial expression is symmetrical. Hand grasps are equal bilaterally. Normal sensation in all extremities when touched with finger.          "

## 2020-09-24 NOTE — DISCHARGE SUMMARY
[facility] Hospital Medicine  Discharge Summary      Patient Name: Huy Cisneros  MRN: [id number]  Admission Date: 9/20/2020  Hospital Length of Stay: 3 days  Discharge Date and Time: 9/24/2020  5:00 PM  Attending Physician: Maryana Patel*   Discharging Provider: Maryana Patel MD  Primary Care Provider: Primary Doctor No      HPI:   Huy Cisneros is a 30 y/o F with PMH of Cancer of the colon with liver mets, tachycardia, HTN, and Uterine cyst, present to the ED with RLQ abdominal pain, she denies fever, chills, nausea, vomiting, constipation dysuria, urgency or frequency.   In the ED Lactic acid 5.0, wbc wnl, CT abdomen/ pelvis described metastatic tumor progression with Mild urinary bladder wall thickening may relate to incomplete distention however there is an air bubble within the urinary bladder that may be due to instrumentation however correlation for UTI/cystitis is needed    * No surgery found *      Hospital Course:   No notes on file     Consults:   Consults (From admission, onward)        Status Ordering Provider     Inpatient consult to Hematology/Oncology  Once     Provider:  (Not yet assigned)    Completed DANI JOSHI     Inpatient consult to Palliative Care  Once     Provider:  (Not yet assigned)    Completed DANI JOSHI     Inpatient consult to Psychiatry  Once     Provider:  (Not yet assigned)    Acknowledged MARYANA PATEL     Inpatient consult to Urology  Once     Provider:  Tameka Lu MD    Completed MARYANA PATEL     IP consult to case management  Once     Provider:  (Not yet assigned)    Acknowledged MARYANA PATEL     Pharmacy to dose Vancomycin consult  Once     Provider:  (Not yet assigned)    Acknowledged DANI JOSHI          * Sepsis  Elevated lactic acid-trend  Continue Vanc  Add Meropenem  Blood cx pending       Hypertension  Tachycardia  Chronic  Continue  Meotprolol      Hyponatremia    Monitor     Anemia  monitor      Adenocarcinoma of colon  Metastatic disease  CT abdomen/pelvis: Extensive abnormal appearance of the liver again noted consistent with hepatic metastatic disease with evidence for progression.  Mild free fluid of the abdomen and pelvis noted, with progression  Consult hematology  Consult palliative care      Final Active Diagnoses:    Diagnosis Date Noted POA    PRINCIPAL PROBLEM:  Sepsis [A41.9]  Yes    Emphysematous cystitis [N30.80] 09/21/2020 Yes    Tachycardia [R00.0] 09/21/2020 Yes    Hypertension [I10] 09/21/2020 Yes    Elevated LFTs [R94.5] 09/21/2020 Yes    Abnormal CT scan, bladder [R93.41] 09/21/2020 Yes    Goals of care, counseling/discussion [Z71.89] 09/03/2020 Not Applicable    Bacteremia due to Staphylococcus [R78.81]  Yes    Hyponatremia [E87.1] 08/26/2020 Yes    Anemia [D64.9] 08/10/2020 Yes    Adenocarcinoma of colon [C18.9] 07/14/2020 Yes    Metastatic disease [C79.9] 07/14/2020 Yes      Problems Resolved During this Admission:       Discharged Condition: fair    Disposition[place] [place] [facility]    Follow Up:    Patient Instructions:      Ambulatory referral/consult to Home Health   Standing Status: Future   Referral Priority: Routine Referral Type: Home Health   Referral Reason: Specialty Services Required   Requested Specialty: Home Health Services   Number of Visits Requested: 1     Ambulatory referral/consult to Physical/Occupational Therapy   Standing Status: Future   Referral Priority: Routine Referral Type: Physical Medicine   Referral Reason: Specialty Services Required   Requested Specialty: Physical Therapy   Number of Visits Requested: 1     Ambulatory referral/consult to Physical/Occupational Therapy   Standing Status: Future   Referral Priority: Routine Referral Type: Physical Medicine   Referral Reason: Specialty Services Required   Requested Specialty: Occupational Therapy   Number of Visits Requested: 1      Ambulatory referral/consult to Psychiatry   Standing Status: Future   Referral Priority: Routine Referral Type: Psychiatric   Referral Reason: Specialty Services Required   Requested Specialty: Psychiatry   Number of Visits Requested: 1     Ambulatory referral/consult to [facility]   Standing Status: Future   Referral Priority: Routine Referral Type: Consultation   Referral Reason: Specialty Services Required   Number of Visits Requested: 1     Diet Adult Regular     Activity as tolerated       Significant Diagnostic Studies:     Pending Diagnostic Studies:     None         Medications:  Reconciled Home Medications:      Medication List      START taking these medications    promethazine 12.5 MG Tab  Commonly known as: PHENERGAN  Take 1 tablet (12.5 mg total) by mouth every 6 (six) hours as needed.        CHANGE how you take these medications    vancomycin in dextrose 5 % 1 gram/250 mL Soln  Inject 375 mLs (1,500 mg total) into the vein every 12 (twelve) hours. for 6 days  What changed: how much to take        CONTINUE taking these medications    amLODIPine 5 MG tablet  Commonly known as: NORVASC  Take 1 tablet (5 mg total) by mouth once daily.     folic acid 1 MG tablet  Commonly known as: FOLVITE  Take 1 tablet (1 mg total) by mouth once daily.     lidocaine-prilocaine cream  Commonly known as: EMLA  Apply topically As instructed. Apply to skin overlying port site 30 minutes before chemotherapy.     metoprolol tartrate 100 MG tablet  Commonly known as: LOPRESSOR  Take 1 tablet (100 mg total) by mouth 2 (two) times daily.     oxyCODONE-acetaminophen  mg per tablet  Commonly known as: PERCOCET  Take 1 tablet by mouth every 4 (four) hours as needed for Pain.     simethicone 125 MG chewable tablet  Commonly known as: MYLICON  Take 1 tablet (125 mg total) by mouth every 6 (six) hours as needed for Flatulence.     spironolactone 25 MG tablet  Commonly known as: ALDACTONE  Take 1 tablet (25 mg  total) by mouth once daily.     STOOL SOFTENER 100 MG capsule  Generic drug: docusate sodium  Take 1 capsule (100 mg total) by mouth once daily.            Indwelling Lines/Drains at time of discharge:   Lines/Drains/Airways     Peripherally Inserted Central Catheter Line            PICC Double Lumen 09/02/20 1715 left basilic 21 days          Drain            Female External Urinary Catheter 09/02/20 1226 22 days                Time spent on the discharge of patient: 35 minutes  Patient was seen and examined on the date of discharge and determined to be suitable for discharge.         Maryana Patel MD  Department of Hospital Medicine  [facility]

## 2020-09-24 NOTE — SUBJECTIVE & OBJECTIVE
Interval History: awake and alert,   Had a family meeting with patient family - mother and aunt over the phone, disease progression and need to FU with hem/onc evaluation for possible chemo. worried patient is debilitated and weak. Aunt stated not ready for hospice for now?   Ok to discharge    Review of Systems   Constitutional: Negative for fatigue and fever.   Respiratory: Negative for cough and chest tightness.    Cardiovascular: Negative for chest pain.   Gastrointestinal: Negative for abdominal distention, abdominal pain, anal bleeding, blood in stool, diarrhea and nausea.   Endocrine: Negative for polyuria.   Genitourinary: Negative for dysuria.   Musculoskeletal: Negative for arthralgias.   Skin: Negative for color change and wound.   Neurological: Negative for dizziness and weakness.   Psychiatric/Behavioral: Negative for agitation and behavioral problems.     Objective:     Vital Signs (Most Recent):  Temp: 98.3 °F (36.8 °C) (09/24/20 1231)  Pulse: 105 (09/24/20 1231)  Resp: (!) 24 (09/24/20 1231)  BP: 115/74 (09/24/20 1231)  SpO2: 100 % (09/24/20 1231) Vital Signs (24h Range):  Temp:  [96.3 °F (35.7 °C)-99.1 °F (37.3 °C)] 98.3 °F (36.8 °C)  Pulse:  [] 105  Resp:  [18-24] 24  SpO2:  [98 %-100 %] 100 %  BP: (113-128)/(74-94) 115/74     Weight: 84.5 kg (186 lb 4.6 oz)  Body mass index is 31 kg/m².    Intake/Output Summary (Last 24 hours) at 9/24/2020 1354  Last data filed at 9/24/2020 0500  Gross per 24 hour   Intake --   Output 500 ml   Net -500 ml      Physical Exam  Constitutional:       Appearance: She is well-developed.   HENT:      Head: Normocephalic and atraumatic.   Neck:      Musculoskeletal: Neck supple.   Cardiovascular:      Rate and Rhythm: Normal rate and regular rhythm.      Heart sounds: Normal heart sounds. No murmur. No friction rub. No gallop.    Pulmonary:      Breath sounds: Normal breath sounds.   Abdominal:      General: Bowel sounds are normal. There is no distension.       Palpations: Abdomen is soft. There is no mass.      Tenderness: There is no rebound.   Musculoskeletal:         General: No tenderness.   Skin:     General: Skin is warm.   Neurological:      Mental Status: She is alert and oriented to person, place, and time.      Deep Tendon Reflexes: Reflexes are normal and symmetric.   Psychiatric:         Behavior: Behavior normal.         Significant Labs:   Blood Culture:   No results for input(s): LABBLOO in the last 48 hours.  CBC:   No results for input(s): WBC, HGB, HCT, PLT in the last 48 hours.  Cardiac Markers: No results for input(s): CKMB, MYOGLOBIN, BNP, TROPISTAT in the last 48 hours.  Lactic Acid:   No results for input(s): LACTATE in the last 48 hours.  Troponin: No results for input(s): TROPONINI in the last 48 hours.  TSH:   Recent Labs   Lab 07/14/20  0959   TSH 1.876     Urine Culture: No results for input(s): LABURIN in the last 48 hours.  Urine Studies:   No results for input(s): COLORU, APPEARANCEUA, PHUR, SPECGRAV, PROTEINUA, GLUCUA, KETONESU, BILIRUBINUA, OCCULTUA, NITRITE, UROBILINOGEN, LEUKOCYTESUR, RBCUA, WBCUA, BACTERIA, SQUAMEPITHEL, HYALINECASTS in the last 48 hours.    Invalid input(s): WRIGHTSUR  Imaging Results          CT Abdomen Pelvis With Contrast (Final result)  Result time 09/21/20 02:51:18    Final result by Wale Carmona MD (09/21/20 02:51:18)                 Impression:      Extensive abnormal appearance of the liver again noted consistent with hepatic metastatic disease with evidence for progression.    Mild free fluid of the abdomen and pelvis noted, with progression.    Multiple pulmonary nodules and abnormal opacities some of which demonstrate cavitary appearance may relate to pulmonary metastatic disease, or as previously discussed a component of septic emboli, this is noted on prior examinations.    Cholelithiasis with mild pericholecystic fluid without pericholecystic inflammatory appearance, this pericholecystic fluid may  relate to the mild free fluid of the abdomen and pelvis, correlation for gallbladder symptomatology is otherwise needed.    Mild urinary bladder wall thickening may relate to incomplete distention however there is an air bubble within the urinary bladder that may be due to instrumentation however correlation for UTI/cystitis is needed.    Mildly prominent small bowel loops not thought to represent obstruction, may relate to ileus however if there is persistent or worsening symptomatology additional follow-up to exclude developing obstruction recommended.      Electronically signed by: Wale Carmona  Date:    09/21/2020  Time:    02:51             Narrative:    EXAMINATION:  CT ABDOMEN PELVIS WITH CONTRAST    CLINICAL HISTORY:  Abdominal pain, fever;    TECHNIQUE:  Low dose axial images, sagittal and coronal reformations were obtained from the lung bases to the pubic symphysis following the IV administration of 100 mL of Omnipaque 350 .  Oral contrast was not given.    COMPARISON:  CT examination chest abdomen pelvis September 2, 2020    FINDINGS:  Single-phase CT examination of the abdomen and pelvis was performed.  Intravenous contrast was utilized.  Oral contrast was not utilized.    The lung bases demonstrate multiple pulmonary nodules and mass lesions, some of which demonstrate cavitary abnormality, as seen on prior chest CT.  There is minimal pleural fluid noted.  The stomach is decompressed, with mild fluid and air in the lumen, nonspecific appearance.  The liver is enlarged and there are multiple hepatic mass lesions consistent with hepatic metastatic disease again noted, there is appearance of progression as the lesions appears somewhat more prominent.  There is abnormal appearance of the gallbladder, there is intraluminal complexity likely relating to cholelithiasis, there is pericholecystic fluid, without significant pericholecystic inflammatory change, and although pericholecystic fluid may relate to  gallbladder pathology, this may relate to mild free fluid of the abdomen and pelvis, with some free fluid adjacent to the inferior aspect of the right lobe of the liver, and along the right pericolic gutter as well as free fluid in the pelvis.  The volume of free fluid has increased when compared to the prior study.  There is suspected heterogeneous enlarged lymph node at the richard hepatis again noted.  There is no evidence for peripancreatic inflammatory change, there is no evidence for abnormal pancreatic or biliary ductal dilatation.  The portal venous structures demonstrate appropriate opacification.  The spleen and adrenal glands appear stable.  There is no evidence for hydronephrosis or obstructive uropathy bilaterally.  The abdominal aorta demonstrates appropriate opacification.  The uterus and adnexa appear appropriate without evidence for acute process, free fluid of the pelvis noted as discussed above.  The urinary bladder is incompletely distended, mild wall thickening may relate to incomplete distention, there is however an air bubble within the urinary bladder, this may relate to instrumentation however can be seen with infection, clinical and historical correlation for UTI/cystitis is needed.    Postoperative change of the bowel is noted, there are some mildly prominent small bowel loops, however not thought to represent obstruction may represent ileus, there is no evidence for inflammatory or obstructive process of the colon.  If there is persistent or worsening symptomatology additional follow-up recommended to exclude the possibility of developing partial obstruction.  There is no evidence for free intraperitoneal air.  The visualized osseous structures appear intact.                               X-Ray Chest AP Portable (Final result)  Result time 09/21/20 01:16:40    Final result by Federico Barron MD (09/21/20 01:16:40)                 Impression:      Removal of Kana catheter from right  jugular approach now with PICC line tip over the innominate/subclavian vein on the right.    Improved patchy lung airspace opacities most significantly in the left lung base with decreased pleural fluid.      Electronically signed by: Federico Barron  Date:    09/21/2020  Time:    01:16             Narrative:    EXAMINATION:  XR CHEST AP PORTABLE    CLINICAL HISTORY:  Sepsis;    TECHNIQUE:  Single frontal view of the chest was performed.    COMPARISON:  Chest x-ray, 09/02/2020 at 17:33 hours    FINDINGS:  Kana dialysis catheter is been removed.  PICC line tip previously at the atrial caval junction is now overlying the innominate vein on the right.    The hazy opacities in the periphery a little spaces of the lungs most confluent in the left retrocardiac region are mildly to moderately improved.  Pleural effusion on the left is less apparent.  There is no pneumothorax.  The heart mediastinal contours remain stable.                                Significant Imaging: I have reviewed all pertinent imaging results/findings within the past 24 hours.

## 2020-09-24 NOTE — PLAN OF CARE
Discharge orders noted. Additional clinical references attached.    Patient's discharge instructions given by bedside RN and reviewed via this VN.  Education provided on new medication, diagnosis, and follow-up appointments. Patient verbalized understanding. All questions answered. Transport to [facility] requested. Floor nurse notified.

## 2020-09-24 NOTE — PLAN OF CARE
Problem: Occupational Therapy Goal  Goal: Occupational Therapy Goal  Description: Goals to be met by: 10/23/2020      Patient will increase functional independence with ADLs by performing:    UE Dressing with Stand-by Assistance.  LE Dressing with Moderate Assistance.  Grooming while seated with Minimal Assistance.  Toileting from bedside commode with Minimal Assistance for hygiene and clothing management.   Toilet transfer to bedside commode with Minimal Assistance.  Increased functional strength to WFL for self care.  Upper extremity exercise program x10 reps per handout, with assistance as needed.     Outcome: Adequate for Care Transition   Goals not met , pt discharged home.

## 2020-09-25 PROBLEM — G93.40 ACUTE ENCEPHALOPATHY: Status: ACTIVE | Noted: 2020-01-01

## 2020-09-25 PROBLEM — R41.82 ALTERED MENTAL STATUS: Status: ACTIVE | Noted: 2020-01-01

## 2020-09-25 NOTE — ANESTHESIA PROCEDURE NOTES
Lumbar puncture    Diagnosis: Septic shock, AMS  Patient location during procedure: floor  Start time: 9/25/2020 1:00 PM  Timeout: 9/25/2020 1:00 PM  End time: 9/25/2020 1:15 PM    Staffing  Authorizing Provider: Rohith Healy MD  Performing Provider: Russell Varela MD    Preanesthetic Checklist  Completed: patient identified, site marked, surgical consent, pre-op evaluation, timeout performed, IV checked, risks and benefits discussed and monitors and equipment checked  Spinal Block  Prep: Betadine  Patient monitoring: heart rate, cardiac monitor, continuous pulse ox and frequent blood pressure checks  Approach: midline  Location: L3-4  Injection technique: lumbar puncture  Needle  Needle type: Quincke   Needle gauge: 18 g.  Needle length: 5 in  Needle localization: anatomical landmarks  Additional Notes  Procedure aborted after 3 attempts due to patient discomfort.

## 2020-09-25 NOTE — CONSULTS
CB consulted for PICC     After review of notes, patient has 5 days of abx (Vanc)    Message sent to Dr. Shannon Hernandez- patient can be d/c with a MIDLINE for 5 days of vanc (ED 9/30/2020)

## 2020-09-25 NOTE — CARE UPDATE
Rapid Response Nurse Chart Check     Chart check completed, abnormal VS noted. Charge RNBette contacted. No concerns verbalized at this time. Pt undergoing LP in IR today. Instructed to call [phone] for further concerns or assistance.

## 2020-09-25 NOTE — CONSULTS
Consult acknowledged. Patient admitted to Medical Oncology service. Please refer to full H&P.      Shannon Hernandez MD   Internal Medicine PGY3  Service: Medical Oncology  [facility]

## 2020-09-25 NOTE — ANESTHESIA PREPROCEDURE EVALUATION
09/25/2020  Huy Cisneros is a 29 y.o., female.  with h/o metastatic colon CA s/p colectomy with liver mets, uterine cysts, and HTN.  Recent admissions for confusion and septic shock.  Plan for LP to workup AMS.    Anesthesia Evaluation    I have reviewed the Patient Summary Reports.   I have reviewed the NPO Status.      Review of Systems  Anesthesia Hx:  No problems with previous Anesthesia    Social:  Non-Smoker    Hematology/Oncology:        Current/Recent Cancer.   Cardiovascular:   Hypertension  Functional Capacity good / => 4 METS  Denies Valvular Heart Disease.    Hypertension    Pulmonary:  Pulmonary Normal  Denies Asthma.  Denies Obstructive Sleep Apnea (STE[place]ANIA)   Hepatic/GI:   Liver Disease,  Liver Disease    Musculoskeletal:  Musculoskeletal Normal    Endocrine:  Denies Diabetes  Denies Thyroid Disease    Dermatological:  Skin Normal        Physical Exam  General:  Well nourished    Airway/Jaw/Neck:  Airway Findings: Mouth Opening: Normal General Airway Assessment: Adult  Mallampati: III  Improves to II with phonation.  TM Distance: Normal, at least 6 cm  Jaw/Neck Findings:  Neck ROM: Normal ROM     Eyes/Ears/Nose:  Eyes/Ears/Nose Findings:    Dental:  Dental Findings: In tact   Chest/Lungs:  Chest/Lungs Findings: Clear to auscultation     Heart/Vascular:  Heart Findings: Rate: Normal, Tachycardia     Abdomen:  Abdomen Findings:  Normal       Mental Status:  Mental Status Findings:  Lethargic         Anesthesia Plan  Type of Anesthesia, risks & benefits discussed:  Anesthesia Type:  MAC  Patient's Preference:   Intra-op Monitoring Plan:   Intra-op Monitoring Plan Comments:   Post Op Pain Control Plan:   Post Op Pain Control Plan Comments:   Induction:   IV  Beta Blocker:  Patient is not currently on a Beta-Blocker (No further documentation required).       Informed Consent: Patient  understands risks and agrees with Anesthesia plan.  Questions answered. Anesthesia consent signed with patient.  ASA Score: 3     Day of Surgery Review of History & Physical: I have interviewed and examined the patient. I have reviewed the patient's H&P dated: 9/25/20. There are no significant changes.          Ready For Surgery From Anesthesia Perspective.

## 2020-09-25 NOTE — ASSESSMENT & PLAN NOTE
Patient presenting with acute encephalopathy as well as nausea and vomiting likely related to sepsis vs intracranial abnormality.  Zofran PRN (check EKG for QTc, on admit 490)  Advance diet as tolerated

## 2020-09-25 NOTE — HOSPITAL COURSE
"Patient presented to hospital with AMS and nausea and vomiting. CT head was negative. MRI brain showed subacutue lacunar infarct. MRI spine unable to be performed. Lactic acid has been trending upward possibly due to liver failure. Patient was empirically being covered by vancomycin, ampicillin, cefepime, acyclovir for possible meningitis. EEG complete w/o significant findings. Patient transferred to ICU 9/26 due to worsening mental status changes. LP was done and studies so far are unrevealing, with negative cultures (viral PCR are on process to date).   Her mental status was waxing and waning.  On 10/2, she became confused again, hypotensive, requiring pressor support. Talked with mother about patient's critical status (now on pressors). Patient was supposed to leave for inpatient hospice at some point but rapidly declined. Mother told us to wait until we start comfort measures  Mother verbalized she understands and wants the patient to be DNR.   Mother arrived with her other daughter, we had a family meeting, and went over Mrs. Cisneros course during this admission, and what looks like is progression of her cancer. We cannot specify a reason for her current mental status or decline. All work-up done was unrevealing for a cause.   They expressed understanding of her condition, and they wished for her to comfortable. We changed the goals of care to be comfort only. Morphine infusion started. 10/3 patient on our floor now, on morphine and comfort measures. Patient does not seem to be in pain. Cousin at bedside. Overnight nurse reported RR 2 and since the morning she has been having agonic breath pattern, RR 4-6.   10/4 patient's mother very upset of the situation "my daughter is still alive and you are not feeding her anything or taking her vitals" we talked to her and reminded her what comfort care was. We also discussed that feeding and fluids do not improve survival and could potentially worsen her situation due " to infections and edema. Plan for now is to wait for a inpatient hospice bed in the hospital to be available (since patient does not seem to be too stable to send her to an inpatient hospice facility), if no options available today we will star process for outside facility tomorrow. Vitals Q8h per mothers request. In the afternoon, mother arrived an apologized saying that she wasn't the one talking over the phone, it was her daughter.    Patient transitioned to comfort care while inpatient, awaiting hospice bed. Passed on 10/6/2020 at 2151 peacefully.  at bedside and family notified.

## 2020-09-25 NOTE — ASSESSMENT & PLAN NOTE
Patient presented to [facility] with AMS. Per family, she has been talking about things which do not exist and has been intermittently disoriented. They note this happened at some point during the hospitalization at [facility] and that it was certainly noted when they picked her up after discharged. She has a recent history of MRSA bacteremia and a UTI for which she was discharged on ABx. Of note, she has not been able to walk due to weakness since her hospitalization for bacteremia appx 3 weeks ago.     MRI demosntrated a small lacunar infarct right corpus callosum above the frontal horn, possibly a subacute infarct. No enhancement is seen on postcontrast images. Additionally, a 2 cm extra-axial enhancing mass identified over the posterior right parietal lobe at the vertex. This abuts the midline superior sagittal sinus.    Recommendations:  -- Uncertain of how large a change this presentation is to the patient's baseline per family's history  -- Weakness is likely a subacute change from cancer, however will assess imaging  -- Primary team ordered MRI C/T/L spine; will follow up results  -- Recommend EEG to r/o seizures; will follow up  -- Primary team ordered LP; will follow up  -- Possible metastisis of primary cancer to brain on imaging?  -- Will continue to follow; appreciate consultation

## 2020-09-25 NOTE — HPI
"Ms. Cisneros is a 29 year old woman with metastatic colon adenocarcinoma, HTN who presents with confusion, nausea, vomiting. She follows with Dr. Arzola in clinic. Attempted to contact patient's mother for further HPI as patient unable to recall recent events but unable to reach her. Patient was most recently admitted to [facility] with UTI for 3 days and was discharged yesterday (09/24) to finish her course of vancomycin when she was hospitalized in 8/26-09/09 for MRSA bacteremia. Per admit ED note after leaving hospital yesterday, "On the way home, pt began complaining of SOB and nausea/vomiting. Reports 2x emesis, non-bloody. SOB has since resolved. Pt's mother further reports that for the past 2d, the pt has been disoriented, speaking strangely, and occasionally has her 'eyes rolled back in her head,' which prompted them to come to [facility] ED". Patient says that she has been feeling "terrible" with nausea and vomiting. She cannot recall how many times she has vomited. She says that she has bilateral leg pain and been having headaches for a while. She denies fevers, chills, chest pain, cough, abdominal pain, diarrhea, dysuria, vision changes. She is able to say her name and what year it is but when asked where she is, she says "[facility]".    In the ED, she received one dose of morphine, zofran, and 1L IVF. Her labs were notable for electrolyte derangements and elevated lactic acid. CT head did not show an acute process. She will be admitted to Medical Oncology for further evaluation and management.    Mother: [phone]    Oncologic History:    Oncologic History 1. Colon adenocarcinoma       Oncologic Treatment 1. Right hemicolectomy (7/17/20)  2. Planned therapy: FOLFOX +/- bevacizumab       Pathology 7/17/20:  Right hemicolectomy:  MODERATELY DIFFERENTIATED ADENOCARCINOMA WITH INFILTRATION THROUGH THE   MUSCULARIS INTO PERICOLONIC ADIPOSE TISSUE   METASTATIC CARCINOMA  TO 6 OF 15 LYMPH NODES "   MULTIPLE METASTASES TO THE FAT ITSELF   NO SIGNIFICANT ABNORMALITY OF THE TERMINAL ILEUM OR APPENDIX   MMR--NORMAL FOR COLORECTAL CARCINOMA   Immunohistochemistry (IHC) Testing for Mismatch Repair (MMR) Proteins:   MLH1 - Intact nuclear expression   MSH2 - Intact nuclear expression   MSH6 - Intact nuclear expression   PMS2 - Intact nuclear expression   Background nonneoplastic tissue/internal control with intact nuclear   expression   IHC Interpretation   No loss of nuclear expression of MMR proteins: low probability of   microsatellite instability      Procedure:  Excision of right and transverse colon   Tumor Site:  transverse colon   Tumor Size:  3.2 cm   Macroscopic Tumor Perforation:  No   Histologic Type:  Adenocarcinoma   Histologic Grade:  Moderately differentiated   Tumor Extension:  Through the muscularis into pericolonic adipose tissue and   with tumor deposits extending to the most circumferential pericolonic tissue.   Margins:  The tumor is  9 cm from the distal margin and 44 from the proximal.   Treatment Effect:  No known presurgical therapy   Lymphovascular Invasion:  Identified in pericolonic tissue   Perineural Invasion:  Present   Tumor Deposits:  Present--approximately 12   Number of Lymph Nodes Examined:15   Number of Lymph Nodes Involved:  6   Pathologic Stage Classification: pT4a pN2A pM1a--liver metastases observed at      BRAF Mutation Analysis(V600E), Tumor  Result Summary:  NO MUTATION IDENTIFIED  Result:  Provided diagnosis: colorectal adenocarcinoma  BRAF status: Wild-type  KRAS Mutation Analysis, Colorectal  Result Summary:  NO MUTATION IDENTIFIED  Result:  Provided diagnosis: colorectal adenocarcinoma  KRAS status: Wild-type     7/15/20:  Transverse colon biopsy:  Invasive moderately differentiated colonic   adenocarcinoma         7/14/20:  Liver, biopsy:   Adenocarcinoma, moderately differentiated   Cytomorphologic features and immunohistochemical staining pattern supports   colonic  primary

## 2020-09-25 NOTE — ASSESSMENT & PLAN NOTE
Patient takes percocet 10mg Q4H PRN pain. As patient altered, avoid to further exacerbate and resume when appropriate.

## 2020-09-25 NOTE — SUBJECTIVE & OBJECTIVE
Oncology Treatment Plan:   OP COLORECTAL FOLFOX + BEVACIZUMAB Q2W    Medications:  Continuous Infusions:  Scheduled Meds:   docusate sodium  100 mg Oral Daily    folic acid  1 mg Oral Daily    sodium chloride 0.9%  1,000 mL Intravenous Once     PRN Meds:dextrose 50%, dextrose 50%, glucagon (human recombinant), glucose, glucose, ondansetron, sodium chloride 0.9%, Pharmacy to dose Vancomycin consult **AND** vancomycin - pharmacy to dose     Review of patient's allergies indicates:   Allergen Reactions    Sulfa (sulfonamide antibiotics)         Past Medical History:   Diagnosis Date    Cancer     Colon CA with mets to the liver    Hypertension 9/21/2020    Uterine cyst      Past Surgical History:   Procedure Laterality Date    COLONOSCOPY N/A 7/15/2020    Procedure: COLONOSCOPY;  Surgeon: Hi Drake MD;  Location: [facility] ENDO;  Service: Endoscopy;  Laterality: N/A;    COLONOSCOPY W/ BIOPSIES      INSERTION OF TUNNELED CENTRAL VENOUS CATHETER (CVC) WITH SUBCUTANEOUS PORT N/A 7/17/2020    Procedure: INSERTION, PORT-A-CATH;  Surgeon: Armani Naqvi MD;  Location: [facility] OR;  Service: General;  Laterality: N/A;     Family History     Problem Relation (Age of Onset)    Hypertension Mother, Maternal Grandmother, Maternal Grandfather, Paternal Grandmother        Tobacco Use    Smoking status: Never Smoker    Smokeless tobacco: Never Used   Substance and Sexual Activity    Alcohol use: No    Drug use: No    Sexual activity: Yes     Partners: Male     Birth control/protection: None       Review of Systems   Constitutional: Positive for fatigue. Negative for chills and fever.   HENT: Negative for congestion and trouble swallowing.    Eyes: Negative for photophobia and visual disturbance.   Respiratory: Positive for shortness of breath. Negative for cough.    Cardiovascular: Negative for chest pain and leg swelling.   Gastrointestinal: Positive for nausea and vomiting. Negative for abdominal pain and  diarrhea.   Genitourinary: Negative for difficulty urinating and dysuria.   Musculoskeletal: Positive for arthralgias and myalgias.   Skin: Negative for rash and wound.   Neurological: Positive for headaches. Negative for dizziness and syncope.   Psychiatric/Behavioral: Positive for confusion. Negative for agitation.     Objective:     Vital Signs (Most Recent):  Pulse: 109 (09/25/20 0100)  Resp: 20 (09/25/20 0145)  BP: 130/88 (09/25/20 0100)  SpO2: 97 % (09/25/20 0100) Vital Signs (24h Range):  Temp:  [98 °F (36.7 °C)-99.1 °F (37.3 °C)] 98.3 °F (36.8 °C)  Pulse:  [] 109  Resp:  [18-24] 20  SpO2:  [97 %-100 %] 97 %  BP: (105-130)/(73-88) 130/88        There is no height or weight on file to calculate BMI.  There is no height or weight on file to calculate BSA.      Intake/Output Summary (Last 24 hours) at 9/25/2020 0300  Last data filed at 9/25/2020 0056  Gross per 24 hour   Intake 1000 ml   Output --   Net 1000 ml       Physical Exam  Vitals signs reviewed.   Constitutional:       General: She is not in acute distress.     Appearance: Normal appearance.   HENT:      Head: Normocephalic and atraumatic.      Nose: Nose normal.      Mouth/Throat:      Mouth: Mucous membranes are dry.   Eyes:      Extraocular Movements: Extraocular movements intact.   Neck:      Musculoskeletal: Neck supple.   Cardiovascular:      Rate and Rhythm: Regular rhythm. Tachycardia present.   Pulmonary:      Effort: Pulmonary effort is normal. No respiratory distress.      Comments: Decreased bilateral breath sounds at the bases.  Abdominal:      General: Bowel sounds are normal. There is no distension.      Palpations: Abdomen is soft.      Tenderness: There is abdominal tenderness (mild, diffuse).   Musculoskeletal: Normal range of motion.         General: Swelling (trace BLE) present.   Skin:     General: Skin is warm and dry.      Comments: L PICC appears clean. No surrounding erythema or drainage noted.   Neurological:       General: No focal deficit present.      Comments: Orientated to person and year but not place.   Psychiatric:      Comments: Impaired recent memory         Significant Labs:   CBC:   Recent Labs   Lab 09/24/20  1903   WBC 11.29   HGB 9.1*   HCT 30.1*       and Urine Studies:   Recent Labs   Lab 09/25/20  0142   COLORU Manuela   APPEARANCEUA Cloudy*   PHUR 6.0   SPECGRAV 1.015   PROTEINUA Negative   GLUCUA Negative   KETONESU Trace*   BILIRUBINUA Negative   OCCULTUA Negative   NITRITE Negative   LEUKOCYTESUR Negative   RBCUA 3   WBCUA 1   SQUAMEPITHEL 3       Diagnostic Results:  CT Head (09/24/2020):  No acute intracranial process.    CXR (09/24/2020):  - Left-sided PICC line catheter tip located at the junction of the innominate vein and SVC.   - Bibasilar patchy lung airspace opacities, left more than right, similar to prior study.

## 2020-09-25 NOTE — ASSESSMENT & PLAN NOTE
Patient on home amlodipine 5mg, lopressor 100mg, spironolactone 25mg. Holding in setting of sepsis. Resume when appropriate.

## 2020-09-25 NOTE — ASSESSMENT & PLAN NOTE
Follows with Dr. Arzola in clinic. Received FOLFOX cycle #1 on 08/11/2020. Will need follow up upon discharge for further evaluation of whether to proceed with cycle #2.

## 2020-09-25 NOTE — ASSESSMENT & PLAN NOTE
Patient is a 29 year old woman with metastatic colon cancer who presents with acute encephalopathy, nausea, vomiting. Patient's mental status was normal on 9/21 able to take care of herself. Differentials include sepsis, electrolyte abnormalities, TIA, delirium, seizures, brain mets. CT head did not show acute infarct or hemorrhage. MRI concerning for subacute stroke in right corpus callosum. Utox positive for opiates, otherwise negative. Neurology consulted.    LP and MRI C/T/L spine could not be performed do to patient confusion and elevated heart rate.    Plan:  - Empirically treating for meningitis: vancomycin, cefepime, ampicillin, acyclovir  - Follow up EEG  - Blood culture 9/25 NGTD  - Patient is being sent to ICU

## 2020-09-25 NOTE — HPI
"Ms. Cisneros is a 29 [place] with metastatic colon adenocarcinoma and HTN who presented to [facility] with confusion, nausea, and vomiting. She follows with Dr. Arzola in clinic. Patient was most recently admitted to [facility] with UTI for 3 days and was discharged yesterday (09/24) to finish her course of vancomycin when she was hospitalized in 8/26-09/09 for MRSA bacteremia. The patient's family notes that they noticed some odd speech and thought over phone conversations while the patient was in [facility], however they had not visited the patient in person during the hospitalization. This was once again noted on the car ride from the hospital. They note that she is speaking of things that do not exist, intermittently disoriented, speaking strangely, and occasionally has her 'eyes rolled back in her head,' which prompted them to come to [facility] ED".      On exam, the patient is AOx3 and able to answer questions and follow most commands.   "

## 2020-09-25 NOTE — CONSULTS
"[facility]  Neurology  Consult Note    Patient Name: Huy Cisneros  MRN: [id number]  Admission Date: 9/24/2020  Hospital Length of Stay: 0 days  Code Status: Full Code   Attending Provider: Balbina Castellanos MD   Consulting Provider: Pablo Carrillo MD  Primary Care Physician: Primary Doctor No  Principal Problem:Acute encephalopathy    Inpatient consult to Neurology  Consult performed by: Pablo Carrillo MD  Consult ordered by: Imtiaz Griffiths MD         Subjective:     Chief Complaint:  AMS     HPI:   Ms. Cisneros is a 29 [place] with metastatic colon adenocarcinoma and HTN who presented to [facility] with confusion, nausea, and vomiting. She follows with Dr. Arzola in clinic. Patient was most recently admitted to [facility] with UTI for 3 days and was discharged yesterday (09/24) to finish her course of vancomycin when she was hospitalized in 8/26-09/09 for MRSA bacteremia. The patient's family notes that they noticed some odd speech and thought over phone conversations while the patient was in [facility], however they had not visited the patient in person during the hospitalization. This was once again noted on the car ride from the hospital. They note that she is speaking of things that do not exist, intermittently disoriented, speaking strangely, and occasionally has her 'eyes rolled back in her head,' which prompted them to come to [facility] ED".      On exam, the patient is AOx3 and able to answer questions and follow most commands.      Past Medical History:   Diagnosis Date    Cancer     Colon CA with mets to the liver    Hypertension 9/21/2020    Uterine cyst        Past Surgical History:   Procedure Laterality Date    COLONOSCOPY N/A 7/15/2020    Procedure: COLONOSCOPY;  Surgeon: Hi Drake MD;  Location: [facility];  Service: Endoscopy;  Laterality: N/A;    COLONOSCOPY W/ BIOPSIES      INSERTION OF TUNNELED CENTRAL VENOUS CATHETER (CVC) WITH SUBCUTANEOUS PORT N/A " 7/17/2020    Procedure: INSERTION, PORT-A-CATH;  Surgeon: Armani Naqvi MD;  Location: [facility] OR;  Service: General;  Laterality: N/A;       Review of patient's allergies indicates:   Allergen Reactions    Sulfa (sulfonamide antibiotics)        Current Facility-Administered Medications on File Prior to Encounter   Medication    [DISCONTINUED] enoxaparin injection 40 mg    [DISCONTINUED] metoprolol tartrate (LOPRESSOR) tablet 50 mg    [DISCONTINUED] morphine injection 2 mg    [DISCONTINUED] ondansetron injection 4 mg    [DISCONTINUED] promethazine tablet 12.5 mg    [DISCONTINUED] sodium chloride 0.9% flush 10 mL    [DISCONTINUED] vancomycin - pharmacy to dose    [DISCONTINUED] vancomycin 1.5 g in dextrose 5 % 250 mL IVPB (ready to mix)     Current Outpatient Medications on File Prior to Encounter   Medication Sig    amLODIPine (NORVASC) 5 MG tablet Take 1 tablet (5 mg total) by mouth once daily.    docusate sodium (COLACE) 100 MG capsule Take 1 capsule (100 mg total) by mouth once daily.    folic acid (FOLVITE) 1 MG tablet Take 1 tablet (1 mg total) by mouth once daily.    lidocaine-prilocaine (EMLA) cream Apply topically As instructed. Apply to skin overlying port site 30 minutes before chemotherapy.    metoprolol tartrate (LOPRESSOR) 100 MG tablet Take 1 tablet (100 mg total) by mouth 2 (two) times daily.    oxyCODONE-acetaminophen (PERCOCET)  mg per tablet Take 1 tablet by mouth every 4 (four) hours as needed for Pain.    promethazine (PHENERGAN) 12.5 MG Tab Take 1 tablet (12.5 mg total) by mouth every 6 (six) hours as needed.    simethicone (MYLICON) 125 MG chewable tablet Take 1 tablet (125 mg total) by mouth every 6 (six) hours as needed for Flatulence.    spironolactone (ALDACTONE) 25 MG tablet Take 1 tablet (25 mg total) by mouth once daily.    vancomycin HCl in 5 % dextrose (VANCOMYCIN IN DEXTROSE 5 %) 1 gram/250 mL Soln Inject 375 mLs (1,500 mg total) into the vein every 12  (twelve) hours. for 6 days     Family History     Problem Relation (Age of Onset)    Hypertension Mother, Maternal Grandmother, Maternal Grandfather, Paternal Grandmother        Tobacco Use    Smoking status: Never Smoker    Smokeless tobacco: Never Used   Substance and Sexual Activity    Alcohol use: No    Drug use: No    Sexual activity: Yes     Partners: Male     Birth control/protection: None     Review of Systems   Constitutional: Negative for chills and fever.   HENT: Negative for rhinorrhea and sneezing.    Eyes: Negative for redness and visual disturbance.   Respiratory: Negative for cough and wheezing.    Gastrointestinal: Positive for nausea and vomiting.   Genitourinary: Negative for dysuria and frequency.   Musculoskeletal: Negative for neck pain and neck stiffness.   Skin: Negative for pallor and rash.   Neurological: Positive for weakness. Negative for facial asymmetry, numbness and headaches.   Psychiatric/Behavioral: Positive for confusion (mild) and decreased concentration (mild).     Objective:     Vital Signs (Most Recent):  Temp: 98.5 °F (36.9 °C) (09/25/20 1225)  Pulse: (!) 122 (09/25/20 1300)  Resp: (!) 26 (09/25/20 1300)  BP: 118/81 (09/25/20 1300)  SpO2: 100 % (09/25/20 1300) Vital Signs (24h Range):  Temp:  [98.3 °F (36.8 °C)-99 °F (37.2 °C)] 98.5 °F (36.9 °C)  Pulse:  [] 122  Resp:  [20-28] 26  SpO2:  [97 %-100 %] 100 %  BP: (105-138)/(73-97) 118/81        There is no height or weight on file to calculate BMI.    Physical Exam  Constitutional:       General: She is not in acute distress.     Appearance: Normal appearance.   HENT:      Head: Normocephalic and atraumatic.   Eyes:      General: No scleral icterus.     Extraocular Movements: Extraocular movements intact.      Conjunctiva/sclera: Conjunctivae normal.      Pupils: Pupils are equal, round, and reactive to light.   Cardiovascular:      Rate and Rhythm: Normal rate.      Pulses: Normal pulses.      Heart sounds: No  murmur.   Pulmonary:      Effort: Pulmonary effort is normal. No respiratory distress.      Breath sounds: No wheezing.   Abdominal:      General: Abdomen is flat. There is no distension.      Tenderness: There is no abdominal tenderness. There is no guarding.   Musculoskeletal:         General: No swelling.      Right lower leg: No edema.      Left lower leg: No edema.   Skin:     General: Skin is warm.      Coloration: Skin is not jaundiced.      Findings: No bruising.   Neurological:      Mental Status: She is oriented to person, place, and time.      Cranial Nerves: No cranial nerve deficit.      Motor: Weakness present.      Deep Tendon Reflexes: Reflexes normal.   Psychiatric:         Speech: Speech normal.         NEUROLOGICAL EXAMINATION:     MENTAL STATUS   Oriented to person, place, and time.   Attention: decreased. Concentration: decreased.   Speech: speech is normal   Level of consciousness: alert ,  drowsy    CRANIAL NERVES     CN III, IV, VI   Pupils are equal, round, and reactive to light.    CN V   Facial sensation intact.     CN VII   Facial expression full, symmetric.     CN IX, X   CN IX normal.   CN X normal.     CN XI   CN XI normal.     CN XII   CN XII normal.     MOTOR EXAM   Muscle bulk: normal  Overall muscle tone: normal  Right arm tone: normal  Left arm tone: normal  Right leg tone: normal  Left leg tone: normal    Strength   Right deltoid: 4/5  Left deltoid: 4/5  Right biceps: 4/5  Left biceps: 4/5  Right triceps: 4/5  Left triceps: 4/5  Right quadriceps: 4/5  Left quadriceps: 4/5  Right hamstrin/5  Left hamstrin/5      Significant Labs:   CBC:   Recent Labs   Lab 20  0550   WBC 11.29 9.42   HGB 9.1* 8.1*   HCT 30.1* 27.0*    178     CMP:   Recent Labs   Lab 203 20  0550    78   * 132*   K 4.7 4.2   CL 99 102   CO2 14* 14*   BUN 20 19   CREATININE 0.8 0.7   CALCIUM 9.1 8.9   MG  --  1.9   PROT 8.5* 8.2   ALBUMIN 2.4* 2.4*    BILITOT 2.9* 2.9*   ALKPHOS 336* 330*   * 261*   ALT 61* 64*   ANIONGAP 18* 16   EGFRNONAA >60.0 >60.0     Urine Studies:   Recent Labs   Lab 09/25/20  0142   COLORU Manuela   APPEARANCEUA Cloudy*   PHUR 6.0   SPECGRAV 1.015   PROTEINUA Negative   GLUCUA Negative   KETONESU Trace*   BILIRUBINUA Negative   OCCULTUA Negative   NITRITE Negative   LEUKOCYTESUR Negative   RBCUA 3   WBCUA 1   SQUAMEPITHEL 3     All pertinent lab results from the past 24 hours have been reviewed.    Significant Imaging: I have reviewed all pertinent imaging results/findings within the past 24 hours.    Assessment and Plan:     * Acute encephalopathy  [place] [facility] [place]. Per family, she has been talking about things which do not exist and has been intermittently disoriented. They note this happened at some point during the hospitalization at [facility] and that it was certainly noted when they picked her up after discharged. She has a recent history of MRSA bacteremia and a UTI for which she was discharged on ABx. Of note, she has not been able to walk due to weakness since her hospitalization for bacteremia appx 3 weeks ago.     MRI demosntrated a small lacunar infarct right corpus callosum above the frontal horn, possibly a subacute infarct. No enhancement is seen on postcontrast images. Additionally, a 2 cm extra-axial enhancing mass identified over the posterior right parietal lobe at the vertex. This abuts the midline superior sagittal sinus.    Recommendations:  -- Uncertain of how large a change this presentation is to the patient's baseline per family's history  -- Weakness is likely a subacute change from cancer, however will assess imaging  -- Primary team ordered MRI C/T/L spine; will follow up results  -- Recommend EEG to r/o seizures; will follow up  -- Primary team ordered LP; will follow up  -- Possible metastisis of primary cancer to brain on imaging?  -- Will continue to follow; appreciate  consultation        VTE Risk Mitigation (From admission, onward)         Ordered     IP VTE HIGH RISK PATIENT  Once      09/25/20 0236     Place sequential compression device  Until discontinued      09/25/20 0236                Thank you for your consult. I will follow-up with patient. Please contact us if you have any additional questions.    Pablo Carrillo MD  Neurology  [facility]

## 2020-09-25 NOTE — PLAN OF CARE
Pt intermittently confused throughout shift. Acyclovir, ampicillin, Cefepime, and Vancomycin administered. Pt down for U/S of liver this am. Pending completion of MRI of cervical/thoracic/lumbar spine. Pending completion of EEG and LP. Pt bedfast, though able to turn with help. Severe BLE weakness and moderate BUE weakness. Right midline placed this afternoon. Pt with noted tachycardia and tachypnea. No acute events so far this shift.  Pt remaining free from falls or injury throughout shift; bed locked and in lowest position; call light within reach.  Pt instructed to call for assistance as needed.  Q1H rounding done on pt.

## 2020-09-25 NOTE — ASSESSMENT & PLAN NOTE
"Patient is a 29 year old woman with metastatic colon cancer who presents with acute encephalopathy, nausea, vomiting. Differentials include sepsis, electrolyte abnormalities, TIA, delirium, seizures, brain mets. CT head did not show acute infarct or hemorrhage.    Plan:  - Obtain collateral for baseline mental status from family  - Follow up infectious workup and continue broad spectrum until cultures result (see "sepsis" for further info)  - Follow up MRI brain  - Consider EEG if workup above negative  "

## 2020-09-25 NOTE — CONSULTS
Single lumen 20g x 8cm midline placed to right cephalic vein. Max dwell date 10/24/2020, Lot# [id number].  Needle advanced into the vessel under real time ultrasound guidance.  Image recorded and saved.

## 2020-09-25 NOTE — NURSING
Pt ate breakfast @ 0800, only fentanyl 100mcg IV given, Pt unable to tolerate LP, procedure aborted, TRANSPORT sent for, will call pts nurse with report

## 2020-09-25 NOTE — H&P
"[facility]  Hematology/Oncology  H&P    Patient Name: Huy Cisneros  MRN: [id number]  Admission Date: 9/24/2020  Code Status: Full Code   Attending Provider: Balbina Castellanos MD  Primary Care Physician: Primary Doctor No  Principal Problem:Acute encephalopathy    Subjective:     HPI:   Ms. Cisneros is a 29 year old woman with metastatic colon adenocarcinoma, HTN who presents with confusion, nausea, vomiting. She follows with Dr. Arzola in clinic. Attempted to contact patient's mother for further HPI as patient unable to recall recent events but unable to reach her. Patient was most recently admitted to [facility] with UTI for 3 days and was discharged yesterday (09/24) to finish her course of vancomycin when she was hospitalized in 8/26-09/09 for MRSA bacteremia. Per admit ED note after leaving hospital yesterday, "On the way home, pt began complaining of SOB and nausea/vomiting. Reports 2x emesis, non-bloody. SOB has since resolved. Pt's mother further reports that for the past 2d, the pt has been disoriented, speaking strangely, and occasionally has her 'eyes rolled back in her head,' which prompted them to come to [facility]". Patient says that she has been feeling "terrible" with nausea and vomiting. She cannot recall how many times she has vomited. She says that she has bilateral leg pain and been having headaches for a while. She denies fevers, chills, chest pain, cough, abdominal pain, diarrhea, dysuria, vision changes. She is able to say her name and what year it is but when asked where she is, she [facility]    In the ED, she received one dose of morphine, zofran, and 1L IVF. Her labs were notable for electrolyte derangements and elevated lactic acid. CT head did not show an acute process. She will be admitted to Medical Oncology for further evaluation and management.    Oncologic History:    Oncologic History 1. Colon adenocarcinoma       Oncologic Treatment 1. Right " hemicolectomy (7/17/20)  2. Planned therapy: FOLFOX +/- bevacizumab       Pathology 7/17/20:  Right hemicolectomy:  MODERATELY DIFFERENTIATED ADENOCARCINOMA WITH INFILTRATION THROUGH THE   MUSCULARIS INTO PERICOLONIC ADIPOSE TISSUE   METASTATIC CARCINOMA  TO 6 OF 15 LYMPH NODES   MULTIPLE METASTASES TO THE FAT ITSELF   NO SIGNIFICANT ABNORMALITY OF THE TERMINAL ILEUM OR APPENDIX   MMR--NORMAL FOR COLORECTAL CARCINOMA   Immunohistochemistry (IHC) Testing for Mismatch Repair (MMR) Proteins:   MLH1 - Intact nuclear expression   MSH2 - Intact nuclear expression   MSH6 - Intact nuclear expression   PMS2 - Intact nuclear expression   Background nonneoplastic tissue/internal control with intact nuclear   expression   IHC Interpretation   No loss of nuclear expression of MMR proteins: low probability of   microsatellite instability      Procedure:  Excision of right and transverse colon   Tumor Site:  transverse colon   Tumor Size:  3.2 cm   Macroscopic Tumor Perforation:  No   Histologic Type:  Adenocarcinoma   Histologic Grade:  Moderately differentiated   Tumor Extension:  Through the muscularis into pericolonic adipose tissue and   with tumor deposits extending to the most circumferential pericolonic tissue.   Margins:  The tumor is  9 cm from the distal margin and 44 from the proximal.   Treatment Effect:  No known presurgical therapy   Lymphovascular Invasion:  Identified in pericolonic tissue   Perineural Invasion:  Present   Tumor Deposits:  Present--approximately 12   Number of Lymph Nodes Examined:15   Number of Lymph Nodes Involved:  6   Pathologic Stage Classification: pT4a pN2A pM1a--liver metastases observed at      BRAF Mutation Analysis(V600E), Tumor  Result Summary:  NO MUTATION IDENTIFIED  Result:  Provided diagnosis: colorectal adenocarcinoma  BRAF status: Wild-type  KRAS Mutation Analysis, Colorectal  Result Summary:  NO MUTATION IDENTIFIED  Result:  Provided diagnosis: colorectal adenocarcinoma  KRAS  status: Wild-type     7/15/20:  Transverse colon biopsy:  Invasive moderately differentiated colonic   adenocarcinoma         7/14/20:  Liver, biopsy:   Adenocarcinoma, moderately differentiated   Cytomorphologic features and immunohistochemical staining pattern supports   colonic primary         Oncology Treatment Plan:   OP COLORECTAL FOLFOX + BEVACIZUMAB Q2W    Medications:  Continuous Infusions:  Scheduled Meds:   docusate sodium  100 mg Oral Daily    folic acid  1 mg Oral Daily    sodium chloride 0.9%  1,000 mL Intravenous Once     PRN Meds:dextrose 50%, dextrose 50%, glucagon (human recombinant), glucose, glucose, ondansetron, sodium chloride 0.9%, Pharmacy to dose Vancomycin consult **AND** vancomycin - pharmacy to dose     Review of patient's allergies indicates:   Allergen Reactions    Sulfa (sulfonamide antibiotics)         Past Medical History:   Diagnosis Date    Cancer     Colon CA with mets to the liver    Hypertension 9/21/2020    Uterine cyst      Past Surgical History:   Procedure Laterality Date    COLONOSCOPY N/A 7/15/2020    Procedure: COLONOSCOPY;  Surgeon: Hi Drake MD;  Location: [facility] ENDO;  Service: Endoscopy;  Laterality: N/A;    COLONOSCOPY W/ BIOPSIES      INSERTION OF TUNNELED CENTRAL VENOUS CATHETER (CVC) WITH SUBCUTANEOUS PORT N/A 7/17/2020    Procedure: INSERTION, PORT-A-CATH;  Surgeon: Armani Naqvi MD;  Location: [facility] OR;  Service: General;  Laterality: N/A;     Family History     Problem Relation (Age of Onset)    Hypertension Mother, Maternal Grandmother, Maternal Grandfather, Paternal Grandmother        Tobacco Use    Smoking status: Never Smoker    Smokeless tobacco: Never Used   Substance and Sexual Activity    Alcohol use: No    Drug use: No    Sexual activity: Yes     Partners: Male     Birth control/protection: None       Review of Systems   Constitutional: Positive for fatigue. Negative for chills and fever.   HENT: Negative for congestion  and trouble swallowing.    Eyes: Negative for photophobia and visual disturbance.   Respiratory: Positive for shortness of breath. Negative for cough.    Cardiovascular: Negative for chest pain and leg swelling.   Gastrointestinal: Positive for nausea and vomiting. Negative for abdominal pain and diarrhea.   Genitourinary: Negative for difficulty urinating and dysuria.   Musculoskeletal: Positive for arthralgias and myalgias.   Skin: Negative for rash and wound.   Neurological: Positive for headaches. Negative for dizziness and syncope.   Psychiatric/Behavioral: Positive for confusion. Negative for agitation.     Objective:     Vital Signs (Most Recent):  Pulse: 109 (09/25/20 0100)  Resp: 20 (09/25/20 0145)  BP: 130/88 (09/25/20 0100)  SpO2: 97 % (09/25/20 0100) Vital Signs (24h Range):  Temp:  [98 °F (36.7 °C)-99.1 °F (37.3 °C)] 98.3 °F (36.8 °C)  Pulse:  [] 109  Resp:  [18-24] 20  SpO2:  [97 %-100 %] 97 %  BP: (105-130)/(73-88) 130/88        There is no height or weight on file to calculate BMI.  There is no height or weight on file to calculate BSA.      Intake/Output Summary (Last 24 hours) at 9/25/2020 0300  Last data filed at 9/25/2020 0056  Gross per 24 hour   Intake 1000 ml   Output --   Net 1000 ml       Physical Exam  Vitals signs reviewed.   Constitutional:       General: She is not in acute distress.     Appearance: Normal appearance.   HENT:      Head: Normocephalic and atraumatic.      Nose: Nose normal.      Mouth/Throat:      Mouth: Mucous membranes are dry.   Eyes:      Extraocular Movements: Extraocular movements intact.   Neck:      Musculoskeletal: Neck supple.   Cardiovascular:      Rate and Rhythm: Regular rhythm. Tachycardia present.   Pulmonary:      Effort: Pulmonary effort is normal. No respiratory distress.      Comments: Decreased bilateral breath sounds at the bases.  Abdominal:      General: Bowel sounds are normal. There is no distension.      Palpations: Abdomen is soft.       "Tenderness: There is abdominal tenderness (mild, diffuse).   Musculoskeletal: Normal range of motion.         General: Swelling (trace BLE) present.   Skin:     General: Skin is warm and dry.      Comments: L PICC appears clean. No surrounding erythema or drainage noted.   Neurological:      General: No focal deficit present.      Comments: Orientated to person and year but not place.   Psychiatric:      Comments: Impaired recent memory         Significant Labs:   CBC:   Recent Labs   Lab 09/24/20  1903   WBC 11.29   HGB 9.1*   HCT 30.1*       and Urine Studies:   Recent Labs   Lab 09/25/20  0142   COLORU Manuela   APPEARANCEUA Cloudy*   PHUR 6.0   SPECGRAV 1.015   PROTEINUA Negative   GLUCUA Negative   KETONESU Trace*   BILIRUBINUA Negative   OCCULTUA Negative   NITRITE Negative   LEUKOCYTESUR Negative   RBCUA 3   WBCUA 1   SQUAMEPITHEL 3       Diagnostic Results:  CT Head (09/24/2020):  No acute intracranial process.    CXR (09/24/2020):  - Left-sided PICC line catheter tip located at the junction of the innominate vein and SVC.   - Bibasilar patchy lung airspace opacities, left more than right, similar to prior study.       Assessment/Plan:     * Acute encephalopathy  Patient is a 29 year old woman with metastatic colon cancer who presents with acute encephalopathy, nausea, vomiting. Differentials include sepsis, electrolyte abnormalities, TIA, delirium, seizures, brain mets. CT head did not show acute infarct or hemorrhage.    Plan:  - Obtain collateral for baseline mental status from family  - Follow up infectious workup and continue broad spectrum until cultures result (see "sepsis" for further info)  - Follow up MRI brain  - Consider EEG if workup above negative    Elevated LFTs  Patient with elevated LFTs since July 2020, likely due to liver metastases. Stable upon admit.  Trend daily CMP    Hypertension  Patient on home amlodipine 5mg, lopressor 100mg, spironolactone 25mg. Holding in setting of sepsis. " Resume when appropriate.     MRSA bacteremia  Patient admitted to hospital on 8/26/20 - 9/9/20 with MRSA bacteremia. Was recently discharged on 09/24 after being admitted for UTI. She was discharged to complete 6 more days of vancomycin (end date around 09/30).     - Will continue vancomycin until stop date  - Follow up repeat blood cultures  - Will need new PICC as prior PICC was retracted sometime during most recent hospitalization    Sepsis  Concern for sepsis given tachycardia, acute encephalopathy and elevated lactic acid. UA not suggestive of UTI and CXR showed bibasilar patchy lung airspace opacities    Plan:  - Received 1L IVF in ED. Will give an additional 1L and reassess. May require additional IVF.  - Trend lactic acid  - Follow up blood cultures  - Continue vancomycin for MRSA bacteremia (previously diagnosed during hospitalization 8/26/20 - 9/9/20) and zosyn given concern for sepsis and CXR showing possible PNA     Nausea and vomiting  Patient presenting with acute encephalopathy as well as nausea and vomiting likely related to sepsis vs intracranial abnormality.  Follow up MRI brain  Zofran PRN (check EKG for QTc, on admit 490)  Advance diet as tolerated    Hyponatremia  Likely related to dehydration. Monitor CMP.    Chronic neoplasm-related pain  Patient takes percocet 10mg Q4H PRN pain. As patient altered, avoid to further exacerbate and resume when appropriate.    Adenocarcinoma of colon  Follows with Dr. Arzola in clinic. Received FOLFOX cycle #1 on 08/11/2020. Will need follow up upon discharge for further evaluation of whether to proceed with cycle #2.    Patient seen, and case to be discussed with staff. Attending attestation to follow. Please contact Medical Oncology with any questions.    Shannon Hernandez MD  Hematology/Oncology  [facility]      STAFF NOTE:  I have personally taken the history and examined this patient and agree with residents Note as stated above   She is  confused and has weakness in her legs,.  Will broaden antibiotic coverage to cover CNS, may need LP and also will have neurology assess her

## 2020-09-25 NOTE — ASSESSMENT & PLAN NOTE
Patient with elevated LFTs since July 2020, likely due to liver metastases. Stable upon admit.  Trend daily CMP

## 2020-09-25 NOTE — ED NOTES
Encouraged patient to provide urine sample. Placed on purewick. Continuous pulse oximetry, BP, and cardiac monitoring in place. Call bell within reach. Will continue to monitor.

## 2020-09-25 NOTE — ASSESSMENT & PLAN NOTE
Patient admitted to hospital on 8/26/20 - 9/9/20 with MRSA bacteremia. Was recently discharged on 09/24 after being admitted for UTI. She was discharged to complete 6 more days of vancomycin (end date around 09/30).     - Will continue vancomycin until stop date  - Follow up repeat blood cultures  - Will need new PICC as prior PICC was retracted sometime during most recent hospitalization

## 2020-09-25 NOTE — ASSESSMENT & PLAN NOTE
Concern for sepsis given tachycardia, acute encephalopathy and elevated lactic acid. UA not suggestive of UTI and CXR showed bibasilar patchy lung airspace opacities    Plan:  - Trend lactic acid  - Follow up blood cultures  - Continue vancomycin for MRSA bacteremia (previously diagnosed during hospitalization 8/26/20 - 9/9/20) and cefepime, ampicillin, and acyclovir for empiric coverage

## 2020-09-25 NOTE — ASSESSMENT & PLAN NOTE
Patient admitted to hospital on 8/26/20 - 9/9/20 with MRSA bacteremia. Was recently discharged on 09/24 after being admitted for UTI. She was discharged to complete 6 more days of vancomycin (end date around 09/30).     - Will continue vancomycin until stop date  - Follow up repeat blood cultures

## 2020-09-25 NOTE — ED PROVIDER NOTES
Encounter Date: 9/24/2020       History     Chief Complaint   Patient presents with    Fatigue     patient was just discharged from [facility], Patient has colon CA. N/V.      Ms. Adams is a 28yo F w PMH of colon cancer w extensive liver metastases, HTN, uterine cysts; presents to [facility] ED w AMS and nausea/vomiting. Pt w recent admission to [facility] for sepsis/staph bacteremia; was dispo'd earlier today w plan for home IV abx therapy via PICC and re-evaluation outpatient for chemo after completing abx course. However, on the way home, pt began complaining of SOB and nausea/vomiting. Reports 2x emesis, non-bloody. SOB has since resolved. Pt's mother further reports that for the past 2d, the pt has been disoriented, speaking strangely, and occasionally has her 'eyes rolled back in her head,' which prompted them to come [facility]. Additional complaints of abd pain, rated 5/10, and fatigue, stable from prior admit. Denies recent fever/chills/ns, chest pain, cough, dysuria, LE edema.     The history is provided by the patient, medical records and a parent.     Review of patient's allergies indicates:   Allergen Reactions    Sulfa (sulfonamide antibiotics)      Past Medical History:   Diagnosis Date    Cancer     Colon CA with mets to the liver    Hypertension 9/21/2020    Uterine cyst      Past Surgical History:   Procedure Laterality Date    COLONOSCOPY N/A 7/15/2020    Procedure: COLONOSCOPY;  Surgeon: Hi Drake MD;  Location: [facility] ENDO;  Service: Endoscopy;  Laterality: N/A;    COLONOSCOPY W/ BIOPSIES      INSERTION OF TUNNELED CENTRAL VENOUS CATHETER (CVC) WITH SUBCUTANEOUS PORT N/A 7/17/2020    Procedure: INSERTION, PORT-A-CATH;  Surgeon: Armani Naqvi MD;  Location: [facility] OR;  Service: General;  Laterality: N/A;     Family History   Problem Relation Age of Onset    Hypertension Mother     Hypertension Maternal Grandmother     Hypertension Maternal Grandfather      Hypertension Paternal Grandmother      Social History     Tobacco Use    Smoking status: Never Smoker    Smokeless tobacco: Never Used   Substance Use Topics    Alcohol use: No    Drug use: No     Review of Systems   Constitutional: Positive for fatigue. Negative for chills and fever.   HENT: Negative for congestion and sore throat.    Eyes: Negative for photophobia and visual disturbance.   Respiratory: Positive for shortness of breath. Negative for cough.    Cardiovascular: Negative for chest pain, palpitations and leg swelling.   Gastrointestinal: Positive for abdominal pain, nausea and vomiting. Negative for diarrhea.   Genitourinary: Negative for dyspareunia and dysuria.   Musculoskeletal: Negative for arthralgias and myalgias.   Skin: Positive for color change. Negative for rash and wound.   Neurological: Positive for light-headedness. Negative for weakness.   Psychiatric/Behavioral: Positive for confusion. Negative for self-injury and suicidal ideas.       Physical Exam     Initial Vitals   BP Pulse Resp Temp SpO2   09/24/20 1854 09/24/20 1854 09/25/20 0145 -- 09/24/20 1854   109/73 95 20  100 %      MAP       --                Physical Exam    Constitutional: She appears well-developed. She appears lethargic. She is not diaphoretic.   HENT:   Head: Normocephalic and atraumatic.   Eyes: EOM are normal. Pupils are equal, round, and reactive to light. Scleral icterus is present.   Neck: Normal range of motion. Neck supple.   Cardiovascular: Regular rhythm, normal heart sounds and intact distal pulses. Tachycardia present.  Exam reveals no gallop and no friction rub.    No murmur heard.  Pulmonary/Chest: Breath sounds normal. Tachypnea noted. No respiratory distress.   Abdominal: Soft. She exhibits no distension. There is abdominal tenderness (mild diffuse). There is no rebound and no guarding.   Musculoskeletal: Normal range of motion. No tenderness or edema.   Neurological: She appears lethargic. She is  disoriented. No cranial nerve deficit or sensory deficit.   Skin: Skin is warm and dry. Capillary refill takes less than 2 seconds.   jaundiced   Psychiatric: Her mood appears anxious. Her speech is slurred. She is not agitated. She exhibits a depressed mood. She expresses no homicidal and no suicidal ideation. She exhibits abnormal recent memory.         ED Course   Procedures  Labs Reviewed   CBC WITHOUT DIFFERENTIAL - Abnormal; Notable for the following components:       Result Value    RBC 3.40 (*)     Hemoglobin 9.1 (*)     Hematocrit 30.1 (*)     Mean Corpuscular Hemoglobin 26.8 (*)     Mean Corpuscular Hemoglobin Conc 30.2 (*)     RDW 27.9 (*)     All other components within normal limits   COMPREHENSIVE METABOLIC PANEL - Abnormal; Notable for the following components:    Sodium 131 (*)     CO2 14 (*)     Total Protein 8.5 (*)     Albumin 2.4 (*)     Total Bilirubin 2.9 (*)     Alkaline Phosphatase 336 (*)      (*)     ALT 61 (*)     Anion Gap 18 (*)     All other components within normal limits   LACTIC ACID, PLASMA - Abnormal; Notable for the following components:    Lactate (Lactic Acid) 5.8 (*)     All other components within normal limits   ISTAT PROCEDURE - Abnormal; Notable for the following components:    POC PCO2 23.6 (*)     POC PO2 39 (*)     POC HCO3 14.4 (*)     POC SATURATED O2 75 (*)     POC Lactate 4.84 (*)     POC TCO2 15 (*)     All other components within normal limits   CULTURE, BLOOD   CULTURE, BLOOD   TROPONIN I   B-TYPE NATRIURETIC PEPTIDE   AMMONIA   URINALYSIS, REFLEX TO URINE CULTURE   SARS-COV-2 RDRP GENE   POCT URINE PREGNANCY          Imaging Results          CT Head Without Contrast (Final result)  Result time 09/24/20 21:37:12    Final result by Yevgeniy Dixon MD (09/24/20 21:37:12)                 Impression:      No acute intracranial process.      Electronically signed by: Yevgeniy Dixon  Date:    09/24/2020  Time:    21:37             Narrative:    EXAMINATION:  CT  HEAD WITHOUT CONTRAST    CLINICAL HISTORY:  Altered mental status;    TECHNIQUE:  Low dose axial CT images obtained throughout the head without intravenous contrast. Sagittal and coronal reconstructions were performed.    COMPARISON:  04/29/2013    FINDINGS:  Intracranial compartment:    Ventricles and sulci are normal in size for age without evidence of hydrocephalus. No extra-axial blood or fluid collections.    The brain parenchyma appears normal. No parenchymal mass, hemorrhage, edema or major vascular distribution infarct.    Skull/extracranial contents (limited evaluation): No fracture. Mastoid air cells and paranasal sinuses are essentially clear.                               X-Ray Chest AP Portable (Final result)  Result time 09/24/20 20:24:29    Final result by Too Conner MD (09/24/20 20:24:29)                 Impression:      Left-sided PICC line catheter tip located at the junction of the innominate vein and SVC.    Bibasilar patchy lung airspace opacities, left more than right, similar to prior study.      Electronically signed by: Too Conner MD  Date:    09/24/2020  Time:    20:24             Narrative:    EXAMINATION:  XR CHEST AP PORTABLE    CLINICAL HISTORY:  shortness of breath;    TECHNIQUE:  Single frontal view of the chest was performed.    COMPARISON:  09/21/2020.    FINDINGS:  Left-sided PICC line catheter tip located at the junction of the innominate vein and SVC.    Bibasilar patchy lung airspace opacities, left more than right, similar to prior study.    Heart and lungs  appear unchanged when allowing for differences in technique and positioning.                                 Medical Decision Making:   History:   Old Medical Records: I decided to obtain old medical records.  Initial Assessment:   Ms. Adams is a 28yo F w PMH of colon cancer w extensive liver metastases, HTN, uterine cysts; presents to [facility] ED w AMS and nausea/vomiting. Recent admit for sepsis.  Reports 2x emesis, non-bloody. Additional complaints of abd pain, rated 5/10, and fatigue, stable from prior admit.    Differential Diagnosis:   Hepatic encephalopathy, brain metastases, CVA/TIA, encephalitis  Clinical Tests:   Lab Tests: Ordered  Radiological Study: Ordered  ED Management:  On presentation, pt afebrile. Pt borderline tachycardic and tachypnic; other vital signs stable. Physical exam notable for mild diffuse abd tenderness, altered mental status (only AAO x2, speech confused and nonsensical). Labs ordered. CBC unremarkable, without leukocytosis. CMP notable for Na 131, bicarb 14, alb 2.4, tbili 2.9, AST//61, alkphos 336. BNP 53 wnl. Lactate 5.8. Ammonia 18 wnl. Trop wnl. COVID negative. EKG NSR w borderline prolonged QT. CXR w bibasilar patchy lung airspace opacities, left more than right, similar to CXR 9/21. Non-con CT head without evidence of acute intracranial process. Zofran ordered for nausea. Morphine ordered for pain. 1L NS bolus ordered. Repeat lactate 4.8. Discussion held w pt and mother, both agreeable to treatment plan. Pt discussed with oncology. Plan for admit to oncology.                   ED Course as of Sep 25 0156   Thu Sep 24, 2020   1911 ED attending progress note:  29-year-old female with metastatic colon adenocarcinoma presents to ER accompanied by mother due to shortness of breath, fatigue, and altered mental status.  Mother reports she has been confused for the last 2 days.  She was recently admitted to [facility], and discharged earlier today.  Patient is awake but obviously confused and talking nonsensically on arrival.  Vitals reassuring.  Will obtain infectious evaluation, including ammonia and lactate.  Will obtain CT head.  Will continue to monitor.    [SS]   1917 EKG interpretationBy ED attending physician:  Normal sinus rhythm, rate 95, T-wave inversion anterior and lateral leads, no ST elevation or other signs of ischemia, normal intervals.  Compared to  prior EKG 09/2020, no significant change.    [SS]      ED Course User Index  [SS] Varghese Saenz MD            Clinical Impression:       ICD-10-CM ICD-9-CM   1. Altered mental status, unspecified altered mental status type  R41.82 780.97   2. Shortness of breath  R06.02 786.05                                               Armani Allison MD  Resident  09/25/20 0158

## 2020-09-25 NOTE — SUBJECTIVE & OBJECTIVE
Past Medical History:   Diagnosis Date    Cancer     Colon CA with mets to the liver    Hypertension 9/21/2020    Uterine cyst        Past Surgical History:   Procedure Laterality Date    COLONOSCOPY N/A 7/15/2020    Procedure: COLONOSCOPY;  Surgeon: Hi Drake MD;  Location: [facility] ENDO;  Service: Endoscopy;  Laterality: N/A;    COLONOSCOPY W/ BIOPSIES      INSERTION OF TUNNELED CENTRAL VENOUS CATHETER (CVC) WITH SUBCUTANEOUS PORT N/A 7/17/2020    Procedure: INSERTION, PORT-A-CATH;  Surgeon: Armani Naqvi MD;  Location: [facility] OR;  Service: General;  Laterality: N/A;       Review of patient's allergies indicates:   Allergen Reactions    Sulfa (sulfonamide antibiotics)        Current Facility-Administered Medications on File Prior to Encounter   Medication    [DISCONTINUED] enoxaparin injection 40 mg    [DISCONTINUED] metoprolol tartrate (LOPRESSOR) tablet 50 mg    [DISCONTINUED] morphine injection 2 mg    [DISCONTINUED] ondansetron injection 4 mg    [DISCONTINUED] promethazine tablet 12.5 mg    [DISCONTINUED] sodium chloride 0.9% flush 10 mL    [DISCONTINUED] vancomycin - pharmacy to dose    [DISCONTINUED] vancomycin 1.5 g in dextrose 5 % 250 mL IVPB (ready to mix)     Current Outpatient Medications on File Prior to Encounter   Medication Sig    amLODIPine (NORVASC) 5 MG tablet Take 1 tablet (5 mg total) by mouth once daily.    docusate sodium (COLACE) 100 MG capsule Take 1 capsule (100 mg total) by mouth once daily.    folic acid (FOLVITE) 1 MG tablet Take 1 tablet (1 mg total) by mouth once daily.    lidocaine-prilocaine (EMLA) cream Apply topically As instructed. Apply to skin overlying port site 30 minutes before chemotherapy.    metoprolol tartrate (LOPRESSOR) 100 MG tablet Take 1 tablet (100 mg total) by mouth 2 (two) times daily.    oxyCODONE-acetaminophen (PERCOCET)  mg per tablet Take 1 tablet by mouth every 4 (four) hours as needed for Pain.    promethazine  (PHENERGAN) 12.5 MG Tab Take 1 tablet (12.5 mg total) by mouth every 6 (six) hours as needed.    simethicone (MYLICON) 125 MG chewable tablet Take 1 tablet (125 mg total) by mouth every 6 (six) hours as needed for Flatulence.    spironolactone (ALDACTONE) 25 MG tablet Take 1 tablet (25 mg total) by mouth once daily.    vancomycin HCl in 5 % dextrose (VANCOMYCIN IN DEXTROSE 5 %) 1 gram/250 mL Soln Inject 375 mLs (1,500 mg total) into the vein every 12 (twelve) hours. for 6 days     Family History     Problem Relation (Age of Onset)    Hypertension Mother, Maternal Grandmother, Maternal Grandfather, Paternal Grandmother        Tobacco Use    Smoking status: Never Smoker    Smokeless tobacco: Never Used   Substance and Sexual Activity    Alcohol use: No    Drug use: No    Sexual activity: Yes     Partners: Male     Birth control/protection: None     Review of Systems   Constitutional: Negative for chills and fever.   HENT: Negative for rhinorrhea and sneezing.    Eyes: Negative for redness and visual disturbance.   Respiratory: Negative for cough and wheezing.    Gastrointestinal: Positive for nausea and vomiting.   Genitourinary: Negative for dysuria and frequency.   Musculoskeletal: Negative for neck pain and neck stiffness.   Skin: Negative for pallor and rash.   Neurological: Positive for weakness. Negative for facial asymmetry, numbness and headaches.   Psychiatric/Behavioral: Positive for confusion (mild) and decreased concentration (mild).     Objective:     Vital Signs (Most Recent):  Temp: 98.5 °F (36.9 °C) (09/25/20 1225)  Pulse: (!) 122 (09/25/20 1300)  Resp: (!) 26 (09/25/20 1300)  BP: 118/81 (09/25/20 1300)  SpO2: 100 % (09/25/20 1300) Vital Signs (24h Range):  Temp:  [98.3 °F (36.8 °C)-99 °F (37.2 °C)] 98.5 °F (36.9 °C)  Pulse:  [] 122  Resp:  [20-28] 26  SpO2:  [97 %-100 %] 100 %  BP: (105-138)/(73-97) 118/81        There is no height or weight on file to calculate BMI.    Physical  Exam  Constitutional:       General: She is not in acute distress.     Appearance: Normal appearance.   HENT:      Head: Normocephalic and atraumatic.   Eyes:      General: No scleral icterus.     Extraocular Movements: Extraocular movements intact.      Conjunctiva/sclera: Conjunctivae normal.      Pupils: Pupils are equal, round, and reactive to light.   Cardiovascular:      Rate and Rhythm: Normal rate.      Pulses: Normal pulses.      Heart sounds: No murmur.   Pulmonary:      Effort: Pulmonary effort is normal. No respiratory distress.      Breath sounds: No wheezing.   Abdominal:      General: Abdomen is flat. There is no distension.      Tenderness: There is no abdominal tenderness. There is no guarding.   Musculoskeletal:         General: No swelling.      Right lower leg: No edema.      Left lower leg: No edema.   Skin:     General: Skin is warm.      Coloration: Skin is not jaundiced.      Findings: No bruising.   Neurological:      Mental Status: She is oriented to person, place, and time.      Cranial Nerves: No cranial nerve deficit.      Motor: Weakness present.      Deep Tendon Reflexes: Reflexes normal.   Psychiatric:         Speech: Speech normal.         NEUROLOGICAL EXAMINATION:     MENTAL STATUS   Oriented to person, place, and time.   Attention: decreased. Concentration: decreased.   Speech: speech is normal   Level of consciousness: alert ,  drowsy    CRANIAL NERVES     CN III, IV, VI   Pupils are equal, round, and reactive to light.    CN V   Facial sensation intact.     CN VII   Facial expression full, symmetric.     CN IX, X   CN IX normal.   CN X normal.     CN XI   CN XI normal.     CN XII   CN XII normal.     MOTOR EXAM   Muscle bulk: normal  Overall muscle tone: normal  Right arm tone: normal  Left arm tone: normal  Right leg tone: normal  Left leg tone: normal    Strength   Right deltoid: 4/5  Left deltoid: 4/5  Right biceps: 4/5  Left biceps: 4/5  Right triceps: 4/5  Left triceps:  4/5  Right quadriceps: 4/5  Left quadriceps: 4/5  Right hamstrin/5  Left hamstrin/5      Significant Labs:   CBC:   Recent Labs   Lab 20  0550   WBC 11.29 9.42   HGB 9.1* 8.1*   HCT 30.1* 27.0*    178     CMP:   Recent Labs   Lab 20  0550    78   * 132*   K 4.7 4.2   CL 99 102   CO2 14* 14*   BUN 20 19   CREATININE 0.8 0.7   CALCIUM 9.1 8.9   MG  --  1.9   PROT 8.5* 8.2   ALBUMIN 2.4* 2.4*   BILITOT 2.9* 2.9*   ALKPHOS 336* 330*   * 261*   ALT 61* 64*   ANIONGAP 18* 16   EGFRNONAA >60.0 >60.0     Urine Studies:   Recent Labs   Lab 20  0142   COLORU Manuela   APPEARANCEUA Cloudy*   PHUR 6.0   SPECGRAV 1.015   PROTEINUA Negative   GLUCUA Negative   KETONESU Trace*   BILIRUBINUA Negative   OCCULTUA Negative   NITRITE Negative   LEUKOCYTESUR Negative   RBCUA 3   WBCUA 1   SQUAMEPITHEL 3     All pertinent lab results from the past 24 hours have been reviewed.    Significant Imaging: I have reviewed all pertinent imaging results/findings within the past 24 hours.

## 2020-09-25 NOTE — ASSESSMENT & PLAN NOTE
Patient presenting with acute encephalopathy as well as nausea and vomiting likely related to sepsis vs intracranial abnormality.  Follow up MRI brain  Zofran PRN (check EKG for QTc, on admit 490)  Advance diet as tolerated

## 2020-09-25 NOTE — PROGRESS NOTES
Pharmacokinetic Initial Assessment: IV Vancomycin    At OSH, patient received vancomycin for MRSA bacteremia. She was loaded with 2 grams and scheduled 1.5 g q12h. The trough level came back supratherapeutic at 39.1 and the next dose was held. After about 32 hours, vanc was restarted at 1.5 grams every 24 hours. She received 2 doses before discharge on 9/24.    [place] [facility] [place], where first random came back at 27.3. A second random was delayed several hours due to different imaging and procedures, came back at 19.6. Her levels and accumulation on 12 hour dosing from OSH was taken into consideration when re-dosing her vancomycin.    Assessment/Plan:    Scheduling vancomycin 1,500 mg IV to continue every 24 hours. Vancomycin trough scheduled to be collected on Sunday 9/27 at 1700 before the 3rd dose with a trough goal of 15-20 mcg/mL. Estimated trough level is about 17 per pharmacokinetic calculations.  Pharmacy will continue to follow and monitor vancomycin.      Please contact pharmacy at extension [phone] with any questions regarding this assessment.     Thank you for the consult,   Maral Perez PharmD       Patient brief summary:  Huy Cisneros is a 29 y.o. female initiated on antimicrobial therapy with IV Vancomycin for treatment of suspected bacteremia    Drug Allergies:   Review of patient's allergies indicates:   Allergen Reactions    Sulfa (sulfonamide antibiotics)        Actual Body Weight:   84.5 kg    Renal Function:   Estimated Creatinine Clearance: 126.2 mL/min (based on SCr of 0.7 mg/dL).,     Dialysis Method (if applicable):  N/A    CBC (last 72 hours):  Recent Labs   Lab Result Units 09/24/20  1903 09/25/20  0550   WBC K/uL 11.29 9.42   Hemoglobin g/dL 9.1* 8.1*   Hematocrit % 30.1* 27.0*   Platelets K/uL 212 178   Gran% %  --  75.4*   Lymph% %  --  13.2*   Mono% %  --  8.8   Eosinophil% %  --  0.2   Basophil% %  --  0.3   Differential Method   --  Automated       Metabolic  Panel (last 72 hours):  Recent Labs   Lab Result Units 09/24/20  1903 09/25/20  0142 09/25/20  0550 09/25/20  1008   Sodium mmol/L 131*  --  132*  --    Potassium mmol/L 4.7  --  4.2  --    Chloride mmol/L 99  --  102  --    CO2 mmol/L 14*  --  14*  --    Glucose mg/dL 103  --  78  --    Glucose, UA   --  Negative  --   --    BUN, Bld mg/dL 20  --  19  --    Creatinine mg/dL 0.8  --  0.7  --    Creatinine, Random Ur mg/dL  --   --   --  49.0   Albumin g/dL 2.4*  --  2.4*  --    Total Bilirubin mg/dL 2.9*  --  2.9*  --    Alkaline Phosphatase U/L 336*  --  330*  --    AST U/L 219*  --  261*  --    ALT U/L 61*  --  64*  --    Magnesium mg/dL  --   --  1.9  --    Phosphorus mg/dL  --   --  2.3*  --        Drug levels (last 3 results):  Recent Labs   Lab Result Units 09/23/20  0523 09/25/20  0550 09/25/20  1507   Vancomycin, Random ug/mL 22.3 27.3 19.6       Microbiologic Results:  Microbiology Results (last 7 days)     Procedure Component Value Units Date/Time    Blood culture #1 **CANNOT BE ORDERED STAT** [[id number]] Collected: 09/25/20 0239    Order Status: Completed Specimen: Blood from Peripheral, Hand, Right Updated: 09/25/20 0945     Blood Culture, Routine No Growth to date    Blood culture #2 **CANNOT BE ORDERED STAT** [[id number]] Collected: 09/25/20 0239    Order Status: Completed Specimen: Blood from Line, PICC Left Basilic Updated: 09/25/20 0945     Blood Culture, Routine No Growth to date

## 2020-09-25 NOTE — ASSESSMENT & PLAN NOTE
Concern for sepsis given tachycardia, acute encephalopathy and elevated lactic acid. UA not suggestive of UTI and CXR showed bibasilar patchy lung airspace opacities    Plan:  - Received 1L IVF in ED. Will give an additional 1L and reassess. May require additional IVF.  - Trend lactic acid  - Follow up blood cultures  - Continue vancomycin for MRSA bacteremia (previously diagnosed during hospitalization 8/26/20 - 9/9/20) and zosyn given concern for sepsis and CXR showing possible PNA

## 2020-09-26 PROBLEM — R34 OLIGURIA: Status: ACTIVE | Noted: 2020-01-01

## 2020-09-26 NOTE — PLAN OF CARE
Plan of care reviewed. Patient disoriented to place, time, and situation. Maintains ability to state name and president. Remains on bed rest with bed alarm in use. LEIF midline flushed and saline locked. Antibiotics continued. Remained afebrile. Seizure like activity observed. Intermittent moments of gazing into atmosphere and eye twitching observed. MD, charge nurse and rapid response notified. Seizure and aspiration precautions initiated. Side rails padded and suction at bedside. Oxygen at 2 LPM via NC in use. Complained of mild abdominal pain. Chest x-ray and EKG completed. Patient is tachypniec and tachycardic. Cardiac monitoring initiated. LR 1000 ml bolus given. Lactic acid 7.0 , MD made aware. Additional LR 1000 ml bolus ordered. Patient down in MRI. 24 hour EEG pending. All questions and concerns addressed. All safety precautions in place. Remains free of injury. Patient is stable. Will continue to monitor.

## 2020-09-26 NOTE — CONSULTS
Critical Care Medicine consult received. ICU has accepted this patient and transfer orders in. Full H&P to follow, please see for details.    Chris Bell MD, PGY-1  [facility]  [facility]

## 2020-09-26 NOTE — ASSESSMENT & PLAN NOTE
Ms. Cisneros is a 29 y o female with metastatic colon adenocarcinoma diagnosed in 2020. She sees Dr Arzola at [facility]  - unable to be treated for cancer due to MRSA bacteremia.  - Discussed with Dr Castellanos who has spoke with the patient's mother about the small window of opportunity the patient has to receive treatment.  - mother would like to continue aggressive care at this time

## 2020-09-26 NOTE — NURSING TRANSFER
Nursing Transfer Note      9/26/2020     Transfer from 804 to room 79724.    Transfer via hospital bed.    Transfer with cardiac monitoring. Pt sent with clothing and personal cell phone.    Transported by 2 RNs and RTs.    Medicines sent: No    Chart send with patient: Yes    Notified: Char (Mother): [phone] notified by MD.    Report given to TERRY Rubio prior to transfer.

## 2020-09-26 NOTE — NURSING
Pt arrived to MRI and transferred to MRI table without difficulty. MRI safe cardiac monitor and O2 monitor applied to pt. No acute distress noted. Will continue to monitor pt and to monitor vital signs during duration of exam.                                        HR                                 145                                    SpO2                                 100% RA                                    BP                                 127/86       Pt was noticed to have increased HR with no acute distress noted. Attempted to call floor nurse. No answer. Called rapid response team since they are following pt. Was told to continue to monitor pt and to call rapid response back with any concerns or questions. Will continue to monitor.       Spoke with Rapid Response [phone]. Will send nurse from oncology floor to come to MRI to monitor pt.

## 2020-09-26 NOTE — CARE UPDATE
RAPID RESPONSE NURSE PROACTIVE ROUNDING NOTE     Time of Visit: 0030    Admit Date: 2020  LOS: 1  Code Status: Full Code   Date of Visit: 2020  : 1990  Age: 29 y.o.  Sex: female  Race: Black or   Bed: 804/804 A:   MRN: [id number]  Was the patient discharged from an ICU this admission? no   Was the patient discharged from a PACU within last 24 hours?  no  Did the patient receive conscious sedation/general anesthesia in last 24 hours?  no  Was the patient in the ED within the past 24 hours?  no  Was the patient started on NIPPV within the past 24 hours?  no  Attending Physician: Balbina Castellanos MD  Primary Service: [facility] MEDICAL ONCOLOGY    ASSESSMENT     Notified by bedside RN via phone call.  Reason for alert: Seizure    Diagnosis: Acute encephalopathy    Abnormal Vital Signs: /81 (BP Location: Left arm, Patient Position: Lying)   Pulse (!) 137   Temp 98.7 °F (37.1 °C) (Oral)   Resp (!) 30   SpO2 100%   Breastfeeding No      Clinical Issues: Neuro    Patient  has a past medical history of Cancer, Hypertension, and Uterine cyst.      Asked to evaluate pt d/t suspected seizure activity. Pt with reported event in which pt exhibited eye twitching and became non-verbal. Event lasted 2-4 minutes per primary RN. Pt the became verbal, but confused with a return to baseline after ~ 5 minutes. On assessment pt is alert and oriented. Able to follow commands and answer questions.     Remains Tachypnic and tachycardic. Endorses anxiety and SOB. LS clear bilaterally, diminished to bases.      INTERVENTIONS/ RECOMMENDATIONS     Repeat LA. [facility] contacted, no EEG caps available tonight. Follow for ordered EEG placement in AM. Notify RRT and primary team with additional s/s of seizure or if VS become unstable.     Discussed plan of care with RNTodd.    PHYSICIAN ESCALATION     Yes/No  yes, per primary RN    Orders received and case discussed with NA.    Disposition: Remain in room  961.    FOLLOW-UP     Call back the Rapid Response Nurse, Qasim Antonio RN at [phone] for additional questions or concerns.

## 2020-09-26 NOTE — CARE UPDATE
Rapid Response Nurse Follow-up Note     Contacted by MRI over concerns of elevated MEWs score. Pt remains tachycardic and tachypnic.Hemodynamically stable. Oncology [facility] contacted, Oncology to send bedside nurse to help monitor patient. Information relayed to Wyatt STEWART in MRI. Please contact RRT with any additional questions or concerns.

## 2020-09-26 NOTE — PROGRESS NOTES
"[facility]  Hematology/Oncology  Progress Note    Patient Name: Huy Cisneros  Admission Date: 9/24/2020  Hospital Length of Stay: 1 days  Code Status: Full Code     Subjective:     HPI:  Ms. Cisneros is a 29 year old woman with metastatic colon adenocarcinoma, HTN who presents with confusion, nausea, vomiting. She follows with Dr. Arzola in clinic. Attempted to contact patient's mother for further HPI as patient unable to recall recent events but unable to reach her. Patient was most recently admitted to [facility] with UTI for 3 days and was discharged yesterday (09/24) to finish her course of vancomycin when she was hospitalized in 8/26-09/09 for MRSA bacteremia. Per admit ED note after leaving hospital yesterday, "On the way home, pt began complaining of SOB and nausea/vomiting. Reports 2x emesis, non-bloody. SOB has since resolved. Pt's mother further reports that for the past 2d, the pt has been disoriented, speaking strangely, and occasionally has her 'eyes rolled back in her head,' which prompted them to come to [facility]". Patient says that she has been feeling "terrible" with nausea and vomiting. She cannot recall how many times she has vomited. She says that she has bilateral leg pain and been having headaches for a while. She denies fevers, chills, chest pain, cough, abdominal pain, diarrhea, dysuria, vision changes. She is able to say her name and what year it is but when asked where she is, she says "[facility]".    In the ED, she received one dose of morphine, zofran, and 1L IVF. Her labs were notable for electrolyte derangements and elevated lactic acid. CT head did not show an acute process. She will be admitted to Medical Oncology for further evaluation and management.    Mother: [phone]    Oncologic History:    Oncologic History 1. Colon adenocarcinoma       Oncologic Treatment 1. Right hemicolectomy (7/17/20)  2. Planned therapy: FOLFOX +/- bevacizumab    "    Pathology 7/17/20:  Right hemicolectomy:  MODERATELY DIFFERENTIATED ADENOCARCINOMA WITH INFILTRATION THROUGH THE   MUSCULARIS INTO PERICOLONIC ADIPOSE TISSUE   METASTATIC CARCINOMA  TO 6 OF 15 LYMPH NODES   MULTIPLE METASTASES TO THE FAT ITSELF   NO SIGNIFICANT ABNORMALITY OF THE TERMINAL ILEUM OR APPENDIX   MMR--NORMAL FOR COLORECTAL CARCINOMA   Immunohistochemistry (IHC) Testing for Mismatch Repair (MMR) Proteins:   MLH1 - Intact nuclear expression   MSH2 - Intact nuclear expression   MSH6 - Intact nuclear expression   PMS2 - Intact nuclear expression   Background nonneoplastic tissue/internal control with intact nuclear   expression   IHC Interpretation   No loss of nuclear expression of MMR proteins: low probability of   microsatellite instability      Procedure:  Excision of right and transverse colon   Tumor Site:  transverse colon   Tumor Size:  3.2 cm   Macroscopic Tumor Perforation:  No   Histologic Type:  Adenocarcinoma   Histologic Grade:  Moderately differentiated   Tumor Extension:  Through the muscularis into pericolonic adipose tissue and   with tumor deposits extending to the most circumferential pericolonic tissue.   Margins:  The tumor is  9 cm from the distal margin and 44 from the proximal.   Treatment Effect:  No known presurgical therapy   Lymphovascular Invasion:  Identified in pericolonic tissue   Perineural Invasion:  Present   Tumor Deposits:  Present--approximately 12   Number of Lymph Nodes Examined:15   Number of Lymph Nodes Involved:  6   Pathologic Stage Classification: pT4a pN2A pM1a--liver metastases observed at      BRAF Mutation Analysis(V600E), Tumor  Result Summary:  NO MUTATION IDENTIFIED  Result:  Provided diagnosis: colorectal adenocarcinoma  BRAF status: Wild-type  KRAS Mutation Analysis, Colorectal  Result Summary:  NO MUTATION IDENTIFIED  Result:  Provided diagnosis: colorectal adenocarcinoma  KRAS status: Wild-type     7/15/20:  Transverse colon biopsy:  Invasive  moderately differentiated colonic   adenocarcinoma         7/14/20:  Liver, biopsy:   Adenocarcinoma, moderately differentiated   Cytomorphologic features and immunohistochemical staining pattern supports   colonic primary        Interval History: Patient's orientation goes in and out. She was oriented to person, place, and time this morning; but thenbecame disoriented while interviewing patient.    Oncology Treatment Plan:   OP COLORECTAL FOLFOX + BEVACIZUMAB Q2W    Medications:  Continuous Infusions:   sodium chloride 0.9%       Scheduled Meds:   acyclovir  10 mg/kg (Ideal) Intravenous Q8H    ampicillin IVPB  2 g Intravenous Q4H    ceFEPime (MAXIPIME) IVPB  2 g Intravenous Q8H    docusate sodium  100 mg Oral Daily    folic acid  1 mg Oral Daily    lactated ringers  1,000 mL Intravenous Once    lactated ringers  1,000 mL Intravenous Once    lidocaine (PF) 10 mg/ml (1%)  1 mL Intradermal Once    vancomycin (VANCOCIN) IVPB  1,500 mg Intravenous Q24H     PRN Meds:ALPRAZolam, dextrose 50%, dextrose 50%, fentaNYL, glucagon (human recombinant), glucose, glucose, midazolam, ondansetron, sodium chloride 0.9%, Pharmacy to dose Vancomycin consult **AND** vancomycin - pharmacy to dose     Review of Systems   Unable to perform ROS: Mental status change     Objective:     Vital Signs (Most Recent):  Temp: 98.7 °F (37.1 °C) (09/26/20 0417)  Pulse: (!) 137 (09/26/20 0417)  Resp: (!) 30 (09/26/20 0417)  BP: 123/81 (09/26/20 0417)  SpO2: 100 % (09/26/20 0417) Vital Signs (24h Range):  Temp:  [97.1 °F (36.2 °C)-98.9 °F (37.2 °C)] 98.7 °F (37.1 °C)  Pulse:  [119-137] 137  Resp:  [22-30] 30  SpO2:  [96 %-100 %] 100 %  BP: (118-138)/(73-97) 123/81        There is no height or weight on file to calculate BMI.  There is no height or weight on file to calculate BSA.      Intake/Output Summary (Last 24 hours) at 9/26/2020 0602  Last data filed at 9/25/2020 2343  Gross per 24 hour   Intake 770 ml   Output 525 ml   Net 245 ml        Physical Exam  Constitutional:       Appearance: She is not ill-appearing.   HENT:      Head: Normocephalic and atraumatic.      Nose: Nose normal.   Eyes:      General:         Right eye: No discharge.         Left eye: No discharge.      Extraocular Movements: Extraocular movements intact.   Cardiovascular:      Rate and Rhythm: Tachycardia present.      Pulses: Normal pulses.      Heart sounds: No murmur.   Pulmonary:      Effort: No respiratory distress.      Breath sounds: Normal breath sounds.   Abdominal:      General: Bowel sounds are normal. There is no distension.      Tenderness: There is abdominal tenderness (RUQ and LUQ tenderness to plalpation).   Musculoskeletal:      Right lower leg: No edema.      Left lower leg: No edema.   Skin:     General: Skin is warm and dry.   Neurological:      Mental Status: She is disoriented.      Motor: Weakness (in all extremities. Lower>Upper) present.         Significant Labs:   CBC:   Recent Labs   Lab 09/24/20 1903 09/25/20  0550 09/26/20  0400   WBC 11.29 9.42 10.20   HGB 9.1* 8.1* 7.4*   HCT 30.1* 27.0* 24.5*    178 149*    and CMP:   Recent Labs   Lab 09/24/20 1903 09/25/20  0550 09/26/20  0400   * 132* 134*   K 4.7 4.2 4.1   CL 99 102 106   CO2 14* 14* 10*    78 94   BUN 20 19 17   CREATININE 0.8 0.7 0.7   CALCIUM 9.1 8.9 7.4*   PROT 8.5* 8.2 7.1   ALBUMIN 2.4* 2.4* 2.0*   BILITOT 2.9* 2.9* 2.6*   ALKPHOS 336* 330* 291*   * 261* 201*   ALT 61* 64* 55*   ANIONGAP 18* 16 18*   EGFRNONAA >60.0 >60.0 >60.0       Diagnostic Results:    MRI Brain 9/25:  Impression:     Small lacunar infarct right corpus callosum above the frontal horn. This demonstrates minimal diffusion restriction suggesting possible subacute infarct. No enhancement is seen on postcontrast images.     2 cm extra-axial enhancing mass identified over the posterior right parietal lobe at the vertex. This abuts the midline superior sagittal sinus.    RUQ US  9/25:  Impression:     Satisfactory Doppler evaluation of the liver.     Extensive hepatic metastasis, correlating with the 09/21/2020 CT exam.     Cholelithiasis.    Assessment/Plan:     * Acute encephalopathy  Patient is a 29 year old woman with metastatic colon cancer who presents with acute encephalopathy, nausea, vomiting. Patient's mental status was normal on 9/21 able to take care of herself. Differentials include sepsis, electrolyte abnormalities, TIA, delirium, seizures, brain mets. CT head did not show acute infarct or hemorrhage. MRI concerning for subacute stroke in right corpus callosum. Utox positive for opiates, otherwise negative. Neurology consulted.    LP and MRI C/T/L spine could not be performed do to patient confusion and elevated heart rate.    Plan:  - Empirically treating for meningitis: vancomycin, cefepime, ampicillin, acyclovir  - Follow up EEG  - Blood culture 9/25 NGTD  - Patient is being sent to ICU      Sepsis  Concern for sepsis given tachycardia, acute encephalopathy and elevated lactic acid. UA not suggestive of UTI and CXR showed bibasilar patchy lung airspace opacities    Plan:  - Trend lactic acid  - Follow up blood cultures  - Continue vancomycin for MRSA bacteremia (previously diagnosed during hospitalization 8/26/20 - 9/9/20) and cefepime, ampicillin, and acyclovir for empiric coverage    MRSA bacteremia  Patient admitted to hospital on 8/26/20 - 9/9/20 with MRSA bacteremia. Was recently discharged on 09/24 after being admitted for UTI. She was discharged to complete 6 more days of vancomycin (end date around 09/30).     - Will continue vancomycin until stop date  - Follow up repeat blood cultures      Adenocarcinoma of colon  Follows with Dr. Arzola in clinic. Received FOLFOX cycle #1 on 08/11/2020. Will need follow up upon discharge for further evaluation of whether to proceed with cycle #2.    Chronic neoplasm-related pain  Patient takes percocet 10mg Q4H PRN pain. As  patient altered, avoid to further exacerbate and resume when appropriate.    Nausea and vomiting  Patient presenting with acute encephalopathy as well as nausea and vomiting likely related to sepsis vs intracranial abnormality.  Zofran PRN (check EKG for QTc, on admit 490)  Advance diet as tolerated    Hyponatremia  Likely related to dehydration. Monitor CMP.    Hypertension  Patient on home amlodipine 5mg, lopressor 100mg, spironolactone 25mg. Holding in setting of sepsis. Resume when appropriate.     Elevated LFTs  Patient with elevated LFTs since July 2020, likely due to liver metastases. Stable upon admit.  Trend daily CMP             Imtiaz Griffiths MD PGY1  Hematology/Oncology  [facility]    Staff NOTE:  I have personally taken the history and examined this patient and agree with residents Note as stated above   Please see my note for additional details    Patient has continued to worsen overnight. LP and MRI could not be done. She is very confused, has nystagmus and confabulating, tachycardic in the 140's. Discussed with ICU and she will be transferred over.  Also updated her mom (who is the POA) on the telephone this morning of the status change. Her prognosis is very poor and there is a high likelihood that she will continue to decline. If her LFT's continue to worsen she will not be a candidate for chemo

## 2020-09-26 NOTE — CARE UPDATE
Rapid Response Nurse Chart Check     Chart check completed, abnormal VS noted. bedside RNMartha contacted. No concerns verbalized at this time. Instructed to call [phone] for further concerns or assistance.

## 2020-09-26 NOTE — CARE UPDATE
Rapid Response Nurse Follow-up Note     Followed up with patient for proactive rounding. HR increased to 140s with latest set of VS. 12 lead EKG obtained per primary team confirmed ST. Pt endorses anxiety but denies any additional complaints. Of note pt has had multiple loose BM's overnight with minimal oral intake. MD Hernandez ordered IVFB.    Repeat LA and morning labs still pending.  Reviewed plan of care with bedside RN, Todd.   Team will continue to follow.  Please call Rapid Response RN, Qasim Antonio RN with any questions or concerns at [phone].

## 2020-09-26 NOTE — PROGRESS NOTES
0945: RR from 8th floor. Pt arrived on 2L NC, sats 100%, HR 140s, RR in 40s, BP 130s/80s; 2nd LR bolus infusing. Pt oriented to self and place, labored responses. CC MD to bedside. Pending EEG. No new orders at this time. WCTM.     Skin is dry and peeling on backside but no breakdown noted. Foam dressings in place on heels, waffle mattress inflated.

## 2020-09-26 NOTE — NURSING
"MRI complete, pt tolerated exam poorly, pt transferred back to stretcher. Telemetry reapplied and awaiting transport back to room.                               HR                          147                          SpO2                          100% RA                           BP                          121/74     Pt removed from MRI scanner. Pts states "take me out of here". Pt not stable enough for completion of a hour long MRI exam. Pt brought up to room with RN  "

## 2020-09-26 NOTE — SUBJECTIVE & OBJECTIVE
Subjective:     Interval History: Patient with observed nystagmus per primary team with noted AMS as well. Patient frequently says things that are not contextually appropriate per nursing reports. She additionally became tachycardic during MRI. Stepped up to ICU. EEG placed today     Review of Systems   Constitutional: Negative for chills and fever.   HENT: Negative for rhinorrhea and sneezing.    Eyes: Negative for redness and visual disturbance.   Respiratory: Negative for cough and wheezing.    Gastrointestinal: Positive for nausea and vomiting.   Genitourinary: Negative for dysuria and frequency.   Musculoskeletal: Negative for neck pain and neck stiffness.   Skin: Negative for pallor and rash.   Neurological: Positive for weakness. Negative for facial asymmetry, numbness and headaches.   Psychiatric/Behavioral: Positive for confusion (mild) and decreased concentration (mild).     Objective:     Vital Signs (Most Recent):  Temp: 97.5 °F (36.4 °C) (09/26/20 0945)  Pulse: (!) 142 (09/26/20 1500)  Resp: (!) 41 (09/26/20 1500)  BP: 124/82 (09/26/20 1500)  SpO2: 100 % (09/26/20 1500) Vital Signs (24h Range):  Temp:  [97.1 °F (36.2 °C)-98.7 °F (37.1 °C)] 97.5 °F (36.4 °C)  Pulse:  [119-151] 142  Resp:  [22-49] 41  SpO2:  [96 %-100 %] 100 %  BP: (114-131)/(77-97) 124/82        There is no height or weight on file to calculate BMI.    Physical Exam  Constitutional:       General: She is not in acute distress.     Appearance: Normal appearance.   HENT:      Head: Normocephalic and atraumatic.   Eyes:      General: No scleral icterus.     Extraocular Movements: Extraocular movements intact.      Conjunctiva/sclera: Conjunctivae normal.      Pupils: Pupils are equal, round, and reactive to light.   Cardiovascular:      Rate and Rhythm: Normal rate.      Pulses: Normal pulses.      Heart sounds: No murmur.   Pulmonary:      Effort: Pulmonary effort is normal. No respiratory distress.      Breath sounds: No wheezing.    Abdominal:      General: Abdomen is flat. There is no distension.      Tenderness: There is no abdominal tenderness. There is no guarding.   Musculoskeletal:         General: No swelling.      Right lower leg: No edema.      Left lower leg: No edema.   Skin:     General: Skin is warm.      Coloration: Skin is not jaundiced.      Findings: No bruising.   Neurological:      Mental Status: She is oriented to person, place, and time.      Cranial Nerves: No cranial nerve deficit.      Motor: Weakness present.      Deep Tendon Reflexes: Reflexes normal.   Psychiatric:         Speech: Speech normal.         NEUROLOGICAL EXAMINATION:     MENTAL STATUS   Oriented to person, place, and time.   Attention: decreased. Concentration: decreased.   Speech: speech is normal   Level of consciousness: alert ,  drowsy    CRANIAL NERVES     CN III, IV, VI   Pupils are equal, round, and reactive to light.    CN V   Facial sensation intact.     CN VII   Facial expression full, symmetric.     CN IX, X   CN IX normal.   CN X normal.     CN XI   CN XI normal.     CN XII   CN XII normal.     MOTOR EXAM   Muscle bulk: normal  Overall muscle tone: normal  Right arm tone: normal  Left arm tone: normal  Right leg tone: normal  Left leg tone: normal    Strength   Right deltoid: 4/5  Left deltoid: 4/5  Right biceps: 4/5  Left biceps: 4/5  Right triceps: 4/5  Left triceps: 4/5  Right quadriceps: 4/5  Left quadriceps: 4/5  Right hamstrin/5  Left hamstrin/5      Significant Labs:   CBC:   Recent Labs   Lab 20  1903 20  0550 20  0400   WBC 11.29 9.42 10.20   HGB 9.1* 8.1* 7.4*   HCT 30.1* 27.0* 24.5*    178 149*     CMP:   Recent Labs   Lab 20  1903 20  0550 20  0400    78 94   * 132* 134*   K 4.7 4.2 4.1   CL 99 102 106   CO2 14* 14* 10*   BUN 20 19 17   CREATININE 0.8 0.7 0.7   CALCIUM 9.1 8.9 7.4*   MG  --  1.9 1.7   PROT 8.5* 8.2 7.1   ALBUMIN 2.4* 2.4* 2.0*   BILITOT 2.9* 2.9* 2.6*    ALKPHOS 336* 330* 291*   * 261* 201*   ALT 61* 64* 55*   ANIONGAP 18* 16 18*   EGFRNONAA >60.0 >60.0 >60.0     Urine Studies:   Recent Labs   Lab 09/25/20  0142   COLORU Manuela   APPEARANCEUA Cloudy*   PHUR 6.0   SPECGRAV 1.015   PROTEINUA Negative   GLUCUA Negative   KETONESU Trace*   BILIRUBINUA Negative   OCCULTUA Negative   NITRITE Negative   LEUKOCYTESUR Negative   RBCUA 3   WBCUA 1   SQUAMEPITHEL 3     All pertinent lab results from the past 24 hours have been reviewed.    Significant Imaging: I have reviewed all pertinent imaging results/findings within the past 24 hours.

## 2020-09-26 NOTE — HPI
Ms. Cisneros is a 28 yo F with PMHx of HTN and metastatic colon adenocarcinoma (follows Dr Arzola) who presented on 9/24 with confusion, nausea, vomiting. Patient recently inpatient at [facility] with UTI for 3 days; and was discharged yesterday (09/24) to finish her course of vancomycin for MRSA bacteremia diagnosed during an admission 8/26-9/0. On the way home from her discharge 9/24, pt began complaining of SOB and nausea/vomiting. Reports 2x emesis, non-bloody. SOB has since resolved.Per patient's mother, pt has beendisoriented, speaking strangely, and occasionally has her 'eyes rolled back in her head, over the last 2 days. Pt also endorses BL leg pain, and chronic headaches. She denies fevers, chills, chest pain, cough, abdominal pain, diarrhea, dysuria, vision changes. Oriented to self and time in ED, but not to place.     In the ED, she received one dose of morphine, zofran, and 1L IVF. Her labs were notable for electrolyte derangements and elevated lactic acid. CT head did not show an acute process. She will be admitted to Medical Oncology for further evaluation and management.     Mother: [phone]

## 2020-09-26 NOTE — ASSESSMENT & PLAN NOTE
Patient presented to [facility] with AMS. Per family, she has been talking about things which do not exist and has been intermittently disoriented. They note this happened at some point during the hospitalization at [facility] and that it was certainly noted when they picked her up after discharged. She has a recent history of MRSA bacteremia and a UTI for which she was discharged on ABx. Of note, she has not been able to walk due to weakness since her hospitalization for bacteremia appx 3 weeks ago.     MRI demosntrated a small lacunar infarct right corpus callosum above the frontal horn, possibly a subacute infarct. No enhancement is seen on postcontrast images. Additionally, a 2 cm extra-axial enhancing mass identified over the posterior right parietal lobe at the vertex. This abuts the midline superior sagittal sinus. Per primary team conversation with radiology, this is seemingly most consistent with a meningioma.     MRI C/T/L completed. The patient's ongoing lethargy and weakness with AMS is most likely secondary to a mix of ongoing infection requiring extensive ABx and depression. EEG has thus far not demonstrated any epileptic events per prelimary reading.     Recommendations:  -- EEG ongoing. No epileptic activity per preliminary read.  -- LP unable to be completed thus far. No need for LP from neurologic perspective  -- Neurology will sign off at this time. Please contact with any questions or concerns  -- Please contact if remainder of EEG is abnormal, however has thus far been non epileptogenic in nature

## 2020-09-26 NOTE — HOSPITAL COURSE
Patient admitted to Medical Oncology 9/24. Septic workup began, critical care consulted on 9/26 due to elevated lactate and concern for worsening sepsis in addition to neuro symptoms like BL LE weakness and eye twitching. Admitted to Critical Care on 9/26. An EEG was negative for seizure activity. She also underwent a pan-MRI which was generally unremarkable with the exception of a small, remote lacunar brain infarct of the right corpus callosum. A lumbar puncture unremarkable. 10/1 Patient with worsening mental status. CTH negative. Seen by Dr Castellanos who recommended hospice and DNR. Hospice consulted

## 2020-09-26 NOTE — PROGRESS NOTES
09/25/20 2316   Vital Signs   Temp 97.1 °F (36.2 °C)   Temp src Axillary   Pulse (!) 120   Heart Rate Source Manual   Resp (!) 30   SpO2 100 %   Pulse Oximetry Type Intermittent   Flow (L/min) 2   O2 Device (Oxygen Therapy) nasal cannula   BP (!) 131/97   MAP (mmHg) 110   BP Location Left arm   BP Method Automatic   Patient Position Lying   MD notified and is at bedside. Charge nurse and rapid response updated on patient's condition.

## 2020-09-26 NOTE — SUBJECTIVE & OBJECTIVE
Past Medical History:   Diagnosis Date    Cancer     Colon CA with mets to the liver    Hypertension 9/21/2020    Uterine cyst        Past Surgical History:   Procedure Laterality Date    COLONOSCOPY N/A 7/15/2020    Procedure: COLONOSCOPY;  Surgeon: Hi Drake MD;  Location: [facility] ENDO;  Service: Endoscopy;  Laterality: N/A;    COLONOSCOPY W/ BIOPSIES      INSERTION OF TUNNELED CENTRAL VENOUS CATHETER (CVC) WITH SUBCUTANEOUS PORT N/A 7/17/2020    Procedure: INSERTION, PORT-A-CATH;  Surgeon: Armani Naqvi MD;  Location: [facility] OR;  Service: General;  Laterality: N/A;       Review of patient's allergies indicates:   Allergen Reactions    Sulfa (sulfonamide antibiotics)        Family History     Problem Relation (Age of Onset)    Hypertension Mother, Maternal Grandmother, Maternal Grandfather, Paternal Grandmother        Tobacco Use    Smoking status: Never Smoker    Smokeless tobacco: Never Used   Substance and Sexual Activity    Alcohol use: No    Drug use: No    Sexual activity: Yes     Partners: Male     Birth control/protection: None      Review of Systems   Unable to perform ROS: Acuity of condition (patient in excessive pain and hoarse of voice, she is not responding to most questions)   HENT: Positive for dental problem.      Objective:     Vital Signs (Most Recent):  Temp: 97.5 °F (36.4 °C) (09/26/20 0945)  Pulse: (!) 138 (09/26/20 1045)  Resp: (!) 36 (09/26/20 1045)  BP: 123/80 (09/26/20 1045)  SpO2: 100 % (09/26/20 1045) Vital Signs (24h Range):  Temp:  [97.1 °F (36.2 °C)-98.9 °F (37.2 °C)] 97.5 °F (36.4 °C)  Pulse:  [119-151] 138  Resp:  [22-49] 36  SpO2:  [96 %-100 %] 100 %  BP: (114-138)/(78-97) 123/80      There is no height or weight on file to calculate BMI.      Intake/Output Summary (Last 24 hours) at 9/26/2020 1149  Last data filed at 9/26/2020 1000  Gross per 24 hour   Intake 2973 ml   Output 225 ml   Net 2748 ml       Physical Exam  Vitals signs and nursing note  reviewed.   Constitutional:       General: She is in acute distress.      Appearance: She is ill-appearing and toxic-appearing.   HENT:      Mouth/Throat:      Mouth: Mucous membranes are dry.   Eyes:      Pupils: Pupils are equal, round, and reactive to light.   Cardiovascular:      Rate and Rhythm: Regular rhythm. Tachycardia present.      Heart sounds: No murmur.   Pulmonary:      Effort: Pulmonary effort is normal. No respiratory distress.      Breath sounds: Normal breath sounds. No rales.   Abdominal:      General: Abdomen is flat. There is no distension.      Tenderness: There is abdominal tenderness (generalized).   Musculoskeletal: Normal range of motion.         General: No swelling.      Right lower leg: No edema.      Left lower leg: No edema.   Skin:     General: Skin is warm and dry.   Neurological:      General: No focal deficit present.      Mental Status: She is alert and oriented to person, place, and time. Mental status is at baseline.      Cranial Nerves: No cranial nerve deficit.   Psychiatric:      Comments: anxious         Vents:     Lines/Drains/Airways     Peripheral Intravenous Line                 Peripheral IV - Single Lumen 09/24/20 1915 20 G Right Antecubital 1 day         Midline Catheter Insertion/Assessment  - Single Lumen 09/25/20 1622 Right cephalic vein (lateral side of arm) 20g x 8cm less than 1 day              Significant Labs:    CBC/Anemia Profile:  Recent Labs   Lab 09/24/20 1903 09/25/20  0550 09/26/20  0400   WBC 11.29 9.42 10.20   HGB 9.1* 8.1* 7.4*   HCT 30.1* 27.0* 24.5*    178 149*   MCV 89 88 89   RDW 27.9* 27.9* 28.5*        Chemistries:  Recent Labs   Lab 09/24/20 1903 09/25/20  0550 09/26/20  0400   * 132* 134*   K 4.7 4.2 4.1   CL 99 102 106   CO2 14* 14* 10*   BUN 20 19 17   CREATININE 0.8 0.7 0.7   CALCIUM 9.1 8.9 7.4*   ALBUMIN 2.4* 2.4* 2.0*   PROT 8.5* 8.2 7.1   BILITOT 2.9* 2.9* 2.6*   ALKPHOS 336* 330* 291*   ALT 61* 64* 55*   * 261*  201*   MG  --  1.9 1.7   PHOS  --  2.3* 1.9*     Results for MARK RICHARDS (MRN [id number]) as of 9/26/2020 11:53   Ref. Range 9/26/2020 09:19   Ammonia Latest Ref Range: 10 - 50 umol/L 30     Results for MARK RICHARDS (MRN [id number]) as of 9/26/2020 11:53   Ref. Range 9/26/2020 08:02   Lactate, Enrique Latest Ref Range: 0.5 - 2.2 mmol/L 6.3 (HH)       Significant Imaging: I have reviewed and interpreted all pertinent imaging results/findings within the past 24 hours.

## 2020-09-26 NOTE — PROGRESS NOTES
"[facility]  Neurology  Progress Note    Patient Name: Huy Cisneros  MRN: [id number]  Admission Date: 9/24/2020  Hospital Length of Stay: 1 days  Code Status: Full Code   Attending Provider: Martin Epperson MD  Primary Care Physician: Primary Doctor No   Principal Problem:Acute encephalopathy    HPI:   Ms. Cisneros is a 29 [place] with metastatic colon adenocarcinoma and HTN who presented to [facility] with confusion, nausea, and vomiting. She follows with Dr. Arzola in clinic. Patient was most recently admitted to [facility] with UTI for 3 days and was discharged yesterday (09/24) to finish her course of vancomycin when she was hospitalized in 8/26-09/09 for MRSA bacteremia. The patient's family notes that they noticed some odd speech and thought over phone conversations while the patient was in [facility], however they had not visited the patient in person during the hospitalization. This was once again noted on the car ride from the hospital. They note that she is speaking of things that do not exist, intermittently disoriented, speaking strangely, and occasionally has her 'eyes rolled back in her head,' which prompted them to come to [facility] ED".      On exam, the patient is AOx3 and able to answer questions and follow most commands.     Overview/Hospital Course:  No notes on file      Subjective:     Interval History: Patient with observed nystagmus per primary team with noted AMS as well. Patient frequently says things that are not contextually appropriate per nursing reports. She additionally became tachycardic during MRI. Stepped up to ICU. EEG placed today     Review of Systems   Constitutional: Negative for chills and fever.   HENT: Negative for rhinorrhea and sneezing.    Eyes: Negative for redness and visual disturbance.   Respiratory: Negative for cough and wheezing.    Gastrointestinal: Positive for nausea and vomiting.   Genitourinary: Negative for dysuria and frequency. "   Musculoskeletal: Negative for neck pain and neck stiffness.   Skin: Negative for pallor and rash.   Neurological: Positive for weakness. Negative for facial asymmetry, numbness and headaches.   Psychiatric/Behavioral: Positive for confusion (mild) and decreased concentration (mild).     Objective:     Vital Signs (Most Recent):  Temp: 97.5 °F (36.4 °C) (09/26/20 0945)  Pulse: (!) 142 (09/26/20 1500)  Resp: (!) 41 (09/26/20 1500)  BP: 124/82 (09/26/20 1500)  SpO2: 100 % (09/26/20 1500) Vital Signs (24h Range):  Temp:  [97.1 °F (36.2 °C)-98.7 °F (37.1 °C)] 97.5 °F (36.4 °C)  Pulse:  [119-151] 142  Resp:  [22-49] 41  SpO2:  [96 %-100 %] 100 %  BP: (114-131)/(77-97) 124/82        There is no height or weight on file to calculate BMI.    Physical Exam  Constitutional:       General: She is not in acute distress.     Appearance: Normal appearance.   HENT:      Head: Normocephalic and atraumatic.   Eyes:      General: No scleral icterus.     Extraocular Movements: Extraocular movements intact.      Conjunctiva/sclera: Conjunctivae normal.      Pupils: Pupils are equal, round, and reactive to light.   Cardiovascular:      Rate and Rhythm: Normal rate.      Pulses: Normal pulses.      Heart sounds: No murmur.   Pulmonary:      Effort: Pulmonary effort is normal. No respiratory distress.      Breath sounds: No wheezing.   Abdominal:      General: Abdomen is flat. There is no distension.      Tenderness: There is no abdominal tenderness. There is no guarding.   Musculoskeletal:         General: No swelling.      Right lower leg: No edema.      Left lower leg: No edema.   Skin:     General: Skin is warm.      Coloration: Skin is not jaundiced.      Findings: No bruising.   Neurological:      Mental Status: She is oriented to person, place, and time.      Cranial Nerves: No cranial nerve deficit.      Motor: Weakness present.      Deep Tendon Reflexes: Reflexes normal.   Psychiatric:         Speech: Speech normal.          NEUROLOGICAL EXAMINATION:     MENTAL STATUS   Oriented to person, place, and time.   Attention: decreased. Concentration: decreased.   Speech: speech is normal   Level of consciousness: alert ,  drowsy    CRANIAL NERVES     CN III, IV, VI   Pupils are equal, round, and reactive to light.    CN V   Facial sensation intact.     CN VII   Facial expression full, symmetric.     CN IX, X   CN IX normal.   CN X normal.     CN XI   CN XI normal.     CN XII   CN XII normal.     MOTOR EXAM   Muscle bulk: normal  Overall muscle tone: normal  Right arm tone: normal  Left arm tone: normal  Right leg tone: normal  Left leg tone: normal    Strength   Right deltoid: 4/5  Left deltoid: 4/5  Right biceps: 4/5  Left biceps: 4/5  Right triceps: 4/5  Left triceps: 4/5  Right quadriceps: 4/5  Left quadriceps: 4/5  Right hamstrin/5  Left hamstrin/5      Significant Labs:   CBC:   Recent Labs   Lab 203 20  0550 20  0400   WBC 11.29 9.42 10.20   HGB 9.1* 8.1* 7.4*   HCT 30.1* 27.0* 24.5*    178 149*     CMP:   Recent Labs   Lab 20  0550 20  0400    78 94   * 132* 134*   K 4.7 4.2 4.1   CL 99 102 106   CO2 14* 14* 10*   BUN 20 19 17   CREATININE 0.8 0.7 0.7   CALCIUM 9.1 8.9 7.4*   MG  --  1.9 1.7   PROT 8.5* 8.2 7.1   ALBUMIN 2.4* 2.4* 2.0*   BILITOT 2.9* 2.9* 2.6*   ALKPHOS 336* 330* 291*   * 261* 201*   ALT 61* 64* 55*   ANIONGAP 18* 16 18*   EGFRNONAA >60.0 >60.0 >60.0     Urine Studies:   Recent Labs   Lab 20  0142   COLORU Manuela   APPEARANCEUA Cloudy*   PHUR 6.0   SPECGRAV 1.015   PROTEINUA Negative   GLUCUA Negative   KETONESU Trace*   BILIRUBINUA Negative   OCCULTUA Negative   NITRITE Negative   LEUKOCYTESUR Negative   RBCUA 3   WBCUA 1   SQUAMEPITHEL 3     All pertinent lab results from the past 24 hours have been reviewed.    Significant Imaging: I have reviewed all pertinent imaging results/findings within the past 24  hours.    Assessment and Plan:     * Acute encephalopathy  Patient presented to [facility] with AMS. Per family, she has been talking about things which do not exist and has been intermittently disoriented. They note this happened at some point during the hospitalization at [place] and that it was certainly noted when they picked her up after discharged. She has a recent history of MRSA bacteremia and a UTI for which she was discharged on ABx. Of note, she has not been able to walk due to weakness since her hospitalization for bacteremia appx 3 weeks ago.     MRI demosntrated a small lacunar infarct right corpus callosum above the frontal horn, possibly a subacute infarct. No enhancement is seen on postcontrast images. Additionally, a 2 cm extra-axial enhancing mass identified over the posterior right parietal lobe at the vertex. This abuts the midline superior sagittal sinus. Per primary team conversation with radiology, this is seemingly most consistent with a meningioma.     MRI C/T/L completed. The patient's ongoing lethargy and weakness with AMS is most likely secondary to a mix of ongoing infection requiring extensive ABx and depression. EEG has thus far not demonstrated any epileptic events per prelimary reading.     Recommendations:  -- EEG ongoing. No epileptic activity per preliminary read.  -- LP unable to be completed thus far. No need for LP from neurologic perspective  -- Neurology will sign off at this time. Please contact with any questions or concerns  -- Please contact if remainder of EEG is abnormal, however has thus far been non epileptogenic in nature    MRSA bacteremia  See acute encephalopathy    Sepsis  See Acute encephalopathy      Neurology will sign off at this time. Please contact with any questions or concerns    VTE Risk Mitigation (From admission, onward)         Ordered     IP VTE HIGH RISK PATIENT  Once      09/25/20 0236     Place sequential compression device  Until discontinued       09/25/20 0236                Pablo Carrillo MD  Neurology  [facility]

## 2020-09-26 NOTE — CARE UPDATE
RAPID RESPONSE NURSE PROACTIVE ROUNDING NOTE     Time of Visit: 0850    Admit Date: 2020  LOS: 1  Code Status: Full Code   Date of Visit: 2020  : 1990  Age: 29 y.o.  Sex: female  Race: Black or   Bed: 804/804 A:   MRN: [id number]  Was the patient discharged from an ICU this admission? no   Was the patient discharged from a PACU within last 24 hours?  no  Did the patient receive conscious sedation/general anesthesia in last 24 hours?  no  Was the patient in the ED within the past 24 hours?  no  Was the patient started on NIPPV within the past 24 hours?  no  Attending Physician: Balbina Castellanos MD  Primary Service: [facility]    ASSESSMENT     Notified by charge RN via phone call.  Reason for alert: AMS, seizure like activity, tachycardia    Diagnosis: Acute encephalopathy    Abnormal Vital Signs: /85 (BP Location: Left arm, Patient Position: Lying)   Pulse (!) 145   Temp 98 °F (36.7 °C) (Oral)   Resp (!) 44   SpO2 100%   Breastfeeding No      Clinical Issues: Neuro, circulatory    Patient  has a past medical history of Cancer, Hypertension, and Uterine cyst.      On arrival, patient supine in bed being cleaned up per RN x 2 post BM. After cleaning, patient sitting up in bed. Alert and disoriented to situation. Nystagmus present. Inappropriate words with delayed response. Jaundice. HR 140s. Sinus tachycardia per 12-lead EKG at bedside. LR bolus being administered. BP stable, SBP 110s. Tachypnic on 2 L NC. Ammonia level pending. Repeat lactate improved.      INTERVENTIONS/ RECOMMENDATIONS     After discussion with primary team, palliative and CC consult placed. Discussion with mom conducted for goals of care. Continue frequent VS. Will upgrade to ICU. Awaiting placement of EEG.     Discussed plan of care with Maura STEWART (charge).    PHYSICIAN ESCALATION     Yes/No  yes    Orders received and case discussed with Dr. Castellanos and FLORA Prason with CC.    Disposition:  Tx in ICU bed 13768.    FOLLOW-UP     Call back the Rapid Response Nurse, Madelaine Agustin RN at [phone] for additional questions or concerns.

## 2020-09-26 NOTE — SUBJECTIVE & OBJECTIVE
Interval History: Patient's orientation goes in and out. She was oriented to person, place, and time this morning; but thenbecame disoriented while interviewing patient.    Oncology Treatment Plan:   OP COLORECTAL FOLFOX + BEVACIZUMAB Q2W    Medications:  Continuous Infusions:   sodium chloride 0.9%       Scheduled Meds:   acyclovir  10 mg/kg (Ideal) Intravenous Q8H    ampicillin IVPB  2 g Intravenous Q4H    ceFEPime (MAXIPIME) IVPB  2 g Intravenous Q8H    docusate sodium  100 mg Oral Daily    folic acid  1 mg Oral Daily    lactated ringers  1,000 mL Intravenous Once    lactated ringers  1,000 mL Intravenous Once    lidocaine (PF) 10 mg/ml (1%)  1 mL Intradermal Once    vancomycin (VANCOCIN) IVPB  1,500 mg Intravenous Q24H     PRN Meds:ALPRAZolam, dextrose 50%, dextrose 50%, fentaNYL, glucagon (human recombinant), glucose, glucose, midazolam, ondansetron, sodium chloride 0.9%, Pharmacy to dose Vancomycin consult **AND** vancomycin - pharmacy to dose     Review of Systems   Unable to perform ROS: Mental status change     Objective:     Vital Signs (Most Recent):  Temp: 98.7 °F (37.1 °C) (09/26/20 0417)  Pulse: (!) 137 (09/26/20 0417)  Resp: (!) 30 (09/26/20 0417)  BP: 123/81 (09/26/20 0417)  SpO2: 100 % (09/26/20 0417) Vital Signs (24h Range):  Temp:  [97.1 °F (36.2 °C)-98.9 °F (37.2 °C)] 98.7 °F (37.1 °C)  Pulse:  [119-137] 137  Resp:  [22-30] 30  SpO2:  [96 %-100 %] 100 %  BP: (118-138)/(73-97) 123/81        There is no height or weight on file to calculate BMI.  There is no height or weight on file to calculate BSA.      Intake/Output Summary (Last 24 hours) at 9/26/2020 0602  Last data filed at 9/25/2020 2343  Gross per 24 hour   Intake 770 ml   Output 525 ml   Net 245 ml       Physical Exam  Constitutional:       Appearance: She is not ill-appearing.   HENT:      Head: Normocephalic and atraumatic.      Nose: Nose normal.   Eyes:      General:         Right eye: No discharge.         Left eye: No  discharge.      Extraocular Movements: Extraocular movements intact.   Cardiovascular:      Rate and Rhythm: Tachycardia present.      Pulses: Normal pulses.      Heart sounds: No murmur.   Pulmonary:      Effort: No respiratory distress.      Breath sounds: Normal breath sounds.   Abdominal:      General: Bowel sounds are normal. There is no distension.      Tenderness: There is abdominal tenderness (RUQ and LUQ tenderness to plalpation).   Musculoskeletal:      Right lower leg: No edema.      Left lower leg: No edema.   Skin:     General: Skin is warm and dry.   Neurological:      Mental Status: She is disoriented.      Motor: Weakness (in all extremities. Lower>Upper) present.         Significant Labs:   CBC:   Recent Labs   Lab 09/24/20 1903 09/25/20  0550 09/26/20  0400   WBC 11.29 9.42 10.20   HGB 9.1* 8.1* 7.4*   HCT 30.1* 27.0* 24.5*    178 149*    and CMP:   Recent Labs   Lab 09/24/20 1903 09/25/20  0550 09/26/20  0400   * 132* 134*   K 4.7 4.2 4.1   CL 99 102 106   CO2 14* 14* 10*    78 94   BUN 20 19 17   CREATININE 0.8 0.7 0.7   CALCIUM 9.1 8.9 7.4*   PROT 8.5* 8.2 7.1   ALBUMIN 2.4* 2.4* 2.0*   BILITOT 2.9* 2.9* 2.6*   ALKPHOS 336* 330* 291*   * 261* 201*   ALT 61* 64* 55*   ANIONGAP 18* 16 18*   EGFRNONAA >60.0 >60.0 >60.0       Diagnostic Results:    MRI Brain 9/25:  Impression:     Small lacunar infarct right corpus callosum above the frontal horn. This demonstrates minimal diffusion restriction suggesting possible subacute infarct. No enhancement is seen on postcontrast images.     2 cm extra-axial enhancing mass identified over the posterior right parietal lobe at the vertex. This abuts the midline superior sagittal sinus.    RUQ US 9/25:  Impression:     Satisfactory Doppler evaluation of the liver.     Extensive hepatic metastasis, correlating with the 09/21/2020 CT exam.     Cholelithiasis.

## 2020-09-26 NOTE — PLAN OF CARE
Pt remains tachycardic and tachypneic. Sats 100%. 1L LR infusing now. Antibiotics continued. Disoriented to place, time and situation. Continuous EEG initiated. Echo done today to rule out endocarditis. Tran placed for urine monitoring. Pt and mother updated on POC, WCTM.     Skin remains dry but no breakdown noted. WCTM.

## 2020-09-26 NOTE — PLAN OF CARE
Spoke with Char (Mother): [phone]. Information was provided by mother.    Patient lives with her kids at home. Patient does not have a history of seizures. Patient does not use recreational drugs. Patient was normal with no AMS on Monday 9/21.    Mother is aware that patient is confused. We spoke about how the patient's mental status is worsening and that she will be going to the ICU. Mother said is the MPOA.

## 2020-09-26 NOTE — PROCEDURES
EEG prelim    10:53 a.m. - 11:40 a.m.    The background is relatively symmetric.  During brief times when awake, there is a moderately well-formed 9 hz posterior dominant rhythm seen bilaterally.  For the rest of the study, there is well-formed sleep architecture.  There are no pushbutton activations.    Impression - unremarkable EEG.    Please push the button with any abnormal behaviors concerning for clinical seizure activity.    Laura Fleming MD PhD  Neurology-Epilepsy  [facility].  [facility]
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
## 2020-09-26 NOTE — PROGRESS NOTES
"Pt returned from MRI this am; pt tolerated poorly and unable to complete MRI as pt with HR in the 140s, tearful, and anxious. Upon return to floor, pt noted with respirations in the 30-40s, anxious, and HR = 140-150s. First LR Bolus completed. Lactic acid drawn, repeat Lactic = 6.3. Second LR bolus administered.     EEG ordered yesterday and pending completeion. Pt remains intermittently confused. Upon assessment, noted increased JESS/BLE weakness. Neuro assessment and full assessment as charted. Pt also noted to have moments of gazing into space and eye "twitching." MD aware and at bedside this am to assess. MD to placed consult to critical care. Charge nurse and rapid response aware. Labs drawn as ordered. Telemetry placed. EKG completed. Will continue to monitor.  "

## 2020-09-27 NOTE — ASSESSMENT & PLAN NOTE
Likely due to colon cancer, possible obstruction or sepsis  - Zofran and phenergan prn  - NPO  - may need to consider NGT

## 2020-09-27 NOTE — ASSESSMENT & PLAN NOTE
Patient admitted to hospital on 8/26/20 - 9/9/20 with MRSA bacteremia. Was recently discharged on 09/24 after being admitted for UTI. She was discharged to complete 6 more days of vancomycin (end date around 09/30).      Plan:  - UA not suggestive of UTI   - CXR showed bibasilar patchy lung airspace opacities  - Follow blood cultures  - broad spectrum abx  - volume resuscitation  - TTE unremarkable  - trend CBC

## 2020-09-27 NOTE — ASSESSMENT & PLAN NOTE
Ms. Cisneros is a 30 yo F with metastatic colon adenocarcinoma diagnosed in 2020. She sees Dr Arzola at [facility]    Plan:  - unable to be treated for cancer due to MRSA bacteremia.  - Discussed with Dr Castellanos who has spoke with the patient's mother about the small window of opportunity the patient has to receive treatment.  - mother would like to continue aggressive care at this time  - palliative care consult

## 2020-09-27 NOTE — ASSESSMENT & PLAN NOTE
Patient with elevated LFTs since July 2020, likely due to liver metastases.    - Trend CMP/LFTs  - Trend lactate

## 2020-09-27 NOTE — ASSESSMENT & PLAN NOTE
Concern for sepsis due tp  tachycardia, acute encephalopathy and elevated lactic acid.   - UA not suggestive of UTI   - CXR showed bibasilar patchy lung airspace opacities  - Follow blood cultures  - broad spectrum abx  - volume resuscitation  - TTE unremarkable

## 2020-09-27 NOTE — ASSESSMENT & PLAN NOTE
Decreased urine output    Plan:  - rebolledo placed  - strict I+O  - trend CMP  - replete electrolytes prn

## 2020-09-27 NOTE — PLAN OF CARE
Afebrile. HR Sinus Tach 120s-140s. MAPs >65.   SpO2 >90% on room air. RR 30s-40s team aware.   Pt disoriented to situation.   Cont EEG monitoring in place.   Episode of N/V relived by prn zofran and brianne.   NPO.   Daily labs reviewed, electrolytes replaced prn. Lactic trending down.  POC reviewed with pt, all questions and concerns addressed.     Skin: No evidence of skin breakdown noted. Sacrum and heels intact. Foams in place. Heel boots applied. Waffle mattress inflated. Pt turned q2.

## 2020-09-27 NOTE — PROCEDURES
[facility] EEG/VIDEO MONITORING REPORT  Huy Cisneros  [id number]  1990    DATE OF SERVICE:  09/26/2020 - 09/27/2020  DATE OF ADMISSION: 9/24/2020  6:41 PM    ADMITTING/REQUESTING PROVIDER: Varghese Saenz MD    REASON FOR CONSULT:  29-year-old woman with metastatic colon cancer with disorientation and confusion.  Evaluate for evidence of epileptiform activity.    METHODOLOGY   Electroencephalographic (EEG) recording is with electrodes placed according to the International 10-20 placement system.  Thirty two (32) channels of digital signal (sampling rate of 512/sec) including T1 and T2 was simultaneously recorded from the scalp and may include  EKG, EMG, and/or eye monitors.  Recording band pass was 0.1 to 512 hz.  Digital video recording of the patient is simultaneously recorded with the EEG.  The patient is instructed report clinical symptoms which may occur during the recording session.  EEG and video recording is stored and archived in digital format.  Activation procedures which include photic stimulation, hyperventilation and instructing patients to perform simple task are done in selected patients.   The EEG is displayed on a monitor screen and can be reviewed using different montages.  Computer assisted analysis is employed to detect spike and electrographic seizure activity.   The entire record is submitted for computer analysis.  The entire recording is visually reviewed and the times identified by computer analysis as being spikes or seizures are reviewed again.  Compresses spectral analysis (CSA) is also performed on the activity recorded from each individual channel.  This is displayed as a power display of frequencies from 0 to 30 Hz over time.   The CSA is reviewed looking for asymmetries in power between homologous areas of the scalp and then compared with the original EEG recording.     [employer] software is also utilized in the review of this study.  This software suite analyzes the EEG recording  in multiple domains.  Coherence and rhythmicity is computed to identify EEG sections which may contain organized seizures.  Each channel undergoes analysis to detect presence of spike and sharp waves which have special and morphological characteristic of epileptic activity.  The routine EEG recording is converted from spacial into frequency domain.  This is then displayed comparing homologous areas to identify areas of significant asymmetry.  Algorithm to identify non-cortically generated artifact is used to separate eye movement, EMG and other artifact from the EEG.      RECORDING TIMES  Start on 09/26/2020 at 10:53 a.m.  Stop on 09/27/2020 at 12:00 p.m.-> End of the Recording Session  A total of 24 hr and 55 min of EEG recording is obtained.    EEG FINDINGS  Background activity:   For brief periods when the patient is awake, the background is relatively symmetric with a well-formed 9 hz posterior dominant rhythm seen bilaterally.    There are no pushbutton activations.    Sleep:  The patient spends the majority of the recording session in sleep with sleep spindles, K complexes, and vertex waves.    Activation procedures:   Hyperventilation is not performed  Photic stimulation is not performed    Cardiac Monitor:   Tachycardia    Impression:   This is a normal continuous EEG monitoring study.  There are no pushbutton activations, no prominent focal findings, no epileptiform discharges, and no electrographic seizures.     Laura Fleming MD PhD  Neurology-Epilepsy  [facility].  [facility]

## 2020-09-27 NOTE — ASSESSMENT & PLAN NOTE
Patient is a 29 year old woman with metastatic colon cancer who presents with acute encephalopathy, nausea, vomiting. Patient's mental status was normal on 9/21 able to take care of herself. Differentials include sepsis, electrolyte abnormalities, TIA, delirium, seizures, brain mets. CT head did not show acute infarct or hemorrhage. MRI concerning for subacute stroke in right corpus callosum. Utox positive for opiates, otherwise negative. Neurology consulted.    LP and MRI C/T/L spine could not be performed do to patient confusion and elevated heart rate.    Plan:  - Empirically treating for meningitis: vancomycin, cefepime, ampicillin, acyclovir  - EEG unremarkable, neurology signed off  - Blood culture 9/25 NGTD  - Patient is being sent to ICU  - If w/u remains unremarkable, consider repeat MRI/CT Head to evaluate for evolution of leukoencephalopathy as there are case reports a/w FOLFOX + Jie, tx would be supportive care

## 2020-09-27 NOTE — ASSESSMENT & PLAN NOTE
Patient with metastatic colon cancer, acute encephalopathy, nausea, vomiting.   - Abx for sepsis+ meningitis  - EEG to evaluate for seizures  - MRI brain 9/25 showed a small lacunar infarct right corpus callosum above the frontal horn suggesting possible subacute infarct and a  2 cm extra-axial enhancing mass identified over the posterior right parietal lobe at the vertex   - May need LP

## 2020-09-27 NOTE — SUBJECTIVE & OBJECTIVE
Interval History/Significant Events: Seen and examined overnight. No acute events overnight. Patient became nauseous and vomited last night, relieved by Zofran. No other events. Remains disoriented. Will continue workup and continue to monitor.     Review of Systems   Unable to perform ROS: Mental status change     Objective:     Vital Signs (Most Recent):  Temp: 97.6 °F (36.4 °C) (09/27/20 1500)  Pulse: (!) 136 (09/27/20 1530)  Resp: (!) 43 (09/27/20 1530)  BP: 102/63 (09/27/20 1530)  SpO2: 100 % (09/27/20 1530) Vital Signs (24h Range):  Temp:  [97.6 °F (36.4 °C)-98.1 °F (36.7 °C)] 97.6 °F (36.4 °C)  Pulse:  [126-147] 136  Resp:  [23-48] 43  SpO2:  [99 %-100 %] 100 %  BP: ()/(52-93) 102/63   Weight: 84.4 kg (186 lb)  Body mass index is 30.95 kg/m².      Intake/Output Summary (Last 24 hours) at 9/27/2020 1619  Last data filed at 9/27/2020 1600  Gross per 24 hour   Intake 1377 ml   Output 1170 ml   Net 207 ml       Physical Exam  Constitutional:       General: She is not in acute distress.     Appearance: Normal appearance. She is not toxic-appearing.   HENT:      Head: Normocephalic and atraumatic.      Comments: EEG in place       Nose: Nose normal. No congestion or rhinorrhea.      Mouth/Throat:      Mouth: Mucous membranes are moist.      Pharynx: Oropharynx is clear. No oropharyngeal exudate or posterior oropharyngeal erythema.   Eyes:      General: No scleral icterus.     Extraocular Movements: Extraocular movements intact.      Conjunctiva/sclera: Conjunctivae normal.      Pupils: Pupils are equal, round, and reactive to light.      Comments: Horizontal nystagmus   Neck:      Musculoskeletal: Normal range of motion and neck supple. No neck rigidity or muscular tenderness.   Cardiovascular:      Rate and Rhythm: Regular rhythm. Tachycardia present.      Pulses: Normal pulses.      Heart sounds: No murmur. No friction rub. No gallop.    Pulmonary:      Effort: Pulmonary effort is normal. No respiratory  distress.      Breath sounds: Normal breath sounds. No wheezing.   Abdominal:      General: There is distension.      Palpations: Abdomen is soft. There is no mass.      Tenderness: There is no abdominal tenderness. There is no guarding.      Comments: Hepatomegaly on exam   Musculoskeletal:      Right lower leg: No edema.      Left lower leg: No edema.   Lymphadenopathy:      Cervical: No cervical adenopathy.   Skin:     General: Skin is warm and dry.   Neurological:      Mental Status: She is disoriented.         Vents:     Lines/Drains/Airways     Drain                 Urethral Catheter 09/26/20 1830 Latex 16 Fr. less than 1 day          Peripheral Intravenous Line                 Peripheral IV - Single Lumen 09/24/20 1915 20 G Right Antecubital 2 days         Midline Catheter Insertion/Assessment  - Single Lumen 09/25/20 1622 Right cephalic vein (lateral side of arm) 20g x 8cm 1 day              Significant Labs:    CBC/Anemia Profile:  Recent Labs   Lab 09/26/20  0400 09/27/20  0048   WBC 10.20 10.06   HGB 7.4* 7.4*   HCT 24.5* 24.8*   * 139*   MCV 89 89   RDW 28.5* 28.6*        Chemistries:  Recent Labs   Lab 09/26/20  0400 09/27/20  0048   * 135*   K 4.1 3.8    106   CO2 10* 14*   BUN 17 16   CREATININE 0.7 0.7   CALCIUM 7.4* 8.3*   ALBUMIN 2.0* 2.0*   PROT 7.1 7.2   BILITOT 2.6* 2.8*   ALKPHOS 291* 301*   ALT 55* 56*   * 173*   MG 1.7 1.8   PHOS 1.9* 1.8*       Lactic Acid:   Recent Labs   Lab 09/26/20  1615 09/26/20  2123 09/27/20  0048   LACTATE 5.4* 5.4* 4.9*     All pertinent labs within the past 24 hours have been reviewed.    Significant Imaging:  I have reviewed all pertinent imaging results/findings within the past 24 hours.

## 2020-09-27 NOTE — H&P
[facility]  Critical Care Medicine  History & Physical    Patient Name: Huy Cisneros  MRN: [id number]  Admission Date: 9/24/2020  Hospital Length of Stay: 1 days  Code Status: Full Code  Attending Physician: Martin Epperson MD   Primary Care Provider: Primary Doctor No   Principal Problem: Acute encephalopathy    Subjective:     HPI:  Ms. Cisneros is a 28 yo F with PMHx of HTN and metastatic colon adenocarcinoma (follows Dr Arzola) who presented on 9/24 with confusion, nausea, vomiting. Patient recently inpatient at [facility] with UTI for 3 days; and was discharged yesterday (09/24) to finish her course of vancomycin for MRSA bacteremia diagnosed during an admission 8/26-9/0. On the way home from her discharge 9/24, pt began complaining of SOB and nausea/vomiting. Reports 2x emesis, non-bloody. SOB has since resolved.Per patient's mother, pt has beendisoriented, speaking strangely, and occasionally has her 'eyes rolled back in her head, over the last 2 days. Pt also endorses BL leg pain, and chronic headaches. She denies fevers, chills, chest pain, cough, abdominal pain, diarrhea, dysuria, vision changes. Oriented to self and time in ED, but not to place.     In the ED, she received one dose of morphine, zofran, and 1L IVF. Her labs were notable for electrolyte derangements and elevated lactic acid. CT head did not show an acute process. She will be admitted to Medical Oncology for further evaluation and management.     Mother: [phone]    Hospital/ICU Course:  Patient admitted to Medical Oncology 9/24. Septic workup began, critical care consulted on 9/26 due to elevated lactate and concern for worsening sepsis in addition to neuro symptoms like BL LE weakness and eye twitching. Admitted to Critical Care on 9/26.      Past Medical History:   Diagnosis Date    Cancer     Colon CA with mets to the liver    Hypertension 9/21/2020    Uterine cyst        Past Surgical History:    Procedure Laterality Date    COLONOSCOPY N/A 7/15/2020    Procedure: COLONOSCOPY;  Surgeon: Hi Drake MD;  Location: [facility] ENDO;  Service: Endoscopy;  Laterality: N/A;    COLONOSCOPY W/ BIOPSIES      INSERTION OF TUNNELED CENTRAL VENOUS CATHETER (CVC) WITH SUBCUTANEOUS PORT N/A 7/17/2020    Procedure: INSERTION, PORT-A-CATH;  Surgeon: Armani Naqvi MD;  Location: [facility] OR;  Service: General;  Laterality: N/A;       Review of patient's allergies indicates:   Allergen Reactions    Sulfa (sulfonamide antibiotics)        Family History     Problem Relation (Age of Onset)    Hypertension Mother, Maternal Grandmother, Maternal Grandfather, Paternal Grandmother        Tobacco Use    Smoking status: Never Smoker    Smokeless tobacco: Never Used   Substance and Sexual Activity    Alcohol use: No    Drug use: No    Sexual activity: Yes     Partners: Male     Birth control/protection: None      Review of Systems   Unable to perform ROS: Acuity of condition (patient in excessive pain and hoarse of voice, she is not responding to most questions)   HENT: Positive for dental problem.      Objective:     Vital Signs (Most Recent):  Temp: 97.5 °F (36.4 °C) (09/26/20 0945)  Pulse: (!) 138 (09/26/20 1045)  Resp: (!) 36 (09/26/20 1045)  BP: 123/80 (09/26/20 1045)  SpO2: 100 % (09/26/20 1045) Vital Signs (24h Range):  Temp:  [97.1 °F (36.2 °C)-98.9 °F (37.2 °C)] 97.5 °F (36.4 °C)  Pulse:  [119-151] 138  Resp:  [22-49] 36  SpO2:  [96 %-100 %] 100 %  BP: (114-138)/(78-97) 123/80      There is no height or weight on file to calculate BMI.      Intake/Output Summary (Last 24 hours) at 9/26/2020 1149  Last data filed at 9/26/2020 1000  Gross per 24 hour   Intake 2973 ml   Output 225 ml   Net 2748 ml       Physical Exam  Vitals signs and nursing note reviewed.   Constitutional:       General: She is in acute distress.      Appearance: She is ill-appearing and toxic-appearing.   HENT:      Mouth/Throat:       Mouth: Mucous membranes are dry.   Eyes:      Pupils: Pupils are equal, round, and reactive to light.   Cardiovascular:      Rate and Rhythm: Regular rhythm. Tachycardia present.      Heart sounds: No murmur.   Pulmonary:      Effort: Pulmonary effort is normal. No respiratory distress.      Breath sounds: Normal breath sounds. No rales.   Abdominal:      General: Abdomen is flat. There is no distension.      Tenderness: There is abdominal tenderness (generalized).   Musculoskeletal: Normal range of motion.         General: No swelling.      Right lower leg: No edema.      Left lower leg: No edema.   Skin:     General: Skin is warm and dry.   Neurological:      General: No focal deficit present.      Mental Status: She is alert and oriented to person, place, and time. Mental status is at baseline.      Cranial Nerves: No cranial nerve deficit.   Psychiatric:      Comments: anxious         Vents:     Lines/Drains/Airways     Peripheral Intravenous Line                 Peripheral IV - Single Lumen 09/24/20 1915 20 G Right Antecubital 1 day         Midline Catheter Insertion/Assessment  - Single Lumen 09/25/20 1622 Right cephalic vein (lateral side of arm) 20g x 8cm less than 1 day              Significant Labs:    CBC/Anemia Profile:  Recent Labs   Lab 09/24/20 1903 09/25/20  0550 09/26/20  0400   WBC 11.29 9.42 10.20   HGB 9.1* 8.1* 7.4*   HCT 30.1* 27.0* 24.5*    178 149*   MCV 89 88 89   RDW 27.9* 27.9* 28.5*        Chemistries:  Recent Labs   Lab 09/24/20 1903 09/25/20  0550 09/26/20  0400   * 132* 134*   K 4.7 4.2 4.1   CL 99 102 106   CO2 14* 14* 10*   BUN 20 19 17   CREATININE 0.8 0.7 0.7   CALCIUM 9.1 8.9 7.4*   ALBUMIN 2.4* 2.4* 2.0*   PROT 8.5* 8.2 7.1   BILITOT 2.9* 2.9* 2.6*   ALKPHOS 336* 330* 291*   ALT 61* 64* 55*   * 261* 201*   MG  --  1.9 1.7   PHOS  --  2.3* 1.9*     Results for MARK RICHARDS (MRN [id number]) as of 9/26/2020 11:53   Ref. Range 9/26/2020 09:19   Ammonia Latest  Ref Range: 10 - 50 umol/L 30     Results for MARK RICHARDS (MRN [id number]) as of 9/26/2020 11:53   Ref. Range 9/26/2020 08:02   Lactate, Enrique Latest Ref Range: 0.5 - 2.2 mmol/L 6.3 ()       Significant Imaging: I have reviewed and interpreted all pertinent imaging results/findings within the past 24 hours.    Assessment/Plan:     Adenocarcinoma of colon  Ms. Richards is a 29 y o female with metastatic colon adenocarcinoma diagnosed in 2020. She sees Dr Arzola at [facility]  - unable to be treated for cancer due to MRSA bacteremia.  - Discussed with Dr Castellanos who has spoke with the patient's mother about the small window of opportunity the patient has to receive treatment.  - mother would like to continue aggressive care at this time    Metastatic disease  9/25 Abdominal US showed extensive hepatic metastasis    Elevated LFTs  Patient with elevated LFTs since July 2020, likely due to liver metastases.    - Trend CMP/LFTs  - Trend lactate    Nausea and vomiting  Likely due to colon cancer, possible obstruction or sepsis  - Zofran and phenergan prn  - NPO  - may need to consider NGT    * Acute encephalopathy  Patient with metastatic colon cancer, acute encephalopathy, nausea, vomiting.   - Abx for sepsis+ meningitis  - EEG to evaluate for seizures  - MRI brain 9/25 showed a small lacunar infarct right corpus callosum above the frontal horn suggesting possible subacute infarct and a  2 cm extra-axial enhancing mass identified over the posterior right parietal lobe at the vertex   - May need LP     Oliguria  Place rebolledo  - strict I+O  - trend CMP  - replete electrolytes prn    MRSA bacteremia  Patient admitted to hospital on 8/26/20 - 9/9/20 with MRSA bacteremia. Was recently discharged on 09/24 after being admitted for UTI. She was discharged to complete 6 more days of vancomycin (end date around 09/30).      - Will continue vancomycin    - Repeat blood cultures  - trend CBC    Sepsis  Concern for sepsis due tp   tachycardia, acute encephalopathy and elevated lactic acid.   - UA not suggestive of UTI   - CXR showed bibasilar patchy lung airspace opacities  - Follow blood cultures  - broad spectrum abx  - volume resuscitation  - trend lactate  - TTE unremarkable     Chronic neoplasm-related pain  Generalized abdominal pain   - fentanyl prn  - may need bowel regimen     Palliative care encounter  Will plan to consult Palliative Care and Childlife  - patient has young children       Critical Care Time: 70 minutes     Critical care was time spent personally by me on the following activities: development of treatment plan with patient or surrogate and bedside caregivers, discussions with consultants, evaluation of patient's response to treatment, examination of patient, ordering and performing treatments and interventions, ordering and review of laboratory studies, ordering and review of radiographic studies, pulse oximetry, re-evaluation of patient's condition. This critical care time did not overlap with that of any other provider or involve time for any procedures.     Fiona Winterbottom, APRN, CNS  Critical Care Medicine  [facility]

## 2020-09-27 NOTE — ASSESSMENT & PLAN NOTE
Follows with Dr. Arzola in clinic. Received FOLFOX + Bevacizumab cycle #1 on 08/11/2020. Will need follow up upon discharge for further evaluation of whether to proceed with cycle #2.

## 2020-09-27 NOTE — PROGRESS NOTES
"[facility]  Hematology/Oncology  Progress Note    Patient Name: Huy Cisneros  Admission Date: 9/24/2020  Hospital Length of Stay: 2 days  Code Status: Full Code     Subjective:     HPI:  Ms. Cisneros is a 29 year old woman with metastatic colon adenocarcinoma, HTN who presents with confusion, nausea, vomiting. She follows with Dr. Arzola in clinic. Attempted to contact patient's mother for further HPI as patient unable to recall recent events but unable to reach her. Patient was most recently admitted to [facility] with UTI for 3 days and was discharged yesterday (09/24) to finish her course of vancomycin when she was hospitalized in 8/26-09/09 for MRSA bacteremia. Per admit ED note after leaving hospital yesterday, "On the way home, pt began complaining of SOB and nausea/vomiting. Reports 2x emesis, non-bloody. SOB has since resolved. Pt's mother further reports that for the past 2d, the pt has been disoriented, speaking strangely, and occasionally has her 'eyes rolled back in her head,' which prompted them to come to [facility] ED". Patient says that she has been feeling "terrible" with nausea and vomiting. She cannot recall how many times she has vomited. She says that she has bilateral leg pain and been having headaches for a while. She denies fevers, chills, chest pain, cough, abdominal pain, diarrhea, dysuria, vision changes. She is able to say her name and what year it is but when asked where she is, she says "[facility]".    In the ED, she received one dose of morphine, zofran, and 1L IVF. Her labs were notable for electrolyte derangements and elevated lactic acid. CT head did not show an acute process. She will be admitted to Medical Oncology for further evaluation and management.    Mother: [phone]    Oncologic History:    Oncologic History 1. Colon adenocarcinoma       Oncologic Treatment 1. Right hemicolectomy (7/17/20)  2. Planned therapy: FOLFOX +/- bevacizumab    "    Pathology 7/17/20:  Right hemicolectomy:  MODERATELY DIFFERENTIATED ADENOCARCINOMA WITH INFILTRATION THROUGH THE   MUSCULARIS INTO PERICOLONIC ADIPOSE TISSUE   METASTATIC CARCINOMA  TO 6 OF 15 LYMPH NODES   MULTIPLE METASTASES TO THE FAT ITSELF   NO SIGNIFICANT ABNORMALITY OF THE TERMINAL ILEUM OR APPENDIX   MMR--NORMAL FOR COLORECTAL CARCINOMA   Immunohistochemistry (IHC) Testing for Mismatch Repair (MMR) Proteins:   MLH1 - Intact nuclear expression   MSH2 - Intact nuclear expression   MSH6 - Intact nuclear expression   PMS2 - Intact nuclear expression   Background nonneoplastic tissue/internal control with intact nuclear   expression   IHC Interpretation   No loss of nuclear expression of MMR proteins: low probability of   microsatellite instability      Procedure:  Excision of right and transverse colon   Tumor Site:  transverse colon   Tumor Size:  3.2 cm   Macroscopic Tumor Perforation:  No   Histologic Type:  Adenocarcinoma   Histologic Grade:  Moderately differentiated   Tumor Extension:  Through the muscularis into pericolonic adipose tissue and   with tumor deposits extending to the most circumferential pericolonic tissue.   Margins:  The tumor is  9 cm from the distal margin and 44 from the proximal.   Treatment Effect:  No known presurgical therapy   Lymphovascular Invasion:  Identified in pericolonic tissue   Perineural Invasion:  Present   Tumor Deposits:  Present--approximately 12   Number of Lymph Nodes Examined:15   Number of Lymph Nodes Involved:  6   Pathologic Stage Classification: pT4a pN2A pM1a--liver metastases observed at      BRAF Mutation Analysis(V600E), Tumor  Result Summary:  NO MUTATION IDENTIFIED  Result:  Provided diagnosis: colorectal adenocarcinoma  BRAF status: Wild-type  KRAS Mutation Analysis, Colorectal  Result Summary:  NO MUTATION IDENTIFIED  Result:  Provided diagnosis: colorectal adenocarcinoma  KRAS status: Wild-type     7/15/20:  Transverse colon biopsy:  Invasive  moderately differentiated colonic   adenocarcinoma         7/14/20:  Liver, biopsy:   Adenocarcinoma, moderately differentiated   Cytomorphologic features and immunohistochemical staining pattern supports   colonic primary        Interval History:  Today pt is A&Ox2, knows name and year. Says she is at [facility] and that birthday is in 2000. She is asking where her children are and why she is at the hospital. EEG unremarkable.     Oncology Treatment Plan:   OP COLORECTAL FOLFOX + BEVACIZUMAB Q2W    Medications:  Continuous Infusions:    Scheduled Meds:   acyclovir  10 mg/kg (Ideal) Intravenous Q8H    ampicillin IVPB  2 g Intravenous Q4H    ceFEPime (MAXIPIME) IVPB  2 g Intravenous Q8H    folic acid  1 mg Oral Daily    lidocaine (PF) 10 mg/ml (1%)  1 mL Intradermal Once    thiamine (VITAMIN B1) IVPB  100 mg Intravenous Daily    vancomycin (VANCOCIN) IVPB  1,500 mg Intravenous Q24H     PRN Meds:ALPRAZolam, dextrose 50%, dextrose 50%, fentaNYL, glucagon (human recombinant), glucose, glucose, lorazepam, ondansetron, promethazine (PHENERGAN) IVPB, sodium chloride 0.9%, Pharmacy to dose Vancomycin consult **AND** vancomycin - pharmacy to dose     Review of Systems   Unable to perform ROS: Mental status change     Objective:     Vital Signs (Most Recent):  Temp: 97.6 °F (36.4 °C) (09/27/20 1100)  Pulse: (!) 135 (09/27/20 1400)  Resp: (!) 42 (09/27/20 1400)  BP: 108/74 (09/27/20 1400)  SpO2: 100 % (09/27/20 1400) Vital Signs (24h Range):  Temp:  [97.6 °F (36.4 °C)-98.8 °F (37.1 °C)] 97.6 °F (36.4 °C)  Pulse:  [126-147] 135  Resp:  [23-52] 42  SpO2:  [99 %-100 %] 100 %  BP: ()/(52-93) 108/74     Weight: 84.4 kg (186 lb)  Body mass index is 30.95 kg/m².  Body surface area is 1.97 meters squared.      Intake/Output Summary (Last 24 hours) at 9/27/2020 1422  Last data filed at 9/27/2020 1407  Gross per 24 hour   Intake 1377 ml   Output 1120 ml   Net 257 ml       Physical Exam  Constitutional:       Appearance:  She is not ill-appearing.   HENT:      Head: Normocephalic and atraumatic.      Nose: Nose normal.   Eyes:      General:         Right eye: No discharge.         Left eye: No discharge.      Extraocular Movements: Extraocular movements intact.   Cardiovascular:      Rate and Rhythm: Tachycardia present.      Pulses: Normal pulses.      Heart sounds: No murmur.   Pulmonary:      Effort: No respiratory distress.      Breath sounds: Normal breath sounds.   Abdominal:      General: Bowel sounds are normal. There is no distension.      Tenderness: There is abdominal tenderness (RUQ and LUQ tenderness to plalpation).   Musculoskeletal:      Right lower leg: No edema.      Left lower leg: No edema.   Skin:     General: Skin is warm and dry.   Neurological:      Mental Status: She is disoriented.      Motor: Weakness (in all extremities. Lower>Upper) present.         Significant Labs:   CBC:   Recent Labs   Lab 09/26/20  0400 09/27/20  0048   WBC 10.20 10.06   HGB 7.4* 7.4*   HCT 24.5* 24.8*   * 139*    and CMP:   Recent Labs   Lab 09/26/20  0400 09/27/20  0048   * 135*   K 4.1 3.8    106   CO2 10* 14*   GLU 94 92   BUN 17 16   CREATININE 0.7 0.7   CALCIUM 7.4* 8.3*   PROT 7.1 7.2   ALBUMIN 2.0* 2.0*   BILITOT 2.6* 2.8*   ALKPHOS 291* 301*   * 173*   ALT 55* 56*   ANIONGAP 18* 15   EGFRNONAA >60.0 >60.0       Diagnostic Results:    MRI Brain 9/25:  Impression:     Small lacunar infarct right corpus callosum above the frontal horn. This demonstrates minimal diffusion restriction suggesting possible subacute infarct. No enhancement is seen on postcontrast images.     2 cm extra-axial enhancing mass identified over the posterior right parietal lobe at the vertex. This abuts the midline superior sagittal sinus.    RUQ US 9/25:  Impression:     Satisfactory Doppler evaluation of the liver.     Extensive hepatic metastasis, correlating with the 09/21/2020 CT exam.     Cholelithiasis.    EEG 9/27/20  Impression:   This is a normal continuous EEG monitoring study.  There are no pushbutton activations, no prominent focal findings, no epileptiform discharges, and no electrographic seizures.     Assessment/Plan:     * Acute encephalopathy  Patient is a 29 year old woman with metastatic colon cancer who presents with acute encephalopathy, nausea, vomiting. Patient's mental status was normal on 9/21 able to take care of herself. Differentials include sepsis, electrolyte abnormalities, TIA, delirium, seizures, brain mets. CT head did not show acute infarct or hemorrhage. MRI concerning for subacute stroke in right corpus callosum. Utox positive for opiates, otherwise negative. Neurology consulted.    LP and MRI C/T/L spine could not be performed do to patient confusion and elevated heart rate.    Plan:  - Empirically treating for meningitis: vancomycin, cefepime, ampicillin, acyclovir  - EEG unremarkable, neurology signed off  - Blood culture 9/25 NGTD  - Patient is being sent to ICU  - If w/u remains unremarkable, consider repeat MRI/CT Head to evaluate for evolution of leukoencephalopathy as there are case reports a/w FOLFOX + Jie, tx would be supportive care       Elevated LFTs  Patient with elevated LFTs since July 2020, likely due to liver metastases. Stable upon admit.  Trend daily CMP    Hypertension  Patient on home amlodipine 5mg, lopressor 100mg, spironolactone 25mg. Holding in setting of sepsis. Resume when appropriate.     MRSA bacteremia  Patient admitted to hospital on 8/26/20 - 9/9/20 with MRSA bacteremia. Was recently discharged on 09/24 after being admitted for UTI. She was discharged to complete 6 more days of vancomycin (end date around 09/30).     - Will continue vancomycin until stop date  - Follow up repeat blood cultures      Sepsis  Concern for sepsis given tachycardia, acute encephalopathy and elevated lactic acid. UA not suggestive of UTI and CXR showed bibasilar patchy lung airspace  opacities    Plan:  - Trend lactic acid  - Follow up blood cultures  - Continue vancomycin for MRSA bacteremia (previously diagnosed during hospitalization 8/26/20 - 9/9/20) and cefepime, ampicillin, and acyclovir for empiric coverage    Nausea and vomiting  Patient presenting with acute encephalopathy as well as nausea and vomiting likely related to sepsis vs intracranial abnormality.  Zofran PRN (check EKG for QTc, on admit 490)  Advance diet as tolerated    Hyponatremia  Likely related to dehydration. Monitor CMP.    Chronic neoplasm-related pain  Patient takes percocet 10mg Q4H PRN pain. As patient altered, avoid to further exacerbate and resume when appropriate.    Adenocarcinoma of colon  Follows with Dr. Arzola in clinic. Received FOLFOX + Bevacizumab cycle #1 on 08/11/2020. Will need follow up upon discharge for further evaluation of whether to proceed with cycle #2.      Pt discussed with attending physician, Dr. Emanuel Gomez MD  Hematology/Oncology  [facility]    STAFF NOTE:  I have personally reviewed the past notes, images, labs and other provoider's notes and taken the history and examined this patient and agree with  's Note as stated above.    Balbina Castellanos MD

## 2020-09-27 NOTE — ASSESSMENT & PLAN NOTE
Patient with metastatic colon cancer, acute encephalopathy, nausea, vomiting.     Ddx: unclear etiology at this time but sepsis vs meningitis vs psychiatric dx vs Wernicke's    Plan:  - Abx for sepsis+ meningitis  - EEG to evaluate for seizures - neg  - MRI brain 9/25 showed a small lacunar infarct right corpus callosum above the frontal horn suggesting possible subacute infarct and a  2 cm extra-axial enhancing mass identified over the posterior right parietal lobe at the vertex   - Plan for LP, INR 9/27 1.5, 1 dose Vit K, repeat INR Monday (if elevated, IR can perform LP)  - horizontal nystagmus on exam, confabulation, encephalopathic- concern for Wernicke's, started thiamine and sent B1 labs

## 2020-09-27 NOTE — SUBJECTIVE & OBJECTIVE
Interval History:  Today pt is A&Ox2, knows name and year. Says she is at [facility] and that birthday is in 2000. She is asking where her children are and why she is at the hospital. EEG unremarkable.     Oncology Treatment Plan:   OP COLORECTAL FOLFOX + BEVACIZUMAB Q2W    Medications:  Continuous Infusions:    Scheduled Meds:   acyclovir  10 mg/kg (Ideal) Intravenous Q8H    ampicillin IVPB  2 g Intravenous Q4H    ceFEPime (MAXIPIME) IVPB  2 g Intravenous Q8H    folic acid  1 mg Oral Daily    lidocaine (PF) 10 mg/ml (1%)  1 mL Intradermal Once    thiamine (VITAMIN B1) IVPB  100 mg Intravenous Daily    vancomycin (VANCOCIN) IVPB  1,500 mg Intravenous Q24H     PRN Meds:ALPRAZolam, dextrose 50%, dextrose 50%, fentaNYL, glucagon (human recombinant), glucose, glucose, lorazepam, ondansetron, promethazine (PHENERGAN) IVPB, sodium chloride 0.9%, Pharmacy to dose Vancomycin consult **AND** vancomycin - pharmacy to dose     Review of Systems   Unable to perform ROS: Mental status change     Objective:     Vital Signs (Most Recent):  Temp: 97.6 °F (36.4 °C) (09/27/20 1100)  Pulse: (!) 135 (09/27/20 1400)  Resp: (!) 42 (09/27/20 1400)  BP: 108/74 (09/27/20 1400)  SpO2: 100 % (09/27/20 1400) Vital Signs (24h Range):  Temp:  [97.6 °F (36.4 °C)-98.8 °F (37.1 °C)] 97.6 °F (36.4 °C)  Pulse:  [126-147] 135  Resp:  [23-52] 42  SpO2:  [99 %-100 %] 100 %  BP: ()/(52-93) 108/74     Weight: 84.4 kg (186 lb)  Body mass index is 30.95 kg/m².  Body surface area is 1.97 meters squared.      Intake/Output Summary (Last 24 hours) at 9/27/2020 1422  Last data filed at 9/27/2020 1407  Gross per 24 hour   Intake 1377 ml   Output 1120 ml   Net 257 ml       Physical Exam  Constitutional:       Appearance: She is not ill-appearing.   HENT:      Head: Normocephalic and atraumatic.      Nose: Nose normal.   Eyes:      General:         Right eye: No discharge.         Left eye: No discharge.      Extraocular Movements: Extraocular  movements intact.   Cardiovascular:      Rate and Rhythm: Tachycardia present.      Pulses: Normal pulses.      Heart sounds: No murmur.   Pulmonary:      Effort: No respiratory distress.      Breath sounds: Normal breath sounds.   Abdominal:      General: Bowel sounds are normal. There is no distension.      Tenderness: There is abdominal tenderness (RUQ and LUQ tenderness to plalpation).   Musculoskeletal:      Right lower leg: No edema.      Left lower leg: No edema.   Skin:     General: Skin is warm and dry.   Neurological:      Mental Status: She is disoriented.      Motor: Weakness (in all extremities. Lower>Upper) present.         Significant Labs:   CBC:   Recent Labs   Lab 09/26/20  0400 09/27/20  0048   WBC 10.20 10.06   HGB 7.4* 7.4*   HCT 24.5* 24.8*   * 139*    and CMP:   Recent Labs   Lab 09/26/20 0400 09/27/20  0048   * 135*   K 4.1 3.8    106   CO2 10* 14*   GLU 94 92   BUN 17 16   CREATININE 0.7 0.7   CALCIUM 7.4* 8.3*   PROT 7.1 7.2   ALBUMIN 2.0* 2.0*   BILITOT 2.6* 2.8*   ALKPHOS 291* 301*   * 173*   ALT 55* 56*   ANIONGAP 18* 15   EGFRNONAA >60.0 >60.0       Diagnostic Results:    MRI Brain 9/25:  Impression:     Small lacunar infarct right corpus callosum above the frontal horn. This demonstrates minimal diffusion restriction suggesting possible subacute infarct. No enhancement is seen on postcontrast images.     2 cm extra-axial enhancing mass identified over the posterior right parietal lobe at the vertex. This abuts the midline superior sagittal sinus.    RUQ US 9/25:  Impression:     Satisfactory Doppler evaluation of the liver.     Extensive hepatic metastasis, correlating with the 09/21/2020 CT exam.     Cholelithiasis.    EEG 9/27/20 Impression:   This is a normal continuous EEG monitoring study.  There are no pushbutton activations, no prominent focal findings, no epileptiform discharges, and no electrographic seizures.

## 2020-09-27 NOTE — ASSESSMENT & PLAN NOTE
Patient with elevated LFTs since July 2020, likely due to liver metastases    Plan:  - CT Abd/Pelvis shows extensive liver mets and hepatomegaly    - Trend CMP/LFTs  - Trend lactate

## 2020-09-27 NOTE — PROGRESS NOTES
[facility]  Critical Care Medicine  Progress Note    Patient Name: Huy Cisneros  MRN: [id number]  Admission Date: 9/24/2020  Hospital Length of Stay: 2 days  Code Status: Full Code  Attending Provider: Martin Epperson MD  Primary Care Provider: Primary Doctor No   Principal Problem: Acute encephalopathy    Subjective:     HPI:  Ms. Cisneros is a 30 yo F with PMHx of HTN and metastatic colon adenocarcinoma (follows Dr Arzola) who presented on 9/24 with confusion, nausea, vomiting. Patient recently inpatient at [facility] with UTI for 3 days; and was discharged yesterday (09/24) to finish her course of vancomycin for MRSA bacteremia diagnosed during an admission 8/26-9/0. On the way home from her discharge 9/24, pt began complaining of SOB and nausea/vomiting. Reports 2x emesis, non-bloody. SOB has since resolved.Per patient's mother, pt has beendisoriented, speaking strangely, and occasionally has her 'eyes rolled back in her head, over the last 2 days. Pt also endorses BL leg pain, and chronic headaches. She denies fevers, chills, chest pain, cough, abdominal pain, diarrhea, dysuria, vision changes. Oriented to self and time in ED, but not to place.     In the ED, she received one dose of morphine, zofran, and 1L IVF. Her labs were notable for electrolyte derangements and elevated lactic acid. CT head did not show an acute process. She will be admitted to Medical Oncology for further evaluation and management.     Mother: [phone]    Hospital/ICU Course:  Patient admitted to Medical Oncology 9/24. Septic workup began, critical care consulted on 9/26 due to elevated lactate and concern for worsening sepsis in addition to neuro symptoms like BL LE weakness and eye twitching. Admitted to Critical Care on 9/26.     Interval History/Significant Events: Seen and examined overnight. No acute events overnight. Patient became nauseous and vomited last night, relieved by Zofran. No other  events. Remains disoriented. Will continue workup and continue to monitor.     Review of Systems   Unable to perform ROS: Mental status change     Objective:     Vital Signs (Most Recent):  Temp: 97.6 °F (36.4 °C) (09/27/20 1500)  Pulse: (!) 136 (09/27/20 1530)  Resp: (!) 43 (09/27/20 1530)  BP: 102/63 (09/27/20 1530)  SpO2: 100 % (09/27/20 1530) Vital Signs (24h Range):  Temp:  [97.6 °F (36.4 °C)-98.1 °F (36.7 °C)] 97.6 °F (36.4 °C)  Pulse:  [126-147] 136  Resp:  [23-48] 43  SpO2:  [99 %-100 %] 100 %  BP: ()/(52-93) 102/63   Weight: 84.4 kg (186 lb)  Body mass index is 30.95 kg/m².      Intake/Output Summary (Last 24 hours) at 9/27/2020 1619  Last data filed at 9/27/2020 1600  Gross per 24 hour   Intake 1377 ml   Output 1170 ml   Net 207 ml       Physical Exam  Constitutional:       General: She is not in acute distress.     Appearance: Normal appearance. She is not toxic-appearing.   HENT:      Head: Normocephalic and atraumatic.      Comments: EEG in place       Nose: Nose normal. No congestion or rhinorrhea.      Mouth/Throat:      Mouth: Mucous membranes are moist.      Pharynx: Oropharynx is clear. No oropharyngeal exudate or posterior oropharyngeal erythema.   Eyes:      General: No scleral icterus.     Extraocular Movements: Extraocular movements intact.      Conjunctiva/sclera: Conjunctivae normal.      Pupils: Pupils are equal, round, and reactive to light.      Comments: Horizontal nystagmus   Neck:      Musculoskeletal: Normal range of motion and neck supple. No neck rigidity or muscular tenderness.   Cardiovascular:      Rate and Rhythm: Regular rhythm. Tachycardia present.      Pulses: Normal pulses.      Heart sounds: No murmur. No friction rub. No gallop.    Pulmonary:      Effort: Pulmonary effort is normal. No respiratory distress.      Breath sounds: Normal breath sounds. No wheezing.   Abdominal:      General: There is distension.      Palpations: Abdomen is soft. There is no mass.       Tenderness: There is no abdominal tenderness. There is no guarding.      Comments: Hepatomegaly on exam   Musculoskeletal:      Right lower leg: No edema.      Left lower leg: No edema.   Lymphadenopathy:      Cervical: No cervical adenopathy.   Skin:     General: Skin is warm and dry.   Neurological:      Mental Status: She is disoriented.         Vents:     Lines/Drains/Airways     Drain                 Urethral Catheter 09/26/20 1830 Latex 16 Fr. less than 1 day          Peripheral Intravenous Line                 Peripheral IV - Single Lumen 09/24/20 1915 20 G Right Antecubital 2 days         Midline Catheter Insertion/Assessment  - Single Lumen 09/25/20 1622 Right cephalic vein (lateral side of arm) 20g x 8cm 1 day              Significant Labs:    CBC/Anemia Profile:  Recent Labs   Lab 09/26/20  0400 09/27/20  0048   WBC 10.20 10.06   HGB 7.4* 7.4*   HCT 24.5* 24.8*   * 139*   MCV 89 89   RDW 28.5* 28.6*        Chemistries:  Recent Labs   Lab 09/26/20  0400 09/27/20  0048   * 135*   K 4.1 3.8    106   CO2 10* 14*   BUN 17 16   CREATININE 0.7 0.7   CALCIUM 7.4* 8.3*   ALBUMIN 2.0* 2.0*   PROT 7.1 7.2   BILITOT 2.6* 2.8*   ALKPHOS 291* 301*   ALT 55* 56*   * 173*   MG 1.7 1.8   PHOS 1.9* 1.8*       Lactic Acid:   Recent Labs   Lab 09/26/20  1615 09/26/20  2123 09/27/20  0048   LACTATE 5.4* 5.4* 4.9*     All pertinent labs within the past 24 hours have been reviewed.    Significant Imaging:  I have reviewed all pertinent imaging results/findings within the past 24 hours.      ABG  Recent Labs   Lab 09/26/20  1500   PH 7.441   PO2 129*   PCO2 18.3*   HCO3 12.5*   BE -12     Assessment/Plan:     Neuro  * Acute encephalopathy  Patient with metastatic colon cancer, acute encephalopathy, nausea, vomiting.     Ddx: unclear etiology at this time but sepsis vs meningitis vs psychiatric dx vs Wernicke's    Plan:  - Abx for sepsis+ meningitis  - EEG to evaluate for seizures - neg  - MRI brain 9/25  showed a small lacunar infarct right corpus callosum above the frontal horn suggesting possible subacute infarct and a  2 cm extra-axial enhancing mass identified over the posterior right parietal lobe at the vertex   - Plan for LP, INR 9/27 1.5, 1 dose Vit K, repeat INR Monday (if elevated, IR can perform LP)  - horizontal nystagmus on exam, confabulation, encephalopathic- concern for Wernicke's, started thiamine and sent B1 labs          Renal/  Oliguria  Decreased urine output    Plan:  - rebolledo placed  - strict I+O  - trend CMP  - replete electrolytes prn    ID  MRSA bacteremia  Patient admitted to hospital on 8/26/20 - 9/9/20 with MRSA bacteremia. Was recently discharged on 09/24 after being admitted for UTI. She was discharged to complete 6 more days of vancomycin (end date around 09/30).      Plan:  - UA not suggestive of UTI   - CXR showed bibasilar patchy lung airspace opacities  - Follow blood cultures  - broad spectrum abx  - volume resuscitation  - TTE unremarkable  - trend CBC    Oncology  Chronic neoplasm-related pain  Generalized abdominal pain   - fentanyl prn  - may need bowel regimen       Adenocarcinoma of colon  Ms. Cisneros is a 28 yo F with metastatic colon adenocarcinoma diagnosed in 2020. She sees Dr Arzola at [facility]    Plan:  - unable to be treated for cancer due to MRSA bacteremia.  - Discussed with Dr Castellanos who has spoke with the patient's mother about the small window of opportunity the patient has to receive treatment.  - mother would like to continue aggressive care at this time  - palliative care consult    Metastatic disease  9/25 Abdominal US showed extensive hepatic metastasis    GI  Elevated LFTs  Patient with elevated LFTs since July 2020, likely due to liver metastases    Plan:  - CT Abd/Pelvis shows extensive liver mets and hepatomegaly    - Trend CMP/LFTs  - Trend lactate    Nausea and vomiting  Likely due to colon cancer, possible obstruction or sepsis    Plan:   -  KUB shows moderate distention, but no clear evidence of SBO  - NPO  - Zofran/Phenegran PRN    Other  Palliative care encounter  Will plan to consult Palliative Care and Childlife    Plan:  - patient has young children        Critical secondary to Patient has a condition that poses threat to life and bodily function: AMS with unclear etiology      Critical care was time spent personally by me on the following activities: development of treatment plan with patient or surrogate and bedside caregivers, discussions with consultants, evaluation of patient's response to treatment, examination of patient, ordering and performing treatments and interventions, ordering and review of laboratory studies, ordering and review of radiographic studies, pulse oximetry, re-evaluation of patient's condition. This critical care time did not overlap with that of any other provider or involve time for any procedures.     Chris Bell MD, PGY-1  Critical Care Medicine  [facility]

## 2020-09-27 NOTE — PROGRESS NOTES
Pharmacokinetic Assessment Follow Up: IV Vancomycin    Vancomycin serum concentration assessment(s):    The trough level was drawn correctly and can be used to guide therapy at this time. The measurement is above the desired definitive target range of 15 to 20 mcg/mL.    Vancomycin Regimen Plan:    Re-dose when the random level is less than 20 mcg/mL, next level to be drawn with AM labs on 9/28    Drug levels (last 3 results):  Recent Labs   Lab Result Units 09/25/20  0550 09/25/20  1507 09/27/20  1619   Vancomycin, Random ug/mL 27.3 19.6  --    Vancomycin-Trough ug/mL  --   --  25.5*       Pharmacy will continue to follow and monitor vancomycin.    Please contact pharmacy at extension [phone] for questions regarding this assessment.    Thank you for the consult,   Maral Perez PharmD       Patient brief summary:  Huy Cisneros is a 29 y.o. female initiated on antimicrobial therapy with IV Vancomycin for treatment of suspected bacteremia    Drug Allergies:   Review of patient's allergies indicates:   Allergen Reactions    Sulfa (sulfonamide antibiotics)        Actual Body Weight:   84.4 kg    Renal Function:   Estimated Creatinine Clearance: 126.2 mL/min (based on SCr of 0.7 mg/dL).,     Dialysis Method (if applicable):  N/A    CBC (last 72 hours):  Recent Labs   Lab Result Units 09/24/20  1903 09/25/20  0550 09/26/20  0400 09/27/20  0048   WBC K/uL 11.29 9.42 10.20 10.06   Hemoglobin g/dL 9.1* 8.1* 7.4* 7.4*   Hematocrit % 30.1* 27.0* 24.5* 24.8*   Platelets K/uL 212 178 149* 139*   Gran% %  --  75.4* 79.4* 79.5*   Lymph% %  --  13.2* 10.7* 10.8*   Mono% %  --  8.8 6.9 7.1   Eosinophil% %  --  0.2 0.1 0.1   Basophil% %  --  0.3 0.4 0.3   Differential Method   --  Automated Automated Automated       Metabolic Panel (last 72 hours):  Recent Labs   Lab Result Units 09/24/20  1903 09/25/20  0142 09/25/20  0550 09/25/20  1008 09/26/20  0400 09/27/20  0048   Sodium mmol/L 131*  --  132*  --  134* 135*    Potassium mmol/L 4.7  --  4.2  --  4.1 3.8   Chloride mmol/L 99  --  102  --  106 106   CO2 mmol/L 14*  --  14*  --  10* 14*   Glucose mg/dL 103  --  78  --  94 92   Glucose, UA   --  Negative  --   --   --   --    BUN, Bld mg/dL 20  --  19  --  17 16   Creatinine mg/dL 0.8  --  0.7  --  0.7 0.7   Creatinine, Random Ur mg/dL  --   --   --  49.0  --   --    Albumin g/dL 2.4*  --  2.4*  --  2.0* 2.0*   Total Bilirubin mg/dL 2.9*  --  2.9*  --  2.6* 2.8*   Alkaline Phosphatase U/L 336*  --  330*  --  291* 301*   AST U/L 219*  --  261*  --  201* 173*   ALT U/L 61*  --  64*  --  55* 56*   Magnesium mg/dL  --   --  1.9  --  1.7 1.8   Phosphorus mg/dL  --   --  2.3*  --  1.9* 1.8*       Vancomycin Administrations:  vancomycin given in the last 96 hours                   vancomycin 1.5 g in dextrose 5 % 250 mL IVPB (ready to mix) (mg) 1,500 mg New Bag 09/26/20 1800     1,500 mg New Bag 09/25/20 1709    vancomycin 1.5 g in dextrose 5 % 250 mL IVPB (ready to mix) (mg) 1,500 mg New Bag 09/24/20 1036                Microbiologic Results:  Microbiology Results (last 7 days)     Procedure Component Value Units Date/Time    Blood culture #2 **CANNOT BE ORDERED STAT** [[id number]]  (Abnormal) Collected: 09/25/20 0239    Order Status: Completed Specimen: Blood from Line, PICC Left Basilic Updated: 09/27/20 1116     Blood Culture, Routine Gram stain aer bottle: Gram positive cocci in clusters resembling Staph       Results called to and read back by:Joanne Garrett 09/26/2020  10:43      COAGULASE-NEGATIVE STAPHYLOCOCCUS SPECIES  Organism is a probable contaminant      Clostridium difficile EIA [[id number]] Collected: 09/26/20 0925    Order Status: Completed Specimen: Stool Updated: 09/27/20 0627     C. diff Antigen Negative     C difficile Toxins A+B, EIA Negative     Comment: Testing not recommended for children <24 months old.       Blood culture #1 **CANNOT BE ORDERED STAT** [[id number]] Collected: 09/25/20 0239    Order Status:  Completed Specimen: Blood from Peripheral, Hand, Right Updated: 09/27/20 0612     Blood Culture, Routine No Growth to date      No Growth to date      No Growth to date

## 2020-09-27 NOTE — ASSESSMENT & PLAN NOTE
Patient admitted to hospital on 8/26/20 - 9/9/20 with MRSA bacteremia. Was recently discharged on 09/24 after being admitted for UTI. She was discharged to complete 6 more days of vancomycin (end date around 09/30).      - Will continue vancomycin    - Repeat blood cultures  - trend CBC

## 2020-09-27 NOTE — ASSESSMENT & PLAN NOTE
Likely due to colon cancer, possible obstruction or sepsis    Plan:   - KUB shows moderate distention, but no clear evidence of SBO  - NPO  - Zofran/Phenegran PRN

## 2020-09-28 PROBLEM — E87.1 HYPONATREMIA: Status: RESOLVED | Noted: 2020-01-01 | Resolved: 2020-01-01

## 2020-09-28 NOTE — PROGRESS NOTES
[facility]  Critical Care Medicine  Progress Note    Patient Name: Huy Cisneros  MRN: [id number]  Admission Date: 9/24/2020  Hospital Length of Stay: 3 days  Code Status: Full Code  Attending Provider: Rashid Esparza MD  Primary Care Provider: Primary Doctor No   Principal Problem: Acute encephalopathy    Subjective:     HPI:  Ms. Cisneros is a 28 yo F with PMHx of HTN and metastatic colon adenocarcinoma (follows Dr Arzola) who presented on 9/24 with confusion, nausea, vomiting. Patient recently inpatient at [facility] with UTI for 3 days; and was discharged yesterday (09/24) to finish her course of vancomycin for MRSA bacteremia diagnosed during an admission 8/26-9/0. On the way home from her discharge 9/24, pt began complaining of SOB and nausea/vomiting. Reports 2x emesis, non-bloody. SOB has since resolved.Per patient's mother, pt has beendisoriented, speaking strangely, and occasionally has her 'eyes rolled back in her head, over the last 2 days. Pt also endorses BL leg pain, and chronic headaches. She denies fevers, chills, chest pain, cough, abdominal pain, diarrhea, dysuria, vision changes. Oriented to self and time in ED, but not to place.     In the ED, she received one dose of morphine, zofran, and 1L IVF. Her labs were notable for electrolyte derangements and elevated lactic acid. CT head did not show an acute process. She will be admitted to Medical Oncology for further evaluation and management.     Mother: [phone]    Hospital/ICU Course:  Patient admitted to Medical Oncology 9/24. Septic workup began, critical care consulted on 9/26 due to elevated lactate and concern for worsening sepsis in addition to neuro symptoms like BL LE weakness and eye twitching. Admitted to Critical Care on 9/26. An EEG was negative for seizure activity. She also underwent a pan-MRI which was generally unremarkable with the exception of a small, remote lacunar brain infarct of the right  corpus callosum. A lumbar puncture under fluoroscopy is pending.     Interval History/Significant Events: No significant events overnight. Lactate cleared.     Review of Systems   Unable to perform ROS: Mental status change     Objective:     Vital Signs (Most Recent):  Temp: 98.7 °F (37.1 °C) (09/28/20 1100)  Pulse: (!) 133 (09/28/20 1200)  Resp: (!) 33 (09/28/20 1200)  BP: 116/66 (09/28/20 1200)  SpO2: 98 % (09/28/20 1200) Vital Signs (24h Range):  Temp:  [97.5 °F (36.4 °C)-98.7 °F (37.1 °C)] 98.7 °F (37.1 °C)  Pulse:  [115-137] 133  Resp:  [23-43] 33  SpO2:  [94 %-100 %] 98 %  BP: ()/(52-74) 116/66   Weight: 84.3 kg (185 lb 13.6 oz)  Body mass index is 30.93 kg/m².      Intake/Output Summary (Last 24 hours) at 9/28/2020 1337  Last data filed at 9/28/2020 1200  Gross per 24 hour   Intake 2191 ml   Output 746 ml   Net 1445 ml       Physical Exam  Vitals signs reviewed.   Constitutional:       Appearance: She is well-developed.   HENT:      Head: Normocephalic and atraumatic.   Eyes:      General: No scleral icterus.        Right eye: No discharge.         Left eye: No discharge.      Conjunctiva/sclera: Conjunctivae normal.      Pupils: Pupils are equal, round, and reactive to light.   Neck:      Musculoskeletal: Full passive range of motion without pain, normal range of motion and neck supple.      Thyroid: No thyromegaly.      Vascular: No JVD.      Trachea: Trachea normal. No tracheal deviation.   Cardiovascular:      Rate and Rhythm: Normal rate and regular rhythm.      Heart sounds: Normal heart sounds, S1 normal and S2 normal. No murmur. No friction rub. No gallop.    Pulmonary:      Effort: Pulmonary effort is normal. No respiratory distress.      Breath sounds: Normal breath sounds. No wheezing or rales.   Chest:      Chest wall: No tenderness.   Abdominal:      General: Bowel sounds are normal. There is no distension.      Palpations: Abdomen is soft. There is no mass.      Tenderness: There is no  abdominal tenderness. There is no guarding or rebound.   Musculoskeletal: Normal range of motion.         General: No tenderness or deformity.   Lymphadenopathy:      Cervical: No cervical adenopathy.   Skin:     General: Skin is warm and dry.      Findings: No abrasion or bruising.   Neurological:      Cranial Nerves: No cranial nerve deficit.      Coordination: Coordination normal.         Vents:     Lines/Drains/Airways     Drain                 Urethral Catheter 09/26/20 1830 Latex 16 Fr. 1 day          Peripheral Intravenous Line                 Peripheral IV - Single Lumen 09/24/20 1915 20 G Right Antecubital 3 days         Midline Catheter Insertion/Assessment  - Single Lumen 09/25/20 1622 Right cephalic vein (lateral side of arm) 20g x 8cm 2 days              Significant Labs:    CBC/Anemia Profile:  Recent Labs   Lab 09/27/20  0048 09/28/20  0311   WBC 10.06 9.62   HGB 7.4* 7.6*   HCT 24.8* 25.3*   * 151   MCV 89 90   RDW 28.6* 29.2*        Chemistries:  Recent Labs   Lab 09/27/20 0048 09/28/20 0311   * 138   K 3.8 3.5    108   CO2 14* 18*   BUN 16 20   CREATININE 0.7 0.7   CALCIUM 8.3* 7.9*   ALBUMIN 2.0* 1.9*   PROT 7.2 6.9   BILITOT 2.8* 3.0*   ALKPHOS 301* 325*   ALT 56* 81*   * 383*   MG 1.8 2.4   PHOS 1.8* 2.0*     Significant Imaging:  I have reviewed and interpreted all pertinent imaging results/findings within the past 24 hours.      ABG  Recent Labs   Lab 09/26/20  1500   PH 7.441   PO2 129*   PCO2 18.3*   HCO3 12.5*   BE -12     Assessment/Plan:     Neuro  * Acute encephalopathy  --Unclear etiology.  --No evidence of brain metastases. EEG negative.   --Ammonia within normal limits. Hold on treatment for hepatic encephalopathy for now (will need NGT).  --LP pending. Antibiotics changed for CNS penetration.   --Continue to monitor.     Renal/  Oliguria  Decreased urine output    Plan:  - rebolledo placed  - strict I+O  - trend CMP  - replete electrolytes prn    Lactic  acidosis  --Unclear etiology. Extensive liver metastases.   --Lactate has cleared. Stop trending.     ID  MRSA bacteremia  Patient admitted to hospital on 8/26/20 - 9/9/20 with MRSA bacteremia. Was recently discharged on 09/24 after being admitted for UTI. She was discharged to complete 6 more days of vancomycin (end date around 09/30).      Plan:  - UA not suggestive of UTI   - CXR showed bibasilar patchy lung airspace opacities  - Follow blood cultures  - broad spectrum abx  - volume resuscitation  - TTE unremarkable  - trend CBC    Oncology  Chronic neoplasm-related pain  Generalized abdominal pain   - fentanyl prn  - may need bowel regimen       Adenocarcinoma of colon  Ms. Cisneros is a 30 yo F with metastatic colon adenocarcinoma diagnosed in 2020. She sees Dr Arzola at [facility]    Plan:  - unable to be treated for cancer due to MRSA bacteremia.  - Discussed with Dr Castellanos who has spoke with the patient's mother about the small window of opportunity the patient has to receive treatment.  - mother would like to continue aggressive care at this time  - palliative care consult    Metastatic disease  9/25 Abdominal US showed extensive hepatic metastasis    GI  Elevated LFTs  Patient with elevated LFTs since July 2020, likely due to liver metastases    Plan:  - CT Abd/Pelvis shows extensive liver mets and hepatomegaly    - Trend CMP/LFTs      Nausea and vomiting  Likely due to colon cancer, possible obstruction or sepsis    Plan:   - KUB shows moderate distention, but no clear evidence of SBO  - NPO  - Zofran/Phenegran PRN    Other  Palliative care encounter  Will plan to consult Palliative Care and Childlife    Plan:  - patient has young children      I spent >35 minutes reviewing patient records, examining, and counseling the patient with greater than 50% of the time spent with direct patient care and coordination.      Angel Pablo PA-C  Critical Care Medicine  [facility]

## 2020-09-28 NOTE — ASSESSMENT & PLAN NOTE
--Unclear etiology.  --No evidence of brain metastases. EEG negative.   --Ammonia within normal limits. Hold on treatment for hepatic encephalopathy for now (will need NGT).  --LP pending. Antibiotics changed for CNS penetration.   --Continue to monitor.

## 2020-09-28 NOTE — CONSULTS
[facility]  Palliative Medicine  Consult Note    Patient Name: Huy Cisneros  MRN: [id number]  Admission Date: 9/24/2020  Hospital Length of Stay: 3 days  Code Status: Full Code   Attending Provider: Rashid Esparza MD  Consulting Provider: Theresa Hanson MD  Primary Care Physician: Primary Doctor No  Principal Problem:Acute encephalopathy    Patient information was obtained from relative(s) and ER records.      Consults  Assessment/Plan:     Palliative care encounter  29 year old female with metastatic colon cancer with large tumor burden. Currently being treated for MRSA bacteremia with continued work up of acute encephalopathy. Concern that patient will not be a candidate for further chemotherapy. Palliative consulted for GOC       GOC/ACP  -Patient unable to participate in GOC conversation but was previously followed at [facility] by Palliative Team. See note 9/21 by Celine Conde for details of conversation with patient.   Spoke with patient's mother, Char. Char says she understood from the beginning that her daughter's cancer is not curable. She is hoping that she will be able to get more chemotherapy so she can have more meaningful time with her family. Discussed hoping for the best while developing a plan for if things do not go how we are hoping. Char said that if patient is not a candidate for further chemotherapy, she believes patient would want to spend the time she has left at home with her children rather than on machines at the hospital. Will continue GOC conversations.  Patient's mother interested in speaking with [facility] [employer]. Appreciate their assistance         Thank you for your consult. I will follow-up with patient. Please contact us if you have any additional questions.    Subjective:     HPI:   29 year old female with PMH HTN and metastatic colon adenocarcinoma diagnosed 7/2020 s/p FOLFOX + Bevacizumab cycle #1 on 08/11/2020 presented to [facility] with N/V and  shortness of breath. Patient recently discharged from [facility] with UTI and MRSA bacteremia being treated with vanc. Patient was admitted to oncology but was transferred to ICU shortly after admission with elevated lactic and worsening mental status. Etiology of altered mental status remains unclear. Palliative has been following patient at [facility]. Consulted at [facility] to continue GOC conversations. Patient with large tumor burden and concern that she will not be a candidate for further chemotherapy.     During my interview patient was unable to answer any of my questions. She opened eyes when I called her name but did not speak. Quickly closed them. Lethargic .           Hospital Course:  No notes on file        Past Medical History:   Diagnosis Date    Cancer     Colon CA with mets to the liver    Hypertension 9/21/2020    Uterine cyst        Past Surgical History:   Procedure Laterality Date    COLONOSCOPY N/A 7/15/2020    Procedure: COLONOSCOPY;  Surgeon: Hi Drake MD;  Location: [facility] ENDO;  Service: Endoscopy;  Laterality: N/A;    COLONOSCOPY W/ BIOPSIES      INSERTION OF TUNNELED CENTRAL VENOUS CATHETER (CVC) WITH SUBCUTANEOUS PORT N/A 7/17/2020    Procedure: INSERTION, PORT-A-CATH;  Surgeon: Armani Naqvi MD;  Location: [facility] OR;  Service: General;  Laterality: N/A;       Review of patient's allergies indicates:   Allergen Reactions    Sulfa (sulfonamide antibiotics)        Medications:  Continuous Infusions:  Scheduled Meds:   acyclovir  10 mg/kg (Ideal) Intravenous Q8H    ampicillin IVPB  2 g Intravenous Q4H    ceFEPime (MAXIPIME) IVPB  2 g Intravenous Q8H    folic acid  1 mg Oral Daily    lidocaine (PF) 10 mg/ml (1%)  1 mL Intradermal Once    thiamine (VITAMIN B1) IVPB  100 mg Intravenous Daily     PRN Meds:calcium gluconate IVPB, calcium gluconate IVPB, calcium gluconate IVPB, dextrose 50%, dextrose 50%, fentaNYL, glucagon (human recombinant), glucose, glucose,  magnesium sulfate IVPB, magnesium sulfate IVPB, ondansetron, potassium chloride in water **AND** potassium chloride in water **AND** potassium chloride in water, promethazine (PHENERGAN) IVPB, sodium chloride 0.9%, sodium phosphate IVPB, sodium phosphate IVPB, sodium phosphate IVPB, Pharmacy to dose Vancomycin consult **AND** vancomycin - pharmacy to dose    Family History     Problem Relation (Age of Onset)    Hypertension Mother, Maternal Grandmother, Maternal Grandfather, Paternal Grandmother        Tobacco Use    Smoking status: Never Smoker    Smokeless tobacco: Never Used   Substance and Sexual Activity    Alcohol use: No    Drug use: No    Sexual activity: Yes     Partners: Male     Birth control/protection: None       Review of Systems   Unable to perform ROS: Mental status change     Objective:     Vital Signs (Most Recent):  Temp: 98.7 °F (37.1 °C) (09/28/20 1100)  Pulse: (!) 128 (09/28/20 1300)  Resp: (!) 26 (09/28/20 1300)  BP: (!) 107/58 (09/28/20 1300)  SpO2: 98 % (09/28/20 1300) Vital Signs (24h Range):  Temp:  [97.5 °F (36.4 °C)-98.7 °F (37.1 °C)] 98.7 °F (37.1 °C)  Pulse:  [115-136] 128  Resp:  [23-39] 26  SpO2:  [94 %-100 %] 98 %  BP: ()/(52-73) 107/58     Weight: 84.3 kg (185 lb 13.6 oz)  Body mass index is 30.93 kg/m².    Physical Exam  Vitals signs and nursing note reviewed.   Constitutional:       General: She is not in acute distress.  HENT:      Head: Normocephalic.      Right Ear: External ear normal.      Left Ear: External ear normal.      Nose: Nose normal.      Mouth/Throat:      Mouth: Mucous membranes are dry.   Eyes:      Comments: Eyes mostly closed    Neck:      Musculoskeletal: Normal range of motion.   Cardiovascular:      Rate and Rhythm: Tachycardia present.   Pulmonary:      Effort: Pulmonary effort is normal. No respiratory distress.   Abdominal:      General: There is no distension.   Musculoskeletal:      Comments: Moves extremities spontaneously    Skin:      General: Skin is warm and dry.   Neurological:      Mental Status: She is lethargic and disoriented.   Psychiatric:         Cognition and Memory: Cognition is impaired.         Review of Symptoms    Symptom Assessment (ESAS 0-10 Scale)  Pain:  0  Dyspnea:  0  Anxiety:  0  Nausea:  0  Depression:  0  Anorexia:  0  Fatigue:  0  Insomnia:  0  Restlessness:  0  Agitation:  0     CAM / Delirium:  Positive  Constipation:  Negative  Diarrhea:  Negative    Bowel Management Plan (BMP):  No            OME in 24 hours:  0    ECOG Performance Status Grade:  4 - Completely disabled    Living Arrangements:  Lives with family    Psychosocial/Cultural: Patient lives with her mother, some siblings, and her three children 3, 8, & 10. Working at a CNA prior to getting sick     Spiritual:  F - Sania and Belief:  Druze  I - Importance:  Yes  C - Community:  Yes  A - Address in Care:  Yes       Advance Care Planning   Advance Directives:   Living Will: No    LaPOST: No    Do Not Resuscitate Status: No    Medical Power of : Yes    Agent's Name:  Char Cisneros   Agent's Contact Number:  [phone]    Decision Making:  Family answered questions         Significant Labs: All pertinent labs within the past 24 hours have been reviewed.  CBC:   Recent Labs   Lab 09/28/20  0311   WBC 9.62   HGB 7.6*   HCT 25.3*   MCV 90        BMP:  Recent Labs   Lab 09/28/20  0311   GLU 68*      K 3.5      CO2 18*   BUN 20   CREATININE 0.7   CALCIUM 7.9*   MG 2.4     LFT:  Lab Results   Component Value Date     (H) 09/28/2020    ALKPHOS 325 (H) 09/28/2020    BILITOT 3.0 (H) 09/28/2020     Albumin:   Albumin   Date Value Ref Range Status   09/28/2020 1.9 (L) 3.5 - 5.2 g/dL Final     Protein:   Total Protein   Date Value Ref Range Status   09/28/2020 6.9 6.0 - 8.4 g/dL Final     Lactic acid:   Lab Results   Component Value Date    LACTATE 1.8 09/28/2020    LACTATE 4.9 (HH) 09/27/2020       Significant Imaging: I have  reviewed all pertinent imaging results/findings within the past 24 hours.      > 50% of 75 min visit spent in chart review, face to face discussion of goals of care,  symptom assessment, coordination of care and emotional support.    Theresa Hanson MD  Palliative Medicine  [facility]

## 2020-09-28 NOTE — SUBJECTIVE & OBJECTIVE
Past Medical History:   Diagnosis Date    Cancer     Colon CA with mets to the liver    Hypertension 9/21/2020    Uterine cyst        Past Surgical History:   Procedure Laterality Date    COLONOSCOPY N/A 7/15/2020    Procedure: COLONOSCOPY;  Surgeon: Hi Drake MD;  Location: [facility] ENDO;  Service: Endoscopy;  Laterality: N/A;    COLONOSCOPY W/ BIOPSIES      INSERTION OF TUNNELED CENTRAL VENOUS CATHETER (CVC) WITH SUBCUTANEOUS PORT N/A 7/17/2020    Procedure: INSERTION, PORT-A-CATH;  Surgeon: Armani Naqvi MD;  Location: [facility] OR;  Service: General;  Laterality: N/A;       Review of patient's allergies indicates:   Allergen Reactions    Sulfa (sulfonamide antibiotics)        Medications:  Continuous Infusions:  Scheduled Meds:   acyclovir  10 mg/kg (Ideal) Intravenous Q8H    ampicillin IVPB  2 g Intravenous Q4H    ceFEPime (MAXIPIME) IVPB  2 g Intravenous Q8H    folic acid  1 mg Oral Daily    lidocaine (PF) 10 mg/ml (1%)  1 mL Intradermal Once    thiamine (VITAMIN B1) IVPB  100 mg Intravenous Daily     PRN Meds:calcium gluconate IVPB, calcium gluconate IVPB, calcium gluconate IVPB, dextrose 50%, dextrose 50%, fentaNYL, glucagon (human recombinant), glucose, glucose, magnesium sulfate IVPB, magnesium sulfate IVPB, ondansetron, potassium chloride in water **AND** potassium chloride in water **AND** potassium chloride in water, promethazine (PHENERGAN) IVPB, sodium chloride 0.9%, sodium phosphate IVPB, sodium phosphate IVPB, sodium phosphate IVPB, Pharmacy to dose Vancomycin consult **AND** vancomycin - pharmacy to dose    Family History     Problem Relation (Age of Onset)    Hypertension Mother, Maternal Grandmother, Maternal Grandfather, Paternal Grandmother        Tobacco Use    Smoking status: Never Smoker    Smokeless tobacco: Never Used   Substance and Sexual Activity    Alcohol use: No    Drug use: No    Sexual activity: Yes     Partners: Male     Birth control/protection:  None       Review of Systems   Unable to perform ROS: Mental status change     Objective:     Vital Signs (Most Recent):  Temp: 98.7 °F (37.1 °C) (09/28/20 1100)  Pulse: (!) 128 (09/28/20 1300)  Resp: (!) 26 (09/28/20 1300)  BP: (!) 107/58 (09/28/20 1300)  SpO2: 98 % (09/28/20 1300) Vital Signs (24h Range):  Temp:  [97.5 °F (36.4 °C)-98.7 °F (37.1 °C)] 98.7 °F (37.1 °C)  Pulse:  [115-136] 128  Resp:  [23-39] 26  SpO2:  [94 %-100 %] 98 %  BP: ()/(52-73) 107/58     Weight: 84.3 kg (185 lb 13.6 oz)  Body mass index is 30.93 kg/m².    Physical Exam  Vitals signs and nursing note reviewed.   Constitutional:       General: She is not in acute distress.  HENT:      Head: Normocephalic.      Right Ear: External ear normal.      Left Ear: External ear normal.      Nose: Nose normal.      Mouth/Throat:      Mouth: Mucous membranes are dry.   Eyes:      Comments: Eyes mostly closed    Neck:      Musculoskeletal: Normal range of motion.   Cardiovascular:      Rate and Rhythm: Tachycardia present.   Pulmonary:      Effort: Pulmonary effort is normal. No respiratory distress.   Abdominal:      General: There is no distension.   Musculoskeletal:      Comments: Moves extremities spontaneously    Skin:     General: Skin is warm and dry.   Neurological:      Mental Status: She is lethargic and disoriented.   Psychiatric:         Cognition and Memory: Cognition is impaired.         Review of Symptoms    Symptom Assessment (ESAS 0-10 Scale)  Pain:  0  Dyspnea:  0  Anxiety:  0  Nausea:  0  Depression:  0  Anorexia:  0  Fatigue:  0  Insomnia:  0  Restlessness:  0  Agitation:  0     CAM / Delirium:  Positive  Constipation:  Negative  Diarrhea:  Negative    Bowel Management Plan (BMP):  No            OME in 24 hours:  0    ECOG Performance Status Grade:  4 - Completely disabled    Living Arrangements:  Lives with family    Psychosocial/Cultural: Patient lives with her mother, some siblings, and her three children 3, 8, & 10. Working  at a CNA prior to getting sick     Spiritual:  F - Sania and Belief:  Yazidism  I - Importance:  Yes  C - Community:  Yes  A - Address in Care:  Yes       Advance Care Planning   Advance Directives:   Living Will: No    LaPOST: No    Do Not Resuscitate Status: No    Medical Power of : Yes    Agent's Name:  Char Cisneros   Agent's Contact Number:  [phone]    Decision Making:  Family answered questions         Significant Labs: All pertinent labs within the past 24 hours have been reviewed.  CBC:   Recent Labs   Lab 09/28/20 0311   WBC 9.62   HGB 7.6*   HCT 25.3*   MCV 90        BMP:  Recent Labs   Lab 09/28/20 0311   GLU 68*      K 3.5      CO2 18*   BUN 20   CREATININE 0.7   CALCIUM 7.9*   MG 2.4     LFT:  Lab Results   Component Value Date     (H) 09/28/2020    ALKPHOS 325 (H) 09/28/2020    BILITOT 3.0 (H) 09/28/2020     Albumin:   Albumin   Date Value Ref Range Status   09/28/2020 1.9 (L) 3.5 - 5.2 g/dL Final     Protein:   Total Protein   Date Value Ref Range Status   09/28/2020 6.9 6.0 - 8.4 g/dL Final     Lactic acid:   Lab Results   Component Value Date    LACTATE 1.8 09/28/2020    LACTATE 4.9 (HH) 09/27/2020       Significant Imaging: I have reviewed all pertinent imaging results/findings within the past 24 hours.

## 2020-09-28 NOTE — ASSESSMENT & PLAN NOTE
Ms. Cisneros is a 28 yo F with metastatic colon adenocarcinoma diagnosed in 2020. She sees Dr Arzola at [facility]    Plan:  - unable to be treated for cancer due to MRSA bacteremia.  - Discussed with Dr Castellanos who has spoke with the patient's mother about the small window of opportunity the patient has to receive treatment.  - mother would like to continue aggressive care at this time  - palliative care consult

## 2020-09-28 NOTE — PLAN OF CARE
Afebrile. HR Sinus Tach 120s-140s. MAPs >65.   SpO2 >90% on room air. RR 20s-30steam aware.   Pt disoriented to situation and time.   1 L LR bolus given for low UOP.  Episode of N/V relived by prn zofran and phernergan.   NPO.   Daily labs reviewed, electrolytes replaced prn. Lactic WNL.  POC reviewed with pt, all questions and concerns addressed.      Skin: No evidence of skin breakdown noted. Sacrum and heels intact. Foams in place. Heel boots applied. Waffle mattress inflated. Pt turned q2.

## 2020-09-28 NOTE — PROGRESS NOTES
Pharmacokinetic Assessment Follow Up: IV Vancomycin    Vancomycin Regimen Assessment & Plan:  - Vancomycin level went from 25.5 --> 19.4 ug/mL over ~11h.   - Stable SCr, good UOP, and good vancomycin elimination based on above 2 levels. Changing from pulse dosing to scheduled 1000 mg IV q24h.  - Draw next trough on 9/30 @0800 prior to 3rd dose of this new regimen, goal 15-20 ug/mL.    Drug levels (last 3 results):  Recent Labs   Lab Result Units 09/27/20  1619 09/28/20  0311   Vancomycin, Random ug/mL  --  19.4   Vancomycin-Trough ug/mL 25.5*  --      Pharmacy will continue to follow and monitor vancomycin.    Please contact pharmacy at extension [phone] for questions regarding this assessment.    Thank you for the consult,   Ayden Pulido     Patient brief summary:  Huy Cisneros is a 29 y.o. female initiated on antimicrobial therapy with IV Vancomycin for treatment of bacteremia    Drug Allergies:   Review of patient's allergies indicates:   Allergen Reactions    Sulfa (sulfonamide antibiotics)        Actual Body Weight:   84.3 kg    Renal Function:   Estimated Creatinine Clearance: 126 mL/min (based on SCr of 0.7 mg/dL).,     Dialysis Method (if applicable):  N/A

## 2020-09-28 NOTE — ASSESSMENT & PLAN NOTE
Patient with elevated LFTs since July 2020, likely due to liver metastases    Plan:  - CT Abd/Pelvis shows extensive liver mets and hepatomegaly    - Trend CMP/LFTs

## 2020-09-28 NOTE — PLAN OF CARE
"Dx: Acute encephalopathy    Neuro: Follows Commands, Moves All Extremities, and Confused    Vital Signs: /62 (BP Location: Left arm, Patient Position: Lying)   Pulse (!) 127   Temp 98.8 °F (37.1 °C) (Oral)   Resp (!) 27   Ht 5' 5" (1.651 m)   Wt 84.3 kg (185 lb 13.6 oz)   SpO2 98%   Breastfeeding No   BMI 30.93 kg/m²     Cardiovascular: ST 120s throughout shift. Electrolytes replaced as needed.     Respiratory: room air SpO2-98%    Diet: NPO    Urine Output: Urinary Catheter 20-45cc/hour     Labs/Accuchecks: potassium and phos replaced this shift. No accuchecks.    Skin: patient turned Q2H this shift, no new skin breakdown noted. Waffle mattress inflated, bed plugged in and functioning properly. Heels elevated off of bed.     Shift Events: Patient did not go for LP this shift r/t nausea/vomiting/ inability to follow commands intermittently and patient thrashing in bed. POC reviewed with patient, no questions/concerns at the moment. Will continue to monitor.     "

## 2020-09-28 NOTE — PROGRESS NOTES
"[facility]  Hematology/Oncology  Progress Note     Patient Name: Huy Cisneros  Admission Date: 9/24/2020  Hospital Length of Stay: 2 days  Code Status: Full Code      Subjective:      HPI:  Ms. Cisneros is a 29 year old woman with metastatic colon adenocarcinoma, HTN who presents with confusion, nausea, vomiting. She follows with Dr. Arzola in clinic. Attempted to contact patient's mother for further HPI as patient unable to recall recent events but unable to reach her. Patient was most recently admitted to [facility] with UTI for 3 days and was discharged yesterday (09/24) to finish her course of vancomycin when she was hospitalized in 8/26-09/09 for MRSA bacteremia. Per admit ED note after leaving hospital yesterday, "On the way home, pt began complaining of SOB and nausea/vomiting. Reports 2x emesis, non-bloody. SOB has since resolved. Pt's mother further reports that for the past 2d, the pt has been disoriented, speaking strangely, and occasionally has her 'eyes rolled back in her head,' which prompted them to come to [facility]". Patient says that she has been feeling "terrible" with nausea and vomiting. She cannot recall how many times she has vomited. She says that she has bilateral leg pain and been having headaches for a while. She denies fevers, chills, chest pain, cough, abdominal pain, diarrhea, dysuria, vision changes. She is able to say her name and what year it is but when asked where she is, she says "[facility]".     In the ED, she received one dose of morphine, zofran, and 1L IVF. Her labs were notable for electrolyte derangements and elevated lactic acid. CT head did not show an acute process. She will be admitted to Medical Oncology for further evaluation and management.     Mother: [phone]     Oncologic History:    Oncologic History 1. Colon adenocarcinoma       Oncologic Treatment 1. Right hemicolectomy (7/17/20)  2. Planned therapy: FOLFOX +/- " bevacizumab       Pathology 7/17/20:  Right hemicolectomy:  MODERATELY DIFFERENTIATED ADENOCARCINOMA WITH INFILTRATION THROUGH THE   MUSCULARIS INTO PERICOLONIC ADIPOSE TISSUE   METASTATIC CARCINOMA  TO 6 OF 15 LYMPH NODES   MULTIPLE METASTASES TO THE FAT ITSELF   NO SIGNIFICANT ABNORMALITY OF THE TERMINAL ILEUM OR APPENDIX   MMR--NORMAL FOR COLORECTAL CARCINOMA   Immunohistochemistry (IHC) Testing for Mismatch Repair (MMR) Proteins:   MLH1 - Intact nuclear expression   MSH2 - Intact nuclear expression   MSH6 - Intact nuclear expression   PMS2 - Intact nuclear expression   Background nonneoplastic tissue/internal control with intact nuclear   expression   IHC Interpretation   No loss of nuclear expression of MMR proteins: low probability of   microsatellite instability      Procedure:  Excision of right and transverse colon   Tumor Site:  transverse colon   Tumor Size:  3.2 cm   Macroscopic Tumor Perforation:  No   Histologic Type:  Adenocarcinoma   Histologic Grade:  Moderately differentiated   Tumor Extension:  Through the muscularis into pericolonic adipose tissue and   with tumor deposits extending to the most circumferential pericolonic tissue.   Margins:  The tumor is  9 cm from the distal margin and 44 from the proximal.   Treatment Effect:  No known presurgical therapy   Lymphovascular Invasion:  Identified in pericolonic tissue   Perineural Invasion:  Present   Tumor Deposits:  Present--approximately 12   Number of Lymph Nodes Examined:15   Number of Lymph Nodes Involved:  6   Pathologic Stage Classification: pT4a pN2A pM1a--liver metastases observed at      BRAF Mutation Analysis(V600E), Tumor  Result Summary:  NO MUTATION IDENTIFIED  Result:  Provided diagnosis: colorectal adenocarcinoma  BRAF status: Wild-type  KRAS Mutation Analysis, Colorectal  Result Summary:  NO MUTATION IDENTIFIED  Result:  Provided diagnosis: colorectal adenocarcinoma  KRAS status: Wild-type     7/15/20:  Transverse colon biopsy:   Invasive moderately differentiated colonic   adenocarcinoma         7/14/20:  Liver, biopsy:   Adenocarcinoma, moderately differentiated   Cytomorphologic features and immunohistochemical staining pattern supports   colonic primary          Interval History:  Talking very slowly, asking where she is today, and looks at the TV and believes those are her children. Lactic acid downtrended. No other workup has yielded positive results thus far. Remains on broad spectrum antibiotics.     Oncology Treatment Plan:   OP COLORECTAL FOLFOX + BEVACIZUMAB Q2W     Medications:  Continuous Infusions:     Scheduled Meds:   acyclovir  10 mg/kg (Ideal) Intravenous Q8H    ampicillin IVPB  2 g Intravenous Q4H    ceFEPime (MAXIPIME) IVPB  2 g Intravenous Q8H    folic acid  1 mg Oral Daily    lidocaine (PF) 10 mg/ml (1%)  1 mL Intradermal Once    thiamine (VITAMIN B1) IVPB  100 mg Intravenous Daily    vancomycin (VANCOCIN) IVPB  1,500 mg Intravenous Q24H      PRN Meds:ALPRAZolam, dextrose 50%, dextrose 50%, fentaNYL, glucagon (human recombinant), glucose, glucose, lorazepam, ondansetron, promethazine (PHENERGAN) IVPB, sodium chloride 0.9%, Pharmacy to dose Vancomycin consult **AND** vancomycin - pharmacy to dose      Review of Systems   Unable to perform ROS: Mental status change      Objective:      Vital Signs (Most Recent):  Temp: 97.6 °F (36.4 °C) (09/27/20 1100)  Pulse: (!) 135 (09/27/20 1400)  Resp: (!) 42 (09/27/20 1400)  BP: 108/74 (09/27/20 1400)  SpO2: 100 % (09/27/20 1400) Vital Signs (24h Range):  Temp:  [97.6 °F (36.4 °C)-98.8 °F (37.1 °C)] 97.6 °F (36.4 °C)  Pulse:  [126-147] 135  Resp:  [23-52] 42  SpO2:  [99 %-100 %] 100 %  BP: ()/(52-93) 108/74      Weight: 84.4 kg (186 lb)  Body mass index is 30.95 kg/m².  Body surface area is 1.97 meters squared.        Intake/Output Summary (Last 24 hours) at 9/27/2020 1422  Last data filed at 9/27/2020 1407      Gross per 24 hour   Intake 1377 ml   Output 1120 ml   Net  257 ml         Physical Exam  Constitutional:       Appearance: She is not ill-appearing.   HENT:      Head: Normocephalic and atraumatic.      Nose: Nose normal.   Eyes:      General:         Right eye: No discharge.         Left eye: No discharge.      Extraocular Movements: Extraocular movements intact.   Cardiovascular:      Rate and Rhythm: Tachycardia present.      Pulses: Normal pulses.      Heart sounds: No murmur.   Pulmonary:      Effort: No respiratory distress.      Breath sounds: Normal breath sounds.   Abdominal:      General: Bowel sounds are normal. There is no distension.      Tenderness: There is abdominal tenderness (RUQ and LUQ tenderness to plalpation).   Musculoskeletal:      Right lower leg: No edema.      Left lower leg: No edema.   Skin:     General: Skin is warm and dry.   Neurological:      Mental Status: She is disoriented.      Motor: Weakness (in all extremities. Lower>Upper) present.            Significant Labs:   CBC:        Recent Labs   Lab 09/26/20  0400 09/27/20  0048   WBC 10.20 10.06   HGB 7.4* 7.4*   HCT 24.5* 24.8*   * 139*    and CMP:   Recent Labs   Lab 09/26/20  0400 09/27/20  0048   * 135*   K 4.1 3.8    106   CO2 10* 14*   GLU 94 92   BUN 17 16   CREATININE 0.7 0.7   CALCIUM 7.4* 8.3*   PROT 7.1 7.2   ALBUMIN 2.0* 2.0*   BILITOT 2.6* 2.8*   ALKPHOS 291* 301*   * 173*   ALT 55* 56*   ANIONGAP 18* 15   EGFRNONAA >60.0 >60.0         Diagnostic Results:     MRI Brain 9/25:  Impression:     Small lacunar infarct right corpus callosum above the frontal horn. This demonstrates minimal diffusion restriction suggesting possible subacute infarct. No enhancement is seen on postcontrast images.     2 cm extra-axial enhancing mass identified over the posterior right parietal lobe at the vertex. This abuts the midline superior sagittal sinus.     RUQ US 9/25:  Impression:     Satisfactory Doppler evaluation of the liver.     Extensive hepatic metastasis,  correlating with the 09/21/2020 CT exam.     Cholelithiasis.     EEG 9/27/20 Impression:   This is a normal continuous EEG monitoring study.  There are no pushbutton activations, no prominent focal findings, no epileptiform discharges, and no electrographic seizures.      Assessment/Plan:      * Acute encephalopathy  Patient is a 29 year old woman with metastatic colon cancer who presents with acute encephalopathy, nausea, vomiting. Patient's mental status was normal on 9/21 able to take care of herself. Differentials include sepsis, electrolyte abnormalities, TIA, delirium, seizures, brain mets. CT head did not show acute infarct or hemorrhage. MRI concerning for subacute stroke in right corpus callosum. Utox positive for opiates, otherwise negative. Neurology consulted.     LP and MRI C/T/L spine could not be performed do to patient confusion and elevated heart rate.     Plan:  - Currently being treated in the ICU  - Empirically treating for meningitis: vancomycin, cefepime, ampicillin, acyclovir  - EEG unremarkable, neurology signed off  - Blood culture 9/25 NGTD  - If w/u remains unremarkable, consider repeat MRI/CT Head to evaluate for evolution of leukoencephalopathy as there are case reports a/w FOLFOX + Jie, tx would be supportive care         Elevated LFTs  Patient with elevated LFTs since July 2020, likely due to liver metastases. Stable upon admit.  Trend daily CMP     Hypertension  Patient on home amlodipine 5mg, lopressor 100mg, spironolactone 25mg. Holding in setting of sepsis. Resume when appropriate.      MRSA bacteremia  Patient admitted to hospital on 8/26/20 - 9/9/20 with MRSA bacteremia. Was recently discharged on 09/24 after being admitted for UTI. She was discharged to complete 6 more days of vancomycin (end date around 09/30).      - Will continue vancomycin until stop date  - Follow up repeat blood cultures        Sepsis  Concern for sepsis given tachycardia, acute encephalopathy and elevated  lactic acid. UA not suggestive of UTI and CXR showed bibasilar patchy lung airspace opacities     Plan:  - Trend lactic acid  - Follow up blood cultures  - Continue vancomycin for MRSA bacteremia (previously diagnosed during hospitalization 8/26/20 - 9/9/20) and cefepime, ampicillin, and acyclovir for empiric coverage     Nausea and vomiting  Patient presenting with acute encephalopathy as well as nausea and vomiting likely related to sepsis vs intracranial abnormality.  Zofran PRN (check EKG for QTc, on admit 490)  Advance diet as tolerated     Hyponatremia  Likely related to dehydration. Monitor CMP.     Chronic neoplasm-related pain  Patient takes percocet 10mg Q4H PRN pain. As patient altered, avoid to further exacerbate and resume when appropriate.     Adenocarcinoma of colon  Follows with Dr. Arzola in clinic. Received FOLFOX + Bevacizumab cycle #1 on 08/11/2020. Will need follow up upon discharge for further evaluation of whether to proceed with cycle #2.        Geo Velez, PGY- IV  Hematology/Oncology Fellow      STAFF NOTE:  I have personally reviewed the past notes, images, labs and other provoider's notes and taken the history and examined this patient and agree with Dr. Velez 's Note as stated above.    Balbina Castellanos MD

## 2020-09-28 NOTE — SUBJECTIVE & OBJECTIVE
Interval History/Significant Events: No significant events overnight. Lactate cleared.     Review of Systems   Unable to perform ROS: Mental status change     Objective:     Vital Signs (Most Recent):  Temp: 98.7 °F (37.1 °C) (09/28/20 1100)  Pulse: (!) 133 (09/28/20 1200)  Resp: (!) 33 (09/28/20 1200)  BP: 116/66 (09/28/20 1200)  SpO2: 98 % (09/28/20 1200) Vital Signs (24h Range):  Temp:  [97.5 °F (36.4 °C)-98.7 °F (37.1 °C)] 98.7 °F (37.1 °C)  Pulse:  [115-137] 133  Resp:  [23-43] 33  SpO2:  [94 %-100 %] 98 %  BP: ()/(52-74) 116/66   Weight: 84.3 kg (185 lb 13.6 oz)  Body mass index is 30.93 kg/m².      Intake/Output Summary (Last 24 hours) at 9/28/2020 1337  Last data filed at 9/28/2020 1200  Gross per 24 hour   Intake 2191 ml   Output 746 ml   Net 1445 ml       Physical Exam  Vitals signs reviewed.   Constitutional:       Appearance: She is well-developed.   HENT:      Head: Normocephalic and atraumatic.   Eyes:      General: No scleral icterus.        Right eye: No discharge.         Left eye: No discharge.      Conjunctiva/sclera: Conjunctivae normal.      Pupils: Pupils are equal, round, and reactive to light.   Neck:      Musculoskeletal: Full passive range of motion without pain, normal range of motion and neck supple.      Thyroid: No thyromegaly.      Vascular: No JVD.      Trachea: Trachea normal. No tracheal deviation.   Cardiovascular:      Rate and Rhythm: Normal rate and regular rhythm.      Heart sounds: Normal heart sounds, S1 normal and S2 normal. No murmur. No friction rub. No gallop.    Pulmonary:      Effort: Pulmonary effort is normal. No respiratory distress.      Breath sounds: Normal breath sounds. No wheezing or rales.   Chest:      Chest wall: No tenderness.   Abdominal:      General: Bowel sounds are normal. There is no distension.      Palpations: Abdomen is soft. There is no mass.      Tenderness: There is no abdominal tenderness. There is no guarding or rebound.    Musculoskeletal: Normal range of motion.         General: No tenderness or deformity.   Lymphadenopathy:      Cervical: No cervical adenopathy.   Skin:     General: Skin is warm and dry.      Findings: No abrasion or bruising.   Neurological:      Cranial Nerves: No cranial nerve deficit.      Coordination: Coordination normal.         Vents:     Lines/Drains/Airways     Drain                 Urethral Catheter 09/26/20 1830 Latex 16 Fr. 1 day          Peripheral Intravenous Line                 Peripheral IV - Single Lumen 09/24/20 1915 20 G Right Antecubital 3 days         Midline Catheter Insertion/Assessment  - Single Lumen 09/25/20 1622 Right cephalic vein (lateral side of arm) 20g x 8cm 2 days              Significant Labs:    CBC/Anemia Profile:  Recent Labs   Lab 09/27/20  0048 09/28/20  0311   WBC 10.06 9.62   HGB 7.4* 7.6*   HCT 24.8* 25.3*   * 151   MCV 89 90   RDW 28.6* 29.2*        Chemistries:  Recent Labs   Lab 09/27/20  0048 09/28/20  0311   * 138   K 3.8 3.5    108   CO2 14* 18*   BUN 16 20   CREATININE 0.7 0.7   CALCIUM 8.3* 7.9*   ALBUMIN 2.0* 1.9*   PROT 7.2 6.9   BILITOT 2.8* 3.0*   ALKPHOS 301* 325*   ALT 56* 81*   * 383*   MG 1.8 2.4   PHOS 1.8* 2.0*     Significant Imaging:  I have reviewed and interpreted all pertinent imaging results/findings within the past 24 hours.

## 2020-09-28 NOTE — HPI
29 year old female with PMH HTN and metastatic colon adenocarcinoma diagnosed 7/2020 s/p FOLFOX + Bevacizumab cycle #1 on 08/11/2020 presented to [facility] with N/V and shortness of breath. Patient recently discharged from [facility] with UTI and MRSA bacteremia being treated with vanc. Patient was admitted to oncology but was transferred to ICU shortly after admission with elevated lactic and worsening mental status. Etiology of altered mental status remains unclear. Palliative has been following patient at [facility]. Consulted at [facility] to continue GOC conversations. Patient with large tumor burden and concern that she will not be a candidate for further chemotherapy.     During my interview patient was unable to answer any of my questions. She opened eyes when I called her name but did not speak. Quickly closed them. Lethargic .

## 2020-09-28 NOTE — CARE UPDATE
Updated patient's mother Buffy via telephone regarding patient's clinical status. All questions answered and concerns addressed.      Chris Bell MD  Critical Care Medicine  9/27/2020   7:25 PM

## 2020-09-28 NOTE — CONSULTS
Palliative Care Acknowledgement of Consult - .date    Consult received. Palliative Care Provider:____Dr. Hanson______ will touch base with team prior to seeing patient. Full consult to follow.    Thank you for allowing us to be a part of the care of this patient.          Yas Gao, ROGELIO, ACHP-SW

## 2020-09-28 NOTE — ASSESSMENT & PLAN NOTE
29 year old female with metastatic colon cancer with large tumor burden. Currently being treated for MRSA bacteremia with continued work up of acute encephalopathy. Concern that patient will not be a candidate for further chemotherapy. Palliative consulted for GOC       GOC/ACP  -Patient unable to participate in GOC conversation but was previously followed at [facility] by Palliative Team. See note 9/21 by Celine Conde for details of conversation with patient.   Spoke with patient's mother, Char. Char says she understood from the beginning that her daughter's cancer is not curable. She is hoping that she will be able to get more chemotherapy so she can have more meaningful time with her family. Discussed hoping for the best while developing a plan for if things do not go how we are hoping. Char said that if patient is not a candidate for further chemotherapy, she believes patient would want to spend the time she has left at home with her children rather than on machines at the hospital. Will continue GOC conversations.  Patient's mother interested in speaking with [facility]. Appreciate their assistance

## 2020-09-29 NOTE — ASSESSMENT & PLAN NOTE
Admitted to hospital on 8/26/20 - 9/9/20 with MRSA bacteremia. Was recently discharged on 09/24 after being admitted for UTI. She was discharged to complete 6 more days of vancomycin (end date around 09/30).      Plan:  - UA not suggestive of UTI   - CXR showed bibasilar patchy lung airspace opacities  - Follow blood cultures  - broad spectrum abx  - volume resuscitation  - TTE unremarkable  - trend CBC

## 2020-09-29 NOTE — ASSESSMENT & PLAN NOTE
Ms. Cisneros is a 30 yo F with metastatic colon adenocarcinoma diagnosed in 2020. She sees Dr Arzola at [facility]    - unable to be treated for cancer due to MRSA bacteremia.  - Discussed with Dr Castellanos who has spoke with the patient's mother about the small window of opportunity the patient has to receive treatment.  - mother would like to continue aggressive care at this time  - palliative care consult

## 2020-09-29 NOTE — PROGRESS NOTES
[facility]  Critical Care Medicine  Progress Note    Patient Name: Huy Cisneros  MRN: [id number]  Admission Date: 9/24/2020  Hospital Length of Stay: 4 days  Code Status: Full Code  Attending Provider: Rashid Esparza MD  Primary Care Provider: Primary Doctor No   Principal Problem: Acute encephalopathy    Subjective:     HPI:  Ms. Cisneros is a 30 yo F with PMHx of HTN and metastatic colon adenocarcinoma (follows Dr Arzola) who presented on 9/24 with confusion, nausea, vomiting. Patient recently inpatient at [facility] with UTI for 3 days; and was discharged yesterday (09/24) to finish her course of vancomycin for MRSA bacteremia diagnosed during an admission 8/26-9/0. On the way home from her discharge 9/24, pt began complaining of SOB and nausea/vomiting. Reports 2x emesis, non-bloody. SOB has since resolved.Per patient's mother, pt has beendisoriented, speaking strangely, and occasionally has her 'eyes rolled back in her head, over the last 2 days. Pt also endorses BL leg pain, and chronic headaches. She denies fevers, chills, chest pain, cough, abdominal pain, diarrhea, dysuria, vision changes. Oriented to self and time in ED, but not to place.     In the ED, she received one dose of morphine, zofran, and 1L IVF. Her labs were notable for electrolyte derangements and elevated lactic acid. CT head did not show an acute process. She will be admitted to Medical Oncology for further evaluation and management.     Mother: [phone]    Hospital/ICU Course:  Patient admitted to Medical Oncology 9/24. Septic workup began, critical care consulted on 9/26 due to elevated lactate and concern for worsening sepsis in addition to neuro symptoms like BL LE weakness and eye twitching. Admitted to Critical Care on 9/26. An EEG was negative for seizure activity. She also underwent a pan-MRI which was generally unremarkable with the exception of a small, remote lacunar brain infarct of the right  corpus callosum. A lumbar puncture under fluoroscopy is pending.     Interval History/Significant Events: Agitated overnight. Started on low dose precedex infusion.  Serum bicarb decreasing.  Remains encephalopathic.     Review of Systems   Unable to perform ROS: Mental status change     Objective:     Vital Signs (Most Recent):  Temp: 97.8 °F (36.6 °C) (09/29/20 1100)  Pulse: (!) 119 (09/29/20 1200)  Resp: (!) 34 (09/29/20 1200)  BP: (!) 98/59 (09/29/20 1200)  SpO2: 99 % (09/29/20 1200) Vital Signs (24h Range):  Temp:  [97.1 °F (36.2 °C)-98.8 °F (37.1 °C)] 97.8 °F (36.6 °C)  Pulse:  [111-144] 119  Resp:  [19-36] 34  SpO2:  [76 %-100 %] 99 %  BP: ()/(50-70) 98/59   Weight: 84.3 kg (185 lb 13.6 oz)  Body mass index is 30.93 kg/m².      Intake/Output Summary (Last 24 hours) at 9/29/2020 1305  Last data filed at 9/29/2020 1200  Gross per 24 hour   Intake 2393 ml   Output 790 ml   Net 1603 ml       Physical Exam  Vitals signs and nursing note reviewed.   Constitutional:       Interventions: She is not intubated.  HENT:      Head: Normocephalic.   Eyes:      Extraocular Movements:      Right eye: Normal extraocular motion and no nystagmus.      Left eye: Normal extraocular motion and no nystagmus.   Cardiovascular:      Rate and Rhythm: Normal rate and regular rhythm.      Heart sounds: No murmur.   Pulmonary:      Effort: Pulmonary effort is normal. No tachypnea or accessory muscle usage. She is not intubated.      Breath sounds: No wheezing, rhonchi or rales.      Comments: On room air  Abdominal:      General: Bowel sounds are normal.      Palpations: Abdomen is soft.      Tenderness: There is abdominal tenderness (generalized however more pronounced in LUQ).   Genitourinary:     Comments: Urine catheter with teena output  Musculoskeletal:      Right lower leg: No edema.      Left lower leg: No edema.   Lymphadenopathy:      Cervical: No cervical adenopathy.   Skin:     General: Skin is warm and dry.    Neurological:      Mental Status: She is alert.      Comments: Alert, following request and moving extremities symmetrically. PERRL.  No nystagmus or roving eye movements.  Generalized weakness noted. Hypophonic, intermittently nods (? Appropriately)  Appears confused.           Vents:     Lines/Drains/Airways     Drain                 Urethral Catheter 09/26/20 1830 Latex 16 Fr. 2 days          Peripheral Intravenous Line                 Midline Catheter Insertion/Assessment  - Single Lumen 09/25/20 1622 Right cephalic vein (lateral side of arm) 20g x 8cm 3 days         Peripheral IV - Single Lumen 09/28/20 1425 20 G;1 3/4 in Left Forearm less than 1 day              Significant Labs:    CBC/Anemia Profile:  Recent Labs   Lab 09/28/20 0311 09/29/20  0309   WBC 9.62 8.90   HGB 7.6* 7.4*   HCT 25.3* 24.8*    145*   MCV 90 91   RDW 29.2* 29.7*        Chemistries:  Recent Labs   Lab 09/28/20 0311 09/29/20  0309    137   K 3.5 3.9    108   CO2 18* 16*   BUN 20 20   CREATININE 0.7 0.8   CALCIUM 7.9* 7.4*   ALBUMIN 1.9* 1.7*   PROT 6.9 6.8   BILITOT 3.0* 3.1*   ALKPHOS 325* 337*   ALT 81* 84*   * 379*   MG 2.4 2.3   PHOS 2.0* 2.7       All pertinent labs within the past 24 hours have been reviewed.    Significant Imaging:  I have reviewed and interpreted all pertinent imaging results/findings within the past 24 hours.      ABG  Recent Labs   Lab 09/29/20  1527   PH 7.411   PO2 33*   PCO2 29.3*   HCO3 18.6*   BE -6     Assessment/Plan:     Neuro  * Acute encephalopathy  Unclear etiology. MRI brain concerning for subacute small lacunar infarct right corpus callosum. 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 EEG negative for epileptiform activity. Evaluated by Neurology this admission.     --Ammonia within normal limits. Hold on treatment for hepatic encephalopathy for now (will need NGT).  --VBG without hypercapnia.  --LP obtained 9/29. Antibiotics dosed  for CNS penetration.   --Oncology recommending repeating MRI Brain in 1 week for evolution of leukoencephalopathy.   --Appears to be protecting airway.  --Reported improvement in mental status following thiamine, will change to treatment dose of thiamine with 500 mg TID    Renal/  Lactic acidosis  Unclear etiology. Extensive liver metastases.     -- Given downtrending serum bicarb, will repeat lactate    ID  MRSA bacteremia  Admitted to hospital on 8/26/20 - 9/9/20 with MRSA bacteremia. Was recently discharged on 09/24 after being admitted for UTI. She was discharged to complete 6 more days of vancomycin (end date around 09/30).  Currently afebrile without leukocytosis.      - UA not suggestive of UTI   - CXR showed bibasilar patchy lung airspace opacities  - Follow blood cultures, 1 set NGTD, 1 set with CoNS. Repeat blood cultures.  - Broad spectrum antibiotics  - TTE unremarkable       Oncology  Chronic neoplasm-related pain  Generalized abdominal pain, more pronounced in LUQ.     - CT abdomen/pelvis w/o contrast without bowel obstruction.    - Fentanyl prn  - May need bowel regimen, reportedly having daily bowel movements       Adenocarcinoma of colon  Ms. Cisneros is a 28 yo F with metastatic colon adenocarcinoma diagnosed in 2020. She sees Dr Arzola at [facility]    - Palliative medicine following.    Metastatic disease    GI  Elevated LFTs  Elevated LFTs since July 2020, likely due to liver metastases    - CT Abd/Pelvis shows extensive liver mets and hepatomegaly    - Trend CMP/LFTs      Nausea and vomiting  Likely due to colon cancer, possible obstruction or sepsis    - NPO  - Zofran/Phenegran PRN  - Currently no complaints of nausea or reports of N/V  - CT abdomen/pelvis 9/29 without evidence of bowel obstruction.    Other  Palliative care encounter  Palliative Medicine following.  Reaching out to [facility] as patient has 3 young children    Updated patient's mother, Char, at bedside.    Discussed  with Dr. Esparza    I spent >70 minutes reviewing patient records, examining, and counseling the patient with greater than 50% of the time spent with direct patient care and coordination.        Beckie Farley NP  Critical Care Medicine  [facility]

## 2020-09-29 NOTE — ASSESSMENT & PLAN NOTE
Likely due to colon cancer, possible obstruction or sepsis    - KUB shows moderate distention, but no clear evidence of SBO  - NPO  - Zofran/Phenegran PRN\  - Currently no complaints of nausea or reports of N/V

## 2020-09-29 NOTE — SUBJECTIVE & OBJECTIVE
Interval History/Significant Events: Agitated overnight. Started on low dose precedex infusion.  Serum bicarb decreasing.  Remains encephalopathic.     Review of Systems   Unable to perform ROS: Mental status change     Objective:     Vital Signs (Most Recent):  Temp: 97.8 °F (36.6 °C) (09/29/20 1100)  Pulse: (!) 119 (09/29/20 1200)  Resp: (!) 34 (09/29/20 1200)  BP: (!) 98/59 (09/29/20 1200)  SpO2: 99 % (09/29/20 1200) Vital Signs (24h Range):  Temp:  [97.1 °F (36.2 °C)-98.8 °F (37.1 °C)] 97.8 °F (36.6 °C)  Pulse:  [111-144] 119  Resp:  [19-36] 34  SpO2:  [76 %-100 %] 99 %  BP: ()/(50-70) 98/59   Weight: 84.3 kg (185 lb 13.6 oz)  Body mass index is 30.93 kg/m².      Intake/Output Summary (Last 24 hours) at 9/29/2020 1305  Last data filed at 9/29/2020 1200  Gross per 24 hour   Intake 2393 ml   Output 790 ml   Net 1603 ml       Physical Exam  Vitals signs and nursing note reviewed.   Constitutional:       Interventions: She is not intubated.  HENT:      Head: Normocephalic.   Eyes:      Extraocular Movements:      Right eye: Normal extraocular motion and no nystagmus.      Left eye: Normal extraocular motion and no nystagmus.   Cardiovascular:      Rate and Rhythm: Normal rate and regular rhythm.      Heart sounds: No murmur.   Pulmonary:      Effort: Pulmonary effort is normal. No tachypnea or accessory muscle usage. She is not intubated.      Breath sounds: No wheezing, rhonchi or rales.      Comments: On room air  Abdominal:      General: Bowel sounds are normal.      Palpations: Abdomen is soft.      Tenderness: There is abdominal tenderness (generalized however more pronounced in LUQ).   Genitourinary:     Comments: Urine catheter with teena output  Musculoskeletal:      Right lower leg: No edema.      Left lower leg: No edema.   Lymphadenopathy:      Cervical: No cervical adenopathy.   Skin:     General: Skin is warm and dry.   Neurological:      Mental Status: She is alert.      Comments: Alert,  following request and moving extremities symmetrically. PERRL.  No nystagmus or roving eye movements.  Generalized weakness noted. Hypophonic, intermittently nods (? Appropriately)  Appears confused.           Vents:     Lines/Drains/Airways     Drain                 Urethral Catheter 09/26/20 1830 Latex 16 Fr. 2 days          Peripheral Intravenous Line                 Midline Catheter Insertion/Assessment  - Single Lumen 09/25/20 1622 Right cephalic vein (lateral side of arm) 20g x 8cm 3 days         Peripheral IV - Single Lumen 09/28/20 1425 20 G;1 3/4 in Left Forearm less than 1 day              Significant Labs:    CBC/Anemia Profile:  Recent Labs   Lab 09/28/20  0311 09/29/20  0309   WBC 9.62 8.90   HGB 7.6* 7.4*   HCT 25.3* 24.8*    145*   MCV 90 91   RDW 29.2* 29.7*        Chemistries:  Recent Labs   Lab 09/28/20 0311 09/29/20  0309    137   K 3.5 3.9    108   CO2 18* 16*   BUN 20 20   CREATININE 0.7 0.8   CALCIUM 7.9* 7.4*   ALBUMIN 1.9* 1.7*   PROT 6.9 6.8   BILITOT 3.0* 3.1*   ALKPHOS 325* 337*   ALT 81* 84*   * 379*   MG 2.4 2.3   PHOS 2.0* 2.7       All pertinent labs within the past 24 hours have been reviewed.    Significant Imaging:  I have reviewed and interpreted all pertinent imaging results/findings within the past 24 hours.

## 2020-09-29 NOTE — ASSESSMENT & PLAN NOTE
Generalized abdominal pain, more pronounced in LUQ.     - CT abdomen/pelvis w/o contrast  - fentanyl prn  - may need bowel regimen, reportedly having daily bowel movements

## 2020-09-29 NOTE — ANESTHESIA PROCEDURE NOTES
Lumbar Puncture    Diagnosis: Encephalopathy  Patient location during procedure: ICU  Start time: 9/29/2020 3:32 AM  Timeout: 9/29/2020 3:30 AM  End time: 9/29/2020 3:39 AM    Staffing  Authorizing Provider: Mike Chacon MD  Performing Provider: Mike Chacon MD    Preanesthetic Checklist  Completed: patient identified, site marked, surgical consent, pre-op evaluation, timeout performed, IV checked, risks and benefits discussed and monitors and equipment checked  Spinal Block  Patient position: right lateral decubitus  Prep: ChloraPrep  Patient monitoring: heart rate, continuous pulse ox and frequent blood pressure checks  Approach: midline  Location: L3-4  Injection technique: lumbar puncture  CSF Fluid: clear free-flowing CSF  Needle  Needle gauge: 17G.  Needle length: 5 in  Needle localization: anatomical landmarks  Assessment  Ease of block: moderate  Patient's tolerance of the procedure: comfortable throughout block and no complaints  Additional Notes  Patient received versed 2mg for sedation. Patient positioned Right-lateral but remained hyperalgesic to light touch. Redirected several times at L3-4 interspace before obtaining clear, free-flowing CSF. Filled 4 vials with a total of 12mL spinal fluid. LP moderately difficult due to patient compliance.

## 2020-09-29 NOTE — PLAN OF CARE
SW is following this Pt for DC planning needs. Discharge Disposition: TBD - pending patient progress    SW will continue to coordinate with patient, family, team and insurance to complete patient's discharge plan.    Marivel Scanlon LMSW   - Case Management

## 2020-09-29 NOTE — PLAN OF CARE
Afebrile. HR Sinus Tach 120s-140s. MAPs >65.   SpO2 >90% on room air. RR 20s-30.  Pt disoriented to situation and time.   NPO.   Daily labs reviewed, electrolytes replaced prn.   Plan for lumbar puncture today.   POC reviewed with pt, all questions and concerns addressed.      Skin: No evidence of skin breakdown noted. Sacrum and heels intact. Foams in place. Heel boots applied. Waffle mattress inflated. Pt turned q2.

## 2020-09-29 NOTE — PLAN OF CARE
"Dx: Acute encephalopathy    Neuro: Follows Commands, Moves All Extremities, and Confused    Vital Signs: /60 (BP Location: Left arm, Patient Position: Lying)   Pulse (!) 119   Temp 97.8 °F (36.6 °C) (Oral)   Resp (!) 30   Ht 5' 5" (1.651 m)   Wt 84.3 kg (185 lb 13.6 oz)   SpO2 100%   Breastfeeding No   BMI 30.93 kg/m²     Cardiovascular: ST throughout shift.     Respiratory: room air, SpO2-99%    Diet: NPO    Urine Output: Urinary Catheter 345 cc/shift     Labs/Accuchecks: labs reviewed and trended, phosphorous and potassium replacements given this shift.    Skin: patient turned/provided weight shift assistance Q2H this shift. No skin breakdown noted this shift. Heels floated off of bed. No sacral foam r/t incontinence. Bed plugged in, waffle mattress inflated.     Shift Events: urine cultures sent. CT of abdomen/pelvis this shift, see results. Bedside lumbar puncture performed, CSF fluids sent to lab. Lactic sent this afternoon. POC reviewed with patient, mother and family, no concerns/questions at this time. Will continue to monitor.       "

## 2020-09-29 NOTE — PLAN OF CARE
CM obtained discharge planning assessment at bedside from patient and medical records.  Patient was current with [facility] and [facility] in [place], [place].  Per medical record, due to staffing issues, patient will need a new home health.  Palliative care is following this case and have spoken with [facility], since patient has 3 dependent children.  No discharge planning issues are noted at this time.      Primary Doctor No    [id number] - [place], [place] - [address]  [address] [address]  Phone: [phone] Fax: [phone]    Payor: MEDICAID / Plan: [facility] COMMUNITY PLAN [facility] ([place] MEDICAID) / Product Type: Managed Medicaid /     Extended Emergency Contact Information  Primary Emergency Contact: Char Spence  Address: [address] [place], [place] [id number] [facility]  Home Phone: [phone]  Relation: Mother    Future Appointments   Date Time Provider Department Center   10/5/2020 10:00 AM LAB, [facility]   10/5/2020 10:20 AM LAB, [facility]   10/6/2020 10:20 AM Jewel Arzola MD [facility] HEM ONC [facility]   10/19/2020 10:00 AM LAB, [facility]          09/29/20 1224   Discharge Assessment   Assessment Type Discharge Planning Assessment   Confirmed/corrected address and phone number on facesheet? Yes   Assessment information obtained from? Medical Record;Patient   Prior to hospitilization cognitive status: Unable to Assess   Prior to hospitalization functional status: Needs Assistance;Assistive Equipment   Current cognitive status: Unable to Assess   Current Functional Status:   (unable to assess)   Lives With child(larisa), dependent;parent(s)   Able to Return to Prior Arrangements other (see comments)  (TBD)   Is patient able to care for self after discharge? No   Who are your caregiver(s) and their phone number(s)? Char Spence (mother)-  [phone]   Readmission Within the Last 30 Days previous discharge plan unsuccessful   If yes, most recent facility name: [facility]   Patient currently being followed by outpatient case management? No   Patient currently receives any other outside agency services? Yes   Name and contact number of agency or person providing outside services [facility]/ [facility]   Is it the patient/care giver preference to resume care with the current outside agency? Other (comment)  (per previous record, patient will need new home health due to staffing issues at )   Equipment Currently Used at Home wheelchair;bedside commode;shower chair   Do you have any problems affording any of your prescribed medications? No   Is the patient taking medications as prescribed? yes   Does the patient have transportation home? Yes   Transportation Anticipated family or friend will provide   Dialysis Name and Scheduled days N/A   Does the patient receive services at the [facility]? No   Discharge Plan A Other  (TBD: patient's discharge disposition will depend on prognosis)   DME Needed Upon Discharge  other (see comments)  (TBD)   Patient/Family in Agreement with Plan yes   Readmission Questionnaire   At the time of your discharge, did someone talk to you about what your health problems were? Yes   At the time of discharge, did someone talk to you about what to watch out for regarding worsening of your health problem? Yes   At the time of discharge, did someone talk to you about what to do if you experienced worsening of your health problem? Yes   At the time of discharge, did someone talk to you about which medication to take when you left the hospital and which ones to stop taking? Yes   At the time of discharge, did someone talk to you about when and where to follow up with a doctor after you left the hospital? Yes   What do you believe caused you to be sick enough to be re-admitted? shortness of breath   How often do  you need to have someone help you when you read instructions, pamphlets, or other written material from your doctor or pharmacy? Sometimes   Do you have problems taking your medications as prescribed? Yes   Do you have any problems affording any of  your prescribed medications? No   Do you have problems obtaining/receiving your medications? No   Does the patient have transportation to healthcare appointments? Yes   Living Arrangements house   Does the patient have family/friends to help with healtcare needs after discharge? yes       Laura Morales MPH, RN, CM  Ext. [phone]

## 2020-09-29 NOTE — ASSESSMENT & PLAN NOTE
Unclear etiology. Extensive liver metastases.     -- Given downtrending serum bicarb, will repeat lactate

## 2020-09-30 NOTE — PLAN OF CARE
"      SICU PLAN OF CARE NOTE    Dx: Acute encephalopathy    Shift Events: Plan of care reviewed in detail with patient. Throughout the night patient was intermittently confused, alternating between disorientation to person, place, time, etc. She would often not make any sense when speaking was able to be re-oriented if needed. Many times forgot who she was or mis-identified me. Patient remained extremely sad and hopeless throughout the shift, exclaiming that she just wants to go home and be with her kids. Patient intermittently complained of pain. Pain controlled with 12.5mcg of Fentanyl q4. Initially, this dosage was not therapeutic so the team was called and gave permission for 25mcg, but 12.5 became therapeutic throughout the night, so no dosage increase necessary. At one point during the night, patient spontaneously vomited. She was able to control her airway and no emesis was swallowed or choked on. 8mg of Zofran administered, and successfully controlled nausea.    Goals of Care: Comfort, pain control, palliative measures, release home.    Neuro: Intermittently confused and overall not at baseline. Does follow commands appropriately.    Vital Signs: /71 (BP Location: Left arm, Patient Position: Lying)   Pulse (!) 128   Temp 98.8 °F (37.1 °C) (Oral)   Resp 20   Ht 5' 5" (1.651 m)   Wt 84.3 kg (185 lb 13.6 oz)   SpO2 (!) 94%   Breastfeeding No   BMI 30.93 kg/m²     Respiratory: O2 sats % on RA; RR 20-30s    Diet: NPO    Gtts: ABX. Administered throughout the night as scheduled. No continuous gtts.    Urine Output: 30-60cc/hr. Team made aware of decreasing UOP, 1L LR bolus given.    Drains: No drains     Labs/Accuchecks: Labs trended and treated as necessary. Cultures sent off for confirmation of MRSA in blood. See flow sheets for details.    Skin: No pressure-related injuries noted. Patient able to move all extremities as necessary. Foam pads in place and waffle inflated.       "

## 2020-09-30 NOTE — SUBJECTIVE & OBJECTIVE
Interval History/Significant Events: LP performed on 9/29 by Anesthesia at bedside.  No acute issues overnight.      Review of Systems   Reason unable to perform ROS: Difficult to obtain given mental status.   Respiratory: Negative for shortness of breath.    Cardiovascular: Negative for chest pain.   Gastrointestinal: Negative for abdominal pain.     Objective:     Vital Signs (Most Recent):  Temp: 98.5 °F (36.9 °C) (09/30/20 0700)  Pulse: (!) 127 (09/30/20 0830)  Resp: 15 (09/30/20 0953)  BP: 136/86 (09/30/20 0800)  SpO2: 100 % (09/30/20 0830) Vital Signs (24h Range):  Temp:  [98 °F (36.7 °C)-98.8 °F (37.1 °C)] 98.5 °F (36.9 °C)  Pulse:  [122-136] 127  Resp:  [6-43] 15  SpO2:  [92 %-100 %] 100 %  BP: (100-136)/(50-86) 136/86   Weight: 88.1 kg (194 lb 3.6 oz)  Body mass index is 32.32 kg/m².      Intake/Output Summary (Last 24 hours) at 9/30/2020 1613  Last data filed at 9/30/2020 1100  Gross per 24 hour   Intake 2087 ml   Output 600 ml   Net 1487 ml       Physical Exam  Vitals signs and nursing note reviewed.   Constitutional:       Interventions: She is not intubated.  HENT:      Head: Normocephalic.   Eyes:      Extraocular Movements:      Right eye: Normal extraocular motion and no nystagmus.      Left eye: Normal extraocular motion and no nystagmus.   Cardiovascular:      Rate and Rhythm: Normal rate and regular rhythm.      Heart sounds: No murmur.   Pulmonary:      Effort: Pulmonary effort is normal. No tachypnea or accessory muscle usage. She is not intubated.      Breath sounds: No wheezing, rhonchi or rales.      Comments: On room air  Abdominal:      General: Bowel sounds are normal.      Palpations: Abdomen is soft.      Tenderness: There is no abdominal tenderness.   Genitourinary:     Comments: Urine catheter with teena output  Musculoskeletal:      Right lower leg: No edema.      Left lower leg: No edema.   Lymphadenopathy:      Cervical: No cervical adenopathy.   Skin:     General: Skin is warm and  dry.   Neurological:      Mental Status: She is alert.      Comments: Alert, following request and moving extremities symmetrically. PERRL.  No nystagmus or roving eye movements.  Generalized weakness noted. Hypophonic, intermittently nods, ? Appropriately at times.  Oriented to person, hospital, 2020.           Vents:     Lines/Drains/Airways     Drain                 Urethral Catheter 09/26/20 1830 Latex 16 Fr. 3 days          Peripheral Intravenous Line                 Midline Catheter Insertion/Assessment  - Single Lumen 09/25/20 1622 Right cephalic vein (lateral side of arm) 20g x 8cm 4 days         Peripheral IV - Single Lumen 09/28/20 1425 20 G;1 3/4 in Left Forearm 2 days              Significant Labs:    CBC/Anemia Profile:  Recent Labs   Lab 09/29/20  0309 09/30/20  0332   WBC 8.90 8.60   HGB 7.4* 7.3*   HCT 24.8* 25.2*   * 152   MCV 91 93   RDW 29.7* 29.6*        Chemistries:  Recent Labs   Lab 09/29/20  0309 09/29/20  1720 09/30/20  0332    136 134*   K 3.9 4.5 4.2    109 109   CO2 16* 16* 14*   BUN 20 20 22*   CREATININE 0.8 0.9 0.9   CALCIUM 7.4* 7.6* 7.5*   ALBUMIN 1.7* 1.8* 1.7*   PROT 6.8  --  6.7   BILITOT 3.1*  --  3.7*   ALKPHOS 337*  --  308*   ALT 84*  --  71*   *  --  285*   MG 2.3  --  2.1   PHOS 2.7 3.1 2.7       All pertinent labs within the past 24 hours have been reviewed.    Significant Imaging:  I have reviewed and interpreted all pertinent imaging results/findings within the past 24 hours.

## 2020-09-30 NOTE — ASSESSMENT & PLAN NOTE
Generalized abdominal pain, more pronounced in LUQ.      - CT abdomen/pelvis w/o contrast without bowel obstruction.    - May need bowel regimen, reportedly having daily bowel movements

## 2020-09-30 NOTE — ASSESSMENT & PLAN NOTE
Likely due to colon cancer, possible obstruction or sepsis     - NPO  - Zofran/Phenegran PRN  - Currently no complaints of nausea or reports of N/V  - CT abdomen/pelvis 9/29 without evidence of bowel obstruction.

## 2020-09-30 NOTE — CHAPLAIN
Provided pastoral support to pt's mom (over the phone) and met with the pt. Pt continues to appear drowsy and 'mouth' her words. Pt appreciated the visit and pt's mom also appreciated the tele-visit.

## 2020-09-30 NOTE — ASSESSMENT & PLAN NOTE
Ms. Cisneros is a 28 yo F with metastatic colon adenocarcinoma diagnosed in 2020. She sees Dr Arzola at [facility] - Palliative medicine following.

## 2020-09-30 NOTE — ASSESSMENT & PLAN NOTE
Admitted to hospital on 8/26/20 - 9/9/20 with MRSA bacteremia. Was recently discharged on 09/24 after being admitted for UTI. She was discharged to complete 6 more days of vancomycin (end date around 09/30).  Currently afebrile without leukocytosis.      - UA not suggestive of UTI   - CXR showed bibasilar patchy lung airspace opacities  - Follow blood cultures, 1 set NGTD, 1 set with CoNS. Repeat blood cultures.  - Broad spectrum antibiotics  - TTE unremarkable

## 2020-09-30 NOTE — ASSESSMENT & PLAN NOTE
Elevated LFTs since July 2020, likely due to liver metastases     - CT Abd/Pelvis shows extensive liver mets and hepatomegaly    - Trend CMP/LFTs

## 2020-09-30 NOTE — CARE UPDATE
Spoke with Bhavesh in lab regarding adding on cytology to CSF.     VANESA CASANOVA, [facility]-BC  Critical Care Medicine

## 2020-09-30 NOTE — CONSULTS
Therapy with vancomycin complete after 4 weeks of therapy. Pharmacy will sign off, please re-consult as needed.    Hannah Goldsmith, PharmD, BCCCP  [phone]

## 2020-09-30 NOTE — PROGRESS NOTES
[facility]  Critical Care Medicine  Progress Note    Patient Name: Huy Cisneros  MRN: [id number]  Admission Date: 9/24/2020  Hospital Length of Stay: 5 days  Code Status: Full Code  Attending Provider: Rashid Esparza MD  Primary Care Provider: Primary Doctor No   Principal Problem: Acute encephalopathy    Subjective:     HPI:  Ms. Cisneros is a 30 yo F with PMHx of HTN and metastatic colon adenocarcinoma (follows Dr Arzola) who presented on 9/24 with confusion, nausea, vomiting. Patient recently inpatient at [facility] with UTI for 3 days; and was discharged yesterday (09/24) to finish her course of vancomycin for MRSA bacteremia diagnosed during an admission 8/26-9/0. On the way home from her discharge 9/24, pt began complaining of SOB and nausea/vomiting. Reports 2x emesis, non-bloody. SOB has since resolved.Per patient's mother, pt has beendisoriented, speaking strangely, and occasionally has her 'eyes rolled back in her head, over the last 2 days. Pt also endorses BL leg pain, and chronic headaches. She denies fevers, chills, chest pain, cough, abdominal pain, diarrhea, dysuria, vision changes. Oriented to self and time in ED, but not to place.     In the ED, she received one dose of morphine, zofran, and 1L IVF. Her labs were notable for electrolyte derangements and elevated lactic acid. CT head did not show an acute process. She will be admitted to Medical Oncology for further evaluation and management.     Mother: [phone]    Hospital/ICU Course:  Patient admitted to Medical Oncology 9/24. Septic workup began, critical care consulted on 9/26 due to elevated lactate and concern for worsening sepsis in addition to neuro symptoms like BL LE weakness and eye twitching. Admitted to Critical Care on 9/26. An EEG was negative for seizure activity. She also underwent a pan-MRI which was generally unremarkable with the exception of a small, remote lacunar brain infarct of the right  corpus callosum. A lumbar puncture under fluoroscopy is pending.     Interval History/Significant Events: LP performed on 9/29 by Anesthesia at bedside.  No acute issues overnight.      Review of Systems   Reason unable to perform ROS: Difficult to obtain given mental status.   Respiratory: Negative for shortness of breath.    Cardiovascular: Negative for chest pain.   Gastrointestinal: Negative for abdominal pain.     Objective:     Vital Signs (Most Recent):  Temp: 98.5 °F (36.9 °C) (09/30/20 0700)  Pulse: (!) 127 (09/30/20 0830)  Resp: 15 (09/30/20 0953)  BP: 136/86 (09/30/20 0800)  SpO2: 100 % (09/30/20 0830) Vital Signs (24h Range):  Temp:  [98 °F (36.7 °C)-98.8 °F (37.1 °C)] 98.5 °F (36.9 °C)  Pulse:  [122-136] 127  Resp:  [6-43] 15  SpO2:  [92 %-100 %] 100 %  BP: (100-136)/(50-86) 136/86   Weight: 88.1 kg (194 lb 3.6 oz)  Body mass index is 32.32 kg/m².      Intake/Output Summary (Last 24 hours) at 9/30/2020 1613  Last data filed at 9/30/2020 1100  Gross per 24 hour   Intake 2087 ml   Output 600 ml   Net 1487 ml       Physical Exam  Vitals signs and nursing note reviewed.   Constitutional:       Interventions: She is not intubated.  HENT:      Head: Normocephalic.   Eyes:      Extraocular Movements:      Right eye: Normal extraocular motion and no nystagmus.      Left eye: Normal extraocular motion and no nystagmus.   Cardiovascular:      Rate and Rhythm: Normal rate and regular rhythm.      Heart sounds: No murmur.   Pulmonary:      Effort: Pulmonary effort is normal. No tachypnea or accessory muscle usage. She is not intubated.      Breath sounds: No wheezing, rhonchi or rales.      Comments: On room air  Abdominal:      General: Bowel sounds are normal.      Palpations: Abdomen is soft.      Tenderness: There is no abdominal tenderness.   Genitourinary:     Comments: Urine catheter with teena output  Musculoskeletal:      Right lower leg: No edema.      Left lower leg: No edema.   Lymphadenopathy:       Cervical: No cervical adenopathy.   Skin:     General: Skin is warm and dry.   Neurological:      Mental Status: She is alert.      Comments: Alert, following request and moving extremities symmetrically. PERRL.  No nystagmus or roving eye movements.  Generalized weakness noted. Hypophonic, intermittently nods, ? Appropriately at times.  Oriented to person, hospital, 2020.           Vents:     Lines/Drains/Airways     Drain                 Urethral Catheter 09/26/20 1830 Latex 16 Fr. 3 days          Peripheral Intravenous Line                 Midline Catheter Insertion/Assessment  - Single Lumen 09/25/20 1622 Right cephalic vein (lateral side of arm) 20g x 8cm 4 days         Peripheral IV - Single Lumen 09/28/20 1425 20 G;1 3/4 in Left Forearm 2 days              Significant Labs:    CBC/Anemia Profile:  Recent Labs   Lab 09/29/20  0309 09/30/20  0332   WBC 8.90 8.60   HGB 7.4* 7.3*   HCT 24.8* 25.2*   * 152   MCV 91 93   RDW 29.7* 29.6*        Chemistries:  Recent Labs   Lab 09/29/20  0309 09/29/20  1720 09/30/20  0332    136 134*   K 3.9 4.5 4.2    109 109   CO2 16* 16* 14*   BUN 20 20 22*   CREATININE 0.8 0.9 0.9   CALCIUM 7.4* 7.6* 7.5*   ALBUMIN 1.7* 1.8* 1.7*   PROT 6.8  --  6.7   BILITOT 3.1*  --  3.7*   ALKPHOS 337*  --  308*   ALT 84*  --  71*   *  --  285*   MG 2.3  --  2.1   PHOS 2.7 3.1 2.7       All pertinent labs within the past 24 hours have been reviewed.    Significant Imaging:  I have reviewed and interpreted all pertinent imaging results/findings within the past 24 hours.      ABG  Recent Labs   Lab 09/29/20  1527   PH 7.411   PO2 33*   PCO2 29.3*   HCO3 18.6*   BE -6     Assessment/Plan:     Neuro  * Acute encephalopathy  Unclear etiology. MRI brain concerning for subacute small lacunar infarct right corpus callosum. 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 EEG negative for epileptiform activity. Evaluated by Neurology this  admission.      --Ammonia within normal limits. Hold on treatment for hepatic encephalopathy while being evaluated by SLP.  --VBG without hypercapnia.  --LP obtained 9/29 f/u labs, culture, and cytology.  Antibiotics dosed for CNS penetration.   --Oncology recommending repeating MRI Brain in 1 week for evolution of leukoencephalopathy.   --Appears to be protecting airway.  --Reported improvement in mental status following thiamine, started on treatment dose of thiamine with 500 mg TID on 9/29.    Renal/      Lactic acidosis  Unclear etiology. Extensive liver metastases. Lactate level now normalized.     NAGMA  Serum bicarb downtrending. Urine anion gap + indicating renal etiology.     --Repeat renal panel pending.  Consider sodium bicarb supplementation.    ID  MRSA bacteremia  Admitted to hospital on 8/26/20 - 9/9/20 with MRSA bacteremia. Was recently discharged on 09/24 after being admitted for UTI. She was discharged to complete 6 more days of vancomycin (end date around 09/30).  Currently afebrile without leukocytosis.      - UA not suggestive of UTI   - CXR showed bibasilar patchy lung airspace opacities  - Follow blood cultures, 9/25, 1 set NGTD, 1 set with CoNS. Repeat blood cultures on 9/30.  - TTE unremarkable   - On empiric antibiotics; 4 week course of vancomycin for MRSA bacteremia to be completed on 9/30.    Oncology  Chronic neoplasm-related pain  Generalized abdominal pain, more pronounced in LUQ.      - CT abdomen/pelvis w/o contrast without bowel obstruction.    - May need bowel regimen, reportedly having daily bowel movements       Adenocarcinoma of colon  Ms. Cisneros is a 28 yo F with metastatic colon adenocarcinoma diagnosed in 2020. She sees Dr Arzola at [facility]    - Palliative medicine following.    Metastatic disease  9/25 Abdominal US showed extensive hepatic metastasis    GI  Elevated LFTs  Elevated LFTs since July 2020, likely due to liver metastases     - CT Abd/Pelvis shows  extensive liver mets and hepatomegaly    - Trend CMP/LFTs      Nausea and vomiting  Likely due to colon cancer, possible obstruction or sepsis     - NPO  - Zofran/Phenegran PRN.  Repeat ECG to monitor QTc.  - Currently no complaints of nausea or reports of N/V  - CT abdomen/pelvis 9/29 without evidence of bowel obstruction.    Other  Palliative care encounter  Palliative Medicine following.  Reaching out to [facility] as patient has 3 young children      VTE Risk Mitigation (From admission, onward)         Ordered     heparin (porcine) injection 5,000 Units  Every 8 hours      09/30/20 1123     IP VTE HIGH RISK PATIENT  Once      09/28/20 1023     Place sequential compression device  Until discontinued      09/25/20 0236              Transfer to floor with medical oncology, spoke with fellow.     Discussed with Dr. Isaias Farley, NP  Critical Care Medicine  [facility]

## 2020-09-30 NOTE — ASSESSMENT & PLAN NOTE
Unclear etiology. MRI brain concerning for subacute small lacunar infarct right corpus callosum. 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 EEG negative for epileptiform activity. Evaluated by Neurology this admission.      --Ammonia within normal limits. Hold on treatment for hepatic encephalopathy for now (will need NGT).  --VBG without hypercapnia.  --LP obtained 9/29. Antibiotics dosed for CNS penetration.   --Oncology recommending repeating MRI Brain in 1 week for evolution of leukoencephalopathy.   --Appears to be protecting airway.  --Reported improvement in mental status following thiamine, will change to treatment dose of thiamine with 500 mg TID

## 2020-09-30 NOTE — PLAN OF CARE
JULIAN is following this Pt for DC planning needs.     JULIAN will continue to coordinate with patient, family, team and insurance to complete patient's discharge plan.    Marivel Scanlon LMSW   - Case Management

## 2020-09-30 NOTE — PLAN OF CARE
Patient admitted to Medical Oncology 9/24. Septic workup began, critical care consulted on 9/26 due to elevated lactate and concern for worsening sepsis in addition to neuro symptoms like BL LE weakness and eye twitching. Admitted to Critical Care on 9/26. An EEG was negative for seizure activity. She also underwent a pan-MRI which was generally unremarkable with the exception of a small, remote lacunar brain infarct of the right corpus callosum. LP performed on 9/29 by Anesthesia at bedside. LP results pending.     Pt remains tachycardic and altered. Pt was not speaking on admission and is now speaking freely but not making sense. Remains on empiric antibiotics while we follow up LP results. Pt will be transferred to Medical Oncology floor service.

## 2020-09-30 NOTE — CARE UPDATE
Not able to reach patient's mother, Char, for update.     VANESA CASANOVA, ACNP-BC  Critical Care Medicine

## 2020-10-01 PROBLEM — C18.9 COLON CANCER METASTASIZED TO LIVER: Status: ACTIVE | Noted: 2020-01-01

## 2020-10-01 PROBLEM — C78.7 COLON CANCER METASTASIZED TO LIVER: Status: ACTIVE | Noted: 2020-01-01

## 2020-10-01 PROBLEM — Z51.5 COMFORT MEASURES ONLY STATUS: Status: ACTIVE | Noted: 2020-01-01

## 2020-10-01 NOTE — PROGRESS NOTES
"[facility]  Hematology/Oncology  Progress Note    Patient Name: Huy Cisneros  Admission Date: 9/24/2020  Hospital Length of Stay: 6 days  Code Status: Full Code     Subjective:     HPI:  Ms. Cisneros is a 29 year old woman with metastatic colon adenocarcinoma, HTN who presents with confusion, nausea, vomiting. She follows with Dr. Arzola in clinic. Attempted to contact patient's mother for further HPI as patient unable to recall recent events but unable to reach her. Patient was most recently admitted to [facility] with UTI for 3 days and was discharged yesterday (09/24) to finish her course of vancomycin when she was hospitalized in 8/26-09/09 for MRSA bacteremia. Per admit ED note after leaving hospital yesterday, "On the way home, pt began complaining of SOB and nausea/vomiting. Reports 2x emesis, non-bloody. SOB has since resolved. Pt's mother further reports that for the past 2d, the pt has been disoriented, speaking strangely, and occasionally has her 'eyes rolled back in her head,' which prompted them to come to [facility] ED". Patient says that she has been feeling "terrible" with nausea and vomiting. She cannot recall how many times she has vomited. She says that she has bilateral leg pain and been having headaches for a while. She denies fevers, chills, chest pain, cough, abdominal pain, diarrhea, dysuria, vision changes. She is able to say her name and what year it is but when asked where she is, she says "[facility]".    In the ED, she received one dose of morphine, zofran, and 1L IVF. Her labs were notable for electrolyte derangements and elevated lactic acid. CT head did not show an acute process. She will be admitted to Medical Oncology for further evaluation and management.    Mother: [phone]    Oncologic History:    Oncologic History 1. Colon adenocarcinoma       Oncologic Treatment 1. Right hemicolectomy (7/17/20)  2. Planned therapy: FOLFOX +/- bevacizumab    "    Pathology 7/17/20:  Right hemicolectomy:  MODERATELY DIFFERENTIATED ADENOCARCINOMA WITH INFILTRATION THROUGH THE   MUSCULARIS INTO PERICOLONIC ADIPOSE TISSUE   METASTATIC CARCINOMA  TO 6 OF 15 LYMPH NODES   MULTIPLE METASTASES TO THE FAT ITSELF   NO SIGNIFICANT ABNORMALITY OF THE TERMINAL ILEUM OR APPENDIX   MMR--NORMAL FOR COLORECTAL CARCINOMA   Immunohistochemistry (IHC) Testing for Mismatch Repair (MMR) Proteins:   MLH1 - Intact nuclear expression   MSH2 - Intact nuclear expression   MSH6 - Intact nuclear expression   PMS2 - Intact nuclear expression   Background nonneoplastic tissue/internal control with intact nuclear   expression   IHC Interpretation   No loss of nuclear expression of MMR proteins: low probability of   microsatellite instability      Procedure:  Excision of right and transverse colon   Tumor Site:  transverse colon   Tumor Size:  3.2 cm   Macroscopic Tumor Perforation:  No   Histologic Type:  Adenocarcinoma   Histologic Grade:  Moderately differentiated   Tumor Extension:  Through the muscularis into pericolonic adipose tissue and   with tumor deposits extending to the most circumferential pericolonic tissue.   Margins:  The tumor is  9 cm from the distal margin and 44 from the proximal.   Treatment Effect:  No known presurgical therapy   Lymphovascular Invasion:  Identified in pericolonic tissue   Perineural Invasion:  Present   Tumor Deposits:  Present--approximately 12   Number of Lymph Nodes Examined:15   Number of Lymph Nodes Involved:  6   Pathologic Stage Classification: pT4a pN2A pM1a--liver metastases observed at      BRAF Mutation Analysis(V600E), Tumor  Result Summary:  NO MUTATION IDENTIFIED  Result:  Provided diagnosis: colorectal adenocarcinoma  BRAF status: Wild-type  KRAS Mutation Analysis, Colorectal  Result Summary:  NO MUTATION IDENTIFIED  Result:  Provided diagnosis: colorectal adenocarcinoma  KRAS status: Wild-type     7/15/20:  Transverse colon biopsy:  Invasive  moderately differentiated colonic   adenocarcinoma         7/14/20:  Liver, biopsy:   Adenocarcinoma, moderately differentiated   Cytomorphologic features and immunohistochemical staining pattern supports   colonic primary        Interval History: agitated and screaming overnight, placed on precedex.     Oncology Treatment Plan:   OP COLORECTAL FOLFOX + BEVACIZUMAB Q2W    Medications:  Continuous Infusions:   dextrose 10 % in water (D10W)      norepinephrine bitartrate-D5W      Standard Custom Day One ADULT TPN for patient with NO electrolyte abnormality or NO renal dysfunction (PERIPHERAL)       Scheduled Meds:   acyclovir  10 mg/kg (Ideal) Intravenous Q8H    ceFEPime (MAXIPIME) IVPB  2 g Intravenous Q8H    fat emulsion 20%  250 mL Intravenous Daily    folic acid (FOLVITE) IVPB  1 mg Intravenous Daily    heparin (porcine)  5,000 Units Subcutaneous Q8H    Banana bag   Intravenous Once    [START ON 10/3/2020] thiamine (VITAMIN B1) IVPB  100 mg Intravenous Daily    thiamine (VITAMIN B1) IVPB  500 mg Intravenous TID     PRN Meds:sodium chloride, calcium gluconate IVPB, calcium gluconate IVPB, calcium gluconate IVPB, dextrose 10 % in water (D10W), dextrose 50%, glucagon (human recombinant), glucose, glucose, insulin aspart U-100, magnesium sulfate IVPB, magnesium sulfate IVPB, ondansetron, potassium chloride in water **AND** potassium chloride in water **AND** potassium chloride in water, promethazine (PHENERGAN) IVPB, sodium chloride 0.9%, sodium phosphate IVPB, sodium phosphate IVPB, sodium phosphate IVPB     Review of Systems   Unable to perform ROS: Mental status change     Objective:     Vital Signs (Most Recent):  Temp: 97.8 °F (36.6 °C) (10/01/20 0851)  Pulse: 89 (10/01/20 1345)  Resp: 17 (10/01/20 1345)  BP: (!) 91/56 (10/01/20 1300)  SpO2: 99 % (10/01/20 1345) Vital Signs (24h Range):  Temp:  [97.8 °F (36.6 °C)-98.9 °F (37.2 °C)] 97.8 °F (36.6 °C)  Pulse:  [] 89  Resp:  [8-42] 17  SpO2:  [84  %-100 %] 99 %  BP: ()/() 91/56     Weight: 88.1 kg (194 lb 3.6 oz)  Body mass index is 32.32 kg/m².  Body surface area is 2.01 meters squared.      Intake/Output Summary (Last 24 hours) at 10/1/2020 1410  Last data filed at 10/1/2020 1300  Gross per 24 hour   Intake 1553.67 ml   Output 820 ml   Net 733.67 ml       Physical Exam  Vitals signs and nursing note reviewed.   Constitutional:       Interventions: She is not intubated.  HENT:      Head: Normocephalic.   Eyes:      Extraocular Movements:      Right eye: Normal extraocular motion and no nystagmus.      Left eye: Normal extraocular motion and no nystagmus.   Cardiovascular:      Rate and Rhythm: Normal rate and regular rhythm.      Heart sounds: No murmur.   Pulmonary:      Effort: Pulmonary effort is normal. No tachypnea or accessory muscle usage. She is not intubated.      Breath sounds: No wheezing, rhonchi or rales.      Comments: On room air  Abdominal:      General: Bowel sounds are normal.      Palpations: Abdomen is soft.      Tenderness: There is no abdominal tenderness.   Genitourinary:     Comments: Urine catheter with teena output  Musculoskeletal:      Right lower leg: No edema.      Left lower leg: No edema.   Lymphadenopathy:      Cervical: No cervical adenopathy.   Skin:     General: Skin is warm and dry.   Neurological:      Mental Status: She is alert.      Comments: Alert, following request and moving extremities symmetrically. PERRL.  No nystagmus or roving eye movements.  Generalized weakness noted. Hypophonic, intermittently nods, ? Appropriately at times.  Oriented to person, [facility], 2020.           Significant Labs:   All pertinent labs from the last 24 hours have been reviewed.    Diagnostic Results:  I have reviewed and interpreted all pertinent imaging results/findings within the past 24 hours.    Assessment/Plan:     * Acute encephalopathy  Patient is a 29 year old woman with metastatic colon cancer who presents with  acute encephalopathy, nausea, vomiting. Patient's mental status was normal on 9/21 able to take care of herself. Differentials include sepsis, electrolyte abnormalities, TIA, delirium, seizures, brain mets. CT head did not show acute infarct or hemorrhage. MRI concerning for subacute stroke in right corpus callosum. Utox positive for opiates, otherwise negative. Neurology consulted.    LP and MRI C/T/L spine could not be performed do to patient confusion and elevated heart rate.    Plan:  - Empirically treating for meningitis: vancomycin, cefepime, ampicillin, acyclovir  - EEG unremarkable, neurology signed off  - Blood culture 9/25 NGTD  - Patient is being sent to ICU  - If w/u remains unremarkable, consider repeat MRI/CT Head to evaluate for evolution of leukoencephalopathy as there are case reports a/w FOLFOX + Jie, tx would be supportive care   - Talked with mother about patient's critical status (now on pressors) . Patient was supposed to leave for inpatient hospice at some point but rapidly declined. Mother told us to wait until we start comfort meassures, she is leaving work right now and will come right away. Mother verbalized she understands and wants the patient to be DNR. I called ICU staff and they are aware       Elevated LFTs  Patient with elevated LFTs since July 2020, likely due to liver metastases. Stable upon admit.  Trend daily CMP    Hypertension  Patient on home amlodipine 5mg, lopressor 100mg, spironolactone 25mg. Holding in setting of sepsis. Resume when appropriate.     MRSA bacteremia  Patient admitted to hospital on 8/26/20 - 9/9/20 with MRSA bacteremia. Was recently discharged on 09/24 after being admitted for UTI. She was discharged to complete 6 more days of vancomycin (end date around 09/30).     - Will continue vancomycin until stop date  - Follow up repeat blood cultures      Nausea and vomiting  Patient presenting with acute encephalopathy as well as nausea and vomiting likely  related to sepsis vs intracranial abnormality.  Zofran PRN (check EKG for QTc, on admit 490)  Advance diet as tolerated    Chronic neoplasm-related pain  Patient takes percocet 10mg Q4H PRN pain. As patient altered, avoid to further exacerbate and resume when appropriate.    Adenocarcinoma of colon  Follows with Dr. Arzola in clinic. Received FOLFOX + Bevacizumab cycle #1 on 08/11/2020. Will need follow up upon discharge for further evaluation of whether to proceed with cycle #2.             Abrahan Malin MD  Hematology/Oncology  [facility]    STAFF NOTE:  I have personally taken the history and examined this patient and agree with residents Note as stated above   Please see my note for additional details, After we saw patient this morning we decided on inpatient hospice,  However patient declining now, she is not safe for transport to inpatient hospice, we tried to get an inpatient hospice bed in hospital but not available. Discussed with her mother, changed code status to DNR and plan to initiate comfort measures as soon as mom gets here.  Also notified primary oncologist

## 2020-10-01 NOTE — PROGRESS NOTES
"  [facility]  Adult Nutrition  Consult Note    SUMMARY     Recommendations    1.) If TPN warranted 110g of AA, 275g of dextrose, 50g of lipids. EN provides 1875kcal.   2.) If feasible to use GI tract, initiate EN of Peptamen 1.5 with Prebio; begin at 10mL/hr and advance slowly 5mL Q8hr as tolerate to goal rate at 55mL/hr. EN provides 1980kcal, 89g of protein.    Goals: 1.) Pt to meet >75% of estimated energy and protein needs over the course of 7 days.  Nutrition Goal Status: new  Communication of RD Recs: (POC)    Reason for Assessment    Reason For Assessment: new TPN, NPO/clear liquids x 5 days  Diagnosis: (Acute Encephalopathy)  Relevant Medical History: HTN, Metastatic colon adenocarcinoma  Interdisciplinary Rounds: did not attend  General Information Comments: Nsg staff reports pt is confused and unable to be awaken. SLP unable to complete evaluation. GI- LBM . Pt has hx of metastatic colon adenocarcinoma. Pt UBW 86.2kg, RD unable to complete NFPE at this time due to pt resting.  Nutrition Discharge Planning: Unable to determine at this time.    Nutrition Risk Screen    Nutrition Risk Screen: tube feeding or parenteral nutrition    Nutrition/Diet History    Spiritual, Cultural Beliefs, Congregational Practices, Values that Affect Care: no  Food Allergies: NKFA  Factors Affecting Nutritional Intake: NPO, altered gastrointestinal function    Anthropometrics    Temp: 97.8 °F (36.6 °C)  Height: 5' 5" (165.1 cm)  Height (inches): 65 in  Weight Method: Bed Scale  Weight: 88.1 kg (194 lb 3.6 oz)  Weight (lb): 194.23 lb  Ideal Body Weight (IBW), Female: 125 lb  % Ideal Body Weight, Female (lb): 148.68 %  BMI (Calculated): 32.3  BMI Grade: 30 - 34.9- obesity - grade I  Usual Body Weight (UBW), k.2 kg(2020)  % Usual Body Weight: 102.42       Lab/Procedures/Meds    Pertinent Labs Reviewed: reviewed  Pertinent Labs Comments: Hgb 6.6, Hct 23.4, Chl 112, BUN 28, Ca 7.4, Alb 1.5  Pertinent " Medications Reviewed: reviewed  Pertinent Medications Comments: Ampicillin, Cefepime, folic acid, thiamine, precedex    Physical Findings/Assessment     Edema 2+ generalized     Estimated/Assessed Needs    Weight Used For Calorie Calculations: 88.1 kg (194 lb 3.6 oz)  Energy Calorie Requirements (kcal): 1921  Energy Need Method: [place]-St Jeor(x1.2 PAL)  Protein Requirements: 88-105gm (1.0-1.2gm/kg)  Weight Used For Protein Calculations: 88 kg (194 lb 0.1 oz)  Fluid Requirements (mL): 1mL/kcal or per MD recommenations  Estimated Fluid Requirement Method: RDA Method  RDA Method (mL): 1921         Nutrition Prescription Ordered    Current Diet Order: NPO (9/27)    Evaluation of Received Nutrient/Fluid Intake    I/O: I: 1252; O: 660; +8.9L since admission  Energy Calories Required: not meeting needs  Protein Required: not meeting needs  % Intake of Estimated Energy Needs: 0  % Meal Intake: 0    Nutrition Risk    Level of Risk/Frequency of Follow-up: high, 2x weekly     Assessment and Plan        Nutrition Problem  Inadequate energy intake    Related to (etiology):   NPO    Signs and Symptoms (as evidenced by):   NPO    Interventions(treatment strategy):  1.) Collaboration with other providers    Nutrition Diagnosis Status:   new     Monitor and Evaluation    Food and Nutrient Intake: energy intake, parenteral nutrition intake, enteral nutrition intake  Food and Nutrient Adminstration: diet order, enteral and parenteral nutrition administration  Knowledge/Beliefs/Attitudes: food and nutrition knowledge/skill  Anthropometric Measurements: weight, weight change  Biochemical Data, Medical Tests and Procedures: electrolyte and renal panel, gastrointestinal profile  Nutrition-Focused Physical Findings: skin, overall appearance       Nutrition Follow-Up    RD Follow-up?: Yes

## 2020-10-01 NOTE — NURSING
Dr. Castellanos at bedside assessing patient. Precedex has been off since 7:45 AM. Patient still not following commands  & appears moderately sedated. STAT CT order placed by Dr. Castellanos. Will carry out orders.

## 2020-10-01 NOTE — SIGNIFICANT EVENT
Spoke to the patient's mother and sister with Dr Castellanos. Patient will be placed on comfort-focused care with a low dose morphine infusion for pain.  and Childlife are supporting the family  The patient will move to Oncology of a bed is available. She is not stable for transfer to hospice

## 2020-10-01 NOTE — SUBJECTIVE & OBJECTIVE
Interval History/Significant Events: Unresponsive overnight    Review of Systems   Unable to perform ROS: Mental status change     Objective:     Vital Signs (Most Recent):  Temp: 97.8 °F (36.6 °C) (10/01/20 0851)  Pulse: 103 (10/01/20 1430)  Resp: 20 (10/01/20 1430)  BP: (!) 88/56 (10/01/20 1400)  SpO2: 99 % (10/01/20 1430) Vital Signs (24h Range):  Temp:  [97.8 °F (36.6 °C)-98.9 °F (37.2 °C)] 97.8 °F (36.6 °C)  Pulse:  [] 103  Resp:  [8-42] 20  SpO2:  [84 %-100 %] 99 %  BP: ()/() 88/56   Weight: 88.1 kg (194 lb 3.6 oz)  Body mass index is 32.32 kg/m².      Intake/Output Summary (Last 24 hours) at 10/1/2020 1442  Last data filed at 10/1/2020 1300  Gross per 24 hour   Intake 1553.67 ml   Output 820 ml   Net 733.67 ml       Physical Exam  Vitals signs and nursing note reviewed.   Constitutional:       Appearance: She is ill-appearing.   HENT:      Head: Normocephalic.      Mouth/Throat:      Mouth: Mucous membranes are dry.   Eyes:      Pupils: Pupils are equal, round, and reactive to light.   Cardiovascular:      Rate and Rhythm: Normal rate.      Pulses: Normal pulses.   Pulmonary:      Effort: Tachypnea and accessory muscle usage present.      Breath sounds: Decreased breath sounds present.   Abdominal:      Palpations: Abdomen is soft.   Genitourinary:     Comments: rebolledo  Skin:     General: Skin is warm.   Neurological:      GCS: GCS eye subscore is 3. GCS verbal subscore is 2. GCS motor subscore is 4.      Motor: Weakness present.         Vents:     Lines/Drains/Airways     Drain                 Urethral Catheter 09/26/20 1830 Latex 16 Fr. 4 days          Peripheral Intravenous Line                 Midline Catheter Insertion/Assessment  - Single Lumen 09/25/20 1622 Right cephalic vein (lateral side of arm) 20g x 8cm 5 days         Peripheral IV - Single Lumen 09/28/20 1425 20 G;1 3/4 in Left Forearm 3 days              Significant Labs:    CBC/Anemia Profile:  Recent Labs   Lab 10/01/20  6562  10/01/20  0443 10/01/20  1152   WBC 6.93 7.00 7.12   HGB 6.4* 6.6* 7.6*   HCT 22.2* 23.4* 25.3*    154 148*   MCV 95 94 93   RDW 32.2* 30.2* 28.0*        Chemistries:  Recent Labs   Lab 09/30/20  0332 09/30/20  2025 10/01/20  0443   * 136 139   K 4.2 5.8* 4.2    111* 112*   CO2 14* 14* 15*   BUN 22* 27* 28*   CREATININE 0.9 1.0 1.2   CALCIUM 7.5* 7.6* 7.4*   ALBUMIN 1.7* 1.6* 1.5*   PROT 6.7  --  6.3   BILITOT 3.7*  --  3.5*   ALKPHOS 308*  --  240*   ALT 71*  --  52*   *  --  150*   MG 2.1  --  2.1   PHOS 2.7 3.0 2.9       All pertinent labs within the past 24 hours have been reviewed.    Significant Imaging:  I have reviewed and interpreted all pertinent imaging results/findings within the past 24 hours.

## 2020-10-01 NOTE — PLAN OF CARE
Recommendations     1.) If TPN warranted 110g of AA, 275g of dextrose, 50g of lipids. EN provides 1875kcal.   2.) If feasible to use GI tract, initiate EN of Peptamen 1.5 with Prebio; begin at 10mL/hr and advance slowly 5mL Q8hr as tolerate to goal rate at 55mL/hr. EN provides 1980kcal, 89g of protein.     Goals: 1.) Pt to meet >75% of estimated energy and protein needs over the course of 7 days.  Nutrition Goal Status: new  Communication of RD Recs: (POC)

## 2020-10-01 NOTE — SIGNIFICANT EVENT
Talked with mother about patient's critical status. Patient was supposed to leave for inpatient hospice at some point but rapidly declined. Mother told us to wait until we start comfort meassures, she is leaving work right now and will come right away. Mother verbalized she understands and wants the patient to be DNR. I called ICU staff and they are aware

## 2020-10-01 NOTE — NURSING
Pt confused, screaming very loudly. Unable to make out what she's saying. Asked her if she's in pain but pt continues to scream and holler. Critical Care notified and will look through chart and order meds to help her settle down.

## 2020-10-01 NOTE — SUBJECTIVE & OBJECTIVE
Interval History: agitated and screaming overnight, placed on precedex.     Oncology Treatment Plan:   OP COLORECTAL FOLFOX + BEVACIZUMAB Q2W    Medications:  Continuous Infusions:   dextrose 10 % in water (D10W)      norepinephrine bitartrate-D5W      Standard Custom Day One ADULT TPN for patient with NO electrolyte abnormality or NO renal dysfunction (PERIPHERAL)       Scheduled Meds:   acyclovir  10 mg/kg (Ideal) Intravenous Q8H    ceFEPime (MAXIPIME) IVPB  2 g Intravenous Q8H    fat emulsion 20%  250 mL Intravenous Daily    folic acid (FOLVITE) IVPB  1 mg Intravenous Daily    heparin (porcine)  5,000 Units Subcutaneous Q8H    Banana bag   Intravenous Once    [START ON 10/3/2020] thiamine (VITAMIN B1) IVPB  100 mg Intravenous Daily    thiamine (VITAMIN B1) IVPB  500 mg Intravenous TID     PRN Meds:sodium chloride, calcium gluconate IVPB, calcium gluconate IVPB, calcium gluconate IVPB, dextrose 10 % in water (D10W), dextrose 50%, glucagon (human recombinant), glucose, glucose, insulin aspart U-100, magnesium sulfate IVPB, magnesium sulfate IVPB, ondansetron, potassium chloride in water **AND** potassium chloride in water **AND** potassium chloride in water, promethazine (PHENERGAN) IVPB, sodium chloride 0.9%, sodium phosphate IVPB, sodium phosphate IVPB, sodium phosphate IVPB     Review of Systems   Unable to perform ROS: Mental status change     Objective:     Vital Signs (Most Recent):  Temp: 97.8 °F (36.6 °C) (10/01/20 0851)  Pulse: 89 (10/01/20 1345)  Resp: 17 (10/01/20 1345)  BP: (!) 91/56 (10/01/20 1300)  SpO2: 99 % (10/01/20 1345) Vital Signs (24h Range):  Temp:  [97.8 °F (36.6 °C)-98.9 °F (37.2 °C)] 97.8 °F (36.6 °C)  Pulse:  [] 89  Resp:  [8-42] 17  SpO2:  [84 %-100 %] 99 %  BP: ()/() 91/56     Weight: 88.1 kg (194 lb 3.6 oz)  Body mass index is 32.32 kg/m².  Body surface area is 2.01 meters squared.      Intake/Output Summary (Last 24 hours) at 10/1/2020 1410  Last data filed  at 10/1/2020 1300  Gross per 24 hour   Intake 1553.67 ml   Output 820 ml   Net 733.67 ml       Physical Exam  Vitals signs and nursing note reviewed.   Constitutional:       Interventions: She is not intubated.  HENT:      Head: Normocephalic.   Eyes:      Extraocular Movements:      Right eye: Normal extraocular motion and no nystagmus.      Left eye: Normal extraocular motion and no nystagmus.   Cardiovascular:      Rate and Rhythm: Normal rate and regular rhythm.      Heart sounds: No murmur.   Pulmonary:      Effort: Pulmonary effort is normal. No tachypnea or accessory muscle usage. She is not intubated.      Breath sounds: No wheezing, rhonchi or rales.      Comments: On room air  Abdominal:      General: Bowel sounds are normal.      Palpations: Abdomen is soft.      Tenderness: There is no abdominal tenderness.   Genitourinary:     Comments: Urine catheter with teena output  Musculoskeletal:      Right lower leg: No edema.      Left lower leg: No edema.   Lymphadenopathy:      Cervical: No cervical adenopathy.   Skin:     General: Skin is warm and dry.   Neurological:      Mental Status: She is alert.      Comments: Alert, following request and moving extremities symmetrically. PERRL.  No nystagmus or roving eye movements.  Generalized weakness noted. Hypophonic, intermittently nods, ? Appropriately at times.  Oriented to person, [facility], 2020.           Significant Labs:   All pertinent labs from the last 24 hours have been reviewed.    Diagnostic Results:  I have reviewed and interpreted all pertinent imaging results/findings within the past 24 hours.

## 2020-10-01 NOTE — NURSING
Type and Screen collected and sent to lab, MD unable to reach pts mother for phone consent. Will try again later.

## 2020-10-01 NOTE — PLAN OF CARE
"      SICU PLAN OF CARE NOTE    Dx: Acute encephalopathy    Shift Events: Afebrile, NSR. 1 U PRBC's administered this AM. Patient unarousable this AM. Precedex was stopped at 0730 AM. STAT CT of head orders placed & carried out. Jordin called patient's family & discussed plan of care. Patient code status DNR. Comfort care orders & hospice orders placed. Patient resting comfortably with Morphine gtt. Plan of care reviewed with patient and patient's mother & other family members. Will continue to monitor.     Goals of Care: Comfort    Neuro: Unresponsive    Vital Signs: BP (!) 83/52 (BP Location: Left arm, Patient Position: Lying)   Pulse 90   Temp 97.8 °F (36.6 °C) (Oral)   Resp 19   Ht 5' 5" (1.651 m)   Wt 88.1 kg (194 lb 3.6 oz)   SpO2 100%   Breastfeeding No   BMI 32.32 kg/m²     Respiratory: Room Air    Diet: NPO    Gtts: Morphine     Labs/Accuchecks: All labs trended. 1 U RBC given this AM.          "

## 2020-10-01 NOTE — PROGRESS NOTES
[facility]  Critical Care Medicine  Progress Note    Patient Name: Huy Cisneros  MRN: [id number]  Admission Date: 9/24/2020  Hospital Length of Stay: 6 days  Code Status: DNR  Attending Provider: Rashid Esparza MD  Primary Care Provider: Primary Doctor No   Principal Problem: Acute encephalopathy    Subjective:     HPI:  Ms. Cisneros is a 30 yo F with PMHx of HTN and metastatic colon adenocarcinoma (follows Dr Arzola) who presented on 9/24 with confusion, nausea, vomiting. Patient recently inpatient at [facility] with UTI for 3 days; and was discharged yesterday (09/24) to finish her course of vancomycin for MRSA bacteremia diagnosed during an admission 8/26-9/0. On the way home from her discharge 9/24, pt began complaining of SOB and nausea/vomiting. Reports 2x emesis, non-bloody. SOB has since resolved.Per patient's mother, pt has beendisoriented, speaking strangely, and occasionally has her 'eyes rolled back in her head, over the last 2 days. Pt also endorses BL leg pain, and chronic headaches. She denies fevers, chills, chest pain, cough, abdominal pain, diarrhea, dysuria, vision changes. Oriented to self and time in ED, but not to place.     In the ED, she received one dose of morphine, zofran, and 1L IVF. Her labs were notable for electrolyte derangements and elevated lactic acid. CT head did not show an acute process. She will be admitted to Medical Oncology for further evaluation and management.     Mother: [phone]    Hospital/ICU Course:  Patient admitted to Medical Oncology 9/24. Septic workup began, critical care consulted on 9/26 due to elevated lactate and concern for worsening sepsis in addition to neuro symptoms like BL LE weakness and eye twitching. Admitted to Critical Care on 9/26. An EEG was negative for seizure activity. She also underwent a pan-MRI which was generally unremarkable with the exception of a small, remote lacunar brain infarct of the right corpus  callosum. A lumbar puncture unremarkable. 10/1 Patient with worsening mental status. CTH negative. Seen by Dr Castellanos who recommended hospice and DNR. Hospice consulted     Interval History/Significant Events: Unresponsive overnight    Review of Systems   Unable to perform ROS: Mental status change     Objective:     Vital Signs (Most Recent):  Temp: 97.8 °F (36.6 °C) (10/01/20 0851)  Pulse: 103 (10/01/20 1430)  Resp: 20 (10/01/20 1430)  BP: (!) 88/56 (10/01/20 1400)  SpO2: 99 % (10/01/20 1430) Vital Signs (24h Range):  Temp:  [97.8 °F (36.6 °C)-98.9 °F (37.2 °C)] 97.8 °F (36.6 °C)  Pulse:  [] 103  Resp:  [8-42] 20  SpO2:  [84 %-100 %] 99 %  BP: ()/() 88/56   Weight: 88.1 kg (194 lb 3.6 oz)  Body mass index is 32.32 kg/m².      Intake/Output Summary (Last 24 hours) at 10/1/2020 1442  Last data filed at 10/1/2020 1300  Gross per 24 hour   Intake 1553.67 ml   Output 820 ml   Net 733.67 ml       Physical Exam  Vitals signs and nursing note reviewed.   Constitutional:       Appearance: She is ill-appearing.   HENT:      Head: Normocephalic.      Mouth/Throat:      Mouth: Mucous membranes are dry.   Eyes:      Pupils: Pupils are equal, round, and reactive to light.   Cardiovascular:      Rate and Rhythm: Normal rate.      Pulses: Normal pulses.   Pulmonary:      Effort: Tachypnea and accessory muscle usage present.      Breath sounds: Decreased breath sounds present.   Abdominal:      Palpations: Abdomen is soft.   Genitourinary:     Comments: rebolledo  Skin:     General: Skin is warm.   Neurological:      GCS: GCS eye subscore is 3. GCS verbal subscore is 2. GCS motor subscore is 4.      Motor: Weakness present.         Vents:     Lines/Drains/Airways     Drain                 Urethral Catheter 09/26/20 1830 Latex 16 Fr. 4 days          Peripheral Intravenous Line                 Midline Catheter Insertion/Assessment  - Single Lumen 09/25/20 1622 Right cephalic vein (lateral side of arm) 20g x 8cm 5 days          Peripheral IV - Single Lumen 09/28/20 1425 20 G;1 3/4 in Left Forearm 3 days              Significant Labs:    CBC/Anemia Profile:  Recent Labs   Lab 10/01/20  0332 10/01/20  0443 10/01/20  1152   WBC 6.93 7.00 7.12   HGB 6.4* 6.6* 7.6*   HCT 22.2* 23.4* 25.3*    154 148*   MCV 95 94 93   RDW 32.2* 30.2* 28.0*        Chemistries:  Recent Labs   Lab 09/30/20  0332 09/30/20  2025 10/01/20  0443   * 136 139   K 4.2 5.8* 4.2    111* 112*   CO2 14* 14* 15*   BUN 22* 27* 28*   CREATININE 0.9 1.0 1.2   CALCIUM 7.5* 7.6* 7.4*   ALBUMIN 1.7* 1.6* 1.5*   PROT 6.7  --  6.3   BILITOT 3.7*  --  3.5*   ALKPHOS 308*  --  240*   ALT 71*  --  52*   *  --  150*   MG 2.1  --  2.1   PHOS 2.7 3.0 2.9       All pertinent labs within the past 24 hours have been reviewed.    Significant Imaging:  I have reviewed and interpreted all pertinent imaging results/findings within the past 24 hours.      ABG  Recent Labs   Lab 09/30/20  1817   PH 7.414   PO2 53   PCO2 27.7*   HCO3 17.7*   BE -7     Assessment/Plan:     Neuro  * Acute encephalopathy  Unclear etiology. MRI brain concerning for subacute small lacunar infarct right corpus callosum. 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 EEG negative for epileptiform activity. Evaluated by Neurology this admission.      --Ammonia within normal limits. Hold on treatment for hepatic encephalopathy for now (will need NGT).  --VBG without hypercapnia.  --LP obtained 9/29. Antibiotics dosed for CNS penetration.   --Oncology recommending repeating MRI Brain in 1 week for evolution of leukoencephalopathy.   --Appears to be protecting airway.  --Reported improvement in mental status following thiamine, will change to treatment dose of thiamine with 500 mg TID    Renal/  Oliguria  Decreased urine output    Plan:  - rebolledo placed  - strict I+O  - trend CMP  - replete electrolytes prn    ID  MRSA bacteremia  Admitted to hospital on 8/26/20  - 9/9/20 with MRSA bacteremia. Was recently discharged on 09/24 after being admitted for UTI. She was discharged to complete 6 more days of vancomycin (end date around 09/30).  Currently afebrile without leukocytosis.      - UA not suggestive of UTI   - CXR showed bibasilar patchy lung airspace opacities  - Follow blood cultures, 1 set NGTD, 1 set with CoNS. Repeat blood cultures.  - Broad spectrum antibiotics  - TTE unremarkable     Oncology  Chronic neoplasm-related pain  Generalized abdominal pain   - CT abdomen/pelvis w/o contrast without bowel obstruction.           Adenocarcinoma of colon  Ms. Cisneros is a 28 yo F with metastatic colon adenocarcinoma diagnosed in 2020. She sees Dr Arzola at [facility]    - Palliative medicine following.    Metastatic disease  9/25 Abdominal US showed extensive hepatic metastasis    GI  Elevated LFTs  Elevated LFTs since July 2020, likely due to liver metastases  - CT Abd/Pelvis shows extensive liver mets and hepatomegaly    - Trend CMP/LFTs      Nausea and vomiting  Likely due to colon cancer, possible obstruction or sepsis     - NPO  - Zofran/Phenegran PRN  - CT abdomen/pelvis 9/29 without evidence of bowel obstruction.    Other  Palliative care encounter  Palliative Medicine following.  Reaching out to Childlife as patient has 3 young children  - DNR per Oncology. Will start inpatient Hospice process however the patient may not survive.  - Will initiate comfort care when mother is at bedside        Critical Care Time: 50 minutes     Critical care was time spent personally by me on the following activities: development of treatment plan with patient or surrogate and bedside caregivers, discussions with consultants, evaluation of patient's response to treatment, examination of patient, ordering and performing treatments and interventions, ordering and review of laboratory studies, ordering and review of radiographic studies, pulse oximetry, re-evaluation of patient's  condition. This critical care time did not overlap with that of any other provider or involve time for any procedures.     Fiona Winterbottom, APRN, CNS  Critical Care Medicine  [facility]

## 2020-10-01 NOTE — ASSESSMENT & PLAN NOTE
Palliative Medicine following.  Reaching out to Childlife as patient has 3 young children  - DNR per Oncology. Will start inpatient Hospice process however the patient may not survive.  - Will initiate comfort care when mother is at bedside

## 2020-10-01 NOTE — ASSESSMENT & PLAN NOTE
Likely due to colon cancer, possible obstruction or sepsis     - NPO  - Zofran/Phenegran PRN  - CT abdomen/pelvis 9/29 without evidence of bowel obstruction.

## 2020-10-01 NOTE — PLAN OF CARE
Talked with mother Char Spence over the phone for blood consent. Verbalized she understood risks and benefits. Will proceed with the transfusion

## 2020-10-01 NOTE — ASSESSMENT & PLAN NOTE
Patient is a 29 year old woman with metastatic colon cancer who presents with acute encephalopathy, nausea, vomiting. Patient's mental status was normal on 9/21 able to take care of herself. Differentials include sepsis, electrolyte abnormalities, TIA, delirium, seizures, brain mets. CT head did not show acute infarct or hemorrhage. MRI concerning for subacute stroke in right corpus callosum. Utox positive for opiates, otherwise negative. Neurology consulted.    LP and MRI C/T/L spine could not be performed do to patient confusion and elevated heart rate.    Plan:  - Empirically treating for meningitis: vancomycin, cefepime, ampicillin, acyclovir  - EEG unremarkable, neurology signed off  - Blood culture 9/25 NGTD  - Patient is being sent to ICU  - If w/u remains unremarkable, consider repeat MRI/CT Head to evaluate for evolution of leukoencephalopathy as there are case reports a/w FOLFOX + Jie, tx would be supportive care   - Talked with mother about patient's critical status (now on pressors) . Patient was supposed to leave for inpatient hospice at some point but rapidly declined. Mother told us to wait until we start comfort meassures, she is leaving work right now and will come right away. Mother verbalized she understands and wants the patient to be DNR. I called ICU staff and they are aware

## 2020-10-01 NOTE — PLAN OF CARE
No SW needs identified at this time.  Patient not stable for transfer to inpatient hospice per team notes. Comfort measures to be initiated once patient's mother bedside.     Marivel Scanlon LMSW   - Case Management

## 2020-10-01 NOTE — PT/OT/SLP PROGRESS
Speech Language Pathology      Huy Cisneros  MRN: [id number]    Patient not seen today secondary to patient somnolent and unable to arouse per RN. Current orders discharged at this time 2/2 comfort measures only.    CRYS Queen   10/1/2020

## 2020-10-02 PROBLEM — R11.2 NAUSEA AND VOMITING: Status: RESOLVED | Noted: 2020-01-01 | Resolved: 2020-01-01

## 2020-10-02 NOTE — PLAN OF CARE
Pt tx from ICU to step down. Comfort care measures continued throughout the shift. Family at bedside throughout the night.  Fall precautions continued for pt. Bed locked and at lowest position. Call light within reach. Pt knows to call if assistance is needed. Morphine gtt continued. Pt looks resting comfortable with no distress.

## 2020-10-02 NOTE — NURSING
Pt was yelling/increased respirations  when RN went to check on pt for 0600 rounds. Morphine PRN given. Checked and turned. No BM. Will continue to monitor.

## 2020-10-02 NOTE — ASSESSMENT & PLAN NOTE
Patient admitted to hospital on 8/26/20 - 9/9/20 with MRSA bacteremia. Was recently discharged on 09/24 after being admitted for UTI. She was discharged to complete 6 more days of vancomycin (end date around 09/30).     - Will d/c antibiotics

## 2020-10-02 NOTE — PROGRESS NOTES
Certified Child Life Specialist consulted by team to provide support for patient's family and children. CCLS met with patient's mother and sister and provided emotionally supportive conversation, educational conversation/materials for explaining death and dying to patient's children, as well as legacy materials (ink and card stock for hand prints). Mother and sister were forthcoming with information in conversation and very appreciative of child life services. Will continue to follow as needed.     Julisa Cruz MS, CCLS  Radiology  [id number]

## 2020-10-02 NOTE — PROGRESS NOTES
Bedside rounding performed. No family present at bedside at this time. Patient resting comfortably in bed in supine position. Morphine 1mg/hr infusing to R upper arm midline clean dry and intact. Pt unresponsive to voice. Small groans upon touch. WCTM.

## 2020-10-02 NOTE — CHAPLAIN
"Provided spiritual care to family and friends of pt. Accompanied family to family meeting with Oncology team and sat with family during Child Life visit. Family has been tearful throughout the day. Pt's aunt, "lukas" will be staying with the pt overnight tonight.   "

## 2020-10-02 NOTE — PHYSICIAN QUERY
PT Name: Huy Cisneros  MR #: [id number]     NEUROLOGICAL CONDITION CLARIFICATION     CDS: Miriam Sales RN, CCDS       Contact information: [email or web address]    This form is a permanent document in the medical record.    Query Date: October 2, 2020    By submitting this query, we are merely seeking further clarification of documentation.  Please utilize your independent clinical judgment when addressing the question(s) below.    The Medical Record contains the following:  Indicators Supporting Clinical Findings Location in Medical Record    Decreased LOC, neurological deficits      [place] Coma Scale (GCS)      Traumatic Injury     X Acute/Chronic Conditions Colon adenocarcinoma  Liver Metastasis  Acute encephalopathy--Etiology remains unclear, leptomeningeal disease on the differential, though negative MRI    10/2-Hem Onc PN     X Radiology FINDINGS:  There is ill-defined hyperdense lesion underlying the right parietal calvarium overlying the right posterior parasagittal parietal lobe corresponds to enhancing extra-axial lesion seen on MRI. There is slight mass effect and hypoattenuation in the right parietal lobe underlying this concerning for edema core with corresponding T2 flair signal hyperintensity seen on MRI.   There is no evidence for acute intracranial hemorrhage. Ventricles stable without hydrocephalus.   Visualized paranasal sinuses and mastoid air cells are essentially clear.     Impression:   Ill-defined hyperdensity lesion corresponds to enhancing lesion seen on prior MRI overlying the right parietal lobe at the vertex. There is hypoattenuation in the right parietal lobe subjacent to this concerning for associated edema. There is no evidence for acute intracranial hemorrhage or hydrocephalus.   10/1-CT Head    Treatment (i.e. IV Steroids, Mannitol, Surgery)     X Other She is lethargic, disoriented and confused     10/2-Hem Onc PN         Provider, please specify diagnosis or diagnoses  associated with above clinical findings.     [ x  ] Non-Traumatic Cerebral (Vasogenic) Edema   [   ] Other neurological diagnosis (please specify):________   [  ] Clinically Undetermined       Please document in your progress notes daily for the duration of treatment until resolved and include in your discharge summary.

## 2020-10-02 NOTE — ASSESSMENT & PLAN NOTE
Etiology remains unclear, leptomeningeal disease on the differential, though negative MRI      - Keep morphine IV for comfort care

## 2020-10-02 NOTE — SUBJECTIVE & OBJECTIVE
Interval History:   On 10/2, she became confused again, hypotensive, requiring pressor support. Talked with mother about patient's critical status (now on pressors). Patient was supposed to leave for inpatient hospice at some point but rapidly declined. Mother told us to wait until we start comfort measures  Mother verbalized she understands and wants the patient to be DNR.   Mother arrived with her other daughter, we had a family meeting, and went over Mrs. Cisneros course during this admission, and what looks like is progression of her cancer. We cannot specify a reason for her current mental status or decline. All work-up done was unrevealing for a cause.   They expressed understanding of her condition, and they wished for her to comfortable. We changed the goals of care to be comfort only. Morphine infusion started.   Today, she remains with AMS. Open eyes to verbal stimulation.     Oncology Treatment Plan:   OP COLORECTAL FOLFOX + BEVACIZUMAB Q2W    Medications:  Continuous Infusions:   morphine 1 mg/hr (10/01/20 2200)     Scheduled Meds:  PRN Meds:morphine, promethazine (PHENERGAN) IVPB     Review of Systems   Unable to perform ROS: Mental status change     Objective:     Vital Signs (Most Recent):  Temp: 97.5 °F (36.4 °C) (10/01/20 1900)  Pulse: 97 (10/01/20 2200)  Resp: (!) 36 (10/02/20 0612)  BP: (!) 82/50 (10/01/20 2200)  SpO2: 98 % (10/01/20 2200) Vital Signs (24h Range):  Temp:  [97.5 °F (36.4 °C)-97.9 °F (36.6 °C)] 97.5 °F (36.4 °C)  Pulse:  [] 97  Resp:  [14-36] 36  SpO2:  [96 %-100 %] 98 %  BP: ()/() 82/50     Weight: 88.1 kg (194 lb 3.6 oz)  Body mass index is 32.32 kg/m².  Body surface area is 2.01 meters squared.      Intake/Output Summary (Last 24 hours) at 10/2/2020 0809  Last data filed at 10/1/2020 2252  Gross per 24 hour   Intake 306.24 ml   Output 280 ml   Net 26.24 ml       Physical Exam  Constitutional:       Appearance: She is ill-appearing.   HENT:      Head:  Normocephalic and atraumatic.   Cardiovascular:      Rate and Rhythm: Normal rate and regular rhythm.      Heart sounds: Normal heart sounds.   Pulmonary:      Effort: No respiratory distress.      Breath sounds: No wheezing.   Abdominal:      General: Abdomen is flat.      Palpations: Abdomen is soft.   Musculoskeletal:         General: No swelling.   Neurological:      Mental Status: She is lethargic, disoriented and confused.         Significant Labs:   None    Diagnostic Results:  None

## 2020-10-02 NOTE — ASSESSMENT & PLAN NOTE
Follows with Dr. Arzola in clinic. Received FOLFOX + Bevacizumab cycle #1 on 08/11/2020.   Now care is shifted to comfort care. Dr. Arzola is aware and agreeable.

## 2020-10-02 NOTE — PHYSICIAN QUERY
PT Name: Huy Cisneros  MR #: [id number]     Diagnosis Clarification      CDS: Miriam Sales RN, CCDS       Contact information: [email or web address]    This form is a permanent document in the medical record.     Query Date: October 2, 2020    Dear Provider,  By submitting this query, we are merely seeking further clarification of documentation.  Please utilize your independent clinical judgment when addressing the question(s) below.     The medical record contains the following:    Supporting Clinical Information Location in Medical Record   Acute encephalopathy  Patient is a 29 year old woman with metastatic colon cancer who presents with acute encephalopathy, nausea, vomiting.   Differentials include sepsis, electrolyte abnormalities, TIA, delirium, seizures, brain mets.    Patient admitted to Medical Oncology 9/24. Septic workup began, critical care consulted on 9/26 due to elevated lactate and concern for worsening sepsis in addition to neuro symptoms like BL LE weakness and eye twitching. Admitted to Critical Care on 9/26.    Nausea and vomiting--Likely due to colon cancer, possible obstruction or sepsis    Acute encephalopathy--Patient with metastatic colon cancer, acute encephalopathy, nausea, vomiting. Abx for sepsis+ meningitis    Sepsis--Concern for sepsis due tp  tachycardia, acute encephalopathy and elevated lactic acid. UA not suggestive of UTI. CXR showed bibasilar patchy lung airspace opacities    MRSA bacteremia--Patient admitted to hospital on 8/26/20 - 9/9/20 with MRSA bacteremia. Was recently discharged on 09/24 after being admitted for UTI. She was discharged to complete 6 more days of vancomycin (end date around 09/30).    - Will d/c antibiotics     9/25-H&P            9/26-Critical Care H&P                            10/2-Hem Onc PN     Please clarify if the _______Sepsis__________ diagnosis has been:    [ x ] Ruled In   [  ] Ruled Out   [  ] Other/Clarification of findings (please  specify)_______________    [   ] Clinically undetermined           Please document in your progress notes daily for the duration of treatment, until resolved, and include in your discharge summary.

## 2020-10-02 NOTE — PROGRESS NOTES
"[facility]  Hematology/Oncology  Progress Note    Patient Name: Huy Cisneros  Admission Date: 9/24/2020  Hospital Length of Stay: 7 days  Code Status: DNR     Subjective:     HPI:  Ms. Cisenros is a 29 year old woman with metastatic colon adenocarcinoma, HTN who presents with confusion, nausea, vomiting. She follows with Dr. Arzola in clinic. Attempted to contact patient's mother for further HPI as patient unable to recall recent events but unable to reach her. Patient was most recently admitted to [facility] with UTI for 3 days and was discharged yesterday (09/24) to finish her course of vancomycin when she was hospitalized in 8/26-09/09 for MRSA bacteremia. Per admit ED note after leaving hospital yesterday, "On the way home, pt began complaining of SOB and nausea/vomiting. Reports 2x emesis, non-bloody. SOB has since resolved. Pt's mother further reports that for the past 2d, the pt has been disoriented, speaking strangely, and occasionally has her 'eyes rolled back in her head,' which prompted them to come [place] [facility]". Patient says that she has been feeling "terrible" with nausea and vomiting. She cannot recall how many times she has vomited. She says that she has bilateral leg pain and been having headaches for a while. She denies fevers, chills, chest pain, cough, abdominal pain, diarrhea, dysuria, vision changes. She is able to say her name and what year it is but when asked where she is, she says "[facility]".    In the ED, she received one dose of morphine, zofran, and 1L IVF. Her labs were notable for electrolyte derangements and elevated lactic acid. CT head did not show an acute process. She will be admitted to Medical Oncology for further evaluation and management.    Mother: [phone]    Oncologic History:    Oncologic History 1. Colon adenocarcinoma       Oncologic Treatment 1. Right hemicolectomy (7/17/20)  2. Planned therapy: FOLFOX +/- bevacizumab       " Pathology 7/17/20:  Right hemicolectomy:  MODERATELY DIFFERENTIATED ADENOCARCINOMA WITH INFILTRATION THROUGH THE   MUSCULARIS INTO PERICOLONIC ADIPOSE TISSUE   METASTATIC CARCINOMA  TO 6 OF 15 LYMPH NODES   MULTIPLE METASTASES TO THE FAT ITSELF   NO SIGNIFICANT ABNORMALITY OF THE TERMINAL ILEUM OR APPENDIX   MMR--NORMAL FOR COLORECTAL CARCINOMA   Immunohistochemistry (IHC) Testing for Mismatch Repair (MMR) Proteins:   MLH1 - Intact nuclear expression   MSH2 - Intact nuclear expression   MSH6 - Intact nuclear expression   PMS2 - Intact nuclear expression   Background nonneoplastic tissue/internal control with intact nuclear   expression   IHC Interpretation   No loss of nuclear expression of MMR proteins: low probability of   microsatellite instability      Procedure:  Excision of right and transverse colon   Tumor Site:  transverse colon   Tumor Size:  3.2 cm   Macroscopic Tumor Perforation:  No   Histologic Type:  Adenocarcinoma   Histologic Grade:  Moderately differentiated   Tumor Extension:  Through the muscularis into pericolonic adipose tissue and   with tumor deposits extending to the most circumferential pericolonic tissue.   Margins:  The tumor is  9 cm from the distal margin and 44 from the proximal.   Treatment Effect:  No known presurgical therapy   Lymphovascular Invasion:  Identified in pericolonic tissue   Perineural Invasion:  Present   Tumor Deposits:  Present--approximately 12   Number of Lymph Nodes Examined:15   Number of Lymph Nodes Involved:  6   Pathologic Stage Classification: pT4a pN2A pM1a--liver metastases observed at      BRAF Mutation Analysis(V600E), Tumor  Result Summary:  NO MUTATION IDENTIFIED  Result:  Provided diagnosis: colorectal adenocarcinoma  BRAF status: Wild-type  KRAS Mutation Analysis, Colorectal  Result Summary:  NO MUTATION IDENTIFIED  Result:  Provided diagnosis: colorectal adenocarcinoma  KRAS status: Wild-type     7/15/20:  Transverse colon biopsy:  Invasive  moderately differentiated colonic   adenocarcinoma         7/14/20:  Liver, biopsy:   Adenocarcinoma, moderately differentiated   Cytomorphologic features and immunohistochemical staining pattern supports   colonic primary        Interval History:   On 10/2, she became confused again, hypotensive, requiring pressor support. Talked with mother about patient's critical status (now on pressors). Patient was supposed to leave for inpatient hospice at some point but rapidly declined. Mother told us to wait until we start comfort measures  Mother verbalized she understands and wants the patient to be DNR.   Mother arrived with her other daughter, we had a family meeting, and went over Mrs. Cisneros course during this admission, and what looks like is progression of her cancer. We cannot specify a reason for her current mental status or decline. All work-up done was unrevealing for a cause.   They expressed understanding of her condition, and they wished for her to comfortable. We changed the goals of care to be comfort only. Morphine infusion started.   Today, she remains with AMS. Open eyes to verbal stimulation.     Oncology Treatment Plan:   OP COLORECTAL FOLFOX + BEVACIZUMAB Q2W    Medications:  Continuous Infusions:   morphine 1 mg/hr (10/01/20 2200)     Scheduled Meds:  PRN Meds:morphine, promethazine (PHENERGAN) IVPB     Review of Systems   Unable to perform ROS: Mental status change     Objective:     Vital Signs (Most Recent):  Temp: 97.5 °F (36.4 °C) (10/01/20 1900)  Pulse: 97 (10/01/20 2200)  Resp: (!) 36 (10/02/20 0612)  BP: (!) 82/50 (10/01/20 2200)  SpO2: 98 % (10/01/20 2200) Vital Signs (24h Range):  Temp:  [97.5 °F (36.4 °C)-97.9 °F (36.6 °C)] 97.5 °F (36.4 °C)  Pulse:  [] 97  Resp:  [14-36] 36  SpO2:  [96 %-100 %] 98 %  BP: ()/() 82/50     Weight: 88.1 kg (194 lb 3.6 oz)  Body mass index is 32.32 kg/m².  Body surface area is 2.01 meters squared.      Intake/Output Summary (Last 24  hours) at 10/2/2020 0809  Last data filed at 10/1/2020 2252  Gross per 24 hour   Intake 306.24 ml   Output 280 ml   Net 26.24 ml       Physical Exam  Constitutional:       Appearance: She is ill-appearing.   HENT:      Head: Normocephalic and atraumatic.   Cardiovascular:      Rate and Rhythm: Normal rate and regular rhythm.      Heart sounds: Normal heart sounds.   Pulmonary:      Effort: No respiratory distress.      Breath sounds: No wheezing.   Abdominal:      General: Abdomen is flat.      Palpations: Abdomen is soft.   Musculoskeletal:         General: No swelling.   Neurological:      Mental Status: She is lethargic, disoriented and confused.         Significant Labs:   None    Diagnostic Results:  None    Assessment/Plan:     * Acute encephalopathy  Etiology remains unclear, leptomeningeal disease on the differential, though negative MRI      - Keep morphine IV for comfort care   - Ativan 1 mg Q2 hourly PRN    Elevated LFTs  - no labs     Hypertension  Patient on home amlodipine 5mg, lopressor 100mg, spironolactone 25mg. Holding in setting of sepsis. Resume when appropriate.     MRSA bacteremia  Patient admitted to hospital on 8/26/20 - 9/9/20 with MRSA bacteremia. Was recently discharged on 09/24 after being admitted for UTI. She was discharged to complete 6 more days of vancomycin (end date around 09/30).     - Will d/c antibiotics       Chronic neoplasm-related pain  On morphine IV for comfort   Ativan 1 mg Q2 prn    Adenocarcinoma of colon  Follows with Dr. Arzola in clinic. Received FOLFOX + Bevacizumab cycle #1 on 08/11/2020.   Now care is shifted to comfort care. Dr. Arzola is aware and agreeable.             Santiago Fink MD  Hematology/Oncology  [facility]

## 2020-10-02 NOTE — CARE UPDATE
Rapid Response Nurse Chart Check     Chart check completed, abnormal VS noted. Pt on comfort care. Call [phone] for further concerns or assistance.

## 2020-10-02 NOTE — NURSING TRANSFER
Nursing Transfer Note      10/1/2020     Transfer To: 821    Transfer via stretcher    Transported by RN and PCT    Medicines sent: morphine gtt infusing at 1mg/hr    Chart send with patient: Yes    Notified: family at bedside    Patient reassessed at: 10/1/20, 2250 (date, time)    Upon arrival to floor: bed in lowest position, family at bedside and oriented to room.  Pt appears to be resting comfortably.  TERRY Sorto updated on plan of care and at bedside to assess patient.  All personal belongings with family.

## 2020-10-02 NOTE — NURSING
Transfer SBAR report given to nurse for bed 821. All questions and answers acknowledged. Family at bedside. Patient transferred via bed, and monitor, on room air.

## 2020-10-02 NOTE — CARE UPDATE
Rapid Response Nurse Chart Check     Chart check completed, abnormal VS noted. Pt on comfort measures.

## 2020-10-02 NOTE — PHYSICIAN QUERY
PT Name: Huy Cisneros  MR #: [id number]    HEMATOLOGY CLARIFICATION      CDS: Miriam Sales RN, CCDS       Contact information: [email or web address]    This form is a permanent document in the medical record.      Query Date: October 2, 2020    By submitting this query, we are merely seeking further clarification of documentation. Please utilize your independent clinical judgment when addressing the question(s) below.    The Medical Record contains the following:   Indicators  Supporting Clinical Findings Location in Medical Record    Anemia documented     X  X  X H&H Hgb=9.1 Hct=30.1  Hgb=6.4, Hct=22.2  Hgb=7.6, Hct=25.3 9/24-Labs  10/1-Labs  10/2-Labs      BP                    HR      GI bleeding documented      Acute bleeding (Non GI site)     X Transfusion(s) 1u PRBC's   10/1-Flowsheet   X Acute/Chronic illness Metastatic colon adenocarcinoma   Liver Metastasis   10/2-Hem Onc PN    Treatments      Other       Provider, please specify diagnosis or diagnoses associated with above clinical findings.   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

## 2020-10-03 NOTE — PLAN OF CARE
Comfort care measures continued throughout the shift. Family at bedside throughout the night.  Fall precautions continued for pt. Bed locked and at lowest position. Call light within reach. Pt knows to call if assistance is needed. Morphine gtt continued. Pt looks resting comfortable with no distress.

## 2020-10-03 NOTE — CONSULTS
Child Life team consulted to provide education and support for pts three young children. CCLS met with pts mom and provided her with resources on how to talk to the children about poor prognosis/end of life. Pts mother was very appreciative. Child Life team happy to meet with the children if they come up to the hospital. Child Life will continue to follow as needed and our contact info was left with pts mom.     Osvaldo Hagen, CCLS  [id number]  [id number]

## 2020-10-03 NOTE — SUBJECTIVE & OBJECTIVE
Interval History: Patient was transferred from CCS/ICU last night to Oncology, comfort care measures only, morphine infusion was started, patient's status is terminal    Oncology Treatment Plan:   OP COLORECTAL FOLFOX + BEVACIZUMAB Q2W    Medications:  Continuous Infusions:   morphine 1 mg/hr (10/01/20 2200)     Scheduled Meds:  PRN Meds:lorazepam, morphine, promethazine (PHENERGAN) IVPB     Review of Systems   Unable to perform ROS: Mental status change     Objective:     Vital Signs (Most Recent):  Temp: 97.5 °F (36.4 °C) (10/01/20 1900)  Pulse: 97 (10/01/20 2200)  Resp: (!) 4 (10/03/20 1227)  BP: (!) 82/50 (10/01/20 2200)  SpO2: 98 % (10/01/20 2200) Vital Signs (24h Range):  Resp:  [4-6] 4     Weight: 88.1 kg (194 lb 3.6 oz)  Body mass index is 32.32 kg/m².  Body surface area is 2.01 meters squared.    No intake or output data in the 24 hours ending 10/03/20 1252    Physical Exam   No done. Patient on comfort measures.     Significant Labs:   All pertinent labs from the last 24 hours have been reviewed.    Diagnostic Results:  I have reviewed and interpreted all pertinent imaging results/findings within the past 24 hours.

## 2020-10-03 NOTE — PROGRESS NOTES
Patient seen and examined. Cousin at bedside. She is comfortable. Continue with comfort measures.

## 2020-10-03 NOTE — PROGRESS NOTES
Patient was transferred from CCS/ICU last night to Oncology, comfort care measures only, morphine infusion was started, patient's status is terminal. Spoke with patient's nurse Martine to get status update before entering patient's room. Met with patient's family who were vigiling at her bedside - her mother (Char Spence, [phone]), two sisters and her brother, and an aunt. Another sister and patient's oldest son who is 10 y.o. were sleeping on the couch. Patient not verbally responsive. Family grieving appropriately at present. Patient's mother reported that they had follow up visit from the  and Child Life staff earlier. Patient's two younger children, ages 8 y.o. and 3 y.o., are with another family member at present. Patient's mother has information and resources from the Child Life staff to help her explain to patient's children about her death. Provided support to family and offered ongoing support/assistance. Explained to family how to reach the . Gave patient's mother my business card with my direct number written on it. Will continue to follow.

## 2020-10-03 NOTE — PLAN OF CARE
Plan of care reviewed with patient at beginning of shift. Patient is nonverbal and nonresponsive. Patient is on a morphine drip at 1mg/hr. Patient is resting comfortably. No signs of discomfort, agitation or trouble breathing. Patient is breathing between 4-6 breaths per minute. Pillows adjusted throughout shift for comfort. Bed locked in lowest position. Side rails up x2. Call bell within reach. BADL within reach. Rounding Q1H. Will continue to monitor.

## 2020-10-03 NOTE — PLAN OF CARE
Problem: Adult Inpatient Plan of Care  Goal: Plan of Care Review  Outcome: Ongoing, Progressing   Pt actively dying. Family at bedside. Midline to R arm maintained - morphine drip at 1mg/hr infusing. Pt rested comfortably in bed all day.

## 2020-10-04 NOTE — PROGRESS NOTES
"[facility]  Hematology/Oncology  Progress Note    Patient Name: Huy Cisneros  Admission Date: 9/24/2020  Hospital Length of Stay: 9 days  Code Status: DNR     Subjective:     HPI:  Ms. Cisneros is a 29 year old woman with metastatic colon adenocarcinoma, HTN who presents with confusion, nausea, vomiting. She follows with Dr. Arzola in clinic. Attempted to contact patient's mother for further HPI as patient unable to recall recent events but unable to reach her. Patient was most recently admitted to [facility] with UTI for 3 days and was discharged yesterday (09/24) to finish her course of vancomycin when she was hospitalized in 8/26-09/09 for MRSA bacteremia. Per admit ED note after leaving hospital yesterday, "On the way home, pt began complaining of SOB and nausea/vomiting. Reports 2x emesis, non-bloody. SOB has since resolved. Pt's mother further reports that for the past 2d, the pt has been disoriented, speaking strangely, and occasionally has her 'eyes rolled back in her head,' which prompted them to come to [place] ED". Patient says that she has been feeling "terrible" with nausea and vomiting. She cannot recall how many times she has vomited. She says that she has bilateral leg pain and been having headaches for a while. She denies fevers, chills, chest pain, cough, abdominal pain, diarrhea, dysuria, vision changes. She is able to say her name and what year it is but when asked where she is, she says "[facility]".    In the ED, she received one dose of morphine, zofran, and 1L IVF. Her labs were notable for electrolyte derangements and elevated lactic acid. CT head did not show an acute process. She will be admitted to Medical Oncology for further evaluation and management.    Mother: [phone]    Oncologic History:    Oncologic History 1. Colon adenocarcinoma       Oncologic Treatment 1. Right hemicolectomy (7/17/20)  2. Planned therapy: FOLFOX +/- bevacizumab       " Pathology 7/17/20:  Right hemicolectomy:  MODERATELY DIFFERENTIATED ADENOCARCINOMA WITH INFILTRATION THROUGH THE   MUSCULARIS INTO PERICOLONIC ADIPOSE TISSUE   METASTATIC CARCINOMA  TO 6 OF 15 LYMPH NODES   MULTIPLE METASTASES TO THE FAT ITSELF   NO SIGNIFICANT ABNORMALITY OF THE TERMINAL ILEUM OR APPENDIX   MMR--NORMAL FOR COLORECTAL CARCINOMA   Immunohistochemistry (IHC) Testing for Mismatch Repair (MMR) Proteins:   MLH1 - Intact nuclear expression   MSH2 - Intact nuclear expression   MSH6 - Intact nuclear expression   PMS2 - Intact nuclear expression   Background nonneoplastic tissue/internal control with intact nuclear   expression   IHC Interpretation   No loss of nuclear expression of MMR proteins: low probability of   microsatellite instability      Procedure:  Excision of right and transverse colon   Tumor Site:  transverse colon   Tumor Size:  3.2 cm   Macroscopic Tumor Perforation:  No   Histologic Type:  Adenocarcinoma   Histologic Grade:  Moderately differentiated   Tumor Extension:  Through the muscularis into pericolonic adipose tissue and   with tumor deposits extending to the most circumferential pericolonic tissue.   Margins:  The tumor is  9 cm from the distal margin and 44 from the proximal.   Treatment Effect:  No known presurgical therapy   Lymphovascular Invasion:  Identified in pericolonic tissue   Perineural Invasion:  Present   Tumor Deposits:  Present--approximately 12   Number of Lymph Nodes Examined:15   Number of Lymph Nodes Involved:  6   Pathologic Stage Classification: pT4a pN2A pM1a--liver metastases observed at      BRAF Mutation Analysis(V600E), Tumor  Result Summary:  NO MUTATION IDENTIFIED  Result:  Provided diagnosis: colorectal adenocarcinoma  BRAF status: Wild-type  KRAS Mutation Analysis, Colorectal  Result Summary:  NO MUTATION IDENTIFIED  Result:  Provided diagnosis: colorectal adenocarcinoma  KRAS status: Wild-type     7/15/20:  Transverse colon biopsy:  Invasive  "moderately differentiated colonic   adenocarcinoma         7/14/20:  Liver, biopsy:   Adenocarcinoma, moderately differentiated   Cytomorphologic features and immunohistochemical staining pattern supports   colonic primary        Interval History: no acute events overnight     Oncology Treatment Plan:   OP COLORECTAL FOLFOX + BEVACIZUMAB Q2W    Medications:  Continuous Infusions:   morphine 1 mg/hr (10/04/20 0928)     Scheduled Meds:  PRN Meds:lorazepam, morphine, promethazine (PHENERGAN) IVPB     Review of Systems   Unable to perform ROS: Mental status change     Objective:     Vital Signs (Most Recent):  Temp: 97.5 °F (36.4 °C) (10/01/20 1900)  Pulse: (!) 136 (10/04/20 0700)  Resp: 18 (10/04/20 0928)  BP: (!) 82/50 (10/01/20 2200)  SpO2: 98 % (10/01/20 2200) Vital Signs (24h Range):  Pulse:  [136] 136  Resp:  [4-18] 18     Weight: 88.1 kg (194 lb 3.6 oz)  Body mass index is 32.32 kg/m².  Body surface area is 2.01 meters squared.      Intake/Output Summary (Last 24 hours) at 10/4/2020 1100  Last data filed at 10/3/2020 1800  Gross per 24 hour   Intake 0 ml   Output 25 ml   Net -25 ml       Physical Exam   Patient look comfortable, does not seems to be in pain or stress.     Significant Labs:   All pertinent labs from the last 24 hours have been reviewed.    Diagnostic Results:  I have reviewed and interpreted all pertinent imaging results/findings within the past 24 hours.    Assessment/Plan:     * Acute encephalopathy  Etiology remains unclear, leptomeningeal disease on the differential, though negative MRI      - Keep morphine IV for comfort care   - 10/4 patient's mother very upset of the situation "my daughter is still alive and you are not feeding her anything or taking her vitals" we talked to her and reminded her what comfort care was. We also discussed that feeding and fluids do not improve survival and could potentially worsen her situation due to infections and edema. Plan for now is to wait for a " "inpatient hospice bed in the hospital to be available (since patient does not seem to be too stable to send her to an inpatient hospice facility), if no options available today we will star process for outside facility tomorrow. Vitals Q8h per mothers request     Elevated LFTs  - no labs     Hypertension  Patient on home amlodipine 5mg, lopressor 100mg, spironolactone 25mg. Holding in setting of sepsis. Resume when appropriate.     MRSA bacteremia  Patient admitted to hospital on 8/26/20 - 9/9/20 with MRSA bacteremia. Was recently discharged on 09/24 after being admitted for UTI. She was discharged to complete 6 more days of vancomycin (end date around 09/30).     - Will d/c antibiotics       Chronic neoplasm-related pain  On morphine IV for comfort     Adenocarcinoma of colon  Follows with Dr. Arzola in clinic. Received FOLFOX + Bevacizumab cycle #1 on 08/11/2020.   Now care is shifted to comfort care. Dr. Arzola is aware and agreeable.  - 10/4 patient's mother very upset of the situation "my daughter is still alive and you are not feeding her anything or taking her vitals" we talked to her and reminded her what comfort care was. We also discussed that feeding and fluids do not improve survival and could potentially worsen her situation due to infections and edema. Plan for now is to wait for a inpatient hospice bed in the hospital to be available (since patient does not seem to be too stable to send her to an inpatient hospice facility), if no options available today we will star process for outside facility tomorrow. Vitals Q8h per mothers request         Abrahan Malin MD  Hematology/Oncology  [facility]    "

## 2020-10-04 NOTE — ASSESSMENT & PLAN NOTE
"Etiology remains unclear, leptomeningeal disease on the differential, though negative MRI      - Keep morphine IV for comfort care   - 10/4 patient's mother very upset of the situation "my daughter is still alive and you are not feeding her anything or taking her vitals" we talked to her and reminded her what comfort care was. We also discussed that feeding and fluids do not improve survival and could potentially worsen her situation due to infections and edema. Plan for now is to wait for a inpatient hospice bed in the hospital to be available (since patient does not seem to be too stable to send her to an inpatient hospice facility), if no options available today we will star process for outside facility tomorrow. Vitals Q8h per mothers request   "

## 2020-10-04 NOTE — CARE UPDATE
Spoke with patient mother concerning call this morning, and she states that the team was speaking the patient's aunt not the mother concerning care. The patient's aunt assumed the role of the mother on the call and requested vitals, nutrition and changes to the plan of care. Patient's mother states she is understanding and aware of the process and would like to proceed with comfort care and if possible inpatient hospice. They would also not like any vital checks, or any changes in care besides comfort measures. All questions were answered with mother and other family members.

## 2020-10-04 NOTE — PLAN OF CARE
Comfort care measures continued throughout the shift.  No family at bedside throughout the night. Pt checked on hourly throughout the shift with multiple staff members. Fall precautions continued for pt. Bed locked and at lowest position. Call light within reach. Morphine gtt continued. Pt looks resting comfortable with no distress

## 2020-10-04 NOTE — SUBJECTIVE & OBJECTIVE
Interval History: no acute events overnight     Oncology Treatment Plan:   OP COLORECTAL FOLFOX + BEVACIZUMAB Q2W    Medications:  Continuous Infusions:   morphine 1 mg/hr (10/04/20 0928)     Scheduled Meds:  PRN Meds:lorazepam, morphine, promethazine (PHENERGAN) IVPB     Review of Systems   Unable to perform ROS: Mental status change     Objective:     Vital Signs (Most Recent):  Temp: 97.5 °F (36.4 °C) (10/01/20 1900)  Pulse: (!) 136 (10/04/20 0700)  Resp: 18 (10/04/20 0928)  BP: (!) 82/50 (10/01/20 2200)  SpO2: 98 % (10/01/20 2200) Vital Signs (24h Range):  Pulse:  [136] 136  Resp:  [4-18] 18     Weight: 88.1 kg (194 lb 3.6 oz)  Body mass index is 32.32 kg/m².  Body surface area is 2.01 meters squared.      Intake/Output Summary (Last 24 hours) at 10/4/2020 1100  Last data filed at 10/3/2020 1800  Gross per 24 hour   Intake 0 ml   Output 25 ml   Net -25 ml       Physical Exam   Patient look comfortable, does not seems to be in pain or stress.     Significant Labs:   All pertinent labs from the last 24 hours have been reviewed.    Diagnostic Results:  I have reviewed and interpreted all pertinent imaging results/findings within the past 24 hours.

## 2020-10-04 NOTE — CARE UPDATE
Rapid Response Nurse Chart Check     Chart check completed, abnormal VS noted. Comfort Care measures in place. Please call [phone] for further concerns or assistance.

## 2020-10-04 NOTE — PROGRESS NOTES
Received call requesting exploration of inpatient hospice.  Spoke to titi Higgins supervisor, who reports the one GIP bed for inpatient hospice is unavailable and currently in use.  Options will be reviewed with the family during rounds.

## 2020-10-04 NOTE — PLAN OF CARE
Plan of care reviewed with patient at beginning of shift. Patient is nonverbal and nonresponsive. Patient is on a morphine drip at 1mg/hr. Patient is resting comfortably. No signs of discomfort, agitation or trouble breathing. Patient is breathing between 6-8 breaths per minute. Pillows adjusted throughout shift for comfort. Bed locked in lowest position. Side rails up x2. Call bell within reach. BADL within reach. Rounding Q1H. Will continue to monitor.

## 2020-10-05 NOTE — ASSESSMENT & PLAN NOTE
"Etiology remains unclear, leptomeningeal disease on the differential, though negative MRI      - Keep morphine IV for comfort care   - 10/4 patient's mother very upset of the situation "my daughter is still alive and you are not feeding her anything or taking her vitals" we talked to her and reminded her what comfort care was. We also discussed that feeding and fluids do not improve survival and could potentially worsen her situation due to infections and edema. Plan for now is to wait for a inpatient hospice bed in the hospital to be available (since patient does not seem to be too stable to send her to an inpatient hospice facility), if no options available today we will star process for outside facility tomorrow. Vitals Q8h per mothers request. mother arrived an apologized saying that she wasn't the one talking over the phone, it was her daughter. She does not want fluids or feeding. We are going to keep patient on our floor until a hospice bed in the hospital is available.   "

## 2020-10-05 NOTE — PLAN OF CARE
Pt continues on comfort care, morphine gtt @ 1mg/hr. Pt resting comfortably throughout shift. No signs of discomfort. Respirations 8/hr. Tran in place. Family at bedside.

## 2020-10-05 NOTE — PLAN OF CARE
Comfort care measures continued throughout the shift.  No family at bedside throughout the night. Pt checked on hourly throughout the shift with multiple staff members. Fall precautions continued for pt. Bed locked and at lowest position. Call light within reach. Morphine gtt continued. Pt looks resting comfortable with no distress.
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
## 2020-10-05 NOTE — SUBJECTIVE & OBJECTIVE
Interval History: no events overnight     Oncology Treatment Plan:   OP COLORECTAL FOLFOX + BEVACIZUMAB Q2W    Medications:  Continuous Infusions:   morphine 1 mg/hr (10/04/20 0928)     Scheduled Meds:  PRN Meds:lorazepam, morphine, promethazine (PHENERGAN) IVPB     Review of Systems   Unable to perform ROS: Mental status change     Objective:     Vital Signs (Most Recent):  Temp: (!) 102.3 °F (39.1 °C) (10/04/20 1234)  Pulse: (!) 133 (10/04/20 1234)  Resp: (!) 8 (10/05/20 0903)  BP: (!) 80/45 (10/04/20 1234)  SpO2: (!) 74 % (10/04/20 1234) Vital Signs (24h Range):  Temp:  [102.3 °F (39.1 °C)] 102.3 °F (39.1 °C)  Pulse:  [133] 133  Resp:  [8] 8  SpO2:  [74 %] 74 %  BP: (80)/(45) 80/45     Weight: 88.1 kg (194 lb 3.6 oz)  Body mass index is 32.32 kg/m².  Body surface area is 2.01 meters squared.      Intake/Output Summary (Last 24 hours) at 10/5/2020 0959  Last data filed at 10/4/2020 1300  Gross per 24 hour   Intake 0 ml   Output --   Net 0 ml       Physical Exam    Significant Labs:   All pertinent labs from the last 24 hours have been reviewed.    Diagnostic Results:  I have reviewed and interpreted all pertinent imaging results/findings within the past 24 hours.

## 2020-10-05 NOTE — PROGRESS NOTES
"[facility]  Hematology/Oncology  Progress Note    Patient Name: Huy Cisneros  Admission Date: 9/24/2020  Hospital Length of Stay: 10 days  Code Status: DNR     Subjective:     HPI:  Ms. Cisneros is a 29 year old woman with metastatic colon adenocarcinoma, HTN who presents with confusion, nausea, vomiting. She follows with Dr. Arzola in clinic. Attempted to contact patient's mother for further HPI as patient unable to recall recent events but unable to reach her. Patient was most recently admitted to [facility] with UTI for 3 days and was discharged yesterday (09/24) to finish her course of vancomycin when she was hospitalized in 8/26-09/09 for MRSA bacteremia. Per admit ED note after leaving hospital yesterday, "On the way home, pt began complaining of SOB and nausea/vomiting. Reports 2x emesis, non-bloody. SOB has since resolved. Pt's mother further reports that for the past 2d, the pt has been disoriented, speaking strangely, and occasionally has her 'eyes rolled back in her head,' which prompted them to come to [facility] ED". Patient says that she has been feeling "terrible" with nausea and vomiting. She cannot recall how many times she has vomited. She says that she has bilateral leg pain and been having headaches for a while. She denies fevers, chills, chest pain, cough, abdominal pain, diarrhea, dysuria, vision changes. She is able to say her name and what year it is but when asked where she is, she says "[facility]".    In the ED, she received one dose of morphine, zofran, and 1L IVF. Her labs were notable for electrolyte derangements and elevated lactic acid. CT head did not show an acute process. She will be admitted to Medical Oncology for further evaluation and management.    Mother: [phone]    Oncologic History:    Oncologic History 1. Colon adenocarcinoma       Oncologic Treatment 1. Right hemicolectomy (7/17/20)  2. Planned therapy: FOLFOX +/- bevacizumab       " Pathology 7/17/20:  Right hemicolectomy:  MODERATELY DIFFERENTIATED ADENOCARCINOMA WITH INFILTRATION THROUGH THE   MUSCULARIS INTO PERICOLONIC ADIPOSE TISSUE   METASTATIC CARCINOMA  TO 6 OF 15 LYMPH NODES   MULTIPLE METASTASES TO THE FAT ITSELF   NO SIGNIFICANT ABNORMALITY OF THE TERMINAL ILEUM OR APPENDIX   MMR--NORMAL FOR COLORECTAL CARCINOMA   Immunohistochemistry (IHC) Testing for Mismatch Repair (MMR) Proteins:   MLH1 - Intact nuclear expression   MSH2 - Intact nuclear expression   MSH6 - Intact nuclear expression   PMS2 - Intact nuclear expression   Background nonneoplastic tissue/internal control with intact nuclear   expression   IHC Interpretation   No loss of nuclear expression of MMR proteins: low probability of   microsatellite instability      Procedure:  Excision of right and transverse colon   Tumor Site:  transverse colon   Tumor Size:  3.2 cm   Macroscopic Tumor Perforation:  No   Histologic Type:  Adenocarcinoma   Histologic Grade:  Moderately differentiated   Tumor Extension:  Through the muscularis into pericolonic adipose tissue and   with tumor deposits extending to the most circumferential pericolonic tissue.   Margins:  The tumor is  9 cm from the distal margin and 44 from the proximal.   Treatment Effect:  No known presurgical therapy   Lymphovascular Invasion:  Identified in pericolonic tissue   Perineural Invasion:  Present   Tumor Deposits:  Present--approximately 12   Number of Lymph Nodes Examined:15   Number of Lymph Nodes Involved:  6   Pathologic Stage Classification: pT4a pN2A pM1a--liver metastases observed at      BRAF Mutation Analysis(V600E), Tumor  Result Summary:  NO MUTATION IDENTIFIED  Result:  Provided diagnosis: colorectal adenocarcinoma  BRAF status: Wild-type  KRAS Mutation Analysis, Colorectal  Result Summary:  NO MUTATION IDENTIFIED  Result:  Provided diagnosis: colorectal adenocarcinoma  KRAS status: Wild-type     7/15/20:  Transverse colon biopsy:  Invasive  "moderately differentiated colonic   adenocarcinoma         7/14/20:  Liver, biopsy:   Adenocarcinoma, moderately differentiated   Cytomorphologic features and immunohistochemical staining pattern supports   colonic primary        Interval History: no events overnight     Oncology Treatment Plan:   OP COLORECTAL FOLFOX + BEVACIZUMAB Q2W    Medications:  Continuous Infusions:   morphine 1 mg/hr (10/04/20 0928)     Scheduled Meds:  PRN Meds:lorazepam, morphine, promethazine (PHENERGAN) IVPB     Review of Systems   Unable to perform ROS: Mental status change     Objective:     Vital Signs (Most Recent):  Temp: (!) 102.3 °F (39.1 °C) (10/04/20 1234)  Pulse: (!) 133 (10/04/20 1234)  Resp: (!) 8 (10/05/20 0903)  BP: (!) 80/45 (10/04/20 1234)  SpO2: (!) 74 % (10/04/20 1234) Vital Signs (24h Range):  Temp:  [102.3 °F (39.1 °C)] 102.3 °F (39.1 °C)  Pulse:  [133] 133  Resp:  [8] 8  SpO2:  [74 %] 74 %  BP: (80)/(45) 80/45     Weight: 88.1 kg (194 lb 3.6 oz)  Body mass index is 32.32 kg/m².  Body surface area is 2.01 meters squared.      Intake/Output Summary (Last 24 hours) at 10/5/2020 0959  Last data filed at 10/4/2020 1300  Gross per 24 hour   Intake 0 ml   Output --   Net 0 ml       Physical Exam    Significant Labs:   All pertinent labs from the last 24 hours have been reviewed.    Diagnostic Results:  I have reviewed and interpreted all pertinent imaging results/findings within the past 24 hours.    Assessment/Plan:     * Acute encephalopathy  Etiology remains unclear, leptomeningeal disease on the differential, though negative MRI      - Keep morphine IV for comfort care   - 10/4 patient's mother very upset of the situation "my daughter is still alive and you are not feeding her anything or taking her vitals" we talked to her and reminded her what comfort care was. We also discussed that feeding and fluids do not improve survival and could potentially worsen her situation due to infections and edema. Plan for now is to " wait for a inpatient hospice bed in the hospital to be available (since patient does not seem to be too stable to send her to an inpatient hospice facility), if no options available today we will star process for outside facility tomorrow. Vitals Q8h per mothers request. mother arrived an apologized saying that she wasn't the one talking over the phone, it was her daughter. She does not want fluids or feeding. We are going to keep patient on our floor until a hospice bed in the hospital is available.     Elevated LFTs  - no labs, comfort care     Hypertension  - only on morphine    MRSA bacteremia  Patient admitted to hospital on 8/26/20 - 9/9/20 with MRSA bacteremia. Was recently discharged on 09/24 after being admitted for UTI. She was discharged to complete 6 more days of vancomycin (end date around 09/30).     - Will d/c antibiotics since patient on comfort care      Chronic neoplasm-related pain  On morphine IV for comfort     Adenocarcinoma of colon  Follows with Dr. Arzola in clinic. Received FOLFOX + Bevacizumab cycle #1 on 08/11/2020.   Now care is shifted to comfort care. Dr. Arzola is aware and agreeable.         Abrahan Malin MD  Hematology/Oncology  [facility]

## 2020-10-06 NOTE — PLAN OF CARE
Pt nonverbal and non responsive. Agonal breathing throughout shift, but no signs of nonverbal discomfort. Family at bedside. Offered emotional support. Wcm.

## 2020-10-06 NOTE — NURSING
Comfort care measures continues. Pt quiet, unresponsive to touch or voice. On morphine drip at 1ml/hr to right upper arm midline. resp 10. Tran catheter in place, approx 55ml dark urine noted in chamber. No family members currently at bedside. Will continue to monitor.

## 2020-10-06 NOTE — PROGRESS NOTES
"[facility]  Hematology/Oncology  Progress Note    Patient Name: Huy Cisneros  Admission Date: 9/24/2020  Hospital Length of Stay: 11 days  Code Status: DNR     Subjective:     HPI:  Ms. Cisneros is a 29 year old woman with metastatic colon adenocarcinoma, HTN who presents with confusion, nausea, vomiting. She follows with Dr. Arzola in clinic. Attempted to contact patient's mother for further HPI as patient unable to recall recent events but unable to reach her. Patient was most recently admitted to [facility] with UTI for 3 days and was discharged yesterday (09/24) to finish her course of vancomycin when she was hospitalized in 8/26-09/09 for MRSA bacteremia. Per admit ED note after leaving hospital yesterday, "On the way home, pt began complaining of SOB and nausea/vomiting. Reports 2x emesis, non-bloody. SOB has since resolved. Pt's mother further reports that for the past 2d, the pt has been disoriented, speaking strangely, and occasionally has her 'eyes rolled back in her head,' which prompted them to come to [facility]". Patient says that she has been feeling "terrible" with nausea and vomiting. She cannot recall how many times she has vomited. She says that she has bilateral leg pain and been having headaches for a while. She denies fevers, chills, chest pain, cough, abdominal pain, diarrhea, dysuria, vision changes. She is able to say her name and what year it is but when asked where she is, she says "[facility]".    In the ED, she received one dose of morphine, zofran, and 1L IVF. Her labs were notable for electrolyte derangements and elevated lactic acid. CT head did not show an acute process. She will be admitted to Medical Oncology for further evaluation and management.    Mother: [phone]    Oncologic History:    Oncologic History 1. Colon adenocarcinoma       Oncologic Treatment 1. Right hemicolectomy (7/17/20)  2. Planned therapy: FOLFOX +/- bevacizumab       " Pathology 7/17/20:  Right hemicolectomy:  MODERATELY DIFFERENTIATED ADENOCARCINOMA WITH INFILTRATION THROUGH THE   MUSCULARIS INTO PERICOLONIC ADIPOSE TISSUE   METASTATIC CARCINOMA  TO 6 OF 15 LYMPH NODES   MULTIPLE METASTASES TO THE FAT ITSELF   NO SIGNIFICANT ABNORMALITY OF THE TERMINAL ILEUM OR APPENDIX   MMR--NORMAL FOR COLORECTAL CARCINOMA   Immunohistochemistry (IHC) Testing for Mismatch Repair (MMR) Proteins:   MLH1 - Intact nuclear expression   MSH2 - Intact nuclear expression   MSH6 - Intact nuclear expression   PMS2 - Intact nuclear expression   Background nonneoplastic tissue/internal control with intact nuclear   expression   IHC Interpretation   No loss of nuclear expression of MMR proteins: low probability of   microsatellite instability      Procedure:  Excision of right and transverse colon   Tumor Site:  transverse colon   Tumor Size:  3.2 cm   Macroscopic Tumor Perforation:  No   Histologic Type:  Adenocarcinoma   Histologic Grade:  Moderately differentiated   Tumor Extension:  Through the muscularis into pericolonic adipose tissue and   with tumor deposits extending to the most circumferential pericolonic tissue.   Margins:  The tumor is  9 cm from the distal margin and 44 from the proximal.   Treatment Effect:  No known presurgical therapy   Lymphovascular Invasion:  Identified in pericolonic tissue   Perineural Invasion:  Present   Tumor Deposits:  Present--approximately 12   Number of Lymph Nodes Examined:15   Number of Lymph Nodes Involved:  6   Pathologic Stage Classification: pT4a pN2A pM1a--liver metastases observed at      BRAF Mutation Analysis(V600E), Tumor  Result Summary:  NO MUTATION IDENTIFIED  Result:  Provided diagnosis: colorectal adenocarcinoma  BRAF status: Wild-type  KRAS Mutation Analysis, Colorectal  Result Summary:  NO MUTATION IDENTIFIED  Result:  Provided diagnosis: colorectal adenocarcinoma  KRAS status: Wild-type     7/15/20:  Transverse colon biopsy:  Invasive  "moderately differentiated colonic   adenocarcinoma         7/14/20:  Liver, biopsy:   Adenocarcinoma, moderately differentiated   Cytomorphologic features and immunohistochemical staining pattern supports   colonic primary        Interval History: no acute events overnight     Oncology Treatment Plan:   OP COLORECTAL FOLFOX + BEVACIZUMAB Q2W    Medications:  Continuous Infusions:   morphine 1 mg/hr (10/06/20 0559)     Scheduled Meds:   [START ON 10/7/2020] mupirocin   Nasal BID    scopolamine  1 patch Transdermal Q3 Days     PRN Meds:lorazepam, morphine, promethazine (PHENERGAN) IVPB     Review of Systems   Unable to perform ROS: Mental status change     Objective:     Vital Signs (Most Recent):  Temp: (!) 102.3 °F (39.1 °C) (10/04/20 1234)  Pulse: (!) 133 (10/04/20 1234)  Resp: (!) 9 (10/06/20 0500)  BP: (!) 80/45 (10/04/20 1234)  SpO2: (!) 74 % (10/04/20 1234) Vital Signs (24h Range):  Resp:  [8-10] 9     Weight: 88.1 kg (194 lb 3.6 oz)  Body mass index is 32.32 kg/m².  Body surface area is 2.01 meters squared.      Intake/Output Summary (Last 24 hours) at 10/6/2020 0948  Last data filed at 10/6/2020 0559  Gross per 24 hour   Intake 24.17 ml   Output 62 ml   Net -37.83 ml       Physical Exam   Patient confortable, does not seem to be in pain    Significant Labs:   All pertinent labs from the last 24 hours have been reviewed.    Diagnostic Results:  I have reviewed and interpreted all pertinent imaging results/findings within the past 24 hours.    Assessment/Plan:     * Acute encephalopathy  Etiology remains unclear, leptomeningeal disease on the differential, though negative MRI      - Keep morphine IV for comfort care   - 10/4 patient's mother very upset of the situation "my daughter is still alive and you are not feeding her anything or taking her vitals" we talked to her and reminded her what comfort care was. We also discussed that feeding and fluids do not improve survival and could potentially worsen her " situation due to infections and edema. Plan for now is to wait for a inpatient hospice bed in the hospital to be available (since patient does not seem to be too stable to send her to an inpatient hospice facility), if no options available today we will star process for outside facility tomorrow. Vitals Q8h per mothers request. mother arrived an apologized saying that she wasn't the one talking over the phone, it was her daughter. She does not want fluids or feeding. We are going to keep patient on our floor until a hospice bed in the hospital is available.     Elevated LFTs  - no labs     Hypertension  Patient on home amlodipine 5mg, lopressor 100mg, spironolactone 25mg. Holding in setting of sepsis. Resume when appropriate.     MRSA bacteremia  Patient admitted to hospital on 8/26/20 - 9/9/20 with MRSA bacteremia. Was recently discharged on 09/24 after being admitted for UTI. She was discharged to complete 6 more days of vancomycin (end date around 09/30).     - Will d/c antibiotics       Chronic neoplasm-related pain  On morphine IV for comfort     Adenocarcinoma of colon  Follows with Dr. Arzola in clinic. Received FOLFOX + Bevacizumab cycle #1 on 08/11/2020.   Now care is shifted to comfort care. Dr. Arzola is aware and agreeable.           Abrahan Malin MD  Hematology/Oncology  [facility]

## 2020-10-06 NOTE — PROGRESS NOTES
"[facility]  Adult Nutrition  Progress Note    SUMMARY       Recommendations  1. Continue comfort care measures per team.    Goals: 1.) Pt to meet >75% of estimated energy and protein needs over the course of 7 days.  Nutrition Goal Status: goal discontinued(pt on comfort care)  Communication of RD Recs: (POC)    Reason for Assessment    Reason For Assessment: RD follow-up  Diagnosis: (Acute Encephalopathy)  Relevant Medical History: HTN, Metastatic colon adenocarcinoma  Interdisciplinary Rounds: did not attend  General Information Comments: Pt unresponsive and on comfort care, no fluids or feeding per chart.  Nutrition Discharge Planning: Pt to remain comfortable    Nutrition Risk Screen    Nutrition Risk Screen: tube feeding or parenteral nutrition    Nutrition/Diet History    Spiritual, Cultural Beliefs, Bahai Practices, Values that Affect Care: no  Food Allergies: NKFA  Factors Affecting Nutritional Intake: NPO, altered gastrointestinal function    Anthropometrics    Temp: (!) 102.3 °F (39.1 °C)  Height: 5' 5" (165.1 cm)  Height (inches): 65 in  Weight Method: Bed Scale  Weight: 88.1 kg (194 lb 3.6 oz)  Weight (lb): 194.23 lb  Ideal Body Weight (IBW), Female: 125 lb  % Ideal Body Weight, Female (lb): 148.68 %  BMI (Calculated): 32.3  BMI Grade: 30 - 34.9- obesity - grade I  Usual Body Weight (UBW), k.2 kg(2020)  % Usual Body Weight: 102.42    Lab/Procedures/Meds    Pertinent Labs Reviewed: reviewed  Pertinent Labs Comments: labs d/c due to comfort care  Pertinent Medications Reviewed: reviewed  Pertinent Medications Comments: morphine    Estimated/Assessed Needs    Weight Used For Calorie Calculations: 88.1 kg (194 lb 3.6 oz)  Energy Calorie Requirements (kcal): 1921  Energy Need Method: Havelock-St Jeor(x1.2 PAL)  Protein Requirements: 88-105gm (1.0-1.2gm/kg)  Weight Used For Protein Calculations: 88 kg (194 lb 0.1 oz)  Fluid Requirements (mL): 1mL/kcal or per MD " recommenations  Estimated Fluid Requirement Method: RDA Method  RDA Method (mL): 1921    Nutrition Prescription Ordered    Current Diet Order: NPO    Evaluation of Received Nutrient/Fluid Intake    I/O: +8.8L since admit  Energy Calories Required: not meeting needs  Protein Required: not meeting needs  % Intake of Estimated Energy Needs: 0 - 25 %  % Meal Intake: NPO    Nutrition Risk    Level of Risk/Frequency of Follow-up: low     Assessment and Plan    Nutrition Problem  Inadequate energy intake     Related to (etiology):   NPO     Signs and Symptoms (as evidenced by):   NPO     Interventions(treatment strategy):  1.) Collaboration with other providers     Nutrition Diagnosis Status:   Discontinued - pt on comfort measures    Monitor and Evaluation    Food and Nutrient Intake: energy intake, parenteral nutrition intake, enteral nutrition intake  Food and Nutrient Adminstration: diet order, enteral and parenteral nutrition administration  Knowledge/Beliefs/Attitudes: food and nutrition knowledge/skill  Anthropometric Measurements: weight, weight change  Biochemical Data, Medical Tests and Procedures: electrolyte and renal panel, gastrointestinal profile  Nutrition-Focused Physical Findings: skin, overall appearance     Nutrition Follow-Up    RD Follow-up?: Yes

## 2020-10-06 NOTE — SUBJECTIVE & OBJECTIVE
Interval History: no acute events overnight     Oncology Treatment Plan:   OP COLORECTAL FOLFOX + BEVACIZUMAB Q2W    Medications:  Continuous Infusions:   morphine 1 mg/hr (10/06/20 0559)     Scheduled Meds:   [START ON 10/7/2020] mupirocin   Nasal BID    scopolamine  1 patch Transdermal Q3 Days     PRN Meds:lorazepam, morphine, promethazine (PHENERGAN) IVPB     Review of Systems   Unable to perform ROS: Mental status change     Objective:     Vital Signs (Most Recent):  Temp: (!) 102.3 °F (39.1 °C) (10/04/20 1234)  Pulse: (!) 133 (10/04/20 1234)  Resp: (!) 9 (10/06/20 0500)  BP: (!) 80/45 (10/04/20 1234)  SpO2: (!) 74 % (10/04/20 1234) Vital Signs (24h Range):  Resp:  [8-10] 9     Weight: 88.1 kg (194 lb 3.6 oz)  Body mass index is 32.32 kg/m².  Body surface area is 2.01 meters squared.      Intake/Output Summary (Last 24 hours) at 10/6/2020 0948  Last data filed at 10/6/2020 0559  Gross per 24 hour   Intake 24.17 ml   Output 62 ml   Net -37.83 ml       Physical Exam   Patient confortable, does not seem to be in pain    Significant Labs:   All pertinent labs from the last 24 hours have been reviewed.    Diagnostic Results:  I have reviewed and interpreted all pertinent imaging results/findings within the past 24 hours.

## 2020-10-06 NOTE — RESPIRATORY THERAPY
Rapid Response Respiratory Follow Up Therapy Note     Followed up with patient for proactive rounding MEWS Score 10. Patient DNR SPO2 83 on Room Air. Plan of care reviewed with primary RT, Tianna.  Please call Rapid Response RT, Isaiah Guillermo, LYNDSEY at [phone] with any questions or concerns.

## 2020-10-06 NOTE — PLAN OF CARE
Comfort care measures continued throughout shift. Continuous morphine infusion at 1ml/hr infusing to LEIF midline. Tran catheter intact, with scant amount of concentrated urine noted, approx 7ml. No BM overnight. Pt remained unresponsive, quiet. RR 8-10. Pt did have 2 family members that came to the bedside for about an hour

## 2020-10-07 NOTE — NURSING
Entered pt's room to find pt had stopped breathing, no apical heart rate detected. Call placed to resident. Attempted to call pt's mother, Char.  notified.

## 2020-10-07 NOTE — SIGNIFICANT EVENT
Death Note  Hospital Medicine  [facility]            I was called to bedside on 10/6/2020 at 2150. Nursing at beside, nursing supervisor notified.  has been notified. Family at bedside.     Patient is not responding to verbal or tactile stimuli. No heart or breath sounds on auscultation. No respirations. No palpable pulses. Patient does not have a pupillary or corneal reflex. Patient's pupils are fixed and dilated.      Time of death is 10/6/2020  at 2151        Cause of Death: metastatic colon cancer         Signing Physician:    Shyla Carreon MD   [facility] Contact  Email: [email or web address]  Pager: [phone]

## 2020-10-07 NOTE — DISCHARGE SUMMARY
"[facility]  [facility]ology[facility]Oncology  Discharge Summary      Patient Name: Huy Cisneros  MRN: [id number]  Admission Date: 9/24/2020  Hospital Length of Stay: 11 days  Discharge Date and Time:  10/06/2020 10:12 PM  Attending Physician: Joslyn Donovan MD   Discharging Provider: Shyla Carreon MD  Primary Care Provider: Primary Doctor No    HPI:   Ms. Cisneros is a 29 year old woman with metastatic colon adenocarcinoma, HTN who presents with confusion, nausea, vomiting. She follows with Dr. Arzola in clinic. Attempted to contact patient's mother for further HPI as patient unable to recall recent events but unable to reach her. Patient was most recently admitted to [facility] with UTI for 3 days and was discharged yesterday (09/24) to finish her course of vancomycin when she was hospitalized in 8/26-09/09 for MRSA bacteremia. Per admit ED note after leaving hospital yesterday, "On the way home, pt began complaining of SOB and nausea/vomiting. Reports 2x emesis, non-bloody. SOB has since resolved. Pt's mother further reports that for the past 2d, the pt has been disoriented, speaking strangely, and occasionally has her 'eyes rolled back in her head,' which prompted them to come to [facility] ED". Patient says that she has been feeling "terrible" with nausea and vomiting. She cannot recall how many times she has vomited. She says that she has bilateral leg pain and been having headaches for a while. She denies fevers, chills, chest pain, cough, abdominal pain, diarrhea, dysuria, vision changes. She is able to say her name and what year it is but when asked where she is, she says "[facility]".    In the ED, she received one dose of morphine, zofran, and 1L IVF. Her labs were notable for electrolyte derangements and elevated lactic acid. CT head did not show an acute process. She will be admitted to Medical Oncology for further evaluation and management.    Mother: [phone]    Oncologic " History:    Oncologic History 1. Colon adenocarcinoma       Oncologic Treatment 1. Right hemicolectomy (7/17/20)  2. Planned therapy: FOLFOX +/- bevacizumab       Pathology 7/17/20:  Right hemicolectomy:  MODERATELY DIFFERENTIATED ADENOCARCINOMA WITH INFILTRATION THROUGH THE   MUSCULARIS INTO PERICOLONIC ADIPOSE TISSUE   METASTATIC CARCINOMA  TO 6 OF 15 LYMPH NODES   MULTIPLE METASTASES TO THE FAT ITSELF   NO SIGNIFICANT ABNORMALITY OF THE TERMINAL ILEUM OR APPENDIX   MMR--NORMAL FOR COLORECTAL CARCINOMA   Immunohistochemistry (IHC) Testing for Mismatch Repair (MMR) Proteins:   MLH1 - Intact nuclear expression   MSH2 - Intact nuclear expression   MSH6 - Intact nuclear expression   PMS2 - Intact nuclear expression   Background nonneoplastic tissue/internal control with intact nuclear   expression   IHC Interpretation   No loss of nuclear expression of MMR proteins: low probability of   microsatellite instability      Procedure:  Excision of right and transverse colon   Tumor Site:  transverse colon   Tumor Size:  3.2 cm   Macroscopic Tumor Perforation:  No   Histologic Type:  Adenocarcinoma   Histologic Grade:  Moderately differentiated   Tumor Extension:  Through the muscularis into pericolonic adipose tissue and   with tumor deposits extending to the most circumferential pericolonic tissue.   Margins:  The tumor is  9 cm from the distal margin and 44 from the proximal.   Treatment Effect:  No known presurgical therapy   Lymphovascular Invasion:  Identified in pericolonic tissue   Perineural Invasion:  Present   Tumor Deposits:  Present--approximately 12   Number of Lymph Nodes Examined:15   Number of Lymph Nodes Involved:  6   Pathologic Stage Classification: pT4a pN2A pM1a--liver metastases observed at      BRAF Mutation Analysis(V600E), Tumor  Result Summary:  NO MUTATION IDENTIFIED  Result:  Provided diagnosis: colorectal adenocarcinoma  BRAF status: Wild-type  KRAS Mutation Analysis, Colorectal  Result  Summary:  NO MUTATION IDENTIFIED  Result:  Provided diagnosis: colorectal adenocarcinoma  KRAS status: Wild-type     7/15/20:  Transverse colon biopsy:  Invasive moderately differentiated colonic   adenocarcinoma         7/14/20:  Liver, biopsy:   Adenocarcinoma, moderately differentiated   Cytomorphologic features and immunohistochemical staining pattern supports   colonic primary        Procedure(s) (LRB):  MPU PROCEDURE (N/A)     Hospital Course: Patient presented to hospital with AMS and nausea and vomiting. CT head was negative. MRI brain showed subacutue lacunar infarct. MRI spine unable to be performed. Lactic acid has been trending upward possibly due to liver failure. Patient was empirically being covered by vancomycin, ampicillin, cefepime, acyclovir for possible meningitis. EEG complete w/o significant findings. Patient transferred to ICU 9/26 due to worsening mental status changes. LP was done and studies so far are unrevealing, with negative cultures (viral PCR are on process to date).   Her mental status was waxing and waning.  On 10/2, she became confused again, hypotensive, requiring pressor support. Talked with mother about patient's critical status (now on pressors). Patient was supposed to leave for inpatient hospice at some point but rapidly declined. Mother told us to wait until we start comfort measures  Mother verbalized she understands and wants the patient to be DNR.   Mother arrived with her other daughter, we had a family meeting, and went over Mrs. Cisneros course during this admission, and what looks like is progression of her cancer. We cannot specify a reason for her current mental status or decline. All work-up done was unrevealing for a cause.   They expressed understanding of her condition, and they wished for her to comfortable. We changed the goals of care to be comfort only. Morphine infusion started. 10/3 patient on our floor now, on morphine and comfort measures. Patient does  "not seem to be in pain. Cousin at bedside. Overnight nurse reported RR 2 and since the morning she has been having agonic breath pattern, RR 4-6.   10/4 patient's mother very upset of the situation "my daughter is still alive and you are not feeding her anything or taking her vitals" we talked to her and reminded her what comfort care was. We also discussed that feeding and fluids do not improve survival and could potentially worsen her situation due to infections and edema. Plan for now is to wait for a inpatient hospice bed in the hospital to be available (since patient does not seem to be too stable to send her to an inpatient hospice facility), if no options available today we will star process for outside facility tomorrow. Vitals Q8h per mothers request. In the afternoon, mother arrived an apologized saying that she wasn't the one talking over the phone, it was her daughter.    Patient transitioned to comfort care while inpatient, awaiting hospice bed. Passed on 10/6/2020 at 2151 peacefully.  at bedside and family notified.     Consults:   Consults (From admission, onward)        Status Ordering Provider     Inpatient consult to Child Life  Once     Provider:  (Not yet assigned)    Completed JOSE FORTUNE     Inpatient consult to Critical Care Medicine  Once     Provider:  (Not yet assigned)    Completed DELONTE ONOFRE     Inpatient consult to Hospice  Once     Provider:  (Not yet assigned)    Acknowledged TED LUZ     Inpatient consult to Midline team  Once     Provider:  (Not yet assigned)    Completed ALDO JI     Inpatient consult to Midline team  Once     Provider:  (Not yet assigned)    Completed OCTAVIANO LOYOLA     Inpatient consult to Neurology  Once     Provider:  (Not yet assigned)    Completed DELONTE ONOFRE     Inpatient consult to Oncology  Once     Provider:  (Not yet assigned)    Completed TITO MCCRARY     Inpatient consult to Palliative Care  Once     Provider:  " (Not yet assigned)    Completed DYLAN LIGHT     In[facility] team [facility]  Once     Provider:  (Not yet assigned)    Completed KATT HOWE     Inpatient consult to Social Work/Case Management  Once     Provider:  (Not yet assigned)    Acknowledged WINTERBOTTOM, FIONA     Pharmacy to dose Vancomycin consult  Once     Provider:  (Not yet assigned)    Completed KATT HOWE          Significant Diagnostic Studies: Labs: All labs within the past 24 hours have been reviewed    Pending Diagnostic Studies:     Procedure Component Value Units Date/Time    Freeze and Hold, [facility] [[id number]] Collected: 20 1555    Order Status: Sent Lab Status: No result     Specimen: CSF (Spinal Fluid) from Cerebrospinal Fluid         Final Active Diagnoses:    Diagnosis Date Noted POA    PRINCIPAL PROBLEM:  Acute encephalopathy [G93.40] 2020 Yes    Comfort measures only status [Z51.5] 10/01/2020 Not Applicable    Colon cancer metastasized to liver [C18.9, C78.7] 10/01/2020 Yes    Altered mental status [R41.82]  Yes    Oliguria [R34] 2020 Yes    Elevated LFTs [R79.89] 2020 Yes    Palliative care encounter [Z51.5] 2020 Not Applicable    MRSA bacteremia [R78.81, B95.62] 2020 Yes    Chronic neoplasm-related pain [G89.3] 2020 Yes    Adenocarcinoma of colon [C18.9] 2020 Yes      Problems Resolved During this Admission:    Diagnosis Date Noted Date Resolved POA    Hyponatremia [E87.1] 2020 Yes    Nausea and vomiting [R11.2] 2020 10/02/2020 Yes    Sepsis [A41.9]  2020 Yes    Lactic acidosis [E87.2]  10/01/2020 Yes      Discharged Condition:     Disposition:  in Medical Facility    Patient Instructions:   No discharge procedures on file.  Medications:  None (patient  at medical facility)    Shyla Carreon MD  Hematology/Oncology  [facility]

## 2020-10-07 NOTE — CHAPLAIN
Responded to unit request. Pt . No family present. Upon arrival to unit nurse stated family just left and has not yet been reached.  remains available.

## 2020-11-02 ENCOUNTER — TELEPHONE (OUTPATIENT)
Dept: HEMATOLOGY/ONCOLOGY | Facility: CLINIC | Age: 30
End: 2020-11-02

## 2020-11-02 NOTE — TELEPHONE ENCOUNTER
Pt.'s aunt confirmed Dr. Arzola's fax # in [facility].    ----- Message from Mireille Pulido sent at 2020  1:57 PM CST -----  Type:  Needs Medical Advice    Who Called: pt  Advice Regarding: Katherin Berry w/ Aunt - needs physician's statement for insurance purposes, pt  on 10/6/20  Would the patient rather a call back or a response via [facility]? call  Best Call Back Number:   Additional Information: n/a

## 2020-11-12 ENCOUNTER — DOCUMENT SCAN (OUTPATIENT)
Dept: HOME HEALTH SERVICES | Facility: HOSPITAL | Age: 30
End: 2020-11-12
Payer: MEDICAID

## 2020-12-08 ENCOUNTER — DOCUMENT SCAN (OUTPATIENT)
Dept: HOME HEALTH SERVICES | Facility: HOSPITAL | Age: 30
End: 2020-12-08

## 2020-12-08 ENCOUNTER — DOCUMENT SCAN (OUTPATIENT)
Dept: HOME HEALTH SERVICES | Facility: HOSPITAL | Age: 30
End: 2020-12-08
Payer: MEDICAID

## 2020-12-21 ENCOUNTER — TELEPHONE (OUTPATIENT)
Dept: HEMATOLOGY/ONCOLOGY | Facility: CLINIC | Age: 30
End: 2020-12-21

## 2020-12-21 NOTE — TELEPHONE ENCOUNTER
"Pt.'s niece confirmed medical records fax number and Dr. Arzola's office fax number for insurance records request.    ----- Message from Steph Forrester sent at 12/21/2020 11:16 AM CST -----  Consult/Advisory:    Name Of Caller: Alannah Berry (niece)  Contact Preference?: [phone]    Does patient feel the need to be seen today? No    What is the nature of the call?:  -request a callback regarding paperwork that was sent by Insurance to be completed and returned back in October    -Insurance company states they still have not received info     Additional Notes:  "Thank you for all that you do for our patients'"      "
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
## 2022-12-19 NOTE — ASSESSMENT & PLAN NOTE
It is noted that DR Aviles  filled pantoprazole 7/27/22 with #90, R3.     Pt last seen 1/11/22.     Call to pt.  F/u appt scheduled with Dr Mattson for 1/17 @ 9:30 am.     Pt requests change pharmacy.  See o/v note of 1/11/22.     Escribe completed for pantoprazole 40 mg 1 tab po daily, 30, R0.    Monitor.

## 2024-12-13 NOTE — PROGRESS NOTES
"[facility]  Hematology/Oncology  Progress Note    Patient Name: Huy Cisneros  Admission Date: 9/24/2020  Hospital Length of Stay: 8 days  Code Status: DNR     Subjective:     HPI:  Ms. Cisneros is a 29 year old woman with metastatic colon adenocarcinoma, HTN who presents with confusion, nausea, vomiting. She follows with Dr. Arzola in clinic. Attempted to contact patient's mother for further HPI as patient unable to recall recent events but unable to reach her. Patient was most recently admitted to [facility] with UTI for 3 days and was discharged yesterday (09/24) to finish her course of vancomycin when she was hospitalized in 8/26-09/09 for MRSA bacteremia. Per admit ED note after leaving hospital yesterday, "On the way home, pt began complaining of SOB and nausea/vomiting. Reports 2x emesis, non-bloody. SOB has since resolved. Pt's mother further reports that for the past 2d, the pt has been disoriented, speaking strangely, and occasionally has her 'eyes rolled back in her head,' which prompted them to come [place] [facility]". Patient says that she has been feeling "terrible" with nausea and vomiting. She cannot recall how many times she has vomited. She says that she has bilateral leg pain and been having headaches for a while. She denies fevers, chills, chest pain, cough, abdominal pain, diarrhea, dysuria, vision changes. She is able to say her name and what year it is but when asked where she is, she says "[facility]".    In the ED, she received one dose of morphine, zofran, and 1L IVF. Her labs were notable for electrolyte derangements and elevated lactic acid. CT head did not show an acute process. She will be admitted to Medical Oncology for further evaluation and management.    Mother: [phone]    Oncologic History:    Oncologic History 1. Colon adenocarcinoma       Oncologic Treatment 1. Right hemicolectomy (7/17/20)  2. Planned therapy: FOLFOX +/- bevacizumab       " Spoke with patient. Relayed vsp results. Patient V/U.  Pathology 7/17/20:  Right hemicolectomy:  MODERATELY DIFFERENTIATED ADENOCARCINOMA WITH INFILTRATION THROUGH THE   MUSCULARIS INTO PERICOLONIC ADIPOSE TISSUE   METASTATIC CARCINOMA  TO 6 OF 15 LYMPH NODES   MULTIPLE METASTASES TO THE FAT ITSELF   NO SIGNIFICANT ABNORMALITY OF THE TERMINAL ILEUM OR APPENDIX   MMR--NORMAL FOR COLORECTAL CARCINOMA   Immunohistochemistry (IHC) Testing for Mismatch Repair (MMR) Proteins:   MLH1 - Intact nuclear expression   MSH2 - Intact nuclear expression   MSH6 - Intact nuclear expression   PMS2 - Intact nuclear expression   Background nonneoplastic tissue/internal control with intact nuclear   expression   IHC Interpretation   No loss of nuclear expression of MMR proteins: low probability of   microsatellite instability      Procedure:  Excision of right and transverse colon   Tumor Site:  transverse colon   Tumor Size:  3.2 cm   Macroscopic Tumor Perforation:  No   Histologic Type:  Adenocarcinoma   Histologic Grade:  Moderately differentiated   Tumor Extension:  Through the muscularis into pericolonic adipose tissue and   with tumor deposits extending to the most circumferential pericolonic tissue.   Margins:  The tumor is  9 cm from the distal margin and 44 from the proximal.   Treatment Effect:  No known presurgical therapy   Lymphovascular Invasion:  Identified in pericolonic tissue   Perineural Invasion:  Present   Tumor Deposits:  Present--approximately 12   Number of Lymph Nodes Examined:15   Number of Lymph Nodes Involved:  6   Pathologic Stage Classification: pT4a pN2A pM1a--liver metastases observed at      BRAF Mutation Analysis(V600E), Tumor  Result Summary:  NO MUTATION IDENTIFIED  Result:  Provided diagnosis: colorectal adenocarcinoma  BRAF status: Wild-type  KRAS Mutation Analysis, Colorectal  Result Summary:  NO MUTATION IDENTIFIED  Result:  Provided diagnosis: colorectal adenocarcinoma  KRAS status: Wild-type     7/15/20:  Transverse colon biopsy:  Invasive  moderately differentiated colonic   adenocarcinoma         7/14/20:  Liver, biopsy:   Adenocarcinoma, moderately differentiated   Cytomorphologic features and immunohistochemical staining pattern supports   colonic primary        Interval History: Patient was transferred from CCS/ICU last night to Oncology, comfort care measures only, morphine infusion was started, patient's status is terminal    Oncology Treatment Plan:   OP COLORECTAL FOLFOX + BEVACIZUMAB Q2W    Medications:  Continuous Infusions:   morphine 1 mg/hr (10/01/20 2200)     Scheduled Meds:  PRN Meds:lorazepam, morphine, promethazine (PHENERGAN) IVPB     Review of Systems   Unable to perform ROS: Mental status change     Objective:     Vital Signs (Most Recent):  Temp: 97.5 °F (36.4 °C) (10/01/20 1900)  Pulse: 97 (10/01/20 2200)  Resp: (!) 4 (10/03/20 1227)  BP: (!) 82/50 (10/01/20 2200)  SpO2: 98 % (10/01/20 2200) Vital Signs (24h Range):  Resp:  [4-6] 4     Weight: 88.1 kg (194 lb 3.6 oz)  Body mass index is 32.32 kg/m².  Body surface area is 2.01 meters squared.    No intake or output data in the 24 hours ending 10/03/20 1252    Physical Exam   No done. Patient on comfort measures.     Significant Labs:   All pertinent labs from the last 24 hours have been reviewed.    Diagnostic Results:  I have reviewed and interpreted all pertinent imaging results/findings within the past 24 hours.    Assessment/Plan:     * Acute encephalopathy  Etiology remains unclear, leptomeningeal disease on the differential, though negative MRI      - Keep morphine IV for comfort care       Elevated LFTs  - no labs     Hypertension  Patient on home amlodipine 5mg, lopressor 100mg, spironolactone 25mg. Holding in setting of sepsis. Resume when appropriate.     MRSA bacteremia  Patient admitted to hospital on 8/26/20 - 9/9/20 with MRSA bacteremia. Was recently discharged on 09/24 after being admitted for UTI. She was discharged to complete 6 more days of vancomycin (end date  around 09/30).     - Will d/c antibiotics       Chronic neoplasm-related pain  On morphine IV for comfort     Adenocarcinoma of colon  Follows with Dr. Arzola in clinic. Received FOLFOX + Bevacizumab cycle #1 on 08/11/2020.   Now care is shifted to comfort care. Dr. Arzola is aware and agreeable.             Abrahan Malin MD  Hematology/Oncology  [facility]
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
## (undated) DEVICE — SUT SILK 2-0 STRANDS 30IN

## (undated) DEVICE — GLOVE SURGICAL LATEX SZ 7

## (undated) DEVICE — DRESSING TRANS 4X4 3/4

## (undated) DEVICE — ADHESIVE DERMABOND ADVANCED

## (undated) DEVICE — SUT VICRYL 4-0 27 SH

## (undated) DEVICE — SUT MONOCRYL 5-0 P-3 UND 18

## (undated) DEVICE — MANIFOLD 4 PORT

## (undated) DEVICE — SYR 30CC LUER LOCK

## (undated) DEVICE — TROCAR ENDOPATH XCEL 5MM 7.5CM

## (undated) DEVICE — DRESSING TEGADERM 2 3/8 X 2.75

## (undated) DEVICE — ELECTRODE REM PLYHSV RETURN 9

## (undated) DEVICE — BLADE SURG CARBON STEEL SZ11

## (undated) DEVICE — SCISSOR 5MMX35CM DIRECT DRIVE

## (undated) DEVICE — STAPLER SKIN ROTATING HEAD

## (undated) DEVICE — SET DECANTER MEDICHOICE

## (undated) DEVICE — SPONGE DERMA 8PLY 2X2

## (undated) DEVICE — TROCAR ENDOPATH XCEL 11MM 10CM

## (undated) DEVICE — SUT SILK 0 STRANDS 30IN BLK

## (undated) DEVICE — APPLICATOR CHLORAPREP ORN 26ML

## (undated) DEVICE — SHELL POWER SIGNIA

## (undated) DEVICE — IRRIGATOR ENDOSCOPY DISP.

## (undated) DEVICE — TROCAR ENDOPATH XCEL 5X75MM

## (undated) DEVICE — SUT PROLENE 4-0 MONO 18IN

## (undated) DEVICE — SUPPORT ULNA NERVE PROTECTOR

## (undated) DEVICE — STAPLER ENDO GIA 60MM MED THCK

## (undated) DEVICE — TROCAR ENDOPATH XCEL 12X100MM

## (undated) DEVICE — CLOSURE SKIN STERI STRIP 1/2X4

## (undated) DEVICE — SYS CLSR WND ENDOSCP XL

## (undated) DEVICE — TRAY FOLEY 16FR INFECTION CONT

## (undated) DEVICE — SUT VICRYL CTD 2-0 GI 27 SH

## (undated) DEVICE — COVER PROBE 6X48

## (undated) DEVICE — SEALER LIGASURE MARYLAND 37CM

## (undated) DEVICE — SUT 0 VICRYL / UR6 (J603)

## (undated) DEVICE — SUT 2/0 30IN SILK BLK BRAI

## (undated) DEVICE — SEE MEDLINE ITEM 156952

## (undated) DEVICE — DRAPE LAP TIBURON 77X122IN

## (undated) DEVICE — DRAPE C-ARM/MOBILE XRAY 44X80

## (undated) DEVICE — SEE MEDLINE ITEM 152622

## (undated) DEVICE — PACK BASIC

## (undated) DEVICE — NDL HYPO REG 25G X 1 1/2

## (undated) DEVICE — Device

## (undated) DEVICE — SYR 10CC LUER LOCK

## (undated) DEVICE — DRESSING AQUACEL SACRAL 9 X 9

## (undated) DEVICE — COVER OVERHEAD SURG LT BLUE

## (undated) DEVICE — SEE MEDLINE ITEM 157116

## (undated) DEVICE — SUT 1 48IN PDS II VIO MONO